# Patient Record
Sex: MALE | Race: WHITE | NOT HISPANIC OR LATINO | Employment: OTHER | URBAN - METROPOLITAN AREA
[De-identification: names, ages, dates, MRNs, and addresses within clinical notes are randomized per-mention and may not be internally consistent; named-entity substitution may affect disease eponyms.]

---

## 2017-01-04 ENCOUNTER — HOSPITAL ENCOUNTER (OUTPATIENT)
Dept: ULTRASOUND IMAGING | Facility: HOSPITAL | Age: 67
Discharge: HOME | End: 2017-01-04
Attending: OTOLARYNGOLOGY

## 2017-01-04 DIAGNOSIS — E04.1 THYROID NODULE: ICD-10-CM

## 2017-02-14 ENCOUNTER — COMMUNICATION - HEALTHEAST (OUTPATIENT)
Dept: CARDIOLOGY | Facility: CLINIC | Age: 67
End: 2017-02-14

## 2017-02-14 DIAGNOSIS — I10 HTN (HYPERTENSION): ICD-10-CM

## 2017-02-28 ENCOUNTER — OFFICE VISIT - HEALTHEAST (OUTPATIENT)
Dept: OTOLARYNGOLOGY | Facility: CLINIC | Age: 67
End: 2017-02-28

## 2017-02-28 DIAGNOSIS — R04.0 EPISTAXIS: ICD-10-CM

## 2017-02-28 ASSESSMENT — MIFFLIN-ST. JEOR: SCORE: 1409.43

## 2017-06-15 ENCOUNTER — OFFICE VISIT - HEALTHEAST (OUTPATIENT)
Dept: FAMILY MEDICINE | Facility: CLINIC | Age: 67
End: 2017-06-15

## 2017-06-15 DIAGNOSIS — G50.0 TRIGEMINAL NEURALGIA: ICD-10-CM

## 2017-06-27 ENCOUNTER — HOSPITAL ENCOUNTER (OUTPATIENT)
Dept: MRI IMAGING | Facility: HOSPITAL | Age: 67
Discharge: HOME | End: 2017-06-27
Attending: FAMILY MEDICINE

## 2017-06-27 DIAGNOSIS — G50.0 TRIGEMINAL NEURALGIA: ICD-10-CM

## 2017-07-03 ENCOUNTER — COMMUNICATION - HEALTHEAST (OUTPATIENT)
Dept: FAMILY MEDICINE | Facility: CLINIC | Age: 67
End: 2017-07-03

## 2017-07-12 ENCOUNTER — COMMUNICATION - HEALTHEAST (OUTPATIENT)
Dept: CARDIOLOGY | Facility: CLINIC | Age: 67
End: 2017-07-12

## 2017-07-12 DIAGNOSIS — I10 ESSENTIAL HYPERTENSION: ICD-10-CM

## 2017-07-14 ENCOUNTER — OFFICE VISIT - HEALTHEAST (OUTPATIENT)
Dept: FAMILY MEDICINE | Facility: CLINIC | Age: 67
End: 2017-07-14

## 2017-07-14 DIAGNOSIS — J32.9 SINUSITIS: ICD-10-CM

## 2017-07-14 ASSESSMENT — MIFFLIN-ST. JEOR: SCORE: 1423.04

## 2017-07-24 ENCOUNTER — OFFICE VISIT - HEALTHEAST (OUTPATIENT)
Dept: FAMILY MEDICINE | Facility: CLINIC | Age: 67
End: 2017-07-24

## 2017-07-24 ENCOUNTER — RECORDS - HEALTHEAST (OUTPATIENT)
Dept: GENERAL RADIOLOGY | Facility: CLINIC | Age: 67
End: 2017-07-24

## 2017-07-24 DIAGNOSIS — Z23 NEED FOR PNEUMOCOCCAL VACCINE: ICD-10-CM

## 2017-07-24 DIAGNOSIS — R05.9 COUGH: ICD-10-CM

## 2017-07-24 DIAGNOSIS — G50.0 TRIGEMINAL NEURALGIA: ICD-10-CM

## 2017-07-24 DIAGNOSIS — Z71.2 ENCOUNTER TO DISCUSS TEST RESULTS: ICD-10-CM

## 2017-07-24 DIAGNOSIS — J40 BRONCHITIS: ICD-10-CM

## 2017-07-24 ASSESSMENT — MIFFLIN-ST. JEOR: SCORE: 1418.51

## 2017-07-28 ENCOUNTER — AMBULATORY - HEALTHEAST (OUTPATIENT)
Dept: FAMILY MEDICINE | Facility: CLINIC | Age: 67
End: 2017-07-28

## 2017-07-28 ENCOUNTER — COMMUNICATION - HEALTHEAST (OUTPATIENT)
Dept: FAMILY MEDICINE | Facility: CLINIC | Age: 67
End: 2017-07-28

## 2017-08-11 ENCOUNTER — COMMUNICATION - HEALTHEAST (OUTPATIENT)
Dept: FAMILY MEDICINE | Facility: CLINIC | Age: 67
End: 2017-08-11

## 2017-08-11 DIAGNOSIS — G50.0 TRIGEMINAL NEURALGIA: ICD-10-CM

## 2017-08-14 ENCOUNTER — COMMUNICATION - HEALTHEAST (OUTPATIENT)
Dept: FAMILY MEDICINE | Facility: CLINIC | Age: 67
End: 2017-08-14

## 2017-08-14 DIAGNOSIS — G50.0 TRIGEMINAL NEURALGIA: ICD-10-CM

## 2017-08-19 ENCOUNTER — COMMUNICATION - HEALTHEAST (OUTPATIENT)
Dept: FAMILY MEDICINE | Facility: CLINIC | Age: 67
End: 2017-08-19

## 2017-08-19 DIAGNOSIS — I10 HYPERTENSION: ICD-10-CM

## 2017-08-21 ENCOUNTER — AMBULATORY - HEALTHEAST (OUTPATIENT)
Dept: CARDIOLOGY | Facility: CLINIC | Age: 67
End: 2017-08-21

## 2017-08-21 ENCOUNTER — COMMUNICATION - HEALTHEAST (OUTPATIENT)
Dept: FAMILY MEDICINE | Facility: CLINIC | Age: 67
End: 2017-08-21

## 2017-08-21 ENCOUNTER — AMBULATORY - HEALTHEAST (OUTPATIENT)
Dept: FAMILY MEDICINE | Facility: CLINIC | Age: 67
End: 2017-08-21

## 2017-08-21 DIAGNOSIS — I25.10 CAD (CORONARY ARTERY DISEASE): ICD-10-CM

## 2017-08-21 DIAGNOSIS — E78.5 HYPERLIPIDEMIA: ICD-10-CM

## 2017-08-27 ENCOUNTER — COMMUNICATION - HEALTHEAST (OUTPATIENT)
Dept: FAMILY MEDICINE | Facility: CLINIC | Age: 67
End: 2017-08-27

## 2017-08-27 DIAGNOSIS — G50.0 TRIGEMINAL NEURALGIA: ICD-10-CM

## 2017-09-08 ENCOUNTER — AMBULATORY - HEALTHEAST (OUTPATIENT)
Dept: CARDIOLOGY | Facility: CLINIC | Age: 67
End: 2017-09-08

## 2017-09-08 DIAGNOSIS — I25.10 CAD (CORONARY ARTERY DISEASE): ICD-10-CM

## 2017-09-08 DIAGNOSIS — E78.5 HYPERLIPIDEMIA: ICD-10-CM

## 2017-09-08 LAB
CHOLEST SERPL-MCNC: 282 MG/DL
FASTING STATUS PATIENT QL REPORTED: YES
HDLC SERPL-MCNC: 38 MG/DL
LDLC SERPL CALC-MCNC: 217 MG/DL
TRIGL SERPL-MCNC: 133 MG/DL

## 2017-09-11 ENCOUNTER — AMBULATORY - HEALTHEAST (OUTPATIENT)
Dept: CARDIOLOGY | Facility: CLINIC | Age: 67
End: 2017-09-11

## 2017-09-11 DIAGNOSIS — E78.5 HYPERLIPIDEMIA: ICD-10-CM

## 2017-09-25 ENCOUNTER — COMMUNICATION - HEALTHEAST (OUTPATIENT)
Dept: FAMILY MEDICINE | Facility: CLINIC | Age: 67
End: 2017-09-25

## 2017-09-28 ENCOUNTER — COMMUNICATION - HEALTHEAST (OUTPATIENT)
Dept: FAMILY MEDICINE | Facility: CLINIC | Age: 67
End: 2017-09-28

## 2017-09-28 DIAGNOSIS — G50.0 TRIGEMINAL NEURALGIA: ICD-10-CM

## 2017-10-09 ENCOUNTER — COMMUNICATION - HEALTHEAST (OUTPATIENT)
Dept: CARDIOLOGY | Facility: CLINIC | Age: 67
End: 2017-10-09

## 2017-10-09 DIAGNOSIS — I10 ESSENTIAL HYPERTENSION: ICD-10-CM

## 2017-10-26 ENCOUNTER — OFFICE VISIT - HEALTHEAST (OUTPATIENT)
Dept: CARDIOLOGY | Facility: CLINIC | Age: 67
End: 2017-10-26

## 2017-10-26 DIAGNOSIS — E04.1 THYROID NODULE: ICD-10-CM

## 2017-10-26 DIAGNOSIS — Z95.1 S/P CABG X 3: ICD-10-CM

## 2017-10-26 DIAGNOSIS — M79.10 MUSCLE SORENESS: ICD-10-CM

## 2017-10-26 DIAGNOSIS — I25.83 CORONARY ARTERY DISEASE DUE TO LIPID RICH PLAQUE: ICD-10-CM

## 2017-10-26 DIAGNOSIS — I10 ESSENTIAL HYPERTENSION: ICD-10-CM

## 2017-10-26 DIAGNOSIS — I25.10 CORONARY ARTERY DISEASE DUE TO LIPID RICH PLAQUE: ICD-10-CM

## 2017-10-26 ASSESSMENT — MIFFLIN-ST. JEOR: SCORE: 1436.65

## 2017-10-30 ENCOUNTER — HOSPITAL ENCOUNTER (OUTPATIENT)
Dept: CT IMAGING | Facility: HOSPITAL | Age: 67
Setting detail: RADIATION/ONCOLOGY SERIES
Discharge: STILL ADMITTED OR ADMINISTRATIVE | End: 2017-10-30
Attending: INTERNAL MEDICINE

## 2017-10-30 DIAGNOSIS — C7A.090 ATYPICAL CARCINOID LUNG TUMOR (H): ICD-10-CM

## 2017-10-31 ENCOUNTER — OFFICE VISIT - HEALTHEAST (OUTPATIENT)
Dept: ONCOLOGY | Facility: HOSPITAL | Age: 67
End: 2017-10-31

## 2017-10-31 ENCOUNTER — AMBULATORY - HEALTHEAST (OUTPATIENT)
Dept: INFUSION THERAPY | Facility: HOSPITAL | Age: 67
End: 2017-10-31

## 2017-10-31 DIAGNOSIS — I71.40 ABDOMINAL AORTIC ANEURYSM (AAA) WITHOUT RUPTURE (H): ICD-10-CM

## 2017-10-31 DIAGNOSIS — C7A.090 ATYPICAL CARCINOID LUNG TUMOR (H): ICD-10-CM

## 2017-10-31 ASSESSMENT — MIFFLIN-ST. JEOR: SCORE: 1435.74

## 2017-11-01 ENCOUNTER — COMMUNICATION - HEALTHEAST (OUTPATIENT)
Dept: CARDIOLOGY | Facility: CLINIC | Age: 67
End: 2017-11-01

## 2017-11-01 ENCOUNTER — COMMUNICATION - HEALTHEAST (OUTPATIENT)
Dept: ONCOLOGY | Facility: HOSPITAL | Age: 67
End: 2017-11-01

## 2017-11-03 ENCOUNTER — COMMUNICATION - HEALTHEAST (OUTPATIENT)
Dept: ONCOLOGY | Facility: CLINIC | Age: 67
End: 2017-11-03

## 2017-11-03 ENCOUNTER — AMBULATORY - HEALTHEAST (OUTPATIENT)
Dept: ONCOLOGY | Facility: CLINIC | Age: 67
End: 2017-11-03

## 2017-11-07 ENCOUNTER — COMMUNICATION - HEALTHEAST (OUTPATIENT)
Dept: CARDIOLOGY | Facility: CLINIC | Age: 67
End: 2017-11-07

## 2017-11-07 DIAGNOSIS — I10 HTN (HYPERTENSION): ICD-10-CM

## 2017-11-13 ENCOUNTER — RECORDS - HEALTHEAST (OUTPATIENT)
Dept: ADMINISTRATIVE | Facility: OTHER | Age: 67
End: 2017-11-13

## 2017-11-14 ENCOUNTER — RECORDS - HEALTHEAST (OUTPATIENT)
Dept: ADMINISTRATIVE | Facility: OTHER | Age: 67
End: 2017-11-14

## 2017-12-18 ENCOUNTER — COMMUNICATION - HEALTHEAST (OUTPATIENT)
Dept: FAMILY MEDICINE | Facility: CLINIC | Age: 67
End: 2017-12-18

## 2017-12-18 DIAGNOSIS — I10 HTN (HYPERTENSION): ICD-10-CM

## 2018-01-05 ENCOUNTER — COMMUNICATION - HEALTHEAST (OUTPATIENT)
Dept: CARDIOLOGY | Facility: CLINIC | Age: 68
End: 2018-01-05

## 2018-01-05 DIAGNOSIS — I10 ESSENTIAL HYPERTENSION: ICD-10-CM

## 2018-01-15 ENCOUNTER — COMMUNICATION - HEALTHEAST (OUTPATIENT)
Dept: FAMILY MEDICINE | Facility: CLINIC | Age: 68
End: 2018-01-15

## 2018-01-15 ENCOUNTER — AMBULATORY - HEALTHEAST (OUTPATIENT)
Dept: FAMILY MEDICINE | Facility: CLINIC | Age: 68
End: 2018-01-15

## 2018-01-29 ENCOUNTER — COMMUNICATION - HEALTHEAST (OUTPATIENT)
Dept: TELEHEALTH | Facility: CLINIC | Age: 68
End: 2018-01-29

## 2018-01-29 ENCOUNTER — AMBULATORY - HEALTHEAST (OUTPATIENT)
Dept: LAB | Facility: CLINIC | Age: 68
End: 2018-01-29

## 2018-01-29 DIAGNOSIS — I25.83 CORONARY ARTERY DISEASE DUE TO LIPID RICH PLAQUE: ICD-10-CM

## 2018-01-29 DIAGNOSIS — I25.10 CORONARY ARTERY DISEASE DUE TO LIPID RICH PLAQUE: ICD-10-CM

## 2018-01-29 LAB
CHOLEST SERPL-MCNC: 219 MG/DL
FASTING STATUS PATIENT QL REPORTED: YES
HDLC SERPL-MCNC: 39 MG/DL
LDLC SERPL CALC-MCNC: 154 MG/DL
TRIGL SERPL-MCNC: 128 MG/DL

## 2018-02-06 ENCOUNTER — COMMUNICATION - HEALTHEAST (OUTPATIENT)
Dept: CARDIOLOGY | Facility: CLINIC | Age: 68
End: 2018-02-06

## 2018-02-20 ENCOUNTER — COMMUNICATION - HEALTHEAST (OUTPATIENT)
Dept: CARDIOLOGY | Facility: CLINIC | Age: 68
End: 2018-02-20

## 2018-02-27 ENCOUNTER — RECORDS - HEALTHEAST (OUTPATIENT)
Dept: GENERAL RADIOLOGY | Facility: CLINIC | Age: 68
End: 2018-02-27

## 2018-02-27 ENCOUNTER — OFFICE VISIT - HEALTHEAST (OUTPATIENT)
Dept: FAMILY MEDICINE | Facility: CLINIC | Age: 68
End: 2018-02-27

## 2018-02-27 DIAGNOSIS — R10.9 ABDOMINAL PAIN: ICD-10-CM

## 2018-02-27 DIAGNOSIS — R10.9 UNSPECIFIED ABDOMINAL PAIN: ICD-10-CM

## 2018-03-05 ENCOUNTER — RECORDS - HEALTHEAST (OUTPATIENT)
Dept: ADMINISTRATIVE | Facility: OTHER | Age: 68
End: 2018-03-05

## 2018-03-28 ENCOUNTER — COMMUNICATION - HEALTHEAST (OUTPATIENT)
Dept: FAMILY MEDICINE | Facility: CLINIC | Age: 68
End: 2018-03-28

## 2018-03-28 DIAGNOSIS — I10 HTN (HYPERTENSION): ICD-10-CM

## 2018-04-03 ENCOUNTER — COMMUNICATION - HEALTHEAST (OUTPATIENT)
Dept: CARDIOLOGY | Facility: CLINIC | Age: 68
End: 2018-04-03

## 2018-04-03 DIAGNOSIS — I10 ESSENTIAL HYPERTENSION: ICD-10-CM

## 2018-05-14 ENCOUNTER — RECORDS - HEALTHEAST (OUTPATIENT)
Dept: ADMINISTRATIVE | Facility: OTHER | Age: 68
End: 2018-05-14

## 2018-06-26 ENCOUNTER — COMMUNICATION - HEALTHEAST (OUTPATIENT)
Dept: FAMILY MEDICINE | Facility: CLINIC | Age: 68
End: 2018-06-26

## 2018-06-26 DIAGNOSIS — I10 HTN (HYPERTENSION): ICD-10-CM

## 2018-07-03 ENCOUNTER — RECORDS - HEALTHEAST (OUTPATIENT)
Dept: ADMINISTRATIVE | Facility: OTHER | Age: 68
End: 2018-07-03

## 2018-07-13 ENCOUNTER — RECORDS - HEALTHEAST (OUTPATIENT)
Dept: ADMINISTRATIVE | Facility: OTHER | Age: 68
End: 2018-07-13

## 2018-09-26 ENCOUNTER — COMMUNICATION - HEALTHEAST (OUTPATIENT)
Dept: FAMILY MEDICINE | Facility: CLINIC | Age: 68
End: 2018-09-26

## 2018-09-26 DIAGNOSIS — I10 HTN (HYPERTENSION): ICD-10-CM

## 2018-10-25 ENCOUNTER — COMMUNICATION - HEALTHEAST (OUTPATIENT)
Dept: ONCOLOGY | Facility: HOSPITAL | Age: 68
End: 2018-10-25

## 2018-10-25 ENCOUNTER — OFFICE VISIT - HEALTHEAST (OUTPATIENT)
Dept: FAMILY MEDICINE | Facility: CLINIC | Age: 68
End: 2018-10-25

## 2018-10-25 DIAGNOSIS — C7A.090 ATYPICAL CARCINOID LUNG TUMOR (H): ICD-10-CM

## 2018-10-25 DIAGNOSIS — R68.2 DRY MOUTH: ICD-10-CM

## 2018-10-25 DIAGNOSIS — E55.9 VITAMIN D DEFICIENCY: ICD-10-CM

## 2018-10-25 DIAGNOSIS — E78.2 MIXED HYPERLIPIDEMIA: ICD-10-CM

## 2018-10-25 DIAGNOSIS — Z00.00 ROUTINE GENERAL MEDICAL EXAMINATION AT A HEALTH CARE FACILITY: ICD-10-CM

## 2018-10-25 DIAGNOSIS — M79.10 MYALGIA: ICD-10-CM

## 2018-10-25 LAB
ALBUMIN SERPL-MCNC: 3.8 G/DL (ref 3.5–5)
ALP SERPL-CCNC: 66 U/L (ref 45–120)
ALT SERPL W P-5'-P-CCNC: 12 U/L (ref 0–45)
ANION GAP SERPL CALCULATED.3IONS-SCNC: 12 MMOL/L (ref 5–18)
AST SERPL W P-5'-P-CCNC: 20 U/L (ref 0–40)
BASOPHILS # BLD AUTO: 0.1 THOU/UL (ref 0–0.2)
BASOPHILS NFR BLD AUTO: 1 % (ref 0–2)
BILIRUB SERPL-MCNC: 0.5 MG/DL (ref 0–1)
BUN SERPL-MCNC: 20 MG/DL (ref 8–22)
C REACTIVE PROTEIN LHE: 0.2 MG/DL (ref 0–0.8)
CALCIUM SERPL-MCNC: 9.9 MG/DL (ref 8.5–10.5)
CHLORIDE BLD-SCNC: 106 MMOL/L (ref 98–107)
CHOLEST SERPL-MCNC: 244 MG/DL
CO2 SERPL-SCNC: 21 MMOL/L (ref 22–31)
CREAT SERPL-MCNC: 0.83 MG/DL (ref 0.7–1.3)
EOSINOPHIL # BLD AUTO: 0.2 THOU/UL (ref 0–0.4)
EOSINOPHIL NFR BLD AUTO: 3 % (ref 0–6)
ERYTHROCYTE [DISTWIDTH] IN BLOOD BY AUTOMATED COUNT: 13.2 % (ref 11–14.5)
FASTING STATUS PATIENT QL REPORTED: YES
GFR SERPL CREATININE-BSD FRML MDRD: >60 ML/MIN/1.73M2
GLUCOSE BLD-MCNC: 80 MG/DL (ref 70–125)
HCT VFR BLD AUTO: 49.2 % (ref 40–54)
HDLC SERPL-MCNC: 42 MG/DL
HGB BLD-MCNC: 16.4 G/DL (ref 14–18)
LDLC SERPL CALC-MCNC: 182 MG/DL
LYMPHOCYTES # BLD AUTO: 1.7 THOU/UL (ref 0.8–4.4)
LYMPHOCYTES NFR BLD AUTO: 31 % (ref 20–40)
MCH RBC QN AUTO: 30.1 PG (ref 27–34)
MCHC RBC AUTO-ENTMCNC: 33.4 G/DL (ref 32–36)
MCV RBC AUTO: 90 FL (ref 80–100)
MONOCYTES # BLD AUTO: 0.4 THOU/UL (ref 0–0.9)
MONOCYTES NFR BLD AUTO: 7 % (ref 2–10)
NEUTROPHILS # BLD AUTO: 3.3 THOU/UL (ref 2–7.7)
NEUTROPHILS NFR BLD AUTO: 58 % (ref 50–70)
PLATELET # BLD AUTO: 261 THOU/UL (ref 140–440)
PMV BLD AUTO: 6.8 FL (ref 7–10)
POTASSIUM BLD-SCNC: 4.7 MMOL/L (ref 3.5–5)
PROT SERPL-MCNC: 7.2 G/DL (ref 6–8)
RBC # BLD AUTO: 5.45 MILL/UL (ref 4.4–6.2)
SODIUM SERPL-SCNC: 139 MMOL/L (ref 136–145)
T4 FREE SERPL-MCNC: 1 NG/DL (ref 0.7–1.8)
TRIGL SERPL-MCNC: 101 MG/DL
TSH SERPL DL<=0.005 MIU/L-ACNC: 1.25 UIU/ML (ref 0.3–5)
WBC: 5.6 THOU/UL (ref 4–11)

## 2018-10-25 ASSESSMENT — MIFFLIN-ST. JEOR: SCORE: 1428.71

## 2018-10-26 LAB
25(OH)D3 SERPL-MCNC: 19.1 NG/ML (ref 30–80)
25(OH)D3 SERPL-MCNC: 19.1 NG/ML (ref 30–80)

## 2018-10-29 LAB — ANA SER QL: 0.2 U

## 2018-10-30 ENCOUNTER — COMMUNICATION - HEALTHEAST (OUTPATIENT)
Dept: FAMILY MEDICINE | Facility: CLINIC | Age: 68
End: 2018-10-30

## 2018-10-30 DIAGNOSIS — I10 HTN (HYPERTENSION): ICD-10-CM

## 2018-10-30 DIAGNOSIS — E55.9 VITAMIN D DEFICIENCY: ICD-10-CM

## 2018-11-01 ENCOUNTER — RECORDS - HEALTHEAST (OUTPATIENT)
Dept: ADMINISTRATIVE | Facility: OTHER | Age: 68
End: 2018-11-01

## 2018-11-05 ENCOUNTER — COMMUNICATION - HEALTHEAST (OUTPATIENT)
Dept: ONCOLOGY | Facility: HOSPITAL | Age: 68
End: 2018-11-05

## 2018-11-05 ENCOUNTER — AMBULATORY - HEALTHEAST (OUTPATIENT)
Dept: ONCOLOGY | Facility: HOSPITAL | Age: 68
End: 2018-11-05

## 2018-11-05 DIAGNOSIS — C7A.090 ATYPICAL CARCINOID LUNG TUMOR (H): ICD-10-CM

## 2018-11-07 ENCOUNTER — COMMUNICATION - HEALTHEAST (OUTPATIENT)
Dept: CARDIOLOGY | Facility: CLINIC | Age: 68
End: 2018-11-07

## 2018-11-07 DIAGNOSIS — I10 HTN (HYPERTENSION): ICD-10-CM

## 2018-12-06 ENCOUNTER — COMMUNICATION - HEALTHEAST (OUTPATIENT)
Dept: CARDIOLOGY | Facility: CLINIC | Age: 68
End: 2018-12-06

## 2018-12-06 DIAGNOSIS — I10 HTN (HYPERTENSION): ICD-10-CM

## 2018-12-17 ENCOUNTER — HOSPITAL ENCOUNTER (OUTPATIENT)
Dept: CT IMAGING | Facility: HOSPITAL | Age: 68
Setting detail: RADIATION/ONCOLOGY SERIES
Discharge: STILL ADMITTED OR ADMINISTRATIVE | End: 2018-12-17
Attending: INTERNAL MEDICINE

## 2018-12-17 DIAGNOSIS — C7A.090 ATYPICAL CARCINOID LUNG TUMOR (H): ICD-10-CM

## 2018-12-17 LAB
CREAT BLD-MCNC: 1 MG/DL
POC GFR AMER AF HE - HISTORICAL: >60 ML/MIN/1.73M2
POC GFR NON AMER AF HE - HISTORICAL: >60 ML/MIN/1.73M2

## 2018-12-19 ENCOUNTER — AMBULATORY - HEALTHEAST (OUTPATIENT)
Dept: INFUSION THERAPY | Facility: HOSPITAL | Age: 68
End: 2018-12-19

## 2018-12-19 ENCOUNTER — OFFICE VISIT - HEALTHEAST (OUTPATIENT)
Dept: ONCOLOGY | Facility: HOSPITAL | Age: 68
End: 2018-12-19

## 2018-12-19 DIAGNOSIS — E04.1 THYROID NODULE: ICD-10-CM

## 2018-12-19 DIAGNOSIS — C7A.090 ATYPICAL CARCINOID LUNG TUMOR (H): ICD-10-CM

## 2018-12-19 DIAGNOSIS — K76.9 LESION OF LIVER: ICD-10-CM

## 2018-12-19 LAB
ALBUMIN SERPL-MCNC: 3.6 G/DL (ref 3.5–5)
ALP SERPL-CCNC: 57 U/L (ref 45–120)
ALT SERPL W P-5'-P-CCNC: <9 U/L (ref 0–45)
ANION GAP SERPL CALCULATED.3IONS-SCNC: 4 MMOL/L (ref 5–18)
AST SERPL W P-5'-P-CCNC: 17 U/L (ref 0–40)
BASOPHILS # BLD AUTO: 0.1 THOU/UL (ref 0–0.2)
BASOPHILS NFR BLD AUTO: 1 % (ref 0–2)
BILIRUB SERPL-MCNC: 0.6 MG/DL (ref 0–1)
BUN SERPL-MCNC: 18 MG/DL (ref 8–28)
CALCIUM SERPL-MCNC: 9.6 MG/DL (ref 8.5–10.5)
CHLORIDE BLD-SCNC: 108 MMOL/L (ref 98–107)
CO2 SERPL-SCNC: 29 MMOL/L (ref 22–31)
CREAT SERPL-MCNC: 0.91 MG/DL (ref 0.7–1.3)
EOSINOPHIL # BLD AUTO: 0.2 THOU/UL (ref 0–0.4)
EOSINOPHIL NFR BLD AUTO: 4 % (ref 0–6)
ERYTHROCYTE [DISTWIDTH] IN BLOOD BY AUTOMATED COUNT: 13.6 % (ref 11–14.5)
GFR SERPL CREATININE-BSD FRML MDRD: >60 ML/MIN/1.73M2
GLUCOSE BLD-MCNC: 63 MG/DL (ref 70–125)
HCT VFR BLD AUTO: 45 % (ref 40–54)
HGB BLD-MCNC: 15.6 G/DL (ref 14–18)
LYMPHOCYTES # BLD AUTO: 1.2 THOU/UL (ref 0.8–4.4)
LYMPHOCYTES NFR BLD AUTO: 24 % (ref 20–40)
MCH RBC QN AUTO: 30.8 PG (ref 27–34)
MCHC RBC AUTO-ENTMCNC: 34.7 G/DL (ref 32–36)
MCV RBC AUTO: 89 FL (ref 80–100)
MONOCYTES # BLD AUTO: 0.4 THOU/UL (ref 0–0.9)
MONOCYTES NFR BLD AUTO: 8 % (ref 2–10)
NEUTROPHILS # BLD AUTO: 3.3 THOU/UL (ref 2–7.7)
NEUTROPHILS NFR BLD AUTO: 63 % (ref 50–70)
PLATELET # BLD AUTO: 230 THOU/UL (ref 140–440)
PMV BLD AUTO: 8.8 FL (ref 8.5–12.5)
POTASSIUM BLD-SCNC: 3.8 MMOL/L (ref 3.5–5)
PROT SERPL-MCNC: 6.8 G/DL (ref 6–8)
RBC # BLD AUTO: 5.06 MILL/UL (ref 4.4–6.2)
SODIUM SERPL-SCNC: 141 MMOL/L (ref 136–145)
WBC: 5.2 THOU/UL (ref 4–11)

## 2018-12-27 ENCOUNTER — COMMUNICATION - HEALTHEAST (OUTPATIENT)
Dept: CARDIOLOGY | Facility: CLINIC | Age: 68
End: 2018-12-27

## 2018-12-27 DIAGNOSIS — I10 ESSENTIAL HYPERTENSION: ICD-10-CM

## 2019-02-07 ENCOUNTER — OFFICE VISIT - HEALTHEAST (OUTPATIENT)
Dept: FAMILY MEDICINE | Facility: CLINIC | Age: 69
End: 2019-02-07

## 2019-02-07 DIAGNOSIS — S93.491A SPRAIN OF ANTERIOR TALOFIBULAR LIGAMENT OF RIGHT ANKLE, INITIAL ENCOUNTER: ICD-10-CM

## 2019-02-15 ENCOUNTER — AMBULATORY - HEALTHEAST (OUTPATIENT)
Dept: LAB | Facility: CLINIC | Age: 69
End: 2019-02-15

## 2019-02-15 DIAGNOSIS — E55.9 VITAMIN D DEFICIENCY: ICD-10-CM

## 2019-02-18 LAB
25(OH)D3 SERPL-MCNC: 44.9 NG/ML (ref 30–80)
25(OH)D3 SERPL-MCNC: 44.9 NG/ML (ref 30–80)

## 2019-03-19 ENCOUNTER — HOSPITAL ENCOUNTER (OUTPATIENT)
Dept: MRI IMAGING | Facility: HOSPITAL | Age: 69
Setting detail: RADIATION/ONCOLOGY SERIES
Discharge: STILL ADMITTED OR ADMINISTRATIVE | End: 2019-03-19
Attending: INTERNAL MEDICINE

## 2019-03-19 ENCOUNTER — HOSPITAL ENCOUNTER (OUTPATIENT)
Dept: ULTRASOUND IMAGING | Facility: HOSPITAL | Age: 69
Setting detail: RADIATION/ONCOLOGY SERIES
Discharge: STILL ADMITTED OR ADMINISTRATIVE | End: 2019-03-19
Attending: INTERNAL MEDICINE

## 2019-03-19 DIAGNOSIS — C7A.090 ATYPICAL CARCINOID LUNG TUMOR (H): ICD-10-CM

## 2019-03-19 LAB
CREAT BLD-MCNC: 0.8 MG/DL (ref 0.7–1.3)
CREAT BLD-MCNC: 0.9 MG/DL
POC GFR AMER AF HE - HISTORICAL: >60 ML/MIN/1.73M2
POC GFR NON AMER AF HE - HISTORICAL: >60 ML/MIN/1.73M2

## 2019-03-22 ENCOUNTER — COMMUNICATION - HEALTHEAST (OUTPATIENT)
Dept: ONCOLOGY | Facility: HOSPITAL | Age: 69
End: 2019-03-22

## 2019-05-02 ENCOUNTER — RECORDS - HEALTHEAST (OUTPATIENT)
Dept: ADMINISTRATIVE | Facility: OTHER | Age: 69
End: 2019-05-02

## 2019-08-09 ENCOUNTER — RECORDS - HEALTHEAST (OUTPATIENT)
Dept: ADMINISTRATIVE | Facility: OTHER | Age: 69
End: 2019-08-09

## 2019-10-30 ENCOUNTER — COMMUNICATION - HEALTHEAST (OUTPATIENT)
Dept: FAMILY MEDICINE | Facility: CLINIC | Age: 69
End: 2019-10-30

## 2019-10-30 DIAGNOSIS — I10 HTN (HYPERTENSION): ICD-10-CM

## 2019-10-31 ENCOUNTER — RECORDS - HEALTHEAST (OUTPATIENT)
Dept: ADMINISTRATIVE | Facility: OTHER | Age: 69
End: 2019-10-31

## 2019-11-19 ENCOUNTER — COMMUNICATION - HEALTHEAST (OUTPATIENT)
Dept: FAMILY MEDICINE | Facility: CLINIC | Age: 69
End: 2019-11-19

## 2019-11-19 DIAGNOSIS — E55.9 VITAMIN D DEFICIENCY: ICD-10-CM

## 2019-11-29 ENCOUNTER — COMMUNICATION - HEALTHEAST (OUTPATIENT)
Dept: CARDIOLOGY | Facility: CLINIC | Age: 69
End: 2019-11-29

## 2019-11-29 DIAGNOSIS — I10 HTN (HYPERTENSION): ICD-10-CM

## 2019-12-15 ENCOUNTER — COMMUNICATION - HEALTHEAST (OUTPATIENT)
Dept: CARDIOLOGY | Facility: CLINIC | Age: 69
End: 2019-12-15

## 2019-12-15 DIAGNOSIS — I10 ESSENTIAL HYPERTENSION: ICD-10-CM

## 2019-12-20 ENCOUNTER — OFFICE VISIT - HEALTHEAST (OUTPATIENT)
Dept: FAMILY MEDICINE | Facility: CLINIC | Age: 69
End: 2019-12-20

## 2019-12-20 DIAGNOSIS — H92.02 LEFT EAR PAIN: ICD-10-CM

## 2019-12-20 ASSESSMENT — MIFFLIN-ST. JEOR: SCORE: 1405.39

## 2019-12-29 ENCOUNTER — COMMUNICATION - HEALTHEAST (OUTPATIENT)
Dept: CARDIOLOGY | Facility: CLINIC | Age: 69
End: 2019-12-29

## 2019-12-29 DIAGNOSIS — I10 HTN (HYPERTENSION): ICD-10-CM

## 2019-12-30 ENCOUNTER — COMMUNICATION - HEALTHEAST (OUTPATIENT)
Dept: CARDIOLOGY | Facility: CLINIC | Age: 69
End: 2019-12-30

## 2019-12-30 DIAGNOSIS — I10 HTN (HYPERTENSION): ICD-10-CM

## 2020-01-07 ENCOUNTER — OFFICE VISIT - HEALTHEAST (OUTPATIENT)
Dept: FAMILY MEDICINE | Facility: CLINIC | Age: 70
End: 2020-01-07

## 2020-01-07 DIAGNOSIS — M79.10 MYALGIA: ICD-10-CM

## 2020-01-07 DIAGNOSIS — I10 ESSENTIAL HYPERTENSION: ICD-10-CM

## 2020-01-07 DIAGNOSIS — E55.9 VITAMIN D DEFICIENCY, UNSPECIFIED: ICD-10-CM

## 2020-01-07 DIAGNOSIS — Z00.00 MEDICARE ANNUAL WELLNESS VISIT, SUBSEQUENT: ICD-10-CM

## 2020-01-07 DIAGNOSIS — Z11.59 NEED FOR HEPATITIS C SCREENING TEST: ICD-10-CM

## 2020-01-07 DIAGNOSIS — I10 HTN (HYPERTENSION): ICD-10-CM

## 2020-01-07 DIAGNOSIS — Z13.220 LIPID SCREENING: ICD-10-CM

## 2020-01-07 LAB
ANION GAP SERPL CALCULATED.3IONS-SCNC: 10 MMOL/L (ref 5–18)
BUN SERPL-MCNC: 21 MG/DL (ref 8–28)
CALCIUM SERPL-MCNC: 10.1 MG/DL (ref 8.5–10.5)
CHLORIDE BLD-SCNC: 107 MMOL/L (ref 98–107)
CHOLEST SERPL-MCNC: 260 MG/DL
CO2 SERPL-SCNC: 24 MMOL/L (ref 22–31)
CREAT SERPL-MCNC: 1.07 MG/DL (ref 0.7–1.3)
FASTING STATUS PATIENT QL REPORTED: YES
GFR SERPL CREATININE-BSD FRML MDRD: >60 ML/MIN/1.73M2
GLUCOSE BLD-MCNC: 89 MG/DL (ref 70–125)
HDLC SERPL-MCNC: 48 MG/DL
LDLC SERPL CALC-MCNC: 187 MG/DL
POTASSIUM BLD-SCNC: 4.8 MMOL/L (ref 3.5–5)
SODIUM SERPL-SCNC: 141 MMOL/L (ref 136–145)
TRIGL SERPL-MCNC: 123 MG/DL

## 2020-01-07 ASSESSMENT — MIFFLIN-ST. JEOR: SCORE: 1401.6

## 2020-01-08 LAB
25(OH)D3 SERPL-MCNC: 28.9 NG/ML (ref 30–80)
25(OH)D3 SERPL-MCNC: 28.9 NG/ML (ref 30–80)
HCV AB SERPL QL IA: NEGATIVE

## 2020-01-15 ENCOUNTER — AMBULATORY - HEALTHEAST (OUTPATIENT)
Dept: ONCOLOGY | Facility: HOSPITAL | Age: 70
End: 2020-01-15

## 2020-01-15 DIAGNOSIS — C7A.090 ATYPICAL CARCINOID LUNG TUMOR (H): ICD-10-CM

## 2020-01-20 ENCOUNTER — COMMUNICATION - HEALTHEAST (OUTPATIENT)
Dept: FAMILY MEDICINE | Facility: CLINIC | Age: 70
End: 2020-01-20

## 2020-01-20 DIAGNOSIS — Z95.1 S/P CABG X 3: ICD-10-CM

## 2020-01-20 DIAGNOSIS — E78.2 MIXED HYPERLIPIDEMIA: ICD-10-CM

## 2020-01-29 ENCOUNTER — HOSPITAL ENCOUNTER (OUTPATIENT)
Dept: CT IMAGING | Facility: HOSPITAL | Age: 70
Discharge: HOME | End: 2020-01-29
Attending: INTERNAL MEDICINE

## 2020-01-29 DIAGNOSIS — C7A.090 ATYPICAL CARCINOID LUNG TUMOR (H): ICD-10-CM

## 2020-01-31 ENCOUNTER — OFFICE VISIT - HEALTHEAST (OUTPATIENT)
Dept: ONCOLOGY | Facility: HOSPITAL | Age: 70
End: 2020-01-31

## 2020-01-31 ENCOUNTER — AMBULATORY - HEALTHEAST (OUTPATIENT)
Dept: INFUSION THERAPY | Facility: HOSPITAL | Age: 70
End: 2020-01-31

## 2020-01-31 DIAGNOSIS — C7A.090 ATYPICAL CARCINOID LUNG TUMOR (H): ICD-10-CM

## 2020-01-31 LAB
ALBUMIN SERPL-MCNC: 3.6 G/DL (ref 3.5–5)
ALP SERPL-CCNC: 53 U/L (ref 45–120)
ALT SERPL W P-5'-P-CCNC: <9 U/L (ref 0–45)
ANION GAP SERPL CALCULATED.3IONS-SCNC: 5 MMOL/L (ref 5–18)
AST SERPL W P-5'-P-CCNC: 16 U/L (ref 0–40)
BASOPHILS # BLD AUTO: 0.1 THOU/UL (ref 0–0.2)
BASOPHILS NFR BLD AUTO: 1 % (ref 0–2)
BILIRUB SERPL-MCNC: 0.4 MG/DL (ref 0–1)
BUN SERPL-MCNC: 14 MG/DL (ref 8–28)
CALCIUM SERPL-MCNC: 9.5 MG/DL (ref 8.5–10.5)
CHLORIDE BLD-SCNC: 106 MMOL/L (ref 98–107)
CO2 SERPL-SCNC: 29 MMOL/L (ref 22–31)
CREAT SERPL-MCNC: 0.96 MG/DL (ref 0.7–1.3)
EOSINOPHIL # BLD AUTO: 0.2 THOU/UL (ref 0–0.4)
EOSINOPHIL NFR BLD AUTO: 4 % (ref 0–6)
ERYTHROCYTE [DISTWIDTH] IN BLOOD BY AUTOMATED COUNT: 13.9 % (ref 11–14.5)
GFR SERPL CREATININE-BSD FRML MDRD: >60 ML/MIN/1.73M2
GLUCOSE BLD-MCNC: 60 MG/DL (ref 70–125)
HCT VFR BLD AUTO: 43 % (ref 40–54)
HGB BLD-MCNC: 14.1 G/DL (ref 14–18)
LYMPHOCYTES # BLD AUTO: 1.2 THOU/UL (ref 0.8–4.4)
LYMPHOCYTES NFR BLD AUTO: 23 % (ref 20–40)
MCH RBC QN AUTO: 28.4 PG (ref 27–34)
MCHC RBC AUTO-ENTMCNC: 32.8 G/DL (ref 32–36)
MCV RBC AUTO: 87 FL (ref 80–100)
MONOCYTES # BLD AUTO: 0.6 THOU/UL (ref 0–0.9)
MONOCYTES NFR BLD AUTO: 12 % (ref 2–10)
NEUTROPHILS # BLD AUTO: 3.3 THOU/UL (ref 2–7.7)
NEUTROPHILS NFR BLD AUTO: 61 % (ref 50–70)
PLATELET # BLD AUTO: 268 THOU/UL (ref 140–440)
PMV BLD AUTO: 8.7 FL (ref 8.5–12.5)
POTASSIUM BLD-SCNC: 3.7 MMOL/L (ref 3.5–5)
PROT SERPL-MCNC: 6.9 G/DL (ref 6–8)
RBC # BLD AUTO: 4.96 MILL/UL (ref 4.4–6.2)
SODIUM SERPL-SCNC: 140 MMOL/L (ref 136–145)
WBC: 5.4 THOU/UL (ref 4–11)

## 2020-01-31 ASSESSMENT — MIFFLIN-ST. JEOR: SCORE: 1410.1

## 2020-04-10 ENCOUNTER — COMMUNICATION - HEALTHEAST (OUTPATIENT)
Dept: FAMILY MEDICINE | Facility: CLINIC | Age: 70
End: 2020-04-10

## 2020-04-10 DIAGNOSIS — E55.9 VITAMIN D DEFICIENCY: ICD-10-CM

## 2020-04-30 ENCOUNTER — RECORDS - HEALTHEAST (OUTPATIENT)
Dept: ADMINISTRATIVE | Facility: OTHER | Age: 70
End: 2020-04-30

## 2020-07-07 ENCOUNTER — COMMUNICATION - HEALTHEAST (OUTPATIENT)
Dept: FAMILY MEDICINE | Facility: CLINIC | Age: 70
End: 2020-07-07

## 2020-07-07 ENCOUNTER — OFFICE VISIT - HEALTHEAST (OUTPATIENT)
Dept: FAMILY MEDICINE | Facility: CLINIC | Age: 70
End: 2020-07-07

## 2020-07-07 DIAGNOSIS — E78.2 MIXED HYPERLIPIDEMIA: ICD-10-CM

## 2020-07-07 DIAGNOSIS — I10 ESSENTIAL HYPERTENSION: ICD-10-CM

## 2020-07-07 DIAGNOSIS — E55.9 VITAMIN D DEFICIENCY: ICD-10-CM

## 2020-07-15 ENCOUNTER — AMBULATORY - HEALTHEAST (OUTPATIENT)
Dept: LAB | Facility: CLINIC | Age: 70
End: 2020-07-15

## 2020-07-15 DIAGNOSIS — E55.9 VITAMIN D DEFICIENCY: ICD-10-CM

## 2020-07-15 DIAGNOSIS — E78.2 MIXED HYPERLIPIDEMIA: ICD-10-CM

## 2020-07-15 DIAGNOSIS — Z95.1 S/P CABG X 3: ICD-10-CM

## 2020-07-15 DIAGNOSIS — C7A.090 ATYPICAL CARCINOID LUNG TUMOR (H): ICD-10-CM

## 2020-07-15 LAB
ALBUMIN SERPL-MCNC: 3.6 G/DL (ref 3.5–5)
ALP SERPL-CCNC: 61 U/L (ref 45–120)
ALT SERPL W P-5'-P-CCNC: 9 U/L (ref 0–45)
ANION GAP SERPL CALCULATED.3IONS-SCNC: 10 MMOL/L (ref 5–18)
AST SERPL W P-5'-P-CCNC: 17 U/L (ref 0–40)
BASOPHILS # BLD AUTO: 0.1 THOU/UL (ref 0–0.2)
BASOPHILS NFR BLD AUTO: 1 % (ref 0–2)
BILIRUB SERPL-MCNC: 0.3 MG/DL (ref 0–1)
BUN SERPL-MCNC: 19 MG/DL (ref 8–28)
CALCIUM SERPL-MCNC: 9.6 MG/DL (ref 8.5–10.5)
CHLORIDE BLD-SCNC: 106 MMOL/L (ref 98–107)
CHOLEST SERPL-MCNC: 248 MG/DL
CO2 SERPL-SCNC: 23 MMOL/L (ref 22–31)
CREAT SERPL-MCNC: 0.91 MG/DL (ref 0.7–1.3)
EOSINOPHIL # BLD AUTO: 0.2 THOU/UL (ref 0–0.4)
EOSINOPHIL NFR BLD AUTO: 3 % (ref 0–6)
ERYTHROCYTE [DISTWIDTH] IN BLOOD BY AUTOMATED COUNT: 14.6 % (ref 11–14.5)
FASTING STATUS PATIENT QL REPORTED: YES
GFR SERPL CREATININE-BSD FRML MDRD: >60 ML/MIN/1.73M2
GLUCOSE BLD-MCNC: 83 MG/DL (ref 70–125)
HCT VFR BLD AUTO: 47.2 % (ref 40–54)
HDLC SERPL-MCNC: 45 MG/DL
HGB BLD-MCNC: 14.9 G/DL (ref 14–18)
LDLC SERPL CALC-MCNC: 184 MG/DL
LYMPHOCYTES # BLD AUTO: 1.4 THOU/UL (ref 0.8–4.4)
LYMPHOCYTES NFR BLD AUTO: 24 % (ref 20–40)
MCH RBC QN AUTO: 27.5 PG (ref 27–34)
MCHC RBC AUTO-ENTMCNC: 31.6 G/DL (ref 32–36)
MCV RBC AUTO: 87 FL (ref 80–100)
MONOCYTES # BLD AUTO: 0.6 THOU/UL (ref 0–0.9)
MONOCYTES NFR BLD AUTO: 10 % (ref 2–10)
NEUTROPHILS # BLD AUTO: 3.8 THOU/UL (ref 2–7.7)
NEUTROPHILS NFR BLD AUTO: 63 % (ref 50–70)
PLATELET # BLD AUTO: 241 THOU/UL (ref 140–440)
PMV BLD AUTO: 9.9 FL (ref 8.5–12.5)
POTASSIUM BLD-SCNC: 4.6 MMOL/L (ref 3.5–5)
PROT SERPL-MCNC: 6.8 G/DL (ref 6–8)
RBC # BLD AUTO: 5.42 MILL/UL (ref 4.4–6.2)
SODIUM SERPL-SCNC: 139 MMOL/L (ref 136–145)
TRIGL SERPL-MCNC: 97 MG/DL
WBC: 6 THOU/UL (ref 4–11)

## 2020-07-16 LAB
25(OH)D3 SERPL-MCNC: 33.7 NG/ML (ref 30–80)
25(OH)D3 SERPL-MCNC: 33.7 NG/ML (ref 30–80)

## 2020-07-20 ENCOUNTER — COMMUNICATION - HEALTHEAST (OUTPATIENT)
Dept: ONCOLOGY | Facility: HOSPITAL | Age: 70
End: 2020-07-20

## 2020-10-29 ENCOUNTER — RECORDS - HEALTHEAST (OUTPATIENT)
Dept: ADMINISTRATIVE | Facility: OTHER | Age: 70
End: 2020-10-29

## 2021-01-10 ENCOUNTER — COMMUNICATION - HEALTHEAST (OUTPATIENT)
Dept: FAMILY MEDICINE | Facility: CLINIC | Age: 71
End: 2021-01-10

## 2021-01-10 DIAGNOSIS — I10 ESSENTIAL HYPERTENSION: ICD-10-CM

## 2021-01-11 RX ORDER — AMLODIPINE BESYLATE 10 MG/1
TABLET ORAL
Qty: 91 TABLET | Refills: 1 | Status: SHIPPED | OUTPATIENT
Start: 2021-01-11 | End: 2021-07-09

## 2021-01-18 ENCOUNTER — HOSPITAL ENCOUNTER (OUTPATIENT)
Dept: CT IMAGING | Facility: HOSPITAL | Age: 71
Discharge: HOME | End: 2021-01-18
Attending: INTERNAL MEDICINE

## 2021-01-18 DIAGNOSIS — C7A.090 ATYPICAL CARCINOID LUNG TUMOR (H): ICD-10-CM

## 2021-01-18 LAB
CREAT BLD-MCNC: 1.2 MG/DL (ref 0.7–1.3)
GFR SERPL CREATININE-BSD FRML MDRD: 60 ML/MIN/1.73M2

## 2021-01-19 ENCOUNTER — OFFICE VISIT - HEALTHEAST (OUTPATIENT)
Dept: ONCOLOGY | Facility: HOSPITAL | Age: 71
End: 2021-01-19

## 2021-01-19 DIAGNOSIS — C7A.090 ATYPICAL CARCINOID LUNG TUMOR (H): ICD-10-CM

## 2021-01-19 DIAGNOSIS — N40.0 BENIGN PROSTATIC HYPERPLASIA WITHOUT LOWER URINARY TRACT SYMPTOMS: ICD-10-CM

## 2021-01-19 DIAGNOSIS — E04.1 THYROID NODULE: ICD-10-CM

## 2021-02-01 ENCOUNTER — COMMUNICATION - HEALTHEAST (OUTPATIENT)
Dept: FAMILY MEDICINE | Facility: CLINIC | Age: 71
End: 2021-02-01

## 2021-02-01 ENCOUNTER — HOSPITAL ENCOUNTER (OUTPATIENT)
Dept: MRI IMAGING | Facility: HOSPITAL | Age: 71
Setting detail: RADIATION/ONCOLOGY SERIES
Discharge: STILL ADMITTED OR ADMINISTRATIVE | End: 2021-02-01
Attending: INTERNAL MEDICINE

## 2021-02-01 DIAGNOSIS — I10 HTN (HYPERTENSION): ICD-10-CM

## 2021-02-04 ENCOUNTER — HOSPITAL ENCOUNTER (OUTPATIENT)
Dept: MRI IMAGING | Facility: HOSPITAL | Age: 71
Setting detail: RADIATION/ONCOLOGY SERIES
Discharge: STILL ADMITTED OR ADMINISTRATIVE | End: 2021-02-04
Attending: INTERNAL MEDICINE

## 2021-02-15 ENCOUNTER — HOSPITAL ENCOUNTER (OUTPATIENT)
Dept: ULTRASOUND IMAGING | Facility: HOSPITAL | Age: 71
Setting detail: RADIATION/ONCOLOGY SERIES
Discharge: STILL ADMITTED OR ADMINISTRATIVE | End: 2021-02-15
Attending: INTERNAL MEDICINE

## 2021-02-15 ENCOUNTER — HOSPITAL ENCOUNTER (OUTPATIENT)
Dept: MRI IMAGING | Facility: HOSPITAL | Age: 71
Setting detail: RADIATION/ONCOLOGY SERIES
Discharge: STILL ADMITTED OR ADMINISTRATIVE | End: 2021-02-15
Attending: INTERNAL MEDICINE

## 2021-02-15 DIAGNOSIS — E04.1 THYROID NODULE: ICD-10-CM

## 2021-02-15 DIAGNOSIS — N40.0 BENIGN PROSTATIC HYPERPLASIA WITHOUT LOWER URINARY TRACT SYMPTOMS: ICD-10-CM

## 2021-02-17 ENCOUNTER — COMMUNICATION - HEALTHEAST (OUTPATIENT)
Dept: ONCOLOGY | Facility: HOSPITAL | Age: 71
End: 2021-02-17

## 2021-02-20 ENCOUNTER — OFFICE VISIT - HEALTHEAST (OUTPATIENT)
Dept: FAMILY MEDICINE | Facility: CLINIC | Age: 71
End: 2021-02-20

## 2021-02-20 DIAGNOSIS — R07.89 ATYPICAL CHEST PAIN: ICD-10-CM

## 2021-02-20 DIAGNOSIS — Z95.1 S/P CABG X 3: ICD-10-CM

## 2021-02-20 DIAGNOSIS — B02.9 HERPES ZOSTER WITHOUT COMPLICATION: ICD-10-CM

## 2021-02-25 ENCOUNTER — OFFICE VISIT - HEALTHEAST (OUTPATIENT)
Dept: FAMILY MEDICINE | Facility: CLINIC | Age: 71
End: 2021-02-25

## 2021-02-25 DIAGNOSIS — B02.9 HERPES ZOSTER WITHOUT COMPLICATION: ICD-10-CM

## 2021-02-27 LAB — BACTERIA SPEC CULT: NORMAL

## 2021-03-28 ENCOUNTER — COMMUNICATION - HEALTHEAST (OUTPATIENT)
Dept: FAMILY MEDICINE | Facility: CLINIC | Age: 71
End: 2021-03-28

## 2021-03-28 DIAGNOSIS — I10 HTN (HYPERTENSION): ICD-10-CM

## 2021-03-29 ENCOUNTER — RECORDS - HEALTHEAST (OUTPATIENT)
Dept: ADMINISTRATIVE | Facility: OTHER | Age: 71
End: 2021-03-29

## 2021-03-30 RX ORDER — TAMSULOSIN HYDROCHLORIDE 0.4 MG/1
CAPSULE ORAL
Qty: 90 CAPSULE | Refills: 1 | Status: SHIPPED | OUTPATIENT
Start: 2021-03-30 | End: 2021-09-24

## 2021-04-05 ENCOUNTER — AMBULATORY - HEALTHEAST (OUTPATIENT)
Dept: NURSING | Facility: CLINIC | Age: 71
End: 2021-04-05

## 2021-04-26 ENCOUNTER — AMBULATORY - HEALTHEAST (OUTPATIENT)
Dept: NURSING | Facility: CLINIC | Age: 71
End: 2021-04-26

## 2021-05-07 ENCOUNTER — COMMUNICATION - HEALTHEAST (OUTPATIENT)
Dept: FAMILY MEDICINE | Facility: CLINIC | Age: 71
End: 2021-05-07

## 2021-05-07 ENCOUNTER — COMMUNICATION - HEALTHEAST (OUTPATIENT)
Dept: ADMINISTRATIVE | Facility: CLINIC | Age: 71
End: 2021-05-07

## 2021-05-07 DIAGNOSIS — I10 HTN (HYPERTENSION): ICD-10-CM

## 2021-05-20 ENCOUNTER — RECORDS - HEALTHEAST (OUTPATIENT)
Dept: ADMINISTRATIVE | Facility: OTHER | Age: 71
End: 2021-05-20

## 2021-05-20 ENCOUNTER — OFFICE VISIT - HEALTHEAST (OUTPATIENT)
Dept: FAMILY MEDICINE | Facility: CLINIC | Age: 71
End: 2021-05-20

## 2021-05-20 DIAGNOSIS — I25.83 CORONARY ARTERY DISEASE DUE TO LIPID RICH PLAQUE: ICD-10-CM

## 2021-05-20 DIAGNOSIS — I25.10 CORONARY ARTERY DISEASE DUE TO LIPID RICH PLAQUE: ICD-10-CM

## 2021-05-20 DIAGNOSIS — Z13.220 LIPID SCREENING: ICD-10-CM

## 2021-05-20 DIAGNOSIS — Z00.00 MEDICARE ANNUAL WELLNESS VISIT, SUBSEQUENT: ICD-10-CM

## 2021-05-20 DIAGNOSIS — I10 ESSENTIAL HYPERTENSION: ICD-10-CM

## 2021-05-20 LAB
ANION GAP SERPL CALCULATED.3IONS-SCNC: 7 MMOL/L (ref 5–18)
BUN SERPL-MCNC: 16 MG/DL (ref 8–28)
CALCIUM SERPL-MCNC: 9.4 MG/DL (ref 8.5–10.5)
CHLORIDE BLD-SCNC: 105 MMOL/L (ref 98–107)
CHOLEST SERPL-MCNC: 236 MG/DL
CO2 SERPL-SCNC: 27 MMOL/L (ref 22–31)
CREAT SERPL-MCNC: 0.81 MG/DL (ref 0.7–1.3)
FASTING STATUS PATIENT QL REPORTED: YES
GFR SERPL CREATININE-BSD FRML MDRD: >60 ML/MIN/1.73M2
GLUCOSE BLD-MCNC: 91 MG/DL (ref 70–125)
HDLC SERPL-MCNC: 41 MG/DL
LDLC SERPL CALC-MCNC: 168 MG/DL
POTASSIUM BLD-SCNC: 4.3 MMOL/L (ref 3.5–5)
SODIUM SERPL-SCNC: 139 MMOL/L (ref 136–145)
TRIGL SERPL-MCNC: 134 MG/DL

## 2021-05-20 ASSESSMENT — MIFFLIN-ST. JEOR: SCORE: 1393.1

## 2021-05-26 NOTE — TELEPHONE ENCOUNTER
LM for patient at his home number with results of recent liver MRI and thyroid ultrasound. Per Dr Harmon, liver MRI was normal, thyroid ultrasound is stable, no change from previous.  Advised pt to follow up as directed and to call if he has any further concerns.

## 2021-05-27 ENCOUNTER — RECORDS - HEALTHEAST (OUTPATIENT)
Dept: ADMINISTRATIVE | Facility: CLINIC | Age: 71
End: 2021-05-27

## 2021-05-27 VITALS — HEIGHT: 65 IN

## 2021-05-27 VITALS — BODY MASS INDEX: 26.46 KG/M2 | WEIGHT: 159 LBS

## 2021-05-27 VITALS — OXYGEN SATURATION: 96 % | DIASTOLIC BLOOD PRESSURE: 70 MMHG | HEART RATE: 59 BPM | SYSTOLIC BLOOD PRESSURE: 144 MMHG

## 2021-05-30 ENCOUNTER — RECORDS - HEALTHEAST (OUTPATIENT)
Dept: ADMINISTRATIVE | Facility: CLINIC | Age: 71
End: 2021-05-30

## 2021-05-30 VITALS — WEIGHT: 158 LBS | BODY MASS INDEX: 25.39 KG/M2 | HEIGHT: 66 IN

## 2021-05-31 VITALS — HEIGHT: 66 IN | WEIGHT: 163.8 LBS | BODY MASS INDEX: 26.33 KG/M2

## 2021-05-31 VITALS — WEIGHT: 161 LBS | BODY MASS INDEX: 25.88 KG/M2 | HEIGHT: 66 IN

## 2021-05-31 VITALS — WEIGHT: 160 LBS | HEIGHT: 66 IN | BODY MASS INDEX: 25.71 KG/M2

## 2021-05-31 VITALS — BODY MASS INDEX: 26.31 KG/M2 | WEIGHT: 163 LBS

## 2021-05-31 VITALS — BODY MASS INDEX: 26.36 KG/M2 | WEIGHT: 164 LBS | HEIGHT: 66 IN

## 2021-06-01 VITALS — WEIGHT: 156 LBS | BODY MASS INDEX: 25.18 KG/M2

## 2021-06-02 VITALS — WEIGHT: 165 LBS | BODY MASS INDEX: 27.04 KG/M2

## 2021-06-02 VITALS — WEIGHT: 164.75 LBS | BODY MASS INDEX: 27 KG/M2

## 2021-06-02 VITALS — WEIGHT: 164 LBS | HEIGHT: 66 IN | BODY MASS INDEX: 26.36 KG/M2

## 2021-06-02 NOTE — TELEPHONE ENCOUNTER
Refill Approved    Rx renewed per Medication Renewal Policy. Medication was last renewed on 10/31/18.    Joelle Meza, [facility] [place] Connection Triage/Med Refill 10/30/2019     Requested Prescriptions   Pending Prescriptions Disp Refills     metoprolol tartrate (LOPRESSOR) 50 MG tablet 180 tablet 3     Sig: TAKE 1 TABLET(50 MG) BY MOUTH TWICE DAILY       Beta-Blockers Refill Protocol Passed - 10/30/2019  8:04 AM        Passed - PCP or prescribing provider visit in past 12 months or next 3 months     Last office visit with prescriber/PCP: 2/27/2018 Mercedes Adorno MD OR same dept: 2/7/2019 Lelo Aranda MD OR same specialty: 2/7/2019 Lelo Aranda MD  Last physical: 10/25/2018 Last MTM visit: Visit date not found   Next visit within 3 mo: Visit date not found  Next physical within 3 mo: Visit date not found  Prescriber OR PCP: Mercedes Adorno MD  Last diagnosis associated with med order: 1. HTN (hypertension)  - metoprolol tartrate (LOPRESSOR) 50 MG tablet; TAKE 1 TABLET(50 MG) BY MOUTH TWICE DAILY  Dispense: 180 tablet; Refill: 3    If protocol passes may refill for 12 months if within 3 months of last provider visit (or a total of 15 months).             Passed - Blood pressure filed in past 12 months     BP Readings from Last 1 Encounters:   02/07/19 122/74

## 2021-06-03 NOTE — TELEPHONE ENCOUNTER
This is available Over-the-counter if needed.  It has been over a year since I saw him and he is due for annual wellness visit.  Please call him to schedule.  I can do refill at that appt if needed - declined today

## 2021-06-03 NOTE — TELEPHONE ENCOUNTER
RN cannot approve Refill Request    RN can NOT refill this medication med is not covered by policy/route to provider. Last office visit: 2/27/2018 Mercedes Adorno MD Last Physical: 10/25/2018 Last MTM visit: Visit date not found Last visit same specialty: 2/7/2019 Lelo Aranda MD.  Next visit within 3 mo: Visit date not found  Next physical within 3 mo: Visit date not found      Milena Gonsalves, [facility] Triage/Med Refill 11/19/2019    Requested Prescriptions   Pending Prescriptions Disp Refills     cholecalciferol, vitamin D3, 1,000 unit (25 mcg) tablet 100 tablet 3     Sig: Take 1 tablet (1,000 Units total) by mouth daily.       There is no refill protocol information for this order

## 2021-06-03 NOTE — TELEPHONE ENCOUNTER
RN cannot approve Refill Request    RN can NOT refill this medication med is not covered by policy/route to provider. Last office visit: 2/27/2018 Mercedes Adorno MD Last Physical: 10/25/2018 Last MTM visit: Visit date not found Last visit same specialty: 10/26/2017 Yobany Estrada MD.  Next visit within 3 mo: Visit date not found  Next physical within 3 mo: Visit date not found      Jayde Koch, [facility] Triage/Med Refill 11/29/2019    Requested Prescriptions   Pending Prescriptions Disp Refills     tamsulosin (FLOMAX) 0.4 mg cap [Pharmacy Med Name: TAMSULOSIN 0.4MG CAPSULES] 90 capsule 0     Sig: TAKE 1 CAPSULE BY MOUTH DAILY AFTER BREAKFAST       There is no refill protocol information for this order

## 2021-06-03 NOTE — TELEPHONE ENCOUNTER
Spoke with Jack's wife. She stated he is not home. Jack was going to contact  in the next coming days and at that time he will schedule an appointment. No further action needed.

## 2021-06-04 VITALS
HEIGHT: 66 IN | DIASTOLIC BLOOD PRESSURE: 86 MMHG | BODY MASS INDEX: 25.28 KG/M2 | WEIGHT: 157.3 LBS | HEART RATE: 64 BPM | SYSTOLIC BLOOD PRESSURE: 122 MMHG | RESPIRATION RATE: 16 BRPM

## 2021-06-04 NOTE — TELEPHONE ENCOUNTER
RN cannot approve Refill Request    RN can NOT refill this medication med is not covered by policy/route to provider     . Last office visit: 2/27/2018 Mercedes Adorno MD Last Physical: 10/25/2018 Last MTM visit: Visit date not found Last visit same specialty: 10/26/2017 Yobany Estrada MD.  Next visit within 3 mo: Visit date not found  Next physical within 3 mo: Visit date not found      Joelle Meza, [facility] Triage/Med Refill 1/3/2020    Requested Prescriptions   Pending Prescriptions Disp Refills     tamsulosin (FLOMAX) 0.4 mg cap [Pharmacy Med Name: TAMSULOSIN 0.4MG CAPSULES] 30 capsule 0     Sig: TAKE ONE CAPSULE BY MOUTH DAILY AFTER BREAKFAST       There is no refill protocol information for this order

## 2021-06-04 NOTE — PROGRESS NOTES
Assessment/ Plan:         1. Left ear pain       Etiology of his left ear pain is unclear.  No excessive cerumen to explain discomfort.  No signs of bulging or retraction that is significant for diagnosis.  Oropharynx appears within normal limits no cracked teeth or swollen gums.  No significant lymph node enlargement present on exam.  Discussed that he may find benefit from utilizing an antihistamine to help reduce symptoms.  However I did discuss that he may find it worthwhile to follow-up with his dentist.  If no further improvement resolution of symptoms could consider referral to ENT for further evaluation.  Would likely recommend an audiology referral as well to rule out decreased hearing however patient seems to have adequate hearing in clinic.  Discussed this plan with the patient and he is in agreement with this plan.    Review of medications did not reveal any medications that would cause increased ear symptoms/side effects.      Subjective:      Jack Brown is a 69 y.o. male who presents for ear pain. He reports that he has had symptoms of left ear discomfort and pain over the last 2 months. Symptoms are worse in the morning and resolves later. No pain at night is present. He thinks he may have a cerumen impaction. He has had symptoms like this previously the last couple years when it gets cold. He trialed mucinex which has helped in the past. This did not help today. He does have a crown that is old on a tooth. He is wondering if this is a cause of the pain. He denies any URI symptoms.   No shortness of breath, chest pain, or difficulty breathing has been present.  He has not had any persistent headaches.  Has not noticed any swollen lymph nodes.    The following portions of the patient's history were reviewed and updated as appropriate: allergies, current medications and problem list.    Patient Active Problem List   Diagnosis     Hypertension     Abnormal Blood Chemistry     Hematospermia      "Hyperlipemia     Visual Impairment     Nasal drainage     Coronary artery disease due to lipid rich plaque     S/P CABG x 3     Thyroid nodule     Urinary retention     Atypical carcinoid lung tumor (H)     Trigeminal neuralgia     Muscle soreness       Current Outpatient Medications   Medication Sig     acetaminophen (TYLENOL) 500 MG tablet Take 500 mg by mouth every 6 (six) hours as needed for pain.     amLODIPine (NORVASC) 10 MG tablet TAKE 1 TABLET BY MOUTH ONCE DAILY     aspirin 81 mg chewable tablet Chew 81 mg daily.     cholecalciferol, vitamin D3, (VITAMIN D3) 25 mcg (1,000 unit) capsule Take 1,000 Units by mouth daily.     metoprolol tartrate (LOPRESSOR) 50 MG tablet TAKE 1 TABLET(50 MG) BY MOUTH TWICE DAILY     tamsulosin (FLOMAX) 0.4 mg cap TAKE 1 CAPSULE BY MOUTH DAILY AFTER BREAKFAST       Review of Systems   A 12 point comprehensive review of systems was negative except as noted.      Objective:      /86 (Patient Site: Left Arm, Patient Position: Sitting, Cuff Size: Adult Regular)   Pulse 64   Resp 16   Ht 5' 5.95\" (1.675 m)   Wt 157 lb 4.8 oz (71.4 kg)   BMI 25.43 kg/m      General Appearance: Alert, cooperative, appears well.  Head: Normocephalic, without obvious abnormality, atraumatic.  Eyes: PERRL, conjunctiva/corneas clear, EOM's intact.  Ears: Normal TM's and external ear canals, both ears. No excessive cerumen present.   Nose: Nares WNL  Throat: Slightly erythematous. No exudates.  Neck: Supple, symmetrical, trachea midline, no adenopathy.  Heart : normal rate, regular rhythm, normal S1, S2, no murmurs, rubs, clicks or gallops.  Lungs: Clear to auscultation bilaterally, respirations unlabored.  Extremities: Extremities normal, atraumatic, no cyanosis or edema.  Lymph nodes: Cervical, supraclavicular, and axillary nodes normal.      Brittany Moon, [place]  9:07 AM  "

## 2021-06-04 NOTE — TELEPHONE ENCOUNTER
RN cannot approve Refill Request    RN can NOT refill this medication med is not covered by policy/route to provider. Last office visit: 2/27/2018 Mercedes Adorno MD Last Physical: 10/25/2018 Last MTM visit: Visit date not found Last visit same specialty: 10/26/2017 Yobany Estrada MD.  Next visit within 3 mo: Visit date not found  Next physical within 3 mo: Visit date not found      Milena Gonsalves, [facility] Triage/Med Refill 12/16/2019    Requested Prescriptions   Pending Prescriptions Disp Refills     amLODIPine (NORVASC) 10 MG tablet [Pharmacy Med Name: AMLODIPINE BESYLATE 10MG TABLETS] 90 tablet 0     Sig: TAKE 1 TABLET BY MOUTH ONCE DAILY       There is no refill protocol information for this order

## 2021-06-04 NOTE — TELEPHONE ENCOUNTER
RN cannot approve Refill Request    RN can NOT refill this medication med is not covered by policy/route to provider     . Last office visit: 2/27/2018 Mercedes Adorno MD Last Physical: 10/25/2018 Last MTM visit: Visit date not found Last visit same specialty: 10/26/2017 Yobany Estrada MD.  Next visit within 3 mo: Visit date not found  Next physical within 3 mo: Visit date not found      Joelle Meza, [facility] Triage/Med Refill 1/2/2020    Requested Prescriptions   Pending Prescriptions Disp Refills     tamsulosin (FLOMAX) 0.4 mg cap [Pharmacy Med Name: TAMSULOSIN 0.4MG CAPSULES] 30 capsule 0     Sig: TAKE ONE CAPSULE BY MOUTH DAILY AFTER BREAKFAST       There is no refill protocol information for this order

## 2021-06-05 NOTE — PATIENT INSTRUCTIONS - HE
There are three choices for treating your cholesterol if it is high   - statin - we would try this first, but if you have muscle cramps, switch to   - Zetia  - but if you do not tolerate this, there is still the option of   - cholestyramine    If your face pain is not better with avoiding chewing chewy things, we could consider having you try taking nortriptyline at night    For aches, you could try taking a daily tumeric (= curcumin) supplement    Check your insurance for coverage of shingles vaccine - it may be better covered if you get it in a pharmacy than if you get it here    Please schedule and eye check up  Personalized Prevention Plan  You are due for the preventive services outlined below.  Your care team is available to assist you in scheduling these services.  If you have already completed any of these items, please share that information with your care team to update in your medical record.  Health Maintenance   Topic Date Due     HEPATITIS C SCREENING  1950     ZOSTER VACCINES (1 of 2) 12/02/1998     INFLUENZA VACCINE RULE BASED (1) 08/01/2019     MEDICARE ANNUAL WELLNESS VISIT  10/25/2019     TD 18+ HE  10/29/2019     PNEUMOCOCCAL IMMUNIZATION 65+ LOW/MEDIUM RISK (2 of 2 - PPSV23) 10/25/2019     FALL RISK ASSESSMENT  12/20/2020     LIPID  10/25/2023     ADVANCE CARE PLANNING  10/25/2023     COLONOSCOPY  03/05/2028

## 2021-06-05 NOTE — PROGRESS NOTES
Assessment and Plan:     1. Medicare annual wellness visit, subsequent    2. Essential hypertension  Well controlled on:   - amLODIPine (NORVASC) 10 MG tablet; Take 1 tablet (10 mg total) by mouth daily.  Dispense: 91 tablet; Refill: 3  - Basic Metabolic Panel  - metoprolol tartrate (LOPRESSOR) 50 MG tablet; TAKE 1 TABLET(50 MG) BY MOUTH TWICE DAILY  Dispense: 182 tablet; Refill: 3    3. Urinary hesitancy  Well controlled on:   - tamsulosin (FLOMAX) 0.4 mg cap; TAKE ONE CAPSULE BY MOUTH DAILY AFTER BREAKFAST  Dispense: 91 capsule; Refill: 3    4. Myalgia/ Vitamin D deficiency  An ongoing issue - vitamin D was low last year Note that in October 2018 did a lab work up for myalgia/mouth dryness - he had normal TSH, T4, comp panel, CBC, NANCY, CRP.    - recheck Vitamin D level    I also gave him a hand-out on antiinflammatory diet    5. Face pain  Pain in front of left ear Jack has a previous history of trigeminal neuralgia for which he had taken tegretol and  this resolved on its own. Current pain does not feel like TGN. Chewing triggers the pain, though not consistently, and he has no pain with brushing teeth. Given hand out on TMJ syndrome  - he watching what he chews and consider mouth guard at night, but if this doesn't get better he will contact me    6. Lipid screening  He has been intolerant to statins in the past due to Myalgia.  Also discussed low Vitamin D while taking a statin can make Myalgias worse.  He might be willing to try statin again, or Zetia, if LDL is high  - Lipid Morton FASTING   - recheck Vitamin D level    7. Need for hepatitis C screening test  - Hepatitis C Antibody (Anti-HCV)     Patient Instructions     There are three choices for treating your cholesterol if it is high   - statin - we would try this first, but if you have muscle cramps, switch to   - Zetia  - but if you do not tolerate this, there is still the option of   - cholestyramine    If your face pain is not better with avoiding  "chewing chewy things, we could consider having you try taking nortriptyline at night    For aches, you could try taking a daily tumeric (= curcumin) supplement    Check your insurance for coverage of shingles vaccine    Please schedule and eye check up    ---    The patient's current medical problems were reviewed.    The following health maintenance schedule was reviewed with the patient and provided in printed form in the after visit summary:   Health Maintenance   Topic Date Due     HEPATITIS C SCREENING  1950     ZOSTER VACCINES (1 of 2) 12/02/1998     INFLUENZA VACCINE RULE BASED (1) 08/01/2019     MEDICARE ANNUAL WELLNESS VISIT  10/25/2019     TD 18+ HE  10/29/2019     PNEUMOCOCCAL IMMUNIZATION 65+ LOW/MEDIUM RISK (2 of 2 - PPSV23) 10/25/2019     FALL RISK ASSESSMENT  12/20/2020     LIPID  10/25/2023     ADVANCE CARE PLANNING  10/25/2023     COLONOSCOPY  03/05/2028          .Mercedes Adorno MD    Subjective:   Chief Complaint: Jack Brown is an 69 y.o. male here for an Annual Wellness visit.   HPI:      NEW CONCERN  Pain in front of left ear  Jack has a previous history of trigeminal neuralgia (TGN) with negative brain MRI in 2017,  for which he had taken tegretol; this resolved on its own and the current pain does not feel like TGN. Chewing triggers the pain, though not consistently, and he has no pain with brushing teeth.  Sweet or salty food hurts.The pain  does not throb at night. His ear doesn't feel plugged (a couple of weeks ago had wax removed) and there has been no change in hearing.  Has shooting pain right now.  He does not chew gum.  No numbness in face    He has gone through bottle of mucinex - for addressing \"blockage\" of left maxillary sinus that he gets every fall. Does not feel blocked in that area any more.  No fever or blowing nose    ONGOING ISSUES  Lipid    Component      Latest Ref Rng & Units 10/25/2018   Cholesterol      <=199 mg/dL 244 (H)   Triglycerides      <=149 mg/dL 101 " "  HDL Cholesterol      >=40 mg/dL 42   LDL Calculated      <=129 mg/dL 182 (H)   Patient Fasting > 8hrs?       Yes     Jack had previously been intolerant to statins .  I message him last year about the possibility of trying ezetimibe but I did not hear back from him.  We discuss options today,.  I did say that sometimes muscle cramps with statins are due to low vitamin D level, so lets check that   He thought Zetia and statins had to be taken together - I said that was not the case, and we often try Zetia when statins cause side effects. At the end of the discussion he expressing willingness to try a statin again if his lipids were high, and if that didn't work we could try Zetia.      Myalgia  \"Arthritis' has set in\"  - by this Jack means generally soreness/myalgia  - \"when I get up and move around the soreness goes away - it takes 10 minutes for it to go away.\" He has cut down on bread and refined sugar. Cherry juice has not helped  If he sits for too long soreness comes back    Note that in October 2018 did a lab work up for myalgia/mouth dryness - he had normal TSH, T4, comp panel, CBC, NANCY, CRP.  His vitamin D level was low and he has been taking supplement since then    Given antiinflammatory diet **    Sun damaged skin - Jack sees dr. Jose Acevedo q 6 months  -recently had a spot on his face frozen    Hypertension   Well controlled -  no chest pains, palpitations or dyspnea     BP Readings from Last 3 Encounters:   01/07/20 100/78   12/20/19 122/86   02/07/19 122/74     Urinary hesitancy  Jack is very pleased that Tamsulosin IS WORKING!    Social History/ ADL/IADL - and  Independent.  Loves working in garden and walks the mall at least three times per week. No falls in the last year      Healthy Habits  Are you taking a daily aspirin? Yes  Do you typically exercising at least 40 min, 3-4 times per week?  Yes  Do you usually eat at least 4 servings of fruit and vegetables a day, include whole grains " and fiber and avoid regularly eating high fat foods? Yes  Have you had an eye exam in the past two years? NO  Do you see a dentist twice per year? NO  Do you have any concerns regarding sleep? No    Safety Screen  If you own firearms, are they secured in a locked gun cabinet or with trigger locks? Yes  Do you feel you are safe where you are living?: Yes (1/7/2020  7:58 AM)  Do you feel you are safe in your relationship(s)?: Yes (1/7/2020  7:58 AM)      Review of Systems:  Please see above.  The rest of the review of systems are negative for all systems.    Patient Care Team:  Mercedes Adorno MD as PCP - General (Family Medicine)  Orville Harmon MD as Physician (Hematology and Oncology)  Vinny Chase MD as Physician (Cardiovascular and Thoracic Surgery)  Cristy Osei RN as Oncology Nurse Navigator (Hematology and Oncology)  Mercedes Adorno MD as Assigned PCP  Lelo Aranda MD as Assigned PCP     Patient Active Problem List   Diagnosis     Hypertension     Abnormal Blood Chemistry     Hematospermia     Hyperlipemia     Visual Impairment     Nasal drainage     Coronary artery disease due to lipid rich plaque     S/P CABG x 3     Thyroid nodule     Urinary retention     Atypical carcinoid lung tumor (H)     Trigeminal neuralgia     Muscle soreness     Past Medical History:   Diagnosis Date     BPH (benign prostatic hypertrophy)      Cancer (H)     Skin on R.chest wall.     Chest discomfort      Coronary artery disease      Detached retina, left Oct 2014     Hyperlipidemia      Hypertension       Past Surgical History:   Procedure Laterality Date     CARDIAC SURGERY       CATARACT EXTRACTION  2015     CORONARY ARTERY BYPASS GRAFT N/A 12/28/2015    Procedure: CORONARY ARTERY BYPASS x 3, RIGHT ENDOSCOPIC VEIN HARVEST, LEFT INTERNAL MAMMARY ARTERY, ANESTHESIA TRANSESOPHAGEAL ECHOCARDIOGRAM;  Surgeon: Jayson Alvarado MD;  Location: [facility];  Service:      CYST REMOVAL      Ganglion cyst  removal of both wrist     PARS PLANA VITRECTOMY W/ REPAIR OF MACULAR HOLE  Nov 2014     ID EXCIS TENDON SHEATH LESION, HAND/FINGER      Description: Hand Excision Of A Tendon Cyst;  Recorded: 01/04/2011;     ID LAP, VENTRAL HERNIA REPAIR,REDUCIBLE      Description: Laparoscopy Repair Of Umbilical Hernia;  Recorded: 01/04/2011;     ID REMOVAL PREPATELLA BURSA      Description: Excision Of Prepatellar Bursa;  Recorded: 09/30/2014;     ID REPAIR OF NASAL SEPTUM      Description: Septoplasty;  Recorded: 09/30/2014;     RETINAL DETACHMENT SURGERY  oct 2014     SKIN CANCER EXCISION  Nov 2015    right chest     THORACOSCOPY Right 4/14/2016    Procedure: RIGHT VIDEO ASSISTED THORACOSCOPY, RIGHT LOBECTOMY;  Surgeon: Vinny Chase MD;  Location: [facility];  Service:      UMBILICAL HERNIA REPAIR       VASECTOMY        Family History   Problem Relation Age of Onset     Acute Myocardial Infarction Mother      Aneurysm Mother         brain     Acute Myocardial Infarction Father      Acute Myocardial Infarction Brother      Aortic aneurysm Brother         d @ 72     Lung cancer Cousin      Heart attack Cousin      Leukemia Brother         d @ 55     Colitis Brother      Colon cancer Paternal Grandfather       Social History     Socioeconomic History     Marital status: Single     Spouse name: Not on file     Number of children: Not on file     Years of education: Not on file     Highest education level: Not on file   Occupational History     Not on file   Social Needs     Financial resource strain: Not on file     Food insecurity:     Worry: Not on file     Inability: Not on file     Transportation needs:     Medical: Not on file     Non-medical: Not on file   Tobacco Use     Smoking status: Never Smoker     Smokeless tobacco: Never Used   Substance and Sexual Activity     Alcohol use: Yes     Alcohol/week: 8.0 standard drinks     Types: 1 Cans of beer, 7 Glasses of wine per week     Drug use: No     Sexual  activity: Never   Lifestyle     Physical activity:     Days per week: Not on file     Minutes per session: Not on file     Stress: Not on file   Relationships     Social connections:     Talks on phone: Not on file     Gets together: Not on file     Attends [employer] service: Not on file     Active member of club or organization: Not on file     Attends meetings of clubs or organizations: Not on file     Relationship status: Not on file     Intimate partner violence:     Fear of current or ex partner: Not on file     Emotionally abused: Not on file     Physically abused: Not on file     Forced sexual activity: Not on file   Other Topics Concern     Not on file   Social History Narrative     Not on file      Current Outpatient Medications   Medication Sig Dispense Refill     acetaminophen (TYLENOL) 500 MG tablet Take 500 mg by mouth every 6 (six) hours as needed for pain.       amLODIPine (NORVASC) 10 MG tablet TAKE 1 TABLET BY MOUTH ONCE DAILY 30 tablet 0     aspirin 81 mg chewable tablet Chew 81 mg daily.       cholecalciferol, vitamin D3, (VITAMIN D3) 25 mcg (1,000 unit) capsule Take 1,000 Units by mouth daily.       metoprolol tartrate (LOPRESSOR) 50 MG tablet TAKE 1 TABLET(50 MG) BY MOUTH TWICE DAILY 180 tablet 0     tamsulosin (FLOMAX) 0.4 mg cap TAKE ONE CAPSULE BY MOUTH DAILY AFTER BREAKFAST 30 capsule 0     No current facility-administered medications for this visit.       Objective:   Vital Signs:   Visit Vitals  /78 (Patient Site: Left Arm, Patient Position: Sitting, Cuff Size: Adult Regular)   Pulse 74        VisionScreening:  No exam data present     PHYSICAL EXAM  General appearance - alert, well appearing, and in no distress  Mental status - normal mood, behavior, speech, dress, motor activity, and thought processes  Eyes - pupils equal and reactive, extraocular eye movements intact, funduscopic exam normal, discs flat and sharp  Ears - bilateral TM's and external ear canals normal  Nose - no  sinus tenderness  Face - no pain over TMJs  Mouth - mucous membranes moist, pharynx normal without lesions  Neck - supple, no significant adenopathy, carotids upstroke normal bilaterally, no bruits, thyroid exam: thyroid is normal in size without nodules or tenderness  Chest - clear to auscultation, no wheezes, rales or rhonchi, symmetric air entry  Heart - normal rate, regular rhythm, normal S1, S2, no murmurs, rubs, clicks or gallops  Abdomen - soft, nontender, nondistended, no masses or organomegaly  Neurological - alert, oriented, normal speech, no focal findings or movement disorder noted, DTR's normal and symmetric  Extremities - peripheral pulses normal, no pedal edema, no clubbing or cyanosis.    Skin - no rashes or worrisome lesions; scattered erythematous macules on face, scalp      Assessment Results 1/7/2020   Activities of Daily Living No help needed   Instrumental Activities of Daily Living No help needed   Mini Cog Total Score 4   Some recent data might be hidden     A Mini-Cog score of 0-2 suggests the possibility of dementia, score of 3-5 suggests no dementia    Identified Health Risks:     Patient's advanced directive was discussed and I am comfortable with the patient's wishes.

## 2021-06-05 NOTE — PROGRESS NOTES
[facility] Hematology and Oncology Progress Note    Patient: Jack Brown  MRN: [id number]  Date of Service: January 31, 2020      Assessment and Plan:    Atypical carcinoid lung tumor    Staging form: Lung, AJCC 7th Edition      Clinical: T1, N0, M0 - Unsigned      Pathologic: T1, N0, cM0 - Signed by Orville Harmon MD on 5/3/2016    1.  Lung carcinoid tumor: CT scan images were reviewed.  These look fine.  No evidence of recurrent disease.  Clinically he is doing well.  He is about 3-1/2 years out from his treatment.  We will continue yearly CT scans for a few more years.     2.  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  Ultrasound in March 2019 showed stable isthmic nodule.     3.  Arterial aneurysms.  General stability.  Aneurysms are generally small.    ECOG Performance   ECOG Performance Status: 0    Distress Assessment  Distress Assessment Score: No distress    Pain  Currently in Pain: Yes  Pain Score (Initial OR Reassessment): 8  Location: left ear/jaw    Diagnosis:    1.  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    Treatment:    Right middle lobectomy in April 2016: Pathology shows atypical carcinoid tumor.  2.2 cm.  No lymphovascular invasion.  15 lymph nodes tested all negative.  Less than 2 mitoses per 10 high-power field.    Interim History:    Jack returns today for a 1 year followup. Doing well.  Having some problems with left-sided TMJ.  Otherwise no changes in his health over the past year.  No acute complaints today.    Review of Systems:    Constitutional  Constitutional (WDL): All constitutional elements are within defined limits  Neurosensory  Neurosensory (WDL): All neurosensory elements are within defined limits  Eye   Eye Disorder (WDL): All eye disorder elements are within defined limits(glasses)  Ear  Ear Disorder (WDL): Exceptions to WDL  Ear Pain: Concerns(left ear; possible TMJ)  Cardiovascular  Cardiovascular (WDL): All  "cardiovascular elements are within defined limits  Pulmonary  Respiratory (WDL): Within Defined Limits  Gastrointestinal  Gastrointestinal (WDL): All gastrointestinal elements are within defined limits  Genitourinary  Genitourinary (WDL): All genitourinary elements are within defined limits  Lymphatic  Lymph (WDL): All lymph disorder elements are within defined limits  Musculoskeletal and Connective Tissue  Musculoskeletal and Connetive Tissue Disorders (WDL): Exceptions to WDL  Arthralgia: Concerns  Myalgia: Concerns  Integumentary  Integumentary (WDL): All integumentary elements are within defined limits  Patient Coping  Patient Coping: Accepting;Open/discussion  Accompanied by  Accompanied by: Alone  Oral Chemo Adherence         Constitutional     Neurosensory     Cardiovascular     Pulmonary     Gastrointestinal     Genitourinary     Integumentary     Patient Coping  Patient Coping: Accepting;Open/discussion  Accompanied by  Accompanied by: Alone    Past History:    Past Medical History:   Diagnosis Date     BPH (benign prostatic hypertrophy)      Cancer (H)     Skin on R.chest wall.     Chest discomfort      Coronary artery disease      Detached retina, left Oct 2014     Hyperlipidemia      Hypertension      Physical Exam:    Recent Vitals 1/31/2020   Height 5' 5.5\"   Weight 161 lbs   BSA (m2) 1.84 m2   /82   Pulse 54   SpO2 97   Some recent data might be hidden     General: patient appears stated age of 69 y.o.. Nontoxic and in no distress.   HEENT: Head: atraumatic, normocephalic. Sclerae anicteric.  Chest:  Normal respiratory effort  Cardiac:  No edema.   Abdomen: abdomen is nondistended  Extremities: normal tone and muscle bulk.  Skin: no lesions or rash. Warm and dry.   CNS: alert and oriented. Grossly non-focal.   Psychiatric: normal mood and affect.     Lab Results:    Recent Results (from the past 168 hour(s))   Comprehensive Metabolic Panel   Result Value Ref Range    Sodium 140 136 - 145 mmol/L "    Potassium 3.7 3.5 - 5.0 mmol/L    Chloride 106 98 - 107 mmol/L    CO2 29 22 - 31 mmol/L    Anion Gap, Calculation 5 5 - 18 mmol/L    Glucose 60 (L) 70 - 125 mg/dL    BUN 14 8 - 28 mg/dL    Creatinine 0.96 0.70 - 1.30 mg/dL    GFR MDRD Af Amer >60 >60 mL/min/1.73m2    GFR MDRD Non Af Amer >60 >60 mL/min/1.73m2    Bilirubin, Total 0.4 0.0 - 1.0 mg/dL    Calcium 9.5 8.5 - 10.5 mg/dL    Protein, Total 6.9 6.0 - 8.0 g/dL    Albumin 3.6 3.5 - 5.0 g/dL    Alkaline Phosphatase 53 45 - 120 U/L    AST 16 0 - 40 U/L    ALT <9 0 - 45 U/L   HM1 (CBC with Diff)   Result Value Ref Range    WBC 5.4 4.0 - 11.0 thou/uL    RBC 4.96 4.40 - 6.20 mill/uL    Hemoglobin 14.1 14.0 - 18.0 g/dL    Hematocrit 43.0 40.0 - 54.0 %    MCV 87 80 - 100 fL    MCH 28.4 27.0 - 34.0 pg    MCHC 32.8 32.0 - 36.0 g/dL    RDW 13.9 11.0 - 14.5 %    Platelets 268 140 - 440 thou/uL    MPV 8.7 8.5 - 12.5 fL    Neutrophils % 61 50 - 70 %    Lymphocytes % 23 20 - 40 %    Monocytes % 12 (H) 2 - 10 %    Eosinophils % 4 0 - 6 %    Basophils % 1 0 - 2 %    Neutrophils Absolute 3.3 2.0 - 7.7 thou/uL    Lymphocytes Absolute 1.2 0.8 - 4.4 thou/uL    Monocytes Absolute 0.6 0.0 - 0.9 thou/uL    Eosinophils Absolute 0.2 0.0 - 0.4 thou/uL    Basophils Absolute 0.1 0.0 - 0.2 thou/uL     Imaging:    CT images were personally reviewed with the patient.  No pulmonary nodules.     Ct Chest Abdomen Pelvis Without Oral With Iv Contrast    Result Date: 1/29/2020  EXAM: CT CHEST ABDOMEN PELVIS WO ORAL W IV CONTRAST LOCATION: [facility] DATE/TIME: 1/29/2020 9:00 AM INDICATION: lung cancer and liver lesion follow-up. COMPARISON: MRI 3/19/2019 and CT 12/17/2018 TECHNIQUE: CT scan of the chest, abdomen, and pelvis was performed following injection of IV contrast. Multiplanar reformats were obtained. Dose reduction techniques were used. CONTRAST: Iohexol (Omni) 100 mL FINDINGS: LUNGS AND PLEURA: Status post right middle lobectomy. Emphysema. Trace dependent atelectasis on the  left. A stable 0.3 cm perifissural nodule in the left lower lobe, image 59:3. No pleural effusion. MEDIASTINUM/AXILLAE: Stable calcified nodule in the thyroid isthmus. Severe coronary artery calcification. Normal aortic caliber. Moderate aortic plaque. No lymphadenopathy. HEPATOBILIARY: Normal. PANCREAS: Normal. SPLEEN: Normal. ADRENAL GLANDS: A stable 1.7 cm adenoma in the left adrenal gland and a stable 1.5 cm adenoma in the right adrenal gland. KIDNEYS/BLADDER: Simple cyst in the right kidney, no follow-up required. A 0.2 cm nephrolith in the lower pole the right kidney. No hydronephrosis. Stable mild circumferential bladder wall thickening, likely due to chronic outlet obstruction. BOWEL: Normal. LYMPH NODES: No lymphadenopathy. VASCULATURE: A stable 2.1 cm saccular aneurysm of the infrarenal abdominal aorta, image 84:400. A stable saccular 2.9 cm aneurysm at the bifurcation of the left common iliac artery with eccentric mural thrombus. A stable saccular 2.2 cm aneurysm at the origin of the right internal iliac artery with eccentric mural thrombus. Moderate atheromatous plaque involving the aorta and major branches. PELVIC ORGANS: Stable prostatomegaly measuring 7.8 cm in transverse dimension. OTHER: No ascites. Small fat-containing inguinal hernias. MUSCULOSKELETAL: Degenerative changes of the spine and hips. Median sternotomy. A dextroconvex curvature of the thoracolumbar spine.     1.  No evidence of recurrent or metastatic disease in the chest, abdomen or pelvis. 2.  Stable bilateral adrenal adenomas. 3.  Stable aneurysms of the aorta, left common iliac artery and right internal iliac artery. 4.  Stable marked prostatomegaly. 5.  Small right nephrolith without hydronephrosis. 6.  A stable 0.3 cm pulmonary nodule which is consistent with an intrapulmonary lymph node.      Signed by: Orville Harmon MD

## 2021-06-07 NOTE — TELEPHONE ENCOUNTER
RN cannot approve Refill Request    RN can NOT refill this medication med is not covered by policy/route to provider       . Last office visit: 2/27/2018 Mercedes Adorno MD Last Physical: 1/7/2020 Last MTM visit: Visit date not found Last visit same specialty: 12/20/2019 Brittany Moon CNP.  Next visit within 3 mo: Visit date not found  Next physical within 3 mo: Visit date not found      Joelle Meza, [facility] Triage/Med Refill 4/10/2020    Requested Prescriptions   Pending Prescriptions Disp Refills     CHOLECALCIFEROL 25 mcg (1,000 unit) tablet [Pharmacy Med Name: VITAMIN D 1,000UNIT TABLETS] 100 tablet 3     Sig: TAKE 1 TABLET( 1,000 UNITS TOTAL) BY MOUTH DAILY       There is no refill protocol information for this order

## 2021-06-09 ENCOUNTER — COMMUNICATION - HEALTHEAST (OUTPATIENT)
Dept: INTERNAL MEDICINE | Facility: CLINIC | Age: 71
End: 2021-06-09

## 2021-06-09 ENCOUNTER — COMMUNICATION - HEALTHEAST (OUTPATIENT)
Dept: FAMILY MEDICINE | Facility: CLINIC | Age: 71
End: 2021-06-09

## 2021-06-09 DIAGNOSIS — I10 HTN (HYPERTENSION): ICD-10-CM

## 2021-06-09 NOTE — PROGRESS NOTES
HPI: This patient is a 67yo M who presents to clinic for evaluation of epistaxis. States that it has been going on for a few weeks and is out of the /left side. They start and stop spontaneously, and none of them have required an ER visit or packing. Not currently using any nasal saline or other nasal preparations.     Past medical history, surgical history, social history, family history, medications, and allergies have been reviewed with the patient and are documented above.    Review of Systems: a 10-system review was performed. Pertinent positives are noted in the HPI and on a separate scanned document in the chart.    PHYSICAL EXAMINATION:  GEN: no acute distress, normocephalic  EYES: extraocular movements are intact, pupils are equal and round. Sclera clear.   NOSE: anterior nares are patent. There are no masses or lesions. The septum is non-obstructing, but dry with exposed vessels on the left that were cauterized today.  OC/OP: clear, dentition is in good repair. The tongue and palate are fully mobile and symmetric.   NECK: soft and supple. No lymphadenopathy or masses. Airway is midline.  NEURO: CN II-XII are intact bilaterally. alert and oriented. No nystagmus. Gait is normal.  PULM: breathing comfortably on room air, normal chest expansion with respiration    MEDICAL DECISION-MAKING: This patient is a 67yo M with epistaxis from the anterior septum. The area in question was cauterized today in clinic without difficulty. Advised on nasal saline use. RTC PRN.

## 2021-06-09 NOTE — TELEPHONE ENCOUNTER
Called patient to notify him that Dr Harmon reviewed his labs and reported normal results. Reminded patient that he will be due for visit in January. Patient verbalized understanding and appreciation of call. Corry Hannah, [facility]

## 2021-06-09 NOTE — TELEPHONE ENCOUNTER
Left message to call back for: Patient   Information to relay to patient:  Please help schedule  For Fasting labs and a BP.    BR, CMA

## 2021-06-09 NOTE — PROGRESS NOTES
"Jack Brown is a 69 y.o. male who is being evaluated via a billable telephone visit.      The patient has been notified of following:     \"This telephone visit will be conducted via a call between you and your physician/provider. We have found that certain health care needs can be provided without the need for a physical exam.  This service lets us provide the care you need with a short phone conversation.  If a prescription is necessary we can send it directly to your pharmacy.  If lab work is needed we can place an order for that and you can then stop by our lab to have the test done at a later time.    Telephone visits are billed at different rates depending on your insurance coverage. During this emergency period, for some insurers they may be billed the same as an in-person visit.  Please reach out to your insurance provider with any questions.    If during the course of the call the physician/provider feels a telephone visit is not appropriate, you will not be charged for this service.\"    Patient has given verbal consent to a Telephone visit? Yes    What phone number would you like to be contacted at? [phone]     Patient would like to receive their AVS by AVS Preference: Britta.    Chief Complaint   Patient presents with     Follow-up     6 month med check     TMJ pain - Jack takes tylenol - doesn't grind teeth at night - does find himself clenching teeth and tries to correct    Elevated LDL  - Jack has made healthy changed in his diet   - Red meat only once a week   - Cuts out all the fat with park   - Cut down on pastries   - Trying to read saturated fat labels   - Aware of tropical oils (coconut, palms oil)  I did discuss that even with the best dietary efforts, sometimes cholesterol remains high.  He has had myalgias with pravastatin and atorvastatin.  He would be wiling to consider ezetimibe    Urinary retention - well controlled on tamsulosin    Muscle pains - they have improved  The kind of " soreness he used to have is better -the deep down soreness  taking 1000 ius daily Vitamin D    Hypertension - controlled on amlodipine and metoprolol    BP Readings from Last 3 Encounters:   01/31/20 156/82   01/07/20 100/78   12/20/19 122/86     He donated blood before lock down - his blood pressure then was at good level    How are you during the  CoVid19 pandemic ?   - they go out only to the grocery store and hardware store, drive through sandwich once in a while   - picnic lunch in the park - staying in vehicle    - Puts in 3 hours in the garde daily - he may have to go out as early as 5 am   - they avoid socializing   - Good connection with family and friends       ROS  No dizziness or dry eye   no chest pains, palpitations or dyspnea    He would consider statin if LDL is high    Assessment/Plan:    1. Hypertension  He has pending labs including renal function form Dr. Alford  I can refill medications in the next 6 months    2. Mixed hyperlipidemia  Intolerant of statins - I did discuss that even with the best dietary efforts, sometimes cholesterol remains high.  He has had myalgias with pravastatin and atorvastatin.  He would be wiling to consider ezetimibe      3. Vitamin D deficiency  Recheck levels  - Vitamin D, Total (25-Hydroxy); Future    Return in about 6 months (around 1/7/2021) for follow up.    8:07 - 8: 25  Phone call duration:  18 minutes    Mercedes Adorno MD

## 2021-06-10 RX ORDER — METOPROLOL TARTRATE 50 MG
50 TABLET ORAL 2 TIMES DAILY
Qty: 180 TABLET | Refills: 3 | Status: SHIPPED | OUTPATIENT
Start: 2021-06-10 | End: 2022-06-17

## 2021-06-11 NOTE — PROGRESS NOTES
"Assessment and Plan    1. Trigeminal neuralgia  - carBAMazepine (TEGRETOL  XR) 100 MG 12 hr tablet; Take 1 tablet (100 mg total) by mouth 2 (two) times a day.  Dispense: 30 tablet; Refill: 1  - MR Brain Without Contrast; Future      Mercedes Adorno MD     -------------------------------------------    Chief Complaint   Patient presents with     Ear Pain     left ear ache for 4 weeks.  Started on and off for 2 yrs ago especially in winter and spring.  Pain scale of  8/10.     Jack has been having ear pain starting a couple of years ago - went to the dentist - no problem with teeth. It happened a couple of times a year with cold/damp weather.  Now it has been every day for the last 4 weeks.  No pain when he is sleeping    Ear ache resonating down to jaw line, sometime top teeth, sometimes bottom teeth - excruciating pain.  Can be sensitive to salt and sugar and hot or cold.  Brushing teeth with warm water rather than cold water helps. Shaving can set it off.  Chewing can also set it off.  He notes history of septoplasty on the right    He says his Mom had \"tic\" syndrome and took tegretol.  When I asked \"tic douloureaux\"? He said yes.        Patient Active Problem List   Diagnosis     Hypertension     Abnormal Blood Chemistry     Hematospermia     Hyperlipidemia     Visual Impairment     Nasal drainage     Coronary artery disease due to lipid rich plaque     S/P CABG x 3     Acute respiratory insufficiency     Thyroid nodule     Carcinoid tumor determined by biopsy of lung     Carcinoid tumor of lung, right     Dysuria     Urinary retention     Atypical carcinoid lung tumor         Current Outpatient Prescriptions:      acetaminophen (TYLENOL) 500 MG tablet, Take 500 mg by mouth every 6 (six) hours as needed for pain., Disp: , Rfl:      amLODIPine (NORVASC) 10 MG tablet, TAKE 1 TABLET BY MOUTH ONCE DAILY, Disp: 90 tablet, Rfl: 2     aspirin 81 mg chewable tablet, Chew 81 mg daily., Disp: , Rfl:      atenolol (TENORMIN) " 50 MG tablet, Take 50 mg by mouth 2 times a day at 9:00am and 5:00pm., Disp: , Rfl:      omega-3 fatty acids-fish oil 360-1,200 mg cap, Take 1,200 mg by mouth 2 (two) times a day. , Disp: , Rfl:      tamsulosin (FLOMAX) 0.4 mg Cp24, Take 0.4 mg by mouth daily., Disp: , Rfl:      carBAMazepine (TEGRETOL  XR) 100 MG 12 hr tablet, Take 1 tablet (100 mg total) by mouth 2 (two) times a day., Disp: 30 tablet, Rfl: 1        History   Smoking Status     Never Smoker   Smokeless Tobacco     Never Used       History   Alcohol Use     0.6 oz/week     1 Cans of beer per week     Comment: Mild use         Vitals:    06/15/17 0915   BP: 102/78   Pulse: (!) 52   SpO2: 97%     Body mass index is 26.31 kg/(m^2).     EXAM:    General appearance - alert, well appearing, and in no distress  Ears - bilateral TM's and external ear canals normal  Mouth - mucous membranes moist, pharynx normal without lesions  Neck - supple, no significant adenopathy  Chest - clear to auscultation, no wheezes, rales or rhonchi, symmetric air entry  Heart - normal rate, regular rhythm, normal S1, S2, no murmurs, rubs, clicks or gallops  Neurological - alert, oriented, normal speech, no focal findings or movement disorder noted, cranial nerves II through XII intact

## 2021-06-12 NOTE — PROGRESS NOTES
Assessment/Plan:        Diagnoses and all orders for this visit:    Bronchitis  -     XR Chest PA and Lateral; Future; Expected date: 7/24/17    Encounter to discuss test results    Need for pneumococcal vaccine    Other orders  -     amoxicillin-clavulanate (AUGMENTIN) 500-125 mg per tablet; Take 1 tablet (500 mg total) by mouth 2 (two) times a day for 10 days.  Dispense: 20 tablet; Refill: 0    CXR - is read by me as normal.   Will retreat with Augmentin. Recommended to take probiotic along with this   We went over his MRI results of the brain - this was done due to presentation of tic dolisamarjer. This was normal.   He Is using Tegretol and reports that his pain is 99% improved.   We discussed staying on this for at least the next 3months and then tapering this down. He is in agreement with this.   FU willl be prn           Subjective:    Patient ID: Jack Brown is a 66 y.o. male.    Cough   This is a recurrent problem. The current episode started more than 1 month ago. The problem occurs intermittently. Associated symptoms include congestion and coughing. Pertinent negatives include no anorexia, chest pain, chills, fatigue, fever, headaches, myalgias, sore throat, swollen glands or weakness. Nothing aggravates the symptoms. He has tried acetaminophen and NSAIDs (antibiotics) for the symptoms. The treatment provided moderate relief.       The following portions of the patient's history were reviewed and updated as appropriate: allergies, current medications, past family history, past medical history, past social history, past surgical history and problem list.    Review of Systems   Constitutional: Negative for chills, fatigue and fever.   HENT: Positive for congestion and sinus pressure. Negative for ear pain, facial swelling, rhinorrhea and sore throat.    Respiratory: Positive for cough. Negative for shortness of breath, wheezing and stridor.    Cardiovascular: Negative for chest pain.   Gastrointestinal:  Negative for anorexia.   Musculoskeletal: Negative for myalgias.   Neurological: Negative for weakness and headaches.   All other systems reviewed and are negative.            Objective:    Physical Exam   Constitutional: He is oriented to person, place, and time. He appears well-developed and well-nourished. No distress.   HENT:   Head: Normocephalic and atraumatic.   Right Ear: External ear normal.   Left Ear: External ear normal.   Nose: Nose normal.   Mouth/Throat: Oropharynx is clear and moist. No oropharyngeal exudate.   Neck: Normal range of motion. Neck supple.   Cardiovascular: Normal rate, regular rhythm and normal heart sounds.  Exam reveals no gallop and no friction rub.    No murmur heard.  Pulmonary/Chest: Effort normal. No respiratory distress. He has no wheezes. He has no rales. He exhibits no tenderness.   Rhonchi scattered throughout. Air movement is good.    Lymphadenopathy:     He has no cervical adenopathy.   Neurological: He is alert and oriented to person, place, and time.   Skin: Skin is warm and dry.   Nursing note and vitals reviewed.

## 2021-06-12 NOTE — PROGRESS NOTES
Assessment/Plan:        Diagnoses and all orders for this visit:    Sinusitis    Other orders  -     amoxicillin-clavulanate (AUGMENTIN) 500-125 mg per tablet; Take 1 tablet (500 mg total) by mouth 2 (two) times a day for 10 days.  Dispense: 20 tablet; Refill: 0    Rrecommended Mucinex as well   Push fluids  FU prn         Subjective:    Patient ID: Jack Brown is a 66 y.o. male.    Cough   This is a new problem. The current episode started in the past 7 days. The problem occurs intermittently. The problem has been unchanged. Associated symptoms include congestion, coughing and fatigue. Pertinent negatives include no chest pain, chills, fever, headaches, nausea, rash, sore throat or swollen glands. Nothing aggravates the symptoms. He has tried drinking, rest and sleep for the symptoms. The treatment provided no relief.   Sinusitis   This is a new problem. The current episode started in the past 7 days. The problem is unchanged. His pain is at a severity of 2/10. The pain is mild. Associated symptoms include congestion, coughing and sinus pressure. Pertinent negatives include no chills, ear pain, headaches, hoarse voice, shortness of breath, sneezing, sore throat or swollen glands. Past treatments include acetaminophen. The treatment provided no relief.       The following portions of the patient's history were reviewed and updated as appropriate: allergies, current medications, past family history, past medical history, past social history, past surgical history and problem list.    Review of Systems   Constitutional: Positive for fatigue. Negative for chills and fever.   HENT: Positive for congestion and sinus pressure. Negative for ear pain, facial swelling, hoarse voice, sneezing and sore throat.    Respiratory: Positive for cough. Negative for shortness of breath, wheezing and stridor.    Cardiovascular: Negative for chest pain.   Gastrointestinal: Negative for nausea.   Skin: Negative for rash.    Neurological: Negative for headaches.   All other systems reviewed and are negative.            Objective:    Physical Exam   Constitutional: He appears well-developed and well-nourished. He appears distressed.   HENT:   Head: Normocephalic.   Right Ear: External ear normal.   Left Ear: External ear normal.   Nose: Mucosal edema and rhinorrhea present. No nasal deformity or septal deviation. Right sinus exhibits maxillary sinus tenderness. Left sinus exhibits maxillary sinus tenderness.   Mouth/Throat: Oropharynx is clear and moist. No oropharyngeal exudate.   Neck: Normal range of motion. Neck supple.   Cardiovascular: Regular rhythm.  Exam reveals friction rub. Exam reveals no gallop.    No murmur heard.  Pulmonary/Chest: Effort normal and breath sounds normal.   Lymphadenopathy:     He has no cervical adenopathy.   Nursing note and vitals reviewed.

## 2021-06-13 NOTE — PROGRESS NOTES
[facility] Hematology and Oncology Progress Note    Patient: Jack Brown  MRN: [id number]  Date of Service:  10/31/2017      Assessment and Plan:    Atypical carcinoid lung tumor    Staging form: Lung, AJCC 7th Edition      Clinical: T1, N0, M0 - Unsigned      Pathologic: T1, N0, cM0 - Signed by Orville Harmon MD on 5/3/2016    1.  Lung carcinoid tumor:  Clinically stable. No evidence of recurrent disease on imaging. Repeat scan in one year.    2.  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 Has seen ENT.     3.  Arterial aneurysms. Slight increase on imaging. Will send to surgery to establish follow-up.     ECOG Performance   ECOG Performance Status: 0    Distress Assessment  Distress Assessment Score: No distress    Pain  Currently in Pain: No/denies    Diagnosis:    1.  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    Treatment:    Right middle lobectomy in April 2016: Pathology shows atypical carcinoid tumor.  2.2 cm.  No lymphovascular invasion.  15 lymph nodes tested all negative.  Less than 2 mitoses per 10 high-power field.    Interim History:    Jack returns today for a 1 year followup. Doing well. No pain or cough. Breathing is stable.     Review of Systems:    Constitutional  Constitutional (WDL): Exceptions to WDL  Weight Gain: 5 - <10% from baseline (up 5lbs since 11/2/16)  Neurosensory  Neurosensory (WDL): All neurosensory elements are within defined limits  Cardiovascular  Cardiovascular (WDL): All cardiovascular elements are within defined limits  Pulmonary  Respiratory (WDL): Exceptions to WDL  Cough: Mild symptoms, nonprescription intervention indicated (sometimes after eating certain things)  Gastrointestinal  Gastrointestinal (WDL): All gastrointestinal elements are within defined limits  Genitourinary  Genitourinary (WDL): All genitourinary elements are within defined limits  Integumentary  Integumentary (WDL): All integumentary  "elements are within defined limits  Patient Coping  Patient Coping: Accepting  Accompanied by  Accompanied by: Family Member    Past History:    Past Medical History:   Diagnosis Date     BPH (benign prostatic hypertrophy)      Cancer     Skin on R.chest wall.     Chest discomfort      Coronary artery disease      Detached retina, left Oct 2014     Hyperlipidemia      Hypertension      Physical Exam:    Recent Vitals 10/31/2017   Height 5' 6\"   Weight 163 lbs 13 oz   BSA (m2) 1.86 m2   /79   Pulse 65   Temp 97.8   Temp src 1   SpO2 94   Some recent data might be hidden     General: patient appears stated age of 66 y.o.. Nontoxic and in no distress.   HEENT: Head: atraumatic, normocephalic. Sclerae anicteric.  Chest:  Normal respiratory effort  Cardiac:  No edema.   Abdomen: abdomen is soft, non-distended  Extremities: normal tone and muscle bulk.  Skin: no lesions or rash. Warm and dry.   CNS: alert and oriented x3. Grossly non-focal.   Psychiatric: normal mood and affect.     Lab Results:    Recent Results (from the past 168 hour(s))   Thyroid Stimulating Hormone (TSH)   Result Value Ref Range    TSH 1.62 0.30 - 5.00 uIU/mL   Erythrocyte Sedimentation Rate   Result Value Ref Range    Sed Rate 6 0 - 15 mm/hr   C-Reactive Protein   Result Value Ref Range    CRP 0.2 0.0 - 0.8 mg/dL   CK   Result Value Ref Range    CK, Total 105 30 - 190 U/L   POCT creatinine   Result Value Ref Range    POC Creatinine 0.9 mg/dL   POCT GFR   Result Value Ref Range    POC GFR AMER AF HE >60  >60 mL/min/1.73m2    POC GFR NON AMER AF >60  >60 mL/min/1.73m2   Comprehensive Metabolic Panel   Result Value Ref Range    Sodium 139 136 - 145 mmol/L    Potassium 3.9 3.5 - 5.0 mmol/L    Chloride 104 98 - 107 mmol/L    CO2 29 22 - 31 mmol/L    Anion Gap, Calculation 6 5 - 18 mmol/L    Glucose 78 70 - 125 mg/dL    BUN 14 8 - 22 mg/dL    Creatinine 0.83 0.70 - 1.30 mg/dL    GFR MDRD Af Amer >60 >60 mL/min/1.73m2    GFR MDRD Non Af Amer >60 >60 " mL/min/1.73m2    Bilirubin, Total 0.3 0.0 - 1.0 mg/dL    Calcium 9.6 8.5 - 10.5 mg/dL    Protein, Total 7.2 6.0 - 8.0 g/dL    Albumin 3.5 3.5 - 5.0 g/dL    Alkaline Phosphatase 59 45 - 120 U/L    AST 15 0 - 40 U/L    ALT 8 0 - 45 U/L   HM1 (CBC with Diff)   Result Value Ref Range    WBC 6.0 4.0 - 11.0 thou/uL    RBC 5.05 4.40 - 6.20 mill/uL    Hemoglobin 16.0 14.0 - 18.0 g/dL    Hematocrit 45.5 40.0 - 54.0 %    MCV 90 80 - 100 fL    MCH 31.7 27.0 - 34.0 pg    MCHC 35.2 32.0 - 36.0 g/dL    RDW 12.8 11.0 - 14.5 %    Platelets 251 140 - 440 thou/uL    MPV 8.9 8.5 - 12.5 fL    Neutrophils % 63 50 - 70 %    Lymphocytes % 26 20 - 40 %    Monocytes % 9 2 - 10 %    Eosinophils % 2 0 - 6 %    Basophils % 1 0 - 2 %    Neutrophils Absolute 3.7 2.0 - 7.7 thou/uL    Lymphocytes Absolute 1.5 0.8 - 4.4 thou/uL    Monocytes Absolute 0.5 0.0 - 0.9 thou/uL    Eosinophils Absolute 0.1 0.0 - 0.4 thou/uL    Basophils Absolute 0.1 0.0 - 0.2 thou/uL     Imaging:    CT images were personally reviewed with the patient.  No pulmonary nodules.     Ct Chest Abdomen Pelvis With Oral With Iv Cont    Result Date: 10/30/2017  CT CHEST, ABDOMEN, AND PELVIS 10/30/2017 9:20 AM      INDICATION: Follow-up pulmonary carcinoid. TECHNIQUE: CT chest, abdomen, and pelvis. Dose reduction techniques were used. IV CONTRAST: 100 mL Omnipaque 350. COMPARISON: 10/30/2017 and 1/1/2016 FINDINGS: CHEST: LUNGS: Right middle lobectomy an wedge resection of the inferior right upper lobe redemonstrated. Stable postsurgical scar in the right lung and left basilar subsegmental atelectasis versus fibrosis. No new mass or infiltrate. Central airways are patent. MEDIASTINUM: No adenopathy. Previous sternotomy for coronary artery bypass. No pleural nor pericardial effusion.  ABDOMEN: Diffuse hepatic steatosis. Normal-appearing gallbladder and biliary system, spleen, pancreas and left kidney with prominent extrarenal pelvis. Benign right upper pole cortical cyst and prominent  right extrarenal pelvis. 3 mm nonobstructing calculus right lower pole. Stable bilateral adrenal nodules, left greater the right. No retroperitoneal mass. Slight interval increase in penetrating ulcer/pseudoaneurysm of the infrarenal aorta which now measures 1 cm on image 277, previously measuring 0.8 cm. PELVIS: Bilateral iliac bifurcation our unchanged, measuring 2 cm on the right and 2.8 cm on the left. Markedly enlarged prostate protrudes into the base of the bladder. Normal appendix. No adenopathy nor abnormal fluid collection. MUSCULOSKELETAL: Mild rightward scoliosis centered at L3. Degenerative changes lower lumbar spine.     CONCLUSION: 1.  Stable postop changes right lung with no evidence of recurrence nor metastatic disease. 2.  3 mm nonobstructing calculus right lower pole end benign right renal cysts are redemonstrated. 3.  Interval increase in infrarenal aortic penetrating ulcer/pseudoaneurysm which now measures 1 cm, previously measuring 0.8 cm. Bilateral iliac bifurcation aneurysms measuring 2.8 cm on left and 2 cm on the right are stable. Consider vascular surgery consult to determine follow-up/management. 4.  Marked prostate enlargement redemonstrated.      Signed by: Orville Harmon MD

## 2021-06-13 NOTE — PROGRESS NOTES
Jack had a recent CT and f/u with Dr. Harmon.  His CT showed no evidence of recurrence.  I have sent out a letter today introducing myself and my role.  I provided my direct number and invited calls.  I will f/u with Jack when he returns to clinic or sooner if needs arise.

## 2021-06-14 NOTE — PROGRESS NOTES
[facility] Hematology and Oncology Progress Note    Patient: Jack Brown  MRN: [id number]  Date of Service: January 19, 2021.      Assessment and Plan:    Atypical carcinoid lung tumor    Staging form: Lung, AJCC 7th Edition      Clinical: T1, N0, M0 - Unsigned      Pathologic: T1, N0, cM0 - Signed by Orville Harmon MD on 5/3/2016    1.  Lung carcinoid tumor: Imaging all reviewed.  No evidence of recurrence.  He is now just over 5 years out.  I told him he is likely cured.  We will continue yearly surveillance for now as he has aneurysms and a thyroid nodule which also need to be followed.    2.  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  Ultrasound in March 2019 showed stable isthmic nodule.  Her going to repeat an ultrasound now as it has been 2 years.    3.  Arterial aneurysms.  General stability.  Aneurysms are generally small.  Continue to follow radiographically yearly.    4.  Enlarged prostate: I believe he had this evaluated with an MRI and biopsy about 5 years ago.  We will work to get those results.  We talked about the pros and cons of PSA testing and elected to forego that at this time.  We are going to repeat an MRI just to see if there is been any change compared to the previous.    ECOG Performance   ECOG Performance Status: 0    Distress Assessment  Distress Assessment Score: No distress    Pain  Currently in Pain: No/denies    Diagnosis:    1.  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    Treatment:    Right middle lobectomy in April 2016: Pathology shows atypical carcinoid tumor.  2.2 cm.  No lymphovascular invasion.  15 lymph nodes tested all negative.  Less than 2 mitoses per 10 high-power field.    Interim History:    Jack returns today for a 1 year followup.  He is doing well.  No change in his health over the past year.  No acute complaints today.    Review of Systems:    Constitutional  Constitutional (WDL): Exceptions to  WDL  Fatigue: Fatigue relieved by rest  Neurosensory  Neurosensory (WDL): All neurosensory elements are within defined limits  Cardiovascular  Cardiovascular (WDL): All cardiovascular elements are within defined limits  Pulmonary  Respiratory (WDL): Within Defined Limits  Gastrointestinal  Gastrointestinal (WDL): All gastrointestinal elements are within defined limits  Genitourinary  Genitourinary (WDL): All genitourinary elements are within defined limits  Integumentary  Integumentary (WDL): All integumentary elements are within defined limits  Patient Coping  Patient Coping: Accepting  Accompanied by  Accompanied by: Alone    Past History:    Past Medical History:   Diagnosis Date     BPH (benign prostatic hypertrophy)      Cancer (H)     Skin on R.chest wall.     Chest discomfort      Coronary artery disease      Detached retina, left Oct 2014     Hyperlipidemia      Hypertension      Physical Exam:    Recent Vitals 1/19/2021   Height -   Weight 163 lbs   BSA (m2) 1.85 m2   /81   Pulse 64   Temp 97.6   Temp src 1   SpO2 98   Some recent data might be hidden     General: patient appears stated age of 70 y.o.. Nontoxic and in no distress.   HEENT: Head: atraumatic, normocephalic. Sclerae anicteric.  Chest:  Normal respiratory effort  Cardiac:  No edema.   Abdomen: abdomen is nondistended  Extremities: normal tone and muscle bulk.  Skin: no lesions or rash. Warm and dry.   CNS: alert and oriented. Grossly non-focal.   Psychiatric: normal mood and affect.     Lab Results:    Recent Results (from the past 168 hour(s))   POCT CREATININE   Result Value Ref Range    iSTAT Creatinine 1.2 0.7 - 1.3 mg/dL    iSTAT GFR MDRD Af Amer >60 >60 mL/min/1.73m2    iSTAT GFR MDRD Non Af Amer 60 (L) >60 mL/min/1.73m2     Imaging:    CT images were personally reviewed with the patient.  No pulmonary nodules.     Ct Chest Abdomen Pelvis Without Oral With Iv Contrast    Result Date: 1/18/2021  EXAM: CT CHEST ABDOMEN PELVIS WO ORAL  W IV CONTRAST LOCATION: [facility] DATE/TIME: 1/18/2021 8:59 AM INDICATION: f/u lung carcinoid. COMPARISON: CT of the chest, abdomen, and pelvis 1/29/2020 and 12/17/2018 TECHNIQUE: CT scan of the chest, abdomen, and pelvis was performed following injection of IV contrast. Multiplanar reformats were obtained. Dose reduction techniques were used. CONTRAST: Iohexol (Omni) 75mL FINDINGS: LUNGS AND PLEURA: Status post right middle lobectomy. Minimal scarring along the resection staple lines without nodularity. No new nodules or airspace opacities. Trachea and remaining central airways are patent and normal caliber. No pleural fluid, thickening, or nodularity. MEDIASTINUM: Cardiac chambers are normal in size. There is subendocardial low-attenuation along the basal to mid anterior left ventricular myocardium consistent with old myocardial infarct. Status post median sternotomy and coronary revascularization. No  pericardial effusion. The main pulmonary artery is normal in size. There are no pulmonary artery filling defects. Normal caliber thoracic aorta. Conventional arch anatomy. Mild unchanged tortuosity of the proximal great vessels and mixed attenuation atheroma in the distal arch and descending aorta. There are no enlarged mediastinal or hilar lymph nodes. The esophagus is decompressed. HEPATOBILIARY: Normal. PANCREAS: Normal. SPLEEN: Normal. ADRENAL GLANDS: Unchanged left adrenal nodule typical of an adenoma. Unchanged thickening of the lateral limb of the right adrenal gland. KIDNEYS/BLADDER: No significant mass or hydronephrosis. 2 mm nonobstructing calculus in the right lower pole calyx, unchanged. No new or enlarging renal stones. Ureters are decompressed. There are simple or benign cysts. No follow up is needed. BOWEL: No obstruction or inflammatory change. LYMPH NODES: Normal. VASCULATURE: Fusiform ectasia of the infrarenal abdominal aorta and fusiform aneurysms of the distal common  iliac arteries. The distal right common iliac artery measures 19 x 20 mm. The distal left common iliac artery measures up to 22 mm and has a a larger amount of crescentic low-attenuation plaque/thrombus extending into the proximal left internal iliac artery. The common femoral arteries have mild mixed attenuation atheroma are patent and normal caliber. PELVIC ORGANS: Severe enlargement of the prostate gland which measures 8.1 cm transverse. The prostate gland indents the bladder base. MUSCULOSKELETAL: Solid osseous union of the median sternotomy. Degenerative disc space narrowing of the lower cervical and upper thoracic spine and lower lumbar spine. Lumbosacral facet arthropathy. No lytic or sclerotic bone lesions are present.     1.  Status post right middle lobectomy. No findings to suggest metastatic disease in the chest, abdomen, or pelvis. 2.  Unchanged adrenal nodules. 3.  Unchanged fusiform ectasia of the infrarenal abdominal aorta and aneurysms of the distal common iliac arteries. 4.  Severe prostate enlargement measuring 8.1 cm transverse. 5.  Nonobstructing right renal calculus.      Signed by: Orville Harmon MD

## 2021-06-14 NOTE — TELEPHONE ENCOUNTER
Refill Approved    Rx renewed per Medication Renewal Policy. Medication was last renewed on 1/7/20.    Joelle Meza, [facility] Triage/Med Refill 1/11/2021     Requested Prescriptions   Pending Prescriptions Disp Refills     amLODIPine (NORVASC) 10 MG tablet [Pharmacy Med Name: AMLODIPINE BESYLATE 10MG TABLETS] 91 tablet 3     Sig: TAKE 1 TABLET(10 MG) BY MOUTH DAILY       Calcium-Channel Blockers Protocol Passed - 1/10/2021 11:11 AM        Passed - PCP or prescribing provider visit in past 12 months or next 3 months     Last office visit with prescriber/PCP: 2/27/2018 Mercedes Adorno MD OR same dept: Visit date not found OR same specialty: 12/20/2019 Brittany Moon, CNP  Last physical: 1/7/2020 Last MTM visit: Visit date not found   Next visit within 3 mo: Visit date not found  Next physical within 3 mo: Visit date not found  Prescriber OR PCP: Mercedes Adorno MD  Last diagnosis associated with med order: 1. Essential hypertension  - amLODIPine (NORVASC) 10 MG tablet [Pharmacy Med Name: AMLODIPINE BESYLATE 10MG TABLETS]; TAKE 1 TABLET(10 MG) BY MOUTH DAILY  Dispense: 91 tablet; Refill: 3    If protocol passes may refill for 12 months if within 3 months of last provider visit (or a total of 15 months).             Passed - Blood pressure filed in past 12 months     BP Readings from Last 1 Encounters:   01/31/20 156/82

## 2021-06-14 NOTE — TELEPHONE ENCOUNTER
Refill Approved    Rx renewed per Medication Renewal Policy. Medication was last renewed on 1/7/20.    Karolyn Helton, [facility] Triage/Med Refill 2/1/2021     Requested Prescriptions   Pending Prescriptions Disp Refills     metoprolol tartrate (LOPRESSOR) 50 MG tablet [Pharmacy Med Name: METOPROLOL TARTRATE 50MG TABLETS] 182 tablet 3     Sig: TAKE 1 TABLET(50 MG) BY MOUTH TWICE DAILY       Beta-Blockers Refill Protocol Passed - 2/1/2021 10:05 AM        Passed - PCP or prescribing provider visit in past 12 months or next 3 months     Last office visit with prescriber/PCP: 2/27/2018 Mercedes Adorno MD OR same dept: Visit date not found OR same specialty: 12/20/2019 Brittany Moon, CNP  Last physical: 1/7/2020 Last MTM visit: Visit date not found   Next visit within 3 mo: Visit date not found  Next physical within 3 mo: Visit date not found  Prescriber OR PCP: Mercedes Adorno MD  Last diagnosis associated with med order: 1. HTN (hypertension)  - metoprolol tartrate (LOPRESSOR) 50 MG tablet [Pharmacy Med Name: METOPROLOL TARTRATE 50MG TABLETS]; TAKE 1 TABLET(50 MG) BY MOUTH TWICE DAILY  Dispense: 182 tablet; Refill: 3    If protocol passes may refill for 12 months if within 3 months of last provider visit (or a total of 15 months).             Passed - Blood pressure filed in past 12 months     BP Readings from Last 1 Encounters:   01/19/21 143/81

## 2021-06-15 NOTE — PROGRESS NOTES
Jack,    Culture came back unremarkable.  Please continue with plan discussed by you and your provider.      Best,    Dr. Ennis

## 2021-06-16 PROBLEM — M79.10 MUSCLE SORENESS: Status: ACTIVE | Noted: 2017-10-26

## 2021-06-16 NOTE — PROGRESS NOTES
Assessment and Plan    1. Abdominal pain  Leading to urge to defalcate  - XR Abdomen Upright Only; Future - I see a large amount of stool in distal colon - official reading:  XR ABDOMEN UPRIGHT ONLY  2/27/2018 1:48 PM     INDICATION: gas and pain before BM - small frequent stools - rule out constipation  COMPARISON: Abdomen CT 10/30/2017     FINDINGS: Lung bases are clear. Bowel gas pattern is unremarkable. No significant retention of stool. Mild lumbar rotoscoliosis convex to the right centered at L2 with mild degenerative changes.      Discussed with Jack by phone later that I think it would be OK to try two days of daily polyethylene glycol  this week.  Since he has a colonoscopy next week, that will also provide further (hopefully) reassurance    Return if symptoms worsen or fail to improve.    Mercedes Adorno MD     -------------------------------------------    Chief Complaint   Patient presents with     Abdominal Cramping     when having a BM.  BM makes the pain go away.  Going on for 3-4 weeks. No nausea or vomiting.  Pain is cramping.  Pain scale of 6, does not radiate anywhere.     January he had bronchitis - was on antibiotic Augmentin and using yogurt daily.  After bronchitis cleared up -  BMs were ok for a short time, then he became constipated. He took magnesium citrate and his  bowel movements became loose and frequent.  Now he has some gasey or sometimes sharp abdominal pain that lead to immediate need to have a bowel movement.  Over the weekend he did lots of shoveling and had no pain. He has no pain with eating.  His stool are soft but not liquid, and mostly small.  He had 8 BMs this am: small bowel movements that were soft and mushy  But had normal color and food was completely digested.  H   - no nausea, no vomiting, no black stools   - no pain in rectum   - no change in diet: he eats Yogurt daily, oatmeal qod, homemade vegetable soup frequently    NOTABLY he has a colonoscopy scheduled in 6 days  and will be doing the prep this weekend              Vascular surgery at [facility] [address]  Other concerns:  He notes three aortic aneurysms that his oncologist has evaluated with CT and also referred him to a vascular surgeon.  WE have a copy of consult note from [facility], including their plan on getting ultrasound of popliteal arteries - Lobito says he has had these ultrasounds but has not hear results    Current Outpatient Prescriptions on File Prior to Visit   Medication Sig Dispense Refill     acetaminophen (TYLENOL) 500 MG tablet Take 500 mg by mouth every 6 (six) hours as needed for pain.       amLODIPine (NORVASC) 10 MG tablet TAKE 1 TABLET BY MOUTH DAILY 90 tablet 0     aspirin 81 mg chewable tablet Chew 81 mg daily.       carBAMazepine (TEGRETOL  XR) 100 MG 12 hr tablet TAKE 1 TABLET(100 MG) BY MOUTH TWICE DAILY 60 tablet 5     metoprolol tartrate (LOPRESSOR) 50 MG tablet TAKE 1 TABLET(50 MG) BY MOUTH TWICE DAILY 60 tablet 2     tamsulosin (FLOMAX) 0.4 mg Cp24 Take 0.4 mg by mouth daily.       tamsulosin (FLOMAX) 0.4 mg Cp24 TAKE 1 CAPSULE(0.4 MG) BY MOUTH DAILY AFTER BREAKFAST 30 capsule 11     ezetimibe (ZETIA) 10 mg tablet Take 1 tablet (10 mg total) by mouth daily. 30 tablet 11     omega-3 fatty acids-fish oil 360-1,200 mg cap Take 1,200 mg by mouth 2 (two) times a day.        No current facility-administered medications on file prior to visit.              History   Smoking Status     Never Smoker   Smokeless Tobacco     Never Used       History   Alcohol Use     4.8 oz/week     1 Cans of beer, 7 Glasses of wine per week       Vitals:    02/27/18 1257   BP: 104/74   Pulse: 78   SpO2: 96%     Body mass index is 25.18 kg/(m^2).     EXAM:    General appearance - alert, well appearing, and in no distress  Mental status - alert, oriented to person, place, and time, normal mood, behavior, speech, dress, motor activity, and thought processes  Abdomen - soft, nontender, nondistended, no  masses or organomegaly  active bowel sounds all four quadrants

## 2021-06-16 NOTE — TELEPHONE ENCOUNTER
Refill Approved    Rx renewed per Medication Renewal Policy. Medication was last renewed on 1/7/20.    Joseph Magdaleno, [facility] Connection Triage/Med Refill 3/30/2021     Requested Prescriptions   Pending Prescriptions Disp Refills     tamsulosin (FLOMAX) 0.4 mg cap [Pharmacy Med Name: TAMSULOSIN 0.4MG CAPSULES] 91 capsule 3     Sig: TAKE 1 CAPSULE BY MOUTH DAILY AFTER BREAKFAST       Alfuzosin/Tamsulosin/Silodosin Refill Protocol  Passed - 3/28/2021  6:48 PM        Passed - PCP or prescribing provider visit in past 12 months       Last office visit with prescriber/PCP: 2/27/2018 Mercedes Adorno MD OR same dept: Visit date not found OR same specialty: 12/20/2019 Brittany Moon CNP  Last physical: 1/7/2020 Last MTM visit: Visit date not found   Next visit within 3 mo: Visit date not found  Next physical within 3 mo: Visit date not found  Prescriber OR PCP: Mercedes Adorno MD  Last diagnosis associated with med order: 1. HTN (hypertension)  - tamsulosin (FLOMAX) 0.4 mg cap [Pharmacy Med Name: TAMSULOSIN 0.4MG CAPSULES]; TAKE 1 CAPSULE BY MOUTH DAILY AFTER BREAKFAST  Dispense: 91 capsule; Refill: 3    If protocol passes may refill for 12 months if within 3 months of last provider visit (or a total of 15 months).

## 2021-06-17 NOTE — TELEPHONE ENCOUNTER
RN cannot approve Refill Request    RN can NOT refill this medication med is not covered by policy/route to provider. Last office visit: 2/27/2018 Mercedes Adorno MD Last Physical: 1/7/2020 Last MTM visit: Visit date not found Last visit same specialty: Visit date not found.  Next visit within 3 mo: Visit date not found  Next physical within 3 mo: Visit date not found      Joseph Magdaleno, [facility] Triage/Med Refill 5/7/2021    Requested Prescriptions   Pending Prescriptions Disp Refills     metoprolol tartrate (LOPRESSOR) 50 MG tablet 182 tablet 0     Sig: Take 1 tablet (50 mg total) by mouth 2 (two) times a day.       There is no refill protocol information for this order

## 2021-06-17 NOTE — TELEPHONE ENCOUNTER
Reason for Call:  Medication or medication refill:    Name of the pharmacy and phone number for the current request: [facility]- [phone]    Name of the medication requested: metoprolol tartrate (LOPRESSOR) 50 MG tablet      Can we leave a detailed message on this number? Yes    Phone number patient can be reached at: Home number on file [phone] (home)    Best Time: anytime    Call taken on 5/7/2021 at 9:48 AM by Becky Riggins

## 2021-06-17 NOTE — TELEPHONE ENCOUNTER
Refill Approved    Rx renewed per Medication Renewal Policy. Medication was last renewed on 5/7/21, last OV 7/7/20.    Mel Najera, [facility] Triage/Med Refill 5/9/2021     Requested Prescriptions   Pending Prescriptions Disp Refills     metoprolol tartrate (LOPRESSOR) 50 MG tablet [Pharmacy Med Name: METOPROLOL TARTRATE 50MG TABLETS] 180 tablet 0     Sig: TAKE 1 TABLET(50 MG) BY MOUTH TWICE DAILY       Beta-Blockers Refill Protocol Passed - 5/7/2021  5:05 PM        Passed - PCP or prescribing provider visit in past 12 months or next 3 months     Last office visit with prescriber/PCP: 2/27/2018 Mercedes Adorno MD OR same dept: Visit date not found OR same specialty: 12/20/2019 Brittany Moon CNP  Last physical: 1/7/2020 Last MTM visit: Visit date not found   Next visit within 3 mo: Visit date not found  Next physical within 3 mo: Visit date not found  Prescriber OR PCP: Mercedes Adorno MD  Last diagnosis associated with med order: 1. HTN (hypertension)  - metoprolol tartrate (LOPRESSOR) 50 MG tablet [Pharmacy Med Name: METOPROLOL TARTRATE 50MG TABLETS]; TAKE 1 TABLET(50 MG) BY MOUTH TWICE DAILY  Dispense: 180 tablet; Refill: 0    If protocol passes may refill for 12 months if within 3 months of last provider visit (or a total of 15 months).             Passed - Blood pressure filed in past 12 months     BP Readings from Last 1 Encounters:   02/25/21 149/82

## 2021-06-17 NOTE — PATIENT INSTRUCTIONS - HE
Advance Directive  Patient s advance directive was discussed and I am comfortable with the patient s wishes.  Patient Education   Personalized Prevention Plan  You are due for the preventive services outlined below.  Your care team is available to assist you in scheduling these services.  If you have already completed any of these items, please share that information with your care team to update in your medical record.  Health Maintenance Due   Topic Date Due     ZOSTER VACCINES (1 of 2) Never done

## 2021-06-17 NOTE — PROGRESS NOTES
Assessment and Plan:     Patient has been advised of split billing requirements and indicates understanding: Yes     1. Medicare annual wellness visit, subsequent      2. Coronary artery disease due to lipid rich plaque  Takes aspirin but other risk factors are not ideally controlled:    3. Hypertension  Lower at home that here - asked him to come back for BP check and bring monitor  - Basic Metabolic Panel    4. Lipid screening   He eats well and exercises, and does not want to add medication to lower cholesterol.  Discussed I will check this once more, BUT - given his habits are good and he does not want to take medication, I am hesitant to continue testing  - Lipid [place] FASTING      The patient's current medical problems were reviewed.      The following health maintenance schedule was reviewed with the patient and provided in printed form in the after visit summary:   Health Maintenance   Topic Date Due     ZOSTER VACCINES (1 of 2) Never done     INFLUENZA VACCINE RULE BASED (Season Ended) 08/01/2021     MEDICARE ANNUAL WELLNESS VISIT  05/20/2022     FALL RISK ASSESSMENT  05/20/2022     LIPID  07/15/2025     ADVANCE CARE PLANNING  05/20/2026     COLORECTAL CANCER SCREENING  03/05/2028     TD 18+ HE  01/07/2030     HEPATITIS C SCREENING  Completed     Pneumococcal Vaccine: 65+ Years  Completed     COVID-19 Vaccine  Completed     Pneumococcal Vaccine: Pediatrics (0 to 5 Years) and At-Risk Patients (6 to 64 Years)  Aged Out     HEPATITIS B VACCINES  Aged Out       Subjective:   Chief Complaint: Jack Brown is an 70 y.o. male here for an Annual Wellness visit.   HPI:     Hyperlipidemia/ASCVD s/p CABG  I had previously message Jack about his elevated LDL and guidelines recommending statin.  He eats well and exercises, and does not want to add medications to his list (takes vitamin D, aspirin, metoprolol and amlodipine).  Discussed I will check this once more, BUT - given his habits are good and he does not  want to take medication, I am hesitant to continue testing    Lab Results   Component Value Date    CHOL 236 (H) 05/20/2021    CHOL 248 (H) 07/15/2020    CHOL 260 (H) 01/07/2020     Lab Results   Component Value Date    HDL 41 05/20/2021    HDL 45 07/15/2020    HDL 48 01/07/2020     Lab Results   Component Value Date    LDLCALC 168 (H) 05/20/2021    LDLCALC 184 (H) 07/15/2020    LDLCALC 187 (H) 01/07/2020     Lab Results   Component Value Date    TRIG 134 05/20/2021    TRIG 97 07/15/2020    TRIG 123 01/07/2020     No components found for: CHOLHDL     Teeth grinding - comes and goes    Hypertension  Blood pressure at home is 135/68  Watches salt in diet   no chest pains, palpitations or dyspnea   BP Readings from Last 3 Encounters:   05/20/21 144/70   02/25/21 149/82   02/20/21 157/90       Preventive   - Had the shingles in March - wonders about getting the vaccine.  I said it was fine to do that now - discussed often better coverage at pharmacy - check with insurance   - history of low Vitamin D - taking daily    Social History:  [place]. Still appropriately careful about [employer] - limits shopping to groceries and hardware.  He will be visiting his brother in June.  HE participates in a senior mens discussion group which has been online in the past year - glad to be going back to in person.       Healthy Habits  Are you taking a daily aspirin? Yes  Do you typically exercising at least 40 min, 3-4 times per week?  Yes  Do you usually eat at least 4 servings of fruit and vegetables a day, include whole grains and fiber and avoid regularly eating high fat foods? Yes  Have you had an eye exam in the past two years? Yes  Do you see a dentist twice per year? NO  Do you have any concerns regarding sleep? No     Safety Screen  If you own firearms, are they secured in a locked gun cabinet or with trigger locks? The patient declines to answer  Do you feel you are safe where you are  living?: Yes (5/20/2021  2:08 PM)  Do you feel you are safe in your relationship(s)?: Yes (5/20/2021  2:08 PM)      Review of Systems:  Please see above.  The rest of the review of systems are negative for all systems.    Patient Care Team:  Mercedes Adorno MD as PCP - General (Family Medicine)  Orville Harmon MD as Physician (Hematology and Oncology)  Vinny Chase MD as Physician (Cardiovascular and Thoracic Surgery)  Cristy Osei RN as Oncology Nurse Navigator (Hematology and Oncology)  Orville Harmon MD as Assigned Cancer Care Provider  Mercedes Adorno MD as Assigned PCP     Patient Active Problem List   Diagnosis     Hypertension     Abnormal Blood Chemistry     Hyperlipemia     Visual Impairment     Nasal drainage     Coronary artery disease due to lipid rich plaque     S/P CABG x 3     Thyroid nodule     Urinary retention     Atypical carcinoid lung tumor (H)     Muscle soreness     Past Medical History:   Diagnosis Date     BPH (benign prostatic hypertrophy)      Cancer (H)     Skin on R.chest wall.     Chest discomfort      Coronary artery disease      Detached retina, left Oct 2014     Hyperlipidemia      Hypertension       Past Surgical History:   Procedure Laterality Date     CARDIAC SURGERY       CATARACT EXTRACTION  2015     CORONARY ARTERY BYPASS GRAFT N/A 12/28/2015    Procedure: CORONARY ARTERY BYPASS x 3, RIGHT ENDOSCOPIC VEIN HARVEST, LEFT INTERNAL MAMMARY ARTERY, ANESTHESIA TRANSESOPHAGEAL ECHOCARDIOGRAM;  Surgeon: Jayson Alvarado MD;  Location: [facility];  Service:      CYST REMOVAL      Ganglion cyst removal of both wrist     PARS PLANA VITRECTOMY W/ REPAIR OF MACULAR HOLE  Nov 2014     SC EXCIS TENDON SHEATH LESION, HAND/FINGER      Description: Hand Excision Of A Tendon Cyst;  Recorded: 01/04/2011;     SC LAP, VENTRAL HERNIA REPAIR,REDUCIBLE      Description: Laparoscopy Repair Of Umbilical Hernia;  Recorded: 01/04/2011;     SC REMOVAL PREPATELLA BURSA       Description: Excision Of Prepatellar Bursa;  Recorded: 09/30/2014;     [place] REPAIR OF NASAL SEPTUM      Description: Septoplasty;  Recorded: 09/30/2014;     RETINAL DETACHMENT SURGERY  oct 2014     SKIN CANCER EXCISION  Nov 2015    right chest     THORACOSCOPY Right 4/14/2016    Procedure: RIGHT VIDEO ASSISTED THORACOSCOPY, RIGHT LOBECTOMY;  Surgeon: Vinny Chase MD;  Location: [facility];  Service:      UMBILICAL HERNIA REPAIR       VASECTOMY        Family History   Problem Relation Age of Onset     Acute Myocardial Infarction Mother      Aneurysm Mother         brain     Acute Myocardial Infarction Father      Acute Myocardial Infarction Brother      Aortic aneurysm Brother         d @ 72     Lung cancer Cousin      Heart attack Cousin      Leukemia Brother         d @ 55     Colitis Brother      Colon cancer Paternal Grandfather       Social History     Socioeconomic History     Marital status:      Spouse name: Not on file     Number of children: Not on file     Years of education: Not on file     Highest education level: Not on file   Occupational History     Not on file   Social Needs     Financial resource strain: Not on file     Food insecurity     Worry: Not on file     Inability: Not on file     Transportation needs     Medical: Not on file     Non-medical: Not on file   Tobacco Use     Smoking status: Never Smoker     Smokeless tobacco: Never Used   Substance and Sexual Activity     Alcohol use: Yes     Alcohol/week: 8.0 standard drinks     Types: 1 Cans of beer, 7 Glasses of wine per week     Drug use: No     Sexual activity: Never   Lifestyle     Physical activity     Days per week: Not on file     Minutes per session: Not on file     Stress: Not on file   Relationships     Social connections     Talks on phone: Not on file     Gets together: Not on file     Attends Church service: Not on file     Active member of club or organization: Not on file     Attends meetings of  "clubs or organizations: Not on file     Relationship status: Not on file     Intimate partner violence     Fear of current or ex partner: Not on file     Emotionally abused: Not on file     Physically abused: Not on file     Forced sexual activity: Not on file   Other Topics Concern     Not on file   Social History Narrative     Not on file      Current Outpatient Medications   Medication Sig Dispense Refill     acetaminophen (TYLENOL) 500 MG tablet Take 500 mg by mouth every 6 (six) hours as needed for pain.       amLODIPine (NORVASC) 10 MG tablet TAKE 1 TABLET(10 MG) BY MOUTH DAILY 91 tablet 1     aspirin 81 mg chewable tablet Chew 81 mg daily.       CHOLECALCIFEROL 25 mcg (1,000 unit) tablet TAKE 1 TABLET( 1,000 UNITS TOTAL) BY MOUTH DAILY 100 tablet 3     cholecalciferol, vitamin D3, (VITAMIN D3) 25 mcg (1,000 unit) capsule Take 1,000 Units by mouth daily.       metoprolol tartrate (LOPRESSOR) 50 MG tablet TAKE 1 TABLET(50 MG) BY MOUTH TWICE DAILY 180 tablet 0     tamsulosin (FLOMAX) 0.4 mg cap TAKE 1 CAPSULE BY MOUTH DAILY AFTER BREAKFAST 90 capsule 1     No current facility-administered medications for this visit.       Objective:   Vital Signs:   Visit Vitals  /70   Pulse (!) 59   Ht 5' 5\" (1.651 m)   Wt 159 lb (72.1 kg)   SpO2 96%   BMI 26.46 kg/m           VisionScreening:  No exam data present     PHYSICAL EXAM  General appearance - alert, well appearing, and in no distress  Mental status - normal mood, behavior, speech, dress, motor activity, and thought processes  Eyes - pupils equal and reactive, extraocular eye movements intact, funduscopic exam normal, discs flat and sharp  Ears - bilateral TM's and external ear canals normal  Mouth - mucous membranes moist, pharynx normal without lesions  Neck - supple, no significant adenopathy, carotids upstroke normal bilaterally, no bruits, thyroid exam: thyroid is normal in size without nodules or tenderness  Chest - clear to auscultation, no wheezes, " rales or rhonchi, symmetric air entry  Heart - normal rate, regular rhythm, normal S1, S2, no murmurs, rubs, clicks or gallops  Abdomen - soft, nontender, nondistended, no masses or organomegaly  Neurological - alert, oriented, normal speech, no focal findings or movement disorder noted, DTR's normal and symmetric  Extremities - peripheral pulses normal, no pedal edema, no clubbing or cyanosis  Skin - sun damaged skin, actinic keratoses being treated by dermatology; otherwise no rashes or worrisome lesions      Assessment Results 5/20/2021   Activities of Daily Living No help needed   Instrumental Activities of Daily Living No help needed   Get Up and Go Score Less than 12 seconds   Mini Cog Total Score 5   Some recent data might be hidden     A Mini-Cog score of 0-2 suggests the possibility of dementia, score of 3-5 suggests no dementia    Identified Health Risks:     Patient's advanced directive was discussed and I am comfortable with the patient's wishes.

## 2021-06-17 NOTE — TELEPHONE ENCOUNTER
Overdue for labs; also last BPs done at visits elsewhere shows less than ideal control. 30 days given - please call him to schedule Annual Wellness Visit

## 2021-06-18 NOTE — PATIENT INSTRUCTIONS - HE
Patient Instructions by Jyothi Wilcox PA-C at 2/25/2021  2:50 PM     Author: Jyothi Wilcox PA-C Service: -- Author Type: Physician Assistant    Filed: 2/25/2021  3:39 PM Encounter Date: 2/25/2021 Status: Signed    : Jyothi Wilcox PA-C (Physician Assistant)       Patient Education     Folliculitis  Folliculitis is an inflammation of a hair follicle. A hair follicle is the little pocket where a hair grows out of the skin. Bacteria normally live on the skin. But sometimes bacteria can get trapped in a follicle and cause infection. This causes a bumpy rash. The area over the follicles is red and raised. It may itch or be painful. The bumps may have fluid (pus) inside. The pus may leak and then form crusts. Sores can spread to other areas of the body. Once it goes away, folliculitis can come back at any time. Severe cases may cause permanent hair loss and scarring.  Folliculitis can happen anywhere on the body where hair grows. It can be caused by rubbing from tight clothing. Ingrown hairs can cause it. Soaking in a hot tub or swimming pool that has bacteria in the water can cause it. It may also occur if a hair follicle is blocked by a bandage.  Sores often go away in a few days with no treatment. In some cases, medicine may be given. A small piece of skin or pus may be taken to find the type of bacteria causing the infection.  Home care  The healthcare provider may prescribe an antibiotic cream or ointment.  Oral antibiotics may also be prescribed. Or you may be told to use an over-the-counter antibiotic cream. Follow all instructions when using any of these medicines.  General care:    Apply warm, moist compresses to the sores for 20 minutes up to 3 times a day. You can make a compress by soaking a cloth in warm water. Squeeze out excess water.    Dont cut, poke, or squeeze the sores. This can be painful and spread infection.    Dont scratch the affected area. Scratching can delay  healing.    Dont shave the areas affected by folliculitis.    If the sores leak fluid, cover the area with a nonstick gauze bandage. Use as little tape as possible. Carefully discard all soiled bandages.    Dress in loose cotton clothing.    Change sheets and blankets if they are soiled by pus. Wash all clothes, towels, sheets, and cloth diapers in soap and hot water. Do not share clothes, towels, or sheets with other family members.    Do not soak the sores in bath water. This can spread infection. Instead, keep the area clean by gently washing sores with soap and warm water.    Wash your hands or use antibacterial gels often to prevent spreading the bacteria.  Follow-up care  Follow up with your healthcare provider, or as advised.  When to seek medical advice  Call your healthcare provider right away if any of these occur:    Fever of 100.4 F (38 C) or higher    Spreading of the rash    Rash does not get better with treatment    Redness or swelling that gets worse    Rash becomes more painful    Foul-smelling fluid leaking from the skin    Rash improves, but then comes back   Date Last Reviewed: 11/1/2016 2000-2017 [employer]. [address] [place] [address], [place] [address]. All rights reserved. This information is not intended as a substitute for professional medical care. Always follow your healthcare professional's instructions.

## 2021-06-18 NOTE — PATIENT INSTRUCTIONS - HE
Patient Instructions by Kim Montoya CNP at 2/20/2021 11:10 AM     Author: Kim Montoya CNP Service: -- Author Type: Nurse Practitioner    Filed: 2/20/2021 11:51 AM Encounter Date: 2/20/2021 Status: Signed    : Kim Montoya CNP (Nurse Practitioner)       Tylenol 1000 mg 3-4 times daily as needed for pain.  Can try cool compresses - see handout for details.      Please read over the handout for reminders on contagiousness.  In general, avoid infants we have not been vaccinated and tell any adults around that you have shingles and ensure they have had chickenpox.     Recheck in 2 weeks, sooner if pain is not tolerable. Can call or do a video visit if you'd prefer.      Patient Education     Shingles  Shingles is a viral infection caused by the same virus as chicken pox. Anyone who has had chicken pox may get shingles later in life. The virus stays in the body, but remains dormant (asleep). Shingles often occurs in older persons or persons with lowered immunity. But it can affect anyone at any age.  Shingles starts as a tingling patch of skin on one side of the body. Small, painful blisters may then appear. The rash does not spread to the rest of the body.  Exposure to shingles cannot cause shingles. However, it can cause chicken pox in anyone who has not had chicken pox or has not been vaccinated. The contagious period ends when all blisters have crusted over (generally about 2 weeks after the illness begins).  After the blisters heal, the affected skin may be sensitive or painful for months (neuralgia). This often gradually goes away.  A shingles vaccine is available. This can help prevent shingles or make it less painful. It is generally recommended for adults over the age of 60 who have had chicken pox in the past, but who have never had shingles. Adults over 60 who have had neither chicken pox nor shingles can prevent both diseases with the chicken pox vaccine. Ask your healthcare provider  about these vaccines.  Home care    Medicines may be prescribed to help relieve pain. Take these medicines as directed. Ask your healthcare provider or pharmacist before using over-the-counter medicines for helping treat pain and itching.    In certain cases, antiviral medicines may be prescribed to reduce pain, shorten the illness, and prevent neuralgia. Take these medicines as directed.    Compresses made from a solution of cool water mixed with cornstarch or baking soda may help relieve pain and itching.     Gently wash skin daily with soap and water to help prevent infection.  Be certain to rinse off all of the soap, which can be irritating.    Trim fingernails and try not to scratch. Scratching the sores may leave scars.    Stay home from work or school until all blisters have formed a crust and you are no longer contagious.  Follow-up care  Follow up with your healthcare provider or as directed by our staff.  When to seek medical advice    Fever of 100.4 F (38 C) or higher, or as directed by your healthcare provider    Affected skin is on the face or neck and any of the following occur:  ? Headache  ? Eye pain  ? Changes in vision  ? Sores near the eye  ? Weakness of facial muscles    Pain, redness, or swelling of a joint    Signs of skin infection: colored drainage from the sores, warmth, increasing redness, or increasing pain  Date Last Reviewed: 9/25/2015 2000-2017 [employer]. [address] [place] [address], [place] [address]. All rights reserved. This information is not intended as a substitute for professional medical care. Always follow your healthcare professional's instructions.

## 2021-06-21 NOTE — PROGRESS NOTES
"  Assessment and Plan:       1. Routine general medical examination at a health care facility  Mostly well man with managed chronic issues    2. Myalgia/3. Dry mouth  - C-Reactive Protein  - Thyroid Stimulating Hormone (TSH)  - T4, Free  - Vitamin D, Total (25-Hydroxy)  - Antinuclear Antibody (NANCY) Cascade    4. Vitamin D deficiency  - Vitamin D, Total (25-Hydroxy)    5. Mixed hyperlipidemia  - Lipid Cascade FASTING      -----  Following diagnosis associated with other orders released today    6. Atypical carcinoid lung tumor (H)    - HM1(CBC and Differential)  - Comprehensive Metabolic Panel  - HM1 (CBC with Diff)      The patient's current medical problems were reviewed.      The following health maintenance schedule was reviewed with the patient and provided in printed form in the after visit summary:   Health Maintenance   Topic Date Due     ZOSTER VACCINE  12/02/2008     PNEUMOCOCCAL POLYSACCHARIDE VACCINE AGE 65 AND OVER  12/02/2013     INFLUENZA VACCINE RULE BASED (1) 08/01/2018     TD 18+ HE  10/29/2019     FALL RISK ASSESSMENT  10/25/2019     ADVANCE DIRECTIVES DISCUSSED WITH PATIENT  10/25/2023     COLONOSCOPY  03/05/2028     PNEUMOCOCCAL CONJUGATE VACCINE FOR ADULTS (PCV13 OR PREVNAR)  Completed     He declines flu vaccine today - due for pneumovax in a year       Subjective:   Chief Complaint: Jack Brown is an 67 y.o. male here for a Welcome to Medicare visit.   HPI:      I last saw Jack in February of this year - at that time he was suffering some abdominal cramps. He says these got better with taking probiotic 'Align.'    He has a newer concern of myalgias (he first called this \"arthritis, but no in the joints\"). My whole body is overtaken with arthritis - not in the joint - but in the meat - it started 5 years ago in my arms - could hardly lift a box - .Now it's in my whole body.\" He says there are  good days and bad days - some maria he can do yard work all day, sometimes he can't walk the mall - " started to be this bad a year ago.  Feels equally bad in the am and at the end of the day.  Better after exercise  - No difficulty  sleeping - goes to bed at 9:30 and gets up 7  -No rashes, no joint aches - has dry mouth and eyes   - Does not have raynaud's phenomenon   - Walks the mall two miles every other day, lots of yard work    - TSH, crp, sed rate normal a yea ago - also work up for myalgia   - he does note dry mouth    Before heart surgery was found to have carcinoid - middle lobe removal.  Sees oncology at [facility]-  They also follows aneurysm  History of macular hole and detached retina - In January she goes back for eye exam -    No longer takes Carbamezapine for trigeminal neuralgia - this got gradually better    Healthy habits:  -He is trying to lower saturated fas  -Lots of fruits and veggies and oatmeal and flax meal and ban, whole grain breads; does have cheese and yogurt   - no falls, no loose rugs or other risks for falling    Review of Systems:  Please see above.  The rest of the review of systems are negative for all systems.    Patient Care Team:  Mercedes Adorno MD as PCP - General (Family Medicine)  Orville Harmon MD as Physician (Hematology and Oncology)  Vinny Chase MD as Physician (Cardiovascular and Thoracic Surgery)  Cristy Osei RN as Oncology Nurse Navigator (Hematology and Oncology)     Patient Active Problem List   Diagnosis     Hypertension     Abnormal Blood Chemistry     Hematospermia     Hyperlipemia     Visual Impairment     Nasal drainage     Coronary artery disease due to lipid rich plaque     S/P CABG x 3     Thyroid nodule     Urinary retention     Atypical carcinoid lung tumor (H)     Trigeminal neuralgia     Muscle soreness     Past Medical History:   Diagnosis Date     BPH (benign prostatic hypertrophy)      Cancer (H)     Skin on R.chest wall.     Chest discomfort      Coronary artery disease      Detached retina, left Oct 2014     Hyperlipidemia       Hypertension       Past Surgical History:   Procedure Laterality Date     CARDIAC SURGERY       CATARACT EXTRACTION  2015     CORONARY ARTERY BYPASS GRAFT N/A 12/28/2015    Procedure: CORONARY ARTERY BYPASS x 3, RIGHT ENDOSCOPIC VEIN HARVEST, LEFT INTERNAL MAMMARY ARTERY, ANESTHESIA TRANSESOPHAGEAL ECHOCARDIOGRAM;  Surgeon: Jayson Alvarado MD;  Location: [facility];  Service:      CYST REMOVAL      Ganglion cyst removal of both wrist     PARS PLANA VITRECTOMY W/ REPAIR OF MACULAR HOLE  Nov 2014     MI EXCIS TENDON SHEATH LESION, HAND/FINGER      Description: Hand Excision Of A Tendon Cyst;  Recorded: 01/04/2011;     MI LAP, VENTRAL HERNIA REPAIR,REDUCIBLE      Description: Laparoscopy Repair Of Umbilical Hernia;  Recorded: 01/04/2011;     MI REMOVAL PREPATELLA BURSA      Description: Excision Of Prepatellar Bursa;  Recorded: 09/30/2014;     MI REPAIR OF NASAL SEPTUM      Description: Septoplasty;  Recorded: 09/30/2014;     RETINAL DETACHMENT SURGERY  oct 2014     SKIN CANCER EXCISION  Nov 2015    right chest     THORACOSCOPY Right 4/14/2016    Procedure: RIGHT VIDEO ASSISTED THORACOSCOPY, RIGHT LOBECTOMY;  Surgeon: Vinny Chase MD;  Location: [facility];  Service:      UMBILICAL HERNIA REPAIR       VASECTOMY        Family History   Problem Relation Age of Onset     Acute Myocardial Infarction Mother      Aneurysm Mother      brain     Acute Myocardial Infarction Father      Acute Myocardial Infarction Brother      Aortic aneurysm Brother      d @ 72     Lung cancer Cousin      Heart attack Cousin      Leukemia Brother      d @ 55     Colitis Brother      Colon cancer Paternal Grandfather       Social History     Social History     Marital status: Single     Spouse name: N/A     Number of children: N/A     Years of education: N/A     Occupational History     Not on file.     Social History Main Topics     Smoking status: Never Smoker     Smokeless tobacco: Never Used     Alcohol  "use 4.8 oz/week     1 Cans of beer, 7 Glasses of wine per week     Drug use: No     Sexual activity: No     Other Topics Concern     Not on file     Social History Narrative       Current Outpatient Prescriptions   Medication Sig Dispense Refill     acetaminophen (TYLENOL) 500 MG tablet Take 500 mg by mouth every 6 (six) hours as needed for pain.       amLODIPine (NORVASC) 10 MG tablet TAKE 1 TABLET BY MOUTH DAILY 90 tablet 2     aspirin 81 mg chewable tablet Chew 81 mg daily.       metoprolol tartrate (LOPRESSOR) 50 MG tablet TAKE 1 TABLET(50 MG) BY MOUTH TWICE DAILY 60 tablet 0     tamsulosin (FLOMAX) 0.4 mg Cp24 TAKE 1 CAPSULE(0.4 MG) BY MOUTH DAILY AFTER BREAKFAST 30 capsule 11     carBAMazepine (TEGRETOL  XR) 100 MG 12 hr tablet TAKE 1 TABLET(100 MG) BY MOUTH TWICE DAILY 60 tablet 5     omega-3 fatty acids-fish oil 360-1,200 mg cap Take 1,200 mg by mouth 2 (two) times a day.        tamsulosin (FLOMAX) 0.4 mg Cp24 Take 0.4 mg by mouth daily.       No current facility-administered medications for this visit.       Objective:   Vital Signs:   Visit Vitals     /70     Pulse 62     Ht 5' 5.5\" (1.664 m)     Wt 164 lb (74.4 kg)     SpO2 98%     BMI 26.88 kg/m2      VisionScreening: sees ophthalmology  No exam data present     PHYSICAL EXAM  General appearance - alert, well appearing, and in no distress  Mental status - normal mood, behavior, speech, dress, motor activity, and thought processes  Eyes - pupils equal and reactive, extraocular eye movements intact, funduscopic exam normal, discs flat and sharp  Ears - bilateral TM's and external ear canals normal  Nose - normal and patent, no erythema, discharge or polyps  Mouth - small scratch on left palatotonsilar arch, otherwise normal  Neck - supple, no significant adenopathy, carotids upstroke normal bilaterally, no bruits, thyroid exam: thyroid is normal in size without nodules or tenderness  Chest - clear to auscultation, no wheezes, rales or rhonchi, " symmetric air entry  Heart - normal rate, regular rhythm, normal S1, S2, no murmurs, rubs, clicks or gallops  Abdomen - soft, nontender, nondistended, no masses or organomegaly, bowel sounds normal  Neurological - DTR's normal and symmetric  Extremities - peripheral pulses normal, no pedal edema, no clubbing or cyanosis  MS - no tenderness to palpation over muscle of back, chest, upper arms (fibromyalgia points)  Skin - solar keratosis forehead, otherwise no rashes or worrisome lesions      Assessment Results 10/25/2018   Activities of Daily Living No help needed   Instrumental Activities of Daily Living No help needed   Mini Cog Total Score 5   Some recent data might be hidden     A Mini Cog score of 0-2 suggests the possibility of dementia, score of 3-5 suggests no dementia    Identified Health Risks:       The 10-year ASCVD risk score ([place] JEISON Jr, et al., 2013) is: 21.6%    Values used to calculate the score:      Age: 67 years      Sex: Male      Is Non- : No      Diabetic: No      Tobacco smoker: No      Systolic Blood Pressure: 122 mmHg      Is BP treated: Yes      HDL Cholesterol: 42 mg/dL      Total Cholesterol: 244 mg/dL

## 2021-06-22 NOTE — PROGRESS NOTES
[facility] Hematology and Oncology Progress Note    Patient: Jack Brown  MRN: [id number]  Date of Service: December 19, 2018      Assessment and Plan:    Atypical carcinoid lung tumor    Staging form: Lung, AJCC 7th Edition      Clinical: T1, N0, M0 - Unsigned      Pathologic: T1, N0, cM0 - Signed by Orville Harmon MD on 5/3/2016    1.  Lung carcinoid tumor: No lesions suggestive of carcinoid recurrence.  We will repeat imaging in a year.  He has a low likelihood of carcinoid recurrence.      2.  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  We will repeat imaging is it has been about a year and a half since he has had thyroid ultrasound.    3.  Arterial aneurysms.  General stability.  Aneurysms are generally small.    4.  Liver lesion seen on CAT scan.  I think it was probably there a year ago.  We will get an MRI of the liver in 3 months to better characterize.    ECOG Performance   ECOG Performance Status: 0    Distress Assessment  Distress Assessment Score: 7    Pain  Currently in Pain: No/denies    Diagnosis:    1.  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    Treatment:    Right middle lobectomy in April 2016: Pathology shows atypical carcinoid tumor.  2.2 cm.  No lymphovascular invasion.  15 lymph nodes tested all negative.  Less than 2 mitoses per 10 high-power field.    Interim History:    Jack returns today for a 1 year followup. Doing well.  No acute complaints.  No significant changes in his health over the past year.  Denies abdominal pain, nausea vomiting, cough, or shortness of breath.    Review of Systems:    Constitutional  Constitutional (WDL): All constitutional elements are within defined limits  Neurosensory  Neurosensory (WDL): All neurosensory elements are within defined limits  Cardiovascular  Cardiovascular (WDL): All cardiovascular elements are within defined limits  Pulmonary  Respiratory (WDL): Within Defined  Limits  Gastrointestinal  Gastrointestinal (WDL): All gastrointestinal elements are within defined limits  Genitourinary  Genitourinary (WDL): All genitourinary elements are within defined limits  Integumentary  Integumentary (WDL): Exceptions to WDL(hx  of skin cancer. )  Patient Coping  Patient Coping: Accepting  Accompanied by  Accompanied by: Family Member    Past History:    Past Medical History:   Diagnosis Date     BPH (benign prostatic hypertrophy)      Cancer (H)     Skin on R.chest wall.     Chest discomfort      Coronary artery disease      Detached retina, left Oct 2014     Hyperlipidemia      Hypertension      Physical Exam:    Recent Vitals 12/19/2018   Height -   Weight 165 lbs   BSA (m2) 1.86 m2   /79   Pulse 56   Temp -   Temp src -   SpO2 96   Some recent data might be hidden     General: patient appears stated age of 68 y.o.. Nontoxic and in no distress.   HEENT: Head: atraumatic, normocephalic. Sclerae anicteric.  Chest:  Normal respiratory effort  Cardiac:  No edema.   Abdomen: abdomen is nondistended  Extremities: normal tone and muscle bulk.  Skin: no lesions or rash. Warm and dry.   CNS: alert and oriented. Grossly non-focal.   Psychiatric: normal mood and affect.     Lab Results:    Recent Results (from the past 168 hour(s))   POCT creatinine   Result Value Ref Range    POC Creatinine 1.0 mg/dL   POCT GFR   Result Value Ref Range    POC GFR AMER AF HE >60  >60 mL/min/1.73m2    POC GFR NON AMER AF >60  >60 mL/min/1.73m2   Comprehensive Metabolic Panel   Result Value Ref Range    Sodium 141 136 - 145 mmol/L    Potassium 3.8 3.5 - 5.0 mmol/L    Chloride 108 (H) 98 - 107 mmol/L    CO2 29 22 - 31 mmol/L    Anion Gap, Calculation 4 (L) 5 - 18 mmol/L    Glucose 63 (L) 70 - 125 mg/dL    BUN 18 8 - 28 mg/dL    Creatinine 0.91 0.70 - 1.30 mg/dL    GFR MDRD Af Amer >60 >60 mL/min/1.73m2    GFR MDRD Non Af Amer >60 >60 mL/min/1.73m2    Bilirubin, Total 0.6 0.0 - 1.0 mg/dL    Calcium 9.6 8.5 - 10.5  mg/dL    Protein, Total 6.8 6.0 - 8.0 g/dL    Albumin 3.6 3.5 - 5.0 g/dL    Alkaline Phosphatase 57 45 - 120 U/L    AST 17 0 - 40 U/L    ALT <9 0 - 45 U/L   HM1 (CBC with Diff)   Result Value Ref Range    WBC 5.2 4.0 - 11.0 thou/uL    RBC 5.06 4.40 - 6.20 mill/uL    Hemoglobin 15.6 14.0 - 18.0 g/dL    Hematocrit 45.0 40.0 - 54.0 %    MCV 89 80 - 100 fL    MCH 30.8 27.0 - 34.0 pg    MCHC 34.7 32.0 - 36.0 g/dL    RDW 13.6 11.0 - 14.5 %    Platelets 230 140 - 440 thou/uL    MPV 8.8 8.5 - 12.5 fL    Neutrophils % 63 50 - 70 %    Lymphocytes % 24 20 - 40 %    Monocytes % 8 2 - 10 %    Eosinophils % 4 0 - 6 %    Basophils % 1 0 - 2 %    Neutrophils Absolute 3.3 2.0 - 7.7 thou/uL    Lymphocytes Absolute 1.2 0.8 - 4.4 thou/uL    Monocytes Absolute 0.4 0.0 - 0.9 thou/uL    Eosinophils Absolute 0.2 0.0 - 0.4 thou/uL    Basophils Absolute 0.1 0.0 - 0.2 thou/uL     Imaging:    CT images were personally reviewed with the patient.  No pulmonary nodules.  1 nodule in the liver noted.    Ct Chest Abdomen Pelvis Without Oral With Iv Contrast    Result Date: 12/17/2018  CT CHEST, ABDOMEN, AND PELVIS 12/17/2018 7:30 AM      INDICATION: Pulmonary carcinoid; atypical carcinoid lung tumor TECHNIQUE: CT chest, abdomen, and pelvis. Dose reduction techniques were used. IV CONTRAST: Iohexol (Omni) 100 mL COMPARISON: CT 10/3/2017 and 7/3/2018 FINDINGS: CHEST: The central airways are clear. Stable post right lobectomy changes. Stable opacities in the lateral right lower lobe presumed to reflect chronic endobronchial mucoid impaction. Bibasilar atelectasis versus scarring. No focal pneumonic consolidation or pleural effusion. Stable left major fissural lymph node image 53. No pulmonary mass or suspicious nodule. No thoracic adenopathy. Normal heart size and no pericardial effusion. Severe coronary artery calcifications. Post open-heart surgery changes.  ABDOMEN: Hepatic steatosis. There is a 1.8 cm low-density structure in liver segment 4B  image 187 series 4. This is not definitely seen on previous exams. Unremarkable gallbladder, bile ducts, spleen and pancreas. There is a 1.4 x 1.8 cm left adrenal gland nodule, stable since 10/31/2016 and therefore likely an incidental adenoma. Stable right adrenal gland thickening. Multiple bilateral renal cysts and too small to characterize hypodense lesions likely representing additional cysts. A dominant exophytic right upper renal cyst measures 3.9 cm. No evidence of hydronephrosis. Normal caliber visualized ureters. Unremarkable stomach. Normal caliber small bowel. Normal appendix. Tortuous normal caliber colon. Small bilateral fat-containing inguinal hernias. Stable small infrarenal saccular aortic aneurysm measuring up to 1.5 cm. 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 Stable aneurysmal dilatation of the distal right common iliac artery measuring 2.1 cm and the proximal right internal iliac artery measuring up to 2.2 cm. No free fluid or adenopathy. PELVIS: Stable enlarged heterogeneous prostate measuring up to 7.5 cm in transverse dimension. Unremarkable urinary bladder. No adenopathy. MUSCULOSKELETAL: Negative.     CONCLUSION: 1.  Stable postoperative changes in the chest. No CT evidence of local tumor recurrence or metastatic disease within the chest. 2.  Newly visualized small low-density structure in liver segment 4B, perhaps nodular focal fatty change. As a precaution, recommend correlation with ultrasound and/or short interval CT follow-up in 3 months. 3.  Multiple stable chronic benign findings in the abdomen or pelvis as detailed above.      Signed by: Orville Harmon MD

## 2021-06-23 NOTE — PROGRESS NOTES
Patient is a 68-year-old male who presents today for right ankle injury.    He is here with his wife.  He notes that he slipped yesterday on the ice.  It inverted his right ankle.  Is able to get up right away, and in fact he finished snowblowing shoveling the driveway of his neighbor.  When he came inside, he noted that his right lateral ankle started to feel tight.  He therefore did apply an ice pack, took 2 Tylenol, and elevated it.  He did not think to wrap it.  Today, it looks a little bit bruised, but he is able to walk on it.  His wife is concerned that perhaps there is an undiagnosed fracture.  He notes the pain did not keep him up at night.  He notes slight swelling, but really quite minimal.    Patient Active Problem List    Diagnosis Date Noted     Muscle soreness 10/26/2017     Trigeminal neuralgia 07/25/2017     Atypical carcinoid lung tumor (H) 05/03/2016     Urinary retention      Thyroid nodule 12/29/2015     S/P CABG x 3 12/28/2015     Coronary artery disease due to lipid rich plaque 11/17/2015     Nasal drainage 03/17/2015     Visual Impairment      Hypertension      Abnormal Blood Chemistry      Hematospermia      Hyperlipemia        Current Outpatient Medications:      acetaminophen (TYLENOL) 500 MG tablet, Take 500 mg by mouth every 6 (six) hours as needed for pain., Disp: , Rfl:      amLODIPine (NORVASC) 10 MG tablet, TAKE 1 TABLET BY MOUTH DAILY, Disp: 90 tablet, Rfl: 3     aspirin 81 mg chewable tablet, Chew 81 mg daily., Disp: , Rfl:      cholecalciferol, vitamin D3, 1,000 unit tablet, Take 1 tablet (1,000 Units total) by mouth daily., Disp: 100 tablet, Rfl: 3     metoprolol tartrate (LOPRESSOR) 50 MG tablet, TAKE 1 TABLET(50 MG) BY MOUTH TWICE DAILY, Disp: 180 tablet, Rfl: 3     tamsulosin (FLOMAX) 0.4 mg cap, TAKE ONE CAPSULE BY MOUTH DAILY AFTER BREAKFAST, Disp: 90 capsule, Rfl: 3    Objective     /74 (Patient Site: Right Arm, Patient Position: Sitting, Cuff Size: Adult Regular)    Pulse 61   Wt 164 lb 12 oz (74.7 kg)   SpO2 98%   BMI 27.00 kg/m    General appearance: alert, appears stated age and cooperative  Extremities: Right ankle.   Inspection: There is a very minimal swelling just anterior to the anterior talofibular ligament.  Minimal bruising.  Palpation: There is some very minimal tenderness to the insertion of the anterior talofibular ligament.  There is minimal tenderness to palpation of the mid arch.  No other bony tenderness.  There is no fibular tenderness.  Range of motion: Patient has normal dorsi flexion, plantar flexion, ankle inversion and eversion.  Strength: Patient has 5 out of 5 strength.  Circulation: Patient has normal DPs and PT pulses.    Jack was seen today for fall.    Diagnoses and all orders for this visit:    Sprain of anterior talofibular ligament of right ankle, initial encounter  I discussed RICE cares at length.  We did discuss wearing an ankle brace when he walks outside.  Right now the sprain appears minimal; however, in navigating on the snow-covered uneven terrain, there is a potential that he could further injure his ankle, and therefore recommend Ace wrapping prior to walking or plowing outside.  Anticipate that this will improve in the next 2 weeks, patient to notify if symptoms worsen or do not improve.

## 2021-06-25 NOTE — PROGRESS NOTES
Progress Notes by Yobany Estrada MD at 10/26/2017  4:10 PM     Author: Yobany Estrada MD Service: -- Author Type: Physician    Filed: 10/26/2017  4:40 PM Encounter Date: 10/26/2017 Status: Signed    : Yobany Estrada MD (Physician)           Click to link to [facility] HEART [facility] NOTE    Thank you, Dr. Nazario, for asking us to see Jack Brown at the [facility].      Assessment/Recommendations   Patient with known coronary artery disease who is stable from a cardiac standpoint and exercising which is commendable.  He does have muscle soreness and I would like to send off some inflammatory labs as well as thyroid function.  I am not changing his medications today.  We will bring him back after his been on Zetia for 3 months as he has been intolerant of statins.    Thank you for allowing us to participate in his care.         History of Present Illness    Mr. Jack Brown is a 66 y.o. male with known coronary artery disease who has a history of bypass surgery.  He is actually been doing very well from a cardiac standpoint states that he feels the best when he is exercising.  He denies chest discomfort, orthopnea, paroxysmal nocturnal dyspnea, peripheral edema, syncope or near syncope or palpitations.  Does go for 2 mile walk every day, almost, and has no symptoms and actually feels good.  He does complain of a muscle aching and soreness which is been there since about 3 or 4 months after his bypass surgery.  It is constant.  The muscles are not tender but there is sore.  If he gets up and moves around and walks for a while it seems to help.  The discomfort is not in his joints but rather in his muscles.     Physical Examination Review of Systems   Vitals:    10/26/17 1555   BP: 122/88   Pulse: 72   Resp: 24     Body mass index is 26.47 kg/(m^2).  Wt Readings from Last 3 Encounters:   10/26/17 164 lb (74.4 kg)   07/24/17 160 lb (72.6 kg)   07/14/17  161 lb (73 kg)     General Appearance:   Alert, cooperative and in no acute distress.   ENT/Mouth: Oral mucuos membranes pink and moist .      EYES:  No scleral icterus. No Xanthelasma.    Neck: JVP normal. No Hepatojugular reflux. Thyroid not visualized.   Chest/Lungs:   Lungs are clear to auscultation, equal chest wall expansion    Cardiovascular:   S1, S2 without murmur ,clicks or rubs. Brachial, radial and posterior tibial pulses are intact and symetric. No carotid bruits noted   Abdomen:  Nontender. BS+. No bruits.      Extremities: No cyanosis, clubbing or edema   Skin: no xanthelasma, warm.    Psych: Appropriate affect.   Neurologic: normal gait, normal  bilateral, no tremors        General: WNL  Eyes: WNL  Ears/Nose/Throat: WNL  Lungs: WNL  Heart: WNL  Stomach: WNL  Bladder: WNL  Muscle/Joints: Muscle Pain  Skin: WNL  Nervous System: WNL  Mental Health: WNL     Blood: WNL     Medical History  Surgical History Family History Social History   Past Medical History:   Diagnosis Date   ? BPH (benign prostatic hypertrophy)    ? Cancer     Skin on R.chest wall.   ? Chest discomfort    ? Coronary artery disease    ? Detached retina, left Oct 2014   ? Hyperlipidemia    ? Hypertension     Past Surgical History:   Procedure Laterality Date   ? CARDIAC SURGERY     ? CATARACT EXTRACTION  2015   ? CORONARY ARTERY BYPASS GRAFT N/A 12/28/2015    Procedure: CORONARY ARTERY BYPASS x 3, RIGHT ENDOSCOPIC VEIN HARVEST, LEFT INTERNAL MAMMARY ARTERY, ANESTHESIA TRANSESOPHAGEAL ECHOCARDIOGRAM;  Surgeon: Jayson Alvarado MD;  Location: [facility];  Service:    ? CYST REMOVAL      Ganglion cyst removal of both wrist   ? PARS PLANA VITRECTOMY W/ REPAIR OF MACULAR HOLE  Nov 2014   ? TX EXCIS TENDON SHEATH LESION, HAND/FINGER      Description: Hand Excision Of A Tendon Cyst;  Recorded: 01/04/2011;   ? TX LAP, VENTRAL HERNIA REPAIR,REDUCIBLE      Description: Laparoscopy Repair Of Umbilical Hernia;  Recorded:  01/04/2011;   ? RI REMOVAL PREPATELLA BURSA      Description: Excision Of Prepatellar Bursa;  Recorded: 09/30/2014;   ? RI REPAIR OF NASAL SEPTUM      Description: Septoplasty;  Recorded: 09/30/2014;   ? RETINAL DETACHMENT SURGERY  oct 2014   ? SKIN CANCER EXCISION  Nov 2015    right chest   ? THORACOSCOPY Right 4/14/2016    Procedure: RIGHT VIDEO ASSISTED THORACOSCOPY, RIGHT LOBECTOMY;  Surgeon: Vinny Chase MD;  Location: [facility];  Service:    ? UMBILICAL HERNIA REPAIR     ? VASECTOMY      Family History   Problem Relation Age of Onset   ? Acute Myocardial Infarction Mother    ? Aneurysm Mother      brain   ? Acute Myocardial Infarction Father    ? Acute Myocardial Infarction Brother    ? Aortic aneurysm Brother      d @ 72   ? Lung cancer Cousin    ? Heart attack Cousin    ? Leukemia Brother      d @ 55   ? Colitis Brother    ? Colon cancer Paternal Grandfather     Social History     Social History   ? Marital status: Single     Spouse name: N/A   ? Number of children: N/A   ? Years of education: N/A     Occupational History   ? Not on file.     Social History Main Topics   ? Smoking status: Never Smoker   ? Smokeless tobacco: Never Used   ? Alcohol use 0.6 oz/week     1 Cans of beer per week      Comment: Mild use   ? Drug use: No   ? Sexual activity: No     Other Topics Concern   ? Not on file     Social History Narrative          Medications  Allergies   Current Outpatient Prescriptions   Medication Sig Dispense Refill   ? acetaminophen (TYLENOL) 500 MG tablet Take 500 mg by mouth every 6 (six) hours as needed for pain.     ? amLODIPine (NORVASC) 10 MG tablet Take 1 tablet (10 mg total) by mouth daily. 90 tablet 0   ? aspirin 81 mg chewable tablet Chew 81 mg daily.     ? atenolol (TENORMIN) 50 MG tablet Take 50 mg by mouth 2 times a day at 9:00am and 5:00pm.     ? carBAMazepine (TEGRETOL  XR) 100 MG 12 hr tablet TAKE 1 TABLET(100 MG) BY MOUTH TWICE DAILY 60 tablet 5   ? ezetimibe (ZETIA)  10 mg tablet Take 1 tablet (10 mg total) by mouth daily. 30 tablet 11   ? omega-3 fatty acids-fish oil 360-1,200 mg cap Take 1,200 mg by mouth 2 (two) times a day.      ? tamsulosin (FLOMAX) 0.4 mg Cp24 Take 0.4 mg by mouth daily.     ? atenolol (TENORMIN) 50 MG tablet TAKE 1 TABLET BY MOUTH TWICE DAILY 180 tablet 0   ? metoprolol tartrate (LOPRESSOR) 50 MG tablet Take 1 tablet (50 mg total) by mouth 2 (two) times a day. 60 tablet 3   ? nystatin (MYCOSTATIN) cream Use on affected area TID 30 g 0     No current facility-administered medications for this visit.       Allergies   Allergen Reactions   ? Niacin Hives and Rash     Burning of the skin   ? Atorvastatin Myalgia   ? Pravastatin Myalgia         Lab Results    Chemistry/lipid CBC Cardiac Enzymes/BNP/TSH/INR   Lab Results   Component Value Date    CHOL 282 (H) 09/08/2017    HDL 38 (L) 09/08/2017    LDLCALC 217 (H) 09/08/2017    TRIG 133 09/08/2017    CREATININE 0.94 11/02/2016    BUN 19 11/02/2016    K 3.9 11/02/2016     11/02/2016     11/02/2016    CO2 29 11/02/2016    Lab Results   Component Value Date    WBC 4.9 11/02/2016    HGB 14.7 11/02/2016    HCT 43.3 11/02/2016    MCV 86 11/02/2016     11/02/2016    Lab Results   Component Value Date    INR 1.00 04/25/2016

## 2021-06-25 NOTE — TELEPHONE ENCOUNTER
Reason for Call:  Other call back      Detailed comments: Pt calling about his metroprolol tartrate that has not yet been refilled, pt was seen 2 weeks ago with PCP and they were to have been updated at that time     Per patient -  Do not use my chart      Phone Number Patient can be reached at:   Cell number on file:    Telephone Information:   Mobile [phone]       Best Time: ASAP    Can we leave a detailed message on this number?: Yes    Call taken on 6/9/2021 at 1:22 PM by Farzana Gregg

## 2021-06-25 NOTE — TELEPHONE ENCOUNTER
"Agree with RN - RX requested too early.  However it may be the case that if [facility] is not his \"preferred\" pharmacy, he can only get one month at a time.  That being said, she should still honor 3 months worth, if only doled out once month at a time.  Please call [facility] to check    Also, if it is case that [facility] can only give 1 month at a time, Jack should find out about preferred pharmacy he can get a 90 day fill - thank you     Disp Refills Start End CHEMA   metoprolol tartrate (LOPRESSOR) 50 MG tablet 180 tablet 0 5/9/2021  No   Sig: TAKE 1 TABLET(50 MG) BY MOUTH TWICE DAILY   Sent to pharmacy as: metoprolol tartrate 50 mg tablet   Notes to Pharmacy: **Patient requests 90 days supply**   E-Prescribing Status: Receipt confirmed by pharmacy (5/9/2021  6:06 AM CDT)       "

## 2021-06-25 NOTE — TELEPHONE ENCOUNTER
Spoke to [place].  [facility] received a prescription for metoprolol on 5/7/21 for 30 day supply.  This is what they filled.  They cancelled the 5/9/21 for 90 day supply since it was filled two days prior.    Please send refill with a 90 day supply.

## 2021-06-25 NOTE — TELEPHONE ENCOUNTER
RN cannot approve Refill Request    RN can NOT refill this medication   Patient request early refill. Medication last filled 5/9/21 for qty 180 refill 0. Provider to advise on request. . Last office visit: 2/27/2018 Mercedes Adorno MD Last Physical: 5/20/2021 Last MTM visit: Visit date not found Last visit same specialty: 12/20/2019 Brittany Moon CNP.  Next visit within 3 mo: Visit date not found  Next physical within 3 mo: Visit date not found      Joseph Magdaleno, [facility] Connection Triage/Med Refill 6/9/2021    Requested Prescriptions   Pending Prescriptions Disp Refills     metoprolol tartrate (LOPRESSOR) 50 MG tablet [Pharmacy Med Name: METOPROLOL TARTRATE 50MG TABLETS] 60 tablet 0     Sig: TAKE 1 TABLET(50 MG) BY MOUTH TWICE DAILY       Beta-Blockers Refill Protocol Passed - 6/8/2021  8:55 AM        Passed - PCP or prescribing provider visit in past 12 months or next 3 months     Last office visit with prescriber/PCP: 2/27/2018 Mercedes Adorno MD OR same dept: Visit date not found OR same specialty: 12/20/2019 Brittany Moon CNP  Last physical: 5/20/2021 Last MTM visit: Visit date not found   Next visit within 3 mo: Visit date not found  Next physical within 3 mo: Visit date not found  Prescriber OR PCP: Mercedes Adorno MD  Last diagnosis associated with med order: 1. HTN (hypertension)  - metoprolol tartrate (LOPRESSOR) 50 MG tablet [Pharmacy Med Name: METOPROLOL TARTRATE 50MG TABLETS]; TAKE 1 TABLET(50 MG) BY MOUTH TWICE DAILY  Dispense: 60 tablet; Refill: 0    If protocol passes may refill for 12 months if within 3 months of last provider visit (or a total of 15 months).             Passed - Blood pressure filed in past 12 months     BP Readings from Last 1 Encounters:   05/20/21 144/70

## 2021-06-26 ENCOUNTER — HEALTH MAINTENANCE LETTER (OUTPATIENT)
Age: 71
End: 2021-06-26

## 2021-06-27 ENCOUNTER — COMMUNICATION - HEALTHEAST (OUTPATIENT)
Dept: FAMILY MEDICINE | Facility: CLINIC | Age: 71
End: 2021-06-27

## 2021-06-27 DIAGNOSIS — E55.9 VITAMIN D DEFICIENCY: ICD-10-CM

## 2021-06-30 NOTE — PROGRESS NOTES
Progress Notes by Kim Montoya CNP at 2/20/2021 11:10 AM     Author: Kim Montoya CNP Service: -- Author Type: Nurse Practitioner    Filed: 2/20/2021  4:30 PM Encounter Date: 2/20/2021 Status: Signed    : Kim Montoya CNP (Nurse Practitioner)       Chief Complaint   Patient presents with   ? Rash     CHEST. SAW IT THIS MORNING-- NOT ITCHY OR PAINFUL. POSS SHINGLES PER PT.    ? Neck Pain     NECK/SHOULDER PAIN-- FEW DAYS AFTER SHOVELING.        ASSESSMENT & PLAN:   Diagnoses and all orders for this visit:    Herpes zoster without complication  -     valACYclovir (VALTREX) 500 MG tablet; Take 2 tablets (1,000 mg total) by mouth 3 (three) times a day for 7 days.  Dispense: 42 tablet; Refill: 0    S/P CABG x 3    Atypical chest pain      Shingles rash for 1 day.  Valtrex.  OTC Tylenol.  Call PCP if pain not tolerable.  Long discussion re: contagiousness.  Pt will verify that his wife had chickenpox as a child.      Left sided chest pain with CABG hx - does not sound cardiac and there is significant rash overlying area where CP was present prior to its onset.  Patient to monitor for exertional CP or CP with other s/s.      Supportive care discussed.  See discharge instructions below for specific recommendations given.    At the end of the encounter, I discussed results, diagnosis, medications with the patient and/or caregivers. Discussed red flags for immediate return to clinic/ER, as well as indications for follow up if no improvement. Patient and/or caregiver understood and agreed to plan. Patient was stable for discharge.          SUBJECTIVE    HPI:  HPI  Jack Deans presents to the walk-in clinic with   Chief Complaint   Patient presents with   ? Rash     CHEST. SAW IT THIS MORNING-- NOT ITCHY OR PAINFUL. POSS SHINGLES PER PT.    ? Neck Pain     NECK/SHOULDER PAIN-- FEW DAYS AFTER SHOVELING.      Rash noted this morning on left neck and left chest area.    Has zinger intermittent pain in these  areas as well, comes and goes.  Has tried Tylenol for this which has been effective.  Noticing pain on the left upper chest prior to onset of the rash.  The left-sided chest pain is not associated with shortness of breath, weakness nor exertional.  He does have a history of CABG x3.    Denies any particular stressors other than normal life stressors.  Did get a work-up for prostate cancer recently which was normal.    Symptoms started: Rash yesterday, left-sided chest pain few days prior to that.      See ROS for additional symptoms and/or pertinent negatives.       History obtained from the patient.      Review of Systems   Constitutional: Negative for chills and fever.   HENT: Negative for congestion.    Respiratory: Negative for cough and shortness of breath.    Cardiovascular: Positive for chest pain (See HPI).   Musculoskeletal: Positive for neck pain.   Skin: Positive for rash.   Neurological: Negative for dizziness and light-headedness.           OBJECTIVE    Vitals:    02/20/21 1118   BP: 157/90   Patient Site: Right Arm   Patient Position: Sitting   Cuff Size: Adult Regular   Pulse: 62   Resp: 16   Temp: 97.5  F (36.4  C)   SpO2: 97%       Physical Exam  Constitutional:       General: He is not in acute distress.     Appearance: He is well-developed.   Eyes:      General:         Right eye: No discharge.         Left eye: No discharge.      Conjunctiva/sclera: Conjunctivae normal.   Pulmonary:      Effort: Pulmonary effort is normal.   Musculoskeletal: Normal range of motion.   Skin:     General: Skin is warm and dry.      Capillary Refill: Capillary refill takes less than 2 seconds.      Findings: Erythema (Patches of papular erythema located on only rt side of neck wrapping around to rt anterior chest.  Obvious termination of rash at mid neck.  No blisters/weeping/signs of secondary infection. ) present.   Neurological:      Mental Status: He is alert and oriented to person, place, and time.    Psychiatric:         Mood and Affect: Mood normal.         Behavior: Behavior normal.         Thought Content: Thought content normal.         Judgment: Judgment normal.         Labs/EKG:          Radiology:        PATIENT INSTRUCTIONS:   Patient Instructions   Tylenol 1000 mg 3-4 times daily as needed for pain.  Can try cool compresses - see handout for details.      Please read over the handout for reminders on contagiousness.  In general, avoid infants we have not been vaccinated and tell any adults around that you have shingles and ensure they have had chickenpox.     Recheck in 2 weeks, sooner if pain is not tolerable. Can call or do a video visit if you'd prefer.      Patient Education     Shingles  Shingles is a viral infection caused by the same virus as chicken pox. Anyone who has had chicken pox may get shingles later in life. The virus stays in the body, but remains dormant (asleep). Shingles often occurs in older persons or persons with lowered immunity. But it can affect anyone at any age.  Shingles starts as a tingling patch of skin on one side of the body. Small, painful blisters may then appear. The rash does not spread to the rest of the body.  Exposure to shingles cannot cause shingles. However, it can cause chicken pox in anyone who has not had chicken pox or has not been vaccinated. The contagious period ends when all blisters have crusted over (generally about 2 weeks after the illness begins).  After the blisters heal, the affected skin may be sensitive or painful for months (neuralgia). This often gradually goes away.  A shingles vaccine is available. This can help prevent shingles or make it less painful. It is generally recommended for adults over the age of 60 who have had chicken pox in the past, but who have never had shingles. Adults over 60 who have had neither chicken pox nor shingles can prevent both diseases with the chicken pox vaccine. Ask your healthcare provider about these  vaccines.  Home care    Medicines may be prescribed to help relieve pain. Take these medicines as directed. Ask your healthcare provider or pharmacist before using over-the-counter medicines for helping treat pain and itching.    In certain cases, antiviral medicines may be prescribed to reduce pain, shorten the illness, and prevent neuralgia. Take these medicines as directed.    Compresses made from a solution of cool water mixed with cornstarch or baking soda may help relieve pain and itching.     Gently wash skin daily with soap and water to help prevent infection.  Be certain to rinse off all of the soap, which can be irritating.    Trim fingernails and try not to scratch. Scratching the sores may leave scars.    Stay home from work or school until all blisters have formed a crust and you are no longer contagious.  Follow-up care  Follow up with your healthcare provider or as directed by our staff.  When to seek medical advice    Fever of 100.4 F (38 C) or higher, or as directed by your healthcare provider    Affected skin is on the face or neck and any of the following occur:  ? Headache  ? Eye pain  ? Changes in vision  ? Sores near the eye  ? Weakness of facial muscles    Pain, redness, or swelling of a joint    Signs of skin infection: colored drainage from the sores, warmth, increasing redness, or increasing pain  Date Last Reviewed: 9/25/2015 2000-2017 [employer]. [address] [place] [address], [place] [address]. All rights reserved. This information is not intended as a substitute for professional medical care. Always follow your healthcare professional's instructions.

## 2021-06-30 NOTE — PROGRESS NOTES
Progress Notes by Jyothi Wilcox PA-C at 2/25/2021  2:50 PM     Author: Jyothi Wilcox PA-C Service: -- Author Type: Physician Assistant    Filed: 2/25/2021  5:34 PM Encounter Date: 2/25/2021 Status: Signed    : Jyothi Wilcox PA-C (Physician Assistant)         Assessment & Plan:       1. Herpes zoster without complication  cephalexin (KEFLEX) 500 MG capsule    Culture, Wound      Medical Decision Making  Patient with history of diagnosis of shingles on 2/20 returns for ongoing rash and rash changes.  He is now noted to have new pustules associated with the rash concerning for secondary bacterial infection.  Otherwise, the rash on the back appears to be crusting and healing appropriately.  Patient notes that pain and discomfort of the rash has been improving, which is reassuring.  He will continue to take oral antivirals as previously instructed.  We will add prescription for oral antibiotics per orders.  Discussed signs of worsening symptoms and when to follow-up with PCP if no symptom improvement.      Patient Instructions   Patient Education     Folliculitis  Folliculitis is an inflammation of a hair follicle. A hair follicle is the little pocket where a hair grows out of the skin. Bacteria normally live on the skin. But sometimes bacteria can get trapped in a follicle and cause infection. This causes a bumpy rash. The area over the follicles is red and raised. It may itch or be painful. The bumps may have fluid (pus) inside. The pus may leak and then form crusts. Sores can spread to other areas of the body. Once it goes away, folliculitis can come back at any time. Severe cases may cause permanent hair loss and scarring.  Folliculitis can happen anywhere on the body where hair grows. It can be caused by rubbing from tight clothing. Ingrown hairs can cause it. Soaking in a hot tub or swimming pool that has bacteria in the water can cause it. It may also occur if a hair follicle is blocked by  a bandage.  Sores often go away in a few days with no treatment. In some cases, medicine may be given. A small piece of skin or pus may be taken to find the type of bacteria causing the infection.  Home care  The healthcare provider may prescribe an antibiotic cream or ointment.  Oral antibiotics may also be prescribed. Or you may be told to use an over-the-counter antibiotic cream. Follow all instructions when using any of these medicines.  General care:    Apply warm, moist compresses to the sores for 20 minutes up to 3 times a day. You can make a compress by soaking a cloth in warm water. Squeeze out excess water.    Dont cut, poke, or squeeze the sores. This can be painful and spread infection.    Dont scratch the affected area. Scratching can delay healing.    Dont shave the areas affected by folliculitis.    If the sores leak fluid, cover the area with a nonstick gauze bandage. Use as little tape as possible. Carefully discard all soiled bandages.    Dress in loose cotton clothing.    Change sheets and blankets if they are soiled by pus. Wash all clothes, towels, sheets, and cloth diapers in soap and hot water. Do not share clothes, towels, or sheets with other family members.    Do not soak the sores in bath water. This can spread infection. Instead, keep the area clean by gently washing sores with soap and warm water.    Wash your hands or use antibacterial gels often to prevent spreading the bacteria.  Follow-up care  Follow up with your healthcare provider, or as advised.  When to seek medical advice  Call your healthcare provider right away if any of these occur:    Fever of 100.4 F (38 C) or higher    Spreading of the rash    Rash does not get better with treatment    Redness or swelling that gets worse    Rash becomes more painful    Foul-smelling fluid leaking from the skin    Rash improves, but then comes back   Date Last Reviewed: 11/1/2016 2000-2017 [employer]. [address] [place] [address]  Road, [place] [address]. All rights reserved. This information is not intended as a substitute for professional medical care. Always follow your healthcare professional's instructions.                 Subjective:       Jack Brown is a 70 y.o. male here for evaluation of ongoing skin rash on the left back and left anterior chest.  Patient was seen 5 days ago on 2/20 and diagnosed with herpes zoster rash.  He was given oral antivirals.  Patient has noted some improvement in the pain, but now notes new white spots over the rash.  He continues to use acetaminophen with good relief of discomfort.  Patient denies fevers.  The rash has otherwise not spread.    The following portions of the patient's history were reviewed and updated as appropriate: allergies, current medications and problem list.    Review of Systems  Pertinent items are noted in HPI.     Allergies  Allergies   Allergen Reactions   ? Niacin Hives and Rash     Burning of the skin   ? Atorvastatin Myalgia   ? Pravastatin Myalgia       Family History   Problem Relation Age of Onset   ? Acute Myocardial Infarction Mother    ? Aneurysm Mother         brain   ? Acute Myocardial Infarction Father    ? Acute Myocardial Infarction Brother    ? Aortic aneurysm Brother         d @ 72   ? Lung cancer Cousin    ? Heart attack Cousin    ? Leukemia Brother         d @ 55   ? Colitis Brother    ? Colon cancer Paternal Grandfather        Social History     Socioeconomic History   ? Marital status:      Spouse name: None   ? Number of children: None   ? Years of education: None   ? Highest education level: None   Occupational History   ? None   Social Needs   ? Financial resource strain: None   ? Food insecurity     Worry: None     Inability: None   ? Transportation needs     Medical: None     Non-medical: None   Tobacco Use   ? Smoking status: Never Smoker   ? Smokeless tobacco: Never Used   Substance and Sexual Activity   ? Alcohol use: Yes     Alcohol/week:  8.0 standard drinks     Types: 1 Cans of beer, 7 Glasses of wine per week   ? Drug use: No   ? Sexual activity: Never   Lifestyle   ? Physical activity     Days per week: None     Minutes per session: None   ? Stress: None   Relationships   ? Social connections     Talks on phone: None     Gets together: None     Attends [employer] service: None     Active member of club or organization: None     Attends meetings of clubs or organizations: None     Relationship status: None   ? Intimate partner violence     Fear of current or ex partner: None     Emotionally abused: None     Physically abused: None     Forced sexual activity: None   Other Topics Concern   ? None   Social History Narrative   ? None         Objective:       /82 (Patient Site: Right Arm, Patient Position: Sitting, Cuff Size: Adult Regular)   Pulse 72   Resp 16   SpO2 97%   General appearance: alert, appears stated age, cooperative, no distress and non-toxic  Skin: Erythematous rash with clustered pustules affecting the left upper back and left anterior chest; rash on back appears to be scabbing and healing appropriately, where pustules appear more frequently on the anterior chest

## 2021-07-03 NOTE — ADDENDUM NOTE
Addendum Note by Holger Lemus at 1/7/2020  8:00 AM     Author: Holger Lemus Service: -- Author Type:     Filed: 1/8/2020 10:30 AM Encounter Date: 1/7/2020 Status: Signed    : Holger Lemus ()    Addended by: HOLGER LEMUS on: 1/8/2020 10:30 AM        Modules accepted: Orders

## 2021-07-07 NOTE — TELEPHONE ENCOUNTER
RN cannot approve Refill Request    RN can NOT refill this medication med is not covered by policy/route to provider. Last office visit: 2/27/2018 Mercedes Adorno MD Last Physical: 5/20/2021 Last MTM visit: Visit date not found Last visit same specialty: 12/20/2019 Brittany Moon CNP.  Next visit within 3 mo: Visit date not found  Next physical within 3 mo: Visit date not found      Jayde Koch, [facility] Triage/Med Refill 6/27/2021    Requested Prescriptions   Pending Prescriptions Disp Refills     cholecalciferol, vitamin D3, 1,000 unit (25 mcg) tablet [Pharmacy Med Name: VITAMIN D3 1,000 UNIT TABLETS] 100 tablet 3     Sig: TAKE 1 TABLET BY MOUTH DAILY       There is no refill protocol information for this order

## 2021-07-07 NOTE — TELEPHONE ENCOUNTER
Component      Latest Ref Rng & Units 7/15/2020   Vitamin D, Total (25-Hydroxy)      30.0 - 80.0 ng/mL 33.7     At that time advised to continue current supplementation.  Since normal, no need to recheck -will refill medication

## 2021-07-09 ENCOUNTER — COMMUNICATION - HEALTHEAST (OUTPATIENT)
Dept: FAMILY MEDICINE | Facility: CLINIC | Age: 71
End: 2021-07-09

## 2021-07-09 DIAGNOSIS — I10 ESSENTIAL HYPERTENSION: ICD-10-CM

## 2021-07-09 RX ORDER — AMLODIPINE BESYLATE 10 MG/1
10 TABLET ORAL DAILY
Qty: 90 TABLET | Refills: 2 | Status: SHIPPED | OUTPATIENT
Start: 2021-07-09 | End: 2022-04-13

## 2021-07-09 NOTE — TELEPHONE ENCOUNTER
Telephone Encounter by Milena Arita at 7/9/2021  3:09 PM     Author: Milena Arita Service: -- Author Type: Patient Access    Filed: 7/9/2021  3:59 PM Encounter Date: 7/9/2021 Status: Signed    : Milena Arita (Patient Access)       Reason for Call:  Medication or medication refill:    Amlodipine Besylate 10 mg    Do you use a [facility]?  Name of the pharmacy and phone number for the current request: [facility] [place], [place]    Name of the medication requested: Amlodipine Besylate 10 mg    Other request: Pt states he called [facility] and they sent over a request but unable to find the request. Also did not see this in his med list but he stated on the bottle it was filled by Dr. Adorno on 4/11/21.    Pt is almost out of the medication.    Can we leave a detailed message on this number? Yes    Phone number patient can be reached at: Home number on file [phone] (home)    Best Time:     Call taken on 7/9/2021 at 3:09 PM by Milena Arita

## 2021-07-09 NOTE — TELEPHONE ENCOUNTER
Telephone Encounter by Jayde Koch RN at 7/9/2021  4:20 PM     Author: Jayde Koch RN Service: -- Author Type: Registered Nurse    Filed: 7/9/2021  4:22 PM Encounter Date: 7/9/2021 Status: Signed    : Jayde Koch RN (Registered Nurse)       Refill Approved    Rx renewed per Medication Renewal Policy. Medication was last renewed on 1/11/2021.    Jayde Koch, [facility] Connection Triage/Med Refill 7/9/2021     Requested Prescriptions   Pending Prescriptions Disp Refills   ? amLODIPine (NORVASC) 10 MG tablet 91 tablet 1     Sig: Take 1 tablet (10 mg total) by mouth daily.       Calcium-Channel Blockers Protocol Passed - 7/9/2021  4:03 PM        Passed - PCP or prescribing provider visit in past 12 months or next 3 months     Last office visit with prescriber/PCP: 2/27/2018 Mercedes Adorno MD OR same dept: Visit date not found OR same specialty: 12/20/2019 Brittany Moon, CNP  Last physical: 5/20/2021 Last MTM visit: Visit date not found   Next visit within 3 mo: Visit date not found  Next physical within 3 mo: Visit date not found  Prescriber OR PCP: Mercedes Adorno MD  Last diagnosis associated with med order: 1. Essential hypertension  - amLODIPine (NORVASC) 10 MG tablet; Take 1 tablet (10 mg total) by mouth daily.  Dispense: 91 tablet; Refill: 1    If protocol passes may refill for 12 months if within 3 months of last provider visit (or a total of 15 months).             Passed - Blood pressure filed in past 12 months     BP Readings from Last 1 Encounters:   05/20/21 144/70

## 2021-08-16 ENCOUNTER — OFFICE VISIT (OUTPATIENT)
Dept: FAMILY MEDICINE | Facility: CLINIC | Age: 71
End: 2021-08-16
Payer: MEDICARE

## 2021-08-16 ENCOUNTER — TELEPHONE (OUTPATIENT)
Dept: ONCOLOGY | Facility: HOSPITAL | Age: 71
End: 2021-08-16

## 2021-08-16 VITALS
OXYGEN SATURATION: 95 % | BODY MASS INDEX: 26.19 KG/M2 | TEMPERATURE: 98.2 F | DIASTOLIC BLOOD PRESSURE: 77 MMHG | HEART RATE: 67 BPM | SYSTOLIC BLOOD PRESSURE: 133 MMHG | WEIGHT: 157.4 LBS

## 2021-08-16 DIAGNOSIS — R31.0 GROSS HEMATURIA: Primary | ICD-10-CM

## 2021-08-16 LAB
ALBUMIN UR-MCNC: 30 MG/DL
AMORPH CRY #/AREA URNS HPF: ABNORMAL /HPF
ANION GAP SERPL CALCULATED.3IONS-SCNC: 9 MMOL/L (ref 5–18)
APPEARANCE UR: ABNORMAL
BACTERIA #/AREA URNS HPF: ABNORMAL /HPF
BILIRUB UR QL STRIP: NEGATIVE
BUN SERPL-MCNC: 19 MG/DL (ref 8–28)
CALCIUM SERPL-MCNC: 10.1 MG/DL (ref 8.5–10.5)
CHLORIDE BLD-SCNC: 105 MMOL/L (ref 98–107)
CO2 SERPL-SCNC: 26 MMOL/L (ref 22–31)
COLOR UR AUTO: ABNORMAL
CREAT SERPL-MCNC: 1 MG/DL (ref 0.7–1.3)
GFR SERPL CREATININE-BSD FRML MDRD: 75 ML/MIN/1.73M2
GLUCOSE BLD-MCNC: 85 MG/DL (ref 70–125)
GLUCOSE UR STRIP-MCNC: NEGATIVE MG/DL
HGB BLD-MCNC: 15.7 G/DL (ref 13.3–17.7)
HGB UR QL STRIP: ABNORMAL
KETONES UR STRIP-MCNC: NEGATIVE MG/DL
LEUKOCYTE ESTERASE UR QL STRIP: NEGATIVE
NITRATE UR QL: NEGATIVE
PH UR STRIP: 5.5 [PH] (ref 5–8)
POTASSIUM BLD-SCNC: 4.5 MMOL/L (ref 3.5–5)
RBC #/AREA URNS AUTO: >100 /HPF
SODIUM SERPL-SCNC: 140 MMOL/L (ref 136–145)
SP GR UR STRIP: 1.01 (ref 1–1.03)
SQUAMOUS #/AREA URNS AUTO: ABNORMAL /LPF
UROBILINOGEN UR STRIP-ACNC: 0.2 E.U./DL
WBC #/AREA URNS AUTO: ABNORMAL /HPF

## 2021-08-16 PROCEDURE — 85018 HEMOGLOBIN: CPT | Performed by: NURSE PRACTITIONER

## 2021-08-16 PROCEDURE — 36415 COLL VENOUS BLD VENIPUNCTURE: CPT | Performed by: NURSE PRACTITIONER

## 2021-08-16 PROCEDURE — 81001 URINALYSIS AUTO W/SCOPE: CPT | Performed by: NURSE PRACTITIONER

## 2021-08-16 PROCEDURE — 87086 URINE CULTURE/COLONY COUNT: CPT | Performed by: NURSE PRACTITIONER

## 2021-08-16 PROCEDURE — 80048 BASIC METABOLIC PNL TOTAL CA: CPT | Performed by: NURSE PRACTITIONER

## 2021-08-16 PROCEDURE — 99214 OFFICE O/P EST MOD 30 MIN: CPT | Performed by: NURSE PRACTITIONER

## 2021-08-16 ASSESSMENT — ENCOUNTER SYMPTOMS
FLANK PAIN: 0
FREQUENCY: 0
VOMITING: 0
CHILLS: 0
DYSURIA: 0
HEMATURIA: 0
DIFFICULTY URINATING: 0
FEVER: 0
NAUSEA: 0

## 2021-08-16 NOTE — TELEPHONE ENCOUNTER
Jack calls to report that he has had a scant amount of blood in his urine since last week and had a one time pain in his abd last week.  Patient advised to follow up with his primary MD.  Patient verbalized understanding.

## 2021-08-16 NOTE — PATIENT INSTRUCTIONS
See urology in the next few days for hematuria       Patient Education     Blood in the Urine    Blood in the urine (hematuria) has many possible causes. If it occurs after an injury (such as a car accident or fall), it is most often a sign of bruising to the kidney or bladder. Common causes of blood in the urine include urinary tract infections, kidney stones, inflammation, tumors, or certain other diseases of the kidney or bladder. Menstruation can cause blood to appear in the urine sample, but it is not coming from the urinary tract.   If only a tiny (trace) amount of blood is present, it will show up on the urine test, even though the urine may be yellow and not pink or red. This may occur with any of the above conditions, as well as heavy exercise or high fever. In this case, your healthcare provider may want to repeat the urine test on another day. This will show if there is still blood in the urine. If there is, then other tests can be done to find out the cause.   Home care  Follow these home care guidelines:    If your urine does not look bloody (pink, brown, or red) then you don't need to restrict your activity in any way.    If you can see blood in your urine, rest and don't do any strenuous activity until your next exam. Don't use aspirin, blood thinners, or anti-platelet or anti-inflammatory medicines. These include ibuprofen and naproxen. These thin the blood and may increase bleeding. Call your healthcare provider to talk about using these medicines.  Follow-up care  Follow up with your healthcare provider, or as advised. If you were injured and had blood in your urine, you should have a repeat urine test in 1 to 2 days. Contact your provider for this test.   A radiologist will review any X-rays that were taken. You will be told of any new findings that may affect your care.   When to seek medical advice  Call your healthcare provider right away if any of these occur:    Bright red blood or blood  clots in the urine (if you did not have this before)    Weakness, dizziness or fainting    New groin, belly, or back pain    Fever of 100.4 F (38 C) or higher, or as directed by your provider    Repeated vomiting    Bleeding from the nose or gums or easy bruising  Dorian last reviewed this educational content on 9/1/2019 2000-2021 [employer]. All rights reserved. This information is not intended as a substitute for professional medical care. Always follow your healthcare professional's instructions.

## 2021-08-16 NOTE — PROGRESS NOTES
Assessment & Plan     Hematuria    - UA with Microscopic reflex to Culture - Clinic Collect  - Urine Microscopic    Gross hematuria    - Urine Culture Aerobic Bacterial  - Adult Urology Referral     Patient with otherwise asymptomatic gross hematuria.  Tempted to get an appointment at [facility].  The earliest appointment they had for gross hematuria was September 9.  Did call and speak to [facility] urology clinic and they were able to state that he could get in within 1 to 2 weeks and that the policy is to message the provider to ensure they are able to be seen in this time..    As patient has no other symptoms such as frequency, urgency, flank pain, ordered screening labs including BMP and CBC.  Patient should follow-up ASAP with large clots, difficulty urinating, frequent urination, dysuria.  Did add on a urine culture.    30 minutes spent on the date of the encounter doing chart review, review of test results, patient visit, documentation and 2 calls to urology clinics to discuss appointment availability         Return in about 1 week (around 8/23/2021).    Kim Montoya [facility]    Marcie Santiago is a 70 year old male who presents to clinic today for the following health issues:  Chief Complaint   Patient presents with     Urinary Problem     Blood in urine started 5 days ago.  Also noted one episode of sharp upper abdominal pain. Oncologist recommenede ua/uc.      HPI    Otherwise asymptomatic hematuria noted 5 days ago.  Bright red bleeding with urination - 1 drop at a time.  1 small clot yesterday.  No other symptoms.  Had 5 seconds of RUQ abd pain a few days ago.  Denies trauma.      Has a history of lung cancer with history of right middle lobectomy in 2015.  No recurrence for the last 5 years.  Sees Dr. Harmon with oncology.           Review of Systems   Constitutional: Negative for chills and fever.   Gastrointestinal: Negative for nausea and vomiting.    Genitourinary: Negative for decreased urine volume, difficulty urinating, dysuria, flank pain, frequency, hematuria, testicular pain and urgency.           Objective    /77 (BP Location: Right arm, Patient Position: Sitting, Cuff Size: Adult Regular)   Pulse 67   Temp 98.2  F (36.8  C) (Oral)   Wt 71.4 kg (157 lb 6.4 oz)   SpO2 95%   BMI 26.19 kg/m    Physical Exam  Constitutional:       Appearance: He is well-developed.   HENT:      Right Ear: External ear normal.      Left Ear: External ear normal.   Eyes:      General:         Right eye: No discharge.         Left eye: No discharge.      Conjunctiva/sclera: Conjunctivae normal.   Pulmonary:      Effort: Pulmonary effort is normal.   Abdominal:      Tenderness: There is no right CVA tenderness or left CVA tenderness.   Musculoskeletal:         General: Normal range of motion.   Skin:     General: Skin is warm.   Neurological:      Mental Status: He is alert and oriented to person, place, and time.   Psychiatric:         Mood and Affect: Mood normal.         Behavior: Behavior normal.         Thought Content: Thought content normal.         Judgment: Judgment normal.            Results for orders placed or performed in visit on 08/16/21 (from the past 24 hour(s))   UA with Microscopic reflex to Culture - Clinic Collect    Specimen: Urine, Clean Catch   Result Value Ref Range    Color Urine Light Red (A) Colorless, Straw, Light Yellow, Yellow    Appearance Urine Slightly Cloudy (A) Clear    Glucose Urine Negative Negative mg/dL    Bilirubin Urine Negative Negative    Ketones Urine Negative Negative mg/dL    Specific Gravity Urine 1.015 1.005 - 1.030    Blood Urine Large (A) Negative    pH Urine 5.5 5.0 - 8.0    Protein Albumin Urine 30  (A) Negative mg/dL    Urobilinogen Urine 0.2 0.2, 1.0 E.U./dL    Nitrite Urine Negative Negative    Leukocyte Esterase Urine Negative Negative   Urine Microscopic   Result Value Ref Range    Bacteria Urine None Seen None  Seen /HPF    RBC Urine >100 (A) 0-2 /HPF /HPF    WBC Urine 0-5 0-5 /HPF /HPF    Squamous Epithelials Urine None Seen None Seen /LPF    Amorphous Crystals Urine Few (A) None Seen /HPF    Narrative    Urine Culture not indicated   Hemoglobin   Result Value Ref Range    Hemoglobin 15.7 13.3 - 17.7 g/dL

## 2021-08-17 LAB — BACTERIA UR CULT: NO GROWTH

## 2021-08-18 ENCOUNTER — PRE VISIT (OUTPATIENT)
Dept: UROLOGY | Facility: CLINIC | Age: 71
End: 2021-08-18

## 2021-08-24 ENCOUNTER — PRE VISIT (OUTPATIENT)
Dept: UROLOGY | Facility: CLINIC | Age: 71
End: 2021-08-24

## 2021-09-03 ENCOUNTER — VIRTUAL VISIT (OUTPATIENT)
Dept: UROLOGY | Facility: CLINIC | Age: 71
End: 2021-09-03
Payer: MEDICARE

## 2021-09-03 DIAGNOSIS — R31.0 GROSS HEMATURIA: Primary | ICD-10-CM

## 2021-09-03 DIAGNOSIS — N40.0 BENIGN PROSTATIC HYPERPLASIA WITHOUT LOWER URINARY TRACT SYMPTOMS: ICD-10-CM

## 2021-09-03 PROCEDURE — 99203 OFFICE O/P NEW LOW 30 MIN: CPT | Mod: 95 | Performed by: NURSE PRACTITIONER

## 2021-09-03 NOTE — LETTER
9/3/2021       RE: Jack Brown  [address]  [place] [address]     Dear Colleague,    Thank you for referring your patient, Jack Brown, to the [facility] [place] [facility]. Please see a copy of my visit note below.    Jack is a 70 year old who is being evaluated via a billable video visit.      How would you like to obtain your AVS? MyChart  If the video visit is dropped, the invitation should be resent by: Send to e-mail at: [email or web address]  Will anyone else be joining your video visit? No    Video Start Time: 9:32 AM  Video-Visit Details    Type of service:  Video Visit    Video End Time: 10:00 AM    Originating Location (pt. Location): Home    Distant Location (provider location):  [facility] [place]     Platform used for Video Visit: [employer]       Jack Brown complains of   Chief Complaint   Patient presents with     Consult For     gross hematuria        Assessment/Plan:  70 year old male with a history of CAD s/p CABG, HTN, melanoma, lung nodule, and BPH on Flomax (144.5 cc, PIRADS 2 on MRI 6 months ago) who has experienced a few episodes of painless gross hematuria, now not for the past few weeks.The differential diagnosis at this point includes recurrent stone disease, infection, urothelial malignancy, renal disorder, friable hypervascularity of the prostate versus another yet unknown diagnosis.    At this time, recommend proceeding with comprehensive hematuria evaluation to include:  - Urinalysis and urine culture to rule out an acute urinary tract infection.   - Urine cytology to look for cells concerning for malignancy.  - CT urogram for upper tract imaging.  - Cystoscopy with the first available urologist to evaluate the interior of the bladder. Follow up for hematuria as recommended by urologist performing cystoscopic evaluation.    Veronica Costa, CNP  Department of Urology       Subjective:   70 year old male with a PMH of CAD s/p CABG, HTN, melanoma, lung nodule, and BPH on Flomax (144.5 cc, PIRADS 2 on MRI 6 months ago) who presents virtually for consult regarding gross hematuria. This began a few weeks ago. UA from 8/16 confirmed hematuria, showing >100 RBC/HPF; culture negative.     Today, he states that he last had blood in his urine 14 days ago; painless. This first began on the day of his intial evaluation, on 8/16. He has a history of kidney stones 40+ years ago, 2 stone passed. These were identified via hematuria workup that was conducted at that time. Recalls having a cystoscopy then, which was normal. He does not take any blood thinners aside from a daily baby aspirin. He denies any issues with urination and feels his stream is normal on Flomax.     Hematuria Risk Factors:  Age >40: Yes  Smoking history: No  Occupational exposure to chemicals or dyes (ie, benzenes, aromatic amines): No  History of urologic disorder or disease: BPH (as described)  History of irritative voiding symptoms: No  History of urinary tract infection: No  Analgesic abuse: No  History of pelvic irradiation: No      Objective:     Exam:   Patient is a 70 year old male   General Appearance: Well groomed, hygenic  Respiratory: No cough, no respiratory distress or labored breathing  Musculoskeletal: Grossly normal with no gross deficits  Skin: No discoloration or apparent rashes  Neurologic: No tremors  Psychiatric: Alert and oriented  Further examination is deferred due to the nature of our visit.

## 2021-09-03 NOTE — PROGRESS NOTES
Jack is a 70 year old who is being evaluated via a billable video visit.      How would you like to obtain your AVS? MyChart  If the video visit is dropped, the invitation should be resent by: Send to e-mail at: [email or web address]  Will anyone else be joining your video visit? No    Video Start Time: 9:32 AM  Video-Visit Details    Type of service:  Video Visit    Video End Time: 10:00 AM    Originating Location (pt. Location): Home    Distant Location (provider location):  [facility] [place]     Platform used for Video Visit: [employer]       Jack Brown complains of   Chief Complaint   Patient presents with     Consult For     gross hematuria        Assessment/Plan:  70 year old male with a history of CAD s/p CABG, HTN, melanoma, lung nodule, and BPH on Flomax (144.5 cc, PIRADS 2 on MRI 6 months ago) who has experienced a few episodes of painless gross hematuria, now not for the past few weeks.The differential diagnosis at this point includes recurrent stone disease, infection, urothelial malignancy, renal disorder, friable hypervascularity of the prostate versus another yet unknown diagnosis.    At this time, recommend proceeding with comprehensive hematuria evaluation to include:  - Urinalysis and urine culture to rule out an acute urinary tract infection.   - Urine cytology to look for cells concerning for malignancy.  - CT urogram for upper tract imaging.  - Cystoscopy with the first available urologist to evaluate the interior of the bladder. Follow up for hematuria as recommended by urologist performing cystoscopic evaluation.    Veronica Costa, CNP  Department of Urology      Subjective:   70 year old male with a PMH of CAD s/p CABG, HTN, melanoma, lung nodule, and BPH on Flomax (144.5 cc, PIRADS 2 on MRI 6 months ago) who presents virtually for consult regarding gross hematuria. This began a few weeks ago. UA from 8/16 confirmed hematuria, showing >100 RBC/HPF; culture  negative.     Today, he states that he last had blood in his urine 14 days ago; painless. This first began on the day of his intial evaluation, on 8/16. He has a history of kidney stones 40+ years ago, 2 stone passed. These were identified via hematuria workup that was conducted at that time. Recalls having a cystoscopy then, which was normal. He does not take any blood thinners aside from a daily baby aspirin. He denies any issues with urination and feels his stream is normal on Flomax.     Hematuria Risk Factors:  Age >40: Yes  Smoking history: No  Occupational exposure to chemicals or dyes (ie, benzenes, aromatic amines): No  History of urologic disorder or disease: BPH (as described)  History of irritative voiding symptoms: No  History of urinary tract infection: No  Analgesic abuse: No  History of pelvic irradiation: No      Objective:     Exam:   Patient is a 70 year old male   General Appearance: Well groomed, hygenic  Respiratory: No cough, no respiratory distress or labored breathing  Musculoskeletal: Grossly normal with no gross deficits  Skin: No discoloration or apparent rashes  Neurologic: No tremors  Psychiatric: Alert and oriented  Further examination is deferred due to the nature of our visit.

## 2021-09-03 NOTE — PATIENT INSTRUCTIONS
UROLOGY CLINIC VISIT PATIENT INSTRUCTIONS    -Labs: Will obtain a urinalysis, culture, and urine cytology  -Imaging: Will obtain a CT urogram scan to take a look at your kidneys, ureters, and bladder.  -Lastly, will schedule a cystoscopy with the next available urologist to evaluate the interior of your bladder.     If you have any issues, questions or concerns in the meantime, do not hesitate to contact us at [phone] or via [employer]     It was a pleasure meeting with you today.  Thank you for allowing me and my team the privilege of caring for you today.  YOU are the reason we are here, and I truly hope we provided you with the excellent service you deserve.  Please let us know if there is anything else we can do for you so that we can be sure you are leaving completely satisfied with your care experience.    Veronica Costa, CNP

## 2021-09-08 ENCOUNTER — TELEPHONE (OUTPATIENT)
Dept: UROLOGY | Facility: CLINIC | Age: 71
End: 2021-09-08

## 2021-09-08 NOTE — TELEPHONE ENCOUNTER
LVM for patient to: schedule Labs: Will obtain a urinalysis, culture, and urine cytology  -Imaging: Will obtain a CT urogram scan to take a look at your kidneys, ureters, and bladder.  -Lastly, will schedule a cystoscopy with the next available urologist to evaluate the interior of your bladder

## 2021-09-13 ENCOUNTER — PRE VISIT (OUTPATIENT)
Dept: UROLOGY | Facility: CLINIC | Age: 71
End: 2021-09-13

## 2021-09-13 NOTE — TELEPHONE ENCOUNTER
Reason for visit: cystoscopy     Relevant information: gross hematuria, BPH    Problem list   Patient Active Problem List   Diagnosis     Hypertension     Hyperlipemia     Visual Impairment     Nasal drainage     Coronary artery disease due to lipid rich plaque     S/P CABG x 3     Thyroid nodule     Urinary retention     Atypical carcinoid lung tumor (H)     Muscle soreness     History of melanoma     History of squamous cell carcinoma     History of basal cell carcinoma       Records/imaging/labs/orders: images and labs available    Pt called: not needed    At Rooming: have an extra luer-blair syringe to collect urine during the cysto. No urojet, cipro

## 2021-09-16 ENCOUNTER — LAB (OUTPATIENT)
Dept: LAB | Facility: HOSPITAL | Age: 71
End: 2021-09-16
Attending: NURSE PRACTITIONER
Payer: MEDICARE

## 2021-09-16 ENCOUNTER — HOSPITAL ENCOUNTER (OUTPATIENT)
Dept: CT IMAGING | Facility: HOSPITAL | Age: 71
End: 2021-09-16
Attending: NURSE PRACTITIONER
Payer: MEDICARE

## 2021-09-16 DIAGNOSIS — R31.0 GROSS HEMATURIA: ICD-10-CM

## 2021-09-16 LAB
ALBUMIN UR-MCNC: NEGATIVE MG/DL
APPEARANCE UR: CLEAR
BILIRUB UR QL STRIP: NEGATIVE
COLOR UR AUTO: COLORLESS
GLUCOSE UR STRIP-MCNC: NEGATIVE MG/DL
HGB UR QL STRIP: NEGATIVE
KETONES UR STRIP-MCNC: NEGATIVE MG/DL
LEUKOCYTE ESTERASE UR QL STRIP: NEGATIVE
NITRATE UR QL: NEGATIVE
PH UR STRIP: 7 [PH] (ref 5–7)
RBC URINE: 0 /HPF
SP GR UR STRIP: 1.01 (ref 1–1.03)
UROBILINOGEN UR STRIP-MCNC: <2 MG/DL
WBC URINE: <1 /HPF

## 2021-09-16 PROCEDURE — 74178 CT ABD&PLV WO CNTR FLWD CNTR: CPT | Mod: ME

## 2021-09-16 PROCEDURE — 81001 URINALYSIS AUTO W/SCOPE: CPT

## 2021-09-16 PROCEDURE — 87086 URINE CULTURE/COLONY COUNT: CPT

## 2021-09-16 PROCEDURE — 250N000011 HC RX IP 250 OP 636: Performed by: NURSE PRACTITIONER

## 2021-09-16 PROCEDURE — 88112 CYTOPATH CELL ENHANCE TECH: CPT | Mod: TC

## 2021-09-16 RX ORDER — IOPAMIDOL 755 MG/ML
100 INJECTION, SOLUTION INTRAVASCULAR ONCE
Status: COMPLETED | OUTPATIENT
Start: 2021-09-16 | End: 2021-09-16

## 2021-09-16 RX ADMIN — IOPAMIDOL 100 ML: 755 INJECTION, SOLUTION INTRAVENOUS at 07:44

## 2021-09-17 LAB
BACTERIA UR CULT: NO GROWTH
PATH REPORT.COMMENTS IMP SPEC: NORMAL
PATH REPORT.FINAL DX SPEC: NORMAL
PATH REPORT.GROSS SPEC: NORMAL
PATH REPORT.MICROSCOPIC SPEC OTHER STN: NORMAL
PATH REPORT.RELEVANT HX SPEC: NORMAL

## 2021-09-17 PROCEDURE — 88112 CYTOPATH CELL ENHANCE TECH: CPT | Mod: 26 | Performed by: PATHOLOGY

## 2021-09-22 ENCOUNTER — OFFICE VISIT (OUTPATIENT)
Dept: UROLOGY | Facility: CLINIC | Age: 71
End: 2021-09-22
Payer: MEDICARE

## 2021-09-22 VITALS
HEART RATE: 67 BPM | HEIGHT: 66 IN | SYSTOLIC BLOOD PRESSURE: 135 MMHG | BODY MASS INDEX: 25.71 KG/M2 | WEIGHT: 160 LBS | DIASTOLIC BLOOD PRESSURE: 84 MMHG

## 2021-09-22 DIAGNOSIS — N40.0 BENIGN PROSTATIC HYPERPLASIA WITHOUT LOWER URINARY TRACT SYMPTOMS: ICD-10-CM

## 2021-09-22 DIAGNOSIS — C7A.090 ATYPICAL CARCINOID LUNG TUMOR (H): ICD-10-CM

## 2021-09-22 DIAGNOSIS — R31.0 GROSS HEMATURIA: Primary | ICD-10-CM

## 2021-09-22 PROCEDURE — 52001 CYSTO W/IRRG&EVAC MLT CLOTS: CPT | Performed by: UROLOGY

## 2021-09-22 PROCEDURE — 99213 OFFICE O/P EST LOW 20 MIN: CPT | Mod: 25 | Performed by: UROLOGY

## 2021-09-22 RX ORDER — FINASTERIDE 5 MG/1
5 TABLET, FILM COATED ORAL DAILY
Qty: 30 TABLET | Refills: 2 | Status: SHIPPED | OUTPATIENT
Start: 2021-09-22 | End: 2021-10-12

## 2021-09-22 RX ORDER — CIPROFLOXACIN 500 MG/1
500 TABLET, FILM COATED ORAL ONCE
Status: DISCONTINUED | OUTPATIENT
Start: 2021-09-22 | End: 2021-10-23

## 2021-09-22 ASSESSMENT — MIFFLIN-ST. JEOR: SCORE: 1418.51

## 2021-09-22 ASSESSMENT — PAIN SCALES - GENERAL: PAINLEVEL: NO PAIN (0)

## 2021-09-22 NOTE — PROGRESS NOTES
Chief Complaint:   Gross hematuria            Consult or Referral:     Mr. Jack Brown is a 70 year old male seen in consultation from Dr. Costa.         History of Present Illness:    Jack Brown is a 70 year old male with a PMH of CAD s/p CABG, HTN, melanoma,hx of acinar lung cancer sp right middle lobectomy and BPH on Flomax (prostate volume 144.5 ml on MRI 6 months ago) who was seen recently by Veronica Costa CNP for gross hematuria through video consult.       His painless hematuria began almost 20 days ago. He denies any associated LUTS such as dysuria or straining. He has a history of kidney stones 40+ years ago, 2 stone passed. These were identified via hematuria workup that was conducted at that time. Recalls having a cystoscopy then, which was normal. He does not take any blood thinners aside from a daily baby aspirin.      CT urogram was negative for any filling defect and showed only non obstructive nephrolithiasis and enlarged prostate.  He is here today for cystoscopy to complete the work up for gross hematuria.         Past Medical History:     Past Medical History:   Diagnosis Date     BPH (benign prostatic hypertrophy)      Cancer (H)     Skin on R.chest wall.     Chest discomfort      Coronary artery disease      Detached retina, left Oct 2014     Hyperlipidemia      Hypertension             Past Surgical History:     Past Surgical History:   Procedure Laterality Date     BYPASS GRAFT ARTERY CORONARY N/A 12/28/2015    Procedure: CORONARY ARTERY BYPASS x 3, RIGHT ENDOSCOPIC VEIN HARVEST, LEFT INTERNAL MAMMARY ARTERY, ANESTHESIA TRANSESOPHAGEAL ECHOCARDIOGRAM;  Surgeon: Jayson Alvarado MD;  Location: [facility];  Service:      CARDIAC SURGERY       CATARACT EXTRACTION  2015     CYST REMOVAL      Ganglion cyst removal of both wrist     HC REMOVAL PREPATELLA BURSA      Description: Excision Of Prepatellar Bursa;  Recorded: 09/30/2014;     HC REPAIR OF NASAL SEPTUM       Description: Septoplasty;  Recorded: 09/30/2014;     HERNIA REPAIR, UMBILICAL       PARS PLANA VITRECTOMY W/ REPAIR OF MACULAR HOLE  Nov 2014     PA EXCIS TENDON SHEATH LESION, HAND/FINGER      Description: Hand Excision Of A Tendon Cyst;  Recorded: 01/04/2011;     PA LAP, VENTRAL HERNIA REPAIR,REDUCIBLE      Description: Laparoscopy Repair Of Umbilical Hernia;  Recorded: 01/04/2011;     RETINAL DETACHMENT SURGERY  oct 2014     SKIN CANCER EXCISION  Nov 2015    right chest     THORACOSCOPY Right 4/14/2016    Procedure: RIGHT VIDEO ASSISTED THORACOSCOPY, RIGHT LOBECTOMY;  Surgeon: Vinny Chase MD;  Location: [facility];  Service:      VASECTOMY              Medications     Current Outpatient Medications   Medication     acetaminophen (TYLENOL) 500 MG tablet     amLODIPine (NORVASC) 10 MG tablet     aspirin 81 mg chewable tablet     metoprolol tartrate (LOPRESSOR) 50 MG tablet     tamsulosin (FLOMAX) 0.4 mg cap     Current Facility-Administered Medications   Medication     ciprofloxacin (CIPRO) tablet 500 mg            Family History:     Family History   Problem Relation Age of Onset     Acute Myocardial Infarction Mother      Aneurysm Mother         brain     Acute Myocardial Infarction Father      Acute Myocardial Infarction Brother      Aortic aneurysm Brother         d @ 72     Lung Cancer Cousin      Coronary Artery Disease Cousin      Leukemia Brother         d @ 55     Colitis Brother      Colon Cancer Paternal Grandfather             Social History:     Social History     Socioeconomic History     Marital status:      Spouse name: Not on file     Number of children: Not on file     Years of education: Not on file     Highest education level: Not on file   Occupational History     Not on file   Tobacco Use     Smoking status: Never Smoker     Smokeless tobacco: Never Used   Substance and Sexual Activity     Alcohol use: Yes     Alcohol/week: 8.0 standard drinks     Drug use: No      Sexual activity: Never   Other Topics Concern     Not on file   Social History Narrative     Not on file     Social Determinants of Health     Financial Resource Strain:      Difficulty of Paying Living Expenses:    Food Insecurity:      Worried About Running Out of Food in the Last Year:      Ran Out of Food in the Last Year:    Transportation Needs:      Lack of Transportation (Medical):      Lack of Transportation (Non-Medical):    Physical Activity:      Days of Exercise per Week:      Minutes of Exercise per Session:    Stress:      Feeling of Stress :    Social Connections:      Frequency of Communication with Friends and Family:      Frequency of Social Gatherings with Friends and Family:      Attends Temple Services:      Active Member of Clubs or Organizations:      Attends Club or Organization Meetings:      Marital Status:    Intimate Partner Violence:      Fear of Current or Ex-Partner:      Emotionally Abused:      Physically Abused:      Sexually Abused:             Allergies:   Niacin, Atorvastatin, and Pravastatin         Review of Systems:  From intake questionnaire     Negative 14 system review except as noted on HPI, nurse's note.         Physical Exam:     Patient is a 70 year old  male   Vitals: There were no vitals taken for this visit.  General Appearance Adult: Alert, no acute distress, oriented  HENT: throat/mouth:normal, good dentition  Neck: No adenopathy,masses or thyromegaly  Lungs: no respiratory distress, or pursed lip breathing  Heart: No obvious jugular venous distension present  Abdomen: soft, nontender, no organomegaly or masses, There is no height or weight on file to calculate BMI., scars noted None  Lymphatics: No cervical or supraclavicular adenopathy  Musculoskeltal: extremities normal, no peripheral edema  Skin: no suspicious lesions or rashes  Neuro: Alert, oriented, speech and mentation normal  Psych: affect and mood normal  Gait: Normal  : OLE anodular, symmetric       Labs and Pathology:    I reviewed all applicable laboratory and pathology data and went over findings with patient  Significant for   Prostate Specific Antigen Screen   Date Value Ref Range Status   03/08/2014 10.2 (H) <4.6 ng/mL Final     Comment:     Method is Abbott Prostate-Specific Antigen (PSA)            Standard-WHO 1st International (90:10) as of 09/26/05     09/10/2013 9.0 (H) <4.6 ng/mL Final     Comment:     Method is Abbott Prostate-Specific Antigen (PSA)            Standard-WHO 1st International (90:10) as of 09/26/05 11/30/2010 5.9 (H) <4.6 ng/mL Final     Comment:     Method is Abbott Prostate-Specific Antigen (PSA)            Standard-WHO 1st International (90:10) as of 09/26/05     10/29/2009 5.7 (H) <4.6 ng/mL Final     Comment:     Method is Abbott Prostate-Specific Antigen (PSA)            Standard-WHO 1st International (90:10) as of 09/26/05             Imaging:    I reviewed all applicable imaging and went over findings with patient.  Significant for enlarged prostate, non obstructing renal stones, no hydronephrosis, filling defect or pelvic or retroperitoneal lymphadenopathy. Right adrenal adenoma.     EXAM: CT UROGRAM WO and W CONTRAST  LOCATION: [facility]  DATE/TIME: 9/16/2021 7:42 AM     INDICATION: Hematuria. History of right middle lobectomy for carcinoid tumor.  COMPARISON: CT 1/18/2021 and 1/29/2020  TECHNIQUE: CT scan of the abdomen and pelvis using urogram technique with pre contrast, post contrast, and delayed images. Multiplanar reformats were obtained. Dose reduction techniques were used.   CONTRAST: IsoVue 370 100mL     FINDINGS:   LOWER CHEST: Coronary artery calcifications.     HEPATOBILIARY: Stable focal fat along the niraj hepatis normal gallbladder and biliary system.     PANCREAS: Normal.     SPLEEN: Normal.     ADRENAL GLANDS: Stable 1.6 cm left adrenal adenoma. Normal right adrenal gland.     RIGHT KIDNEY/URETER: Stable 2 x 3 x 2 mm right  lower pole calyceal tip calcification. Stable 3 cm exophytic right upper pole cyst with some layering milk of calcium and a punctate rim calcification. Stable benign 2.2 cm hypodense cyst right lower pole.   Chronic right UPJ stenosis. The right ureter is otherwise negative.     LEFT KIDNEY/URETER: No urinary tract calculus. Benign parapelvic cyst left lower pole. Chronic left UPJ stenosis. The left ureter is otherwise negative.     BLADDER: Mild bladder wall trabeculation. No mucosal lesion.     BOWEL: No obstruction or inflammatory change. Normal appendix.     LYMPH NODES: No lymphadenopathy.     VASCULATURE: Saccular distal aortic ectasia. Unchanged bilateral distal common iliac artery aneurysms measuring 2.1 cm on the right and 2.9 cm on the left. Unchanged bilateral internal iliac artery aneurysms measuring 2.5 cm on the right and 2.1 cm on   the left.     PELVIC ORGANS: Prostatomegaly     MUSCULOSKELETAL: No suspicious osseous lesions. Rightward curvature mid L-spine and degenerative changes lower lumbar spine.                                                                      IMPRESSION:  1.  3 mm right lower pole nonobstructing calculus.  2.  Benign-appearing right cortical and left parapelvic renal cysts, including a Bosniak type II lesion right upper pole.  3.  Stable 1.6 cm benign left adrenal adenoma.  4.  Stable lateral common iliac bifurcation aneurysms extending into bilateral internal iliac arteries.  5.  Prostatomegaly.    Outside and Past Medical records:    I spent 10 minutes reviewing outside and past medical records.       Standardized Questionnaire:      AUASS: 3/35          Assessment and Plan:     Cystoscopy     After sterile preparation and draping of the patient,  a [place] citizen flexible cystoscope was introduced via the urethra.  It was passed without difficulty into the bladder.  The urethra was open without evidence of stricture. Verumontanum to bladder neck distance was 5 cm with  enlarged median lobe protruding into the bladder. The ureteral orifices were orthotopic.  The bladder mucosa was evaluated using both antegrade and retroflex views and this showed no evidence of any lesions or trabeculation. Scope was then removed and patient tolerated the procedure relatively well.     10 total minutes spent on they cystoscopy alone     Assessment:    Gross hematuria believed to be due to Benign Prostatic Hypertrophy     He has a very large, friable prostate. However, he does not have much urinary symptoms and has not had hematuria for 3 weeks now. We discussed of the various options including observation, medical management with addition of 5-alpha reductase inhibitors in order to shrink the prostate or surgical options such as a TURP, laser vaporization, Urolift, HoLEP and for very large prostates, robotic or open simple prostatectomy. I don't believe he needs surgery at this point and even the option of 5 alpha reductase inhibitor is optional for him just to decrease the risk of bleeding and decrease the size of prostate. The patient has decided he would like to proceed with Finasteride in addition to existing Flomax.        Would recommend follow up in 1-3 months for evaluation of medical therapy and degree of hematuria. If medical therapy fails, at that point we would consider other options including surgical intervention.    Plan   Add finasteride to flomax   Follow up in 3 months     30 total minutes spent on the date of the encounter including direct interaction with the patient, performing chart review, documentation and further activities as noted above.      Mina Stewart MD   Department of Urology   [facility]

## 2021-09-22 NOTE — LETTER
9/22/2021      RE: Jack Brown  [address] [place] [address]             Chief Complaint:   Gross hematuria            Consult or Referral:     Mr. Jack Brown is a 70 year old male seen in consultation from Dr. Costa.         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       His painless hematuria began almost 20 days ago. He denies any associated LUTS such as dysuria or straining. He has a history of kidney stones 40+ years ago, 2 stone passed. These were identified via hematuria workup that was conducted at that time. Recalls having a cystoscopy then, which was normal. He does not take any blood thinners aside from a daily baby aspirin.      CT urogram was negative for any filling defect and showed only non obstructive nephrolithiasis and enlarged prostate.  He is here today for cystoscopy to complete the work up for gross hematuria.         Past Medical History:     Past Medical History:   Diagnosis Date     BPH (benign prostatic hypertrophy)      Cancer (H)     Skin on R.chest wall.     Chest discomfort      Coronary artery disease      Detached retina, left Oct 2014     Hyperlipidemia      Hypertension             Past Surgical History:     Past Surgical History:   Procedure Laterality Date     BYPASS GRAFT ARTERY CORONARY N/A 12/28/2015    Procedure: CORONARY ARTERY BYPASS x 3, RIGHT ENDOSCOPIC VEIN HARVEST, LEFT INTERNAL MAMMARY ARTERY, ANESTHESIA TRANSESOPHAGEAL ECHOCARDIOGRAM;  Surgeon: Jayson Alvarado MD;  Location: [facility];  Service:      CARDIAC SURGERY       CATARACT EXTRACTION  2015     CYST REMOVAL      Ganglion cyst removal of both wrist     HC REMOVAL PREPATELLA BURSA      Description: Excision  Of Prepatellar Bursa;  Recorded: 09/30/2014;     HC REPAIR OF NASAL SEPTUM      Description: Septoplasty;  Recorded: 09/30/2014;     HERNIA REPAIR, UMBILICAL       PARS PLANA VITRECTOMY W/ REPAIR OF MACULAR HOLE  Nov 2014     OR EXCIS TENDON SHEATH LESION, HAND/FINGER      Description: Hand Excision Of A Tendon Cyst;  Recorded: 01/04/2011;     OR LAP, VENTRAL HERNIA REPAIR,REDUCIBLE      Description: Laparoscopy Repair Of Umbilical Hernia;  Recorded: 01/04/2011;     RETINAL DETACHMENT SURGERY  oct 2014     SKIN CANCER EXCISION  Nov 2015    right chest     THORACOSCOPY Right 4/14/2016    Procedure: RIGHT VIDEO ASSISTED THORACOSCOPY, RIGHT LOBECTOMY;  Surgeon: Vinny Chase MD;  Location: [facility];  Service:      VASECTOMY              Medications     Current Outpatient Medications   Medication     acetaminophen (TYLENOL) 500 MG tablet     amLODIPine (NORVASC) 10 MG tablet     aspirin 81 mg chewable tablet     metoprolol tartrate (LOPRESSOR) 50 MG tablet     tamsulosin (FLOMAX) 0.4 mg cap     Current Facility-Administered Medications   Medication     ciprofloxacin (CIPRO) tablet 500 mg            Family History:     Family History   Problem Relation Age of Onset     Acute Myocardial Infarction Mother      Aneurysm Mother         brain     Acute Myocardial Infarction Father      Acute Myocardial Infarction Brother      Aortic aneurysm Brother         d @ 72     Lung Cancer Cousin      Coronary Artery Disease Cousin      Leukemia Brother         d @ 55     Colitis Brother      Colon Cancer Paternal Grandfather             Social History:     Social History     Socioeconomic History     Marital status:      Spouse name: Not on file     Number of children: Not on file     Years of education: Not on file     Highest education level: Not on file   Occupational History     Not on file   Tobacco Use     Smoking status: Never Smoker     Smokeless tobacco: Never Used   Substance and Sexual Activity      Alcohol use: Yes     Alcohol/week: 8.0 standard drinks     Drug use: No     Sexual activity: Never   Other Topics Concern     Not on file   Social History Narrative     Not on file     Social Determinants of Health     Financial Resource Strain:      Difficulty of Paying Living Expenses:    Food Insecurity:      Worried About Running Out of Food in the Last Year:      Ran Out of Food in the Last Year:    Transportation Needs:      Lack of Transportation (Medical):      Lack of Transportation (Non-Medical):    Physical Activity:      Days of Exercise per Week:      Minutes of Exercise per Session:    Stress:      Feeling of Stress :    Social Connections:      Frequency of Communication with Friends and Family:      Frequency of Social Gatherings with Friends and Family:      Attends Orthodoxy Services:      Active Member of Clubs or Organizations:      Attends Club or Organization Meetings:      Marital Status:    Intimate Partner Violence:      Fear of Current or Ex-Partner:      Emotionally Abused:      Physically Abused:      Sexually Abused:             Allergies:   Niacin, Atorvastatin, and Pravastatin         Review of Systems:  From intake questionnaire     Negative 14 system review except as noted on HPI, nurse's note.         Physical Exam:     Patient is a 70 year old  male   Vitals: There were no vitals taken for this visit.  General Appearance Adult: Alert, no acute distress, oriented  HENT: throat/mouth:normal, good dentition  Neck: No adenopathy,masses or thyromegaly  Lungs: no respiratory distress, or pursed lip breathing  Heart: No obvious jugular venous distension present  Abdomen: soft, nontender, no organomegaly or masses, There is no height or weight on file to calculate BMI., scars noted None  Lymphatics: No cervical or supraclavicular adenopathy  Musculoskeltal: extremities normal, no peripheral edema  Skin: no suspicious lesions or rashes  Neuro: Alert, oriented, speech and mentation  normal  Psych: affect and mood normal  Gait: Normal  : OLE anodular, symmetric      Labs and Pathology:    I reviewed all applicable laboratory and pathology data and went over findings with patient  Significant for   Prostate Specific Antigen Screen   Date Value Ref Range Status   03/08/2014 10.2 (H) <4.6 ng/mL Final     Comment:     Method is Abbott Prostate-Specific Antigen (PSA)            Standard-WHO 1st International (90:10) as of 09/26/05     09/10/2013 9.0 (H) <4.6 ng/mL Final     Comment:     Method is Abbott Prostate-Specific Antigen (PSA)            Standard-WHO 1st International (90:10) as of 09/26/05 11/30/2010 5.9 (H) <4.6 ng/mL Final     Comment:     Method is Abbott Prostate-Specific Antigen (PSA)            Standard-WHO 1st International (90:10) as of 09/26/05     10/29/2009 5.7 (H) <4.6 ng/mL Final     Comment:     Method is Abbott Prostate-Specific Antigen (PSA)            Standard-WHO 1st International (90:10) as of 09/26/05             Imaging:    I reviewed all applicable imaging and went over findings with patient.  Significant for enlarged prostate, non obstructing renal stones, no hydronephrosis, filling defect or pelvic or retroperitoneal lymphadenopathy. Right adrenal adenoma.     EXAM: CT UROGRAM WO and W CONTRAST  LOCATION: [facility]  DATE/TIME: 9/16/2021 7:42 AM     INDICATION: Hematuria. History of right middle lobectomy for carcinoid tumor.  COMPARISON: CT 1/18/2021 and 1/29/2020  TECHNIQUE: CT scan of the abdomen and pelvis using urogram technique with pre contrast, post contrast, and delayed images. Multiplanar reformats were obtained. Dose reduction techniques were used.   CONTRAST: IsoVue 370 100mL     FINDINGS:   LOWER CHEST: Coronary artery calcifications.     HEPATOBILIARY: Stable focal fat along the niraj hepatis normal gallbladder and biliary system.     PANCREAS: Normal.     SPLEEN: Normal.     ADRENAL GLANDS: Stable 1.6 cm left adrenal  adenoma. Normal right adrenal gland.     RIGHT KIDNEY/URETER: Stable 2 x 3 x 2 mm right lower pole calyceal tip calcification. Stable 3 cm exophytic right upper pole cyst with some layering milk of calcium and a punctate rim calcification. Stable benign 2.2 cm hypodense cyst right lower pole.   Chronic right UPJ stenosis. The right ureter is otherwise negative.     LEFT KIDNEY/URETER: No urinary tract calculus. Benign parapelvic cyst left lower pole. Chronic left UPJ stenosis. The left ureter is otherwise negative.     BLADDER: Mild bladder wall trabeculation. No mucosal lesion.     BOWEL: No obstruction or inflammatory change. Normal appendix.     LYMPH NODES: No lymphadenopathy.     VASCULATURE: Saccular distal aortic ectasia. Unchanged bilateral distal common iliac artery aneurysms measuring 2.1 cm on the right and 2.9 cm on the left. Unchanged bilateral internal iliac artery aneurysms measuring 2.5 cm on the right and 2.1 cm on   the left.     PELVIC ORGANS: Prostatomegaly     MUSCULOSKELETAL: No suspicious osseous lesions. Rightward curvature mid L-spine and degenerative changes lower lumbar spine.                                                                      IMPRESSION:  1.  3 mm right lower pole nonobstructing calculus.  2.  Benign-appearing right cortical and left parapelvic renal cysts, including a Bosniak type II lesion right upper pole.  3.  Stable 1.6 cm benign left adrenal adenoma.  4.  Stable lateral common iliac bifurcation aneurysms extending into bilateral internal iliac arteries.  5.  Prostatomegaly.    Outside and Past Medical records:    I spent 10 minutes reviewing outside and past medical records.       Standardized Questionnaire:      AUASS: 3/35          Assessment and Plan:     Cystoscopy     After sterile preparation and draping of the patient,  a 16-[place] flexible cystoscope was introduced via the urethra.  It was passed without difficulty into the bladder.  The urethra was open  without evidence of stricture. Verumontanum to bladder neck distance was 5 cm with enlarged median lobe protruding into the bladder. The ureteral orifices were orthotopic.  The bladder mucosa was evaluated using both antegrade and retroflex views and this showed no evidence of any lesions or trabeculation. Scope was then removed and patient tolerated the procedure relatively well.     10 total minutes spent on they cystoscopy alone     Assessment:    Gross hematuria believed to be due to Benign Prostatic Hypertrophy     He has a very large, friable prostate. However, he does not have much urinary symptoms and has not had hematuria for 3 weeks now. We discussed of the various options including observation, medical management with addition of 5-alpha reductase inhibitors in order to shrink the prostate or surgical options such as a TURP, laser vaporization, Urolift, HoLEP and for very large prostates, robotic or open simple prostatectomy. I don't believe he needs surgery at this point and even the option of 5 alpha reductase inhibitor is optional for him just to decrease the risk of bleeding and decrease the size of prostate. The patient has decided he would like to proceed with Finasteride in addition to existing Flomax.        Would recommend follow up in 1-3 months for evaluation of medical therapy and degree of hematuria. If medical therapy fails, at that point we would consider other options including surgical intervention.    Plan   Add finasteride to flomax   Follow up in 3 months     30 total minutes spent on the date of the encounter including direct interaction with the patient, performing chart review, documentation and further activities as noted above.      Mina Stewart MD   Department of Urology   [facility]

## 2021-09-22 NOTE — PATIENT INSTRUCTIONS
Please follow up with Veronica Costa CNP on the next available.     It was a pleasure meeting with you today.  Thank you for allowing me and my team the privilege of caring for you today.  YOU are the reason we are here, and I truly hope we provided you with the excellent service you deserve.  Please let us know if there is anything else we can do for you so that we can be sure you are leaving completely satisfied with your care experience.

## 2021-09-22 NOTE — NURSING NOTE
"Chief Complaint   Patient presents with     Cystoscopy       Blood pressure 135/84, pulse 67, height 1.676 m (5' 6\"), weight 72.6 kg (160 lb). Body mass index is 25.82 kg/m .    Patient Active Problem List   Diagnosis     Hypertension     Hyperlipemia     Visual Impairment     Nasal drainage     Coronary artery disease due to lipid rich plaque     S/P CABG x 3     Thyroid nodule     Urinary retention     Atypical carcinoid lung tumor (H)     Muscle soreness     History of melanoma     History of squamous cell carcinoma     History of basal cell carcinoma       Allergies   Allergen Reactions     Niacin Hives and Rash     Burning of the skin     Atorvastatin Muscle Pain (Myalgia)     Pravastatin Muscle Pain (Myalgia)       Current Outpatient Medications   Medication Sig Dispense Refill     acetaminophen (TYLENOL) 500 MG tablet [ACETAMINOPHEN (TYLENOL) 500 MG TABLET] Take 500 mg by mouth every 6 (six) hours as needed for pain.       amLODIPine (NORVASC) 10 MG tablet [AMLODIPINE (NORVASC) 10 MG TABLET] Take 1 tablet (10 mg total) by mouth daily. 90 tablet 2     aspirin 81 mg chewable tablet [ASPIRIN 81 MG CHEWABLE TABLET] Chew 81 mg daily.       metoprolol tartrate (LOPRESSOR) 50 MG tablet [METOPROLOL TARTRATE (LOPRESSOR) 50 MG TABLET] Take 1 tablet (50 mg total) by mouth 2 (two) times a day. 180 tablet 3     tamsulosin (FLOMAX) 0.4 mg cap [TAMSULOSIN (FLOMAX) 0.4 MG CAP] TAKE 1 CAPSULE BY MOUTH DAILY AFTER BREAKFAST 90 capsule 1       Social History     Tobacco Use     Smoking status: Never Smoker     Smokeless tobacco: Never Used   Substance Use Topics     Alcohol use: Yes     Alcohol/week: 8.0 standard drinks     Drug use: No       Invasive Procedure Safety Checklist:    Procedure: Cystoscopy    Action: Complete sections and checkboxes as appropriate.    Pre-procedure:  1. Patient ID Verified with 2 identifiers (Jordyn and  or MRN) : YES    2. Procedure and site verified with patient/designee (when able) : YES    3. " Accurate consent documentation in medical record : YES    4. H&P (or appropriate assessment) documented in medical record : N/A  H&P must be up to 30 days prior to procedure an updated within 24 hours of                 Procedure as applicable.     5. Relevant diagnostic and radiology test results appropriately labeled and displayed as applicable : YES    6. Blood products, implants, devices, and/or special equipment available for the procedure as applicable : YES    7. Procedure site(s) marked with provider initials [Exclusions: none] : NO    8. Marking not required. Reason : Yes  Procedure does not require site marking    Time Out:     Time-Out performed immediately prior to starting procedure, including verbal and active participation of all team members addressing: YES    1. Correct patient identity.  2. Confirmed that the correct side and site are marked.  3. An accurate procedure to be done.  4. Agreement on the procedure to be done.  5. Correct patient position.  6. Relevant images and results are properly labeled and appropriately displayed.  7. The need to administer antibiotics or fluids for irrigation purposes during the procedure as applicable.  8. Safety precautions based on patient history or medication use.    During Procedure: Verification of correct person, site, and procedure occurs any time the responsibility for care of the patient is transferred to another member of the care team.    The following medication was given:     MEDICATION:  Ciprofloxacin  ROUTE: PO  SITE: PO   DOSE: 500 mg  LOT #: [id number]  : [employer]  EXPIRATION DATE: 11/2022  NDC#: 1448-3732-58   Was there drug waste? No    Prior to med admin, verified patient identity using patient's name and date of birth.  Due to med administration, patient instructed to remain in clinic for 15 minutes  afterwards, and to report any adverse reaction to me immediately.    Drug Amount Wasted:  None.  Vial/Syringe: Syringe      Eli  Julieth, EMT  9/22/2021  8:21 AM

## 2021-09-22 NOTE — LETTER
9/22/2021       RE: Jack Brown  [address]  [place] [address]     Dear Colleague,    Thank you for referring your patient, Jack Brown, to the [facility] [place] at [facility]. Please see a copy of my visit note below.          Chief Complaint:   Gross hematuria            Consult or Referral:     Mr. Jack Brown is a 70 year old male seen in consultation from Dr. Costa.         History of Present Illness:    Jack Brown is a 70 year old male with a PMH of CAD s/p CABG, HTN, melanoma,hx of acinar lung cancer sp right middle lobectomy and BPH on Flomax (prostate volume 144.5 ml on MRI 6 months ago) who was seen recently by Veronica Costa CNP for gross hematuria through video consult.       His painless hematuria began almost 20 days ago. He denies any associated LUTS such as dysuria or straining. He has a history of kidney stones 40+ years ago, 2 stone passed. These were identified via hematuria workup that was conducted at that time. Recalls having a cystoscopy then, which was normal. He does not take any blood thinners aside from a daily baby aspirin.      CT urogram was negative for any filling defect and showed only non obstructive nephrolithiasis and enlarged prostate.  He is here today for cystoscopy to complete the work up for gross hematuria.         Past Medical History:     Past Medical History:   Diagnosis Date     BPH (benign prostatic hypertrophy)      Cancer (H)     Skin on R.chest wall.     Chest discomfort      Coronary artery disease      Detached retina, left Oct 2014     Hyperlipidemia      Hypertension             Past Surgical History:     Past Surgical History:   Procedure Laterality Date     BYPASS GRAFT ARTERY CORONARY N/A 12/28/2015    Procedure: CORONARY ARTERY BYPASS x 3, RIGHT ENDOSCOPIC VEIN HARVEST, LEFT INTERNAL MAMMARY ARTERY, ANESTHESIA TRANSESOPHAGEAL ECHOCARDIOGRAM;  Surgeon: Jayson  Alistair Alvarado MD;  Location: [facility];  Service:      CARDIAC SURGERY       CATARACT EXTRACTION  2015     CYST REMOVAL      Ganglion cyst removal of both wrist     HC REMOVAL PREPATELLA BURSA      Description: Excision Of Prepatellar Bursa;  Recorded: 09/30/2014;     HC REPAIR OF NASAL SEPTUM      Description: Septoplasty;  Recorded: 09/30/2014;     HERNIA REPAIR, UMBILICAL       PARS PLANA VITRECTOMY W/ REPAIR OF MACULAR HOLE  Nov 2014     AL EXCIS TENDON SHEATH LESION, HAND/FINGER      Description: Hand Excision Of A Tendon Cyst;  Recorded: 01/04/2011;     AL LAP, VENTRAL HERNIA REPAIR,REDUCIBLE      Description: Laparoscopy Repair Of Umbilical Hernia;  Recorded: 01/04/2011;     RETINAL DETACHMENT SURGERY  oct 2014     SKIN CANCER EXCISION  Nov 2015    right chest     THORACOSCOPY Right 4/14/2016    Procedure: RIGHT VIDEO ASSISTED THORACOSCOPY, RIGHT LOBECTOMY;  Surgeon: Vinny Chase MD;  Location: [facility];  Service:      VASECTOMY              Medications     Current Outpatient Medications   Medication     acetaminophen (TYLENOL) 500 MG tablet     amLODIPine (NORVASC) 10 MG tablet     aspirin 81 mg chewable tablet     metoprolol tartrate (LOPRESSOR) 50 MG tablet     tamsulosin (FLOMAX) 0.4 mg cap     Current Facility-Administered Medications   Medication     ciprofloxacin (CIPRO) tablet 500 mg            Family History:     Family History   Problem Relation Age of Onset     Acute Myocardial Infarction Mother      Aneurysm Mother         brain     Acute Myocardial Infarction Father      Acute Myocardial Infarction Brother      Aortic aneurysm Brother         d @ 72     Lung Cancer Cousin      Coronary Artery Disease Cousin      Leukemia Brother         d @ 55     Colitis Brother      Colon Cancer Paternal Grandfather             Social History:     Social History     Socioeconomic History     Marital status:      Spouse name: Not on file     Number of children: Not on  file     Years of education: Not on file     Highest education level: Not on file   Occupational History     Not on file   Tobacco Use     Smoking status: Never Smoker     Smokeless tobacco: Never Used   Substance and Sexual Activity     Alcohol use: Yes     Alcohol/week: 8.0 standard drinks     Drug use: No     Sexual activity: Never   Other Topics Concern     Not on file   Social History Narrative     Not on file     Social Determinants of Health     Financial Resource Strain:      Difficulty of Paying Living Expenses:    Food Insecurity:      Worried About Running Out of Food in the Last Year:      Ran Out of Food in the Last Year:    Transportation Needs:      Lack of Transportation (Medical):      Lack of Transportation (Non-Medical):    Physical Activity:      Days of Exercise per Week:      Minutes of Exercise per Session:    Stress:      Feeling of Stress :    Social Connections:      Frequency of Communication with Friends and Family:      Frequency of Social Gatherings with Friends and Family:      Attends Judaism Services:      Active Member of Clubs or Organizations:      Attends Club or Organization Meetings:      Marital Status:    Intimate Partner Violence:      Fear of Current or Ex-Partner:      Emotionally Abused:      Physically Abused:      Sexually Abused:             Allergies:   Niacin, Atorvastatin, and Pravastatin         Review of Systems:  From intake questionnaire     Negative 14 system review except as noted on HPI, nurse's note.         Physical Exam:     Patient is a 70 year old  male   Vitals: There were no vitals taken for this visit.  General Appearance Adult: Alert, no acute distress, oriented  HENT: throat/mouth:normal, good dentition  Neck: No adenopathy,masses or thyromegaly  Lungs: no respiratory distress, or pursed lip breathing  Heart: No obvious jugular venous distension present  Abdomen: soft, nontender, no organomegaly or masses, There is no height or weight on file to  calculate BMI., scars noted None  Lymphatics: No cervical or supraclavicular adenopathy  Musculoskeltal: extremities normal, no peripheral edema  Skin: no suspicious lesions or rashes  Neuro: Alert, oriented, speech and mentation normal  Psych: affect and mood normal  Gait: Normal  : OLE anodular, symmetric      Labs and Pathology:    I reviewed all applicable laboratory and pathology data and went over findings with patient  Significant for   Prostate Specific Antigen Screen   Date Value Ref Range Status   03/08/2014 10.2 (H) <4.6 ng/mL Final     Comment:     Method is Abbott Prostate-Specific Antigen (PSA)            Standard-WHO 1st International (90:10) as of 09/26/05     09/10/2013 9.0 (H) <4.6 ng/mL Final     Comment:     Method is Abbott Prostate-Specific Antigen (PSA)            Standard-WHO 1st International (90:10) as of 09/26/05 11/30/2010 5.9 (H) <4.6 ng/mL Final     Comment:     Method is Abbott Prostate-Specific Antigen (PSA)            Standard-WHO 1st International (90:10) as of 09/26/05     10/29/2009 5.7 (H) <4.6 ng/mL Final     Comment:     Method is Abbott Prostate-Specific Antigen (PSA)            Standard-WHO 1st International (90:10) as of 09/26/05             Imaging:    I reviewed all applicable imaging and went over findings with patient.  Significant for enlarged prostate, non obstructing renal stones, no hydronephrosis, filling defect or pelvic or retroperitoneal lymphadenopathy. Right adrenal adenoma.     EXAM: CT UROGRAM WO and W CONTRAST  LOCATION: [facility]  DATE/TIME: 9/16/2021 7:42 AM     INDICATION: Hematuria. History of right middle lobectomy for carcinoid tumor.  COMPARISON: CT 1/18/2021 and 1/29/2020  TECHNIQUE: CT scan of the abdomen and pelvis using urogram technique with pre contrast, post contrast, and delayed images. Multiplanar reformats were obtained. Dose reduction techniques were used.   CONTRAST: IsoVue 370 100mL     FINDINGS:   LOWER  CHEST: Coronary artery calcifications.     HEPATOBILIARY: Stable focal fat along the niraj hepatis normal gallbladder and biliary system.     PANCREAS: Normal.     SPLEEN: Normal.     ADRENAL GLANDS: Stable 1.6 cm left adrenal adenoma. Normal right adrenal gland.     RIGHT KIDNEY/URETER: Stable 2 x 3 x 2 mm right lower pole calyceal tip calcification. Stable 3 cm exophytic right upper pole cyst with some layering milk of calcium and a punctate rim calcification. Stable benign 2.2 cm hypodense cyst right lower pole.   Chronic right UPJ stenosis. The right ureter is otherwise negative.     LEFT KIDNEY/URETER: No urinary tract calculus. Benign parapelvic cyst left lower pole. Chronic left UPJ stenosis. The left ureter is otherwise negative.     BLADDER: Mild bladder wall trabeculation. No mucosal lesion.     BOWEL: No obstruction or inflammatory change. Normal appendix.     LYMPH NODES: No lymphadenopathy.     VASCULATURE: Saccular distal aortic ectasia. Unchanged bilateral distal common iliac artery aneurysms measuring 2.1 cm on the right and 2.9 cm on the left. Unchanged bilateral internal iliac artery aneurysms measuring 2.5 cm on the right and 2.1 cm on   the left.     PELVIC ORGANS: Prostatomegaly     MUSCULOSKELETAL: No suspicious osseous lesions. Rightward curvature mid L-spine and degenerative changes lower lumbar spine.                                                                      IMPRESSION:  1.  3 mm right lower pole nonobstructing calculus.  2.  Benign-appearing right cortical and left parapelvic renal cysts, including a Bosniak type II lesion right upper pole.  3.  Stable 1.6 cm benign left adrenal adenoma.  4.  Stable lateral common iliac bifurcation aneurysms extending into bilateral internal iliac arteries.  5.  Prostatomegaly.    Outside and Past Medical records:    I spent 10 minutes reviewing outside and past medical records.       Standardized Questionnaire:      AUASS: 3/35           Assessment and Plan:     Cystoscopy     After sterile preparation and draping of the patient,  a 16-Hong Konger flexible cystoscope was introduced via the urethra.  It was passed without difficulty into the bladder.  The urethra was open without evidence of stricture. Verumontanum to bladder neck distance was 5 cm with enlarged median lobe protruding into the bladder. The ureteral orifices were orthotopic.  The bladder mucosa was evaluated using both antegrade and retroflex views and this showed no evidence of any lesions or trabeculation. Scope was then removed and patient tolerated the procedure relatively well.     10 total minutes spent on they cystoscopy alone     Assessment:    Gross hematuria believed to be due to Benign Prostatic Hypertrophy     He has a very large, friable prostate. However, he does not have much urinary symptoms and has not had hematuria for 3 weeks now. We discussed of the various options including observation, medical management with addition of 5-alpha reductase inhibitors in order to shrink the prostate or surgical options such as a TURP, laser vaporization, Urolift, HoLEP and for very large prostates, robotic or open simple prostatectomy. I don't believe he needs surgery at this point and even the option of 5 alpha reductase inhibitor is optional for him just to decrease the risk of bleeding and decrease the size of prostate. The patient has decided he would like to proceed with Finasteride in addition to existing Flomax.        Would recommend follow up in 1-3 months for evaluation of medical therapy and degree of hematuria. If medical therapy fails, at that point we would consider other options including surgical intervention.    Plan   Add finasteride to flomax   Follow up in 3 months     30 total minutes spent on the date of the encounter including direct interaction with the patient, performing chart review, documentation and further activities as noted above.      Mina Stewart MD    Department of Urology   [facility]

## 2021-09-24 DIAGNOSIS — I10 HTN (HYPERTENSION): ICD-10-CM

## 2021-09-24 RX ORDER — TAMSULOSIN HYDROCHLORIDE 0.4 MG/1
CAPSULE ORAL
Qty: 90 CAPSULE | Refills: 2 | Status: SHIPPED | OUTPATIENT
Start: 2021-09-24 | End: 2022-06-29

## 2021-09-24 NOTE — TELEPHONE ENCOUNTER
"Last Written Prescription Date:  3/30/2021  Last Fill Quantity: 90,  # refills: 1   Last office visit provider:  8/16/2021     Requested Prescriptions   Pending Prescriptions Disp Refills     tamsulosin (FLOMAX) 0.4 MG capsule [Pharmacy Med Name: TAMSULOSIN 0.4MG CAPSULES] 90 capsule 1     Sig: TAKE 1 CAPSULE BY MOUTH DAILY AFTER BREAKFAST       Alpha Blockers Passed - 9/24/2021  8:29 AM        Passed - Blood pressure under 140/90 in past 12 months     BP Readings from Last 3 Encounters:   09/22/21 135/84   08/16/21 133/77   05/20/21 (!) 144/70                 Passed - Recent (12 mo) or future (30 days) visit within the authorizing provider's specialty     Patient has had an office visit with the authorizing provider or a provider within the authorizing providers department within the previous 12 mos or has a future within next 30 days. See \"Patient Info\" tab in inbasket, or \"Choose Columns\" in Meds & Orders section of the refill encounter.              Passed - Patient does not have Tadalafil, Vardenafil, or Sildenafil on their medication list        Passed - Medication is active on med list        Passed - Patient is 18 years of age or older             Maggie Cruz RN 09/24/21 4:47 PM  "

## 2021-10-06 ENCOUNTER — PRE VISIT (OUTPATIENT)
Dept: UROLOGY | Facility: CLINIC | Age: 71
End: 2021-10-06

## 2021-10-06 NOTE — TELEPHONE ENCOUNTER
Reason for visit: follow up gross hematuria      Dx/Hx/Sx: gross hematuria, had cysto with Dr. Stewart 9/22/21, BPH     Records/imaging/labs/orders: in epic     At Rooming: telephone visit

## 2021-10-12 ENCOUNTER — VIRTUAL VISIT (OUTPATIENT)
Dept: UROLOGY | Facility: CLINIC | Age: 71
End: 2021-10-12
Payer: MEDICARE

## 2021-10-12 DIAGNOSIS — R31.0 GROSS HEMATURIA: ICD-10-CM

## 2021-10-12 DIAGNOSIS — N40.0 BENIGN PROSTATIC HYPERPLASIA WITHOUT LOWER URINARY TRACT SYMPTOMS: ICD-10-CM

## 2021-10-12 PROBLEM — H54.7 VISUAL IMPAIRMENT: Status: ACTIVE | Noted: 2021-10-12

## 2021-10-12 PROBLEM — I10 HYPERTENSION: Status: ACTIVE | Noted: 2021-10-12

## 2021-10-12 PROCEDURE — 99442 PR PHYSICIAN TELEPHONE EVALUATION 11-20 MIN: CPT | Mod: 95 | Performed by: NURSE PRACTITIONER

## 2021-10-12 RX ORDER — CHOLECALCIFEROL (VITAMIN D3) 25 MCG
1 TABLET ORAL DAILY
COMMUNITY
Start: 2021-10-01 | End: 2024-01-19

## 2021-10-12 RX ORDER — FINASTERIDE 5 MG/1
5 TABLET, FILM COATED ORAL DAILY
Qty: 90 TABLET | Refills: 3 | Status: SHIPPED | OUTPATIENT
Start: 2021-10-12 | End: 2022-11-29

## 2021-10-12 ASSESSMENT — PAIN SCALES - GENERAL: PAINLEVEL: NO PAIN (0)

## 2021-10-12 NOTE — LETTER
10/12/2021       RE: Jack Brown  [address]  [place] [address]     Dear Colleague,    Thank you for referring your patient, Jack Brown, to the [facility] [place] at [facility]. Please see a copy of my visit note below.    Jack is a 70 year old who is being evaluated via a billable telephone visit.      What phone number would you like to be contacted at? [phone]  How would you like to obtain your AVS? SureBookshart  Phone call duration: 13 minutes      Jack Brown complains of   Chief Complaint   Patient presents with     RECHECK     Hematuria       Assessment/Plan:  70 year old male with hematuria, likely 2/2 his large, friable prostate, now on finasteride with no recurrence of hematuria. Some concerns related to potential finasteride side effects and inability to donate blood while taking it, but overall he has no major concerns and is happy to continue it.   -Continue Flomax and finasteride (refills sent).  -Follow up with me in one year, or sooner if needed.     Veronica Costa, CNP  Department of Urology       Subjective:   70 year old male with a PMH of CAD s/p CABG, HTN, melanoma, hx of acinar lung cancer sp right middle lobectomy and BPH on Flomax (prostate volume 144.5 ml on MRI 6 months ago) who underwent workup for gross hematuria. CT showed non-obstructive stones and an enlarged prostate. Cystoscopy, completed by Dr. Stewart on 9/22, showed a very large, friable prostate. Finasteride added to his regimen.     Today, he states that he has had no further hematuria. He has been taking the finasteride. He has no major concern about LUTS. He used to get up 2-3 times per night, but has only been getting up once lately. Has some concern about potential sexual side effects over time. He is also a little disappointed because he is a blood donor and has been informed that he can no longer donate blood while on  finasteride.

## 2021-10-12 NOTE — PATIENT INSTRUCTIONS
UROLOGY CLINIC VISIT PATIENT INSTRUCTIONS    -Continue the Flomax and finasteride. Refills of the finasteride were sent to your pharmacy.  -Follow up with me in one year.     If you have any issues, questions or concerns in the meantime, do not hesitate to contact us at [phone] or via [employer].     It was a pleasure meeting with you today.  Thank you for allowing me and my team the privilege of caring for you today.  YOU are the reason we are here, and I truly hope we provided you with the excellent service you deserve.  Please let us know if there is anything else we can do for you so that we can be sure you are leaving completely satisfied with your care experience.    Veronica Costa, CNP

## 2021-10-12 NOTE — PROGRESS NOTES
Jack is a 70 year old who is being evaluated via a billable telephone visit.      What phone number would you like to be contacted at? [phone]  How would you like to obtain your AVS? Britta  Phone call duration: 13 minutes      Jack Brown complains of   Chief Complaint   Patient presents with     RECHECK     Hematuria       Assessment/Plan:  70 year old male with hematuria, likely 2/2 his large, friable prostate, now on finasteride with no recurrence of hematuria. Some concerns related to potential finasteride side effects and inability to donate blood while taking it, but overall he has no major concerns and is happy to continue it.   -Continue Flomax and finasteride (refills sent).  -Follow up with me in one year, or sooner if needed.     Veronica Costa CNP  Department of Urology       Subjective:   70 year old male with a PMH of CAD s/p CABG, HTN, melanoma, hx of acinar lung cancer sp right middle lobectomy and BPH on Flomax (prostate volume 144.5 ml on MRI 6 months ago) who underwent workup for gross hematuria. CT showed non-obstructive stones and an enlarged prostate. Cystoscopy, completed by Dr. Stewart on 9/22, showed a very large, friable prostate. Finasteride added to his regimen.     Today, he states that he has had no further hematuria. He has been taking the finasteride. He has no major concern about LUTS. He used to get up 2-3 times per night, but has only been getting up once lately. Has some concern about potential sexual side effects over time. He is also a little disappointed because he is a blood donor and has been informed that he can no longer donate blood while on finasteride.

## 2021-10-14 ENCOUNTER — TELEPHONE (OUTPATIENT)
Dept: UROLOGY | Facility: CLINIC | Age: 71
End: 2021-10-14

## 2021-10-16 ENCOUNTER — HEALTH MAINTENANCE LETTER (OUTPATIENT)
Age: 71
End: 2021-10-16

## 2021-10-21 ENCOUNTER — TELEPHONE (OUTPATIENT)
Dept: UROLOGY | Facility: CLINIC | Age: 71
End: 2021-10-21

## 2021-10-21 NOTE — TELEPHONE ENCOUNTER
[facility] Call Center    Phone Message    May a detailed message be left on voicemail: yes     Reason for Call: Other: Pt would like a medication for a UTI sent to [facility] and brad call back to discuss     Action Taken: Message routed to:  [facility] Uro    Travel Screening: Not Applicable

## 2021-10-22 DIAGNOSIS — N39.0 URINARY TRACT INFECTION: Primary | ICD-10-CM

## 2021-10-22 NOTE — TELEPHONE ENCOUNTER
Called patient and phone just rang and rang  Patient needs to go get a UAUC done and let us know what symptoms he is having.  Unable to leave message Janet Barton LPN Staff Nurse

## 2021-10-22 NOTE — TELEPHONE ENCOUNTER
[facility] Call Center    Phone Message    May a detailed message be left on voicemail: yes     Reason for Call: Other: Jack called regarding a medication for UTI.  Please call him to discuss.     Action Taken: Message routed to:  [facility] uro    Travel Screening: Not Applicable

## 2021-10-23 ENCOUNTER — OFFICE VISIT (OUTPATIENT)
Dept: FAMILY MEDICINE | Facility: CLINIC | Age: 71
End: 2021-10-23
Payer: MEDICARE

## 2021-10-23 VITALS
OXYGEN SATURATION: 95 % | SYSTOLIC BLOOD PRESSURE: 132 MMHG | HEART RATE: 65 BPM | TEMPERATURE: 97.4 F | DIASTOLIC BLOOD PRESSURE: 72 MMHG

## 2021-10-23 DIAGNOSIS — R35.0 URINARY FREQUENCY: ICD-10-CM

## 2021-10-23 DIAGNOSIS — N41.0 ACUTE PROSTATITIS: Primary | ICD-10-CM

## 2021-10-23 LAB
ALBUMIN UR-MCNC: NEGATIVE MG/DL
APPEARANCE UR: CLEAR
BACTERIA #/AREA URNS HPF: NORMAL /HPF
BILIRUB UR QL STRIP: NEGATIVE
COLOR UR AUTO: YELLOW
GLUCOSE UR STRIP-MCNC: NEGATIVE MG/DL
HGB UR QL STRIP: ABNORMAL
KETONES UR STRIP-MCNC: NEGATIVE MG/DL
LEUKOCYTE ESTERASE UR QL STRIP: NEGATIVE
NITRATE UR QL: NEGATIVE
PH UR STRIP: 6 [PH] (ref 5–8)
RBC #/AREA URNS AUTO: NORMAL /HPF
SP GR UR STRIP: 1.02 (ref 1–1.03)
SQUAMOUS #/AREA URNS AUTO: NORMAL /LPF
UROBILINOGEN UR STRIP-ACNC: 0.2 E.U./DL
WBC #/AREA URNS AUTO: NORMAL /HPF

## 2021-10-23 PROCEDURE — 99214 OFFICE O/P EST MOD 30 MIN: CPT | Performed by: NURSE PRACTITIONER

## 2021-10-23 PROCEDURE — 81001 URINALYSIS AUTO W/SCOPE: CPT | Performed by: NURSE PRACTITIONER

## 2021-10-23 RX ORDER — SULFAMETHOXAZOLE/TRIMETHOPRIM 800-160 MG
1 TABLET ORAL 2 TIMES DAILY
Qty: 14 TABLET | Refills: 0 | Status: SHIPPED | OUTPATIENT
Start: 2021-10-23 | End: 2021-10-26

## 2021-10-23 ASSESSMENT — ENCOUNTER SYMPTOMS
FEVER: 0
NAUSEA: 0
ABDOMINAL PAIN: 0
DYSURIA: 1
FLANK PAIN: 0
CHILLS: 0
APPETITE CHANGE: 0
VOMITING: 0
DIFFICULTY URINATING: 0
FATIGUE: 0
FREQUENCY: 1

## 2021-10-23 NOTE — PROGRESS NOTES
Patient presents with:  Urinary Problem: Painful urination, frequency. Ongoing x4 days.       Clinical Decision Making: UA indcates patient does not have UTI. Due to patient's history of Benign prostatic hyperplasia without lower urinary tract symptoms. Patient is having symptoms of acute prostatitis. No current findings concerning for pyelonephritis. Prescribed sulfamethoxazole-trimethoprim (BACTRIM DS) 800-160 MG. Recommended increasing fluids and following up with urologist.       ICD-10-CM    1. Acute prostatitis  N41.0 sulfamethoxazole-trimethoprim (BACTRIM DS) 800-160 MG tablet   2. Urinary frequency  R35.0 UA macro with reflex to Microscopic and Culture - Clinc Collect     Urine Microscopic       Patient Instructions     Patient Education     Prostatitis     The prostate gland is located deep inside the body at the base of the bladder. Prostatitis is an inflammation of the prostate gland. This can occur with or without infection. Most cases of prostatitis are long term (chronic). Most do not include a bacterial infection.    Chronic prostatitis is more common in older men. It is often an inflammatory condition and not an infection. But bacterial infection can also cause chronic prostatitis. It can cause pain in the rectum, urethra, bladder, or scrotum. It can also make you unable to fully empty the bladder. You may urinate often. Or you may have burning with urination. Prostatitis may also cause painful ejaculation and erectile dysfunction.    Acute prostatitis happens suddenly. This often occurs in men younger than 35. It is from a bacterial infection. You may have severe symptoms such as fever, chills, muscle aches, and pain in the area between the scrotum and anus (perineum). You may have a hard time urinating. Or you may have pain or burning when urinating. There may be blood or pus in the urine.  Your healthcare provider may do a culture test on prostate fluids or discharge from the penis. This will help  figure out if bacteria are the cause. Treatment can include antibiotics, anti-inflammatory medicine, prostate medicines, and stool softeners.  Home care  These guidelines will help you care for yourself at home:    Rest at home until the fever is gone and you are feeling better.    A hot sitz bath may offer some relief. Fill a tub with 6 inches of hot water. Allow the water to run so you can keep it hot for 10 to 15 minutes.    Drink plenty of fluids. Don't drink alcohol or caffeine until all symptoms are gone.    If your healthcare gives you an antibiotic, take it exactly as you are told. Take it until it is all gone.    Constipation causes straining and pain. Prevent constipation by eating natural laxatives such as prunes, fresh fruits, and whole-grain cereals. If needed, use a mild over-the-counter (OTC) laxative for constipation. An OTC stool softener may be used to keep the stools soft.    If sex is uncomfortable or painful, don't have sex until symptoms get better.    You may use OTC medicines for pain and fever, unless another medicine was given. If you have chronic liver or kidney disease, talk with your healthcare provider before using these medicines. Also talk with your provider if you've ever had a stomach ulcer or GI (gastrointestinal) bleeding.  Follow-up care  Follow up with your healthcare provider, a urologist, or as advised to be sure you are responding to treatment. Your healthcare provider may want to see you after you finish your antibiotics to be sure the infection has cleared. If a culture was taken, you may call for the results as directed. A culture test can help your provider know if you are on the correct antibiotic.  Call 911  Call 911if any of these occur:    Weakness, dizziness, or fainting  When to get medical advice  Call your healthcare provider right away if any of these occur:    Fever of 100.4 F (38 C) or higher, or as directed by your healthcare provider    Unable to pass urine  for 8 hours    Pressure or pain in your bladder gets worse    Painful swelling of the testicle or scrotum  [employer] last reviewed this educational content on 9/1/2019 2000-2021 [employer]. All rights reserved. This information is not intended as a substitute for professional medical care. Always follow your healthcare professional's instructions.               HPI: Jack Brown is a 70 year old male who presents today complaining of painful urination and frequency for the past four days. Patient reports urine decrease. Reports he is going to the bathroom every hour and urinating less. Patient denies UTI history. Patient denies taking OTC for pain. Patient did increase his fluids, lemon honey water and decreased his caffeine intake.  Patient denies flank pain, penile discharge and fever.     History obtained from the patient.    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     Chest discomfort      Coronary artery disease      Detached retina, left Oct 2014     Hyperlipidemia      Hypertension        Social History     Tobacco Use     Smoking status: Never Smoker     Smokeless tobacco: Never Used   Substance Use Topics     Alcohol use: Yes     Alcohol/week: 8.0 standard drinks       Review of Systems   Constitutional: Negative for appetite change, chills, fatigue and fever.   HENT: Negative.    Gastrointestinal: Negative for abdominal pain, nausea and vomiting.   Genitourinary:  Positive for decreased urine volume, dysuria, frequency and urgency. Negative for difficulty urinating, discharge, flank pain and penile pain.       Vitals:    10/23/21 1026   BP: 132/72   BP Location: Right arm   Patient Position: Sitting   Cuff Size: Adult Regular   Pulse: 65   Temp: 97.4  F (36.3  C)   TempSrc: Oral   SpO2: 95%       Physical Exam  Constitutional:       General: He is not in acute distress.     Appearance: Normal appearance.   HENT:      Head: Normocephalic.   Cardiovascular:      Rate and Rhythm: Normal rate and regular rhythm.      Pulses: Normal pulses.      Heart sounds: Normal heart sounds.   Pulmonary:      Effort: Pulmonary effort is normal.      Breath sounds: Normal breath sounds.   Abdominal:      General: Bowel sounds are normal.      Palpations: Abdomen is soft.   Skin:     Capillary Refill: Capillary refill takes less than 2 seconds.   Neurological:      Mental Status: He is alert and oriented to person, place, and time.   Psychiatric:         Behavior: Behavior normal.         Thought Content: Thought content normal.         Labs:  Results for orders placed or performed in visit on 10/23/21   UA macro with reflex to Microscopic and Culture - Clinc Collect     Status: Abnormal    Specimen: Urine, Clean Catch   Result Value Ref Range    Color Urine Yellow Colorless, Straw, Light Yellow, Yellow    Appearance Urine Clear Clear    Glucose Urine Negative Negative mg/dL    Bilirubin Urine Negative Negative    Ketones Urine Negative Negative mg/dL    Specific Gravity Urine 1.020 1.005 - 1.030    Blood Urine Trace (A) Negative    pH Urine 6.0 5.0 - 8.0    Protein Albumin Urine Negative Negative mg/dL    Urobilinogen Urine 0.2 0.2, 1.0 E.U./dL    Nitrite Urine Negative Negative    Leukocyte Esterase Urine Negative Negative       Radiology: None      At the end of the encounter, I discussed results, diagnosis, medications. Discussed red flags for immediate return to clinic/ER, as well as  indications for follow up if no improvement. Patient understood and agreed to plan.

## 2021-10-23 NOTE — PATIENT INSTRUCTIONS
Patient Education     Prostatitis     The prostate gland is located deep inside the body at the base of the bladder. Prostatitis is an inflammation of the prostate gland. This can occur with or without infection. Most cases of prostatitis are long term (chronic). Most do not include a bacterial infection.    Chronic prostatitis is more common in older men. It is often an inflammatory condition and not an infection. But bacterial infection can also cause chronic prostatitis. It can cause pain in the rectum, urethra, bladder, or scrotum. It can also make you unable to fully empty the bladder. You may urinate often. Or you may have burning with urination. Prostatitis may also cause painful ejaculation and erectile dysfunction.    Acute prostatitis happens suddenly. This often occurs in men younger than 35. It is from a bacterial infection. You may have severe symptoms such as fever, chills, muscle aches, and pain in the area between the scrotum and anus (perineum). You may have a hard time urinating. Or you may have pain or burning when urinating. There may be blood or pus in the urine.  Your healthcare provider may do a culture test on prostate fluids or discharge from the penis. This will help figure out if bacteria are the cause. Treatment can include antibiotics, anti-inflammatory medicine, prostate medicines, and stool softeners.  Home care  These guidelines will help you care for yourself at home:    Rest at home until the fever is gone and you are feeling better.    A hot sitz bath may offer some relief. Fill a tub with 6 inches of hot water. Allow the water to run so you can keep it hot for 10 to 15 minutes.    Drink plenty of fluids. Don't drink alcohol or caffeine until all symptoms are gone.    If your healthcare gives you an antibiotic, take it exactly as you are told. Take it until it is all gone.    Constipation causes straining and pain. Prevent constipation by eating natural laxatives such as prunes,  fresh fruits, and whole-grain cereals. If needed, use a mild over-the-counter (OTC) laxative for constipation. An OTC stool softener may be used to keep the stools soft.    If sex is uncomfortable or painful, don't have sex until symptoms get better.    You may use OTC medicines for pain and fever, unless another medicine was given. If you have chronic liver or kidney disease, talk with your healthcare provider before using these medicines. Also talk with your provider if you've ever had a stomach ulcer or GI (gastrointestinal) bleeding.  Follow-up care  Follow up with your healthcare provider, a urologist, or as advised to be sure you are responding to treatment. Your healthcare provider may want to see you after you finish your antibiotics to be sure the infection has cleared. If a culture was taken, you may call for the results as directed. A culture test can help your provider know if you are on the correct antibiotic.  Call 911  Call 911if any of these occur:    Weakness, dizziness, or fainting  When to get medical advice  Call your healthcare provider right away if any of these occur:    Fever of 100.4 F (38 C) or higher, or as directed by your healthcare provider    Unable to pass urine for 8 hours    Pressure or pain in your bladder gets worse    Painful swelling of the testicle or scrotum  [employer] last reviewed this educational content on 9/1/2019 2000-2021 [employer]. All rights reserved. This information is not intended as a substitute for professional medical care. Always follow your healthcare professional's instructions.

## 2021-10-25 ENCOUNTER — PRE VISIT (OUTPATIENT)
Dept: UROLOGY | Facility: CLINIC | Age: 71
End: 2021-10-25

## 2021-10-25 PROBLEM — R31.0 GROSS HEMATURIA: Status: ACTIVE | Noted: 2021-10-25

## 2021-10-25 NOTE — TELEPHONE ENCOUNTER
Reason for visit: follow up     Dx/Hx/Sx: acute prostatitis, frequency     Records/imaging/labs/orders: in epic     At Rooming: telephone visit

## 2021-10-26 ENCOUNTER — VIRTUAL VISIT (OUTPATIENT)
Dept: UROLOGY | Facility: CLINIC | Age: 71
End: 2021-10-26
Payer: MEDICARE

## 2021-10-26 DIAGNOSIS — N41.0 ACUTE PROSTATITIS: ICD-10-CM

## 2021-10-26 PROCEDURE — 99442 PR PHYSICIAN TELEPHONE EVALUATION 11-20 MIN: CPT | Mod: 95 | Performed by: NURSE PRACTITIONER

## 2021-10-26 RX ORDER — SULFAMETHOXAZOLE/TRIMETHOPRIM 800-160 MG
1 TABLET ORAL 2 TIMES DAILY
Qty: 14 TABLET | Refills: 0 | Status: SHIPPED | OUTPATIENT
Start: 2021-10-26 | End: 2022-05-23

## 2021-10-26 ASSESSMENT — PAIN SCALES - GENERAL: PAINLEVEL: NO PAIN (0)

## 2021-10-26 NOTE — NURSING NOTE
Chief Complaint   Patient presents with     RECHECK     Prostatitis/urinary frequency     Joelle Rouse, [facility]

## 2021-10-26 NOTE — PROGRESS NOTES
Jack is a 70 year old who is being evaluated via a billable telephone visit.      What phone number would you like to be contacted at? [phone]  How would you like to obtain your AVS? [employer]  Phone call duration: 16 minutes      Jack Brown complains of   Chief Complaint   Patient presents with     RECHECK     Prostatitis/urinary frequency       Assessment/Plan:  70 year old male with BPH on Flomax and recent negative hematuria workup, including cystoscopy, who developed UTI-like symptoms a few weeks later, suspicious for prostatitis. I think this is reasonable given the recent scope. We discussed this today. Recommend he continue his current course of Bactrim. Will extend this by an additional week to see how he responds to this. He will be in touch with me via [employer] next week to give me an update on symptoms. If he is starting to feel better, can consider extending this course further. If no improvement, could consider switching antibiotics.     Veronica Costa, CNP  Department of Urology      Subjective:   70 year old male with a PMH of CAD s/p CABG, HTN, melanoma,hx of acinar lung cancer sp right middle lobectomy and BPH on Flomax (prostate volume 144.5 ml on MRI 6 months ago) who recently completed a workup for gross hematuria, showing a large, friable prostate on cystoscopy. Was started on finasteride, in addition to his Flomax. I saw him virtually two weeks ago and he was doing well with no recurrence in hematuria. No major concerns at that time.     Since our last visit, he unfortunately developed UTI symptoms, including frequency and dysuria. Completed UA with FP a few days ago, which was negative (trace blood on initial UA, but no true hematuria present). Leukocyte and nitrite negative. Diagnosed with acute prostatitis and started on a 7-day course of Bactrim.     Today, he states that he has been on the Bactrim for 3-4 days. He misread the prescription initially and only took it once per day  for the first two days but has now started taking it twice. Continues to have urinary frequency and nocturia but feeling a little bit better.

## 2021-10-26 NOTE — LETTER
10/26/2021       RE: Jack Brown  [address]  [place] [address]     Dear Colleague,    Thank you for referring your patient, Jack Brown, to the [facility] [place] [facility]. Please see a copy of my visit note below.    Jack is a 70 year old who is being evaluated via a billable telephone visit.      What phone number would you like to be contacted at? [phone]  How would you like to obtain your AVS? [employer]  Phone call duration: 16 minutes      Jack Brown complains of   Chief Complaint   Patient presents with     RECHECK     Prostatitis/urinary frequency       Assessment/Plan:  70 year old male with BPH on Flomax and recent negative hematuria workup, including cystoscopy, who developed UTI-like symptoms a few weeks later, suspicious for prostatitis. I think this is reasonable given the recent scope. We discussed this today. Recommend he continue his current course of Bactrim. Will extend this by an additional week to see how he responds to this. He will be in touch with me via [employer] next week to give me an update on symptoms. If he is starting to feel better, can consider extending this course further. If no improvement, could consider switching antibiotics.     Veronica Costa, CNP  Department of Urology      Subjective:   70 year old male with a PMH of CAD s/p CABG, HTN, melanoma,hx of acinar lung cancer sp right middle lobectomy and BPH on Flomax (prostate volume 144.5 ml on MRI 6 months ago) who recently completed a workup for gross hematuria, showing a large, friable prostate on cystoscopy. Was started on finasteride, in addition to his Flomax. I saw him virtually two weeks ago and he was doing well with no recurrence in hematuria. No major concerns at that time.     Since our last visit, he unfortunately developed UTI symptoms, including frequency and dysuria. Completed UA with FP a few days ago, which was  negative (trace blood on initial UA, but no true hematuria present). Leukocyte and nitrite negative. Diagnosed with acute prostatitis and started on a 7-day course of Bactrim.     Today, he states that he has been on the Bactrim for 3-4 days. He misread the prescription initially and only took it once per day for the first two days but has now started taking it twice. Continues to have urinary frequency and nocturia but feeling a little bit better.

## 2021-10-26 NOTE — PATIENT INSTRUCTIONS
UROLOGY CLINIC VISIT PATIENT INSTRUCTIONS    -Continue the Bactrim antibiotic. I have sent an additional 7 days to your pharmacy.  -Touch base with me next week via [employer] to let me know how symptoms are coming along.     If you have any issues, questions or concerns in the meantime, do not hesitate to contact us at [phone] or via [employer].     It was a pleasure meeting with you today.  Thank you for allowing me and my team the privilege of caring for you today.  YOU are the reason we are here, and I truly hope we provided you with the excellent service you deserve.  Please let us know if there is anything else we can do for you so that we can be sure you are leaving completely satisfied with your care experience.    Veronica Costa, CNP

## 2021-10-29 ENCOUNTER — TRANSFERRED RECORDS (OUTPATIENT)
Dept: HEALTH INFORMATION MANAGEMENT | Facility: CLINIC | Age: 71
End: 2021-10-29
Payer: MEDICARE

## 2021-11-15 ENCOUNTER — TRANSFERRED RECORDS (OUTPATIENT)
Dept: HEALTH INFORMATION MANAGEMENT | Facility: CLINIC | Age: 71
End: 2021-11-15
Payer: MEDICARE

## 2022-01-18 VITALS
HEART RATE: 74 BPM | WEIGHT: 160 LBS | HEIGHT: 65 IN | SYSTOLIC BLOOD PRESSURE: 100 MMHG | DIASTOLIC BLOOD PRESSURE: 78 MMHG | BODY MASS INDEX: 26.66 KG/M2

## 2022-01-18 VITALS
WEIGHT: 161 LBS | OXYGEN SATURATION: 97 % | BODY MASS INDEX: 25.88 KG/M2 | HEART RATE: 54 BPM | HEIGHT: 66 IN | SYSTOLIC BLOOD PRESSURE: 156 MMHG | DIASTOLIC BLOOD PRESSURE: 82 MMHG

## 2022-01-18 VITALS
BODY MASS INDEX: 26.49 KG/M2 | OXYGEN SATURATION: 96 % | WEIGHT: 159 LBS | SYSTOLIC BLOOD PRESSURE: 144 MMHG | DIASTOLIC BLOOD PRESSURE: 70 MMHG | HEIGHT: 65 IN | HEART RATE: 59 BPM

## 2022-01-18 VITALS
RESPIRATION RATE: 16 BRPM | HEART RATE: 62 BPM | TEMPERATURE: 97.5 F | SYSTOLIC BLOOD PRESSURE: 157 MMHG | OXYGEN SATURATION: 97 % | DIASTOLIC BLOOD PRESSURE: 90 MMHG

## 2022-01-18 VITALS
SYSTOLIC BLOOD PRESSURE: 149 MMHG | RESPIRATION RATE: 16 BRPM | HEART RATE: 72 BPM | OXYGEN SATURATION: 97 % | DIASTOLIC BLOOD PRESSURE: 82 MMHG

## 2022-01-18 VITALS
DIASTOLIC BLOOD PRESSURE: 81 MMHG | WEIGHT: 163 LBS | SYSTOLIC BLOOD PRESSURE: 143 MMHG | OXYGEN SATURATION: 98 % | TEMPERATURE: 97.6 F | BODY MASS INDEX: 26.71 KG/M2 | HEART RATE: 64 BPM

## 2022-04-10 DIAGNOSIS — I10 ESSENTIAL HYPERTENSION: ICD-10-CM

## 2022-04-13 ENCOUNTER — TELEPHONE (OUTPATIENT)
Dept: FAMILY MEDICINE | Facility: CLINIC | Age: 72
End: 2022-04-13
Payer: MEDICARE

## 2022-04-13 RX ORDER — AMLODIPINE BESYLATE 10 MG/1
TABLET ORAL
Qty: 90 TABLET | Refills: 0 | Status: SHIPPED | OUTPATIENT
Start: 2022-04-13 | End: 2022-07-12

## 2022-04-13 NOTE — TELEPHONE ENCOUNTER
Jack requested renewal for amlodipine.  This brought to my attention he is almost due for annual exam.  Will route to scheduling to contact him to schedule

## 2022-04-13 NOTE — TELEPHONE ENCOUNTER
"Last Written Prescription Date:  7/9/21  Last Fill Quantity: 90,  # refills: 2   Last office visit provider:  5/20/21     Requested Prescriptions   Pending Prescriptions Disp Refills     amLODIPine (NORVASC) 10 MG tablet [Pharmacy Med Name: AMLODIPINE BESYLATE 10MG TABLETS] 90 tablet 2     Sig: TAKE 1 TABLET(10 MG) BY MOUTH DAILY       Calcium Channel Blockers Protocol  Passed - 4/13/2022  9:12 AM        Passed - Blood pressure under 140/90 in past 12 months     BP Readings from Last 3 Encounters:   10/23/21 132/72   09/22/21 135/84   08/16/21 133/77                 Passed - Recent (12 mo) or future (30 days) visit within the authorizing provider's specialty     Patient has had an office visit with the authorizing provider or a provider within the authorizing providers department within the previous 12 mos or has a future within next 30 days. See \"Patient Info\" tab in inbasket, or \"Choose Columns\" in Meds & Orders section of the refill encounter.              Passed - Medication is active on med list        Passed - Patient is age 18 or older        Passed - Normal serum creatinine on file in past 12 months     Recent Labs   Lab Test 08/16/21  1708   CR 1.00       Ok to refill medication if creatinine is low               Joseph Magdaleno RN 04/13/22 9:12 AM  "

## 2022-04-21 DIAGNOSIS — C7A.090 ATYPICAL CARCINOID LUNG TUMOR (H): Primary | ICD-10-CM

## 2022-04-27 ENCOUNTER — LAB (OUTPATIENT)
Dept: INFUSION THERAPY | Facility: HOSPITAL | Age: 72
End: 2022-04-27
Attending: INTERNAL MEDICINE
Payer: MEDICARE

## 2022-04-27 ENCOUNTER — HOSPITAL ENCOUNTER (OUTPATIENT)
Dept: CT IMAGING | Facility: HOSPITAL | Age: 72
Discharge: HOME | End: 2022-04-27
Attending: INTERNAL MEDICINE
Payer: MEDICARE

## 2022-04-27 DIAGNOSIS — C7A.090 ATYPICAL CARCINOID LUNG TUMOR (H): ICD-10-CM

## 2022-04-27 DIAGNOSIS — C7A.090 ATYPICAL CARCINOID LUNG TUMOR (H): Primary | ICD-10-CM

## 2022-04-27 LAB
ALBUMIN SERPL-MCNC: 4.1 G/DL (ref 3.5–5)
ALP SERPL-CCNC: 74 U/L (ref 45–120)
ALT SERPL W P-5'-P-CCNC: 16 U/L (ref 0–45)
ANION GAP SERPL CALCULATED.3IONS-SCNC: 10 MMOL/L (ref 5–18)
AST SERPL W P-5'-P-CCNC: 22 U/L (ref 0–40)
BASOPHILS # BLD AUTO: 0.1 10E3/UL (ref 0–0.2)
BASOPHILS NFR BLD AUTO: 1 %
BILIRUB SERPL-MCNC: 0.6 MG/DL (ref 0–1)
BUN SERPL-MCNC: 18 MG/DL (ref 8–28)
CALCIUM SERPL-MCNC: 10.2 MG/DL (ref 8.5–10.5)
CHLORIDE BLD-SCNC: 105 MMOL/L (ref 98–107)
CO2 SERPL-SCNC: 25 MMOL/L (ref 22–31)
CREAT SERPL-MCNC: 1.08 MG/DL (ref 0.7–1.3)
EOSINOPHIL # BLD AUTO: 0.1 10E3/UL (ref 0–0.7)
EOSINOPHIL NFR BLD AUTO: 2 %
ERYTHROCYTE [DISTWIDTH] IN BLOOD BY AUTOMATED COUNT: 13.4 % (ref 10–15)
GFR SERPL CREATININE-BSD FRML MDRD: 72 ML/MIN/1.73M2
GLUCOSE BLD-MCNC: 66 MG/DL (ref 70–125)
HCT VFR BLD AUTO: 50.2 % (ref 40–53)
HGB BLD-MCNC: 17.3 G/DL (ref 13.3–17.7)
IMM GRANULOCYTES # BLD: 0 10E3/UL
IMM GRANULOCYTES NFR BLD: 0 %
LYMPHOCYTES # BLD AUTO: 1.5 10E3/UL (ref 0.8–5.3)
LYMPHOCYTES NFR BLD AUTO: 23 %
MCH RBC QN AUTO: 30.9 PG (ref 26.5–33)
MCHC RBC AUTO-ENTMCNC: 34.5 G/DL (ref 31.5–36.5)
MCV RBC AUTO: 90 FL (ref 78–100)
MONOCYTES # BLD AUTO: 0.5 10E3/UL (ref 0–1.3)
MONOCYTES NFR BLD AUTO: 8 %
NEUTROPHILS # BLD AUTO: 4.4 10E3/UL (ref 1.6–8.3)
NEUTROPHILS NFR BLD AUTO: 66 %
NRBC # BLD AUTO: 0 10E3/UL
NRBC BLD AUTO-RTO: 0 /100
PLATELET # BLD AUTO: 292 10E3/UL (ref 150–450)
POTASSIUM BLD-SCNC: 3.9 MMOL/L (ref 3.5–5)
PROT SERPL-MCNC: 8.2 G/DL (ref 6–8)
RBC # BLD AUTO: 5.6 10E6/UL (ref 4.4–5.9)
SODIUM SERPL-SCNC: 140 MMOL/L (ref 136–145)
WBC # BLD AUTO: 6.7 10E3/UL (ref 4–11)

## 2022-04-27 PROCEDURE — 85025 COMPLETE CBC W/AUTO DIFF WBC: CPT

## 2022-04-27 PROCEDURE — 250N000011 HC RX IP 250 OP 636: Performed by: INTERNAL MEDICINE

## 2022-04-27 PROCEDURE — 74177 CT ABD & PELVIS W/CONTRAST: CPT

## 2022-04-27 PROCEDURE — 80053 COMPREHEN METABOLIC PANEL: CPT

## 2022-04-27 PROCEDURE — 36415 COLL VENOUS BLD VENIPUNCTURE: CPT

## 2022-04-27 RX ORDER — IOPAMIDOL 755 MG/ML
100 INJECTION, SOLUTION INTRAVASCULAR ONCE
Status: COMPLETED | OUTPATIENT
Start: 2022-04-27 | End: 2022-04-27

## 2022-04-27 RX ADMIN — IOPAMIDOL 100 ML: 755 INJECTION, SOLUTION INTRAVENOUS at 11:46

## 2022-04-29 ENCOUNTER — TRANSFERRED RECORDS (OUTPATIENT)
Dept: HEALTH INFORMATION MANAGEMENT | Facility: CLINIC | Age: 72
End: 2022-04-29
Payer: MEDICARE

## 2022-05-09 ENCOUNTER — ONCOLOGY VISIT (OUTPATIENT)
Dept: ONCOLOGY | Facility: HOSPITAL | Age: 72
End: 2022-05-09
Attending: INTERNAL MEDICINE
Payer: MEDICARE

## 2022-05-09 VITALS
OXYGEN SATURATION: 94 % | SYSTOLIC BLOOD PRESSURE: 131 MMHG | HEART RATE: 64 BPM | BODY MASS INDEX: 27.84 KG/M2 | RESPIRATION RATE: 24 BRPM | DIASTOLIC BLOOD PRESSURE: 77 MMHG | TEMPERATURE: 97.6 F | WEIGHT: 172.5 LBS

## 2022-05-09 DIAGNOSIS — C7A.090 ATYPICAL CARCINOID LUNG TUMOR (H): Primary | ICD-10-CM

## 2022-05-09 DIAGNOSIS — N40.0 BENIGN PROSTATIC HYPERPLASIA WITHOUT LOWER URINARY TRACT SYMPTOMS: ICD-10-CM

## 2022-05-09 DIAGNOSIS — E04.1 THYROID NODULE: ICD-10-CM

## 2022-05-09 PROCEDURE — G0463 HOSPITAL OUTPT CLINIC VISIT: HCPCS

## 2022-05-09 PROCEDURE — 99204 OFFICE O/P NEW MOD 45 MIN: CPT | Performed by: NURSE PRACTITIONER

## 2022-05-09 ASSESSMENT — PAIN SCALES - GENERAL: PAINLEVEL: NO PAIN (0)

## 2022-05-09 NOTE — PROGRESS NOTES
"Oncology Rooming Note    May 9, 2022 1:57 PM   Jack Brown is a 71 year old male who presents for:    Chief Complaint   Patient presents with     Oncology Clinic Visit     1 year return Atypical carcinoid lung tumor, review labs & CT     Initial Vitals: /77 (BP Location: Left arm, Patient Position: Sitting, Cuff Size: Adult Regular)   Pulse 64   Temp 97.6  F (36.4  C) (Oral)   Resp 24   Wt 78.2 kg (172 lb 8 oz)   SpO2 94%   BMI 27.84 kg/m   Estimated body mass index is 27.84 kg/m  as calculated from the following:    Height as of 9/22/21: 1.676 m (5' 6\").    Weight as of this encounter: 78.2 kg (172 lb 8 oz). Body surface area is 1.91 meters squared.  No Pain (0) Comment: Data Unavailable   No LMP for male patient.  Allergies reviewed: Yes  Medications reviewed: Yes    Medications: Medication refills not needed today.  Pharmacy name entered into [facility]: [id number] - [place], [place] - [address] AT [facility] [address]    Clinical concerns: 1 year return Atypical carcinoid lung tumor, review labs & CT      Kim Charlton CMA              "

## 2022-05-09 NOTE — PROGRESS NOTES
[facility] Hematology and Oncology Progress Note    Patient: Jack Brown  MRN: [id number]  Date of Service: 05/09/2022        Reason for Visit    Chief Complaint   Patient presents with     Oncology Clinic Visit     1 year return Atypical carcinoid lung tumor, review labs & CT       Assessment and Plan    Cancer Staging  No matching staging information was found for the patient.    1. Lung carcinoid tumor: currently on observation and doing well. CT scan shows no recurrent disease. Will continue annually imaging.     2. Enlarged prostate: overall stable. BPH symptoms gone after starting finasteride. Last MRI of prostate was stable in 2021.     3. 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    4. Thyroid nodule: US in 2021 was stable, 1.2cm nodule, likely benign.     ECOG Performance    0 - Independent    Distress Screening (within last 30 days)    No data recorded     Pain  Pain Score: No Pain (0)    Problem List    Patient Active Problem List   Diagnosis     Hypertension     Hyperlipemia     Visual Impairment     Nasal drainage     Coronary artery disease due to lipid rich plaque     S/P CABG x 3     Thyroid nodule     Urinary retention     Atypical carcinoid lung tumor (H)     Muscle soreness     History of melanoma     History of squamous cell carcinoma     History of basal cell carcinoma     Hypertension     Visual impairment     Gross hematuria        ______________________________________________________________________________    History of Present Illness    Diagnosis:     1.  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     Treatment:     Right middle lobectomy in April 2016: Pathology shows atypical carcinoid tumor.  2.2 cm.  No lymphovascular invasion.  15 lymph nodes tested all negative.  Less than 2 mitoses per 10 high-power field.     Interim History:    Patient is here today for a 1 year follow-up visit.  He states that over the last year he has been  feeling pretty good and healthy.  Nothing major is going on with his health.  He denies any new bone or back pain.  Denies any new breathing issues.  Denies any coughing.  Denies any infectious complaints.    Review of Systems    Pertinent items are noted in HPI.    Past History    Past Medical History:   Diagnosis Date     BPH (benign prostatic hypertrophy)      Cancer (H)     Skin on R.chest wall.     Chest discomfort      Coronary artery disease      Detached retina, left Oct 2014     Hyperlipidemia      Hypertension      PHYSICAL EXAM  /77 (BP Location: Left arm, Patient Position: Sitting, Cuff Size: Adult Regular)   Pulse 64   Temp 97.6  F (36.4  C) (Oral)   Resp 24   Wt 78.2 kg (172 lb 8 oz)   SpO2 94%   BMI 27.84 kg/m      GENERAL: no acute distress. Cooperative in conversation. Here alone. Mask on  RESP: Regular respiratory rate. Clear lungs bilaterally. No expiratory wheezes   CV: RRR, no murmur  MUSCULOSKELETAL: no bilateral leg swelling  NEURO: non focal. Alert and oriented x3.   PSYCH: within normal limits. No depression or anxiety.  SKIN: exposed skin is dry intact.     Lab Results    No results found for this or any previous visit (from the past 168 hour(s)).    Imaging    CT Chest/Abdomen/Pelvis w Contrast    Result Date: 4/27/2022  EXAM: CT CHEST/ABDOMEN/PELVIS W CONTRAST LOCATION: [facility] DATE/TIME: 4/27/2022 11:09 AM INDICATION:  Atypical carcinoid lung tumor (H) COMPARISON: CT of the chest, abdomen, and pelvis 1/18/2021, 1/29/2020 TECHNIQUE: CT scan of the chest, abdomen, and pelvis was performed following injection of IV contrast. Multiplanar reformats were obtained. Dose reduction techniques were used. CONTRAST: IsoVue 370 100mL FINDINGS: LUNGS AND PLEURA: Status post right middle lobectomy. Unchanged mild right lung volume loss as a consequence. No new soft tissue thickening or nodularity along the margin of bronchial resection. No new nodules or airspace  opacities. Trachea and remaining  central airways are patent and normal caliber. No pleural effusion, thickening, or nodularity. MEDIASTINUM: Status post median sternotomy and coronary revascularization. Cardiac chambers are not enlarged. No pericardial effusion. Mild mixed attenuation atheroma in the arch and descending thoracic aorta. There are no enlarged mediastinal or hilar lymph nodes. Normal thyroid. Esophagus is decompressed. CORONARY ARTERY CALCIFICATION: Previous intervention (stents or CABG). HEPATOBILIARY: Normal. PANCREAS: Normal. SPLEEN: Normal. ADRENAL GLANDS: Unchanged low-attenuation left adrenal nodule. Unchanged mild thickening of the lateral limb of the right adrenal gland. No new adrenal lesions. KIDNEYS/BLADDER: No renal mass or hydronephrosis. Nonobstructing calculi are present in the lower pole right kidney measuring less than 3 mm. No left renal calculi. There are simple/benign cortical and parapelvic cysts. No follow up is needed. BOWEL: Normal. LYMPH NODES: No enlarged lymph nodes in the abdomen or pelvis. VASCULATURE: Diffuse mixed calcified and noncalcified atheroma in the abdominal aorta which has diffusely lobulated contours and fusiform ectasia. Unchanged aneurysmal enlargement of both the distal common iliac arteries. The aneurysmal segments containing crescentic low-attenuation plaque/thrombus. PELVIC ORGANS: Severe enlargement of the prostate gland which indents the bladder base. Seminal vesicles are symmetric. MUSCULOSKELETAL: Solid osseous union of the median sternotomy. Degenerative disc space narrowing at L4-L5 with lower lumbar facet arthropathy. No aggressive or destructive bone lesions are present.     IMPRESSION: 1.  Status post right middle lobectomy. No findings to suggest recurrent neoplasm in the chest, abdomen, or pelvis. 2.  Unchanged prostatomegaly. 3.  Unchanged mixed attenuation atheroma and lobulated ectasia of the infrarenal abdominal aorta and aneurysms of the  distal common iliac arteries. 4.  Small nonobstructing right lower pole renal calculi.        Signed by: LILI Ch CNP

## 2022-05-09 NOTE — LETTER
"    5/9/2022         RE: Jack Brown  [address]  [place] [address]        Dear Colleague,    Thank you for referring your patient, Jack Brown, to the [facility]. Please see a copy of my visit note below.    Oncology Rooming Note    May 9, 2022 1:57 PM   Jack Brown is a 71 year old male who presents for:    Chief Complaint   Patient presents with     Oncology Clinic Visit     1 year return Atypical carcinoid lung tumor, review labs & CT     Initial Vitals: /77 (BP Location: Left arm, Patient Position: Sitting, Cuff Size: Adult Regular)   Pulse 64   Temp 97.6  F (36.4  C) (Oral)   Resp 24   Wt 78.2 kg (172 lb 8 oz)   SpO2 94%   BMI 27.84 kg/m   Estimated body mass index is 27.84 kg/m  as calculated from the following:    Height as of 9/22/21: 1.676 m (5' 6\").    Weight as of this encounter: 78.2 kg (172 lb 8 oz). Body surface area is 1.91 meters squared.  No Pain (0) Comment: Data Unavailable   No LMP for male patient.  Allergies reviewed: Yes  Medications reviewed: Yes    Medications: Medication refills not needed today.  Pharmacy name entered into [facility]: [id number] - [place], [place] - [address] [facility] [address]    Clinical concerns: 1 year return Atypical carcinoid lung tumor, review labs & CT      Kim Charlton, [facility] Hematology and Oncology Progress Note    Patient: Jack Brown  MRN: [id number]  Date of Service: 05/09/2022        Reason for Visit    Chief Complaint   Patient presents with     Oncology Clinic Visit     1 year return Atypical carcinoid lung tumor, review labs & CT       Assessment and Plan    Cancer Staging  No matching staging information was found for the patient.    1. Lung carcinoid tumor: currently on observation and doing well. CT scan shows no recurrent disease. Will continue annually imaging.     2. Enlarged prostate: overall stable. BPH symptoms " gone after starting finasteride. Last MRI of prostate was stable in 2021.     3. 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    4. Thyroid nodule: US in 2021 was stable, 1.2cm nodule, likely benign.     ECOG Performance    0 - Independent    Distress Screening (within last 30 days)    No data recorded     Pain  Pain Score: No Pain (0)    Problem List    Patient Active Problem List   Diagnosis     Hypertension     Hyperlipemia     Visual Impairment     Nasal drainage     Coronary artery disease due to lipid rich plaque     S/P CABG x 3     Thyroid nodule     Urinary retention     Atypical carcinoid lung tumor (H)     Muscle soreness     History of melanoma     History of squamous cell carcinoma     History of basal cell carcinoma     Hypertension     Visual impairment     Gross hematuria        ______________________________________________________________________________    History of Present Illness    Diagnosis:     1.  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     Treatment:     Right middle lobectomy in April 2016: Pathology shows atypical carcinoid tumor.  2.2 cm.  No lymphovascular invasion.  15 lymph nodes tested all negative.  Less than 2 mitoses per 10 high-power field.     Interim History:    Patient is here today for a 1 year follow-up visit.  He states that over the last year he has been feeling pretty good and healthy.  Nothing major is going on with his health.  He denies any new bone or back pain.  Denies any new breathing issues.  Denies any coughing.  Denies any infectious complaints.    Review of Systems    Pertinent items are noted in HPI.    Past History    Past Medical History:   Diagnosis Date     BPH (benign prostatic hypertrophy)      Cancer (H)     Skin on R.chest wall.     Chest discomfort      Coronary artery disease      Detached retina, left Oct 2014     Hyperlipidemia      Hypertension      PHYSICAL EXAM  /77 (BP Location: Left arm,  Patient Position: Sitting, Cuff Size: Adult Regular)   Pulse 64   Temp 97.6  F (36.4  C) (Oral)   Resp 24   Wt 78.2 kg (172 lb 8 oz)   SpO2 94%   BMI 27.84 kg/m      GENERAL: no acute distress. Cooperative in conversation. Here alone. Mask on  RESP: Regular respiratory rate. Clear lungs bilaterally. No expiratory wheezes   CV: RRR, no murmur  MUSCULOSKELETAL: no bilateral leg swelling  NEURO: non focal. Alert and oriented x3.   PSYCH: within normal limits. No depression or anxiety.  SKIN: exposed skin is dry intact.     Lab Results    No results found for this or any previous visit (from the past 168 hour(s)).    Imaging    CT Chest/Abdomen/Pelvis w Contrast    Result Date: 4/27/2022  EXAM: CT CHEST/ABDOMEN/PELVIS W CONTRAST LOCATION: [facility] DATE/TIME: 4/27/2022 11:09 AM INDICATION:  Atypical carcinoid lung tumor (H) COMPARISON: CT of the chest, abdomen, and pelvis 1/18/2021, 1/29/2020 TECHNIQUE: CT scan of the chest, abdomen, and pelvis was performed following injection of IV contrast. Multiplanar reformats were obtained. Dose reduction techniques were used. CONTRAST: IsoVue 370 100mL FINDINGS: LUNGS AND PLEURA: Status post right middle lobectomy. Unchanged mild right lung volume loss as a consequence. No new soft tissue thickening or nodularity along the margin of bronchial resection. No new nodules or airspace opacities. Trachea and remaining  central airways are patent and normal caliber. No pleural effusion, thickening, or nodularity. MEDIASTINUM: Status post median sternotomy and coronary revascularization. Cardiac chambers are not enlarged. No pericardial effusion. Mild mixed attenuation atheroma in the arch and descending thoracic aorta. There are no enlarged mediastinal or hilar lymph nodes. Normal thyroid. Esophagus is decompressed. CORONARY ARTERY CALCIFICATION: Previous intervention (stents or CABG). HEPATOBILIARY: Normal. PANCREAS: Normal. SPLEEN: Normal. ADRENAL  GLANDS: Unchanged low-attenuation left adrenal nodule. Unchanged mild thickening of the lateral limb of the right adrenal gland. No new adrenal lesions. KIDNEYS/BLADDER: No renal mass or hydronephrosis. Nonobstructing calculi are present in the lower pole right kidney measuring less than 3 mm. No left renal calculi. There are simple/benign cortical and parapelvic cysts. No follow up is needed. BOWEL: Normal. LYMPH NODES: No enlarged lymph nodes in the abdomen or pelvis. VASCULATURE: Diffuse mixed calcified and noncalcified atheroma in the abdominal aorta which has diffusely lobulated contours and fusiform ectasia. Unchanged aneurysmal enlargement of both the distal common iliac arteries. The aneurysmal segments containing crescentic low-attenuation plaque/thrombus. PELVIC ORGANS: Severe enlargement of the prostate gland which indents the bladder base. Seminal vesicles are symmetric. MUSCULOSKELETAL: Solid osseous union of the median sternotomy. Degenerative disc space narrowing at L4-L5 with lower lumbar facet arthropathy. No aggressive or destructive bone lesions are present.     IMPRESSION: 1.  Status post right middle lobectomy. No findings to suggest recurrent neoplasm in the chest, abdomen, or pelvis. 2.  Unchanged prostatomegaly. 3.  Unchanged mixed attenuation atheroma and lobulated ectasia of the infrarenal abdominal aorta and aneurysms of the distal common iliac arteries. 4.  Small nonobstructing right lower pole renal calculi.        Signed by: LILI Ch CNP      Again, thank you for allowing me to participate in the care of your patient.        Sincerely,        LILI Ch CNP

## 2022-05-22 ASSESSMENT — ACTIVITIES OF DAILY LIVING (ADL): CURRENT_FUNCTION: NO ASSISTANCE NEEDED

## 2022-05-23 ENCOUNTER — OFFICE VISIT (OUTPATIENT)
Dept: FAMILY MEDICINE | Facility: CLINIC | Age: 72
End: 2022-05-23
Payer: MEDICARE

## 2022-05-23 VITALS
BODY MASS INDEX: 27.35 KG/M2 | HEART RATE: 52 BPM | DIASTOLIC BLOOD PRESSURE: 78 MMHG | RESPIRATION RATE: 14 BRPM | SYSTOLIC BLOOD PRESSURE: 136 MMHG | OXYGEN SATURATION: 96 % | HEIGHT: 66 IN | WEIGHT: 170.2 LBS

## 2022-05-23 DIAGNOSIS — I25.10 CORONARY ARTERY DISEASE DUE TO LIPID RICH PLAQUE: ICD-10-CM

## 2022-05-23 DIAGNOSIS — Z13.1 SCREENING FOR DIABETES MELLITUS: ICD-10-CM

## 2022-05-23 DIAGNOSIS — R79.89 OTHER SPECIFIED ABNORMAL FINDINGS OF BLOOD CHEMISTRY: ICD-10-CM

## 2022-05-23 DIAGNOSIS — I25.83 CORONARY ARTERY DISEASE DUE TO LIPID RICH PLAQUE: ICD-10-CM

## 2022-05-23 DIAGNOSIS — E78.5 HYPERLIPIDEMIA, UNSPECIFIED HYPERLIPIDEMIA TYPE: ICD-10-CM

## 2022-05-23 DIAGNOSIS — Z00.00 ROUTINE GENERAL MEDICAL EXAMINATION AT A HEALTH CARE FACILITY: Primary | ICD-10-CM

## 2022-05-23 LAB
CHOLEST SERPL-MCNC: 234 MG/DL
FASTING STATUS PATIENT QL REPORTED: NO
HBA1C MFR BLD: 5.6 % (ref 0–5.6)
HDLC SERPL-MCNC: 42 MG/DL
LDLC SERPL CALC-MCNC: 173 MG/DL
TRIGL SERPL-MCNC: 96 MG/DL

## 2022-05-23 PROCEDURE — 80061 LIPID PANEL: CPT | Performed by: FAMILY MEDICINE

## 2022-05-23 PROCEDURE — 36415 COLL VENOUS BLD VENIPUNCTURE: CPT | Performed by: FAMILY MEDICINE

## 2022-05-23 PROCEDURE — 83036 HEMOGLOBIN GLYCOSYLATED A1C: CPT | Performed by: FAMILY MEDICINE

## 2022-05-23 PROCEDURE — G0439 PPPS, SUBSEQ VISIT: HCPCS | Performed by: FAMILY MEDICINE

## 2022-05-23 ASSESSMENT — ACTIVITIES OF DAILY LIVING (ADL): CURRENT_FUNCTION: NO ASSISTANCE NEEDED

## 2022-05-23 ASSESSMENT — PAIN SCALES - GENERAL: PAINLEVEL: MILD PAIN (3)

## 2022-05-23 NOTE — PROGRESS NOTES
"SUBJECTIVE:   Jack Brown is a 71 year old male who presents for Preventive Visit.      Patient has been advised of split billing requirements and indicates understanding: Yes  Are you in the first 12 months of your Medicare coverage?  No    Healthy Habits:     In general, how would you rate your overall health?  Excellent    Frequency of exercise:  4-5 days/week    Duration of exercise:  30-45 minutes    Do you usually eat at least 4 servings of fruit and vegetables a day, include whole grains    & fiber and avoid regularly eating high fat or \"junk\" foods?  Yes    Taking medications regularly:  Yes    Medication side effects:  None    Ability to successfully perform activities of daily living:  No assistance needed    Home Safety:  No safety concerns identified    Hearing Impairment:  No hearing concerns    In the past 6 months, have you been bothered by leaking of urine?  No    In general, how would you rate your overall mental or emotional health?  Excellent      PHQ-2 Total Score: 0    Additional concerns today:  No    Enjoys gardening, walking the mall, cooking - homemade soup.  Doing homemade pizza.      1) CAD - CABG x 3 in 2015.  Last seen by cardiology in 2017.    Tried statin medication - atorvastatin - myalgias.  After doing reading online, patient had more concerns regarding statin medication and has not tried another one since.  Did discuss recent data from the AHA\ACC study showing who benefits from statin.  I did discuss that coronary artery disease is diffuse.  The CABG x3 treated the 3 most dire blood vessels, but there are other outcomes to be considered from coronary artery disease.  We did discuss how statins work to decrease blood vessel inflammation.  I did recommend considering starting on a statin such as simvastatin.  He will look into it and let me know.  In the meantime, he has not had follow-up with cardiology, and he tells me that he had one other blood vessel that was 25% occluded.  " We will put in referral.      2) BPH: had a normal MRI ordered by Oncology.  Sees urology for BPH and hematuria.  Had a cystoscopy.  PSA through urology.    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   Sees Dr. Camacho.  Currently on observation.  CT shows no recurrent disease.    Will get shingles vaccine at pharmacy.  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  Discussed OTC lidocaine patch.    Colonoscopy in 2018 -no masses but inflammation that is characteristic of C. difficile colitis.  I cannot tell and patient does not recall if he was treated with vancomycin at that time.  Patient notes that he has not had any abdominal pain.      Do you feel safe in your environment? Yes     Yes, advance care planning is on file.       Fall risk  Fallen 2 or more times in the past year?: No  Any fall with injury in the past year?: No    Cognitive Screening   1) Repeat 3 items (Leader, Season, Table)    2) Clock draw: NORMAL  3) 3 item recall: Recalls 3 objects  Results: 3 items recalled: COGNITIVE IMPAIRMENT LESS LIKELY    Mini-CogTM Copyright S Scar. Licensed by the author for use in [facility] [place]; reprinted with permission ([email or web address]). All rights reserved.      Do you have sleep apnea, excessive snoring or daytime drowsiness?: no    Reviewed and updated as needed this visit by clinical staff   Tobacco  Allergies  Meds   Med Hx  Surg Hx  Fam Hx  Soc Hx          Reviewed and updated as needed this visit by Provider                   Social History     Tobacco Use     Smoking status: Never Smoker     Smokeless tobacco: Never Used   Substance Use Topics     Alcohol use: Yes     Alcohol/week: 8.0 standard drinks         Alcohol Use 5/22/2022   Prescreen: >3 drinks/day or >7 drinks/week? No               Current providers sharing in care for this patient include:   Patient Care Team:  Lelo Aranda MD as PCP - General (Family Medicine)  Orville Harmon MD as MD  "(Hematology & Oncology)  Mercedes Adorno MD as Assigned PCP  Orville Harmon MD as Assigned Cancer Care Provider  Veronica Costa CNP as Nurse Practitioner (Urology)  Kalee Hummel, RN as Specialty Care Coordinator (Urology)  Veroinca Costa CNP as Assigned Surgical Provider    The following health maintenance items are reviewed in Epic and correct as of today:  Health Maintenance Due   Topic Date Due     ZOSTER IMMUNIZATION (1 of 2) Never done     COVID-19 Vaccine (4 - Booster for Pfizer series) 03/23/2022     Labs reviewed in EPIC          Review of Systems  Per HPI    Patient Active Problem List    Diagnosis Date Noted     Gross hematuria 10/25/2021     Priority: Medium     cytoscopy 9/22/21 - large, friable prostate. Treated with \"  Finasteride in addition to existing Flomax.  Would recommend follow up in 1-3 months for evaluation of medical therapy and degree of hematuria. If medical therapy fails, at that point we would consider other options including surgical intervention.\" per Dr. Stewart       Hypertension 10/12/2021     Priority: Medium     Formatting of this note might be different from the original.  Created by Conversion    Replacement Utility updated for latest IMO load       Visual impairment 10/12/2021     Priority: Medium     Formatting of this note might be different from the original.  Created by Conversion       Hyperlipemia      Priority: Medium     Created by Conversion      Formatting of this note might be different from the original.  Created by Conversion       Muscle soreness 10/26/2017     Priority: Medium     Hypertension      Priority: Medium     Created by Conversion  Replacement Utility updated for latest IMO load         Atypical carcinoid lung tumor (H) 05/03/2016     Priority: Medium     Urinary retention      Priority: Medium     Thyroid nodule 12/29/2015     Priority: Medium     S/P CABG x 3 12/28/2015     Priority: Medium     Coronary artery disease due to " "lipid rich plaque 11/17/2015     Priority: Medium     History of melanoma 10/01/2015     Priority: Medium     Right chest         History of basal cell carcinoma 10/01/2015     Priority: Medium     Posterior ear         Nasal drainage 03/17/2015     Priority: Medium     History of squamous cell carcinoma 01/01/2015     Priority: Medium     Invasive, left arm         Visual Impairment      Priority: Medium     Created by Conversion         Current Outpatient Medications   Medication Instructions     acetaminophen (TYLENOL) 500 mg, EVERY 6 HOURS PRN     amLODIPine (NORVASC) 10 MG tablet TAKE 1 TABLET(10 MG) BY MOUTH DAILY     aspirin (ASA) 81 mg, DAILY     finasteride (PROSCAR) 5 mg, Oral, DAILY     metoprolol tartrate (LOPRESSOR) 50 mg, Oral, 2 TIMES DAILY     sulfamethoxazole-trimethoprim (BACTRIM DS) 800-160 MG tablet 1 tablet, Oral, 2 TIMES DAILY     tamsulosin (FLOMAX) 0.4 MG capsule TAKE 1 CAPSULE BY MOUTH DAILY AFTER BREAKFAST     VITAMIN D3 25 MCG (1000 UT) tablet 1 tablet, Oral, DAILY         OBJECTIVE:   /78 (BP Location: Left arm, Patient Position: Sitting, Cuff Size: Adult Large)   Pulse 52   Resp 14   Ht 1.676 m (5' 6\")   Wt 77.2 kg (170 lb 3.2 oz)   SpO2 96%   BMI 27.47 kg/m   Estimated body mass index is 27.47 kg/m  as calculated from the following:    Height as of this encounter: 1.676 m (5' 6\").    Weight as of this encounter: 77.2 kg (170 lb 3.2 oz).  Physical Exam  GENERAL: healthy, alert and no distress  EYES: Eyes grossly normal to inspection, PERRL and conjunctivae and sclerae normal  HENT: ear canals and TM's normal, nose and mouth without ulcers or lesions  NECK: no adenopathy, no asymmetry, masses, or scars and thyroid normal to palpation  RESP: lungs clear to auscultation - no rales, rhonchi or wheezes  CV: regular rate and rhythm, normal S1 S2, no S3 or S4, no murmur, click or rub, no peripheral edema and peripheral pulses strong  ABDOMEN: soft, nontender, no hepatosplenomegaly, " no masses and bowel sounds normal  MS: no gross musculoskeletal defects noted, no edema  SKIN: no suspicious lesions or rashes  NEURO: Normal strength and tone, mentation intact and speech normal  PSYCH: mentation appears normal, affect normal/bright        ASSESSMENT / PLAN:   Jack was seen today for recheck medication and annual visit.    Diagnoses and all orders for this visit:    Routine general medical examination at a health care facility  Discussed option for fourth covid vaccine today.  He would like to hold off.  He is open to shingles vaccine, and I did discuss that through Medicare that is a pharmacy benefit.  Recommend that he schedule that at pharmacy.  I did discuss risks and benefits of vaccine.    Hyperlipidemia, unspecified hyperlipidemia type  -     Lipid Profile (Chol, Trig, HDL, LDL calc); Future  -     Lipid Profile (Chol, Trig, HDL, LDL calc)  Patient has declined treatment of statin since his CABG.  We did discuss latest data and secondary prevention of heart attack.  I did recommend that we could try simvastatin, which would least be doing something for prevention.  Patient notes he will look into it and let me know.    Coronary artery disease due to lipid rich plaque  -     Hemoglobin A1c; Future  -     Adult Cardiology Eval Shyam Referral; Future  Patient has not followed up with cardiology since 2017.  We will put in referral.  He is not having any chest pain with activity.    Other specified abnormal findings of blood chemistry   -     Hemoglobin A1c; Future  -     Hemoglobin A1c  We will screen for diabetes.    Other orders  -     REVIEW OF HEALTH MAINTENANCE PROTOCOL ORDERS        Patient has been advised of split billing requirements and indicates understanding: Yes    COUNSELING:  Reviewed preventive health counseling, as reflected in patient instructions       Regular exercise       Healthy diet/nutrition       Aspirin prophylaxis     Estimated body mass index is 27.47 kg/m  as  "calculated from the following:    Height as of this encounter: 1.676 m (5' 6\").    Weight as of this encounter: 77.2 kg (170 lb 3.2 oz).    Weight management plan: Discussed healthy diet and exercise guidelines    He reports that he has never smoked. He has never used smokeless tobacco.      Appropriate preventive services were discussed with this patient, including applicable screening as appropriate for cardiovascular disease, diabetes, osteopenia/osteoporosis, and glaucoma.  As appropriate for age/gender, discussed screening for colorectal cancer, prostate cancer, breast cancer, and cervical cancer. Checklist reviewing preventive services available has been given to the patient.    Reviewed patients plan of care and provided an AVS. The Intermediate Care Plan ( asthma action plan, low back pain action plan, and migraine action plan) for Jack meets the Care Plan requirement. This Care Plan has been established and reviewed with the Patient.    Counseling Resources:  ATP IV Guidelines  Pooled Cohorts Equation Calculator  Breast Cancer Risk Calculator  Breast Cancer: Medication to Reduce Risk  FRAX Risk Assessment  ICSI Preventive Guidelines  Dietary Guidelines for Americans, 2010  USDA's MyPlate  ASA Prophylaxis  Lung CA Screening    Lelo Aranda MD  [facility]    Identified Health Risks:  "

## 2022-05-31 ENCOUNTER — OFFICE VISIT (OUTPATIENT)
Dept: CARDIOLOGY | Facility: CLINIC | Age: 72
End: 2022-05-31
Payer: MEDICARE

## 2022-05-31 VITALS
WEIGHT: 169 LBS | BODY MASS INDEX: 27.16 KG/M2 | SYSTOLIC BLOOD PRESSURE: 132 MMHG | HEIGHT: 66 IN | DIASTOLIC BLOOD PRESSURE: 80 MMHG | HEART RATE: 60 BPM | RESPIRATION RATE: 18 BRPM

## 2022-05-31 DIAGNOSIS — E78.5 HYPERLIPIDEMIA, UNSPECIFIED HYPERLIPIDEMIA TYPE: Primary | ICD-10-CM

## 2022-05-31 DIAGNOSIS — I10 HYPERTENSION, UNSPECIFIED TYPE: ICD-10-CM

## 2022-05-31 DIAGNOSIS — Z95.1 S/P CABG X 3: ICD-10-CM

## 2022-05-31 DIAGNOSIS — I25.83 CORONARY ARTERY DISEASE DUE TO LIPID RICH PLAQUE: ICD-10-CM

## 2022-05-31 DIAGNOSIS — I25.10 CORONARY ARTERY DISEASE DUE TO LIPID RICH PLAQUE: ICD-10-CM

## 2022-05-31 PROCEDURE — 99204 OFFICE O/P NEW MOD 45 MIN: CPT | Performed by: INTERNAL MEDICINE

## 2022-05-31 RX ORDER — ROSUVASTATIN CALCIUM 5 MG/1
2.5 TABLET, COATED ORAL DAILY
Qty: 45 TABLET | Refills: 3 | Status: SHIPPED | OUTPATIENT
Start: 2022-05-31 | End: 2023-06-05

## 2022-05-31 NOTE — PROGRESS NOTES
[facility]  [phone]          Assessment/Recommendations   Patient with known coronary artery disease, status post bypass surgery in approximately 2015.  He has done well since that time and in the past thought that statins caused his muscle aching to become more prominent but he has had continuous muscle aching which is better with physical activity.  This is been going on for years.  He is willing to retry a statin to see if it makes things worse and we will try rosuvastatin 2.5 mg each day.    He does not have any shortness of breath and it does not have any recurrence of the pinching sensation in his left upper chest that he had before bypass surgery.  He exercises in the garden and I have recommended that he start adding a 20-minute walk at least 5 times a week as well.  He will take that under advisement.       History of Present Illness/Subjective    Mr. Jack Brown is a 71 year old male with known coronary artery disease, status post bypass surgery approximately 7 years ago.  He has been doing well and is here for a routine checkup.  I have not seen him for 5 years.  Prior to his bypass surgery he had a pinching sensation in his left upper chest and this is never recurred.  He works in his garden and in the past is walked up to [employer] our mall in the winter although this past winter did not because it was too busy.  She does not get unusual shortness of breath with activity and denies any chest discomfort or pinching sensation like he had prior to bypass surgery.  He denies orthopnea, paroxysmal nocturnal dyspnea, he gets just a slight amount of peripheral edema in his left lower extremity) vein harvest site leg).  He denies palpitations.    He has not taken statins as he has had some chronic muscle aching and there was a sense that it made things worse although today he is not sure if the statins actually made it worse and certainly not dramatically so.  He would be willing to  "consider trying a statin again.    He is treated for hypertension, but his LDL cholesterol significantly elevated as noted below.       Physical Examination Review of Systems   /80 (BP Location: Left arm, Patient Position: Sitting, Cuff Size: Adult Regular)   Pulse 60   Resp 18   Ht 1.676 m (5' 6\")   Wt 76.7 kg (169 lb)   BMI 27.28 kg/m    Body mass index is 27.28 kg/m .  Wt Readings from Last 3 Encounters:   05/31/22 76.7 kg (169 lb)   05/23/22 77.2 kg (170 lb 3.2 oz)   05/09/22 78.2 kg (172 lb 8 oz)     General Appearance:   Alert, cooperative and in no acute distress.   ENT/Mouth: Patient wearing a mask.      EYES:  no scleral icterus, normal conjunctivae   Neck: JVP normal. No Hepatojugular reflux. Thyroid not visualized.   Chest/Lungs:   Lungs are clear to auscultation, equal chest wall expansion.   Cardiovascular:   S1, S2 without murmur ,clicks or rubs. Brachial, radial and posterior tibial pulses are intact and symetric. No carotid bruits noted   Abdomen:  Nontender. BS+.   Extremities: No cyanosis, clubbing and slight ankle edema on the left    Skin: no xanthelasma, warm.    Neurologic: normal arm movement bilateral, no tremors     Psychiatric: Appropriate affect.      Enc Vitals  BP: 132/80  Pulse: 60  Resp: 18  Weight: 76.7 kg (169 lb)  Height: 167.6 cm (5' 6\")                                           Medical History  Surgical History Family History Social History   Past Medical History:   Diagnosis Date     BPH (benign prostatic hypertrophy)      Cancer (H)     Skin on R.chest wall.     Chest discomfort      Coronary artery disease      Detached retina, left Oct 2014     Hyperlipidemia      Hypertension     Past Surgical History:   Procedure Laterality Date     BYPASS GRAFT ARTERY CORONARY N/A 12/28/2015    Procedure: CORONARY ARTERY BYPASS x 3, RIGHT ENDOSCOPIC VEIN HARVEST, LEFT INTERNAL MAMMARY ARTERY, ANESTHESIA TRANSESOPHAGEAL ECHOCARDIOGRAM;  Surgeon: Jayson Alvarado MD;  " Location: [facility];  Service:      CARDIAC SURGERY       CATARACT EXTRACTION  2015     CYST REMOVAL      Ganglion cyst removal of both wrist     HC REMOVAL PREPATELLA BURSA      Description: Excision Of Prepatellar Bursa;  Recorded: 09/30/2014;     HC REPAIR OF NASAL SEPTUM      Description: Septoplasty;  Recorded: 09/30/2014;     HERNIA REPAIR, UMBILICAL       PARS PLANA VITRECTOMY W/ REPAIR OF MACULAR HOLE  Nov 2014     ME EXCIS TENDON SHEATH LESION, HAND/FINGER      Description: Hand Excision Of A Tendon Cyst;  Recorded: 01/04/2011;     ME LAP, VENTRAL HERNIA REPAIR,REDUCIBLE      Description: Laparoscopy Repair Of Umbilical Hernia;  Recorded: 01/04/2011;     RETINAL DETACHMENT SURGERY  oct 2014     SKIN CANCER EXCISION  Nov 2015    right chest     THORACOSCOPY Right 4/14/2016    Procedure: RIGHT VIDEO ASSISTED THORACOSCOPY, RIGHT LOBECTOMY;  Surgeon: Vinny Chase MD;  Location: [facility];  Service:      VASECTOMY      Family History   Problem Relation Age of Onset     Acute Myocardial Infarction Mother      Aneurysm Mother         brain     Acute Myocardial Infarction Father      Acute Myocardial Infarction Brother      Aortic aneurysm Brother         d @ 72     Lung Cancer Cousin      Coronary Artery Disease Cousin      Leukemia Brother         d @ 55     Colitis Brother      Colon Cancer Paternal Grandfather     Social History     Socioeconomic History     Marital status:      Spouse name: Not on file     Number of children: Not on file     Years of education: Not on file     Highest education level: Not on file   Occupational History     Not on file   Tobacco Use     Smoking status: Never Smoker     Smokeless tobacco: Never Used   Substance and Sexual Activity     Alcohol use: Yes     Alcohol/week: 8.0 standard drinks     Drug use: No     Sexual activity: Never   Other Topics Concern     Not on file   Social History Narrative     Not on file     Social Determinants of  Health     Financial Resource Strain: Not on file   Food Insecurity: Not on file   Transportation Needs: Not on file   Physical Activity: Not on file   Stress: Not on file   Social Connections: Not on file   Intimate Partner Violence: Not on file   Housing Stability: Not on file          Medications  Allergies   Current Outpatient Medications   Medication Sig Dispense Refill     acetaminophen (TYLENOL) 500 MG tablet [ACETAMINOPHEN (TYLENOL) 500 MG TABLET] Take 500 mg by mouth every 6 (six) hours as needed for pain.       amLODIPine (NORVASC) 10 MG tablet TAKE 1 TABLET(10 MG) BY MOUTH DAILY 90 tablet 0     aspirin 81 mg chewable tablet [ASPIRIN 81 MG CHEWABLE TABLET] Chew 81 mg daily.       finasteride (PROSCAR) 5 MG tablet Take 1 tablet (5 mg) by mouth daily 90 tablet 3     metoprolol tartrate (LOPRESSOR) 50 MG tablet [METOPROLOL TARTRATE (LOPRESSOR) 50 MG TABLET] Take 1 tablet (50 mg total) by mouth 2 (two) times a day. 180 tablet 3     rosuvastatin (CRESTOR) 5 MG tablet Take 0.5 tablets (2.5 mg) by mouth daily 45 tablet 3     tamsulosin (FLOMAX) 0.4 MG capsule TAKE 1 CAPSULE BY MOUTH DAILY AFTER BREAKFAST 90 capsule 2     VITAMIN D3 25 MCG (1000 UT) tablet Take 1 tablet by mouth daily      Allergies   Allergen Reactions     Niacin Hives and Rash     Burning of the skin     Atorvastatin Muscle Pain (Myalgia)     Pravastatin Muscle Pain (Myalgia)         Lab Results    Chemistry/lipid CBC Cardiac Enzymes/BNP/TSH/INR   Lab Results   Component Value Date    CHOL 234 (H) 05/23/2022    HDL 42 05/23/2022    TRIG 96 05/23/2022    BUN 18 04/27/2022     04/27/2022    CO2 25 04/27/2022    Lab Results   Component Value Date    WBC 6.7 04/27/2022    HGB 17.3 04/27/2022    HCT 50.2 04/27/2022    MCV 90 04/27/2022     04/27/2022    Lab Results   Component Value Date    TSH 1.25 10/25/2018

## 2022-05-31 NOTE — LETTER
5/31/2022    Lelo Aranda MD  [address] [place] [address]    RE: Jack Brown       Dear Colleague,     I had the pleasure of seeing Jack Brown in the [facility] [place].      [facility] [place]  [phone]          Assessment/Recommendations   Patient with known coronary artery disease, status post bypass surgery in approximately 2015.  He has done well since that time and in the past thought that statins caused his muscle aching to become more prominent but he has had continuous muscle aching which is better with physical activity.  This is been going on for years.  He is willing to retry a statin to see if it makes things worse and we will try rosuvastatin 2.5 mg each day.    He does not have any shortness of breath and it does not have any recurrence of the pinching sensation in his left upper chest that he had before bypass surgery.  He exercises in the garden and I have recommended that he start adding a 20-minute walk at least 5 times a week as well.  He will take that under advisement.       History of Present Illness/Subjective    Mr. Jack Brown is a 71 year old male with known coronary artery disease, status post bypass surgery approximately 7 years ago.  He has been doing well and is here for a routine checkup.  I have not seen him for 5 years.  Prior to his bypass surgery he had a pinching sensation in his left upper chest and this is never recurred.  He works in his garden and in the past is walked [place] in the [place] although this past winter did not because it was too busy.  She does not get unusual shortness of breath with activity and denies any chest discomfort or pinching sensation like he had prior to bypass surgery.  He denies orthopnea, paroxysmal nocturnal dyspnea, he gets just a slight amount of peripheral edema in his left lower extremity) vein harvest site leg).  He denies palpitations.    He has not taken statins as he has had  "some chronic muscle aching and there was a sense that it made things worse although today he is not sure if the statins actually made it worse and certainly not dramatically so.  He would be willing to consider trying a statin again.    He is treated for hypertension, but his LDL cholesterol significantly elevated as noted below.       Physical Examination Review of Systems   /80 (BP Location: Left arm, Patient Position: Sitting, Cuff Size: Adult Regular)   Pulse 60   Resp 18   Ht 1.676 m (5' 6\")   Wt 76.7 kg (169 lb)   BMI 27.28 kg/m    Body mass index is 27.28 kg/m .  Wt Readings from Last 3 Encounters:   05/31/22 76.7 kg (169 lb)   05/23/22 77.2 kg (170 lb 3.2 oz)   05/09/22 78.2 kg (172 lb 8 oz)     General Appearance:   Alert, cooperative and in no acute distress.   ENT/Mouth: Patient wearing a mask.      EYES:  no scleral icterus, normal conjunctivae   Neck: JVP normal. No Hepatojugular reflux. Thyroid not visualized.   Chest/Lungs:   Lungs are clear to auscultation, equal chest wall expansion.   Cardiovascular:   S1, S2 without murmur ,clicks or rubs. Brachial, radial and posterior tibial pulses are intact and symetric. No carotid bruits noted   Abdomen:  Nontender. BS+.   Extremities: No cyanosis, clubbing and slight ankle edema on the left    Skin: no xanthelasma, warm.    Neurologic: normal arm movement bilateral, no tremors     Psychiatric: Appropriate affect.      Enc Vitals  BP: 132/80  Pulse: 60  Resp: 18  Weight: 76.7 kg (169 lb)  Height: 167.6 cm (5' 6\")                                           Medical History  Surgical History Family History Social History   Past Medical History:   Diagnosis Date     BPH (benign prostatic hypertrophy)      Cancer (H)     Skin on R.chest wall.     Chest discomfort      Coronary artery disease      Detached retina, left Oct 2014     Hyperlipidemia      Hypertension     Past Surgical History:   Procedure Laterality Date     BYPASS GRAFT ARTERY CORONARY N/A " 12/28/2015    Procedure: CORONARY ARTERY BYPASS x 3, RIGHT ENDOSCOPIC VEIN HARVEST, LEFT INTERNAL MAMMARY ARTERY, ANESTHESIA TRANSESOPHAGEAL ECHOCARDIOGRAM;  Surgeon: Jayson Alvarado MD;  Location: [facility];  Service:      CARDIAC SURGERY       CATARACT EXTRACTION  2015     CYST REMOVAL      Ganglion cyst removal of both wrist     HC REMOVAL PREPATELLA BURSA      Description: Excision Of Prepatellar Bursa;  Recorded: 09/30/2014;     HC REPAIR OF NASAL SEPTUM      Description: Septoplasty;  Recorded: 09/30/2014;     HERNIA REPAIR, UMBILICAL       PARS PLANA VITRECTOMY W/ REPAIR OF MACULAR HOLE  Nov 2014     MD EXCIS TENDON SHEATH LESION, HAND/FINGER      Description: Hand Excision Of A Tendon Cyst;  Recorded: 01/04/2011;     MD LAP, VENTRAL HERNIA REPAIR,REDUCIBLE      Description: Laparoscopy Repair Of Umbilical Hernia;  Recorded: 01/04/2011;     RETINAL DETACHMENT SURGERY  oct 2014     SKIN CANCER EXCISION  Nov 2015    right chest     THORACOSCOPY Right 4/14/2016    Procedure: RIGHT VIDEO ASSISTED THORACOSCOPY, RIGHT LOBECTOMY;  Surgeon: Vinny Chase MD;  Location: [facility];  Service:      VASECTOMY      Family History   Problem Relation Age of Onset     Acute Myocardial Infarction Mother      Aneurysm Mother         brain     Acute Myocardial Infarction Father      Acute Myocardial Infarction Brother      Aortic aneurysm Brother         d @ 72     Lung Cancer Cousin      Coronary Artery Disease Cousin      Leukemia Brother         d @ 55     Colitis Brother      Colon Cancer Paternal Grandfather     Social History     Socioeconomic History     Marital status:      Spouse name: Not on file     Number of children: Not on file     Years of education: Not on file     Highest education level: Not on file   Occupational History     Not on file   Tobacco Use     Smoking status: Never Smoker     Smokeless tobacco: Never Used   Substance and Sexual Activity     Alcohol use:  Yes     Alcohol/week: 8.0 standard drinks     Drug use: No     Sexual activity: Never   Other Topics Concern     Not on file   Social History Narrative     Not on file     Social Determinants of Health     Financial Resource Strain: Not on file   Food Insecurity: Not on file   Transportation Needs: Not on file   Physical Activity: Not on file   Stress: Not on file   Social Connections: Not on file   Intimate Partner Violence: Not on file   Housing Stability: Not on file          Medications  Allergies   Current Outpatient Medications   Medication Sig Dispense Refill     acetaminophen (TYLENOL) 500 MG tablet [ACETAMINOPHEN (TYLENOL) 500 MG TABLET] Take 500 mg by mouth every 6 (six) hours as needed for pain.       amLODIPine (NORVASC) 10 MG tablet TAKE 1 TABLET(10 MG) BY MOUTH DAILY 90 tablet 0     aspirin 81 mg chewable tablet [ASPIRIN 81 MG CHEWABLE TABLET] Chew 81 mg daily.       finasteride (PROSCAR) 5 MG tablet Take 1 tablet (5 mg) by mouth daily 90 tablet 3     metoprolol tartrate (LOPRESSOR) 50 MG tablet [METOPROLOL TARTRATE (LOPRESSOR) 50 MG TABLET] Take 1 tablet (50 mg total) by mouth 2 (two) times a day. 180 tablet 3     rosuvastatin (CRESTOR) 5 MG tablet Take 0.5 tablets (2.5 mg) by mouth daily 45 tablet 3     tamsulosin (FLOMAX) 0.4 MG capsule TAKE 1 CAPSULE BY MOUTH DAILY AFTER BREAKFAST 90 capsule 2     VITAMIN D3 25 MCG (1000 UT) tablet Take 1 tablet by mouth daily      Allergies   Allergen Reactions     Niacin Hives and Rash     Burning of the skin     Atorvastatin Muscle Pain (Myalgia)     Pravastatin Muscle Pain (Myalgia)         Lab Results    Chemistry/lipid CBC Cardiac Enzymes/BNP/TSH/INR   Lab Results   Component Value Date    CHOL 234 (H) 05/23/2022    HDL 42 05/23/2022    TRIG 96 05/23/2022    BUN 18 04/27/2022     04/27/2022    CO2 25 04/27/2022    Lab Results   Component Value Date    WBC 6.7 04/27/2022    HGB 17.3 04/27/2022    HCT 50.2 04/27/2022    MCV 90 04/27/2022      04/27/2022    Lab Results   Component Value Date    TSH 1.25 10/25/2018              Thank you for allowing me to participate in the care of your patient.      Sincerely,     Yobany Estrada MD     [facility]  cc:   Lelo Aranda MD  [address] [place] [address]

## 2022-06-03 ENCOUNTER — TRANSFERRED RECORDS (OUTPATIENT)
Dept: HEALTH INFORMATION MANAGEMENT | Facility: CLINIC | Age: 72
End: 2022-06-03
Payer: MEDICARE

## 2022-06-16 ENCOUNTER — TELEPHONE (OUTPATIENT)
Dept: FAMILY MEDICINE | Facility: CLINIC | Age: 72
End: 2022-06-16

## 2022-06-16 DIAGNOSIS — I10 BENIGN ESSENTIAL HYPERTENSION: Primary | ICD-10-CM

## 2022-06-16 DIAGNOSIS — I10 HTN (HYPERTENSION): ICD-10-CM

## 2022-06-16 NOTE — TELEPHONE ENCOUNTER
Reason for Call:  Medication or medication refill:    Do you use a [facility]?  Name of the pharmacy and phone number for the current request:  [email or web address]    Name of the medication requested:   metoprolol tartrate (LOPRESSOR) 50 MG tablet 180 tablet 3 6/10/2021  No   Sig - Route: [METOPROLOL TARTRATE (LOPRESSOR) 50 MG TABLET] Take 1 tablet (50 mg total) by mouth 2 (two) times a day. - Oral         Other request: n/a    Can we leave a detailed message on this number? YES    Phone number patient can be reached at: Cell number on file:    Telephone Information:   Mobile [phone]       Best Time: any    Call taken on 6/16/2022 at 11:02 AM by Pam J. Behr

## 2022-06-17 RX ORDER — METOPROLOL TARTRATE 50 MG
50 TABLET ORAL 2 TIMES DAILY
Qty: 180 TABLET | Refills: 3 | Status: SHIPPED | OUTPATIENT
Start: 2022-06-17 | End: 2023-07-15

## 2022-06-28 DIAGNOSIS — I10 HTN (HYPERTENSION): ICD-10-CM

## 2022-06-29 RX ORDER — TAMSULOSIN HYDROCHLORIDE 0.4 MG/1
CAPSULE ORAL
Qty: 90 CAPSULE | Refills: 3 | Status: SHIPPED | OUTPATIENT
Start: 2022-06-29 | End: 2023-06-28

## 2022-06-29 NOTE — TELEPHONE ENCOUNTER
"Last Written Prescription Date:  9/24/21  Last Fill Quantity: 90,  # refills: 2   Last office visit provider:  5/23/22     Requested Prescriptions   Pending Prescriptions Disp Refills     tamsulosin (FLOMAX) 0.4 MG capsule [Pharmacy Med Name: TAMSULOSIN 0.4MG CAPSULES] 90 capsule 2     Sig: TAKE 1 CAPSULE BY MOUTH DAILY AFTER BREAKFAST       Alpha Blockers Passed - 6/29/2022  7:10 AM        Passed - Blood pressure under 140/90 in past 12 months     BP Readings from Last 3 Encounters:   05/31/22 132/80   05/23/22 136/78   05/09/22 131/77                 Passed - Recent (12 mo) or future (30 days) visit within the authorizing provider's specialty     Patient has had an office visit with the authorizing provider or a provider within the authorizing providers department within the previous 12 mos or has a future within next 30 days. See \"Patient Info\" tab in inbasket, or \"Choose Columns\" in Meds & Orders section of the refill encounter.              Passed - Patient does not have Tadalafil, Vardenafil, or Sildenafil on their medication list        Passed - Medication is active on med list        Passed - Patient is 18 years of age or older             Joseph Magdaleno RN 06/29/22 7:10 AM  "

## 2022-07-12 DIAGNOSIS — I10 ESSENTIAL HYPERTENSION: ICD-10-CM

## 2022-07-12 RX ORDER — AMLODIPINE BESYLATE 10 MG/1
TABLET ORAL
Qty: 90 TABLET | Refills: 2 | Status: SHIPPED | OUTPATIENT
Start: 2022-07-12 | End: 2023-04-21

## 2022-07-12 NOTE — TELEPHONE ENCOUNTER
"Last Written Prescription Date:  4/13/22  Last Fill Quantity: 90,  # refills: 0   Last office visit provider:  5/23/22     Requested Prescriptions   Pending Prescriptions Disp Refills     amLODIPine (NORVASC) 10 MG tablet [Pharmacy Med Name: AMLODIPINE BESYLATE 10MG TABLETS] 90 tablet 0     Sig: TAKE 1 TABLET(10 MG) BY MOUTH DAILY       Calcium Channel Blockers Protocol  Passed - 7/12/2022  8:08 AM        Passed - Blood pressure under 140/90 in past 12 months     BP Readings from Last 3 Encounters:   05/31/22 132/80   05/23/22 136/78   05/09/22 131/77                 Passed - Recent (12 mo) or future (30 days) visit within the authorizing provider's specialty     Patient has had an office visit with the authorizing provider or a provider within the authorizing providers department within the previous 12 mos or has a future within next 30 days. See \"Patient Info\" tab in inbasket, or \"Choose Columns\" in Meds & Orders section of the refill encounter.              Passed - Medication is active on med list        Passed - Patient is age 18 or older        Passed - Normal serum creatinine on file in past 12 months     Recent Labs   Lab Test 04/27/22  1041   CR 1.08       Ok to refill medication if creatinine is low               Joelle Meza RN 07/12/22 6:59 PM  "

## 2022-08-12 ENCOUNTER — LAB (OUTPATIENT)
Dept: CARDIOLOGY | Facility: CLINIC | Age: 72
End: 2022-08-12
Payer: MEDICARE

## 2022-08-12 DIAGNOSIS — Z95.1 S/P CABG X 3: ICD-10-CM

## 2022-08-12 DIAGNOSIS — I10 HYPERTENSION, UNSPECIFIED TYPE: ICD-10-CM

## 2022-08-12 DIAGNOSIS — E78.5 HYPERLIPIDEMIA, UNSPECIFIED HYPERLIPIDEMIA TYPE: ICD-10-CM

## 2022-08-12 DIAGNOSIS — I25.10 CORONARY ARTERY DISEASE DUE TO LIPID RICH PLAQUE: ICD-10-CM

## 2022-08-12 DIAGNOSIS — I25.83 CORONARY ARTERY DISEASE DUE TO LIPID RICH PLAQUE: ICD-10-CM

## 2022-08-12 LAB
CHOLEST SERPL-MCNC: 177 MG/DL
FASTING STATUS PATIENT QL REPORTED: YES
HDLC SERPL-MCNC: 43 MG/DL
LDLC SERPL CALC-MCNC: 114 MG/DL
TRIGL SERPL-MCNC: 102 MG/DL

## 2022-08-12 PROCEDURE — 80061 LIPID PANEL: CPT

## 2022-08-12 PROCEDURE — 36415 COLL VENOUS BLD VENIPUNCTURE: CPT

## 2022-10-01 ENCOUNTER — HEALTH MAINTENANCE LETTER (OUTPATIENT)
Age: 72
End: 2022-10-01

## 2022-11-22 DIAGNOSIS — N40.0 BENIGN PROSTATIC HYPERPLASIA WITHOUT LOWER URINARY TRACT SYMPTOMS: ICD-10-CM

## 2022-11-22 DIAGNOSIS — R31.0 GROSS HEMATURIA: ICD-10-CM

## 2022-11-28 ENCOUNTER — HOSPITAL ENCOUNTER (OUTPATIENT)
Dept: GENERAL RADIOLOGY | Facility: HOSPITAL | Age: 72
Discharge: HOME | End: 2022-11-28
Attending: FAMILY MEDICINE | Admitting: FAMILY MEDICINE
Payer: MEDICARE

## 2022-11-28 ENCOUNTER — OFFICE VISIT (OUTPATIENT)
Dept: FAMILY MEDICINE | Facility: CLINIC | Age: 72
End: 2022-11-28
Payer: MEDICARE

## 2022-11-28 VITALS
HEART RATE: 66 BPM | RESPIRATION RATE: 20 BRPM | SYSTOLIC BLOOD PRESSURE: 153 MMHG | DIASTOLIC BLOOD PRESSURE: 89 MMHG | WEIGHT: 163.7 LBS | BODY MASS INDEX: 26.42 KG/M2 | OXYGEN SATURATION: 95 % | TEMPERATURE: 97.4 F

## 2022-11-28 DIAGNOSIS — S22.41XA CLOSED FRACTURE OF MULTIPLE RIBS OF RIGHT SIDE, INITIAL ENCOUNTER: Primary | ICD-10-CM

## 2022-11-28 PROCEDURE — 99213 OFFICE O/P EST LOW 20 MIN: CPT | Performed by: FAMILY MEDICINE

## 2022-11-28 PROCEDURE — 71101 X-RAY EXAM UNILAT RIBS/CHEST: CPT | Mod: RT

## 2022-11-28 NOTE — PATIENT INSTRUCTIONS
Ice to area  Tylenol 1000 mg three times a day as needed.  Brace chest with pillow if need to cough or move.

## 2022-11-28 NOTE — PROGRESS NOTES
OUTPATIENT VISIT NOTE                                                   Date of Visit: 11/28/2022     Chief Complaint   Patient presents with:  Rib Injury: X 4 day. Rt side rib. Stabbing upon movement. Some tightness. Rt metal lobectomy x 6-7 years. No SOB. Some pain when deep inhale.            History of Present Illness   Jack Brown is a 71 year old male hurt his chest when leaning down and over felt pop.  Worse with twisting and deep breathing.  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  No shortness of breath.         MEDICATIONS   Current Outpatient Medications   Medication     acetaminophen (TYLENOL) 500 MG tablet     amLODIPine (NORVASC) 10 MG tablet     aspirin 81 mg chewable tablet     finasteride (PROSCAR) 5 MG tablet     metoprolol tartrate (LOPRESSOR) 50 MG tablet     rosuvastatin (CRESTOR) 5 MG tablet     tamsulosin (FLOMAX) 0.4 MG capsule     VITAMIN D3 25 MCG (1000 UT) tablet     No current facility-administered medications for this visit.         SOCIAL HISTORY   Social History     Tobacco Use     Smoking status: Never     Smokeless tobacco: Never   Substance Use Topics     Alcohol use: Yes     Alcohol/week: 8.0 standard drinks           Physical Exam   Vitals:    11/28/22 1109   BP: (!) 153/89   BP Location: Left arm   Patient Position: Sitting   Cuff Size: Adult Regular   Pulse: 66   Resp: 20   Temp: 97.4  F (36.3  C)   TempSrc: Oral   SpO2: 95%   Weight: 74.3 kg (163 lb 11.2 oz)        GEN:  NAD  LUNGS:  Clear to auscultation without wheezing.  Normal effort.  HEART:  RRR without murmur, rub or gallop   Chest Wall: tender over mid chest line, mid chest     Diagnostic Studies   LABS:  Results for orders placed or performed in visit on 11/28/22   XR Ribs & Chest Rt 3vw     Status: None    Narrative    EXAM: XR RIBS and CHEST RT 3VW  LOCATION: [facility]  DATE/TIME: 11/28/2022 12:21 PM    INDICATION: bent and twisted and now pain in  chest wall.  previous h o right middle lobectomy due to atypical carcinoid.  COMPARISON: CT of the chest 4/27/2022      Impression    IMPRESSION:     Cardiac silhouette remains normal in size. Previous median sternotomy and coronary revascularization. Mildly tortuous thoracic aorta. Symmetric normal lung inflation. Clips in the right hilum and staple line lateral to the right hilum from previous right   lung resection. There are no new nodules or airspace opacities.    The oblique images show mildly displaced fractures of the left anterior sixth and seventh ribs near the costochondral junction. Intact and aligned sternal wires.            Assessment and Plan     Closed fracture of multiple ribs of right side, initial encounter  No respiratory distress.  Lungs clear and equal full breath sounds bilaterally.    Ice to area  Tylenol 1000 mg three times a day as needed.  Brace chest with pillow if need to cough or move.    - XR Ribs & Chest Rt 3vw                   Discussed signs / symptoms that warrant urgent / emergent medical attention.     Recheck if worsening or not improving.       Awais Harris MD          Pertinent History     The following portions of the patient's history were reviewed and updated as appropriate: allergies, current medications, past family history, past medical history, past social history, past surgical history and problem list.

## 2022-11-29 ENCOUNTER — TELEPHONE (OUTPATIENT)
Dept: UROLOGY | Facility: CLINIC | Age: 72
End: 2022-11-29

## 2022-11-29 RX ORDER — FINASTERIDE 5 MG/1
5 TABLET, FILM COATED ORAL DAILY
Qty: 30 TABLET | Refills: 0 | Status: SHIPPED | OUTPATIENT
Start: 2022-11-29 | End: 2023-01-04

## 2022-11-29 NOTE — TELEPHONE ENCOUNTER
Last Clinic Visit: 10/26/21  NV: NONE  Scheduling has been notified to contact the pt for appointment.

## 2022-12-30 ENCOUNTER — PRE VISIT (OUTPATIENT)
Dept: UROLOGY | Facility: CLINIC | Age: 72
End: 2022-12-30

## 2022-12-30 NOTE — TELEPHONE ENCOUNTER
Reason for visit: med refill     Dx/Hx/Sx: BPH    Records/imaging/labs/orders: in epic     At Rooming: telephone visit

## 2022-12-31 DIAGNOSIS — R31.0 GROSS HEMATURIA: ICD-10-CM

## 2022-12-31 DIAGNOSIS — N40.0 BENIGN PROSTATIC HYPERPLASIA WITHOUT LOWER URINARY TRACT SYMPTOMS: ICD-10-CM

## 2023-01-04 RX ORDER — FINASTERIDE 5 MG/1
5 TABLET, FILM COATED ORAL DAILY
Qty: 30 TABLET | Refills: 0 | Status: SHIPPED | OUTPATIENT
Start: 2023-01-04 | End: 2023-02-08

## 2023-01-05 ENCOUNTER — TELEPHONE (OUTPATIENT)
Dept: FAMILY MEDICINE | Facility: CLINIC | Age: 73
End: 2023-01-05

## 2023-01-05 NOTE — TELEPHONE ENCOUNTER
----- Message from Princess Bobby MD sent at 1/4/2023  5:10 PM CST -----  Hello,  This patient is on my schedule for establish care on Jan. 30.  I am not taking new patients, please help him set this appointment with an open provider, thanks.  Cynthia

## 2023-01-05 NOTE — TELEPHONE ENCOUNTER
First Attempt: I sent a my chart message to the patient to please contact our office to reschedule this appt with a provider that is taking new patients.

## 2023-02-06 DIAGNOSIS — R31.0 GROSS HEMATURIA: ICD-10-CM

## 2023-02-06 DIAGNOSIS — N40.0 BENIGN PROSTATIC HYPERPLASIA WITHOUT LOWER URINARY TRACT SYMPTOMS: ICD-10-CM

## 2023-02-08 RX ORDER — FINASTERIDE 5 MG/1
5 TABLET, FILM COATED ORAL DAILY
Qty: 30 TABLET | Refills: 0 | Status: SHIPPED | OUTPATIENT
Start: 2023-02-08 | End: 2023-02-14

## 2023-02-14 ENCOUNTER — VIRTUAL VISIT (OUTPATIENT)
Dept: UROLOGY | Facility: CLINIC | Age: 73
End: 2023-02-14
Payer: MEDICARE

## 2023-02-14 DIAGNOSIS — R31.0 GROSS HEMATURIA: ICD-10-CM

## 2023-02-14 DIAGNOSIS — N40.0 BENIGN PROSTATIC HYPERPLASIA WITHOUT LOWER URINARY TRACT SYMPTOMS: ICD-10-CM

## 2023-02-14 PROCEDURE — 99441 PR PHYSICIAN TELEPHONE EVALUATION 5-10 MIN: CPT | Mod: 95 | Performed by: NURSE PRACTITIONER

## 2023-02-14 RX ORDER — FINASTERIDE 5 MG/1
5 TABLET, FILM COATED ORAL DAILY
Qty: 90 TABLET | Refills: 3 | Status: SHIPPED | OUTPATIENT
Start: 2023-02-14 | End: 2024-04-01

## 2023-02-14 NOTE — LETTER
2/14/2023       RE: Jack Brown  [address]  [place] [address]     Dear Colleague,    Thank you for referring your patient, Jack Brown, to the [facility] [place] [facility]. Please see a copy of my visit note below.      Duration of telephone call: 10 minutes    Originating Location (pt. Location): Home    Distant Location (provider location):  Off-site    Jack Brown complains of   Chief Complaint   Patient presents with     Telephone     Acute prostatitis follow-up/med refill       Assessment/Plan:  70 year old male with BPH on Flomax and finasteride who has been doing quite well. In need of finasteride refills today. Given his age and negative MRI from a new years ago, we discussed options for prostate cancer surveillance going forward. Shared decision ultimately made to defer PSA in favor of follow up with me next year in-person to complete OLE and bladder scan. If any concerning findings on OLE, could consider repeating the MRI.     -Finasteride refills sent.   -Follow up with me in one year for an in-person clinic visit to complete exam and PVR.     Veronica Costa, CNP  Department of Urology      Subjective:   70 year old male with a PMH of CAD s/p CABG, HTN, melanoma,hx of acinar lung cancer sp right middle lobectomy and BPH on combination therapy of Flomax and finasteride (prostate volume 144.5 ml on MRI in 2021) who presents for virtual follow up. Our last visit was in 10/2021, at which time he was dealing with symptoms suggestive of prostatitis, which was possible given the cystoscopy he had one month prior.    Today, he states that he has been doing very well since our last visit. He denies any urination issues. He may experience an occasional twinge of pain, but nothing that is consistent or bothersome. In need of finasteride refills.             Again, thank you for allowing me to participate in the care of  your patient.      Sincerely,    Veronica Costa, CNP

## 2023-02-14 NOTE — LETTER
Date:February 15, 2023      Patient was self referred, no letter generated. Do not send.        [facility] Information

## 2023-02-14 NOTE — PATIENT INSTRUCTIONS
UROLOGY CLINIC VISIT PATIENT INSTRUCTIONS    -Continue the Flomax and finasteride.   -Follow up with me in one year.     If you have any issues, questions or concerns in the meantime, do not hesitate to contact us at [phone] or via [employer].     Veronica Costa, CNP  Department of Urology

## 2023-02-14 NOTE — PROGRESS NOTES
Duration of telephone call: 10 minutes    Originating Location (pt. Location): Home    Distant Location (provider location):  Off-site    Jack Brown complains of   Chief Complaint   Patient presents with     Telephone     Acute prostatitis follow-up/med refill       Assessment/Plan:  70 year old male with BPH on Flomax and finasteride who has been doing quite well. In need of finasteride refills today. Given his age and negative MRI from a new years ago, we discussed options for prostate cancer surveillance going forward. Shared decision ultimately made to defer PSA in favor of follow up with me next year in-person to complete OLE and bladder scan. If any concerning findings on OLE, could consider repeating the MRI.     -Finasteride refills sent.   -Follow up with me in one year for an in-person clinic visit to complete exam and PVR.     Veronica Costa, CNP  Department of Urology      Subjective:   70 year old male with a PMH of CAD s/p CABG, HTN, melanoma,hx of acinar lung cancer sp right middle lobectomy and BPH on combination therapy of Flomax and finasteride (prostate volume 144.5 ml on MRI in 2021) who presents for virtual follow up. Our last visit was in 10/2021, at which time he was dealing with symptoms suggestive of prostatitis, which was possible given the cystoscopy he had one month prior.    Today, he states that he has been doing very well since our last visit. He denies any urination issues. He may experience an occasional twinge of pain, but nothing that is consistent or bothersome. In need of finasteride refills.

## 2023-02-14 NOTE — NURSING NOTE
Is the patient currently in the state of [place]? YES    Visit mode:TELEPHONE    If the visit is dropped, the patient can be reconnected by: VIDEO VISIT: Text to cell phone: [phone]    Will anyone else be joining the visit? NO      How would you like to obtain your AVS? MyChart    Are changes needed to the allergy or medication list? NO    Comments or concerns regarding today's visit:

## 2023-04-20 DIAGNOSIS — I10 ESSENTIAL HYPERTENSION: ICD-10-CM

## 2023-04-20 NOTE — TELEPHONE ENCOUNTER
"Routing refill request to provider for review/approval because:  Blood Pressure  PCP not listed    Last Written Prescription Date:  7/12/2022  Last Fill Quantity: 90,  # refills: 2   Last office visit provider:  11/28/2022     Requested Prescriptions   Pending Prescriptions Disp Refills     amLODIPine (NORVASC) 10 MG tablet [Pharmacy Med Name: AMLODIPINE BESYLATE 10MG TABLETS] 90 tablet 2     Sig: TAKE 1 TABLET(10 MG) BY MOUTH DAILY       Calcium Channel Blockers Protocol  Failed - 4/20/2023  8:08 AM        Failed - Blood pressure under 140/90 in past 12 months     BP Readings from Last 3 Encounters:   11/28/22 (!) 153/89   05/31/22 132/80   05/23/22 136/78                 Failed - Recent (12 mo) or future (30 days) visit within the authorizing provider's specialty     Patient has had an office visit with the authorizing provider or a provider within the authorizing providers department within the previous 12 mos or has a future within next 30 days. See \"Patient Info\" tab in inbasket, or \"Choose Columns\" in Meds & Orders section of the refill encounter.              Passed - Medication is active on med list        Passed - Patient is age 18 or older        Passed - Normal serum creatinine on file in past 12 months     Recent Labs   Lab Test 04/27/22  1041   CR 1.08       Ok to refill medication if creatinine is low               Maggie Cruz RN 04/20/23 6:53 PM  "

## 2023-04-21 RX ORDER — AMLODIPINE BESYLATE 10 MG/1
TABLET ORAL
Qty: 90 TABLET | Refills: 0 | Status: SHIPPED | OUTPATIENT
Start: 2023-04-21 | End: 2023-07-24

## 2023-05-19 ENCOUNTER — PATIENT OUTREACH (OUTPATIENT)
Dept: CARE COORDINATION | Facility: CLINIC | Age: 73
End: 2023-05-19
Payer: MEDICARE

## 2023-05-26 ENCOUNTER — HOSPITAL ENCOUNTER (OUTPATIENT)
Dept: CT IMAGING | Facility: HOSPITAL | Age: 73
Discharge: HOME | End: 2023-05-26
Attending: NURSE PRACTITIONER | Admitting: NURSE PRACTITIONER
Payer: MEDICARE

## 2023-05-26 DIAGNOSIS — C7A.090 ATYPICAL CARCINOID LUNG TUMOR (H): ICD-10-CM

## 2023-05-26 DIAGNOSIS — N40.0 BENIGN PROSTATIC HYPERPLASIA WITHOUT LOWER URINARY TRACT SYMPTOMS: ICD-10-CM

## 2023-05-26 LAB
CREAT BLD-MCNC: 1.1 MG/DL (ref 0.7–1.3)
GFR SERPL CREATININE-BSD FRML MDRD: >60 ML/MIN/1.73M2

## 2023-05-26 PROCEDURE — 82565 ASSAY OF CREATININE: CPT

## 2023-05-26 PROCEDURE — 74177 CT ABD & PELVIS W/CONTRAST: CPT

## 2023-05-26 PROCEDURE — 250N000011 HC RX IP 250 OP 636: Performed by: NURSE PRACTITIONER

## 2023-05-26 RX ORDER — IOPAMIDOL 755 MG/ML
90 INJECTION, SOLUTION INTRAVASCULAR ONCE
Status: COMPLETED | OUTPATIENT
Start: 2023-05-26 | End: 2023-05-26

## 2023-05-26 RX ADMIN — IOPAMIDOL 90 ML: 755 INJECTION, SOLUTION INTRAVENOUS at 08:33

## 2023-05-31 ENCOUNTER — LAB (OUTPATIENT)
Dept: INFUSION THERAPY | Facility: HOSPITAL | Age: 73
End: 2023-05-31
Attending: NURSE PRACTITIONER
Payer: MEDICARE

## 2023-05-31 ENCOUNTER — ONCOLOGY VISIT (OUTPATIENT)
Dept: ONCOLOGY | Facility: HOSPITAL | Age: 73
End: 2023-05-31
Attending: NURSE PRACTITIONER
Payer: MEDICARE

## 2023-05-31 VITALS
OXYGEN SATURATION: 94 % | TEMPERATURE: 97.6 F | SYSTOLIC BLOOD PRESSURE: 137 MMHG | RESPIRATION RATE: 18 BRPM | WEIGHT: 165.1 LBS | HEIGHT: 66 IN | BODY MASS INDEX: 26.53 KG/M2 | DIASTOLIC BLOOD PRESSURE: 82 MMHG | HEART RATE: 66 BPM

## 2023-05-31 DIAGNOSIS — E04.1 THYROID NODULE: ICD-10-CM

## 2023-05-31 DIAGNOSIS — C7A.090 ATYPICAL CARCINOID LUNG TUMOR (H): ICD-10-CM

## 2023-05-31 DIAGNOSIS — I71.23 ANEURYSM OF DESCENDING THORACIC AORTA WITHOUT RUPTURE (H): ICD-10-CM

## 2023-05-31 DIAGNOSIS — C7A.090 ATYPICAL CARCINOID LUNG TUMOR (H): Primary | ICD-10-CM

## 2023-05-31 LAB
ALBUMIN SERPL BCG-MCNC: 4 G/DL (ref 3.5–5.2)
ALP SERPL-CCNC: 63 U/L (ref 40–129)
ALT SERPL W P-5'-P-CCNC: 14 U/L (ref 10–50)
ANION GAP SERPL CALCULATED.3IONS-SCNC: 10 MMOL/L (ref 7–15)
AST SERPL W P-5'-P-CCNC: 24 U/L (ref 10–50)
BASOPHILS # BLD AUTO: 0.1 10E3/UL (ref 0–0.2)
BASOPHILS NFR BLD AUTO: 1 %
BILIRUB SERPL-MCNC: 0.5 MG/DL
BUN SERPL-MCNC: 21.1 MG/DL (ref 8–23)
CALCIUM SERPL-MCNC: 9.8 MG/DL (ref 8.8–10.2)
CHLORIDE SERPL-SCNC: 107 MMOL/L (ref 98–107)
CREAT SERPL-MCNC: 1.07 MG/DL (ref 0.67–1.17)
DEPRECATED HCO3 PLAS-SCNC: 25 MMOL/L (ref 22–29)
EOSINOPHIL # BLD AUTO: 0.2 10E3/UL (ref 0–0.7)
EOSINOPHIL NFR BLD AUTO: 3 %
ERYTHROCYTE [DISTWIDTH] IN BLOOD BY AUTOMATED COUNT: 12.8 % (ref 10–15)
GFR SERPL CREATININE-BSD FRML MDRD: 73 ML/MIN/1.73M2
GLUCOSE SERPL-MCNC: 66 MG/DL (ref 70–99)
HCT VFR BLD AUTO: 46.3 % (ref 40–53)
HGB BLD-MCNC: 15.7 G/DL (ref 13.3–17.7)
IMM GRANULOCYTES # BLD: 0 10E3/UL
IMM GRANULOCYTES NFR BLD: 0 %
LYMPHOCYTES # BLD AUTO: 1.2 10E3/UL (ref 0.8–5.3)
LYMPHOCYTES NFR BLD AUTO: 19 %
MCH RBC QN AUTO: 30.8 PG (ref 26.5–33)
MCHC RBC AUTO-ENTMCNC: 33.9 G/DL (ref 31.5–36.5)
MCV RBC AUTO: 91 FL (ref 78–100)
MONOCYTES # BLD AUTO: 0.5 10E3/UL (ref 0–1.3)
MONOCYTES NFR BLD AUTO: 8 %
NEUTROPHILS # BLD AUTO: 4.5 10E3/UL (ref 1.6–8.3)
NEUTROPHILS NFR BLD AUTO: 69 %
NRBC # BLD AUTO: 0 10E3/UL
NRBC BLD AUTO-RTO: 0 /100
PLATELET # BLD AUTO: 239 10E3/UL (ref 150–450)
POTASSIUM SERPL-SCNC: 3.8 MMOL/L (ref 3.4–5.3)
PROT SERPL-MCNC: 7 G/DL (ref 6.4–8.3)
RBC # BLD AUTO: 5.1 10E6/UL (ref 4.4–5.9)
SODIUM SERPL-SCNC: 142 MMOL/L (ref 136–145)
WBC # BLD AUTO: 6.5 10E3/UL (ref 4–11)

## 2023-05-31 PROCEDURE — 99214 OFFICE O/P EST MOD 30 MIN: CPT | Performed by: NURSE PRACTITIONER

## 2023-05-31 PROCEDURE — 36415 COLL VENOUS BLD VENIPUNCTURE: CPT

## 2023-05-31 PROCEDURE — G0463 HOSPITAL OUTPT CLINIC VISIT: HCPCS | Performed by: NURSE PRACTITIONER

## 2023-05-31 PROCEDURE — 85025 COMPLETE CBC W/AUTO DIFF WBC: CPT

## 2023-05-31 PROCEDURE — 80053 COMPREHEN METABOLIC PANEL: CPT

## 2023-05-31 ASSESSMENT — PAIN SCALES - GENERAL: PAINLEVEL: NO PAIN (0)

## 2023-05-31 NOTE — LETTER
"    5/31/2023         RE: Jack Brown  [address]  [place] [address]        Dear Colleague,    Thank you for referring your patient, Jack Brown, to the [facility]. Please see a copy of my visit note below.    Oncology Rooming Note    May 31, 2023 8:39 AM   Jack Brown is a 72 year old male who presents for:    Chief Complaint   Patient presents with     Oncology Clinic Visit     Atypical carcinoid lung tumor (H)     Initial Vitals: /82   Pulse 66   Temp 97.6  F (36.4  C)   Resp 18   Ht 1.676 m (5' 6\")   Wt 74.9 kg (165 lb 1.6 oz)   SpO2 94%   BMI 26.65 kg/m   Estimated body mass index is 26.65 kg/m  as calculated from the following:    Height as of this encounter: 1.676 m (5' 6\").    Weight as of this encounter: 74.9 kg (165 lb 1.6 oz). Body surface area is 1.87 meters squared.  No Pain (0) Comment: Data Unavailable   No LMP for male patient.  Allergies reviewed: Yes  Medications reviewed: Yes    Medications: Medication refills not needed today.  Pharmacy name entered into [employer]: [facility] #[id number] - [place], [place] - [address] AT [facility] [address]    Clinical concerns: None       Corry Rasheed LPN              [facility] Hematology and Oncology Progress Note    Patient: Jack Brown  MRN: [id number]  Date of Service: 05/09/2022        Reason for Visit    Chief Complaint   Patient presents with     Oncology Clinic Visit     Atypical carcinoid lung tumor (H)       Assessment and Plan     Cancer Staging   No matching staging information was found for the patient.    1. Lung carcinoid tumor: currently on observation and doing well. CT scan shows no recurrent disease. Will continue annually imaging. Will see Dr. Harmon next year to discuss stopping surveillance.     2. Enlarged prostate: overall stable. BPH symptoms gone after starting finasteride. Last MRI of prostate was stable in 2021.     3. 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    4. Thyroid nodule: US in 2021 was stable, 1.2cm nodule, likely benign. Will repeat in one year.      ECOG Performance    0 - Independent    Distress Screening (within last 30 days)    No data recorded     Pain  Pain Score: No Pain (0)    Problem List    Patient Active Problem List   Diagnosis     Hypertension     Hyperlipemia     Visual Impairment     Nasal drainage     Coronary artery disease due to lipid rich plaque     S/P CABG x 3     Thyroid nodule     Urinary retention     Atypical carcinoid lung tumor (H)     Muscle soreness     History of melanoma     History of squamous cell carcinoma     History of basal cell carcinoma     Hypertension     Visual impairment     Gross hematuria        ______________________________________________________________________________    History of Present Illness    Diagnosis:     1.  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     Treatment:     Right middle lobectomy in April 2016: Pathology shows atypical carcinoid tumor.  2.2 cm.  No lymphovascular invasion.  15 lymph nodes tested all negative.  Less than 2 mitoses per 10 high-power field.     Interim History:    Patient is here today for a 1 year follow-up visit.  He states that over the last year he has been feeling pretty good and healthy.  Nothing major is going on with his health.  He denies any new bone or back pain.  Denies any new breathing issues.  Denies any coughing.  Denies any infectious complaints. Weight is stable. Happy with how he is doing. A little anxious to get CT results.     Review of Systems    Pertinent items are noted in HPI.    Past History    Past Medical History:   Diagnosis Date     BPH (benign prostatic hypertrophy)      Cancer (H)     Skin on R.chest wall.     Chest discomfort      Coronary artery disease      Detached retina, left Oct 2014     Hyperlipidemia      Hypertension      PHYSICAL EXAM  /82   Pulse  "66   Temp 97.6  F (36.4  C)   Resp 18   Ht 1.676 m (5' 6\")   Wt 74.9 kg (165 lb 1.6 oz)   SpO2 94%   BMI 26.65 kg/m      GENERAL: no acute distress. Cooperative in conversation. Here alone. Mask on  RESP: Regular respiratory rate. Clear lungs bilaterally. No expiratory wheezes   CV: RRR, no murmur  MUSCULOSKELETAL: no bilateral leg swelling  NEURO: non focal. Alert and oriented x3.   PSYCH: within normal limits. No depression or anxiety.  SKIN: exposed skin is dry intact.     Lab Results    Recent Results (from the past 168 hour(s))   Creatinine POCT   Result Value Ref Range    Creatinine POCT 1.1 0.7 - 1.3 mg/dL    GFR, ESTIMATED POCT >60 >60 mL/min/1.73m2   CBC with platelets and differential   Result Value Ref Range    WBC Count 6.5 4.0 - 11.0 10e3/uL    RBC Count 5.10 4.40 - 5.90 10e6/uL    Hemoglobin 15.7 13.3 - 17.7 g/dL    Hematocrit 46.3 40.0 - 53.0 %    MCV 91 78 - 100 fL    MCH 30.8 26.5 - 33.0 pg    MCHC 33.9 31.5 - 36.5 g/dL    RDW 12.8 10.0 - 15.0 %    Platelet Count 239 150 - 450 10e3/uL    % Neutrophils 69 %    % Lymphocytes 19 %    % Monocytes 8 %    % Eosinophils 3 %    % Basophils 1 %    % Immature Granulocytes 0 %    NRBCs per 100 WBC 0 <1 /100    Absolute Neutrophils 4.5 1.6 - 8.3 10e3/uL    Absolute Lymphocytes 1.2 0.8 - 5.3 10e3/uL    Absolute Monocytes 0.5 0.0 - 1.3 10e3/uL    Absolute Eosinophils 0.2 0.0 - 0.7 10e3/uL    Absolute Basophils 0.1 0.0 - 0.2 10e3/uL    Absolute Immature Granulocytes 0.0 <=0.4 10e3/uL    Absolute NRBCs 0.0 10e3/uL       Imaging    CT Chest/Abdomen/Pelvis w Contrast    Result Date: 5/26/2023  EXAM: CT CHEST/ABDOMEN/PELVIS W CONTRAST LOCATION: [facility] DATE/TIME: 5/26/2023 8:35 AM CDT INDICATION: Atypical carcinoid lung tumor (H). Benign prostatic hyperplasia without lower urinary tract symptoms. COMPARISON: CT chest, abdomen, pelvis, 04/27/2022. TECHNIQUE: CT scan of the chest, abdomen, and pelvis was performed following injection of " IV contrast. Multiplanar reformats were obtained. Dose reduction techniques were used. CONTRAST: 90 mL Isovue 370 FINDINGS: LUNGS AND PLEURA: Right middle lobectomy. Some branching nodularity involving the right lower lobe on image 48 of series 4 is stable and likely reflects some inspissated debris within a peripheral airway. There are no new findings within the lungs. MEDIASTINUM/AXILLAE: Heart size is normal. Thoracic aorta is normal in caliber. No lymphadenopathy. CORONARY ARTERY CALCIFICATION: Moderate. HEPATOBILIARY: Normal. PANCREAS: Normal. SPLEEN: Normal. ADRENAL GLANDS: Stable nodularity of the bilateral adrenal glands, left more than right. KIDNEYS/BLADDER: Bilateral benign-appearing renal cysts are again noted. There is a 2 mm stone involving the lower pole of the right kidney without hydronephrosis. This is unchanged. BOWEL: Normal. LYMPH NODES: Normal. VASCULATURE: Fusiform ectasia of the dominant aorta is again noted. No significant change in bilateral distal common iliac artery aneurysms measuring 3.2 cm on the left and 2.4 cm on the right. There is associated atheromatous plaque involving these aneurysms. PELVIC ORGANS: No significant change in marked prostatomegaly. MUSCULOSKELETAL: There are no suspicious osseous lesions.     IMPRESSION: 1.  Surgical changes of a right middle lobectomy. No evidence of recurrent or metastatic disease within the chest, abdomen or pelvis. 2.  Unchanged marked prostatomegaly. 3.  Stable 2 mm right renal stone without hydronephrosis. 4.  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        Signed by: LILI Ch CNP      Again, thank you for allowing me to participate in the care of your patient.        Sincerely,        LILI Ch CNP

## 2023-05-31 NOTE — PROGRESS NOTES
"Oncology Rooming Note    May 31, 2023 8:39 AM   Jack Brown is a 72 year old male who presents for:    Chief Complaint   Patient presents with     Oncology Clinic Visit     Atypical carcinoid lung tumor (H)     Initial Vitals: /82   Pulse 66   Temp 97.6  F (36.4  C)   Resp 18   Ht 1.676 m (5' 6\")   Wt 74.9 kg (165 lb 1.6 oz)   SpO2 94%   BMI 26.65 kg/m   Estimated body mass index is 26.65 kg/m  as calculated from the following:    Height as of this encounter: 1.676 m (5' 6\").    Weight as of this encounter: 74.9 kg (165 lb 1.6 oz). Body surface area is 1.87 meters squared.  No Pain (0) Comment: Data Unavailable   No LMP for male patient.  Allergies reviewed: Yes  Medications reviewed: Yes    Medications: Medication refills not needed today.  Pharmacy name entered into [employer]: [facility] #[id number] - [place], [place] -  [address] [facility] [address]    Clinical concerns: None       Corry Rasheed LPN            "

## 2023-05-31 NOTE — PROGRESS NOTES
[facility] Hematology and Oncology Progress Note    Patient: Jack Brown  MRN: [id number]  Date of Service: 05/09/2022        Reason for Visit    Chief Complaint   Patient presents with     Oncology Clinic Visit     Atypical carcinoid lung tumor (H)       Assessment and Plan     Cancer Staging   No matching staging information was found for the patient.    1. Lung carcinoid tumor: currently on observation and doing well. CT scan shows no recurrent disease. Will continue annually imaging. Will see Dr. Harmon next year to discuss stopping surveillance.     2. Enlarged prostate: overall stable. BPH symptoms gone after starting finasteride. Last MRI of prostate was stable in 2021.     3. 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    4. Thyroid nodule: US in 2021 was stable, 1.2cm nodule, likely benign. Will repeat in one year.      ECOG Performance    0 - Independent    Distress Screening (within last 30 days)    No data recorded     Pain  Pain Score: No Pain (0)    Problem List    Patient Active Problem List   Diagnosis     Hypertension     Hyperlipemia     Visual Impairment     Nasal drainage     Coronary artery disease due to lipid rich plaque     S/P CABG x 3     Thyroid nodule     Urinary retention     Atypical carcinoid lung tumor (H)     Muscle soreness     History of melanoma     History of squamous cell carcinoma     History of basal cell carcinoma     Hypertension     Visual impairment     Gross hematuria        ______________________________________________________________________________    History of Present Illness    Diagnosis:     1.  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     Treatment:     Right middle lobectomy in April 2016: Pathology shows atypical carcinoid tumor.  2.2 cm.  No lymphovascular invasion.  15 lymph nodes tested all negative.  Less than 2 mitoses per 10 high-power field.     Interim History:    Patient is here today for a 1 year  "follow-up visit.  He states that over the last year he has been feeling pretty good and healthy.  Nothing major is going on with his health.  He denies any new bone or back pain.  Denies any new breathing issues.  Denies any coughing.  Denies any infectious complaints. Weight is stable. Happy with how he is doing. A little anxious to get CT results.     Review of Systems    Pertinent items are noted in HPI.    Past History    Past Medical History:   Diagnosis Date     BPH (benign prostatic hypertrophy)      Cancer (H)     Skin on R.chest wall.     Chest discomfort      Coronary artery disease      Detached retina, left Oct 2014     Hyperlipidemia      Hypertension      PHYSICAL EXAM  /82   Pulse 66   Temp 97.6  F (36.4  C)   Resp 18   Ht 1.676 m (5' 6\")   Wt 74.9 kg (165 lb 1.6 oz)   SpO2 94%   BMI 26.65 kg/m      GENERAL: no acute distress. Cooperative in conversation. Here alone. Mask on  RESP: Regular respiratory rate. Clear lungs bilaterally. No expiratory wheezes   CV: RRR, no murmur  MUSCULOSKELETAL: no bilateral leg swelling  NEURO: non focal. Alert and oriented x3.   PSYCH: within normal limits. No depression or anxiety.  SKIN: exposed skin is dry intact.     Lab Results    Recent Results (from the past 168 hour(s))   Creatinine POCT   Result Value Ref Range    Creatinine POCT 1.1 0.7 - 1.3 mg/dL    GFR, ESTIMATED POCT >60 >60 mL/min/1.73m2   CBC with platelets and differential   Result Value Ref Range    WBC Count 6.5 4.0 - 11.0 10e3/uL    RBC Count 5.10 4.40 - 5.90 10e6/uL    Hemoglobin 15.7 13.3 - 17.7 g/dL    Hematocrit 46.3 40.0 - 53.0 %    MCV 91 78 - 100 fL    MCH 30.8 26.5 - 33.0 pg    MCHC 33.9 31.5 - 36.5 g/dL    RDW 12.8 10.0 - 15.0 %    Platelet Count 239 150 - 450 10e3/uL    % Neutrophils 69 %    % Lymphocytes 19 %    % Monocytes 8 %    % Eosinophils 3 %    % Basophils 1 %    % Immature Granulocytes 0 %    NRBCs per 100 WBC 0 <1 /100    Absolute Neutrophils 4.5 1.6 - 8.3 10e3/uL    " Absolute Lymphocytes 1.2 0.8 - 5.3 10e3/uL    Absolute Monocytes 0.5 0.0 - 1.3 10e3/uL    Absolute Eosinophils 0.2 0.0 - 0.7 10e3/uL    Absolute Basophils 0.1 0.0 - 0.2 10e3/uL    Absolute Immature Granulocytes 0.0 <=0.4 10e3/uL    Absolute NRBCs 0.0 10e3/uL       Imaging    CT Chest/Abdomen/Pelvis w Contrast    Result Date: 5/26/2023  EXAM: CT CHEST/ABDOMEN/PELVIS W CONTRAST LOCATION: [facility] DATE/TIME: 5/26/2023 8:35 AM CDT INDICATION: Atypical carcinoid lung tumor (H). Benign prostatic hyperplasia without lower urinary tract symptoms. COMPARISON: CT chest, abdomen, pelvis, 04/27/2022. TECHNIQUE: CT scan of the chest, abdomen, and pelvis was performed following injection of IV contrast. Multiplanar reformats were obtained. Dose reduction techniques were used. CONTRAST: 90 mL Isovue 370 FINDINGS: LUNGS AND PLEURA: Right middle lobectomy. Some branching nodularity involving the right lower lobe on image 48 of series 4 is stable and likely reflects some inspissated debris within a peripheral airway. There are no new findings within the lungs. MEDIASTINUM/AXILLAE: Heart size is normal. Thoracic aorta is normal in caliber. No lymphadenopathy. CORONARY ARTERY CALCIFICATION: Moderate. HEPATOBILIARY: Normal. PANCREAS: Normal. SPLEEN: Normal. ADRENAL GLANDS: Stable nodularity of the bilateral adrenal glands, left more than right. KIDNEYS/BLADDER: Bilateral benign-appearing renal cysts are again noted. There is a 2 mm stone involving the lower pole of the right kidney without hydronephrosis. This is unchanged. BOWEL: Normal. LYMPH NODES: Normal. VASCULATURE: Fusiform ectasia of the dominant aorta is again noted. No significant change in bilateral distal common iliac artery aneurysms measuring 3.2 cm on the left and 2.4 cm on the right. There is associated atheromatous plaque involving these aneurysms. PELVIC ORGANS: No significant change in marked prostatomegaly. MUSCULOSKELETAL: There are no  suspicious osseous lesions.     IMPRESSION: 1.  Surgical changes of a right middle lobectomy. No evidence of recurrent or metastatic disease within the chest, abdomen or pelvis. 2.  Unchanged marked prostatomegaly. 3.  Stable 2 mm right renal stone without hydronephrosis. 4.  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        Signed by: LILI Ch CNP

## 2023-06-03 ENCOUNTER — PATIENT OUTREACH (OUTPATIENT)
Dept: CARE COORDINATION | Facility: CLINIC | Age: 73
End: 2023-06-03
Payer: MEDICARE

## 2023-06-03 DIAGNOSIS — E78.5 HYPERLIPIDEMIA, UNSPECIFIED HYPERLIPIDEMIA TYPE: ICD-10-CM

## 2023-06-03 DIAGNOSIS — Z95.1 S/P CABG X 3: ICD-10-CM

## 2023-06-03 DIAGNOSIS — I10 HYPERTENSION, UNSPECIFIED TYPE: ICD-10-CM

## 2023-06-03 DIAGNOSIS — I25.10 CORONARY ARTERY DISEASE DUE TO LIPID RICH PLAQUE: ICD-10-CM

## 2023-06-03 DIAGNOSIS — I25.83 CORONARY ARTERY DISEASE DUE TO LIPID RICH PLAQUE: ICD-10-CM

## 2023-06-05 RX ORDER — ROSUVASTATIN CALCIUM 5 MG/1
2.5 TABLET, COATED ORAL DAILY
Qty: 45 TABLET | Refills: 3 | Status: SHIPPED | OUTPATIENT
Start: 2023-06-05 | End: 2023-10-10

## 2023-06-06 NOTE — TELEPHONE ENCOUNTER
06/06/2023 Pt called in to make follow up appt and the Dr has nothing avail in [facility] location for him to go to and he cannot go to the [place] or [place] Location added him to waitlist for opening suggestions. Needs refill of medication approved please follow up with Pt.

## 2023-06-09 ENCOUNTER — TRANSFERRED RECORDS (OUTPATIENT)
Dept: HEALTH INFORMATION MANAGEMENT | Facility: CLINIC | Age: 73
End: 2023-06-09
Payer: MEDICARE

## 2023-06-27 DIAGNOSIS — I10 HTN (HYPERTENSION): ICD-10-CM

## 2023-06-28 DIAGNOSIS — I10 HTN (HYPERTENSION): ICD-10-CM

## 2023-06-28 RX ORDER — TAMSULOSIN HYDROCHLORIDE 0.4 MG/1
CAPSULE ORAL
Qty: 30 CAPSULE | Refills: 0 | Status: SHIPPED | OUTPATIENT
Start: 2023-06-28 | End: 2023-08-04

## 2023-06-28 NOTE — TELEPHONE ENCOUNTER
I have not seen patient since 5/20/21.  Colleagues provided year's refill last year.  Has appointment with me 7/18/23 - will give shot refill only

## 2023-06-28 NOTE — TELEPHONE ENCOUNTER
"Routing refill request to provider for review/approval because:  Patient needs to be seen because it has been more than 1 year since last office visit.  Last Written Prescription Date:  6/29/22  Last Fill Quantity: 90,  # refills: 3   Last office visit provider:  5/23/22 w/ Dr Aranda    Requested Prescriptions   Pending Prescriptions Disp Refills     tamsulosin (FLOMAX) 0.4 MG capsule [Pharmacy Med Name: TAMSULOSIN 0.4MG CAPSULES] 90 capsule 3     Sig: TAKE 1 CAPSULE BY MOUTH DAILY AFTER BREAKFAST       Alpha Blockers Passed - 6/27/2023 10:41 AM        Passed - Blood pressure under 140/90 in past 12 months     BP Readings from Last 3 Encounters:   05/31/23 137/82   11/28/22 (!) 153/89   05/31/22 132/80                 Passed - Recent (12 mo) or future (30 days) visit within the authorizing provider's specialty     Patient has had an office visit with the authorizing provider or a provider within the authorizing providers department within the previous 12 mos or has a future within next 30 days. See \"Patient Info\" tab in inbasket, or \"Choose Columns\" in Meds & Orders section of the refill encounter.              Passed - Patient does not have Tadalafil, Vardenafil, or Sildenafil on their medication list        Passed - Medication is active on med list        Passed - Patient is 18 years of age or older             Aline Blake RN 06/28/23 9:04 AM  "

## 2023-06-29 RX ORDER — TAMSULOSIN HYDROCHLORIDE 0.4 MG/1
CAPSULE ORAL
Qty: 90 CAPSULE | OUTPATIENT
Start: 2023-06-29

## 2023-07-15 DIAGNOSIS — I10 BENIGN ESSENTIAL HYPERTENSION: ICD-10-CM

## 2023-07-15 RX ORDER — METOPROLOL TARTRATE 50 MG
TABLET ORAL
Qty: 180 TABLET | Refills: 1 | Status: SHIPPED | OUTPATIENT
Start: 2023-07-15 | End: 2024-02-23

## 2023-07-15 NOTE — TELEPHONE ENCOUNTER
"Last Written Prescription Date:  6/17/2022  Last Fill Quantity: 180,  # refills: 3   Last office visit provider:  11/28/2022     Requested Prescriptions   Pending Prescriptions Disp Refills    metoprolol tartrate (LOPRESSOR) 50 MG tablet [Pharmacy Med Name: METOPROLOL TARTRATE 50MG TABLETS] 180 tablet 3     Sig: TAKE 1 TABLET(50 MG) BY MOUTH TWICE DAILY       Beta-Blockers Protocol Passed - 7/15/2023  1:29 PM        Passed - Blood pressure under 140/90 in past 12 months     BP Readings from Last 3 Encounters:   05/31/23 137/82   11/28/22 (!) 153/89   05/31/22 132/80                 Passed - Patient is age 6 or older        Passed - Recent (12 mo) or future (30 days) visit within the authorizing provider's specialty     Patient has had an office visit with the authorizing provider or a provider within the authorizing providers department within the previous 12 mos or has a future within next 30 days. See \"Patient Info\" tab in inbasket, or \"Choose Columns\" in Meds & Orders section of the refill encounter.              Passed - Medication is active on med list             SEDA DIEGO RN 07/15/23 2:54 PM  "

## 2023-08-04 ENCOUNTER — OFFICE VISIT (OUTPATIENT)
Dept: FAMILY MEDICINE | Facility: CLINIC | Age: 73
End: 2023-08-04
Payer: MEDICARE

## 2023-08-04 VITALS
RESPIRATION RATE: 17 BRPM | HEIGHT: 65 IN | OXYGEN SATURATION: 95 % | WEIGHT: 164 LBS | DIASTOLIC BLOOD PRESSURE: 78 MMHG | TEMPERATURE: 97.4 F | BODY MASS INDEX: 27.32 KG/M2 | HEART RATE: 60 BPM | SYSTOLIC BLOOD PRESSURE: 135 MMHG

## 2023-08-04 DIAGNOSIS — I10 PRIMARY HYPERTENSION: ICD-10-CM

## 2023-08-04 DIAGNOSIS — Z00.00 ENCOUNTER FOR MEDICARE ANNUAL WELLNESS EXAM: Primary | ICD-10-CM

## 2023-08-04 DIAGNOSIS — Z23 NEED FOR SHINGLES VACCINE: ICD-10-CM

## 2023-08-04 PROCEDURE — 99213 OFFICE O/P EST LOW 20 MIN: CPT | Mod: 25 | Performed by: FAMILY MEDICINE

## 2023-08-04 PROCEDURE — G0439 PPPS, SUBSEQ VISIT: HCPCS | Performed by: FAMILY MEDICINE

## 2023-08-04 RX ORDER — TAMSULOSIN HYDROCHLORIDE 0.4 MG/1
CAPSULE ORAL
Qty: 90 CAPSULE | Refills: 3 | Status: SHIPPED | OUTPATIENT
Start: 2023-08-04 | End: 2024-01-19

## 2023-08-04 ASSESSMENT — ENCOUNTER SYMPTOMS
DIARRHEA: 0
ABDOMINAL PAIN: 0
HEARTBURN: 0
NERVOUS/ANXIOUS: 0
CHILLS: 0
DYSURIA: 0
JOINT SWELLING: 0
PARESTHESIAS: 0
NAUSEA: 0
HEMATURIA: 0
HEMATOCHEZIA: 0
COUGH: 0
WEAKNESS: 0
EYE PAIN: 0
SORE THROAT: 0
FREQUENCY: 0
SHORTNESS OF BREATH: 0
FEVER: 0
CONSTIPATION: 0
ARTHRALGIAS: 0
HEADACHES: 0
PALPITATIONS: 0
MYALGIAS: 0
DIZZINESS: 0

## 2023-08-04 ASSESSMENT — ACTIVITIES OF DAILY LIVING (ADL): CURRENT_FUNCTION: NO ASSISTANCE NEEDED

## 2023-08-04 NOTE — PROGRESS NOTES
"  ASSESSMENT / PLAN:   Jack was seen today for annual visit and recheck medication.    Diagnoses and all orders for this visit:    Encounter for Medicare annual wellness exam    Need for shingles vaccine    - discussed - he will inquire with his pharmacist    Primary hypertension  -     tamsulosin (FLOMAX) 0.4 MG capsule; TAKE 1 CAPSULE BY MOUTH DAILY AFTER BREAKFAST    Other orders  -     REVIEW OF HEALTH MAINTENANCE PROTOCOL ORDERS  -     PRIMARY CARE FOLLOW-UP SCHEDULING; Future        Return in about 53 weeks (around 8/9/2024) for medicare annual wellness.  \      COUNSELING:  Reviewed preventive health counseling, as reflected in patient instructions      BMI:   Estimated body mass index is 27 kg/m  as calculated from the following:    Height as of this encounter: 1.66 m (5' 5.35\").    Weight as of this encounter: 74.4 kg (164 lb).         He reports that he has never smoked. He has never used smokeless tobacco.      Appropriate preventive services were discussed with this patient, including applicable screening as appropriate for cardiovascular disease, diabetes, osteopenia/osteoporosis, and glaucoma.  As appropriate for age/gender, discussed screening for colorectal cancer, prostate cancer, breast cancer, and cervical cancer. Checklist reviewing preventive services available has been given to the patient.    Reviewed patients plan of care and provided an AVS. The Basic Care Plan (routine screening as documented in Health Maintenance) for Jack meets the Care Plan requirement. This Care Plan has been established and reviewed with the Patient.    Mercedes Adorno MD  [facility]    Identified Health Risks:  I have reviewed Opioid Use Disorder and Substance Use Disorder risk factors  - no referrals are necessary. Answers submitted by the patient for this visit:  SUBJECTIVE:   Jack is a 72 year old who presents for Preventive Visit.      8/4/2023     8:21 AM   Additional " "Questions   Roomed by roque         8/4/2023     8:21 AM   Patient Reported Additional Medications   Patient reports taking the following new medications no       Are you in the first 12 months of your Medicare coverage?  No    Healthy Habits:     In general, how would you rate your overall health?  Excellent    Frequency of exercise:  6-7 days/week    Duration of exercise:  15-30 minutes    Do you usually eat at least 4 servings of fruit and vegetables a day, include whole grains    & fiber and avoid regularly eating high fat or \"junk\" foods?  Yes    Taking medications regularly:  Yes    Medication side effects:  None    Ability to successfully perform activities of daily living:  No assistance needed    Home Safety:  No safety concerns identified    Hearing Impairment:  No hearing concerns    In the past 6 months, have you been bothered by leaking of urine?  No    In general, how would you rate your overall mental or emotional health?  Excellent    Additional concerns today:  No    Jack mostly gets care elsewhere for chronic issues.      It turns out I am the one that prescribes tamsulosin -the diagnosis that is linked to this is hypertension, but he also says this was for BPH for which she sees urology.  He also takes finasteride for this because tamsulosin alone was not adequate .He had no family history prostate cancer - declines screen    He sees Dr. Estrada for  follow-up of his ASCVD and abdominal aortic aneurysm.  He will wait till his next upcoming visit with Dr. Estrada to refill statin and do his lipid panel.  He is not fasting today.    He sees oncology for history of carcinoid yusra or-they do yearly competence of metabolic panel, so no need for me to check that     He takes Vitamin D over-the-counter    Vitamin D, Total (25-Hydroxy) 30.0 - 80.0 ng/mL 33.7 33.7 28.9 Low  28.9 Low  44.9 44.9 19.1 Low  19.1 Low        Have you ever done Advance Care Planning? (For example, a Health Directive, POLST, " or a discussion with a medical provider or your loved ones about your wishes): Yes, advance care planning is on file. - no it is not - new       Fall risk  Fallen 2 or more times in the past year?: No  Any fall with injury in the past year?: No    Cognitive Screening   1) Repeat 3 items (Leader, Season, Table)    2) Clock draw: NORMAL  3) 3 item recall: Recalls 3 objects  Results: 3 items recalled: COGNITIVE IMPAIRMENT LESS LIKELY    Mini-CogTM Copyright ELIAS Abbott. Licensed by the author for use in [facility] menuvox; reprinted with permission (radha@[facility]). All rights reserved.      Do you have sleep apnea, excessive snoring or daytime drowsiness? : no    Reviewed and updated as needed this visit by clinical staff   Tobacco  Allergies  Meds              Reviewed and updated as needed this visit by Provider                 Social History     Tobacco Use    Smoking status: Never    Smokeless tobacco: Never   Substance Use Topics    Alcohol use: Yes     Alcohol/week: 8.0 standard drinks of alcohol             8/4/2023     8:20 AM   Alcohol Use   Prescreen: >3 drinks/day or >7 drinks/week? No     Do you use any other controlled substances or medications that are not prescribed by a provider? None      Current providers sharing in care for this patient include: Patient Care Team:  Mercedes Adorno MD as PCP - General (Family Medicine)  Orville Harmon MD as MD (Hematology & Oncology)  Veronica Costa CNP as Nurse Practitioner (Urology)  Kalee Hummel, RN as Specialty Care Coordinator (Urology)  Veronica Costa CNP as Assigned Surgical Provider  Bobbi Camacho APRN CNP as Assigned Cancer Care Provider  Frantz Singh MD as MD (Cardiovascular Disease)  Yobany Estrada MD as Assigned Heart and Vascular Provider  Lelo Aranda MD as Assigned PCP    The following health maintenance items are reviewed in Epic and correct as of today:  Health Maintenance   Topic Date Due    ZOSTER IMMUNIZATION (1 of 2)  "Never done    COVID-19 Vaccine (4 - Pfizer series) 01/18/2022    MEDICARE ANNUAL WELLNESS VISIT  05/23/2023    ANNUAL REVIEW OF HM ORDERS  05/23/2023    INFLUENZA VACCINE (1) 09/01/2023    FALL RISK ASSESSMENT  08/04/2024    ADVANCE CARE PLANNING  05/23/2027    LIPID  08/12/2027    COLORECTAL CANCER SCREENING  03/05/2028    DTAP/TDAP/TD IMMUNIZATION (4 - Td or Tdap) 01/07/2030    HEPATITIS C SCREENING  Completed    PHQ-2 (once per calendar year)  Completed    Pneumococcal Vaccine: 65+ Years  Completed    AORTIC ANEURYSM SCREENING (SYSTEM ASSIGNED)  Completed    IPV IMMUNIZATION  Aged Out    MENINGITIS IMMUNIZATION  Aged Out         Review of Systems   Constitutional:  Negative for chills and fever.   HENT:  Negative for congestion, ear pain, hearing loss and sore throat.    Eyes:  Negative for pain and visual disturbance.   Respiratory:  Negative for cough and shortness of breath.    Cardiovascular:  Negative for chest pain, palpitations and peripheral edema.   Gastrointestinal:  Negative for abdominal pain, constipation, diarrhea, heartburn, hematochezia and nausea.   Genitourinary:  Negative for dysuria, frequency, genital sores, hematuria, impotence, penile discharge and urgency.   Musculoskeletal:  Negative for arthralgias, joint swelling and myalgias.   Skin:  Negative for rash.   Neurological:  Negative for dizziness, weakness, headaches and paresthesias.   Psychiatric/Behavioral:  Negative for mood changes. The patient is not nervous/anxious.          OBJECTIVE:   /78 (BP Location: Left arm, Patient Position: Sitting, Cuff Size: Adult Regular)   Pulse 60   Temp 97.4  F (36.3  C) (Oral)   Resp 17   Ht 1.66 m (5' 5.35\")   Wt 74.4 kg (164 lb)   SpO2 95%   BMI 27.00 kg/m   Estimated body mass index is 27 kg/m  as calculated from the following:    Height as of this encounter: 1.66 m (5' 5.35\").    Weight as of this encounter: 74.4 kg (164 lb).  Physical Exam  General appearance - alert, well " appearing, and in no distress  Mental status - normal mood, behavior, speech, dress, motor activity, and thought processes  Eyes -deferred  - recent eye exam  Ears - bilateral TM's and external ear canals normal  Mouth - mucous membranes moist, pharynx normal without lesions  Neck - supple, no significant adenopathy, carotids upstroke normal bilaterally, no bruits, thyroid exam: thyroid is normal in size without nodules or tenderness  Chest - clear to auscultation, no wheezes, rales or rhonchi, symmetric air entry  Heart - normal rate, regular rhythm, normal S1, S2, no murmurs, rubs, clicks or gallops  Abdomen - soft, nontender, nondistended, no masses or organomegaly  Neurological - alert, oriented, normal speech, no focal findings or movement disorder noted, DTR's normal and symmetric  Extremities - peripheral pulses normal, no pedal edema, no clubbing or cyanosis  Skin - no rashes or worrisome lesions

## 2023-08-04 NOTE — PATIENT INSTRUCTIONS
"Check with your pharmacist about getting shingles (\"shingrix\") vaccine    Please mail or drop by a copy of your advanced directive  Patient Education   Personalized Prevention Plan  You are due for the preventive services outlined below.  Your care team is available to assist you in scheduling these services.  If you have already completed any of these items, please share that information with your care team to update in your medical record.  Health Maintenance Due   Topic Date Due     Zoster (Shingles) Vaccine (1 of 2) Never done     COVID-19 Vaccine (4 - Pfizer series) 01/18/2022     Annual Wellness Visit  05/23/2023        "

## 2023-08-06 ENCOUNTER — HEALTH MAINTENANCE LETTER (OUTPATIENT)
Age: 73
End: 2023-08-06

## 2023-10-10 ENCOUNTER — OFFICE VISIT (OUTPATIENT)
Dept: CARDIOLOGY | Facility: CLINIC | Age: 73
End: 2023-10-10
Attending: INTERNAL MEDICINE
Payer: MEDICARE

## 2023-10-10 VITALS
BODY MASS INDEX: 26.83 KG/M2 | HEART RATE: 52 BPM | SYSTOLIC BLOOD PRESSURE: 120 MMHG | WEIGHT: 163 LBS | DIASTOLIC BLOOD PRESSURE: 74 MMHG | RESPIRATION RATE: 14 BRPM

## 2023-10-10 DIAGNOSIS — I25.10 CORONARY ARTERY DISEASE DUE TO LIPID RICH PLAQUE: ICD-10-CM

## 2023-10-10 DIAGNOSIS — Z95.1 S/P CABG X 3: ICD-10-CM

## 2023-10-10 DIAGNOSIS — M79.622 PAIN OF LEFT UPPER ARM: ICD-10-CM

## 2023-10-10 DIAGNOSIS — I25.83 CORONARY ARTERY DISEASE DUE TO LIPID RICH PLAQUE: ICD-10-CM

## 2023-10-10 DIAGNOSIS — E78.5 HYPERLIPIDEMIA, UNSPECIFIED HYPERLIPIDEMIA TYPE: ICD-10-CM

## 2023-10-10 DIAGNOSIS — R94.31 NONSPECIFIC ABNORMAL ELECTROCARDIOGRAM (ECG) (EKG): Primary | ICD-10-CM

## 2023-10-10 DIAGNOSIS — I10 HYPERTENSION, UNSPECIFIED TYPE: ICD-10-CM

## 2023-10-10 PROCEDURE — 99214 OFFICE O/P EST MOD 30 MIN: CPT | Performed by: INTERNAL MEDICINE

## 2023-10-10 RX ORDER — ROSUVASTATIN CALCIUM 5 MG/1
5 TABLET, COATED ORAL DAILY
Qty: 90 TABLET | Refills: 3 | Status: SHIPPED | OUTPATIENT
Start: 2023-10-10 | End: 2023-12-15

## 2023-10-10 NOTE — LETTER
10/10/2023    Mercedes Adorno MD  [address] [place] [address]    RE: Jack Brown       Dear Colleague,     I had the pleasure of seeing Jack Brown in the [facility].      [facility]  [phone]          Assessment/Recommendations   Patient with known coronary artery disease, status post bypass surgery in 2015.  He is reasonably stable but does get some left shoulder, armpit type discomfort after gardening.  I am not highly suspicious of an anginal equivalent but he has not had any evaluation of his left ventricular systolic function or for myocardial ischemia since bypass surgery 8 years ago.  We will get a stress test to evaluate him and will need imaging because he has had an abnormal EKG from an ST-T wave standpoint in the past.    We will also increase his rosuvastatin to 5 mg a day as he is tolerating this without difficulty.  His LDL cholesterol came down nicely but was not at goal.  We will repeat a lipid panel in 3 months.    We will get back to him with the results of the stress test and any further recommendations.    Thank you for allowing us to participate in his care.     History of Present Illness/Subjective    Mr. Jack Brown is a 72 year old male with known coronary artery disease, status post bypass surgery which was performed in 2015.  He has been feeling well.  He walks and does gardening.  After gardening he can sometimes get a discomfort in his left upper arm, armpit, shoulder area.  This typically does not happen at the time he is gardening but after he gardens.  He denies any unusual shortness of breath with physical activity, orthopnea, paroxysmal nocturnal dyspnea, peripheral edema, syncope or near syncopal episodes.  LDL cholesterol came down nicely with low-dose rosuvastatin and he did not notice any side effects from the rosuvastatin.           Physical Examination Review of Systems   /74 (BP Location: Left  arm, Patient Position: Sitting, Cuff Size: Adult Regular)   Pulse 52   Resp 14   Wt 73.9 kg (163 lb)   BMI 26.83 kg/m    Body mass index is 26.83 kg/m .  Wt Readings from Last 3 Encounters:   10/10/23 73.9 kg (163 lb)   08/04/23 74.4 kg (164 lb)   05/31/23 74.9 kg (165 lb 1.6 oz)     General Appearance:   Alert, cooperative and in no acute distress.   ENT/Mouth: Pink/moist oral mucosa   EYES:  no scleral icterus, normal conjunctivae   Neck: JVP normal. No Hepatojugular reflux. Thyroid not visualized.   Chest/Lungs:   Lungs are clear to auscultation, equal chest wall expansion.   Cardiovascular:   S1, S2 without murmur ,clicks or rubs. Brachial, radial pulses are intact and symetric. No carotid bruits noted   Abdomen:  Nontender. BS+.    Extremities: No cyanosis, clubbing or edema   Skin: no xanthelasma, warm.    Neurologic: normal arm movement bilateral, no tremors     Psychiatric: Appropriate affect.      Enc Vitals  BP: 120/74  Pulse: 52  Resp: 14  Weight: 73.9 kg (163 lb)                                           Medical History  Surgical History Family History Social History   Past Medical History:   Diagnosis Date    BPH (benign prostatic hypertrophy)     Cancer (H)     Skin on R.chest wall.    Chest discomfort     Coronary artery disease     Detached retina, left Oct 2014    Hyperlipidemia     Hypertension     Past Surgical History:   Procedure Laterality Date    BYPASS GRAFT ARTERY CORONARY N/A 12/28/2015    Procedure: CORONARY ARTERY BYPASS x 3, RIGHT ENDOSCOPIC VEIN HARVEST, LEFT INTERNAL MAMMARY ARTERY, ANESTHESIA TRANSESOPHAGEAL ECHOCARDIOGRAM;  Surgeon: Jayson Alvarado MD;  Location: [facility];  Service:     CARDIAC SURGERY      CATARACT EXTRACTION  2015    CYST REMOVAL      Ganglion cyst removal of both wrist    HC REMOVAL PREPATELLA BURSA      Description: Excision Of Prepatellar Bursa;  Recorded: 09/30/2014;    HC REPAIR OF NASAL SEPTUM      Description: Septoplasty;  Recorded:  09/30/2014;    HERNIA REPAIR, UMBILICAL      PARS PLANA VITRECTOMY W/ REPAIR OF MACULAR HOLE  Nov 2014    DE EXCIS TENDON SHEATH LESION, HAND/FINGER      Description: Hand Excision Of A Tendon Cyst;  Recorded: 01/04/2011;    DE LAP, VENTRAL HERNIA REPAIR,REDUCIBLE      Description: Laparoscopy Repair Of Umbilical Hernia;  Recorded: 01/04/2011;    RETINAL DETACHMENT SURGERY  oct 2014    SKIN CANCER EXCISION  Nov 2015    right chest    THORACOSCOPY Right 4/14/2016    Procedure: RIGHT VIDEO ASSISTED THORACOSCOPY, RIGHT LOBECTOMY;  Surgeon: Vinny Chase MD;  Location: [facility];  Service:     VASECTOMY      Family History   Problem Relation Age of Onset    Acute Myocardial Infarction Mother     Aneurysm Mother         brain    Acute Myocardial Infarction Father     Acute Myocardial Infarction Brother     Aortic aneurysm Brother         d @ 72    Lung Cancer Cousin     Coronary Artery Disease Cousin     Leukemia Brother         d @ 55    Colitis Brother     Colon Cancer Paternal Grandfather     Social History     Socioeconomic History    Marital status:      Spouse name: Not on file    Number of children: Not on file    Years of education: Not on file    Highest education level: Not on file   Occupational History    Not on file   Tobacco Use    Smoking status: Never    Smokeless tobacco: Never   Substance and Sexual Activity    Alcohol use: Yes     Alcohol/week: 8.0 standard drinks of alcohol    Drug use: No    Sexual activity: Never   Other Topics Concern    Not on file   Social History Narrative    Not on file     Social Determinants of Health     Financial Resource Strain: Not on file   Food Insecurity: Not on file   Transportation Needs: Not on file   Physical Activity: Not on file   Stress: Not on file   Social Connections: Not on file   Interpersonal Safety: Not on file   Housing Stability: Not on file          Medications  Allergies   Current Outpatient Medications   Medication Sig  Dispense Refill    acetaminophen (TYLENOL) 500 MG tablet [ACETAMINOPHEN (TYLENOL) 500 MG TABLET] Take 500 mg by mouth every 6 (six) hours as needed for pain.      amLODIPine (NORVASC) 10 MG tablet TAKE 1 TABLET(10 MG) BY MOUTH DAILY 90 tablet 0    aspirin 81 mg chewable tablet [ASPIRIN 81 MG CHEWABLE TABLET] Chew 81 mg daily.      finasteride (PROSCAR) 5 MG tablet Take 1 tablet (5 mg) by mouth daily 90 tablet 3    metoprolol tartrate (LOPRESSOR) 50 MG tablet TAKE 1 TABLET(50 MG) BY MOUTH TWICE DAILY 180 tablet 1    rosuvastatin (CRESTOR) 5 MG tablet Take 1 tablet (5 mg) by mouth daily 90 tablet 3    tamsulosin (FLOMAX) 0.4 MG capsule TAKE 1 CAPSULE BY MOUTH DAILY AFTER BREAKFAST 90 capsule 3    VITAMIN D3 25 MCG (1000 UT) tablet Take 1 tablet by mouth daily      Allergies   Allergen Reactions    Niacin Hives and Rash     Burning of the skin    Atorvastatin Muscle Pain (Myalgia)    Pravastatin Muscle Pain (Myalgia)         Lab Results    Chemistry/lipid CBC Cardiac Enzymes/BNP/TSH/INR   Lab Results   Component Value Date    CHOL 177 08/12/2022    HDL 43 08/12/2022    TRIG 102 08/12/2022    BUN 21.1 05/31/2023     05/31/2023    CO2 25 05/31/2023    Lab Results   Component Value Date    WBC 6.5 05/31/2023    HGB 15.7 05/31/2023    HCT 46.3 05/31/2023    MCV 91 05/31/2023     05/31/2023    Lab Results   Component Value Date    TSH 1.25 10/25/2018                                                Thank you for allowing me to participate in the care of your patient.      Sincerely,     Yobany Estrada MD     [facility]  cc:   Yobany Estrada MD  [address]  [place] [address]

## 2023-10-10 NOTE — PROGRESS NOTES
[facility]  [phone]          Assessment/Recommendations   Patient with known coronary artery disease, status post bypass surgery in 2015.  He is reasonably stable but does get some left shoulder, armpit type discomfort after gardening.  I am not highly suspicious of an anginal equivalent but he has not had any evaluation of his left ventricular systolic function or for myocardial ischemia since bypass surgery 8 years ago.  We will get a stress test to evaluate him and will need imaging because he has had an abnormal EKG from an ST-T wave standpoint in the past.    We will also increase his rosuvastatin to 5 mg a day as he is tolerating this without difficulty.  His LDL cholesterol came down nicely but was not at goal.  We will repeat a lipid panel in 3 months.    We will get back to him with the results of the stress test and any further recommendations.    Thank you for allowing us to participate in his care.     History of Present Illness/Subjective    Mr. Jack Brown is a 72 year old male with known coronary artery disease, status post bypass surgery which was performed in 2015.  He has been feeling well.  He walks and does gardening.  After gardening he can sometimes get a discomfort in his left upper arm, armpit, shoulder area.  This typically does not happen at the time he is gardening but after he gardens.  He denies any unusual shortness of breath with physical activity, orthopnea, paroxysmal nocturnal dyspnea, peripheral edema, syncope or near syncopal episodes.  LDL cholesterol came down nicely with low-dose rosuvastatin and he did not notice any side effects from the rosuvastatin.           Physical Examination Review of Systems   /74 (BP Location: Left arm, Patient Position: Sitting, Cuff Size: Adult Regular)   Pulse 52   Resp 14   Wt 73.9 kg (163 lb)   BMI 26.83 kg/m    Body mass index is 26.83 kg/m .  Wt Readings from Last 3 Encounters:   10/10/23 73.9 kg (163  lb)   08/04/23 74.4 kg (164 lb)   05/31/23 74.9 kg (165 lb 1.6 oz)     General Appearance:   Alert, cooperative and in no acute distress.   ENT/Mouth: Pink/moist oral mucosa   EYES:  no scleral icterus, normal conjunctivae   Neck: JVP normal. No Hepatojugular reflux. Thyroid not visualized.   Chest/Lungs:   Lungs are clear to auscultation, equal chest wall expansion.   Cardiovascular:   S1, S2 without murmur ,clicks or rubs. Brachial, radial pulses are intact and symetric. No carotid bruits noted   Abdomen:  Nontender. BS+.    Extremities: No cyanosis, clubbing or edema   Skin: no xanthelasma, warm.    Neurologic: normal arm movement bilateral, no tremors     Psychiatric: Appropriate affect.      Enc Vitals  BP: 120/74  Pulse: 52  Resp: 14  Weight: 73.9 kg (163 lb)                                           Medical History  Surgical History Family History Social History   Past Medical History:   Diagnosis Date    BPH (benign prostatic hypertrophy)     Cancer (H)     Skin on R.chest wall.    Chest discomfort     Coronary artery disease     Detached retina, left Oct 2014    Hyperlipidemia     Hypertension     Past Surgical History:   Procedure Laterality Date    BYPASS GRAFT ARTERY CORONARY N/A 12/28/2015    Procedure: CORONARY ARTERY BYPASS x 3, RIGHT ENDOSCOPIC VEIN HARVEST, LEFT INTERNAL MAMMARY ARTERY, ANESTHESIA TRANSESOPHAGEAL ECHOCARDIOGRAM;  Surgeon: Jayson Alvarado MD;  Location: [facility];  Service:     CARDIAC SURGERY      CATARACT EXTRACTION  2015    CYST REMOVAL      Ganglion cyst removal of both wrist    HC REMOVAL PREPATELLA BURSA      Description: Excision Of Prepatellar Bursa;  Recorded: 09/30/2014;    HC REPAIR OF NASAL SEPTUM      Description: Septoplasty;  Recorded: 09/30/2014;    HERNIA REPAIR, UMBILICAL      PARS PLANA VITRECTOMY W/ REPAIR OF MACULAR HOLE  Nov 2014    [place] EXCIS TENDON SHEATH LESION, HAND/FINGER      Description: Hand Excision Of A Tendon Cyst;  Recorded:  01/04/2011;    ND LAP, VENTRAL HERNIA REPAIR,REDUCIBLE      Description: Laparoscopy Repair Of Umbilical Hernia;  Recorded: 01/04/2011;    RETINAL DETACHMENT SURGERY  oct 2014    SKIN CANCER EXCISION  Nov 2015    right chest    THORACOSCOPY Right 4/14/2016    Procedure: RIGHT VIDEO ASSISTED THORACOSCOPY, RIGHT LOBECTOMY;  Surgeon: Vinny Chase MD;  Location: [facility];  Service:     VASECTOMY      Family History   Problem Relation Age of Onset    Acute Myocardial Infarction Mother     Aneurysm Mother         brain    Acute Myocardial Infarction Father     Acute Myocardial Infarction Brother     Aortic aneurysm Brother         d @ 72    Lung Cancer Cousin     Coronary Artery Disease Cousin     Leukemia Brother         d @ 55    Colitis Brother     Colon Cancer Paternal Grandfather     Social History     Socioeconomic History    Marital status:      Spouse name: Not on file    Number of children: Not on file    Years of education: Not on file    Highest education level: Not on file   Occupational History    Not on file   Tobacco Use    Smoking status: Never    Smokeless tobacco: Never   Substance and Sexual Activity    Alcohol use: Yes     Alcohol/week: 8.0 standard drinks of alcohol    Drug use: No    Sexual activity: Never   Other Topics Concern    Not on file   Social History Narrative    Not on file     Social Determinants of Health     Financial Resource Strain: Not on file   Food Insecurity: Not on file   Transportation Needs: Not on file   Physical Activity: Not on file   Stress: Not on file   Social Connections: Not on file   Interpersonal Safety: Not on file   Housing Stability: Not on file          Medications  Allergies   Current Outpatient Medications   Medication Sig Dispense Refill    acetaminophen (TYLENOL) 500 MG tablet [ACETAMINOPHEN (TYLENOL) 500 MG TABLET] Take 500 mg by mouth every 6 (six) hours as needed for pain.      amLODIPine (NORVASC) 10 MG tablet TAKE 1 TABLET(10  MG) BY MOUTH DAILY 90 tablet 0    aspirin 81 mg chewable tablet [ASPIRIN 81 MG CHEWABLE TABLET] Chew 81 mg daily.      finasteride (PROSCAR) 5 MG tablet Take 1 tablet (5 mg) by mouth daily 90 tablet 3    metoprolol tartrate (LOPRESSOR) 50 MG tablet TAKE 1 TABLET(50 MG) BY MOUTH TWICE DAILY 180 tablet 1    rosuvastatin (CRESTOR) 5 MG tablet Take 1 tablet (5 mg) by mouth daily 90 tablet 3    tamsulosin (FLOMAX) 0.4 MG capsule TAKE 1 CAPSULE BY MOUTH DAILY AFTER BREAKFAST 90 capsule 3    VITAMIN D3 25 MCG (1000 UT) tablet Take 1 tablet by mouth daily      Allergies   Allergen Reactions    Niacin Hives and Rash     Burning of the skin    Atorvastatin Muscle Pain (Myalgia)    Pravastatin Muscle Pain (Myalgia)         Lab Results    Chemistry/lipid CBC Cardiac Enzymes/BNP/TSH/INR   Lab Results   Component Value Date    CHOL 177 08/12/2022    HDL 43 08/12/2022    TRIG 102 08/12/2022    BUN 21.1 05/31/2023     05/31/2023    CO2 25 05/31/2023    Lab Results   Component Value Date    WBC 6.5 05/31/2023    HGB 15.7 05/31/2023    HCT 46.3 05/31/2023    MCV 91 05/31/2023     05/31/2023    Lab Results   Component Value Date    TSH 1.25 10/25/2018

## 2023-10-16 ENCOUNTER — HOSPITAL ENCOUNTER (OUTPATIENT)
Dept: NUCLEAR MEDICINE | Facility: HOSPITAL | Age: 73
Discharge: HOME | End: 2023-10-16
Attending: INTERNAL MEDICINE
Payer: MEDICARE

## 2023-10-16 ENCOUNTER — HOSPITAL ENCOUNTER (OUTPATIENT)
Dept: CARDIOLOGY | Facility: HOSPITAL | Age: 73
Discharge: HOME | End: 2023-10-16
Attending: INTERNAL MEDICINE
Payer: MEDICARE

## 2023-10-16 DIAGNOSIS — I25.83 CORONARY ARTERY DISEASE DUE TO LIPID RICH PLAQUE: ICD-10-CM

## 2023-10-16 DIAGNOSIS — M79.622 PAIN OF LEFT UPPER ARM: ICD-10-CM

## 2023-10-16 DIAGNOSIS — E78.5 HYPERLIPIDEMIA, UNSPECIFIED HYPERLIPIDEMIA TYPE: ICD-10-CM

## 2023-10-16 DIAGNOSIS — I10 HYPERTENSION, UNSPECIFIED TYPE: ICD-10-CM

## 2023-10-16 DIAGNOSIS — Z95.1 S/P CABG X 3: ICD-10-CM

## 2023-10-16 DIAGNOSIS — I25.10 CORONARY ARTERY DISEASE DUE TO LIPID RICH PLAQUE: ICD-10-CM

## 2023-10-16 DIAGNOSIS — R94.31 NONSPECIFIC ABNORMAL ELECTROCARDIOGRAM (ECG) (EKG): ICD-10-CM

## 2023-10-16 LAB
CV STRESS CURRENT BP HE: NORMAL
CV STRESS CURRENT HR HE: 102
CV STRESS CURRENT HR HE: 111
CV STRESS CURRENT HR HE: 112
CV STRESS CURRENT HR HE: 114
CV STRESS CURRENT HR HE: 125
CV STRESS CURRENT HR HE: 128
CV STRESS CURRENT HR HE: 128
CV STRESS CURRENT HR HE: 129
CV STRESS CURRENT HR HE: 130
CV STRESS CURRENT HR HE: 131
CV STRESS CURRENT HR HE: 132
CV STRESS CURRENT HR HE: 132
CV STRESS CURRENT HR HE: 145
CV STRESS CURRENT HR HE: 147
CV STRESS CURRENT HR HE: 147
CV STRESS CURRENT HR HE: 151
CV STRESS CURRENT HR HE: 153
CV STRESS CURRENT HR HE: 70
CV STRESS CURRENT HR HE: 72
CV STRESS CURRENT HR HE: 75
CV STRESS CURRENT HR HE: 75
CV STRESS CURRENT HR HE: 76
CV STRESS CURRENT HR HE: 77
CV STRESS CURRENT HR HE: 79
CV STRESS CURRENT HR HE: 82
CV STRESS CURRENT HR HE: 82
CV STRESS CURRENT HR HE: 87
CV STRESS CURRENT HR HE: 92
CV STRESS CURRENT HR HE: 96
CV STRESS CURRENT HR HE: 99
CV STRESS DEVIATION TIME HE: NORMAL
CV STRESS ECHO PERCENT HR HE: NORMAL
CV STRESS EXERCISE STAGE HE: NORMAL
CV STRESS EXERCISE STAGE REACHED HE: NORMAL
CV STRESS FINAL RESTING BP HE: NORMAL
CV STRESS FINAL RESTING HR HE: 70
CV STRESS MAX HR HE: 151
CV STRESS MAX TREADMILL GRADE HE: 14
CV STRESS MAX TREADMILL SPEED HE: 3.4
CV STRESS PEAK DIA BP HE: NORMAL
CV STRESS PEAK SYS BP HE: NORMAL
CV STRESS PHASE HE: NORMAL
CV STRESS PROTOCOL HE: NORMAL
CV STRESS RESTING PT POSITION HE: NORMAL
CV STRESS RESTING PT POSITION HE: NORMAL
CV STRESS ST DEVIATION AMOUNT HE: NORMAL
CV STRESS ST DEVIATION ELEVATION HE: NORMAL
CV STRESS ST EVELATION AMOUNT HE: NORMAL
CV STRESS TEST TYPE HE: NORMAL
CV STRESS TOTAL STAGE TIME MIN 1 HE: NORMAL
NUC STRESS EJECTION FRACTION: 52 %
RATE PRESSURE PRODUCT: NORMAL
STRESS ANGINA INDEX: 0
STRESS ECHO BASELINE HR: 104
STRESS ECHO CALCULATED PERCENT HR: 103 %
STRESS ECHO LAST STRESS DIASTOLIC BP: 92
STRESS ECHO LAST STRESS HR: 153
STRESS ECHO LAST STRESS SYSTOLIC BP: 164
STRESS ECHO POST ESTIMATED WORKLOAD: 7.3
STRESS ECHO POST EXERCISE DUR MIN: 6
STRESS ECHO POST EXERCISE DUR SEC: 1
STRESS ECHO TARGET HR: 146

## 2023-10-16 PROCEDURE — A9500 TC99M SESTAMIBI: HCPCS | Performed by: INTERNAL MEDICINE

## 2023-10-16 PROCEDURE — G1010 CDSM STANSON: HCPCS | Performed by: GENERAL ACUTE CARE HOSPITAL

## 2023-10-16 PROCEDURE — 78452 HT MUSCLE IMAGE SPECT MULT: CPT | Mod: ME

## 2023-10-16 PROCEDURE — 343N000001 HC RX 343: Performed by: INTERNAL MEDICINE

## 2023-10-16 PROCEDURE — 93018 CV STRESS TEST I&R ONLY: CPT | Performed by: GENERAL ACUTE CARE HOSPITAL

## 2023-10-16 PROCEDURE — 93016 CV STRESS TEST SUPVJ ONLY: CPT | Performed by: INTERNAL MEDICINE

## 2023-10-16 PROCEDURE — 93017 CV STRESS TEST TRACING ONLY: CPT

## 2023-10-16 PROCEDURE — 78452 HT MUSCLE IMAGE SPECT MULT: CPT | Mod: 26 | Performed by: GENERAL ACUTE CARE HOSPITAL

## 2023-10-16 RX ADMIN — Medication 31.1 MILLICURIE: at 09:37

## 2023-10-16 RX ADMIN — Medication 8.4 MILLICURIE: at 08:03

## 2023-10-26 DIAGNOSIS — I10 ESSENTIAL HYPERTENSION: ICD-10-CM

## 2023-10-26 RX ORDER — AMLODIPINE BESYLATE 10 MG/1
TABLET ORAL
Qty: 90 TABLET | Refills: 2 | Status: SHIPPED | OUTPATIENT
Start: 2023-10-26 | End: 2024-07-29

## 2023-12-15 ENCOUNTER — HOSPITAL ENCOUNTER (INPATIENT)
Facility: HOSPITAL | Age: 73
LOS: 5 days | Discharge: HOME | DRG: 300 | End: 2023-12-20
Attending: EMERGENCY MEDICINE | Admitting: INTERNAL MEDICINE
Payer: MEDICARE

## 2023-12-15 ENCOUNTER — OFFICE VISIT (OUTPATIENT)
Dept: FAMILY MEDICINE | Facility: CLINIC | Age: 73
End: 2023-12-15
Payer: MEDICARE

## 2023-12-15 ENCOUNTER — APPOINTMENT (OUTPATIENT)
Dept: CT IMAGING | Facility: HOSPITAL | Age: 73
DRG: 300 | End: 2023-12-15
Attending: EMERGENCY MEDICINE
Payer: MEDICARE

## 2023-12-15 ENCOUNTER — TELEPHONE (OUTPATIENT)
Dept: FAMILY MEDICINE | Facility: CLINIC | Age: 73
End: 2023-12-15

## 2023-12-15 ENCOUNTER — HOSPITAL ENCOUNTER (OUTPATIENT)
Dept: ULTRASOUND IMAGING | Facility: HOSPITAL | Age: 73
Discharge: HOME | DRG: 300 | End: 2023-12-15
Admitting: RADIOLOGY
Payer: MEDICARE

## 2023-12-15 VITALS
TEMPERATURE: 97.6 F | BODY MASS INDEX: 26.83 KG/M2 | SYSTOLIC BLOOD PRESSURE: 142 MMHG | WEIGHT: 163 LBS | DIASTOLIC BLOOD PRESSURE: 78 MMHG | HEART RATE: 57 BPM | RESPIRATION RATE: 18 BRPM | OXYGEN SATURATION: 95 %

## 2023-12-15 DIAGNOSIS — I48.0 PAROXYSMAL ATRIAL FIBRILLATION (H): ICD-10-CM

## 2023-12-15 DIAGNOSIS — M79.662 PAIN OF LEFT LOWER LEG: ICD-10-CM

## 2023-12-15 DIAGNOSIS — I72.4 POPLITEAL ARTERY ANEURYSM (H): Primary | ICD-10-CM

## 2023-12-15 DIAGNOSIS — I70.90 ARTERIAL OCCLUSION: ICD-10-CM

## 2023-12-15 DIAGNOSIS — M79.662 PAIN OF LEFT LOWER LEG: Primary | ICD-10-CM

## 2023-12-15 LAB
ALBUMIN SERPL BCG-MCNC: 4.1 G/DL (ref 3.5–5.2)
ALP SERPL-CCNC: 75 U/L (ref 40–150)
ALT SERPL W P-5'-P-CCNC: 11 U/L (ref 0–70)
ANION GAP SERPL CALCULATED.3IONS-SCNC: 10 MMOL/L (ref 7–15)
APTT PPP: 31 SECONDS (ref 22–38)
AST SERPL W P-5'-P-CCNC: 29 U/L (ref 0–45)
BASOPHILS # BLD AUTO: 0.1 10E3/UL (ref 0–0.2)
BASOPHILS NFR BLD AUTO: 1 %
BILIRUB DIRECT SERPL-MCNC: <0.2 MG/DL (ref 0–0.3)
BILIRUB SERPL-MCNC: 0.4 MG/DL
BUN SERPL-MCNC: 23.6 MG/DL (ref 8–23)
CALCIUM SERPL-MCNC: 9.8 MG/DL (ref 8.8–10.2)
CHLORIDE SERPL-SCNC: 103 MMOL/L (ref 98–107)
CREAT SERPL-MCNC: 0.91 MG/DL (ref 0.67–1.17)
D DIMER PPP FEU-MCNC: 2.75 UG/ML FEU (ref 0–0.5)
DEPRECATED HCO3 PLAS-SCNC: 25 MMOL/L (ref 22–29)
EGFRCR SERPLBLD CKD-EPI 2021: 88 ML/MIN/1.73M2
EOSINOPHIL # BLD AUTO: 0.2 10E3/UL (ref 0–0.7)
EOSINOPHIL NFR BLD AUTO: 3 %
ERYTHROCYTE [DISTWIDTH] IN BLOOD BY AUTOMATED COUNT: 12.9 % (ref 10–15)
GLUCOSE SERPL-MCNC: 117 MG/DL (ref 70–99)
HCT VFR BLD AUTO: 47 % (ref 40–53)
HGB BLD-MCNC: 16.1 G/DL (ref 13.3–17.7)
HOLD SPECIMEN: NORMAL
IMM GRANULOCYTES # BLD: 0 10E3/UL
IMM GRANULOCYTES NFR BLD: 0 %
INR PPP: 1.02 (ref 0.85–1.15)
LYMPHOCYTES # BLD AUTO: 1.7 10E3/UL (ref 0.8–5.3)
LYMPHOCYTES NFR BLD AUTO: 21 %
MCH RBC QN AUTO: 30.5 PG (ref 26.5–33)
MCHC RBC AUTO-ENTMCNC: 34.3 G/DL (ref 31.5–36.5)
MCV RBC AUTO: 89 FL (ref 78–100)
MONOCYTES # BLD AUTO: 0.7 10E3/UL (ref 0–1.3)
MONOCYTES NFR BLD AUTO: 9 %
NEUTROPHILS # BLD AUTO: 5.5 10E3/UL (ref 1.6–8.3)
NEUTROPHILS NFR BLD AUTO: 66 %
NRBC # BLD AUTO: 0 10E3/UL
NRBC BLD AUTO-RTO: 0 /100
PLATELET # BLD AUTO: 267 10E3/UL (ref 150–450)
POTASSIUM SERPL-SCNC: 4.4 MMOL/L (ref 3.4–5.3)
PROT SERPL-MCNC: 7.4 G/DL (ref 6.4–8.3)
RBC # BLD AUTO: 5.28 10E6/UL (ref 4.4–5.9)
SODIUM SERPL-SCNC: 138 MMOL/L (ref 135–145)
WBC # BLD AUTO: 8.3 10E3/UL (ref 4–11)

## 2023-12-15 PROCEDURE — 250N000011 HC RX IP 250 OP 636: Mod: JZ | Performed by: EMERGENCY MEDICINE

## 2023-12-15 PROCEDURE — 96365 THER/PROPH/DIAG IV INF INIT: CPT

## 2023-12-15 PROCEDURE — 80061 LIPID PANEL: CPT | Performed by: INTERNAL MEDICINE

## 2023-12-15 PROCEDURE — 200N000001 HC R&B ICU

## 2023-12-15 PROCEDURE — 85730 THROMBOPLASTIN TIME PARTIAL: CPT | Performed by: EMERGENCY MEDICINE

## 2023-12-15 PROCEDURE — 82040 ASSAY OF SERUM ALBUMIN: CPT | Performed by: EMERGENCY MEDICINE

## 2023-12-15 PROCEDURE — 96376 TX/PRO/DX INJ SAME DRUG ADON: CPT

## 2023-12-15 PROCEDURE — 85025 COMPLETE CBC W/AUTO DIFF WBC: CPT | Performed by: EMERGENCY MEDICINE

## 2023-12-15 PROCEDURE — 80048 BASIC METABOLIC PNL TOTAL CA: CPT | Performed by: EMERGENCY MEDICINE

## 2023-12-15 PROCEDURE — 99223 1ST HOSP IP/OBS HIGH 75: CPT | Mod: AI | Performed by: INTERNAL MEDICINE

## 2023-12-15 PROCEDURE — 85025 COMPLETE CBC W/AUTO DIFF WBC: CPT | Performed by: STUDENT IN AN ORGANIZED HEALTH CARE EDUCATION/TRAINING PROGRAM

## 2023-12-15 PROCEDURE — 85610 PROTHROMBIN TIME: CPT | Performed by: EMERGENCY MEDICINE

## 2023-12-15 PROCEDURE — 99285 EMERGENCY DEPT VISIT HI MDM: CPT | Mod: 25

## 2023-12-15 PROCEDURE — 250N000011 HC RX IP 250 OP 636: Performed by: EMERGENCY MEDICINE

## 2023-12-15 PROCEDURE — 80053 COMPREHEN METABOLIC PANEL: CPT | Performed by: STUDENT IN AN ORGANIZED HEALTH CARE EDUCATION/TRAINING PROGRAM

## 2023-12-15 PROCEDURE — 85379 FIBRIN DEGRADATION QUANT: CPT | Performed by: FAMILY MEDICINE

## 2023-12-15 PROCEDURE — 80048 BASIC METABOLIC PNL TOTAL CA: CPT | Performed by: STUDENT IN AN ORGANIZED HEALTH CARE EDUCATION/TRAINING PROGRAM

## 2023-12-15 PROCEDURE — 36415 COLL VENOUS BLD VENIPUNCTURE: CPT | Performed by: FAMILY MEDICINE

## 2023-12-15 PROCEDURE — 99207 PR INPT ADMISSION FROM CLINIC: CPT | Performed by: FAMILY MEDICINE

## 2023-12-15 PROCEDURE — 83036 HEMOGLOBIN GLYCOSYLATED A1C: CPT | Performed by: INTERNAL MEDICINE

## 2023-12-15 PROCEDURE — 36415 COLL VENOUS BLD VENIPUNCTURE: CPT | Performed by: STUDENT IN AN ORGANIZED HEALTH CARE EDUCATION/TRAINING PROGRAM

## 2023-12-15 PROCEDURE — G1010 CDSM STANSON: HCPCS

## 2023-12-15 PROCEDURE — 93971 EXTREMITY STUDY: CPT | Mod: LT

## 2023-12-15 RX ORDER — HEPARIN SODIUM 10000 [USP'U]/100ML
0-5000 INJECTION, SOLUTION INTRAVENOUS CONTINUOUS
Status: DISCONTINUED | OUTPATIENT
Start: 2023-12-15 | End: 2023-12-16

## 2023-12-15 RX ORDER — IOPAMIDOL 755 MG/ML
100 INJECTION, SOLUTION INTRAVASCULAR ONCE
Status: COMPLETED | OUTPATIENT
Start: 2023-12-15 | End: 2023-12-15

## 2023-12-15 RX ADMIN — HEPARIN SODIUM AND DEXTROSE 1250 UNITS/HR: 10000; 5 INJECTION INTRAVENOUS at 23:04

## 2023-12-15 RX ADMIN — IOPAMIDOL 100 ML: 755 INJECTION, SOLUTION INTRAVENOUS at 21:39

## 2023-12-15 ASSESSMENT — ACTIVITIES OF DAILY LIVING (ADL)
ADLS_ACUITY_SCORE: 33
ADLS_ACUITY_SCORE: 35

## 2023-12-15 NOTE — PROGRESS NOTES
Assessment & Plan     Pain of left lower leg  DDX include sciatica/ dvt but most likley would be PAD type pain given the decreased pulses and pain worse with exertion. Discussed options. No sign limb threatening ischemia. He is on ASA. Recommended vascular eval if d dimer negative. If positive will arrange urgent US today.   - D dimer, quantitative  - D dimer, quantitative       [id number]}    No follow-ups on file.    Sony Guido MD  [facility]    ------------------------------------------------------------------------  Subjective     Jack Brown presents to clinic today for the following health issues:  chief complaint  HPI  Pain left leg for about 4-5 days. Once felt like asleep and was cold but mostly just achy. Worse when active walking. Centerend behind knee. No swelling. No injury nor overuse. Was on a statin but stopped recently due to other muscle aches.     No hip/knee or back pain.         Review of Systems  No const, fever, cv nor resp symptoms.       Objective    BP (!) 142/78 (BP Location: Right arm, Patient Position: Sitting, Cuff Size: Adult Regular)   Pulse 57   Temp 97.6  F (36.4  C) (Oral)   Resp 18   Wt 73.9 kg (163 lb)   SpO2 95%   BMI 26.83 kg/m    Physical Exam   GENERAL: healthy, alert and no distress  MS: hip range of motion is normal without pain. Same with the knee. No efusion or masses. DP and physical therapy pulses decreased on the left compared to the left. There is not pallor but the left foot is mildly cooler than the right. No popliteal mass nor tenderness to palpation .   Straight leg raise did lead to some increase pain posteriorly.    SKIN: no suspicious lesions or rashes    D dimer done-pending

## 2023-12-15 NOTE — TELEPHONE ENCOUNTER
Patient Returning Call    Reason for call:  Returning call    Information relayed to patient:  Pt believes msg left said Dr. Sony Guido or one of his nurses would be calling back and has to do with scheduling an x-ray    Patient has additional questions:  Yes    What are your questions/concerns: What are next steps and what needs to be scheduled and is this time sensitive    Could we send this information to you in [facility] or would you prefer to receive a phone call?:   Patient would prefer a phone call   Okay to leave a detailed message?: Yes at Home number on file [phone] (home)

## 2023-12-16 ENCOUNTER — APPOINTMENT (OUTPATIENT)
Dept: INTERVENTIONAL RADIOLOGY/VASCULAR | Facility: HOSPITAL | Age: 73
DRG: 300 | End: 2023-12-16
Attending: SURGERY
Payer: MEDICARE

## 2023-12-16 ENCOUNTER — APPOINTMENT (OUTPATIENT)
Dept: ULTRASOUND IMAGING | Facility: HOSPITAL | Age: 73
DRG: 300 | End: 2023-12-16
Attending: SURGERY
Payer: MEDICARE

## 2023-12-16 LAB
CHOLEST SERPL-MCNC: 222 MG/DL
ERYTHROCYTE [DISTWIDTH] IN BLOOD BY AUTOMATED COUNT: 12.7 % (ref 10–15)
FIBRINOGEN PPP-MCNC: 342 MG/DL (ref 170–490)
FIBRINOGEN PPP-MCNC: 445 MG/DL (ref 170–490)
HBA1C MFR BLD: 5.5 %
HCT VFR BLD AUTO: 43.4 % (ref 40–53)
HDLC SERPL-MCNC: 41 MG/DL
HGB BLD-MCNC: 14.4 G/DL (ref 13.3–17.7)
HGB BLD-MCNC: 14.9 G/DL (ref 13.3–17.7)
HGB BLD-MCNC: 15.2 G/DL (ref 13.3–17.7)
HGB BLD-MCNC: 15.4 G/DL (ref 13.3–17.7)
HGB BLD-MCNC: 16.3 G/DL (ref 13.3–17.7)
HOLD SPECIMEN: NORMAL
INR PPP: 1.12 (ref 0.85–1.15)
LDLC SERPL CALC-MCNC: 151 MG/DL
MCH RBC QN AUTO: 30.8 PG (ref 26.5–33)
MCHC RBC AUTO-ENTMCNC: 35 G/DL (ref 31.5–36.5)
MCV RBC AUTO: 88 FL (ref 78–100)
NONHDLC SERPL-MCNC: 181 MG/DL
PLATELET # BLD AUTO: 241 10E3/UL (ref 150–450)
RBC # BLD AUTO: 4.93 10E6/UL (ref 4.4–5.9)
TRIGL SERPL-MCNC: 152 MG/DL
UFH PPP CHRO-ACNC: 0.56 IU/ML
UFH PPP CHRO-ACNC: 0.85 IU/ML
WBC # BLD AUTO: 6.6 10E3/UL (ref 4–11)

## 2023-12-16 PROCEDURE — 272N000500 HC NEEDLE CR2

## 2023-12-16 PROCEDURE — C1769 GUIDE WIRE: HCPCS

## 2023-12-16 PROCEDURE — 36415 COLL VENOUS BLD VENIPUNCTURE: CPT | Performed by: INTERNAL MEDICINE

## 2023-12-16 PROCEDURE — 85520 HEPARIN ASSAY: CPT | Performed by: EMERGENCY MEDICINE

## 2023-12-16 PROCEDURE — 272N000566 HC SHEATH CR3

## 2023-12-16 PROCEDURE — 255N000002 HC RX 255 OP 636: Performed by: INTERNAL MEDICINE

## 2023-12-16 PROCEDURE — C1887 CATHETER, GUIDING: HCPCS

## 2023-12-16 PROCEDURE — 85018 HEMOGLOBIN: CPT | Performed by: RADIOLOGY

## 2023-12-16 PROCEDURE — 272N000570 HC SHEATH CR7

## 2023-12-16 PROCEDURE — 258N000003 HC RX IP 258 OP 636: Performed by: RADIOLOGY

## 2023-12-16 PROCEDURE — 75625 CONTRAST EXAM ABDOMINL AORTA: CPT

## 2023-12-16 PROCEDURE — 85520 HEPARIN ASSAY: CPT | Performed by: INTERNAL MEDICINE

## 2023-12-16 PROCEDURE — 250N000011 HC RX IP 250 OP 636: Performed by: INTERNAL MEDICINE

## 2023-12-16 PROCEDURE — 75736 ARTERY X-RAYS PELVIS: CPT

## 2023-12-16 PROCEDURE — B41D1ZZ FLUOROSCOPY OF AORTA AND BILATERAL LOWER EXTREMITY ARTERIES USING LOW OSMOLAR CONTRAST: ICD-10-PCS | Performed by: RADIOLOGY

## 2023-12-16 PROCEDURE — 200N000001 HC R&B ICU

## 2023-12-16 PROCEDURE — 272N000124 HC CATH CR11

## 2023-12-16 PROCEDURE — 36415 COLL VENOUS BLD VENIPUNCTURE: CPT | Performed by: EMERGENCY MEDICINE

## 2023-12-16 PROCEDURE — 85610 PROTHROMBIN TIME: CPT | Performed by: RADIOLOGY

## 2023-12-16 PROCEDURE — 99233 SBSQ HOSP IP/OBS HIGH 50: CPT | Performed by: INTERNAL MEDICINE

## 2023-12-16 PROCEDURE — 250N000013 HC RX MED GY IP 250 OP 250 PS 637: Performed by: INTERNAL MEDICINE

## 2023-12-16 PROCEDURE — 85384 FIBRINOGEN ACTIVITY: CPT | Performed by: RADIOLOGY

## 2023-12-16 PROCEDURE — 250N000011 HC RX IP 250 OP 636: Mod: JZ | Performed by: EMERGENCY MEDICINE

## 2023-12-16 PROCEDURE — 93925 LOWER EXTREMITY STUDY: CPT

## 2023-12-16 PROCEDURE — 99152 MOD SED SAME PHYS/QHP 5/>YRS: CPT

## 2023-12-16 PROCEDURE — 250N000013 HC RX MED GY IP 250 OP 250 PS 637: Performed by: RADIOLOGY

## 2023-12-16 PROCEDURE — 272N000196 HC ACCESSORY CR5

## 2023-12-16 PROCEDURE — 36246 INS CATH ABD/L-EXT ART 2ND: CPT

## 2023-12-16 PROCEDURE — 85027 COMPLETE CBC AUTOMATED: CPT | Performed by: INTERNAL MEDICINE

## 2023-12-16 PROCEDURE — 3E05317 INTRODUCTION OF OTHER THROMBOLYTIC INTO PERIPHERAL ARTERY, PERCUTANEOUS APPROACH: ICD-10-PCS | Performed by: RADIOLOGY

## 2023-12-16 PROCEDURE — 37211 THROMBOLYTIC ART THERAPY: CPT

## 2023-12-16 PROCEDURE — 99222 1ST HOSP IP/OBS MODERATE 55: CPT | Mod: GC | Performed by: SURGERY

## 2023-12-16 PROCEDURE — 272N000117 HC CATH CR2

## 2023-12-16 PROCEDURE — 250N000011 HC RX IP 250 OP 636: Performed by: RADIOLOGY

## 2023-12-16 RX ORDER — HYDROMORPHONE HYDROCHLORIDE 1 MG/ML
0.3 INJECTION, SOLUTION INTRAMUSCULAR; INTRAVENOUS; SUBCUTANEOUS
Status: DISCONTINUED | OUTPATIENT
Start: 2023-12-16 | End: 2023-12-20 | Stop reason: HOSPADM

## 2023-12-16 RX ORDER — HYDROMORPHONE HYDROCHLORIDE 4 MG/1
4 TABLET ORAL EVERY 4 HOURS PRN
Status: DISCONTINUED | OUTPATIENT
Start: 2023-12-16 | End: 2023-12-20 | Stop reason: HOSPADM

## 2023-12-16 RX ORDER — LIDOCAINE 40 MG/G
CREAM TOPICAL
Status: DISCONTINUED | OUTPATIENT
Start: 2023-12-16 | End: 2023-12-20 | Stop reason: HOSPADM

## 2023-12-16 RX ORDER — METOPROLOL TARTRATE 25 MG/1
50 TABLET, FILM COATED ORAL 2 TIMES DAILY
Status: DISCONTINUED | OUTPATIENT
Start: 2023-12-16 | End: 2023-12-20 | Stop reason: HOSPADM

## 2023-12-16 RX ORDER — PROCHLORPERAZINE 25 MG
12.5 SUPPOSITORY, RECTAL RECTAL EVERY 12 HOURS PRN
Status: DISCONTINUED | OUTPATIENT
Start: 2023-12-16 | End: 2023-12-20 | Stop reason: HOSPADM

## 2023-12-16 RX ORDER — SODIUM CHLORIDE 9 MG/ML
INJECTION, SOLUTION INTRAVENOUS CONTINUOUS
Status: DISCONTINUED | OUTPATIENT
Start: 2023-12-17 | End: 2023-12-17

## 2023-12-16 RX ORDER — FINASTERIDE 5 MG/1
5 TABLET, FILM COATED ORAL DAILY
Status: DISCONTINUED | OUTPATIENT
Start: 2023-12-16 | End: 2023-12-20 | Stop reason: HOSPADM

## 2023-12-16 RX ORDER — NALOXONE HYDROCHLORIDE 0.4 MG/ML
0.2 INJECTION, SOLUTION INTRAMUSCULAR; INTRAVENOUS; SUBCUTANEOUS
Status: DISCONTINUED | OUTPATIENT
Start: 2023-12-16 | End: 2023-12-17 | Stop reason: HOSPADM

## 2023-12-16 RX ORDER — LABETALOL HYDROCHLORIDE 5 MG/ML
20 INJECTION, SOLUTION INTRAVENOUS EVERY 4 HOURS PRN
Status: DISCONTINUED | OUTPATIENT
Start: 2023-12-16 | End: 2023-12-20 | Stop reason: HOSPADM

## 2023-12-16 RX ORDER — FLUMAZENIL 0.1 MG/ML
0.2 INJECTION, SOLUTION INTRAVENOUS
Status: DISCONTINUED | OUTPATIENT
Start: 2023-12-16 | End: 2023-12-17 | Stop reason: HOSPADM

## 2023-12-16 RX ORDER — AMLODIPINE BESYLATE 5 MG/1
10 TABLET ORAL DAILY
Status: DISCONTINUED | OUTPATIENT
Start: 2023-12-16 | End: 2023-12-18

## 2023-12-16 RX ORDER — ONDANSETRON 2 MG/ML
4 INJECTION INTRAMUSCULAR; INTRAVENOUS EVERY 6 HOURS PRN
Status: DISCONTINUED | OUTPATIENT
Start: 2023-12-16 | End: 2023-12-16

## 2023-12-16 RX ORDER — TAMSULOSIN HYDROCHLORIDE 0.4 MG/1
0.4 CAPSULE ORAL ONCE
Status: DISCONTINUED | OUTPATIENT
Start: 2023-12-16 | End: 2023-12-16

## 2023-12-16 RX ORDER — FENTANYL CITRATE 50 UG/ML
25-50 INJECTION, SOLUTION INTRAMUSCULAR; INTRAVENOUS EVERY 5 MIN PRN
Status: DISCONTINUED | OUTPATIENT
Start: 2023-12-16 | End: 2023-12-17 | Stop reason: HOSPADM

## 2023-12-16 RX ORDER — HYDROMORPHONE HYDROCHLORIDE 2 MG/1
2 TABLET ORAL EVERY 4 HOURS PRN
Status: DISCONTINUED | OUTPATIENT
Start: 2023-12-16 | End: 2023-12-20 | Stop reason: HOSPADM

## 2023-12-16 RX ORDER — HEPARIN SODIUM 5000 [USP'U]/.5ML
5000 INJECTION, SOLUTION INTRAVENOUS; SUBCUTANEOUS ONCE
Status: COMPLETED | OUTPATIENT
Start: 2023-12-16 | End: 2023-12-16

## 2023-12-16 RX ORDER — AMOXICILLIN 250 MG
1 CAPSULE ORAL 2 TIMES DAILY
Status: DISCONTINUED | OUTPATIENT
Start: 2023-12-16 | End: 2023-12-20 | Stop reason: HOSPADM

## 2023-12-16 RX ORDER — ONDANSETRON 2 MG/ML
4 INJECTION INTRAMUSCULAR; INTRAVENOUS EVERY 6 HOURS PRN
Status: DISCONTINUED | OUTPATIENT
Start: 2023-12-16 | End: 2023-12-20 | Stop reason: HOSPADM

## 2023-12-16 RX ORDER — NALOXONE HYDROCHLORIDE 0.4 MG/ML
0.4 INJECTION, SOLUTION INTRAMUSCULAR; INTRAVENOUS; SUBCUTANEOUS
Status: DISCONTINUED | OUTPATIENT
Start: 2023-12-16 | End: 2023-12-17 | Stop reason: HOSPADM

## 2023-12-16 RX ORDER — ONDANSETRON 4 MG/1
4 TABLET, ORALLY DISINTEGRATING ORAL EVERY 6 HOURS PRN
Status: DISCONTINUED | OUTPATIENT
Start: 2023-12-16 | End: 2023-12-20 | Stop reason: HOSPADM

## 2023-12-16 RX ORDER — AMOXICILLIN 250 MG
1 CAPSULE ORAL 2 TIMES DAILY PRN
Status: DISCONTINUED | OUTPATIENT
Start: 2023-12-16 | End: 2023-12-20 | Stop reason: HOSPADM

## 2023-12-16 RX ORDER — IODIXANOL 320 MG/ML
200 INJECTION, SOLUTION INTRAVASCULAR ONCE
Status: COMPLETED | OUTPATIENT
Start: 2023-12-16 | End: 2023-12-16

## 2023-12-16 RX ORDER — ACETAMINOPHEN 500 MG
500 TABLET ORAL EVERY 6 HOURS PRN
Status: DISCONTINUED | OUTPATIENT
Start: 2023-12-16 | End: 2023-12-20 | Stop reason: HOSPADM

## 2023-12-16 RX ORDER — VITAMIN B COMPLEX
25 TABLET ORAL DAILY
Status: DISCONTINUED | OUTPATIENT
Start: 2023-12-16 | End: 2023-12-20 | Stop reason: HOSPADM

## 2023-12-16 RX ORDER — DOCUSATE SODIUM 100 MG/1
100 CAPSULE, LIQUID FILLED ORAL 2 TIMES DAILY
Status: DISCONTINUED | OUTPATIENT
Start: 2023-12-16 | End: 2023-12-20 | Stop reason: HOSPADM

## 2023-12-16 RX ORDER — HEPARIN SODIUM 10000 [USP'U]/100ML
0-5000 INJECTION, SOLUTION INTRAVENOUS CONTINUOUS
Status: DISCONTINUED | OUTPATIENT
Start: 2023-12-16 | End: 2023-12-16

## 2023-12-16 RX ORDER — HEPARIN SODIUM 10000 [USP'U]/100ML
500 INJECTION, SOLUTION INTRAVENOUS CONTINUOUS
Status: DISCONTINUED | OUTPATIENT
Start: 2023-12-16 | End: 2023-12-17

## 2023-12-16 RX ORDER — AMOXICILLIN 250 MG
2 CAPSULE ORAL 2 TIMES DAILY PRN
Status: DISCONTINUED | OUTPATIENT
Start: 2023-12-16 | End: 2023-12-20 | Stop reason: HOSPADM

## 2023-12-16 RX ORDER — AMOXICILLIN 250 MG
2 CAPSULE ORAL 2 TIMES DAILY
Status: DISCONTINUED | OUTPATIENT
Start: 2023-12-16 | End: 2023-12-20 | Stop reason: HOSPADM

## 2023-12-16 RX ORDER — TAMSULOSIN HYDROCHLORIDE 0.4 MG/1
0.4 CAPSULE ORAL DAILY
Status: DISCONTINUED | OUTPATIENT
Start: 2023-12-16 | End: 2023-12-20 | Stop reason: HOSPADM

## 2023-12-16 RX ORDER — PROCHLORPERAZINE MALEATE 5 MG
5 TABLET ORAL EVERY 6 HOURS PRN
Status: DISCONTINUED | OUTPATIENT
Start: 2023-12-16 | End: 2023-12-20 | Stop reason: HOSPADM

## 2023-12-16 RX ORDER — ASPIRIN 81 MG/1
81 TABLET, CHEWABLE ORAL DAILY
Status: DISCONTINUED | OUTPATIENT
Start: 2023-12-16 | End: 2023-12-20 | Stop reason: HOSPADM

## 2023-12-16 RX ORDER — CALCIUM CARBONATE 500 MG/1
1000 TABLET, CHEWABLE ORAL 4 TIMES DAILY PRN
Status: DISCONTINUED | OUTPATIENT
Start: 2023-12-16 | End: 2023-12-20 | Stop reason: HOSPADM

## 2023-12-16 RX ORDER — ONDANSETRON 4 MG/1
4 TABLET, ORALLY DISINTEGRATING ORAL EVERY 6 HOURS PRN
Status: DISCONTINUED | OUTPATIENT
Start: 2023-12-16 | End: 2023-12-16

## 2023-12-16 RX ADMIN — DOCUSATE SODIUM 100 MG: 100 CAPSULE, LIQUID FILLED ORAL at 20:20

## 2023-12-16 RX ADMIN — MIDAZOLAM HYDROCHLORIDE 0.5 MG: 1 INJECTION, SOLUTION INTRAMUSCULAR; INTRAVENOUS at 11:41

## 2023-12-16 RX ADMIN — METOPROLOL TARTRATE 50 MG: 25 TABLET, FILM COATED ORAL at 08:24

## 2023-12-16 RX ADMIN — SENNOSIDES AND DOCUSATE SODIUM 1 TABLET: 8.6; 5 TABLET ORAL at 20:22

## 2023-12-16 RX ADMIN — HEPARIN SODIUM AND DEXTROSE 500 UNITS/HR: 10000; 5 INJECTION INTRAVENOUS at 12:41

## 2023-12-16 RX ADMIN — ASPIRIN 81 MG CHEWABLE TABLET 81 MG: 81 TABLET CHEWABLE at 08:24

## 2023-12-16 RX ADMIN — ONDANSETRON 4 MG: 2 INJECTION INTRAMUSCULAR; INTRAVENOUS at 23:08

## 2023-12-16 RX ADMIN — MIDAZOLAM HYDROCHLORIDE 0.5 MG: 1 INJECTION, SOLUTION INTRAMUSCULAR; INTRAVENOUS at 11:51

## 2023-12-16 RX ADMIN — HEPARIN SODIUM AND DEXTROSE 1150 UNITS/HR: 10000; 5 INJECTION INTRAVENOUS at 08:04

## 2023-12-16 RX ADMIN — SENNOSIDES AND DOCUSATE SODIUM 1 TABLET: 8.6; 5 TABLET ORAL at 08:25

## 2023-12-16 RX ADMIN — TENECTEPLASE 0.4 MG/HR: KIT at 12:38

## 2023-12-16 RX ADMIN — HYDROMORPHONE HYDROCHLORIDE 4 MG: 4 TABLET ORAL at 13:46

## 2023-12-16 RX ADMIN — SODIUM CHLORIDE: 9 INJECTION, SOLUTION INTRAVENOUS at 23:45

## 2023-12-16 RX ADMIN — METOPROLOL TARTRATE 50 MG: 25 TABLET, FILM COATED ORAL at 20:20

## 2023-12-16 RX ADMIN — Medication 25 MCG: at 08:24

## 2023-12-16 RX ADMIN — MIDAZOLAM HYDROCHLORIDE 0.5 MG: 1 INJECTION, SOLUTION INTRAMUSCULAR; INTRAVENOUS at 12:15

## 2023-12-16 RX ADMIN — AMLODIPINE BESYLATE 10 MG: 5 TABLET ORAL at 08:24

## 2023-12-16 RX ADMIN — FINASTERIDE 5 MG: 5 TABLET, FILM COATED ORAL at 08:24

## 2023-12-16 RX ADMIN — FENTANYL CITRATE 25 MCG: 50 INJECTION, SOLUTION INTRAMUSCULAR; INTRAVENOUS at 11:45

## 2023-12-16 RX ADMIN — HYDROMORPHONE HYDROCHLORIDE 0.3 MG: 1 INJECTION, SOLUTION INTRAMUSCULAR; INTRAVENOUS; SUBCUTANEOUS at 20:26

## 2023-12-16 RX ADMIN — IODIXANOL 100 ML: 320 INJECTION, SOLUTION INTRAVASCULAR at 12:24

## 2023-12-16 RX ADMIN — HYDROMORPHONE HYDROCHLORIDE 0.3 MG: 1 INJECTION, SOLUTION INTRAMUSCULAR; INTRAVENOUS; SUBCUTANEOUS at 23:00

## 2023-12-16 RX ADMIN — FENTANYL CITRATE 50 MCG: 50 INJECTION, SOLUTION INTRAMUSCULAR; INTRAVENOUS at 11:22

## 2023-12-16 RX ADMIN — FENTANYL CITRATE 25 MCG: 50 INJECTION, SOLUTION INTRAMUSCULAR; INTRAVENOUS at 11:57

## 2023-12-16 RX ADMIN — TAMSULOSIN HYDROCHLORIDE 0.4 MG: 0.4 CAPSULE ORAL at 08:24

## 2023-12-16 RX ADMIN — MIDAZOLAM HYDROCHLORIDE 1 MG: 1 INJECTION, SOLUTION INTRAMUSCULAR; INTRAVENOUS at 11:22

## 2023-12-16 ASSESSMENT — ACTIVITIES OF DAILY LIVING (ADL)
ADLS_ACUITY_SCORE: 30
ADLS_ACUITY_SCORE: 30
ADLS_ACUITY_SCORE: 35
ADLS_ACUITY_SCORE: 30
ADLS_ACUITY_SCORE: 35
ADLS_ACUITY_SCORE: 30
ADLS_ACUITY_SCORE: 35

## 2023-12-16 NOTE — CONSULTS
"Vascular Surgery Consultation    Jack Brown MRN# [id number]   Age: 73 year old YOB: 1950     Date of Admission:  12/15/2023    Date of Consult:   12/16/23    Reason for consult: Occluded left popliteal aneurysm       Requesting service: Emergency medicine   requesting provider: Dr. Paz                   Assessment and Plan:   73 y.o. 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  CTA also shows 3 cm infrarenal AAA, 3 cm right common and 3.6 cm left common iliac artery aneurysms, and a patent 2 cm right popliteal artery aneurysms  Now motor and sensory intact since started heparin last evening. LLE angiogram today and started lytic therapy. Left popliteal artery is occluded throughout its course. PT and peroneal arteries reconstitute with 2 vessel runoff to the ankle, PT dominant.    Plan to continue catheter-directed lytic therapy  NPO  Bedrest  Q6H fibrinogen and hgb checks  Repeat angiogram with IR tomorrow      Seen and discussed with staff, Dr. Tapia.    Denzel Boyd MD         History of Present Illness:   Mr. Jack Brown is a 73 year old male who presented to the ED with 5 days of left leg and foot pain. Foot went \"dead\" 5 days ago, had difficulty flexing the ankle and associated parasthesias. Presented initially to PCP. Ultrasound demonstrated left popliteal artery occlusion and he was referred to [facility] ED and started on heparin drip. CTA demosntrates occluded 2.1 cm left popliteal aneurysm. Since started heparin drip his motor and sensory function in the left lower extremity has entirely returned to normal. He does not smoke. Not on statin due to myalgia. No personal history of clotting disorder but reports his two brothers develops clots after previous surgeries. He has a known AAA.             Past Medical History:     Past Medical History:   Diagnosis " Date    BPH (benign prostatic hypertrophy)     Cancer (H)     Skin on R.chest wall.    Chest discomfort     Coronary artery disease     Detached retina, left Oct 2014    Hyperlipidemia     Hypertension              Past Surgical History:     Past Surgical History:   Procedure Laterality Date    BYPASS GRAFT ARTERY CORONARY N/A 12/28/2015    Procedure: CORONARY ARTERY BYPASS x 3, RIGHT ENDOSCOPIC VEIN HARVEST, LEFT INTERNAL MAMMARY ARTERY, ANESTHESIA TRANSESOPHAGEAL ECHOCARDIOGRAM;  Surgeon: Jayson Alvarado MD;  Location: [facility];  Service:     CARDIAC SURGERY      CATARACT EXTRACTION  2015    CYST REMOVAL      Ganglion cyst removal of both wrist    HC REMOVAL PREPATELLA BURSA      Description: Excision Of Prepatellar Bursa;  Recorded: 09/30/2014;    HC REPAIR OF NASAL SEPTUM      Description: Septoplasty;  Recorded: 09/30/2014;    HERNIA REPAIR, UMBILICAL      PARS PLANA VITRECTOMY W/ REPAIR OF MACULAR HOLE  Nov 2014    NH EXCIS TENDON SHEATH LESION, HAND/FINGER      Description: Hand Excision Of A Tendon Cyst;  Recorded: 01/04/2011;    NH LAP, VENTRAL HERNIA REPAIR,REDUCIBLE      Description: Laparoscopy Repair Of Umbilical Hernia;  Recorded: 01/04/2011;    RETINAL DETACHMENT SURGERY  oct 2014    SKIN CANCER EXCISION  Nov 2015    right chest    THORACOSCOPY Right 4/14/2016    Procedure: RIGHT VIDEO ASSISTED THORACOSCOPY, RIGHT LOBECTOMY;  Surgeon: Vinny Chase MD;  Location: [facility];  Service:     VASECTOMY               Social History:     Social History     Tobacco Use    Smoking status: Never    Smokeless tobacco: Never   Substance Use Topics    Alcohol use: Yes     Alcohol/week: 8.0 standard drinks of alcohol             Family History:     Family History   Problem Relation Age of Onset    Acute Myocardial Infarction Mother     Aneurysm Mother         brain    Acute Myocardial Infarction Father     Acute Myocardial Infarction Brother     Aortic aneurysm Brother          d @ 72    Lung Cancer Cousin     Coronary Artery Disease Cousin     Leukemia Brother         d @ 55    Colitis Brother     Colon Cancer Paternal Grandfather                 Allergies:     Allergies   Allergen Reactions    Niacin Hives and Rash     Burning of the skin    Atorvastatin Muscle Pain (Myalgia)    Pravastatin Muscle Pain (Myalgia)             Medications:     Current Facility-Administered Medications   Medication    acetaminophen (TYLENOL) tablet 500 mg    amLODIPine (NORVASC) tablet 10 mg    aspirin (ASA) chewable tablet 81 mg    calcium carbonate (TUMS) chewable tablet 1,000 mg    docusate sodium (COLACE) capsule 100 mg    fentaNYL (PF) (SUBLIMAZE) injection 25-50 mcg    finasteride (PROSCAR) tablet 5 mg    flumazenil (ROMAZICON) injection 0.2 mg    heparin drip (IR Line 1) 25,000 units in D5W 250 mL    heparin infusion 25,000 units in D5W 250 mL ANTICOAGULANT    HYDROmorphone (DILAUDID) tablet 2 mg    Or    HYDROmorphone (DILAUDID) tablet 4 mg    lidocaine (LMX4) cream    lidocaine 1 % 0.1-1 mL    melatonin tablet 1 mg    metoprolol tartrate (LOPRESSOR) tablet 50 mg    midazolam (VERSED) injection 0.5-2 mg    naloxone (NARCAN) injection 0.2 mg    Or    naloxone (NARCAN) injection 0.4 mg    Or    naloxone (NARCAN) injection 0.2 mg    Or    naloxone (NARCAN) injection 0.4 mg    ondansetron (ZOFRAN ODT) ODT tab 4 mg    Or    ondansetron (ZOFRAN) injection 4 mg    prochlorperazine (COMPAZINE) injection 5 mg    Or    prochlorperazine (COMPAZINE) tablet 5 mg    Or    prochlorperazine (COMPAZINE) suppository 12.5 mg    senna-docusate (SENOKOT-S/PERICOLACE) 8.6-50 MG per tablet 1 tablet    Or    senna-docusate (SENOKOT-S/PERICOLACE) 8.6-50 MG per tablet 2 tablet    senna-docusate (SENOKOT-S/PERICOLACE) 8.6-50 MG per tablet 1 tablet    Or    senna-docusate (SENOKOT-S/PERICOLACE) 8.6-50 MG per tablet 2 tablet    sodium chloride (PF) 0.9% PF flush 10 mL    sodium chloride (PF) 0.9% PF flush 3 mL    sodium chloride  "(PF) 0.9% PF flush 3 mL    tamsulosin (FLOMAX) capsule 0.4 mg    tenecteplase (TNKASE) Line 1 (Arterial Infusion Catheter) 5 mg in 0.9 % NaCl 250 mL    Vitamin D3 (CHOLECALCIFEROL) tablet 25 mcg               Review of Systems:   A 12 point review of systems was completed and found to be negative unless otherwise stated in the above HPI          Physical Exam:   /81   Pulse 56   Temp 97.7  F (36.5  C) (Oral)   Resp 27   Ht 1.676 m (5' 6\")   Wt 68 kg (150 lb)   SpO2 93%   BMI 24.21 kg/m        Intake/Output Summary (Last 24 hours) at 12/16/2023 1347  Last data filed at 12/16/2023 0841  Gross per 24 hour   Intake 86.04 ml   Output 550 ml   Net -463.96 ml       GEN: A&Ox3, no acute distress  NEURO: CN II-XII grossly intact. No gross neurologic deficits  NECK: trachea midline, no JVD  HEENT: No scleral icterus.  RESP: Nonlabored breathing on room air, no cough  CV: RRR by radial pulse, noncyanotic   ABD: soft, non-tender, nondistended. No guarding or rebound tenderness  MSK: Moves all extremities independently. Normal active range of motion. Normal and symmetric strength with plantar/dorsiflexion. Left calf entirely soft, nontender  PSYCH: cooperative   PULSES: Palpable femoral pulse, palpable right DP pulse. Monophasic left DP and Pt doppler signals          Data:   All laboratory data reviewed    Results:  BMP  Recent Labs   Lab 12/15/23  2025      POTASSIUM 4.4   CHLORIDE 103   CO2 25   BUN 23.6*   CR 0.91   *     CBC  Recent Labs   Lab 12/16/23  1154 12/16/23  0833 12/15/23  2025   WBC  --  6.6 8.3   HGB 16.3 15.2 16.1   PLT  --  241 267     LFT  Recent Labs   Lab 12/16/23  1154 12/15/23  2025   AST  --  29   ALT  --  11   ALKPHOS  --  75   BILITOTAL  --  0.4   ALBUMIN  --  4.1   INR 1.12 1.02     Recent Labs   Lab 12/15/23  2025   *       Imaging:  CTA Chest Abdomen Pelvis Runoff w Contrast    Result Date: 12/15/2023  EXAM: CTA CHEST ABDOMEN PELVIS RUNOFF W CONTRAST LOCATION: [facility] " [facility] DATE: 12/15/2023 TECHNIQUE: Helical acquisition through the chest, abdomen, pelvis, and bilateral lower extremities was performed during the arterial phase of contrast enhancement. Second phase arterial imaging was performed through the bilateral lower extremities. 2D and 3D reconstructions performed by the CT technologist. Dose reduction techniques were used. CONTRAST: 100 mL Isovue 370. FINDINGS: AORTA: Moderate atheromatous disease of the thoracoabdominal aorta and branch vessels. No evidence of dissection. Multifocal ectasia and aneurysmal dilatation of the infrarenal abdominal aorta, measuring up to 3.0 cm. 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 Slight interval increase in size of a partially thrombosed aneurysm at the distal left common iliac artery bifurcation, measuring up to 3.5 cm, previously 3.3 cm. Classic hepatic arterial anatomy. Dual right renal arteries. Single left renal artery. RIGHT LEG: Moderate atheromatous disease. Widely patent oral and popliteal arteries. Tibioperoneal trunk, peroneal, and posterior tibial arteries are widely patent. Anterior tibial artery becomes occluded within its proximal to midportion, likely a chronic finding. Patent plantar artery within the foot. Dorsalis pedis artery is not opacified. LEFT LEG: Moderate atheromatous disease. Widely patent left femoral artery. Complete occlusion of the left popliteal artery, which demonstrates diffuse aneurysmal dilatation, measuring up to 2.1 cm in diameter. Conclusion also extends distally into the tibioperoneal trunk, as well as the proximal posterior tibial, peroneal, anterior tibial arteries. There is reconstitution of the proximal posterior tibial and peroneal arteries via collateral flow. Anterior tibial artery becomes occluded within its midportion, likely a chronic finding. Patent plantar artery within the  foot. Dorsalis pedis arteries only briefly opacified at the level of the ankle. NONVASCULAR FINDINGS: MEDIASTINUM: Mild cardiomegaly. Median sternotomy and coronary artery bypass grafting. Severe coronary artery atherosclerosis. LUNGS/PLEURA: Mild centrilobular emphysema. Previous right middle lobectomy. ABDOMEN: Diffuse hepatic steatosis. Unchanged bilateral adrenal adenomas. Simple right renal cysts; no further follow-up recommended. Tiny nonobstructing right renal stone. PELVIS: Moderate to severe prostatomegaly. Bilateral vasectomy clips. MUSCULOSKELETAL: Severe bilateral first metatarsophalangeal joint osteoarthritis. Right superficial infrapatellar bursitis.     IMPRESSION: 1.  Long segment thrombotic occlusion involving the left popliteal artery, tibioperoneal trunk, and the proximal aspect of the proximal anterior tibial, posterior tibial, peroneal arteries. The proximal left posterior tibial and peroneal arteries are reconstituted via collateral flow, remaining patent to the level of the ankle. The anterior tibial artery is also reconstituted via collateral flow, though becomes occluded at its mid aspect, possibly chronic. Findings discussed verbally via telephone with Dr. Zaldivar at 10:29 PM on 12/15/2023. 2.  Occlusion of the proximal to mid right anterior tibial artery, likely chronic. Associated nonopacification of the right dorsalis pedis artery within the foot. Remainder of the right lower extremity arterial vasculature is widely patent. 3.  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 Additional aneurysms of the right common iliac artery and the infrarenal abdominal aorta appear unchanged. 4.  Small nonobstructing right renal stone. 5.  Previous right middle lobectomy. 6.  Moderate to severe prostatomegaly. 7.  Hepatic steatosis. 8.  Unchanged small bilateral adrenal adenomas.    US Lower Extremity Venous Duplex Left    Result Date: 12/15/2023  EXAM: US  LOWER EXTREMITY VENOUS DUPLEX LEFT LOCATION: [facility] DATE: 12/15/2023 INDICATION: Left lower extremity pain. COMPARISON: None available. TECHNIQUE: Venous Duplex ultrasound of the left lower extremity with and without compression, augmentation and duplex. Color flow and spectral Doppler with waveform analysis performed. FINDINGS: Exam includes the common femoral, femoral, popliteal, and contralateral common femoral veins as well as segmentally visualized deep calf veins and greater saphenous vein. LEFT: No deep vein thrombosis. No superficial thrombophlebitis. No popliteal cyst. Incidentally noted complete occlusion of the popliteal artery. Distally, there is some detectable flow within the posterior tibial and peroneal arteries.     IMPRESSION: 1.  Complete segmental occlusion of the left popliteal artery, with detectable, though diminutive flow in the more distal left posterior tibial and peroneal arteries. Correlation with CT angiography is recommended. Findings and recommendations discussed verbally via telephone with Dr. Harris at 7:37 PM on 12/15/2023. 2.  No deep venous thrombosis in the visualized left lower extremity.

## 2023-12-16 NOTE — ED NOTES
Expected Patient Referral to ED  7:48 PM    Referring Clinic/Provider:  Walk in clinic    Reason for referral/Clinical facts:  Knee pain, No blood clot, Complete occlusion of popliteal artery, No distal pain.    Recommendations provided:  Send to ED for further evaluation    Caller was informed that this institution does possess the capabilities and/or resources to provide for patient and should be transferred to our facility.    Roselia Sierra MD  [facility] EMERGENCY DEPARTMENT  [address] [place] [id number]  [phone]       Roselia Sierra MD  12/15/23 4644

## 2023-12-16 NOTE — ED PROVIDER NOTES
"EMERGENCY DEPARTMENT ENCOUNTER      NAME: Jack Brown  YOB: 1950  MRN: [id number]    FINAL IMPRESSION  1. Arterial occlusion    2. Pain of left lower leg        MEDICAL DECISION MAKING   Pertinent Labs & Imaging studies reviewed. (See chart for details)    Jack Brown is a 73 year old male who presents for evaluation of left knee pain.  Patient reports onset of symptoms 5 days ago starting with discomfort in his left knee.  He reports that around the same time, he felt as though his leg went \"dead\" from the knee distally.  He states that when he touched his lower leg, it did not feel like it was his own extremity.  That sensation subsequently improved but he has had persistent discomfort in the back of his knee which prompted him to go into clinic today for evaluation.  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  He was sent here to the emergency department for further evaluation.  At time of my initial assessment, patient complains of mild discomfort in the back of his knee but denies associated numbness.  He reports that the discomfort is present primarily when he tries to walk around or move and not when at rest or sleeping.  He has no associated chest pain, dyspnea, or other new complaints.  He does have a history of AAA which is being monitored.  He also has a history of CAD and bypass surgery in 2015, follows with cardiology.  I reviewed most recent cardiology note from 10/10/2023.  He underwent stress test on 10/16/2023 that was reassuring.  Patient is currently on a statin and a baby aspirin but no other blood thinners.  He does not have a personal history of diagnosed vascular disease or clots.    On exam, patient does have decreased pulses to left distal extremity and I was unable to Doppler pulse " at bedside.  Fortunately, he has good range of motion of the ankle and knee and was able to ambulate into the department. I considered a broad differential including but not limited to claudication/peripheral arterial disease, acute occlusive disease, coagulopathy, critical limb ischemia, aneurysm, dissection, arrhythmia.  Discussed options for workup and management with the patient.  We have agreed on plan for CT with runoff, EKG, and labs.  Offered analgesics for discomfort but patient declined and states that he is comfortable at this time.    Independently reviewed labs.  No acute anemia, electrolyte derangement, acidosis, THOM, hepatobiliary disease.  No evidence of coagulopathy.  I discussed results of CT scan with radiologist.  This did reveal a long segment thrombotic occlusion of the left popliteal artery, tibioperoneal trunk, proximal anterior tibial, posterior tibial, and peroneal arteries with reconstitution of left posterior tibial and peroneal arteries at the ankle.  EKG showed no evidence of arrhythmia.  I rechecked the patient and updated him with these results.  He remained comfortable without any interventions.    I discussed the case with Dr. Tapia of vascular surgery who agreed with plan for heparin at admission.  I also discussed the case with Dr. Yang of interventional radiology team.  He reviewed imaging results and is not certain if findings are acute and did not believe that patient would necessarily benefit from emergent directed lytics but did agree with plan for heparin at admission.  Someone from their team will see the patient in the morning.  Will keep him n.p.o. after midnight.  I rechecked the patient and updated him with recommendations from specialist.  He was comfortable with this plan.  Heparin was initiated.  I discussed the case with hospital medicine team who agreed to facilitate admission.  No acute events under my care.      Medical Decision  Making    History:  Supplemental history from: N/A  External Record(s) reviewed: Outpatient Record: Family Medicine Visit 12/15/23 and Prior Imaging: Ultrasound 12/15/23    Work Up:  Chart documentation includes differential considered and any EKGs or imaging independently interpreted by provider, where specified.  In additional to work up documented, I considered the following work up: Documented in chart, if applicable.    External consultation:  Discussion of management with another provider: Hospitalist, Interventional Radiology, and Vascular Surgery    Complicating factors:  Care impacted by chronic illness: Heart Disease, Hyperlipidemia, and Hypertension  Care affected by social determinants of health: N/A    Disposition considerations: Admit.    Critical care: 60 minutes excluding separately billable procedures.  Includes bedside management, time reviewing test results, review of records, discussing the case with staff, documenting the medical record and time spent with family members (or surrogate decision makers) discussing specific treatment issues.         ED COURSE  9:05 PM I introduced myself to the patient, obtained patient history, performed a physical exam, and discussed plan for ED workup including potential diagnostic laboratory/imaging studies and interventions.  10:29 PM Discussed imaging results with radiologist, Dr. Nuñez.  10:34 PM I rechecked the patient and updated them on laboratory and imaging results.  10:46 PM Spoke with Dr. Tapia, vascular surgery. Discussed the patient's case and recommendations.   10:51 PM Updated the patient.  11:09 PM Spoke with Dr. Yang, interventional radiology. Discussed the patient's case and recommendations.   11:22 PM Rechecked the patient.  11:27 PM I spoke with the hospitalist, Dr. Paz. We discussed the patient's case and they agree to admit the patient.       MEDICATIONS GIVEN IN THE ED  Medications   heparin infusion 25,000 units in D5W 250 mL  "ANTICOAGULANT (1,250 Units/hr Intravenous $New Bag 12/15/23 2304)   iopamidol (ISOVUE-370) solution 100 mL (100 mLs Intravenous $Given 12/15/23 2139)   heparin ANTICOAGULANT Loading dose for HIGH INTENSITY TREATMENT * Give BEFORE starting heparin infusion (5,600 Units Intravenous $Given 12/15/23 2304)       NEW PRESCRIPTIONS STARTED AT TODAY'S VISIT  New Prescriptions    No medications on file          =================================================================    Chief Complaint   Patient presents with    Knee Pain         HPI:    Patient information was obtained from: Patient    Use of : N/A    Jack Brown is a 73 year old male who presents from his primary care clinic with a known occlusion of the left popliteal artery in the left lower extremity.    Per chart review, the patient was seen at his primary care clinic prior to ED presentation for evaluation of 4-5 days of left leg pain. On exam pulses were decreased in the left lower extremity compared to the right and the left foot was mildly cooler than the right. Ultrasound demonstrated complete segmental occlusion of the left popliteal artery, with detectable, though diminutive flow in the more distal left posterior tibial and peroneal arteries.     The patient reports he has been having 5 days of pain behind his left knee that intermittent extends down into his calf. The pain is worse when he is up and walking. He does not have any pain when he is lying down at night. A few days ago his left leg went \"dead\" for about 12 hours during which he was unable to move his left foot and he had abnormal sensation in his left lower extremity. Currently, he is not having any numbness or tingling in his left lower extremity. He denies any chest pain or shortness of breath. He is currently taking a baby aspirin daily, no other blood thinning medications. He denies any fever, nausea, vomiting, or back pain.    The patient denies any history of blood clots. " "He is a nonsmoker. He has not had any recent travel or surgeries. Two of his brothers developed blood clots after surgeries. He has a known history of AAA that is currently monitored by hematology/oncology.    RELEVANT HISTORY, MEDICATIONS, & ALLERGIES   Past medical history, surgical history, family history, medications, and allergies reviewed and pertinent noted in HPI.    REVIEW OF SYSTEMS:  A complete review of systems was performed with pertinent positives and negatives noted in the HPI. All other systems negative.     PHYSICAL EXAM:    Vitals: BP (!) 148/83   Pulse 67   Temp 98  F (36.7  C) (Temporal)   Resp 16   Ht 1.753 m (5' 9\")   Wt 70.3 kg (155 lb)   SpO2 94%   BMI 22.89 kg/m     General: Alert and interactive, comfortable appearing.  HENT: Atraumatic. Full AROM of neck. Conjunctiva clear.   Cardiovascular: Regular rate and rhythm.   Chest/Pulmonary: Normal work of breathing. Speaking in complete sentences. Lungs CTAB. No chest wall tenderness or deformities.  Abdomen: Soft, nondistended. Nontender without guarding or rebound.  Extremities: Normal AROM of all major joints.  Left lower extremity: Mild tenderness palpation popliteal fossa and proximal calf.  Negative Homans' sign.  Sensation intact all distributions.  Good range of motion of all major joints.  Unable to palpate distal pulse either manually or with Doppler.  Skin: Warm and dry. Normal skin color.   Neuro: Speech clear. CNs grossly intact. Moves all extremities spontaneously.   Psych: Normal affect/mood, cooperative, memory appropriate.      LAB  Labs Ordered and Resulted from Time of ED Arrival to Time of ED Departure   BASIC METABOLIC PANEL - Abnormal       Result Value    Sodium 138      Potassium 4.4      Chloride 103      Carbon Dioxide (CO2) 25      Anion Gap 10      Urea Nitrogen 23.6 (*)     Creatinine 0.91      GFR Estimate 88      Calcium 9.8      Glucose 117 (*)    HEPATIC FUNCTION PANEL - Normal    Protein Total 7.4      " Albumin 4.1      Bilirubin Total 0.4      Alkaline Phosphatase 75      AST 29      ALT 11      Bilirubin Direct <0.20     PARTIAL THROMBOPLASTIN TIME - Normal    aPTT 31     INR - Normal    INR 1.02     CBC WITH PLATELETS AND DIFFERENTIAL    WBC Count 8.3      RBC Count 5.28      Hemoglobin 16.1      Hematocrit 47.0      MCV 89      MCH 30.5      MCHC 34.3      RDW 12.9      Platelet Count 267      % Neutrophils 66      % Lymphocytes 21      % Monocytes 9      % Eosinophils 3      % Basophils 1      % Immature Granulocytes 0      NRBCs per 100 WBC 0      Absolute Neutrophils 5.5      Absolute Lymphocytes 1.7      Absolute Monocytes 0.7      Absolute Eosinophils 0.2      Absolute Basophils 0.1      Absolute Immature Granulocytes 0.0      Absolute NRBCs 0.0     HEPARIN UNFRACTIONATED ANTI XA LEVEL       RADIOLOGY  CTA Chest Abdomen Pelvis Runoff w Contrast   Final Result   IMPRESSION:   1.  Long segment thrombotic occlusion involving the left popliteal artery, tibioperoneal trunk, and the proximal aspect of the proximal anterior tibial, posterior tibial, peroneal arteries. The proximal left posterior tibial and peroneal arteries are    reconstituted via collateral flow, remaining patent to the level of the ankle. The anterior tibial artery is also reconstituted via collateral flow, though becomes occluded at its mid aspect, possibly chronic. Findings discussed verbally via telephone    with Dr. Zaldivar at 10:29 PM on 12/15/2023.   2.  Occlusion of the proximal to mid right anterior tibial artery, likely chronic. Associated nonopacification of the right dorsalis pedis artery within the foot. Remainder of the right lower extremity arterial vasculature is widely patent.   3.  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 Additional aneurysms of the right common iliac artery and the infrarenal abdominal aorta appear unchanged.   4.  Small nonobstructing right renal stone.    5.  Previous right middle lobectomy.   6.  Moderate to severe prostatomegaly.   7.  Hepatic steatosis.   8.  Unchanged small bilateral adrenal adenomas.          EKG  Performed at: 22:39  Impression: Normal sinus rhythm with first-degree AV block.  No acute ischemic changes.  Compared to previous, T waves upright in V2-V6.  Rate: 60 bpm  Rhythm: Sinus  QRS Interval: 96 ms  QTc Interval: 452 ms  Comparison: 12/29/2015    All laboratory and imaging results and EKG's were personally reviewed and interpreted by myself prior to disposition decision.       I, Chao Wolf, am serving as a scribe to document services personally performed by Dr. Aline Zaldivar based on my observation and the provider's statements to me. I, Aline Zaldivar MD attest that Chao Wolf is acting in a scribe capacity, has observed my performance of the services and has documented them in accordance with my direction.    Aline Zaldivar M.D.  Emergency Medicine  [facility] EMERGENCY DEPARTMENT  [facility] [facility] [address]  [phone]  Dept: [phone]     Aline Zaldivar MD  12/15/23 0782

## 2023-12-16 NOTE — MEDICATION SCRIBE - ADMISSION MEDICATION HISTORY
Medication Scribe Admission Medication History    Admission medication history is complete. The information provided in this note is only as accurate as the sources available at the time of the update.    Information Source(s): Patient, Hospital records, and CareEverywhere/SureScripts via in-person    Pertinent Information: Pt stated he doesn't take much Vit D3 unless winter which he hasn't taken this season, yet.    Changes made to PTA medication list:  Added: None  Deleted: Crestor, pt stopped  Changed: None    Medication Affordability:  Not including over the counter (OTC) medications, was there a time in the past 3 months when you did not take your medications as prescribed because of cost?: No    Allergies reviewed with patient and updates made in EHR: yes    Medication History Completed By: Julien Crockett 12/15/2023 11:17 PM    Prior to Admission medications    Medication Sig Last Dose Taking? Auth Provider Long Term End Date   acetaminophen (TYLENOL) 500 MG tablet [ACETAMINOPHEN (TYLENOL) 500 MG TABLET] Take 500 mg by mouth every 6 (six) hours as needed for pain. 12/15/2023 at prn Yes Provider, Historical     amLODIPine (NORVASC) 10 MG tablet TAKE 1 TABLET(10 MG) BY MOUTH DAILY 12/15/2023 at am Yes Mercedes Adorno MD Yes    aspirin 81 mg chewable tablet [ASPIRIN 81 MG CHEWABLE TABLET] Chew 81 mg daily. 12/15/2023 at am Yes Provider, Historical     finasteride (PROSCAR) 5 MG tablet Take 1 tablet (5 mg) by mouth daily 12/15/2023 at am Yes Veronica Costa, CNP     metoprolol tartrate (LOPRESSOR) 50 MG tablet TAKE 1 TABLET(50 MG) BY MOUTH TWICE DAILY 12/15/2023 at x2 Yes Lelo Aranda MD Yes    tamsulosin (FLOMAX) 0.4 MG capsule TAKE 1 CAPSULE BY MOUTH DAILY AFTER BREAKFAST 12/15/2023 at am Yes Mercedes Adorno MD     VITAMIN D3 25 MCG (1000 UT) tablet Take 1 tablet by mouth daily  Hold Hold Reported, Patient

## 2023-12-16 NOTE — PROGRESS NOTES
[facility]    Medicine Progress Note - Hospitalist Service    Date of Admission:  12/15/2023    Assessment & Plan   Lobito Brown is a 73-year-old male with a past medical history of CAD, HTN, HLD, PAD, BPH.  He presents with 5 days of left knee pain with ambulation found to have acute on chronic left lower extremity limb ischemia    #Acute on chronic left lower extremity ischemia  -Presented with 5 days of symptoms  - Was seen in the clinic, with lower extremity ultrasound showing complete segmental occlusion of left popliteal artery with decreased flow in the posterior tibial and peroneal arteries.  - Patient sent to [facility] ED for ongoing management.  - CT obtained here in the ED shows thrombotic occlusion involving left popliteal artery, tibial peroneal trunk and proximal aspect of proximal anterior tibial, posterior tibial and peroneal arteries.  - Case discussed with vascular surgery and interventional radiology.  - Patient initiated on heparin gtt in the ED  -S/p left lower extremity angiogram and currently on lytic therapy.  -Repeat angiogram with IR again tomorrow  -Continue catheter directed lytic therapy  -Remains on heparin gtt    # Coronary artery disease-status post CABG 2015  # Hypertension   - Graft remains patent.  - Also has a strong coronary artery disease history in the family.  - Is currently on aspirin, metoprolol 50 mg twice daily     # Hyperlipidemia  -Patient was on rosuvastatin 5 mg in the outpatient setting; patient discontinued this medication due to reported side effect of muscle aches    # Enlarged prostate  Continue Proscar and Flomax        Diet: Low Saturated Fat Na <2400 mg  NPO per Anesthesia Guidelines for Procedure/Surgery Except for: Meds    DVT Prophylaxis: Heparin gtt  Roper Catheter: Not present  Lines: PRESENT             Cardiac Monitoring: ACTIVE order. Indication: Tachyarrhythmias, acute (48 hours)  Code Status: Full Code      Clinically  Significant Risk Factors Present on Admission                # Drug Induced Platelet Defect: home medication list includes an antiplatelet medication   # Hypertension: Noted on problem list           # History of CABG: noted on surgical history       Disposition Plan  To  be determined.      Expected Discharge Date: 12/17/2023                    CLINTON BHAT MD  Hospitalist Service  [facility]  Securely message with CloudDock (more info)  Text page via mInfo Paging/Directory   ______________________________________________________________________    Interval History   -Admitted overnight given worsening left lower extremity pain with ambulation  -Per bedside RN, no dopplerable left DP pulse, but tube removal left PT pulse  -Went down for angiogram today and currently receiving lytic therapy; vascular surgery also following   -This morning, he had denied any pain at rest.  Sensation has been intact.    Physical Exam   Vital Signs: Temp: 97.7  F (36.5  C) Temp src: Oral BP: (!) 152/86 Pulse: 62   Resp: 28 SpO2: 94 % O2 Device: Nasal cannula Oxygen Delivery: 2 LPM  Weight: 150 lbs 0 oz    General Appearance: Lying comfortably in bed, on room air in no acute distress or discomfort  Respiratory: Clear to auscultation bilaterally  Cardiovascular: Normal S1-S2, no murmurs rubs or gallops  GI: Abdomen soft nontender nondistended  Skin: No visible rash  Other: Moves both extremities spontaneously.  Absent left foot DP and PT pulses to palpation.    Medical Decision Making       55 MINUTES SPENT BY ME on the date of service doing chart review, history, exam, documentation & further activities per the note.      Data   ------------------------- PAST 24 HR DATA REVIEWED -----------------------------------------------    I have personally reviewed the following data over the past 24 hrs:    6.6  \   16.3   / 241     138 103 23.6 (H) /  117 (H)   4.4 25 0.91 \     ALT: 11 AST: 29 AP: 75 TBILI: 0.4   ALB:  4.1 TOT PROTEIN: 7.4 LIPASE: N/A     TSH: N/A T4: N/A A1C: 5.5     INR:  1.12 PTT:  31   D-dimer:  N/A Fibrinogen:  445       Imaging results reviewed over the past 24 hrs:   Recent Results (from the past 24 hour(s))   US Lower Extremity Venous Duplex Left    Narrative    EXAM: US LOWER EXTREMITY VENOUS DUPLEX LEFT  LOCATION: [facility]  DATE: 12/15/2023    INDICATION: Left lower extremity pain.  COMPARISON: None available.  TECHNIQUE: Venous Duplex ultrasound of the left lower extremity with and without compression, augmentation and duplex. Color flow and spectral Doppler with waveform analysis performed.    FINDINGS: Exam includes the common femoral, femoral, popliteal, and contralateral common femoral veins as well as segmentally visualized deep calf veins and greater saphenous vein.     LEFT: No deep vein thrombosis. No superficial thrombophlebitis. No popliteal cyst.    Incidentally noted complete occlusion of the popliteal artery. Distally, there is some detectable flow within the posterior tibial and peroneal arteries.      Impression    IMPRESSION:  1.  Complete segmental occlusion of the left popliteal artery, with detectable, though diminutive flow in the more distal left posterior tibial and peroneal arteries. Correlation with CT angiography is recommended. Findings and recommendations discussed   verbally via telephone with Dr. Harris at 7:37 PM on 12/15/2023.  2.  No deep venous thrombosis in the visualized left lower extremity.   CTA Chest Abdomen Pelvis Runoff w Contrast    Narrative    EXAM: CTA CHEST ABDOMEN PELVIS RUNOFF W CONTRAST  LOCATION: [facility]  DATE: 12/15/2023      TECHNIQUE: Helical acquisition through the chest, abdomen, pelvis, and bilateral lower extremities was performed during the arterial phase of contrast enhancement. Second phase arterial imaging was performed through the bilateral lower extremities. 2D   and 3D reconstructions  performed by the CT technologist. Dose reduction techniques were used.  CONTRAST: 100 mL Isovue 370.    FINDINGS:   AORTA: Moderate atheromatous disease of the thoracoabdominal aorta and branch vessels. No evidence of dissection. Multifocal ectasia and aneurysmal dilatation of the infrarenal abdominal aorta, measuring up to 3.0 cm.    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    Slight interval increase in size of a partially thrombosed aneurysm at the distal left common iliac artery bifurcation, measuring up to 3.5 cm, previously 3.3 cm.    Classic hepatic arterial anatomy. Dual right renal arteries. Single left renal artery.    RIGHT LEG: Moderate atheromatous disease. Widely patent oral and popliteal arteries. Tibioperoneal trunk, peroneal, and posterior tibial arteries are widely patent.    Anterior tibial artery becomes occluded within its proximal to midportion, likely a chronic finding.    Patent plantar artery within the foot. Dorsalis pedis artery is not opacified.    LEFT LEG: Moderate atheromatous disease. Widely patent left femoral artery. Complete occlusion of the left popliteal artery, which demonstrates diffuse aneurysmal dilatation, measuring up to 2.1 cm in diameter. Conclusion also extends distally into the   tibioperoneal trunk, as well as the proximal posterior tibial, peroneal, anterior tibial arteries. There is reconstitution of the proximal posterior tibial and peroneal arteries via collateral flow.     Anterior tibial artery becomes occluded within its midportion, likely a chronic finding.    Patent plantar artery within the foot. Dorsalis pedis arteries only briefly opacified at the level of the ankle.      NONVASCULAR FINDINGS:  MEDIASTINUM: Mild cardiomegaly. Median sternotomy and coronary artery bypass grafting. Severe coronary artery atherosclerosis.     LUNGS/PLEURA: Mild centrilobular emphysema. Previous  right middle lobectomy.    ABDOMEN: Diffuse hepatic steatosis. Unchanged bilateral adrenal adenomas. Simple right renal cysts; no further follow-up recommended. Tiny nonobstructing right renal stone.    PELVIS: Moderate to severe prostatomegaly. Bilateral vasectomy clips.    MUSCULOSKELETAL: Severe bilateral first metatarsophalangeal joint osteoarthritis. Right superficial infrapatellar bursitis.      Impression    IMPRESSION:  1.  Long segment thrombotic occlusion involving the left popliteal artery, tibioperoneal trunk, and the proximal aspect of the proximal anterior tibial, posterior tibial, peroneal arteries. The proximal left posterior tibial and peroneal arteries are   reconstituted via collateral flow, remaining patent to the level of the ankle. The anterior tibial artery is also reconstituted via collateral flow, though becomes occluded at its mid aspect, possibly chronic. Findings discussed verbally via telephone   with Dr. Zaldivar at 10:29 PM on 12/15/2023.  2.  Occlusion of the proximal to mid right anterior tibial artery, likely chronic. Associated nonopacification of the right dorsalis pedis artery within the foot. Remainder of the right lower extremity arterial vasculature is widely patent.  3.  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 Additional aneurysms of the right common iliac artery and the infrarenal abdominal aorta appear unchanged.  4.  Small nonobstructing right renal stone.  5.  Previous right middle lobectomy.  6.  Moderate to severe prostatomegaly.  7.  Hepatic steatosis.  8.  Unchanged small bilateral adrenal adenomas.   IR Lower Extremity Angiogram Left    [facility] RADIOLOGY  LOCATION: [facility]  DATE: 12/16/2023    PROCEDURE: ABDOMINAL AORTIC, BILATERAL PELVIC AND LEFT LOWER EXTREMITY DIAGNOSTIC ARTERIOGRAPHY.  THIRD ORDER CATHETERIZATION  INITIATION OF THROMBOLYTIC THERAPY  ULTRASOUND-GUIDED  VASCULAR ACCESS  MODERATE SEDATION    INTERVENTIONAL RADIOLOGIST: Aris Larkin MD    INDICATION: PAD. Thrombosed left popliteal artery aneurysm.    CONSENT: The risks, benefits and alternatives of planned procedure were discussed with the patient  in detail. All questions were answered. Informed consent was given to proceed with the procedure.    MODERATE SEDATION: Versed 2.5 mg IV; Fentanyl 100 mcg IV. During the time out, immediately prior to the administration of medications, the patient was reassessed for adequacy to receive conscious sedation.  Under physician supervision, Versed and   fentanyl were administered for moderate sedation. Pulse oximetry, heart rate and blood pressure were continuously monitored by an independent trained observer. The physician spent 40 minutes of face-to-face sedation time with the patient.    CONTRAST: 160 mL intra-arterially.  ANTIBIOTICS: None.  ADDITIONAL MEDICATIONS: None.    FLUOROSCOPIC TIME: 14.5 minutes.  RADIATION DOSE: Air Kerma: 435 mGy.    COMPLICATIONS: No immediate complications.    STERILE BARRIER TECHNIQUE: Maximum sterile barrier technique was used. Cutaneous antisepsis was performed at the operative site with application of 2% chlorhexidine and large sterile drape. Prior to the procedure, the  and assistant performed   hand hygiene and wore hat, mask, sterile gown, and sterile gloves during the entire procedure.    PROCEDURE:    After obtaining informed written and oral consent local anesthesia was infiltrated into the soft tissues of the right groin. Prior to the procedure patency of the right common femoral artery was assessed with ultrasound and sonographic images recorded.   Using real-time ultrasound guidance, access  Access was achieved into the right common femoral artery, and conversion was made for a 5 Congolese vascular sheath, which was attached to a continuous heparinized saline infusion. A flush catheter was   manipulated over a wire into  the upper abdomen at the level of the renal arteries and digital subtraction abdominal arteriography was obtained. The catheter was repositioned above the aortic bifurcation, and bilateral oblique digital subtraction pelvic   arteriography was performed. Second-order catheterization of the left external iliac artery. From this location, digital subtraction left lower extremity arteriography was obtained. The findings were discussed with Dr. Tapia was elected to proceed with   left lower extremity thrombolytic therapy.    The existing 5 Macanese sheath was exchanged for a 6 Macanese Balkan sheath. A hematoma was noted immediately following placement of the 6 Macanese sheath. The 6 Macanese sheath was upsized to 7 Macanese and the bleeding seen to subside. Using a Glidewire   advantage wire and a 5 Macanese catheter, the thrombosed left popliteal artery aneurysm was catheterized. Catheter exchanged for a 1 35 cm x 10 cm infusion catheter which was positioned within the left popliteal artery aneurysm. The catheter and sheath   were secured in place.     ABDOMEN FINDINGS:  2 renal arteries supply the right kidney. Single renal artery supplies the left kidney. Moderate atherosclerotic disease infrarenal abdominal aorta with associated small aortic aneurysm. Atherosclerotic disease common iliac arteries bilaterally.   Bilateral distal common iliac artery aneurysms. The internal and external iliac arteries are patent bilaterally.      LEFT LOWER EXTREMITY FINDINGS:   Left common femoral, left profunda femoral and left superficial femoral arteries are patent. Diffuse atherosclerotic disease of the left superficial femoral artery. Occlusion of the left popliteal artery at the level of the adductor canal.   Reconstitution of the proximal peroneal and posterior tibial arteries. Occlusion of the left anterior tibial artery. Short segment occlusion of the proximal left posterior tibial artery. Two-vessel runoff into the left foot via the  peroneal and posterior   tibial arteries.      Impression    IMPRESSION:    1.  Small infrarenal abdominal aortic aneurysm.  2.  Bilateral common iliac artery aneurysms.  3.  Thrombosed left popliteal artery aneurysm with reconstitution of the left peroneal and posterior tibial arteries.  4.  Initiation of thrombolytic therapy.    PLAN: The patient will receive intra-arterial thrombolytics overnight and be reassessed with angiography in the am.    CPT:  88023  20803  [id number]  [id number]  [id number] x     The CPT codes are for physician reference only.

## 2023-12-16 NOTE — PROGRESS NOTES
Diagnostic Studies   LABS:  Results for orders placed or performed during the hospital encounter of 12/15/23   US Lower Extremity Venous Duplex Left     Status: None    Narrative    EXAM: US LOWER EXTREMITY VENOUS DUPLEX LEFT  LOCATION: [facility]  DATE: 12/15/2023    INDICATION: Left lower extremity pain.  COMPARISON: None available.  TECHNIQUE: Venous Duplex ultrasound of the left lower extremity with and without compression, augmentation and duplex. Color flow and spectral Doppler with waveform analysis performed.    FINDINGS: Exam includes the common femoral, femoral, popliteal, and contralateral common femoral veins as well as segmentally visualized deep calf veins and greater saphenous vein.     LEFT: No deep vein thrombosis. No superficial thrombophlebitis. No popliteal cyst.    Incidentally noted complete occlusion of the popliteal artery. Distally, there is some detectable flow within the posterior tibial and peroneal arteries.      Impression    IMPRESSION:  1.  Complete segmental occlusion of the left popliteal artery, with detectable, though diminutive flow in the more distal left posterior tibial and peroneal arteries. Correlation with CT angiography is recommended. Findings and recommendations discussed   verbally via telephone with Dr. Harris at 7:37 PM on 12/15/2023.  2.  No deep venous thrombosis in the visualized left lower extremity.   Results for orders placed or performed in visit on 12/15/23   D dimer, quantitative     Status: Abnormal   Result Value Ref Range    D-Dimer Quantitative 2.75 (H) 0.00 - 0.50 ug/mL FEU    Narrative    This D-dimer assay is intended for use in conjunction with a clinical pretest probability assessment model to exclude pulmonary embolism (PE) and deep venous thrombosis (DVT) in outpatients suspected of PE or DVT. The cut-off value is 0.50 ug/mL FEU.    For patients 50 years of age or older, the application of age-adjusted cut-off values for  D-Dimer may increase the specificity without significant effect on sensitivity. The literature suggested calculation age adjusted cut-off in ug/L = age in years x 10 ug/L. The results in this laboratory are reported as ug/mL rather than ug/L. The calculation for age adjusted cut off in ug/mL= age in years x 0.01 ug/mL. For example, the cut off for a 76 year old male is 74 x 0.01 ug/mL = 0.76 ug/mL (760 ug/L).    M Elsie et al. Age adjusted D-dimer cut-off levels to rule out pulmonary embolism: The ADJUST-PE Study. MUKUND 2014;311:2005-6140.; HJ Arianna et al. Diagnostic accuracy of conventional or age adjusted D-dimer cutoff values in older patients with suspected venous thromboembolism. Systemic review and meta-analysis. BMJ 2013:346:f2492.          Received call from radiology regarding venous dopplers.  Patient called and informed of arterial clot.  Informed to go to ER.  ER informed.    Awais Harris MD

## 2023-12-16 NOTE — PLAN OF CARE
Problem: Adult Inpatient Plan of Care  Goal: Optimal Comfort and Wellbeing  Outcome: Progressing     Problem: Pain Acute  Goal: Optimal Pain Control and Function  Outcome: Progressing   Goal Outcome Evaluation:  [id number]: A&Ox4, VSS. RA. Tele- NSR 1AVB/ Sinus nakita. LLE pedal pulse not dopplerable, post tib dopplerable. Intermittent numbness/ tingling, worse when standing on LLE per pt. Denies pain this shift. Not OOB , using urinal at bedside. Warm and no change in color. Remains NPO. Vascular to see patient. Hep gtt was running at 12.5 mL/hr but stopped at 0704 per protocol, to be restarted at 0800 and next hep draw will be at 1400.

## 2023-12-16 NOTE — PLAN OF CARE
Problem: Adult Inpatient Plan of Care  Goal: Optimal Comfort and Wellbeing  12/16/2023 1114 by Melani Hernandez, NAYELI  Outcome: Progressing  12/16/2023 1114 by Melani Hernandez RN  Outcome: Progressing     Problem: Pain Acute  Goal: Optimal Pain Control and Function  12/16/2023 1114 by Melani Hernandez, NAYELI  Outcome: Progressing  12/16/2023 1114 by Melani Hernandez RN  Outcome: Progressing  Intervention: Prevent or Manage Pain  Recent Flowsheet Documentation  Taken 12/16/2023 0835 by Melani Hernandez, NAYELI  Medication Review/Management: medications reviewed     Goal Outcome Evaluation: Pt denies any pain. Left tibial Pulse +2 with doppler. Ambulates independently. Anti XA 0.85. Heparin drip stopped for 1 hour. Restarted at 1150 units/hour. Pt left with all belongings to IR via zoom cart for procedure.

## 2023-12-16 NOTE — SEDATION DOCUMENTATION
Interventional Radiology Intra-procedural Nursing Note  Patient Name: Jack Brown  Medical Record Number: [id number]  Today's Date: December 16, 2023    Procedure: left lower extremity angio  Start time: 1120  End time: 1215  Sedation time: 55 min      Administered medication totals:  Versed 2.5 mg IVP  Fentanyl 100 mcg IVP    Last dose of sedation administered at 1215.   Procedure well tolerated by patient without complications. Procedure end with debrief by Dr. Larkin  Report given to Lima in ICU, called wife and updated that the patient was in the room and gave the telephone number of ICU.

## 2023-12-16 NOTE — PROCEDURES
[facility]    Procedure: IR Procedure Note    Date/Time: 12/16/2023 12:35 PM    Performed by: Aris Larkin MD  Authorized by: Aris Larkin MD      UNIVERSAL PROTOCOL   Site Marked: NA  Prior Images Obtained and Reviewed:  Yes  Required items: Required blood products, implants, devices and special equipment available    Patient identity confirmed:  Verbally with patient, arm band, provided demographic data and hospital-assigned identification number  Patient was reevaluated immediately before administering moderate or deep sedation or anesthesia  Confirmation Checklist:  Patient's identity using two indicators, relevant allergies, procedure was appropriate and matched the consent or emergent situation and correct equipment/implants were available  Time out: Immediately prior to the procedure a time out was called    Universal Protocol: the Joint Commission Universal Protocol was followed    Preparation: Patient was prepped and draped in usual sterile fashion       ANESTHESIA    Anesthesia:  Local infiltration  Local Anesthetic:  Lidocaine 1% without epinephrine      SEDATION  Patient Sedated: Yes    Sedation Type:  Moderate (conscious) sedation  Vital signs: Vital signs monitored during sedation    See dictated procedure note for full details.  Findings: Thrombosed popliteal artery aneurysm.  2 vessel run-off via PT and peroneal.  D/w Dr Tapia, will start thrombolytics and recheck tomorrow.    Specimens: none    Complications: None    Condition: Stable      PROCEDURE    Patient Tolerance:  Patient tolerated the procedure well with no immediate complications  Length of time physician/provider present for 1:1 monitoring during sedation: 45

## 2023-12-16 NOTE — ED TRIAGE NOTES
Patient arrives from home. C/o left side posterior knee pain for past five days. Went to walk in clinic today for assessment. Blood work showed elevated D-Dimer and clinic called patient to tell him to be seen in ED for further workup.     States when he is sitting or lying down at rest but any activity causes extreme pain. Also states he gets intermittent numbness from knee down to ankle.

## 2023-12-16 NOTE — H&P
"[facility]    History and Physical - Hospitalist Service       Date of Admission:  12/15/2023    Assessment & Plan      Jack Brown is a 73 year old male with a medical history significant for coronary artery disease, hypertension hyperlipidemia peripheral arterial disease BPH-medical comorbidities who is being admitted with acute on chronic left lower extremity ischemia.      Acute on chronic left lower extremity ischemia-Case discussed with vascular surgery and interventional radiology.  Continue heparin drip for now.  Likely due to acute decreased flow in the right lower extremity with chronic PAD.  Pulses can be auscultated using Doppler.  Patient's leg is wound without sensory loss when seen.  Continue heparin drip for now.  Appreciate vascular surgery input.  Plan of care discussed with patient.  Per nursing documentation-\"LLE pedal pulse not dopplerable, post tib dopplerable. Intermittent \"    Hypertension-continue metoprolol, amlodipine    Coronary artery disease-continue metoprolol, aspirin and heparin    Hyperlipidemia-.  Total cholesterol greater than 200.  Patient has a documented allergy to statins.  He will benefit from this started also planning to decrease the cholesterol level.  A.m. team to follow.  This was not discussed with patient    PAD-continue heparin, patient has documented allergies to statin, aspirin and metoprolol    ?Prostate cancer-resume Proscar and Flomax    Coronary artery disease-status post CABG many years ago.  Graft remains patent.  Patient.  Also has a strong coronary artery disease history in the family.    Rest of patient's medical problems management remains unchanged    Diet:  Full code  DVT Prophylaxis:   Roper Catheter: Not present  Lines: Peripheral     Cardiac Monitoring: None  Code Status:  Full code    Clinically Significant Risk Factors Present on Admission                # Drug Induced Platelet Defect: home medication list includes " an antiplatelet medication   # Hypertension: Noted on problem list           # History of CABG: noted on surgical history       Disposition Plan           Karely Paz MD  Hospitalist Service  [facility]  Securely message with [employer] (more info)  Text page via [employer] Paging/Directory     ______________________________________________________________________    Chief Complaint   Left knee pain        History of Present Illness   Jack Brown is a 73 year old male with a medical history significant for coronary artery disease, hypertension hyperlipidemia peripheral arterial disease BPH-medical comorbidities who is being admitted with acute on chronic left lower extremity ischemia.    Patient presented to the ER due to pain in the posterior knee for 5 days.  Patient admitted to claudication with walking.  He was seen in the clinic today for blood work which showed elevated dimer and he was called to go to the ER for further evaluation.  Patient reports ongoing pain with sitting or lying.  He also had intermittent numbness from his knee to ankle.    Preliminary workup done in the ER showed a BMP which was unremarkable.  BUN of 23 creatinine 0.91.  Total cholesterol 222 .  CBC was unremarkable.    Ultrasound of the right lower extremity showed results below  Narrative & Impression   EXAM: US LOWER EXTREMITY VENOUS DUPLEX LEFT  LOCATION: [facility]  DATE: 12/15/2023     INDICATION: Left lower extremity pain.  COMPARISON: None available.  TECHNIQUE: Venous Duplex ultrasound of the left lower extremity with and without compression, augmentation and duplex. Color flow and spectral Doppler with waveform analysis performed.     FINDINGS: Exam includes the common femoral, femoral, popliteal, and contralateral common femoral veins as well as segmentally visualized deep calf veins and greater saphenous vein.      LEFT: No deep vein thrombosis. No superficial  thrombophlebitis. No popliteal cyst.     Incidentally noted complete occlusion of the popliteal artery. Distally, there is some detectable flow within the posterior tibial and peroneal arteries.                                                                      IMPRESSION:  1.  Complete segmental occlusion of the left popliteal artery, with detectable, though diminutive flow in the more distal left posterior tibial and peroneal arteries. Correlation with CT angiography is recommended. Findings and recommendations discussed   verbally via telephone with Dr. Harris at 7:37 PM on 12/15/2023.  2.  No deep venous thrombosis in the visualized left lower extremity.     Case was discussed with vascular surgeon and interventional radiologist on-call and the recommendation was to start heparin drip.  Patient is being admitted for pain control and further management and anticoagulation.       Past Medical History    Past Medical History:   Diagnosis Date    BPH (benign prostatic hypertrophy)     Cancer (H)     Skin on R.chest wall.    Chest discomfort     Coronary artery disease     Detached retina, left Oct 2014    Hyperlipidemia     Hypertension        Past Surgical History   Past Surgical History:   Procedure Laterality Date    BYPASS GRAFT ARTERY CORONARY N/A 12/28/2015    Procedure: CORONARY ARTERY BYPASS x 3, RIGHT ENDOSCOPIC VEIN HARVEST, LEFT INTERNAL MAMMARY ARTERY, ANESTHESIA TRANSESOPHAGEAL ECHOCARDIOGRAM;  Surgeon: Jayson Alvarado MD;  Location: [facility];  Service:     CARDIAC SURGERY      CATARACT EXTRACTION  2015    CYST REMOVAL      Ganglion cyst removal of both wrist    HC REMOVAL PREPATELLA BURSA      Description: Excision Of Prepatellar Bursa;  Recorded: 09/30/2014;    HC REPAIR OF NASAL SEPTUM      Description: Septoplasty;  Recorded: 09/30/2014;    HERNIA REPAIR, UMBILICAL      PARS PLANA VITRECTOMY W/ REPAIR OF MACULAR HOLE  Nov 2014    NC EXCIS TENDON SHEATH LESION, HAND/FINGER       Description: Hand Excision Of A Tendon Cyst;  Recorded: 01/04/2011;    TN LAP, VENTRAL HERNIA REPAIR,REDUCIBLE      Description: Laparoscopy Repair Of Umbilical Hernia;  Recorded: 01/04/2011;    RETINAL DETACHMENT SURGERY  oct 2014    SKIN CANCER EXCISION  Nov 2015    right chest    THORACOSCOPY Right 4/14/2016    Procedure: RIGHT VIDEO ASSISTED THORACOSCOPY, RIGHT LOBECTOMY;  Surgeon: Vinny Chase MD;  Location: [facility];  Service:     VASECTOMY         Prior to Admission Medications   Prior to Admission Medications   Prescriptions Last Dose Informant Patient Reported? Taking?   VITAMIN D3 25 MCG (1000 UT) tablet  Self Yes No   Sig: Take 1 tablet by mouth daily   acetaminophen (TYLENOL) 500 MG tablet 12/15/2023 at prn Self Yes Yes   Sig: [ACETAMINOPHEN (TYLENOL) 500 MG TABLET] Take 500 mg by mouth every 6 (six) hours as needed for pain.   amLODIPine (NORVASC) 10 MG tablet 12/15/2023 at am Self No Yes   Sig: TAKE 1 TABLET(10 MG) BY MOUTH DAILY   aspirin 81 mg chewable tablet 12/15/2023 at am Self Yes Yes   Sig: [ASPIRIN 81 MG CHEWABLE TABLET] Chew 81 mg daily.   finasteride (PROSCAR) 5 MG tablet 12/15/2023 at am Self No Yes   Sig: Take 1 tablet (5 mg) by mouth daily   metoprolol tartrate (LOPRESSOR) 50 MG tablet 12/15/2023 at x2 Self No Yes   Sig: TAKE 1 TABLET(50 MG) BY MOUTH TWICE DAILY   tamsulosin (FLOMAX) 0.4 MG capsule 12/15/2023 at am Self No Yes   Sig: TAKE 1 CAPSULE BY MOUTH DAILY AFTER BREAKFAST      Facility-Administered Medications: None        Review of Systems    The 10 point Review of Systems is negative other than noted in the HPI or here.     Social History   I have reviewed this patient's social history and updated it with pertinent information if needed.  Social History     Tobacco Use    Smoking status: Never    Smokeless tobacco: Never   Substance Use Topics    Alcohol use: Yes     Alcohol/week: 8.0 standard drinks of alcohol    Drug use: No         Family History   I have  reviewed this patient's family history and updated it with pertinent information if needed.  Family History   Problem Relation Age of Onset    Acute Myocardial Infarction Mother     Aneurysm Mother         brain    Acute Myocardial Infarction Father     Acute Myocardial Infarction Brother     Aortic aneurysm Brother         d @ 72    Lung Cancer Cousin     Coronary Artery Disease Cousin     Leukemia Brother         d @ 55    Colitis Brother     Colon Cancer Paternal Grandfather          Allergies   Allergies   Allergen Reactions    Niacin Hives and Rash     Burning of the skin    Atorvastatin Muscle Pain (Myalgia)    Pravastatin Muscle Pain (Myalgia)        Physical Exam   Vital Signs: Temp: 98  F (36.7  C) Temp src: Temporal BP: (!) 148/83 Pulse: 67   Resp: 16 SpO2: 94 % O2 Device: None (Room air)    Weight: 155 lbs 0 oz      General Aox3, appropriate affect, NAD, on RA  HEENT  MMM, EOMI, PERRL  Chest Adeq E b/l, No wheezing  Heart RRR, No M/R/G  Abd- Soft, NT, BS+  - Deferred,   Extremity- Moving all extremities, No digital clubbing,   No edema  Right lower extremity-warm, no sensory loss, power 5 out of 5  Neuro- Aox3, CN II- XII intact, No peripheral vision loss  P5/5, T-N  gait not checked  Skin  Has no tattoo, No skin rash     Medical Decision Making       80 MINUTES SPENT BY ME on the date of service doing chart review, history, exam, documentation & further activities per the note.      ------------------ MEDICAL DECISION MAKING ------------------------------------------------------------------------------------------------------  MANAGEMENT DISCUSSED with the following over the past 24 hours:         Data     I have personally reviewed the following data over the past 24 hrs:    8.3  \   16.1   / 267     138 103 23.6 (H) /  117 (H)   4.4 25 0.91 \     ALT: 11 AST: 29 AP: 75 TBILI: 0.4   ALB: 4.1 TOT PROTEIN: 7.4 LIPASE: N/A     INR:  1.02 PTT:  31   D-dimer:  2.75 (H) Fibrinogen:  N/A       Imaging  results reviewed over the past 24 hrs:   Recent Results (from the past 24 hour(s))   US Lower Extremity Venous Duplex Left    Narrative    EXAM: US LOWER EXTREMITY VENOUS DUPLEX LEFT  LOCATION: [facility]  DATE: 12/15/2023    INDICATION: Left lower extremity pain.  COMPARISON: None available.  TECHNIQUE: Venous Duplex ultrasound of the left lower extremity with and without compression, augmentation and duplex. Color flow and spectral Doppler with waveform analysis performed.    FINDINGS: Exam includes the common femoral, femoral, popliteal, and contralateral common femoral veins as well as segmentally visualized deep calf veins and greater saphenous vein.     LEFT: No deep vein thrombosis. No superficial thrombophlebitis. No popliteal cyst.    Incidentally noted complete occlusion of the popliteal artery. Distally, there is some detectable flow within the posterior tibial and peroneal arteries.      Impression    IMPRESSION:  1.  Complete segmental occlusion of the left popliteal artery, with detectable, though diminutive flow in the more distal left posterior tibial and peroneal arteries. Correlation with CT angiography is recommended. Findings and recommendations discussed   verbally via telephone with Dr. Harris at 7:37 PM on 12/15/2023.  2.  No deep venous thrombosis in the visualized left lower extremity.   CTA Chest Abdomen Pelvis Runoff w Contrast    Narrative    EXAM: CTA CHEST ABDOMEN PELVIS RUNOFF W CONTRAST  LOCATION: [facility]  DATE: 12/15/2023      TECHNIQUE: Helical acquisition through the chest, abdomen, pelvis, and bilateral lower extremities was performed during the arterial phase of contrast enhancement. Second phase arterial imaging was performed through the bilateral lower extremities. 2D   and 3D reconstructions performed by the CT technologist. Dose reduction techniques were used.  CONTRAST: 100 mL Isovue 370.    FINDINGS:   AORTA: Moderate  atheromatous disease of the thoracoabdominal aorta and branch vessels. No evidence of dissection. Multifocal ectasia and aneurysmal dilatation of the infrarenal abdominal aorta, measuring up to 3.0 cm.    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    Slight interval increase in size of a partially thrombosed aneurysm at the distal left common iliac artery bifurcation, measuring up to 3.5 cm, previously 3.3 cm.    Classic hepatic arterial anatomy. Dual right renal arteries. Single left renal artery.    RIGHT LEG: Moderate atheromatous disease. Widely patent oral and popliteal arteries. Tibioperoneal trunk, peroneal, and posterior tibial arteries are widely patent.    Anterior tibial artery becomes occluded within its proximal to midportion, likely a chronic finding.    Patent plantar artery within the foot. Dorsalis pedis artery is not opacified.    LEFT LEG: Moderate atheromatous disease. Widely patent left femoral artery. Complete occlusion of the left popliteal artery, which demonstrates diffuse aneurysmal dilatation, measuring up to 2.1 cm in diameter. Conclusion also extends distally into the   tibioperoneal trunk, as well as the proximal posterior tibial, peroneal, anterior tibial arteries. There is reconstitution of the proximal posterior tibial and peroneal arteries via collateral flow.     Anterior tibial artery becomes occluded within its midportion, likely a chronic finding.    Patent plantar artery within the foot. Dorsalis pedis arteries only briefly opacified at the level of the ankle.      NONVASCULAR FINDINGS:  MEDIASTINUM: Mild cardiomegaly. Median sternotomy and coronary artery bypass grafting. Severe coronary artery atherosclerosis.     LUNGS/PLEURA: Mild centrilobular emphysema. Previous right middle lobectomy.    ABDOMEN: Diffuse hepatic steatosis. Unchanged bilateral adrenal adenomas. Simple right renal cysts; no  further follow-up recommended. Tiny nonobstructing right renal stone.    PELVIS: Moderate to severe prostatomegaly. Bilateral vasectomy clips.    MUSCULOSKELETAL: Severe bilateral first metatarsophalangeal joint osteoarthritis. Right superficial infrapatellar bursitis.      Impression    IMPRESSION:  1.  Long segment thrombotic occlusion involving the left popliteal artery, tibioperoneal trunk, and the proximal aspect of the proximal anterior tibial, posterior tibial, peroneal arteries. The proximal left posterior tibial and peroneal arteries are   reconstituted via collateral flow, remaining patent to the level of the ankle. The anterior tibial artery is also reconstituted via collateral flow, though becomes occluded at its mid aspect, possibly chronic. Findings discussed verbally via telephone   with Dr. Zaldivar at 10:29 PM on 12/15/2023.  2.  Occlusion of the proximal to mid right anterior tibial artery, likely chronic. Associated nonopacification of the right dorsalis pedis artery within the foot. Remainder of the right lower extremity arterial vasculature is widely patent.  3.  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 Additional aneurysms of the right common iliac artery and the infrarenal abdominal aorta appear unchanged.  4.  Small nonobstructing right renal stone.  5.  Previous right middle lobectomy.  6.  Moderate to severe prostatomegaly.  7.  Hepatic steatosis.  8.  Unchanged small bilateral adrenal adenomas.

## 2023-12-17 ENCOUNTER — APPOINTMENT (OUTPATIENT)
Dept: INTERVENTIONAL RADIOLOGY/VASCULAR | Facility: HOSPITAL | Age: 73
DRG: 300 | End: 2023-12-17
Attending: RADIOLOGY
Payer: MEDICARE

## 2023-12-17 ENCOUNTER — APPOINTMENT (OUTPATIENT)
Dept: ULTRASOUND IMAGING | Facility: HOSPITAL | Age: 73
DRG: 300 | End: 2023-12-17
Attending: RADIOLOGY
Payer: MEDICARE

## 2023-12-17 ENCOUNTER — APPOINTMENT (OUTPATIENT)
Dept: ULTRASOUND IMAGING | Facility: HOSPITAL | Age: 73
DRG: 300 | End: 2023-12-17
Attending: STUDENT IN AN ORGANIZED HEALTH CARE EDUCATION/TRAINING PROGRAM
Payer: MEDICARE

## 2023-12-17 LAB
ALBUMIN SERPL BCG-MCNC: 3.5 G/DL (ref 3.5–5.2)
ALP SERPL-CCNC: 63 U/L (ref 40–150)
ALT SERPL W P-5'-P-CCNC: 6 U/L (ref 0–70)
ANION GAP SERPL CALCULATED.3IONS-SCNC: 10 MMOL/L (ref 7–15)
APTT PPP: 202 SECONDS (ref 22–38)
AST SERPL W P-5'-P-CCNC: 17 U/L (ref 0–45)
BASOPHILS # BLD AUTO: 0.1 10E3/UL (ref 0–0.2)
BASOPHILS NFR BLD AUTO: 0 %
BILIRUB SERPL-MCNC: 0.4 MG/DL
BUN SERPL-MCNC: 32.7 MG/DL (ref 8–23)
CALCIUM SERPL-MCNC: 9.4 MG/DL (ref 8.8–10.2)
CHLORIDE SERPL-SCNC: 104 MMOL/L (ref 98–107)
CREAT SERPL-MCNC: 1.03 MG/DL (ref 0.67–1.17)
DEPRECATED HCO3 PLAS-SCNC: 24 MMOL/L (ref 22–29)
EGFRCR SERPLBLD CKD-EPI 2021: 76 ML/MIN/1.73M2
EOSINOPHIL # BLD AUTO: 0.3 10E3/UL (ref 0–0.7)
EOSINOPHIL NFR BLD AUTO: 3 %
ERYTHROCYTE [DISTWIDTH] IN BLOOD BY AUTOMATED COUNT: 13 % (ref 10–15)
FIBRINOGEN PPP-MCNC: 127 MG/DL (ref 170–490)
FIBRINOGEN PPP-MCNC: 152 MG/DL (ref 170–490)
FIBRINOGEN PPP-MCNC: 195 MG/DL (ref 170–490)
GLUCOSE SERPL-MCNC: 130 MG/DL (ref 70–99)
HCT VFR BLD AUTO: 38.6 % (ref 40–53)
HGB BLD-MCNC: 13.1 G/DL (ref 13.3–17.7)
HGB BLD-MCNC: 13.5 G/DL (ref 13.3–17.7)
HGB BLD-MCNC: 13.7 G/DL (ref 13.3–17.7)
HOLD SPECIMEN: NORMAL
IMM GRANULOCYTES # BLD: 0.1 10E3/UL
IMM GRANULOCYTES NFR BLD: 1 %
INR PPP: 1.27 (ref 0.85–1.15)
LACTATE SERPL-SCNC: 1.6 MMOL/L (ref 0.7–2)
LYMPHOCYTES # BLD AUTO: 1.9 10E3/UL (ref 0.8–5.3)
LYMPHOCYTES NFR BLD AUTO: 16 %
MCH RBC QN AUTO: 30.3 PG (ref 26.5–33)
MCHC RBC AUTO-ENTMCNC: 33.9 G/DL (ref 31.5–36.5)
MCV RBC AUTO: 89 FL (ref 78–100)
MONOCYTES # BLD AUTO: 1.2 10E3/UL (ref 0–1.3)
MONOCYTES NFR BLD AUTO: 10 %
NEUTROPHILS # BLD AUTO: 8.4 10E3/UL (ref 1.6–8.3)
NEUTROPHILS NFR BLD AUTO: 70 %
NRBC # BLD AUTO: 0 10E3/UL
NRBC BLD AUTO-RTO: 0 /100
PLATELET # BLD AUTO: 193 10E3/UL (ref 150–450)
POTASSIUM SERPL-SCNC: 4.2 MMOL/L (ref 3.4–5.3)
PROT SERPL-MCNC: 6.1 G/DL (ref 6.4–8.3)
RBC # BLD AUTO: 4.33 10E6/UL (ref 4.4–5.9)
SODIUM SERPL-SCNC: 138 MMOL/L (ref 135–145)
UFH PPP CHRO-ACNC: 0.63 IU/ML
WBC # BLD AUTO: 12 10E3/UL (ref 4–11)

## 2023-12-17 PROCEDURE — 37211 THROMBOLYTIC ART THERAPY: CPT

## 2023-12-17 PROCEDURE — 250N000011 HC RX IP 250 OP 636: Performed by: INTERNAL MEDICINE

## 2023-12-17 PROCEDURE — 04PY33Z REMOVAL OF INFUSION DEVICE FROM LOWER ARTERY, PERCUTANEOUS APPROACH: ICD-10-PCS | Performed by: RADIOLOGY

## 2023-12-17 PROCEDURE — 36415 COLL VENOUS BLD VENIPUNCTURE: CPT | Performed by: RADIOLOGY

## 2023-12-17 PROCEDURE — 85384 FIBRINOGEN ACTIVITY: CPT | Performed by: INTERNAL MEDICINE

## 2023-12-17 PROCEDURE — 93970 EXTREMITY STUDY: CPT

## 2023-12-17 PROCEDURE — 272N000691 HC DILATOR CR1

## 2023-12-17 PROCEDURE — 250N000013 HC RX MED GY IP 250 OP 250 PS 637: Performed by: INTERNAL MEDICINE

## 2023-12-17 PROCEDURE — 85384 FIBRINOGEN ACTIVITY: CPT | Performed by: RADIOLOGY

## 2023-12-17 PROCEDURE — 83605 ASSAY OF LACTIC ACID: CPT | Performed by: INTERNAL MEDICINE

## 2023-12-17 PROCEDURE — 93005 ELECTROCARDIOGRAM TRACING: CPT

## 2023-12-17 PROCEDURE — 250N000011 HC RX IP 250 OP 636: Mod: JZ | Performed by: RADIOLOGY

## 2023-12-17 PROCEDURE — 85025 COMPLETE CBC W/AUTO DIFF WBC: CPT | Performed by: RADIOLOGY

## 2023-12-17 PROCEDURE — 85018 HEMOGLOBIN: CPT | Performed by: RADIOLOGY

## 2023-12-17 PROCEDURE — 250N000013 HC RX MED GY IP 250 OP 250 PS 637: Performed by: RADIOLOGY

## 2023-12-17 PROCEDURE — 99233 SBSQ HOSP IP/OBS HIGH 50: CPT | Performed by: INTERNAL MEDICINE

## 2023-12-17 PROCEDURE — 93010 ELECTROCARDIOGRAM REPORT: CPT | Mod: HIP | Performed by: INTERNAL MEDICINE

## 2023-12-17 PROCEDURE — 255N000002 HC RX 255 OP 636: Performed by: INTERNAL MEDICINE

## 2023-12-17 PROCEDURE — 93926 LOWER EXTREMITY STUDY: CPT

## 2023-12-17 PROCEDURE — 85730 THROMBOPLASTIN TIME PARTIAL: CPT | Performed by: INTERNAL MEDICINE

## 2023-12-17 PROCEDURE — 272N000196 HC ACCESSORY CR5

## 2023-12-17 PROCEDURE — 80053 COMPREHEN METABOLIC PANEL: CPT | Performed by: INTERNAL MEDICINE

## 2023-12-17 PROCEDURE — 85018 HEMOGLOBIN: CPT | Performed by: INTERNAL MEDICINE

## 2023-12-17 PROCEDURE — 85520 HEPARIN ASSAY: CPT | Performed by: RADIOLOGY

## 2023-12-17 PROCEDURE — 93005 ELECTROCARDIOGRAM TRACING: CPT | Performed by: INTERNAL MEDICINE

## 2023-12-17 PROCEDURE — 99152 MOD SED SAME PHYS/QHP 5/>YRS: CPT

## 2023-12-17 PROCEDURE — 36415 COLL VENOUS BLD VENIPUNCTURE: CPT | Performed by: INTERNAL MEDICINE

## 2023-12-17 PROCEDURE — C1760 CLOSURE DEV, VASC: HCPCS

## 2023-12-17 PROCEDURE — 85610 PROTHROMBIN TIME: CPT | Performed by: INTERNAL MEDICINE

## 2023-12-17 PROCEDURE — 200N000001 HC R&B ICU

## 2023-12-17 PROCEDURE — C1769 GUIDE WIRE: HCPCS

## 2023-12-17 RX ORDER — NALOXONE HYDROCHLORIDE 0.4 MG/ML
0.2 INJECTION, SOLUTION INTRAMUSCULAR; INTRAVENOUS; SUBCUTANEOUS
Status: DISCONTINUED | OUTPATIENT
Start: 2023-12-17 | End: 2023-12-20 | Stop reason: HOSPADM

## 2023-12-17 RX ORDER — HEPARIN SODIUM 10000 [USP'U]/100ML
0-5000 INJECTION, SOLUTION INTRAVENOUS CONTINUOUS
Status: DISCONTINUED | OUTPATIENT
Start: 2023-12-17 | End: 2023-12-20 | Stop reason: HOSPADM

## 2023-12-17 RX ORDER — ROSUVASTATIN CALCIUM 5 MG/1
5 TABLET, COATED ORAL AT BEDTIME
Status: DISCONTINUED | OUTPATIENT
Start: 2023-12-18 | End: 2023-12-20 | Stop reason: HOSPADM

## 2023-12-17 RX ORDER — NALOXONE HYDROCHLORIDE 0.4 MG/ML
0.4 INJECTION, SOLUTION INTRAMUSCULAR; INTRAVENOUS; SUBCUTANEOUS
Status: DISCONTINUED | OUTPATIENT
Start: 2023-12-17 | End: 2023-12-20 | Stop reason: HOSPADM

## 2023-12-17 RX ORDER — IODIXANOL 320 MG/ML
100 INJECTION, SOLUTION INTRAVASCULAR ONCE
Status: COMPLETED | OUTPATIENT
Start: 2023-12-17 | End: 2023-12-17

## 2023-12-17 RX ADMIN — HEPARIN SODIUM 1250 UNITS/HR: 10000 INJECTION, SOLUTION INTRAVENOUS at 09:09

## 2023-12-17 RX ADMIN — ACETAMINOPHEN 500 MG: 500 TABLET ORAL at 09:04

## 2023-12-17 RX ADMIN — FENTANYL CITRATE 25 MCG: 50 INJECTION, SOLUTION INTRAMUSCULAR; INTRAVENOUS at 00:30

## 2023-12-17 RX ADMIN — METOPROLOL TARTRATE 50 MG: 25 TABLET, FILM COATED ORAL at 09:04

## 2023-12-17 RX ADMIN — MIDAZOLAM HYDROCHLORIDE 0.5 MG: 1 INJECTION, SOLUTION INTRAMUSCULAR; INTRAVENOUS at 00:28

## 2023-12-17 RX ADMIN — Medication 25 MCG: at 09:04

## 2023-12-17 RX ADMIN — Medication 1 LOZENGE: at 09:05

## 2023-12-17 RX ADMIN — FENTANYL CITRATE 25 MCG: 50 INJECTION, SOLUTION INTRAMUSCULAR; INTRAVENOUS at 00:46

## 2023-12-17 RX ADMIN — ONDANSETRON 4 MG: 2 INJECTION INTRAMUSCULAR; INTRAVENOUS at 16:31

## 2023-12-17 RX ADMIN — FENTANYL CITRATE 50 MCG: 50 INJECTION, SOLUTION INTRAMUSCULAR; INTRAVENOUS at 00:41

## 2023-12-17 RX ADMIN — MIDAZOLAM HYDROCHLORIDE 1 MG: 1 INJECTION, SOLUTION INTRAMUSCULAR; INTRAVENOUS at 00:44

## 2023-12-17 RX ADMIN — IODIXANOL 20 ML: 320 INJECTION, SOLUTION INTRAVASCULAR at 00:59

## 2023-12-17 RX ADMIN — ASPIRIN 81 MG CHEWABLE TABLET 81 MG: 81 TABLET CHEWABLE at 09:04

## 2023-12-17 RX ADMIN — FINASTERIDE 5 MG: 5 TABLET, FILM COATED ORAL at 09:04

## 2023-12-17 RX ADMIN — AMLODIPINE BESYLATE 10 MG: 5 TABLET ORAL at 09:04

## 2023-12-17 RX ADMIN — TAMSULOSIN HYDROCHLORIDE 0.4 MG: 0.4 CAPSULE ORAL at 09:04

## 2023-12-17 RX ADMIN — HYDROMORPHONE HYDROCHLORIDE 0.3 MG: 1 INJECTION, SOLUTION INTRAMUSCULAR; INTRAVENOUS; SUBCUTANEOUS at 16:29

## 2023-12-17 RX ADMIN — METOPROLOL TARTRATE 50 MG: 25 TABLET, FILM COATED ORAL at 20:06

## 2023-12-17 ASSESSMENT — ACTIVITIES OF DAILY LIVING (ADL)
ADLS_ACUITY_SCORE: 30
ADLS_ACUITY_SCORE: 32
ADLS_ACUITY_SCORE: 32
ADLS_ACUITY_SCORE: 30
DEPENDENT_IADLS:: INDEPENDENT
ADLS_ACUITY_SCORE: 30
ADLS_ACUITY_SCORE: 32
ADLS_ACUITY_SCORE: 30
ADLS_ACUITY_SCORE: 32

## 2023-12-17 NOTE — PROGRESS NOTES
[facility] Progress Note - Hospitalist Service    Date of Admission:  12/15/2023    Assessment & Plan   Lobito Brown is a 73-year-old male with a past medical history of CAD, HTN, HLD, PAD, BPH.  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     #Acute on chronic left lower extremity ischemia  #S/p LLE angiogram with lytic therapy on 12/16/23  # Right groin hematoma s/p discontinuation of lytic therapy due to hematoma 12/17/23  -Presented with 5 days of symptoms  - Was seen in the clinic, with lower extremity ultrasound showing complete segmental occlusion of left popliteal artery with decreased flow in the posterior tibial and peroneal arteries.  - Patient sent to [facility] ED for ongoing management.  - CT obtained here in the ED shows thrombotic occlusion involving left popliteal artery, tibial peroneal trunk and proximal aspect of proximal anterior tibial, posterior tibial and peroneal arteries.  - Case discussed with vascular surgery and interventional radiology.  -- S/p LLE angiogram with lytic therapy on 12/16/23, then with Right groin hematoma s/p discontinuation of lytic therapy due to hematoma 12/17/23.   -Continue on heparin gtt -> transition to DOAC in the morning   -Outpatient follow-up with Vascular surgery     # Infrarenal abdominal aortic aneurysm   # Bilatereal common ileac artery aneurysm   - routine outpatient follow-up with vascular surgery monitoring        # Coronary artery disease-status post CABG 2015  # Hypertension   - Graft remains patent.  - Also has a strong coronary artery disease history in the family.  - Is currently on aspirin, metoprolol 50 mg twice daily     # Hyperlipidemia  -Patient was on rosuvastatin 5 mg in the outpatient setting; patient discontinued this medication due to reported side effect of muscle aches  - we discussed  "the importance of resuming rosuvastatin again and that taking the medication at night might minimize side effect.   Start rosuvastatin at bedtime     # Enlarged prostate  Continue Proscar and Flomax        Diet: Low Saturated Fat Na <2400 mg    DVT Prophylaxis: Heparin gtt  Roper Catheter: Not present  Lines: None       Cardiac Monitoring: ACTIVE order. Indication: Tachyarrhythmias, acute (48 hours)  Code Status: Full Code      Clinically Significant Risk Factors                  # Hypertension: Noted on problem list        # Overweight: Estimated body mass index is 25.01 kg/m  as calculated from the following:    Height as of this encounter: 1.676 m (5' 6\").    Weight as of this encounter: 70.3 kg (154 lb 15.7 oz)., PRESENT ON ADMISSION      # History of CABG: noted on surgical history       Disposition Plan  To  be determined.     Expected Discharge Date: 12/18/2023                    CLINTON BHAT MD  Hospitalist Service  [facility]  Securely message with [employer] (more info)  Text page via [employer] Paging/Directory   ______________________________________________________________________    Interval History   -Overnight, noted to have right groin bleed or hematoma, pressure applied and went down again with IR for angio; lytic therapy discontinued. R-groin catheter and sheath removed overnight with IR, hemostasis achieved with placement of Angio-seal    -This morning, denies any pain in both lower extremities.  -Per discussion with RN, dopplerable pulses in bilateral DP & PT pulses   -patient denies any pain in both legs  -willing to start rosuvastatin again, but this time, at night    Physical Exam   Vital Signs: Temp: 98  F (36.7  C) Temp src: Oral BP: 123/72 Pulse: 65   Resp: 15 SpO2: 98 % O2 Device: Nasal cannula Oxygen Delivery: 4 LPM  Weight: 154 lbs 15.73 oz    General Appearance: Lying comfortably in bed, on room air in no acute distress or discomfort  Respiratory: bibasilar " crackles  Cardiovascular: Normal S1-S2, no murmurs rubs or gallops  GI: Abdomen soft nontender nondistended  Skin: No visible rash  Other: Moves both extremities spontaneously.  Faint bilateral PT pulses to touch, faint L-DP pulse to touch and no R-DP pulse to touch. Warm bilateral LE extremity. Touch is intact.     Medical Decision Making       55 MINUTES SPENT BY ME on the date of service doing chart review, history, exam, documentation & further activities per the note.      Data   ------------------------- PAST 24 HR DATA REVIEWED -----------------------------------------------    I have personally reviewed the following data over the past 24 hrs:    N/A  \   13.7   / N/A     N/A N/A N/A /  N/A   N/A N/A N/A \     INR:  1.12 PTT:  N/A   D-dimer:  N/A Fibrinogen:  152 (L)       Imaging results reviewed over the past 24 hrs:   Recent Results (from the past 24 hour(s))   IR Lower Extremity Angiogram Left    Narrative    [facility]  LOCATION: [facility]  DATE: 12/16/2023    PROCEDURE: ABDOMINAL AORTIC, BILATERAL PELVIC AND LEFT LOWER EXTREMITY DIAGNOSTIC ARTERIOGRAPHY.  THIRD ORDER CATHETERIZATION  INITIATION OF THROMBOLYTIC THERAPY  ULTRASOUND-GUIDED VASCULAR ACCESS  MODERATE SEDATION    INTERVENTIONAL RADIOLOGIST: Aris Larkin MD    INDICATION: PAD. Thrombosed left popliteal artery aneurysm.    CONSENT: The risks, benefits and alternatives of planned procedure were discussed with the patient  in detail. All questions were answered. Informed consent was given to proceed with the procedure.    MODERATE SEDATION: Versed 2.5 mg IV; Fentanyl 100 mcg IV. During the time out, immediately prior to the administration of medications, the patient was reassessed for adequacy to receive conscious sedation.  Under physician supervision, Versed and   fentanyl were administered for moderate sedation. Pulse oximetry, heart rate and blood pressure were continuously monitored by an independent  trained observer. The physician spent 40 minutes of face-to-face sedation time with the patient.    CONTRAST: 160 mL intra-arterially.  ANTIBIOTICS: None.  ADDITIONAL MEDICATIONS: None.    FLUOROSCOPIC TIME: 14.5 minutes.  RADIATION DOSE: Air Kerma: 435 mGy.    COMPLICATIONS: No immediate complications.    STERILE BARRIER TECHNIQUE: Maximum sterile barrier technique was used. Cutaneous antisepsis was performed at the operative site with application of 2% chlorhexidine and large sterile drape. Prior to the procedure, the  and assistant performed   hand hygiene and wore hat, mask, sterile gown, and sterile gloves during the entire procedure.    PROCEDURE:    After obtaining informed written and oral consent local anesthesia was infiltrated into the soft tissues of the right groin. Prior to the procedure patency of the right common femoral artery was assessed with ultrasound and sonographic images recorded.   Using real-time ultrasound guidance, access  Access was achieved into the right common femoral artery, and conversion was made for a 5 Angolan vascular sheath, which was attached to a continuous heparinized saline infusion. A flush catheter was   manipulated over a wire into the upper abdomen at the level of the renal arteries and digital subtraction abdominal arteriography was obtained. The catheter was repositioned above the aortic bifurcation, and bilateral oblique digital subtraction pelvic   arteriography was performed. Second-order catheterization of the left external iliac artery. From this location, digital subtraction left lower extremity arteriography was obtained. The findings were discussed with Dr. Tapia was elected to proceed with   left lower extremity thrombolytic therapy.    The existing 5 Angolan sheath was exchanged for a 6 Angolan Balkan sheath. A hematoma was noted immediately following placement of the 6 Angolan sheath. The 6 Angolan sheath was upsized to 7 Angolan and the bleeding seen  to subside. Using a Glidewire   advantage wire and a 5 Uruguayan catheter, the thrombosed left popliteal artery aneurysm was catheterized. Catheter exchanged for a 1 35 cm x 10 cm infusion catheter which was positioned within the left popliteal artery aneurysm. The catheter and sheath   were secured in place.     ABDOMEN FINDINGS:  2 renal arteries supply the right kidney. Single renal artery supplies the left kidney. Moderate atherosclerotic disease infrarenal abdominal aorta with associated small aortic aneurysm. Atherosclerotic disease common iliac arteries bilaterally.   Bilateral distal common iliac artery aneurysms. The internal and external iliac arteries are patent bilaterally.      LEFT LOWER EXTREMITY FINDINGS:   Left common femoral, left profunda femoral and left superficial femoral arteries are patent. Diffuse atherosclerotic disease of the left superficial femoral artery. Occlusion of the left popliteal artery at the level of the adductor canal.   Reconstitution of the proximal peroneal and posterior tibial arteries. Occlusion of the left anterior tibial artery. Short segment occlusion of the proximal left posterior tibial artery. Two-vessel runoff into the left foot via the peroneal and posterior   tibial arteries.      Impression    IMPRESSION:    1.  Small infrarenal abdominal aortic aneurysm.  2.  Bilateral common iliac artery aneurysms.  3.  Thrombosed left popliteal artery aneurysm with reconstitution of the left peroneal and posterior tibial arteries.  4.  Initiation of thrombolytic therapy.    PLAN: The patient will receive intra-arterial thrombolytics overnight and be reassessed with angiography in the am.    CPT:  21263  10225  [id number]  [id number]  [id number] x     The CPT codes are for physician reference only.   US Lower Extremity Arterial Duplex Bilateral    Narrative    EXAM: US LOWER EXTREMITY ARTERIAL DUPLEX BILATERAL  LOCATION: [facility]  DATE: 12/16/2023    INDICATION:  Peripheral vascular disease. Thrombosed left popliteal artery, currently on catheter directed thrombolytic therapy.  COMPARISON: CTA 12/15/2023. Angiogram earlier today.  TECHNIQUE: Duplex utilizing 2D gray-scale imaging, Doppler interrogation with color-flow and spectral waveform analysis.    FINDINGS:    RIGHT LOWER EXTREMITY ARTERIAL ASSESSMENT:    The external iliac, common femoral, and profunda femoral artery are not visualized on the right due to bandaging in the groin.    The SFA is patent throughout the thigh. Waveforms are biphasic. The popliteal artery is patent, with similar biphasic waveforms. Distal posterior tibial artery, anterior tibial artery and dorsalis pedis are also patent with brisk biphasic flow.    LOWER EXTREMITY ARTERIAL DUPLEX EXAM WITH WAVEFORMS  RIGHT (cm/s)  EIA: NV  CFA: NV  PFA: NV  SFA-Proximal: 54 B  SFA-Mid: 93 B  SFA-Distal: 26 B  Popliteal Proximal: 22 B  Popliteal Distal: 46 B  PTA: 80 B  DMITRI: 89 B  DPA: 43 B/T  (M=monophasic, B=biphasic, T=triphasic)    LEFT LOWER EXTREMITY ARTERIAL ASSESSMENT:    The external iliac and common femoral artery are patent, with brisk biphasic waveforms. Similar brisk biphasic flow is seen throughout the SFA continuing into the proximal popliteal. There is fusiform dilation of the distal SFA and popliteal, with a more   focal distal SFA aneurysm measuring up to 16 mm. There is thrombus within the popliteal artery, with minimal peripheral flow, velocity quite slow distally at 22 cm/s. Below the knee, there is slow monophasic flow in the distal posterior tibial artery   with no detectable flow in the distal anterior tibial or dorsalis pedis.    Indwelling thrombotic catheter is noted.    LEFT (cm/s)  EIA: 57 T  CFA: 64 T  PFA: 41 T  SFA-Proximal: 46 T  SFA-Mid: 53 T  SFA-Distal: 144 T  Popliteal: 153 T  Popliteal Proximal: 79 T  Popliteal Distal: 22  PTA: 13 M  DMITRI: 0  DPA: 0  (M=monophasic, B=biphasic, T=triphasic)    ABIs were not performed.       Impression    IMPRESSION:  1.  Right lower extremity: The external iliac, common femoral, and profunda femoral artery were not visualized due to overlying bandaging, however, waveforms and velocities inferiorly throughout the SFA, popliteal, and tibial vessels suggest no   significant stenosis. No evidence of acute vessel occlusion.    2.  Left lower extremity: Indwelling thrombolytic catheter. Brisk biphasic flow from the external iliac through the proximal popliteal. Some peripheral flow is now noted within the thrombosed popliteal artery, though velocity is quite slow distally at 22   cm/s. Relative to CTA yesterday showing complete occlusion, this represents some early response to thrombolytic therapy, though significant clot burden persists. Below the knee, there is slow monophasic flow in the posterior tibial with no detectable   anterior tibial or dorsal pedal flow.    3.  Fusiform distal SFA and popliteal dilation noted on the left with a small focal distal SFA aneurysm measuring up to 16 mm.     IR Thrombolysis Arterial Infusion Initial Day    Narrative    [facility]  LOCATION: [facility]  DATE: 12/17/2023    PROCEDURE: Left leg thrombolytic check with catheter removal  CLOSURE DEVICE.  MODERATE SEDATION    INTERVENTIONAL RADIOLOGIST: Aris Larkin MD    INDICATION: Expanding painful right groin hematoma. 13 hour thrombolytic therapy. Left leg warm with dopplerable posterior tibial pulse..    CONSENT: The risks, benefits and alternatives of planned procedure were discussed with the patient  in detail. All questions were answered. Informed consent was given to proceed with the procedure.    MODERATE SEDATION: Versed 2.5 mg IV; Fentanyl 100 mcg IV. During the time out, immediately prior to the administration of medications, the patient was reassessed for adequacy to receive conscious sedation.  Under physician supervision, Versed and   fentanyl were administered for  moderate sedation. Pulse oximetry, heart rate and blood pressure were continuously monitored by an independent trained observer. The physician spent 55 minutes of face-to-face sedation time with the patient.    CONTRAST: 40 mL intra-arterially.  ANTIBIOTICS: None.  ADDITIONAL MEDICATIONS: None.    FLUOROSCOPIC TIME: 1.2 minutes.  RADIATION DOSE: Air Kerma: 37 mGy.    COMPLICATIONS: No immediate complications.    STERILE BARRIER TECHNIQUE: Maximum sterile barrier technique was used. Cutaneous antisepsis was performed at the operative site with application of 2% chlorhexidine and large sterile drape. Prior to the procedure, the  and assistant performed   hand hygiene and wore hat, mask, sterile gown, and sterile gloves during the entire procedure.    PROCEDURE:    Contrast was injected through the existing left thrombolytic catheter. Images were obtained of the left lower extremity. Contrast was injected through the right groin sheath and additional images were obtained of the left lower extremity. The catheter   and sheath was removed and hemostasis was obtained with placement of 8 Guamanian Angio-Seal.      LEFT LOWER EXTREMITY FINDINGS:   Left common femoral, left profunda femoral and left superficial femoral arteries are patent. Flow through the thrombosed left popliteal artery has been reestablished. There is some residual nonocclusive thrombus however flow is significantly improved in   comparison to previous study. The tibial peroneal trunk is patent with residual nonocclusive thrombus. There is two-vessel runoff via the posterior tibial and peroneal artery. Proximal occlusion of the left anterior tibial artery. Reconstitution of   small caliber left dorsalis pedis artery at the level the ankle.      Impression    IMPRESSION:    1.  Patient is not tolerating further thrombolytic therapy due to expanding painful right groin hematoma.  2.  Reestablishment of straight line flow through the left popliteal  artery aneurysm. Residual nonocclusive thrombus is noted.  3.  Patent left tibial peroneal trunk with residual nonocclusive thrombus.  4.  Two-vessel runoff via the posterior tibial and peroneal arteries. Reconstitution of small caliber left dorsalis pedis artery at the ankle.

## 2023-12-17 NOTE — DISCHARGE INSTRUCTIONS
Thrombolytic Procedure Discharge Instructions:  Thrombolytic therapy is used when people have blood clot(s) blocking one or more of the large blood vessels within the body. It involves inserting a catheter (thin flexible tube) into the blood stream where the clot is and uses the thrombolytic medication to rapidly dissolvethe clot burden and improve blood flow. This treatment can reduce the long-term effects of clot burden by improving blood flow and reduces harm to organ systems that previously contained clot. Please follow the below instructions as you recover:    Care instructions after angiogram procedure:  -  If you received sedation for your procedure, do not drive or operate heavy machinery for the rest of the day.  -  Do not lift objects greater than 10 pounds for 2 days following the procedure.  -  Avoid excessive exercise and straining for 2 days.   -  Avoid tub baths, pools, hot tubs and Jacuzzis for 3 days or until procedure site is well healed.   -  You may shower beginning tomorrow. Do not scrub procedure site until well healed; pat dry.  -  Return to your normal activities as you tolerate after the 2 day restriction.  -  You can expect to return to work 1-2 days after your procedure - depending on the nature of your profession.  -  It is normal to have some tenderness and minimal swelling at procedure puncture site. A small area of discoloration may be present. Tenderness typically subsides in 1-2 days. A small knot may also be present at puncture site for 6-8 weeks. This can be a normal part of the healing process.     Please seek medical evaluation:  - If you develop chest pain, trouble breathing, shortness of breath, fast heart beat, sweating, fainting, and/or increasing limb swelling/pain please seek urgent medical evaluation.  - If you experience any bleeding from procedure site, apply pressure and seek medical assistance.  - If you develop fevers (greater than 101 F (38.3C)).  - If you develop  increasing pain, redness, purulent drainage, tenderness, or swelling at procedure site.   - If you experience any bleeding from procedure/puncture site: lie down, firmly apply pressure to puncture site and call 911.  - Seek emergent evaluation if you experience any new leg/arm pain, discoloration or numbness.      [facility] number is [phone]

## 2023-12-17 NOTE — IR NOTE
Interventional Radiology Intra-procedural Nursing Note  Patient Name: Jack Brown  Medical Record Number: [id number]  Today's Date: December 17, 2023    Procedure: lytics check on left leg    Administered medication totals:  Versed 1.5 mg IVP  Fentanyl 100 mcg IVP  .   Procedure well tolerated by patient without complications. Procedure end with debrief by Dr. Larkin.  Report given to Manda ICU RN. Manda transported him back to ICU, no questions at the end of report.

## 2023-12-17 NOTE — SIGNIFICANT EVENT
Significant Event Note    Time of event: 10:58 PM December 16, 2023    Description of event:  RN Paged regarding increasing hematoma  and bleeding noted on right groin access site.       Briefly, patient is admitted with 5 days of left knee pain and found to have an acute on chronic left lower extremity ischemia. CTA showed thrombotic occlusion involving left popliteal artery, tibioperoneal and proximal aspect of the anterior tibial and peroneal arteries. Patient seen by IR and currently on heparin and catheter directed lytic therapy with plan to reassess in the morning.      RN notes IR paged earlier regarding hematoma and ordered 15 min pressure and Hgb check. Hgb remained stable, but hematoma continued to increase in size and significant bleeding noted from the site, patient is uncomfortable and c/o pain.      Paged IR. Dr. Valenzuela intensivist spoke with IR.  IR Doctor planning to come in and assess the patient.     -Hold lytics and heparin.        Discussed with: bedside nurse    Cecilia Marie MD

## 2023-12-17 NOTE — PRE-PROCEDURE
GENERAL PRE-PROCEDURE:   Procedure:  Leg angio possible lytics    Written consent obtained?: Yes    Risks and benefits: Risks, benefits and alternatives were discussed    Consent given by:  Patient  Patient states understanding of procedure being performed: Yes    Patient's understanding of procedure matches consent: Yes    Procedure consent matches procedure scheduled: Yes    Expected level of sedation:  Moderate  Appropriately NPO:  Yes  ASA Class:  2  Mallampati  :  Grade 2- soft palate, base of uvula, tonsillar pillars, and portion of posterior pharyngeal wall visible  Lungs:  Lungs clear with good breath sounds bilaterally  Heart:  Normal heart sounds and rate  History & Physical reviewed:  History and physical reviewed and no updates needed  Statement of review:  I have reviewed the lab findings, diagnostic data, medications, and the plan for sedation

## 2023-12-17 NOTE — PROGRESS NOTES
Vascular Surgery Progress Note     Date of Admission:  12/15/2023  Date: December 17, 2023     Subjective  Mr. Brown reports feeling fantastic this morning. He says he has no pain and no numbness of the left foot.       Physical Exam   Temp: 98.1  F (36.7  C) Temp src: Oral BP: 125/82 Pulse: 73   Resp: 22 SpO2: 94 % O2 Device: Nasal cannula Oxygen Delivery: 1 LPM  Vital Signs with Ranges  Temp:  [97.9  F (36.6  C)-98.7  F (37.1  C)] 98.1  F (36.7  C)  Pulse:  [52-99] 73  Resp:  [9-41] 22  BP: ()/(60-96) 125/82  SpO2:  [91 %-98 %] 94 %  154 lbs 15.73 oz    Constitutional: cooperative, no apparent distress  Vascular: right DP and PT pulses palpable; left PT pulse palpable today, with warm left foot  Musculoskeletal: grossly normal and symmetric ROM and strength in BL extremities   Neurologic: Awake, alert, oriented to name, place, time, and situation        Data   Most Recent 3 CBCs:  Recent Labs   Lab Test 12/17/23  0539 12/17/23  0156 12/16/23  2246 12/16/23  0833 12/15/23  2025 05/31/23  0826   WBC  --   --   --  6.6 8.3 6.5   HGB 13.7 13.5 14.4 15.2 16.1 15.7   MCV  --   --   --  88 89 91   PLT  --   --   --  241 267 239    < > = values in this interval not displayed.       Most Recent 3 BMPs:  Recent Labs   Lab Test 12/15/23  2025 05/31/23  0826 04/27/22  1041    142 140   POTASSIUM 4.4 3.8 3.9   CHLORIDE 103 107 105   CO2 25 25 25   BUN 23.6* 21.1 18   CR 0.91 1.07 1.08   ANIONGAP 10 10 10   BIGG 9.8 9.8 10.2   * 66* 66*       Most Recent 3 INRs:  Recent Labs   Lab Test 12/16/23  1154 12/15/23  2025   INR 1.12 1.02          Assessment & Plan   Jack Brown is a 73 year old male who presents with left leg ischemia secondary to thrombosis of a left popliteal artery aneurysm.    Had excellent initial symptomatic improvement with heparinization, and has now had restoration of a palpable posterior tibial pulse at the foot with recanalization of in-line flow. We are exceedingly grateful for the  lytic therapy from IR.   Will plan distal femoral to posterior tibial or tibioperoneal trunk bypass with interval ligation and exclusion of the popliteal artery in the near future. He can likely go home prior to the bypass, but will need to be on anticoagulation medication.   Discussed with him that the pathophysiology of popliteal artery aneurysms tends to be thromboembolic complications (as he has just had on the left); these do not tend to rupture. Explained that the iliac and aortic aneurysms are repaired if/when they continue to enlarge, as these are associated with rupture. Explained that we would focus right now on repairing the popliteal aneurysm, and would continue to follow the iliac aneurysms (these have previously been followed by Dr. Harmon, but we would likely take over aneurysm surveillance in vascular surgery clinic).  Will obtain vein mapping while he is here. Recent cardiac work-up reveals no active coronary disease. He is otherwise reasonably well-optimized from a pre-operative standpoint.   Will ask pharmacy to run cost analysis on DOACs to take as an outpatient, while awaiting surgery.   Will recommend he stay here overnight into tomorrow, to ensure stability of access site hematoma in right groin prior to discharge on anticoagulation.   He will discuss screening recommendations with his nieces/nephews, as his brother also had aneurysmal disease. He has no children of his own.       Nilda Tapia MD

## 2023-12-17 NOTE — CONSULTS
Care Management Initial Consult    General Information  Assessment completed with: Patient,    Type of CM/SW Visit: Initial Assessment    Primary Care Provider verified and updated as needed: Yes   Readmission within the last 30 days: no previous admission in last 30 days         Advance Care Planning: Advance Care Planning Reviewed:  (states [facility] should have one on file)          Communication Assessment  Patient's communication style: spoken language (English or Bilingual)    Hearing Difficulty or Deaf: no   Wear Glasses or Blind: no    Cognitive  Cognitive/Neuro/Behavioral: WDL  Level of Consciousness: alert  Arousal Level: opens eyes spontaneously  Orientation: oriented x 4  Mood/Behavior: cooperative, anxious  Best Language: 0 - No aphasia  Speech: clear, spontaneous, logical    Living Environment:   People in home: spouse     Current living Arrangements: house      Able to return to prior arrangements: yes       Family/Social Support:  Care provided by: self  Provides care for: no one  Marital Status:   Wife, Sibling(s)          Description of Support System: Supportive, Involved         Current Resources:   Patient receiving home care services: No     Community Resources: None  Equipment currently used at home: none  Supplies currently used at home: None    Employment/Financial:  Employment Status: retired          Does the patient's insurance plan have a 3 day qualifying hospital stay waiver?  Yes     Which insurance plan 3 day waiver is available? [employer]    Will the waiver be used for post-acute placement? Undetermined at this time    Lifestyle & Psychosocial Needs:  Social Determinants of Health     Food Insecurity: Not on file   Depression: Not at risk (8/4/2023)    PHQ-2     PHQ-2 Score: 0   Housing Stability: Not on file   Tobacco Use: Low Risk  (12/17/2023)    Patient History     Smoking Tobacco Use: Never     Smokeless Tobacco Use: Never     Passive Exposure: Not on file   Financial  Resource Strain: Not on file   Alcohol Use: Not on file   Transportation Needs: Not on file   Physical Activity: Not on file   Interpersonal Safety: Not on file   Stress: Not on file   Social Connections: Not on file       Functional Status:  Prior to admission patient needed assistance:   Dependent ADLs:: Independent  Dependent IADLs:: Independent         Additional Information:    Assessment completed with patient. Patient reports he lives with his spouse in their house. He is independent with ADLs/IADLs, ambulates without devices and drives. Spouse is primary family contact. He states his car is here and he will drive home at discharge.       Jennifer Mills RN

## 2023-12-17 NOTE — PLAN OF CARE
Goal Outcome Evaluation:  [facility] - ICU    RN Progress Note:            Pertinent Assessments:      Please refer to flowsheet rows for full assessment     Alert & oriented x3. Stable VS, Afebrile. Large right groin hematoma with tenderness.             Key Events - This Shift:       Hematoma noted at shift change during report, please see previous notes for MD notification. Patient was taken to IR, right groin sheath removed and closure place. Hematoma still noted with soft tissue palpable around site. Pulse present to bilateral lower extremities with doppler. Will restart heparin 6hrs after sheath removal as ordered.             Barriers to Discharge / Downgrade:     Heparin gtt

## 2023-12-17 NOTE — PROVIDER NOTIFICATION
Dr. Larkin notified and this writer did talk to him regarding large hematoma on right groin   Site. Pressure was applied for 30 minutes and it came back and appears to be getting larger. Dr. Larkin did suggest using a Fem stop but this writer did not feel comfortable with that so House officer and Wendy Villalpando did see patient and was felt that Dr. Larkin needed to come in and see patient Dr. Auguste did talk to him and he agreed. Patient taken back to IR at 0003.

## 2023-12-17 NOTE — PROVIDER NOTIFICATION
Dr Larkin notified of hematoma noted to patient's right groin access site, ordered for 15 mins of pressure to site and ordered hemoglobin.

## 2023-12-17 NOTE — PROCEDURES
[facility]    Procedure: IR Procedure Note    Date/Time: 12/17/2023 1:05 AM    Performed by: Aris Larkin MD  Authorized by: Aris Larkin MD      UNIVERSAL PROTOCOL   Site Marked: NA  Prior Images Obtained and Reviewed:  Yes  Required items: Required blood products, implants, devices and special equipment available    Patient identity confirmed:  Verbally with patient, arm band, provided demographic data and hospital-assigned identification number  Patient was reevaluated immediately before administering moderate or deep sedation or anesthesia  Confirmation Checklist:  Patient's identity using two indicators, relevant allergies, procedure was appropriate and matched the consent or emergent situation and correct equipment/implants were available  Time out: Immediately prior to the procedure a time out was called    Universal Protocol: the Joint Commission Universal Protocol was followed    Preparation: Patient was prepped and draped in usual sterile fashion       ANESTHESIA    Anesthesia:  Local infiltration  Local Anesthetic:  Lidocaine 1% without epinephrine    See dictated procedure note for full details.  Findings: Expanding right groin hematoma.  Straightline flow via PT and peroneal.  Non-occlusive thrombus in popliteal a  8 F Angioseal    Specimens: none    Complications: None    Condition: Stable      PROCEDURE    Patient Tolerance:  Patient tolerated the procedure well with no immediate complications  Length of time physician/provider present for 1:1 monitoring during sedation: 60

## 2023-12-18 ENCOUNTER — APPOINTMENT (OUTPATIENT)
Dept: ULTRASOUND IMAGING | Facility: HOSPITAL | Age: 73
DRG: 300 | End: 2023-12-18
Attending: NURSE PRACTITIONER
Payer: MEDICARE

## 2023-12-18 LAB
ALBUMIN SERPL BCG-MCNC: 3.3 G/DL (ref 3.5–5.2)
ALP SERPL-CCNC: 57 U/L (ref 40–150)
ALT SERPL W P-5'-P-CCNC: 5 U/L (ref 0–70)
ANION GAP SERPL CALCULATED.3IONS-SCNC: 7 MMOL/L (ref 7–15)
AST SERPL W P-5'-P-CCNC: 12 U/L (ref 0–45)
ATRIAL RATE - MUSE: 97 BPM
BASOPHILS # BLD AUTO: 0.1 10E3/UL (ref 0–0.2)
BASOPHILS NFR BLD AUTO: 1 %
BILIRUB SERPL-MCNC: 0.4 MG/DL
BUN SERPL-MCNC: 31.5 MG/DL (ref 8–23)
CALCIUM SERPL-MCNC: 9.1 MG/DL (ref 8.8–10.2)
CHLORIDE SERPL-SCNC: 105 MMOL/L (ref 98–107)
CREAT SERPL-MCNC: 1.1 MG/DL (ref 0.67–1.17)
DEPRECATED HCO3 PLAS-SCNC: 27 MMOL/L (ref 22–29)
DIASTOLIC BLOOD PRESSURE - MUSE: NORMAL MMHG
EGFRCR SERPLBLD CKD-EPI 2021: 70 ML/MIN/1.73M2
EOSINOPHIL # BLD AUTO: 0.4 10E3/UL (ref 0–0.7)
EOSINOPHIL NFR BLD AUTO: 4 %
ERYTHROCYTE [DISTWIDTH] IN BLOOD BY AUTOMATED COUNT: 13.1 % (ref 10–15)
GLUCOSE SERPL-MCNC: 103 MG/DL (ref 70–99)
HCT VFR BLD AUTO: 35 % (ref 40–53)
HGB BLD-MCNC: 11.8 G/DL (ref 13.3–17.7)
IMM GRANULOCYTES # BLD: 0.1 10E3/UL
IMM GRANULOCYTES NFR BLD: 1 %
INTERPRETATION ECG - MUSE: NORMAL
LYMPHOCYTES # BLD AUTO: 1.9 10E3/UL (ref 0.8–5.3)
LYMPHOCYTES NFR BLD AUTO: 18 %
MCH RBC QN AUTO: 30.3 PG (ref 26.5–33)
MCHC RBC AUTO-ENTMCNC: 33.7 G/DL (ref 31.5–36.5)
MCV RBC AUTO: 90 FL (ref 78–100)
MONOCYTES # BLD AUTO: 1 10E3/UL (ref 0–1.3)
MONOCYTES NFR BLD AUTO: 9 %
NEUTROPHILS # BLD AUTO: 7.2 10E3/UL (ref 1.6–8.3)
NEUTROPHILS NFR BLD AUTO: 67 %
NRBC # BLD AUTO: 0 10E3/UL
NRBC BLD AUTO-RTO: 0 /100
P AXIS - MUSE: 15 DEGREES
PLATELET # BLD AUTO: 166 10E3/UL (ref 150–450)
POTASSIUM SERPL-SCNC: 4.1 MMOL/L (ref 3.4–5.3)
PR INTERVAL - MUSE: 184 MS
PROT SERPL-MCNC: 5.9 G/DL (ref 6.4–8.3)
QRS DURATION - MUSE: 94 MS
QT - MUSE: 362 MS
QTC - MUSE: 459 MS
R AXIS - MUSE: -13 DEGREES
RBC # BLD AUTO: 3.89 10E6/UL (ref 4.4–5.9)
SODIUM SERPL-SCNC: 139 MMOL/L (ref 135–145)
SYSTOLIC BLOOD PRESSURE - MUSE: NORMAL MMHG
T AXIS - MUSE: 39 DEGREES
UFH PPP CHRO-ACNC: 0.51 IU/ML
UFH PPP CHRO-ACNC: 0.72 IU/ML
UFH PPP CHRO-ACNC: 0.77 IU/ML
VENTRICULAR RATE- MUSE: 97 BPM
WBC # BLD AUTO: 10.6 10E3/UL (ref 4–11)

## 2023-12-18 PROCEDURE — 93010 ELECTROCARDIOGRAM REPORT: CPT | Mod: HIP | Performed by: INTERNAL MEDICINE

## 2023-12-18 PROCEDURE — 200N000001 HC R&B ICU

## 2023-12-18 PROCEDURE — 36415 COLL VENOUS BLD VENIPUNCTURE: CPT | Performed by: STUDENT IN AN ORGANIZED HEALTH CARE EDUCATION/TRAINING PROGRAM

## 2023-12-18 PROCEDURE — 250N000013 HC RX MED GY IP 250 OP 250 PS 637: Performed by: RADIOLOGY

## 2023-12-18 PROCEDURE — 36415 COLL VENOUS BLD VENIPUNCTURE: CPT | Performed by: RADIOLOGY

## 2023-12-18 PROCEDURE — 250N000011 HC RX IP 250 OP 636: Mod: JZ | Performed by: RADIOLOGY

## 2023-12-18 PROCEDURE — 80053 COMPREHEN METABOLIC PANEL: CPT | Performed by: INTERNAL MEDICINE

## 2023-12-18 PROCEDURE — 93926 LOWER EXTREMITY STUDY: CPT

## 2023-12-18 PROCEDURE — 99233 SBSQ HOSP IP/OBS HIGH 50: CPT | Performed by: HOSPITALIST

## 2023-12-18 PROCEDURE — 250N000013 HC RX MED GY IP 250 OP 250 PS 637: Performed by: INTERNAL MEDICINE

## 2023-12-18 PROCEDURE — 85520 HEPARIN ASSAY: CPT | Performed by: STUDENT IN AN ORGANIZED HEALTH CARE EDUCATION/TRAINING PROGRAM

## 2023-12-18 PROCEDURE — 93005 ELECTROCARDIOGRAM TRACING: CPT

## 2023-12-18 PROCEDURE — 85025 COMPLETE CBC W/AUTO DIFF WBC: CPT | Performed by: INTERNAL MEDICINE

## 2023-12-18 PROCEDURE — 36415 COLL VENOUS BLD VENIPUNCTURE: CPT | Performed by: INTERNAL MEDICINE

## 2023-12-18 PROCEDURE — 250N000009 HC RX 250: Performed by: INTERNAL MEDICINE

## 2023-12-18 PROCEDURE — 85520 HEPARIN ASSAY: CPT | Performed by: RADIOLOGY

## 2023-12-18 PROCEDURE — 99222 1ST HOSP IP/OBS MODERATE 55: CPT | Mod: 25 | Performed by: INTERNAL MEDICINE

## 2023-12-18 RX ORDER — AMIODARONE HYDROCHLORIDE 200 MG/1
400 TABLET ORAL 2 TIMES DAILY
Status: DISCONTINUED | OUTPATIENT
Start: 2023-12-18 | End: 2023-12-18

## 2023-12-18 RX ORDER — METOPROLOL TARTRATE 1 MG/ML
5 INJECTION, SOLUTION INTRAVENOUS EVERY 6 HOURS
Status: DISCONTINUED | OUTPATIENT
Start: 2023-12-18 | End: 2023-12-20 | Stop reason: HOSPADM

## 2023-12-18 RX ORDER — AMIODARONE HYDROCHLORIDE 200 MG/1
400 TABLET ORAL 2 TIMES DAILY
Status: DISCONTINUED | OUTPATIENT
Start: 2023-12-18 | End: 2023-12-19

## 2023-12-18 RX ADMIN — TAMSULOSIN HYDROCHLORIDE 0.4 MG: 0.4 CAPSULE ORAL at 08:08

## 2023-12-18 RX ADMIN — HEPARIN SODIUM 1050 UNITS/HR: 10000 INJECTION, SOLUTION INTRAVENOUS at 11:34

## 2023-12-18 RX ADMIN — METOPROLOL TARTRATE 5 MG: 5 INJECTION INTRAVENOUS at 17:24

## 2023-12-18 RX ADMIN — SENNOSIDES AND DOCUSATE SODIUM 1 TABLET: 8.6; 5 TABLET ORAL at 20:03

## 2023-12-18 RX ADMIN — ACETAMINOPHEN 500 MG: 500 TABLET ORAL at 08:07

## 2023-12-18 RX ADMIN — DOCUSATE SODIUM 100 MG: 100 CAPSULE, LIQUID FILLED ORAL at 08:08

## 2023-12-18 RX ADMIN — AMLODIPINE BESYLATE 10 MG: 5 TABLET ORAL at 08:05

## 2023-12-18 RX ADMIN — ROSUVASTATIN CALCIUM 5 MG: 5 TABLET, FILM COATED ORAL at 20:03

## 2023-12-18 RX ADMIN — SENNOSIDES AND DOCUSATE SODIUM 1 TABLET: 8.6; 5 TABLET ORAL at 08:08

## 2023-12-18 RX ADMIN — AMIODARONE HYDROCHLORIDE 400 MG: 200 TABLET ORAL at 18:13

## 2023-12-18 RX ADMIN — Medication 25 MCG: at 08:07

## 2023-12-18 RX ADMIN — METOPROLOL TARTRATE 50 MG: 25 TABLET, FILM COATED ORAL at 20:03

## 2023-12-18 RX ADMIN — METOPROLOL TARTRATE 50 MG: 25 TABLET, FILM COATED ORAL at 08:08

## 2023-12-18 RX ADMIN — ASPIRIN 81 MG CHEWABLE TABLET 81 MG: 81 TABLET CHEWABLE at 08:07

## 2023-12-18 RX ADMIN — FINASTERIDE 5 MG: 5 TABLET, FILM COATED ORAL at 08:09

## 2023-12-18 RX ADMIN — DOCUSATE SODIUM 100 MG: 100 CAPSULE, LIQUID FILLED ORAL at 20:03

## 2023-12-18 ASSESSMENT — ACTIVITIES OF DAILY LIVING (ADL)
ADLS_ACUITY_SCORE: 30
ADLS_ACUITY_SCORE: 32
ADLS_ACUITY_SCORE: 30
ADLS_ACUITY_SCORE: 32

## 2023-12-18 NOTE — PROGRESS NOTES
Care Management Follow Up    Length of Stay (days): 3    Expected Discharge Date: 12/20/2023     Concerns to be Addressed:       Patient plan of care discussed at interdisciplinary rounds: Yes    Anticipated Discharge Disposition:       Anticipated Discharge Services:    Anticipated Discharge DME:      Patient/family educated on Medicare website which has current facility and service quality ratings:    Education Provided on the Discharge Plan:    Patient/Family in Agreement with the Plan:      Referrals Placed by CM/SW:    Private pay costs discussed: Not applicable    Additional Information:  Chart reviewed. OR tomorrow. CM will follow     Social history:  Assessment completed with patient. Patient reports he lives with his spouse in their house. He is independent with ADLs/IADLs, ambulates without devices and drives. Spouse is primary family contact. He states his car is here and he will drive home at discharge.      Aline Kuhn RN

## 2023-12-18 NOTE — PROGRESS NOTES
[facility] Progress Note - Hospitalist Service    Date of Admission:  12/15/2023    Assessment & Plan   73-year-old male with a past medical history of CAD, HTN, HLD, PAD, BPH.  He presents to the PCP with 5 days of left knee pain with ambulation found to have acute on chronic left lower extremity limb ischemia secondary to left popliteal artery aneurysm, subsequent sent to ER.  Admitted on 12/15 and was taken for immediate LLE angiogram with lytic therapy by IR and was placed on lytic therapy but developed a right groin hematoma and therefore this was held.  RLE ultrasound done on 12/18 did not reveal any evidence of pseudoaneurysm or hematoma in the right groin.    -Pending OR on 12/19 for LLE femoral to posterior tibial to tibioperoneal trunk bypass.    #Acute on chronic left lower extremity ischemia  #S/p LLE angiogram with lytic therapy on 12/16/23  # Right groin hematoma s/p discontinuation of lytic therapy due to hematoma 12/17/23  Was seen in the clinic, with lower extremity ultrasound showing complete segmental occlusion of left popliteal artery with decreased flow in the posterior tibial and peroneal arteries. CT obtained here in the ED shows thrombotic occlusion involving left popliteal artery, tibial peroneal trunk and proximal aspect of proximal anterior tibial, posterior tibial and peroneal arteries.  -Vascular surgery and IR following, appreciate recs  -S/p LLE angiogram with lytic therapy on 12/16/23, then with Right groin hematoma s/p discontinuation of lytic therapy due to hematoma 12/17/23  -Heparin drip  -Pending or tomorrow for LLE bypass, n.p.o. after midnight    #Infrarenal abdominal aortic aneurysm   #Bilateral common ileac artery aneurysm   -Plan for outpatient surveillance with vascular surgery    #Coronary artery disease-status post CABG 2015  #Hypertension   Graft remains patent. Also has a strong coronary artery disease history in the family.  -Is  currently on aspirin, metoprolol 50 mg twice daily     #Hyperlipidemia  -Patient was on rosuvastatin 5 mg in the outpatient setting; patient discontinued this medication due to reported side effect of muscle aches  - we discussed the importance of resuming rosuvastatin again and that taking the medication at night might minimize side effect.   -Rosuvastatin at bedtime, monitor for side effects as outpatient    #Enlarged prostate  -Continue Proscar and Flomax          Diet: Low Saturated Fat Na <2400 mg  NPO for Medical/Clinical Reasons Except for: Meds    DVT Prophylaxis: Heparin gtt  Roper Catheter: Not present  Lines: None     Cardiac Monitoring: ACTIVE order. Indication: Tachyarrhythmias, acute (48 hours)  Code Status: Full Code      Clinically Significant Risk Factors              # Hypoalbuminemia: Lowest albumin = 3.3 g/dL at 12/18/2023  6:00 AM, will monitor as appropriate    # Coagulation Defect: INR = 1.27 (Ref range: 0.85 - 1.15) and/or PTT = 202 Seconds (Ref range: 22 - 38 Seconds), will monitor for bleeding    # Hypertension: Noted on problem list             # History of CABG: noted on surgical history       Disposition Plan      Expected Discharge Date: 12/20/2023                    Chauncey Mayo MD  Hospitalist Service  [facility]  Securely message with [employer] (more info)  Text page via [employer] Paging/Directory   ______________________________________________________________________    Interval History   No acute events overnight.     Patient seen and evaluated at bedside.  Doing relatively well aside from getting poor sleep overnight.  Denies chest pain or shortness of breath currently.  No abdominal pain either.  Plan for or tomorrow for LLE bypass.    Physical Exam   Vital Signs: Temp: 98.3  F (36.8  C) Temp src: Oral BP: 115/78 Pulse: 114   Resp: 16 SpO2: 90 % O2 Device: Nasal cannula Oxygen Delivery: 1 LPM  Weight: 153 lbs 9.6 oz    General: Laying in bed,  nontoxic-appearing  CV: +S1/S2, no pitting edema  Respiratory: clear to auscultation bilaterally  GI: soft, NT, non-distended  Skin: Significant bruising and scrotal/inguinal area, right thigh  Neuro: alert    Medical Decision Making       50 MINUTES SPENT BY ME on the date of service doing chart review, history, exam, documentation & further activities per the note.      Data     I have personally reviewed the following data over the past 24 hrs:    10.6  \   11.8 (L)   / 166     139 105 31.5 (H) /  103 (H)   4.1 27 1.10 \     ALT: 5 AST: 12 AP: 57 TBILI: 0.4   ALB: 3.3 (L) TOT PROTEIN: 5.9 (L) LIPASE: N/A     Procal: N/A CRP: N/A Lactic Acid: 1.6       INR:  N/A PTT:  N/A   D-dimer:  N/A Fibrinogen:  N/A       Imaging results reviewed over the past 24 hrs:   Recent Results (from the past 24 hour(s))   Us Post Vascular Access Low Ext Duplex    Addendum: 12/17/2023    ADDENDUM: Upon further review, the hypoechoic structure described in the original report could represent the Angio-Seal device. Apparent flow within this hypoechoic structure is favored to represent artifact from the adjacent vessel. This does not   require intervention at this point. Follow-up imaging could be performed if there is continued clinical concern.      Narrative    EXAM: LOWER EXTREMITY ARTERIAL DUPLEX  LOCATION: [facility]  DATE: 12/17/2023    INDICATION: right groin hematoma with current oozing post tkn and heparin 12 16 2023; sutured and angiosealed last night 12 17 2023  COMPARISON: 12/16/2023 at 0052 hours     TECHNIQUE: Gray-scale imaging, spectral wave analysis, and color-flow imaging was performed of the right groin.    FINDINGS:   DUPLEX:   There is an approximately 1.5 x 1.1 cm hypoechoic structure arising from the right common femoral artery with an opening to the artery measuring approximately 2 mm. There is pulsatile color flow along the periphery of this structure. Spectral wave   analysis reveals  pulsatile flow with a maximum velocity of approximately 20 cm/s.    The right common femoral artery exhibits maximum flow velocity of 80 cm/s. More distally flow in the right superficial femoral artery is up to 40 cm/s. The right common femoral vein is patent.        Impression    IMPRESSION:  1.  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    Us Post Vascular Access Low Ext Duplex    Narrative    [facility]  LOCATION: [facility]  DATE: 12/18/2023  LOWER EXTREMITY ARTERIAL DUPLEX     INDICATION: Pain. Reassessment of potential previously noted pseudoaneurysm of right common femoral artery  TECHNIQUE: Gray-scale imaging, spectral wave analysis, and color-flow imaging was performed of the right groin.  COMPARISON: 12/17/2023    FINDINGS:   DUPLEX:   Normal arterial and venous waveforms. No evidence of pseudoaneurysm or AV fistula. No evidence of significant hematoma.      Impression    IMPRESSION:  1.  NO EVIDENCE OF PSEUDOANEURYSM OR HEMATOMA.

## 2023-12-18 NOTE — PROVIDER NOTIFICATION
Spoke with Dr. Boyd from vascular surgery about patient continuing to ooze from right groin and now having spasm like pain with femostop and pressure release. Orders to remove femostop, place pressure dressing and get right groin ultrasound. Then page once ultrasound is completed.

## 2023-12-18 NOTE — CONSULTS
"  Thank you,  No ref. provider found, for asking the [facility] Care team to see Jack Brown to evaluate atrial fibrillation.      Assessment/Recommendations   Assessment:    Paroxysmal atrial fibrillation, new onset, mildly elevated ventricular response rate, hemodynamically stable  Coronary artery disease with history of coronary artery bypass graft surgery in 2015, no angina/negative stress test in October 2023  Peripheral arterial disease with left lower extremity ischemia awaiting peripheral bypass surgery  Hypercholesterolemia on statin  Hypertension, controlled    Plan:  IV  metoprolol in addition to p.o.  P.o. amiodarone load  Echo     History of Present Illness/Subjective    Mr. Jack Brown is a 73 year old male who was admitted with left lower extremity ischemia.  He is known to have peripheral arterial disease and aneurysms.  He was placed on lytics and developed right groin hematoma without significant hemoglobin drop.  He is now being considered for peripheral bypass surgery.  He developed sensation of heart racing at the time of visit with vascular surgeon.  EKG showed atrial fibrillation.  The patient denies any chest pain or shortness of breath at present time.  He has no history of atrial fibrillation.  In 2015 he was diagnosed with multivessel coronary artery disease and underwent coronary artery bypass graft surgery.  He has not had any additional revascularization since that time.  He had negative nuclear stress test in October of this year.    Telemetry: A-fib with mildly elevated ventricular response rate         Physical Examination Review of Systems   /78   Pulse 114   Temp 98.3  F (36.8  C) (Oral)   Resp 16   Ht 1.676 m (5' 6\")   Wt 69.7 kg (153 lb 9.6 oz)   SpO2 90%   BMI 24.79 kg/m    Body mass index is 24.79 kg/m .  Wt Readings from Last 3 Encounters:   12/18/23 69.7 kg (153 lb 9.6 oz)   12/15/23 73.9 kg (163 lb)   10/10/23 73.9 kg (163 lb)       Intake/Output " "Summary (Last 24 hours) at 12/18/2023 1708  Last data filed at 12/18/2023 1400  Gross per 24 hour   Intake 504.5 ml   Output 650 ml   Net -145.5 ml     General Appearance:   Alert, cooperative, no distress, appears stated age   Head/ENT: Normocephalic, without obvious abnormality. Membranes moist.      EYES:  No scleral icterus   Neck: Supple, symmetrical, trachea midline, no adenopathy, thyroid: not enlarged, symmetric, no carotid bruit or JVD   Chest/Lungs:   lungs are clear to auscultation, respirations unlabored. No tenderness or deformity   Cardiovascular:   irregular rhythm, S1, S2 normal, no murmur, rub or gallop.   Abdomen:  Soft, non-tender, bowel sounds active all four quadrants,  no masses, no organomegaly   Extremities: Hematoma right groin           Psychiatric: Normal affect.      10 system review of systems completed see history of present illness and inpatient H&P (reviewed) for further details.          Lab Results    Chemistry/lipid CBC Cardiac Enzymes/BNP/TSH/INR   Recent Labs   Lab Test 12/15/23  2025   CHOL 222*   HDL 41   *   TRIG 152*     Recent Labs   Lab Test 12/15/23  2025 08/12/22  0843 05/23/22  0942   * 114 173*     Recent Labs   Lab Test 12/18/23  0600      POTASSIUM 4.1   CHLORIDE 105   CO2 27   *   BUN 31.5*   CR 1.10   GFRESTIMATED 70   BIGG 9.1     Recent Labs   Lab Test 12/18/23  0600 12/17/23  1626 12/15/23  2025   CR 1.10 1.03 0.91     Recent Labs   Lab Test 12/15/23  2025 05/23/22  0942 12/23/15  0850   A1C 5.5 5.6 5.6          Recent Labs   Lab Test 12/18/23  0600   WBC 10.6   HGB 11.8*   HCT 35.0*   MCV 90        Recent Labs   Lab Test 12/18/23  0600 12/17/23  1626 12/17/23  0539   HGB 11.8* 13.1* 13.7    No results for input(s): \"TROPONINI\" in the last 95008 hours.  No results for input(s): \"BNP\", \"NTBNPI\", \"NTBNP\" in the last 97777 hours.  Recent Labs   Lab Test 10/25/18  1124   TSH 1.25     Recent Labs   Lab Test 12/17/23  1428 " 12/16/23  1154 12/15/23  2025   INR 1.27* 1.12 1.02        Medical History  Surgical History Family History Social History   Past Medical History:   Diagnosis Date    BPH (benign prostatic hypertrophy)     Cancer (H)     Skin on R.chest wall.    Chest discomfort     Coronary artery disease     Detached retina, left Oct 2014    Hyperlipidemia     Hypertension      Past Surgical History:   Procedure Laterality Date    BYPASS GRAFT ARTERY CORONARY N/A 12/28/2015    Procedure: CORONARY ARTERY BYPASS x 3, RIGHT ENDOSCOPIC VEIN HARVEST, LEFT INTERNAL MAMMARY ARTERY, ANESTHESIA TRANSESOPHAGEAL ECHOCARDIOGRAM;  Surgeon: Jayson Alvarado MD;  Location: [facility];  Service:     CARDIAC SURGERY      CATARACT EXTRACTION  2015    CYST REMOVAL      Ganglion cyst removal of both wrist    HC REMOVAL PREPATELLA BURSA      Description: Excision Of Prepatellar Bursa;  Recorded: 09/30/2014;    HC REPAIR OF NASAL SEPTUM      Description: Septoplasty;  Recorded: 09/30/2014;    HERNIA REPAIR, UMBILICAL      IR LOWER EXTREMITY ANGIOGRAM LEFT  12/16/2023    IR THROMBOLYSIS ARTERIAL INFUSION INITIAL DAY  12/17/2023    PARS PLANA VITRECTOMY W/ REPAIR OF MACULAR HOLE  Nov 2014    MT EXCIS TENDON SHEATH LESION, HAND/FINGER      Description: Hand Excision Of A Tendon Cyst;  Recorded: 01/04/2011;    MT LAP, VENTRAL HERNIA REPAIR,REDUCIBLE      Description: Laparoscopy Repair Of Umbilical Hernia;  Recorded: 01/04/2011;    RETINAL DETACHMENT SURGERY  oct 2014    SKIN CANCER EXCISION  Nov 2015    right chest    THORACOSCOPY Right 4/14/2016    Procedure: RIGHT VIDEO ASSISTED THORACOSCOPY, RIGHT LOBECTOMY;  Surgeon: Vinny Chase MD;  Location: [facility];  Service:     VASECTOMY       No premature CAD, SCD,cardiomyopathy   Social History     Socioeconomic History    Marital status:      Spouse name: Not on file    Number of children: Not on file    Years of education: Not on file    Highest education level:  Not on file   Occupational History    Not on file   Tobacco Use    Smoking status: Never    Smokeless tobacco: Never   Substance and Sexual Activity    Alcohol use: Yes     Alcohol/week: 8.0 standard drinks of alcohol    Drug use: No    Sexual activity: Never   Other Topics Concern    Not on file   Social History Narrative    Not on file     Social Determinants of Health     Financial Resource Strain: Not on file   Food Insecurity: Not on file   Transportation Needs: Not on file   Physical Activity: Not on file   Stress: Not on file   Social Connections: Not on file   Interpersonal Safety: Not on file   Housing Stability: Not on file         Medications  Allergies   Current Facility-Administered Medications Ordered in Epic   Medication Dose Route Frequency Provider Last Rate Last Admin    acetaminophen (TYLENOL) tablet 500 mg  500 mg Oral Q6H PRN Karely Paz MD   500 mg at 12/18/23 0807    amLODIPine (NORVASC) tablet 10 mg  10 mg Oral Daily Karely Paz MD   10 mg at 12/18/23 0805    aspirin (ASA) chewable tablet 81 mg  81 mg Oral Daily Karely Paz MD   81 mg at 12/18/23 0807    benzocaine-menthol (CHLORASEPTIC) 6-10 MG lozenge 1 lozenge  1 lozenge Buccal Q1H PRN Aris Larkin MD   1 lozenge at 12/17/23 0905    calcium carbonate (TUMS) chewable tablet 1,000 mg  1,000 mg Oral 4x Daily PRN Karely Paz MD        docusate sodium (COLACE) capsule 100 mg  100 mg Oral BID Aris Larkin MD   100 mg at 12/18/23 0808    finasteride (PROSCAR) tablet 5 mg  5 mg Oral Daily Karely Paz MD   5 mg at 12/18/23 0809    heparin infusion 25,000 units in D5W 250 mL ANTICOAGULANT  0-5,000 Units/hr Intravenous Continuous Aris Larkin MD 10.5 mL/hr at 12/18/23 1500 1,050 Units/hr at 12/18/23 1500    HYDROmorphone (DILAUDID) tablet 2 mg  2 mg Oral Q4H PRN Aris Larkin MD        Or    HYDROmorphone (DILAUDID) tablet 4 mg  4 mg Oral Q4H PRN Aris Larkin MD    4 mg at 12/16/23 1346    HYDROmorphone (PF) (DILAUDID) injection 0.3 mg  0.3 mg Intravenous Q3H PRN Bob Pichardo MD   0.3 mg at 12/17/23 1629    labetalol (NORMODYNE/TRANDATE) injection 20 mg  20 mg Intravenous Q4H PRN Denzel Boyd MD        lidocaine (LMX4) cream   Topical Q1H PRN Karely Paz MD        lidocaine 1 % 0.1-1 mL  0.1-1 mL Other Q1H PRN Karely Paz MD        melatonin tablet 1 mg  1 mg Oral At Bedtime PRN Karely Paz MD        metoprolol tartrate (LOPRESSOR) tablet 50 mg  50 mg Oral BID Bob Pichardo MD   50 mg at 12/18/23 0808    naloxone (NARCAN) injection 0.2 mg  0.2 mg Intravenous Q2 Min PRN Karely Paz MD        Or    naloxone (NARCAN) injection 0.4 mg  0.4 mg Intravenous Q2 Min PRN Karely Paz MD        Or    naloxone (NARCAN) injection 0.2 mg  0.2 mg Intramuscular Q2 Min PRN Karely Paz MD        Or    naloxone (NARCAN) injection 0.4 mg  0.4 mg Intramuscular Q2 Min PRN Karely Paz MD        ondansetron (ZOFRAN ODT) ODT tab 4 mg  4 mg Oral Q6H PRN Karely Paz MD        Or    ondansetron (ZOFRAN) injection 4 mg  4 mg Intravenous Q6H PRN Karely Paz MD   4 mg at 12/17/23 1631    prochlorperazine (COMPAZINE) injection 5 mg  5 mg Intravenous Q6H PRN Aris Larkin MD        Or    prochlorperazine (COMPAZINE) tablet 5 mg  5 mg Oral Q6H PRN Aris Larkin MD        Or    prochlorperazine (COMPAZINE) suppository 12.5 mg  12.5 mg Rectal Q12H PRN Aris Larkin MD        rosuvastatin (CRESTOR) tablet 5 mg  5 mg Oral At Bedtime Bob Pichardo MD        senna-docusate (SENOKOT-S/PERICOLACE) 8.6-50 MG per tablet 1 tablet  1 tablet Oral BID PRN Karely Paz MD        Or    senna-docusate (SENOKOT-S/PERICOLACE) 8.6-50 MG per tablet 2 tablet  2 tablet Oral BID PRN Karely Paz MD        senna-docusate (SENOKOT-S/PERICOLACE) 8.6-50 MG per tablet 1 tablet  1 tablet Oral BID Karely Paz MD   1 tablet at 12/18/23  0808    Or    senna-docusate (SENOKOT-S/PERICOLACE) 8.6-50 MG per tablet 2 tablet  2 tablet Oral BID Karely Paz MD        sodium chloride (PF) 0.9% PF flush 10 mL  10 mL Intracatheter q1 min prn Aris Larkin MD        sodium chloride (PF) 0.9% PF flush 3 mL  3 mL Intracatheter Q8H Karely Paz MD   3 mL at 12/17/23 0610    sodium chloride (PF) 0.9% PF flush 3 mL  3 mL Intracatheter q1 min prn Karely Paz MD        tamsulosin (FLOMAX) capsule 0.4 mg  0.4 mg Oral Daily Karely Paz MD   0.4 mg at 12/18/23 0808    Vitamin D3 (CHOLECALCIFEROL) tablet 25 mcg  25 mcg Oral Daily Karely Paz MD   25 mcg at 12/18/23 0807     No current [facility]-ordered outpatient medications on file.       Allergies   Allergen Reactions    Niacin Hives and Rash     Burning of the skin    Atorvastatin Muscle Pain (Myalgia)    Pravastatin Muscle Pain (Myalgia)

## 2023-12-18 NOTE — PLAN OF CARE
[facility] - ICU    RN Progress Note:            Pertinent Assessments:      Please refer to flowsheet rows for full assessment     A&O x4. Neuro intact.    SR with frequent PACs. BP WNL. Pulses palpable throughout.    Clear lung sounds, 1L via NC.    Active bowel sounds.    Voiding adequately.    Hematoma unchanged, no oozing, area is soft.           Key Events - This Shift:       Heparin restarted at 2000, infusing at 1250, per vascular.     Strict bedrest due to oozing from right groin procedural site. No oozing on my shift.    Limited intake due to risk for surgery tonight if patient re-bleeds.    No complaints of pain, patient stated that he feels better.             Barriers to Discharge / Downgrade:     None.         Point of Contact Update YES-OR-NO: Yes    Name:Shayy  Phone Number:see chart  Summary of Conversation: Patient is doing well, the hematoma is stable, there has not been any oozing, and he is feeling much better. Updated her on the plan and if anything changes we will give her a call. She would like an update in the morning as well.         Problem: VTE (Venous Thromboembolism)  Goal: Tissue Perfusion  Outcome: Progressing  Intervention: Optimize Tissue Perfusion  Recent Flowsheet Documentation  Taken 12/17/2023 2000 by Johanna Soto, RN  Bleeding Precautions:   blood pressure closely monitored   coagulation study results reviewed   monitored for signs of bleeding     Problem: Pain Acute  Goal: Optimal Pain Control and Function  Outcome: Progressing  Intervention: Prevent or Manage Pain  Recent Flowsheet Documentation  Taken 12/17/2023 2000 by Johanna Soto, RN  Sleep/Rest Enhancement:   awakenings minimized   comfort measures   noise level reduced   regular sleep/rest pattern promoted   relaxation techniques promoted   room darkened  Medication Review/Management:   medications reviewed   high-risk medications identified   infusion titrated     Problem: Adult  Inpatient Plan of Care  Goal: Absence of Hospital-Acquired Illness or Injury  Intervention: Prevent Skin Injury  Recent Flowsheet Documentation  Taken 12/17/2023 2000 by Johanna Soto, RN  Body Position:   weight shifting   reverse trendelenburg  Skin Protection:   adhesive use limited   skin to device areas padded   skin to skin areas padded   transparent dressing maintained  Device Skin Pressure Protection:   absorbent pad utilized/changed   pressure points protected   skin-to-device areas padded   skin-to-skin areas padded   tubing/devices free from skin contact

## 2023-12-18 NOTE — PROVIDER NOTIFICATION
Spoke with Dr. Larkin (IR) about new oozing from right groin site despite applying manual pressure for 15 minutes x1 and 30 minutes x2. Orders obtained to stop heparin for 2 hours and place femostop on patient until oozing stops. Right groin ultrasound also ordered.

## 2023-12-18 NOTE — PROGRESS NOTES
"VASCULAR SURGERY PROGRESS NOTE    Subjective:  Patient was seen and evaluated at the bedside for follow up. Denies any lower extremity pain. Continues on heparin gtt. Awaiting bypass surgery tomorrow, in agreement to proceed. No other concerns.    Objective:  Intake/Output Summary (Last 24 hours) at 12/18/2023 0735  Last data filed at 12/18/2023 0629  Gross per 24 hour   Intake 773.9 ml   Output 950 ml   Net -176.1 ml     PHYSICAL EXAM:  BP (!) 142/71   Pulse 70   Temp 98.1  F (36.7  C) (Oral)   Resp 16   Ht 1.676 m (5' 6\")   Wt 69.7 kg (153 lb 9.6 oz)   SpO2 96%   BMI 24.79 kg/m    General: patient is alert and oriented, appropriate, no acute distress  Psych: pleasant affect, answers questions appropriately  Skin: color appropriate for race, warm, dry.  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 Underwent lytic therapy with restoration of a palpable posterior tibial pulse at the foot with recanalization of in-line flow. Lytics discontinued due to developed a right groin hematoma. Ultrasound also demonstrating an incompletely thrombosed small pseudoaneurysm.       PLAN:  Continue heparin gtt  Activity as tolerated  IR consult for possible R femoral artery pseudoaneurysm injection  OR tomorrow for LLE femoral to posterior tibial or tibioperoneal trunk bypass with interval ligation and exclusion of the popliteal artery   NPO at midnight    Nanci Harry PA-C  VASCULAR SURGERY                   "

## 2023-12-18 NOTE — PLAN OF CARE
[facility] - ICU    RN Progress Note:            Pertinent Assessments:      Please refer to flowsheet rows for full assessment     - A/Ox4. Vital signs stable. NSR. 02 sats >90%. No Shortness of breathing noted.  - Had some minimal back pain in bed due to bedrest. Weight shifting in bed helps.         Key Events - This Shift:     - Monitoring closely of hematoma in right groin and hematoma has been unchanged. No signs of active bleeding. Dressing clean, dry and intact.  - Heparin now running now at 1150 U/hr. Followed heparin protocol.  - Interrupted sleep due to frequent monitoring of hematoma/any signs of bleeding.         Barriers to Discharge / Downgrade:     - monitoring closely of signs of bleeding.

## 2023-12-18 NOTE — CONSULTS
"  Interventional Radiology  12/18/2023    Procedure requested: R femoral pseudoaneurysm, candidate for thrombin injection?\"  Requested by: Nanci Harry Pa-C    Brief HPI: 74yo male who presented with pain x5 days of LLE with motor and sensory deficits 2/2 acute limb ischemia and occluded left popliteal artery aneurysm. IR initiated lytics 12/16/23, patient not tolerating further thrombolytic therapy 12/17 d/t expanding painful right groin hematoma.    US 12/17 which noted incompletely thrombosed small pseudoaneurysm from right common femoral artery. Addendum noted that after further review hypoechoic structure could represent angio-seal device utilized by IR.     Plan:  -Imaging studies reviewed by Dr. Peraza (IR), at this pseudoaneurysm presence is small where it will likely heal and close on it's own, would not recommend thrombin injection at this point; however, continue to monitor.     - Follow up US imaging ordered for 12/19 AM to assess for any expanding or improving pseudoaneurysm.    ADDENDUM 1515:   US completed today 12/18 notes no pseudoaneurysm presence or hematoma. No IR intervention recommended    Total time: 20 minutes    LILI Porter CNP   Interventional Radiology  [phone]  "

## 2023-12-19 ENCOUNTER — APPOINTMENT (OUTPATIENT)
Dept: CARDIOLOGY | Facility: HOSPITAL | Age: 73
DRG: 300 | End: 2023-12-19
Attending: INTERNAL MEDICINE
Payer: MEDICARE

## 2023-12-19 ENCOUNTER — ANESTHESIA EVENT (OUTPATIENT)
Dept: SURGERY | Facility: HOSPITAL | Age: 73
End: 2023-12-19

## 2023-12-19 ENCOUNTER — ANESTHESIA (OUTPATIENT)
Dept: SURGERY | Facility: HOSPITAL | Age: 73
End: 2023-12-19

## 2023-12-19 DIAGNOSIS — I48.0 PAROXYSMAL ATRIAL FIBRILLATION (H): Primary | ICD-10-CM

## 2023-12-19 LAB
ABO/RH(D): NORMAL
ANION GAP SERPL CALCULATED.3IONS-SCNC: 7 MMOL/L (ref 7–15)
ANTIBODY SCREEN: NEGATIVE
ATRIAL RATE - MUSE: 87 BPM
BUN SERPL-MCNC: 25.9 MG/DL (ref 8–23)
CALCIUM SERPL-MCNC: 9.1 MG/DL (ref 8.8–10.2)
CHLORIDE SERPL-SCNC: 104 MMOL/L (ref 98–107)
CREAT SERPL-MCNC: 0.99 MG/DL (ref 0.67–1.17)
DEPRECATED HCO3 PLAS-SCNC: 25 MMOL/L (ref 22–29)
DIASTOLIC BLOOD PRESSURE - MUSE: NORMAL MMHG
EGFRCR SERPLBLD CKD-EPI 2021: 79 ML/MIN/1.73M2
ERYTHROCYTE [DISTWIDTH] IN BLOOD BY AUTOMATED COUNT: 12.9 % (ref 10–15)
GLUCOSE SERPL-MCNC: 106 MG/DL (ref 70–99)
HCT VFR BLD AUTO: 30.9 % (ref 40–53)
HGB BLD-MCNC: 10.2 G/DL (ref 13.3–17.7)
INTERPRETATION ECG - MUSE: NORMAL
LVEF ECHO: NORMAL
MAGNESIUM SERPL-MCNC: 1.9 MG/DL (ref 1.7–2.3)
MCH RBC QN AUTO: 30 PG (ref 26.5–33)
MCHC RBC AUTO-ENTMCNC: 33 G/DL (ref 31.5–36.5)
MCV RBC AUTO: 91 FL (ref 78–100)
P AXIS - MUSE: 12 DEGREES
PHOSPHATE SERPL-MCNC: 2.8 MG/DL (ref 2.5–4.5)
PLATELET # BLD AUTO: 182 10E3/UL (ref 150–450)
POTASSIUM SERPL-SCNC: 4 MMOL/L (ref 3.4–5.3)
PR INTERVAL - MUSE: 188 MS
QRS DURATION - MUSE: 98 MS
QT - MUSE: 336 MS
QTC - MUSE: 404 MS
R AXIS - MUSE: -19 DEGREES
RBC # BLD AUTO: 3.4 10E6/UL (ref 4.4–5.9)
SODIUM SERPL-SCNC: 136 MMOL/L (ref 135–145)
SPECIMEN EXPIRATION DATE: NORMAL
SYSTOLIC BLOOD PRESSURE - MUSE: NORMAL MMHG
T AXIS - MUSE: 46 DEGREES
UFH PPP CHRO-ACNC: 0.45 IU/ML
UFH PPP CHRO-ACNC: 0.55 IU/ML
VENTRICULAR RATE- MUSE: 87 BPM
WBC # BLD AUTO: 11.1 10E3/UL (ref 4–11)

## 2023-12-19 PROCEDURE — 83735 ASSAY OF MAGNESIUM: CPT | Performed by: HOSPITALIST

## 2023-12-19 PROCEDURE — 84100 ASSAY OF PHOSPHORUS: CPT | Performed by: HOSPITALIST

## 2023-12-19 PROCEDURE — 99233 SBSQ HOSP IP/OBS HIGH 50: CPT | Mod: 25 | Performed by: GENERAL ACUTE CARE HOSPITAL

## 2023-12-19 PROCEDURE — 99232 SBSQ HOSP IP/OBS MODERATE 35: CPT | Performed by: HOSPITALIST

## 2023-12-19 PROCEDURE — 36415 COLL VENOUS BLD VENIPUNCTURE: CPT | Performed by: HOSPITALIST

## 2023-12-19 PROCEDURE — 250N000009 HC RX 250: Performed by: INTERNAL MEDICINE

## 2023-12-19 PROCEDURE — 200N000001 HC R&B ICU

## 2023-12-19 PROCEDURE — 85520 HEPARIN ASSAY: CPT | Performed by: HOSPITALIST

## 2023-12-19 PROCEDURE — 255N000002 HC RX 255 OP 636: Performed by: HOSPITALIST

## 2023-12-19 PROCEDURE — 250N000013 HC RX MED GY IP 250 OP 250 PS 637: Performed by: INTERNAL MEDICINE

## 2023-12-19 PROCEDURE — 93306 TTE W/DOPPLER COMPLETE: CPT | Mod: 26 | Performed by: INTERNAL MEDICINE

## 2023-12-19 PROCEDURE — 250N000011 HC RX IP 250 OP 636: Mod: JZ | Performed by: RADIOLOGY

## 2023-12-19 PROCEDURE — 86900 BLOOD TYPING SEROLOGIC ABO: CPT | Performed by: HOSPITALIST

## 2023-12-19 PROCEDURE — 80048 BASIC METABOLIC PNL TOTAL CA: CPT | Performed by: HOSPITALIST

## 2023-12-19 PROCEDURE — 85014 HEMATOCRIT: CPT | Performed by: HOSPITALIST

## 2023-12-19 PROCEDURE — 250N000013 HC RX MED GY IP 250 OP 250 PS 637: Performed by: GENERAL ACUTE CARE HOSPITAL

## 2023-12-19 PROCEDURE — 250N000013 HC RX MED GY IP 250 OP 250 PS 637: Performed by: RADIOLOGY

## 2023-12-19 PROCEDURE — 250N000013 HC RX MED GY IP 250 OP 250 PS 637: Performed by: ANESTHESIOLOGY

## 2023-12-19 PROCEDURE — 250N000011 HC RX IP 250 OP 636: Mod: JZ | Performed by: ANESTHESIOLOGY

## 2023-12-19 PROCEDURE — 999N000141 HC STATISTIC PRE-PROCEDURE NURSING ASSESSMENT: Performed by: SURGERY

## 2023-12-19 RX ORDER — SODIUM CHLORIDE, SODIUM LACTATE, POTASSIUM CHLORIDE, CALCIUM CHLORIDE 600; 310; 30; 20 MG/100ML; MG/100ML; MG/100ML; MG/100ML
INJECTION, SOLUTION INTRAVENOUS CONTINUOUS
Status: DISCONTINUED | OUTPATIENT
Start: 2023-12-19 | End: 2023-12-19 | Stop reason: HOSPADM

## 2023-12-19 RX ORDER — AMIODARONE HYDROCHLORIDE 200 MG/1
200 TABLET ORAL DAILY
Status: DISCONTINUED | OUTPATIENT
Start: 2023-12-27 | End: 2023-12-20 | Stop reason: HOSPADM

## 2023-12-19 RX ORDER — CEFAZOLIN SODIUM/WATER 2 G/20 ML
2 SYRINGE (ML) INTRAVENOUS SEE ADMIN INSTRUCTIONS
Status: DISCONTINUED | OUTPATIENT
Start: 2023-12-19 | End: 2023-12-19 | Stop reason: HOSPADM

## 2023-12-19 RX ORDER — ACETAMINOPHEN 325 MG/1
975 TABLET ORAL ONCE
Status: COMPLETED | OUTPATIENT
Start: 2023-12-19 | End: 2023-12-19

## 2023-12-19 RX ORDER — PAPAVERINE HYDROCHLORIDE 30 MG/ML
INJECTION INTRAMUSCULAR; INTRAVENOUS
Status: DISCONTINUED
Start: 2023-12-19 | End: 2023-12-19 | Stop reason: HOSPADM

## 2023-12-19 RX ORDER — MAGNESIUM SULFATE 4 G/50ML
4 INJECTION INTRAVENOUS ONCE
Status: COMPLETED | OUTPATIENT
Start: 2023-12-19 | End: 2023-12-19

## 2023-12-19 RX ORDER — AMIODARONE HYDROCHLORIDE 200 MG/1
400 TABLET ORAL 2 TIMES DAILY
Status: DISCONTINUED | OUTPATIENT
Start: 2023-12-19 | End: 2023-12-20 | Stop reason: HOSPADM

## 2023-12-19 RX ORDER — LIDOCAINE 40 MG/G
CREAM TOPICAL
Status: DISCONTINUED | OUTPATIENT
Start: 2023-12-19 | End: 2023-12-19 | Stop reason: HOSPADM

## 2023-12-19 RX ORDER — CEFAZOLIN SODIUM/WATER 2 G/20 ML
2 SYRINGE (ML) INTRAVENOUS
Status: DISCONTINUED | OUTPATIENT
Start: 2023-12-19 | End: 2023-12-19 | Stop reason: HOSPADM

## 2023-12-19 RX ADMIN — ROSUVASTATIN CALCIUM 5 MG: 5 TABLET, FILM COATED ORAL at 20:15

## 2023-12-19 RX ADMIN — HEPARIN SODIUM 1050 UNITS/HR: 10000 INJECTION, SOLUTION INTRAVENOUS at 11:28

## 2023-12-19 RX ADMIN — SENNOSIDES AND DOCUSATE SODIUM 1 TABLET: 8.6; 5 TABLET ORAL at 08:35

## 2023-12-19 RX ADMIN — Medication 25 MCG: at 08:35

## 2023-12-19 RX ADMIN — PERFLUTREN 2 ML: 6.52 INJECTION, SUSPENSION INTRAVENOUS at 07:59

## 2023-12-19 RX ADMIN — AMIODARONE HYDROCHLORIDE 400 MG: 200 TABLET ORAL at 20:15

## 2023-12-19 RX ADMIN — METOPROLOL TARTRATE 50 MG: 25 TABLET, FILM COATED ORAL at 08:35

## 2023-12-19 RX ADMIN — DOCUSATE SODIUM 100 MG: 100 CAPSULE, LIQUID FILLED ORAL at 20:15

## 2023-12-19 RX ADMIN — AMIODARONE HYDROCHLORIDE 400 MG: 200 TABLET ORAL at 08:35

## 2023-12-19 RX ADMIN — MAGNESIUM SULFATE HEPTAHYDRATE 4 G: 80 INJECTION, SOLUTION INTRAVENOUS at 14:32

## 2023-12-19 RX ADMIN — METOPROLOL TARTRATE 50 MG: 25 TABLET, FILM COATED ORAL at 20:15

## 2023-12-19 RX ADMIN — ASPIRIN 81 MG CHEWABLE TABLET 81 MG: 81 TABLET CHEWABLE at 08:35

## 2023-12-19 RX ADMIN — DOCUSATE SODIUM 100 MG: 100 CAPSULE, LIQUID FILLED ORAL at 08:35

## 2023-12-19 RX ADMIN — SENNOSIDES AND DOCUSATE SODIUM 1 TABLET: 8.6; 5 TABLET ORAL at 20:15

## 2023-12-19 RX ADMIN — ACETAMINOPHEN 975 MG: 325 TABLET ORAL at 14:22

## 2023-12-19 RX ADMIN — ACETAMINOPHEN 500 MG: 500 TABLET ORAL at 00:50

## 2023-12-19 RX ADMIN — TAMSULOSIN HYDROCHLORIDE 0.4 MG: 0.4 CAPSULE ORAL at 08:35

## 2023-12-19 RX ADMIN — FINASTERIDE 5 MG: 5 TABLET, FILM COATED ORAL at 08:35

## 2023-12-19 ASSESSMENT — ACTIVITIES OF DAILY LIVING (ADL)
ADLS_ACUITY_SCORE: 32

## 2023-12-19 NOTE — PROGRESS NOTES
[facility] Progress Note - Hospitalist Service    Date of Admission:  12/15/2023    Assessment & Plan   73-year-old male with a past medical history of CAD, HTN, HLD, PAD, BPH.  He presents to the PCP with 5 days of left knee pain with ambulation found to have acute on chronic left lower extremity limb ischemia secondary to left popliteal artery aneurysm, subsequent sent to ER.  Admitted on 12/15 and was taken for immediate LLE angiogram with lytic therapy by IR and was placed on lytic therapy but developed a right groin hematoma and therefore this was held.  RLE ultrasound done on 12/18 did not reveal any evidence of pseudoaneurysm or hematoma in the right groin.    Telemetry showed possible atrial fibrillation vs sinus tachycardia with PACs; seen by cardiology and started on Amiodarone.    -Pending OR today on 12/19 for LLE femoral to posterior tibial to tibioperoneal trunk bypass.    #Acute on chronic left lower extremity ischemia  #S/p LLE angiogram with lytic therapy on 12/16/23  # Right groin hematoma s/p discontinuation of lytic therapy due to hematoma 12/17/23  Was seen in the clinic, with lower extremity ultrasound showing complete segmental occlusion of left popliteal artery with decreased flow in the posterior tibial and peroneal arteries. CT obtained here in the ED shows thrombotic occlusion involving left popliteal artery, tibial peroneal trunk and proximal aspect of proximal anterior tibial, posterior tibial and peroneal arteries.  -Vascular surgery and IR following, appreciate recs  -S/p LLE angiogram with lytic therapy on 12/16/23, then with Right groin hematoma s/p discontinuation of lytic therapy due to hematoma 12/17/23  -Heparin drip  -Pending or tomorrow for LLE bypass, n.p.o. after midnight    #Possible Paroxysmal Afib vs Sinus Tachycardia with PACs  [id number] 6.  -Amiodarone 400 mg BID x7 days then 200 mg Daily and follow up in cardiology clinic to  determine taper  -AC indefinitely; currently on Heparin gtt, consider DOAC vs Warfarin on discharge  -Aspirin indefinitely    #Infrarenal abdominal aortic aneurysm   #Bilateral common ileac artery aneurysm   -Plan for outpatient surveillance with vascular surgery    #Coronary artery disease-status post CABG 2015  #Hypertension   Graft remains patent. Also has a strong coronary artery disease history in the family.  -Is currently on aspirin, metoprolol 50 mg twice daily  -Metoprolol 5 mg IV q6H     #Hyperlipidemia  -Patient was on rosuvastatin 5 mg in the outpatient setting; patient discontinued this medication due to reported side effect of muscle aches  - we discussed the importance of resuming rosuvastatin again and that taking the medication at night might minimize side effect.   -Rosuvastatin at bedtime, monitor for side effects as outpatient    #Enlarged prostate  -Continue Proscar and Flomax          Diet: NPO for Medical/Clinical Reasons Except for: Meds    DVT Prophylaxis: Heparin gtt  Roper Catheter: Not present  Lines: None     Cardiac Monitoring: ACTIVE order. Indication: Tachyarrhythmias, acute (48 hours)  Code Status: Full Code      Clinically Significant Risk Factors              # Hypoalbuminemia: Lowest albumin = 3.3 g/dL at 12/18/2023  6:00 AM, will monitor as appropriate    # Coagulation Defect: INR = 1.27 (Ref range: 0.85 - 1.15) and/or PTT = 202 Seconds (Ref range: 22 - 38 Seconds), will monitor for bleeding    # Hypertension: Noted on problem list             # History of CABG: noted on surgical history       Disposition Plan     Expected Discharge Date: 12/20/2023                    Chauncey Mayo MD  Hospitalist Service  [facility]  Securely message with [employer] (more info)  Text page via [employer] Paging/Directory   ______________________________________________________________________    Interval History   No acute events overnight.     Patient seen and evaluated at  bedside. Doing fairly well this AM, denies any chest pain, shortness of breath or abdominal pain.     Plan for OR today, feels okay.    Physical Exam   Vital Signs: Temp: 98.1  F (36.7  C) Temp src: Oral BP: (!) 138/91 Pulse: 89   Resp: 24 SpO2: 93 % O2 Device: None (Room air)    Weight: 153 lbs 9.6 oz    General: NAD, laying in bed  CV: +S1/S2, no pitting edema  Respiratory: CTA anteriorly  GI: soft, NT, ND  Skin: Significant bruising and scrotal/inguinal area, right thigh/groin  Neuro: alert    Medical Decision Making               Data     I have personally reviewed the following data over the past 24 hrs:    11.1 (H)  \   10.2 (L)   / 182     136 104 25.9 (H) /  106 (H)   4.0 25 0.99 \       Imaging results reviewed over the past 24 hrs:   Recent Results (from the past 24 hour(s))   Echocardiogram Complete   Result Value    LVEF  55-60%    Narrative    [id number]  [id number]  [id number]  [id number] [address]     Name: JESSA LEW  MRN: [id number]  : 1950  Study Date: 2023 07:01 AM  Age: 73 yrs  Gender: Male  Patient Location: [facility]  Reason For Study: Atrial Fibrillation  Ordering Physician: MARY PRAKASH  Performed By: ACE     BSA: 1.8 m2  Height: 66 in  Weight: 153 lb  HR: 77  BP: 119/70 mmHg  ______________________________________________________________________________  Procedure  Complete Portable Echo Adult. Definity (NDC #01732-005) given intravenously.  ______________________________________________________________________________  Interpretation Summary     Left ventricular size, wall motion and function are normal. The ejection  fraction is 55-60%.  The left atrium is mildly dilated.  There is mild (1+) aortic regurgitation.  ______________________________________________________________________________  Left Ventricle  Left ventricular size, wall motion and function are normal. The ejection  fraction is 55-60%.  There is mild concentric left ventricular hypertrophy.  Left ventricular diastolic function is indeterminate. No regional wall motion  abnormalities noted.     Right Ventricle  The right ventricle is not well visualized. The right ventricle is borderline  dilated. The right ventricular systolic function is normal.     Atria  The left atrium is mildly dilated. Right atrial size is normal. There is no  color Doppler evidence of an atrial shunt.     Mitral Valve  Mitral valve leaflets appear normal. There is no evidence of mitral stenosis  or clinically significant mitral regurgitation. There is mild (1+) mitral  regurgitation.     Tricuspid Valve  Tricuspid valve leaflets appear normal. There is no evidence of tricuspid  stenosis or clinically significant tricuspid regurgitation. There is mild (1+)  tricuspid regurgitation. Right ventricular systolic pressure could not be  approximated due to inadequate tricuspid regurgitation.     Aortic Valve  Aortic valve leaflets appear normal. There is no evidence of aortic stenosis  or clinically significant aortic regurgitation. There is mild (1+) aortic  regurgitation.     Pulmonic Valve  The pulmonic valve is not well seen, but is grossly normal. This degree of  valvular regurgitation is within normal limits.     Vessels  Ascending Aorta dilatation is present. IVC diameter <2.1 cm collapsing >50%  with sniff suggests a normal RA pressure of 3 mmHg.     Pericardium  There is no pericardial effusion.     ______________________________________________________________________________  MMode/2D Measurements & Calculations  IVSd: 1.3 cm  LVIDd: 4.5 cm  LVIDs: 3.3 cm  LVPWd: 1.1 cm  FS: 26.2 %     LV mass(C)d: 187.9 grams  LV mass(C)dI: 105.3 grams/m2  Ao root diam: 3.6 cm  LA dimension: 4.5 cm  asc Aorta Diam: 3.9 cm  LA/Ao: 1.3  LVOT diam: 2.4 cm  LVOT area: 4.5 cm2  Ao root diam index Ht(cm/m): 2.1  Ao root diam index BSA (cm/m2): 2.0  Asc Ao diam index BSA (cm/m2): 2.2  Asc Ao  diam index Ht(cm/m): 2.3  LA Volume (BP): 42.6 ml  LA Volume Index (BP): 23.9 ml/m2     LA Volume Indexed (AL/bp): 26.1 ml/m2  RV Base: 3.9 cm  RWT: 0.48     Time Measurements  MM HR: 73.0 BPM     Doppler Measurements & Calculations  MV E max kavon: 45.6 cm/sec  MV A max kavon: 59.7 cm/sec  MV E/A: 0.76     MV dec slope: 284.0 cm/sec2  MV dec time: 0.16 sec  Ao V2 max: 132.0 cm/sec  Ao max P.0 mmHg  Ao V2 mean: 91.1 cm/sec  Ao mean P.0 mmHg  Ao V2 VTI: 25.2 cm  TOMÁS(I,D): 3.5 cm2  TOMÁS(V,D): 3.8 cm2  LV V1 max P.0 mmHg  LV V1 max: 112.0 cm/sec  LV V1 VTI: 19.5 cm  SV(LVOT): 88.2 ml  SI(LVOT): 49.4 ml/m2  PA acc time: 0.11 sec  AV Kavon Ratio (DI): 0.85  TOMÁS Index (cm2/m2): 2.0  E/E': 5.1  E/E' av.5  Lateral E/e': 5.1  Medial E/e': 12.0  Peak E' Kavon: 8.9 cm/sec  RV S Kavon: 5.7 cm/sec     ______________________________________________________________________________  Report approved by: Arnulfo Vo 2023 08:36 AM

## 2023-12-19 NOTE — PROGRESS NOTES
Impression and Plan     Impression:   Preoperative cardiovascular examination prior to urgent surgical revascularization of the left lower extremity scheduled for today. He is well-compensated from a cardiovascular standpoint and at a reasonably low risk of perioperative major adverse cardiac events.  New-onset atrial fibrillation versus sinus tachycardia with premature atrial contractions seen on telemetry 12/17/2023 since converted to sinus rhythm on amiodarone. [id number] score is at least 6 (hypertension, age 75 or greater, acute lower extremity thrombosis, coronary artery disease).  Acute left lower extremity limb ischemia due to an occluded left popliteal artery aneurysm status post catheter-directed lytic therapy 12/16/2023 with development of a right groin hematoma.  Bilateral lower extremity peripheral arterial disease seen CT angiography of the lower extremities 12/15/2023.  Coronary artery disease status post three-vessel coronary artery bypass grafting (left internal mammary artery to the left anterior descending artery, reversed saphenous venous grafts to an obtuse marginal artery branch and the distal right coronary artery) 12/28/2015. Exercise nuclear stress testing 10/16/2023 showed no ischemia. He denies angina.  Essential hypertension. Controlled.  Hyperlipidemia with prior intolerance of high-intensity statin therapy. Last  mg/dL.  Infrarenal abdominal aortic aneurysm measuring 3.0 cm seen on CT angiography 12/15/2023.  Right common iliac artery aneurysm measuring 3.0 cm and left common iliac artery aneurysm measuring 3.5 cm.    Plan:  From a cardiac perspective he may proceed with his planned surgery today.  Continue oral amiodarone 400 mg twice daily for 7 days then reduce the dose to 200 mg once daily until he is seen in outpatient follow-up  Although from the available data it is not clear if he truly atrial fibrillation, given his risk factors for atrial fibrillation and acute  lower extremity thrombotic event he should probably be anticoagulated indefinitely preferably with a direct oral anticoagulant, or warfarin if cost is an issue  I would also continue low dose aspirin 81 mg daily given his extensive atherosclerotic vascular disease  Continue rosuvastatin 5 mg daily. He reports intolerance to higher intensity statin regimens so addition of ezetimibe and/or a PCSK9 inhibitor could be considered  We will arrange for him to follow-up in our Atrial Fibrillation clinic in 1 months  We will sign off at this time. Please do not hesitate to contact us with additional questions or concerns.    Primary Cardiologist: Dr. Yobany Estrada    Subjective     - left leg does not hurt now  - he was feeling palpitations yesterday which have since subsided    Cardiac Diagnostics   Telemetry (personally reviewed): sinus tachycardia with PACs     ECG 12/18/2023 (personally reviewed and interpreted): SR, LVH with repolarization abnormality    Echocardiogram 12/1/2023 (results reviewed):   Left ventricular size, wall motion and function are normal. The ejection fraction is 55-60%.  The left atrium is mildly dilated.  There is mild (1+) aortic regurgitation.    Cardiac Cath 11/17/2015 (results reviewed):   Severe multivessel CAD     Left lower extremity angiogram 12/16/2023 (report reviewed):  1.  Small infrarenal abdominal aortic aneurysm.  2.  Bilateral common iliac artery aneurysms.  3.  Thrombosed left popliteal artery aneurysm with reconstitution of the left peroneal and posterior tibial arteries.  4.  Initiation of thrombolytic therapy.    Cardiac Stress Testing 10/16/2023 (results reviewed):     The exercise nuclear stress test is negative for inducible myocardial ischemia or infarction.    The exercise stress electrocardiogram is negative for inducible ischemic EKG changes.    The patient's exercise capacity is average for age and gender. The patient exercised for 6:01 minutes on the Florencio protocol,  "achieving 7.3 METs and 103% the age-predicted maximum heart rate. The patient did not experience any symptoms.    The left ventricular ejection fraction at stress is 52%.    The patient is at a low risk of future cardiac ischemic events.    A prior study was conducted on 5/17/2007.  Prior images were unavailable for comparison review.    Physical Examination       /72   Pulse 70   Temp 98.1  F (36.7  C) (Oral)   Resp 27   Ht 1.676 m (5' 6\")   Wt 69.7 kg (153 lb 9.6 oz)   SpO2 95%   BMI 24.79 kg/m          Wt Readings from Last 3 Encounters:   12/18/23 69.7 kg (153 lb 9.6 oz)   12/15/23 73.9 kg (163 lb)   10/10/23 73.9 kg (163 lb)           Intake/Output Summary (Last 24 hours) at 12/19/2023 0847  Last data filed at 12/19/2023 0600  Gross per 24 hour   Intake 1101.66 ml   Output 1075 ml   Net 26.66 ml       General: pleasant male. No acute distress.   HENT: external ears normal. Nares patent. Mucous membranes moist.  Eyes: perrla, extraocular muscles intact. No scleral icterus.   Neck: No JVD  Lungs: clear to auscultation  COR:  regular rate and rhythm, No murmurs, rubs, or gallops  Abd: nondistended, BS present  Extrem: No edema         Imaging      CTA chest/abdomen/pelvis 12/15/2023 (report reviewed):  1.  Long segment thrombotic occlusion involving the left popliteal artery, tibioperoneal trunk, and the proximal aspect of the proximal anterior tibial, posterior tibial, peroneal arteries. The proximal left posterior tibial and peroneal arteries are reconstituted via collateral flow, remaining patent to the level of the ankle. The anterior tibial artery is also reconstituted via collateral flow, though becomes occluded at its mid aspect, possibly chronic. Findings discussed verbally via telephone  with Dr. Zaldivar at 10:29 PM on 12/15/2023.  2.  Occlusion of the proximal to mid right anterior tibial artery, likely chronic. Associated nonopacification of the right dorsalis pedis artery within the foot. " Remainder of the right lower extremity arterial vasculature is widely patent.  3.  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 Additional aneurysms of the right common iliac artery and the infrarenal abdominal aorta appear unchanged.  4.  Small nonobstructing right renal stone.  5.  Previous right middle lobectomy.  6.  Moderate to severe prostatomegaly.  7.  Hepatic steatosis.  8.  Unchanged small bilateral adrenal adenomas.    Lab Results   Lab Results   Component Value Date     12/19/2023    CO2 25 12/19/2023    CO2 25 04/27/2022    BUN 25.9 12/19/2023    BUN 18 04/27/2022     Lab Results   Component Value Date    WBC 11.1 12/19/2023    HGB 10.2 12/19/2023    HCT 30.9 12/19/2023    MCV 91 12/19/2023     12/19/2023     Lab Results   Component Value Date    CHOL 222 12/15/2023    TRIG 152 12/15/2023    HDL 41 12/15/2023     Lab Results   Component Value Date    INR 1.27 12/17/2023     Lab Results   Component Value Date    TSH 1.25 10/25/2018           Current Inpatient Scheduled Medications   Scheduled Meds:   amiodarone  400 mg Oral BID    aspirin  81 mg Oral Daily    docusate sodium  100 mg Oral BID    finasteride  5 mg Oral Daily    metoprolol  5 mg Intravenous Q6H    metoprolol tartrate  50 mg Oral BID    rosuvastatin  5 mg Oral At Bedtime    senna-docusate  1 tablet Oral BID    Or    senna-docusate  2 tablet Oral BID    sodium chloride (PF)  3 mL Intracatheter Q8H    tamsulosin  0.4 mg Oral Daily    Vitamin D3  25 mcg Oral Daily     Continuous Infusions:   heparin 1,050 Units/hr (12/19/23 0800)            Medications Prior to Admission   Prior to Admission medications    Medication Sig Start Date End Date Taking? Authorizing Provider   acetaminophen (TYLENOL) 500 MG tablet [ACETAMINOPHEN (TYLENOL) 500 MG TABLET] Take 500 mg by mouth every 6 (six) hours as needed for pain. 2/9/16  Yes Provider, Historical   amLODIPine (NORVASC) 10 MG tablet TAKE 1  TABLET(10 MG) BY MOUTH DAILY 10/26/23  Yes Mercedes Adorno MD   aspirin 81 mg chewable tablet [ASPIRIN 81 MG CHEWABLE TABLET] Chew 81 mg daily. 2/9/16  Yes Provider, Historical   finasteride (PROSCAR) 5 MG tablet Take 1 tablet (5 mg) by mouth daily 2/14/23  Yes Veronica Costa CNP   metoprolol tartrate (LOPRESSOR) 50 MG tablet TAKE 1 TABLET(50 MG) BY MOUTH TWICE DAILY 7/15/23  Yes Lelo Aranda MD   tamsulosin (FLOMAX) 0.4 MG capsule TAKE 1 CAPSULE BY MOUTH DAILY AFTER BREAKFAST 8/4/23  Yes Mercedes Adorno MD   VITAMIN D3 25 MCG (1000 UT) tablet Take 1 tablet by mouth daily 10/1/21   Reported, Patient          Charles Ordonez MD [facility]  Non-Invasive Cardiologist  [facility]  Pager [phone]

## 2023-12-19 NOTE — PLAN OF CARE
[facility] - ICU    RN Progress Note:            Pertinent Assessments:      Please refer to flowsheet rows for full assessment     VSS, no changes noted in R groin site, Dressing clean, dry, and intact. Pt reports no shortness of breath or numbness/tingling in lower extremities. Pedal pulses palpable, L +1 weak. Pt voiding without difficulty using urinal, NPO after midnight for procedure today. PRN tylenol given d/t generalized pain (4/10), interventions effective per pt.           Key Events - This Shift:       PRN tylenol given x1               Barriers to Discharge / Downgrade:     Surgery today, close monitoring for bleeding

## 2023-12-20 ENCOUNTER — PREP FOR PROCEDURE (OUTPATIENT)
Dept: SURGERY | Facility: CLINIC | Age: 73
End: 2023-12-20
Payer: MEDICARE

## 2023-12-20 VITALS
TEMPERATURE: 98.2 F | OXYGEN SATURATION: 93 % | WEIGHT: 153.6 LBS | RESPIRATION RATE: 33 BRPM | DIASTOLIC BLOOD PRESSURE: 79 MMHG | HEART RATE: 114 BPM | BODY MASS INDEX: 24.68 KG/M2 | HEIGHT: 66 IN | SYSTOLIC BLOOD PRESSURE: 159 MMHG

## 2023-12-20 DIAGNOSIS — I73.9 PAD (PERIPHERAL ARTERY DISEASE) (H): Primary | ICD-10-CM

## 2023-12-20 LAB
ANION GAP SERPL CALCULATED.3IONS-SCNC: 8 MMOL/L (ref 7–15)
BUN SERPL-MCNC: 20.6 MG/DL (ref 8–23)
CALCIUM SERPL-MCNC: 8.8 MG/DL (ref 8.8–10.2)
CHLORIDE SERPL-SCNC: 105 MMOL/L (ref 98–107)
CREAT SERPL-MCNC: 0.9 MG/DL (ref 0.67–1.17)
DEPRECATED HCO3 PLAS-SCNC: 24 MMOL/L (ref 22–29)
EGFRCR SERPLBLD CKD-EPI 2021: 89 ML/MIN/1.73M2
ERYTHROCYTE [DISTWIDTH] IN BLOOD BY AUTOMATED COUNT: 13.1 % (ref 10–15)
GLUCOSE SERPL-MCNC: 103 MG/DL (ref 70–99)
HCT VFR BLD AUTO: 29.9 % (ref 40–53)
HGB BLD-MCNC: 10.1 G/DL (ref 13.3–17.7)
INR PPP: 1.09 (ref 0.85–1.15)
MAGNESIUM SERPL-MCNC: 2.2 MG/DL (ref 1.7–2.3)
MCH RBC QN AUTO: 31.1 PG (ref 26.5–33)
MCHC RBC AUTO-ENTMCNC: 33.8 G/DL (ref 31.5–36.5)
MCV RBC AUTO: 92 FL (ref 78–100)
PLATELET # BLD AUTO: 199 10E3/UL (ref 150–450)
POTASSIUM SERPL-SCNC: 4.1 MMOL/L (ref 3.4–5.3)
RBC # BLD AUTO: 3.25 10E6/UL (ref 4.4–5.9)
SODIUM SERPL-SCNC: 137 MMOL/L (ref 135–145)
UFH PPP CHRO-ACNC: 0.46 IU/ML
WBC # BLD AUTO: 10.9 10E3/UL (ref 4–11)

## 2023-12-20 PROCEDURE — 250N000013 HC RX MED GY IP 250 OP 250 PS 637: Performed by: HOSPITALIST

## 2023-12-20 PROCEDURE — 250N000013 HC RX MED GY IP 250 OP 250 PS 637: Performed by: GENERAL ACUTE CARE HOSPITAL

## 2023-12-20 PROCEDURE — 36415 COLL VENOUS BLD VENIPUNCTURE: CPT | Performed by: HOSPITALIST

## 2023-12-20 PROCEDURE — 99239 HOSP IP/OBS DSCHRG MGMT >30: CPT | Performed by: HOSPITALIST

## 2023-12-20 PROCEDURE — 250N000013 HC RX MED GY IP 250 OP 250 PS 637: Performed by: INTERNAL MEDICINE

## 2023-12-20 PROCEDURE — 85520 HEPARIN ASSAY: CPT | Performed by: HOSPITALIST

## 2023-12-20 PROCEDURE — 85610 PROTHROMBIN TIME: CPT | Performed by: HOSPITALIST

## 2023-12-20 PROCEDURE — 85014 HEMATOCRIT: CPT | Performed by: HOSPITALIST

## 2023-12-20 PROCEDURE — 250N000011 HC RX IP 250 OP 636: Mod: JZ | Performed by: RADIOLOGY

## 2023-12-20 PROCEDURE — 250N000013 HC RX MED GY IP 250 OP 250 PS 637: Performed by: RADIOLOGY

## 2023-12-20 PROCEDURE — 83735 ASSAY OF MAGNESIUM: CPT | Performed by: HOSPITALIST

## 2023-12-20 PROCEDURE — 250N000011 HC RX IP 250 OP 636: Mod: JZ | Performed by: HOSPITALIST

## 2023-12-20 PROCEDURE — 80048 BASIC METABOLIC PNL TOTAL CA: CPT | Performed by: HOSPITALIST

## 2023-12-20 RX ORDER — ENOXAPARIN SODIUM 100 MG/ML
1 INJECTION SUBCUTANEOUS EVERY 12 HOURS
Status: DISCONTINUED | OUTPATIENT
Start: 2023-12-20 | End: 2023-12-20 | Stop reason: HOSPADM

## 2023-12-20 RX ORDER — WARFARIN SODIUM 3 MG/1
3 TABLET ORAL ONCE
Status: COMPLETED | OUTPATIENT
Start: 2023-12-20 | End: 2023-12-20

## 2023-12-20 RX ORDER — AMIODARONE HYDROCHLORIDE 200 MG/1
TABLET ORAL
Qty: 58 TABLET | Refills: 1 | Status: SHIPPED | OUTPATIENT
Start: 2023-12-20 | End: 2024-01-26

## 2023-12-20 RX ORDER — ROSUVASTATIN CALCIUM 5 MG/1
5 TABLET, COATED ORAL AT BEDTIME
Qty: 30 TABLET | Refills: 1 | Status: SHIPPED | OUTPATIENT
Start: 2023-12-20

## 2023-12-20 RX ORDER — ENOXAPARIN SODIUM 100 MG/ML
1 INJECTION SUBCUTANEOUS EVERY 12 HOURS
Qty: 11.2 ML | Refills: 0 | Status: SHIPPED | OUTPATIENT
Start: 2023-12-20 | End: 2023-12-27

## 2023-12-20 RX ORDER — WARFARIN SODIUM 1 MG/1
3 TABLET ORAL DAILY
Qty: 60 TABLET | Refills: 0 | Status: SHIPPED | OUTPATIENT
Start: 2023-12-20 | End: 2024-01-09

## 2023-12-20 RX ADMIN — ENOXAPARIN SODIUM 70 MG: 80 INJECTION SUBCUTANEOUS at 15:12

## 2023-12-20 RX ADMIN — ACETAMINOPHEN 500 MG: 500 TABLET ORAL at 00:55

## 2023-12-20 RX ADMIN — METOPROLOL TARTRATE 50 MG: 25 TABLET, FILM COATED ORAL at 08:32

## 2023-12-20 RX ADMIN — WARFARIN SODIUM 3 MG: 3 TABLET ORAL at 15:23

## 2023-12-20 RX ADMIN — HEPARIN SODIUM 1050 UNITS/HR: 10000 INJECTION, SOLUTION INTRAVENOUS at 11:20

## 2023-12-20 RX ADMIN — FINASTERIDE 5 MG: 5 TABLET, FILM COATED ORAL at 08:33

## 2023-12-20 RX ADMIN — ASPIRIN 81 MG CHEWABLE TABLET 81 MG: 81 TABLET CHEWABLE at 08:32

## 2023-12-20 RX ADMIN — Medication 25 MCG: at 08:32

## 2023-12-20 RX ADMIN — AMIODARONE HYDROCHLORIDE 400 MG: 200 TABLET ORAL at 08:34

## 2023-12-20 RX ADMIN — SENNOSIDES AND DOCUSATE SODIUM 1 TABLET: 8.6; 5 TABLET ORAL at 08:32

## 2023-12-20 RX ADMIN — DOCUSATE SODIUM 100 MG: 100 CAPSULE, LIQUID FILLED ORAL at 08:32

## 2023-12-20 RX ADMIN — TAMSULOSIN HYDROCHLORIDE 0.4 MG: 0.4 CAPSULE ORAL at 08:32

## 2023-12-20 ASSESSMENT — ACTIVITIES OF DAILY LIVING (ADL)
ADLS_ACUITY_SCORE: 32

## 2023-12-20 NOTE — PHARMACY-ANTICOAGULATION SERVICE
Clinical Pharmacy - Warfarin Dosing Consult     Pharmacy has been consulted to manage this patient s warfarin therapy.  Indication: Other - specify in comments (Arterial thrombosis)  Therapy Goal: INR 2-3  Warfarin Prior to Admission: No  Significant drug interactions: AMIODARONE, aspirin, (enoxaparin for bridging)  Dose Comments: Would start this patient at 5 mg, but since he is also taking 800 mg of amiodarone daily right now, will reduce dose to 3 mg.    INR   Date Value Ref Range Status   12/20/2023 1.09 0.85 - 1.15 Final   12/17/2023 1.27 (H) 0.85 - 1.15 Final       Recommend warfarin 3 mg today.  Pharmacy will monitor Jack Brown daily and order warfarin doses to achieve specified goal.      Please contact pharmacy as soon as possible if the warfarin needs to be held for a procedure or if the warfarin goals change.       Thank you,  Nohelia

## 2023-12-20 NOTE — DISCHARGE SUMMARY
[facility]  Hospitalist Discharge Summary      Date of Admission:  12/15/2023  Date of Discharge:  12/20/2023  Discharging Provider: Chauncey Mayo MD  Discharge Service: Hospitalist Service    Discharge Diagnoses   #Acute on chronic left lower extremity ischemia  #S/p LLE angiogram with lytic therapy on 12/16/23  # Right groin hematoma s/p discontinuation of lytic therapy due to hematoma 12/17/23    Follow-ups Needed After Discharge   Follow-up Appointments     Follow-up and recommended labs and tests       Follow up with primary care provider, Mercedes Adorno, within 7   days for hospital follow- up.  The following labs/tests are recommended:   CBC, BMP.      Follow-up with warfarin/Coumadin clinic who will guide you on which dose   of warfarin to take.  You will need an INR check on 12/21.    Follow-up with cardiology within 2-4 weeks of discharge for your atrial   fibrillation.    Follow-up with vascular surgery as an outpatient with regards to getting   your bypass done.        Dose warfarin based on INR.    Unresulted Labs Ordered in the Past 30 Days of this Admission       No orders found from 11/15/2023 to 12/16/2023.        These results will be followed up by NA    Discharge Disposition   Discharged to home  Condition at discharge: Stable    Hospital Course   73-year-old male with a past medical history of CAD, HTN, HLD, PAD, BPH.  He presents to the PCP with 5 days of left knee pain with ambulation found to have acute on chronic left lower extremity limb ischemia secondary to left popliteal artery aneurysm, subsequent sent to ER.  Admitted on 12/15 and was taken for immediate LLE angiogram with lytic therapy by IR and was placed on lytic therapy but developed a right groin hematoma and therefore this was held.  RLE ultrasound done on 12/18 did not reveal any evidence of pseudoaneurysm or hematoma in the right groin.    Telemetry showed possible atrial fibrillation vs sinus  tachycardia with PACs; seen by cardiology and started on Amiodarone.    Initial plans for LLE femoral to posterior tibial to tibioperoneal trunk bypass inpatient but then vascular surgery did not perform the surgery and recommended discharge home with outpatient follow up for bypass. Patient was discharged on Lovenox to warfarin bridge.  Also placed on amiodarone for possible atrial fibrillation with cardiology follow-up.      #Acute on chronic left lower extremity ischemia  #S/p LLE angiogram with lytic therapy on 12/16/23  # Right groin hematoma s/p discontinuation of lytic therapy due to hematoma 12/17/23  Was seen in the clinic, with lower extremity ultrasound showing complete segmental occlusion of left popliteal artery with decreased flow in the posterior tibial and peroneal arteries. CT obtained here in the ED shows thrombotic occlusion involving left popliteal artery, tibial peroneal trunk and proximal aspect of proximal anterior tibial, posterior tibial and peroneal arteries.  -S/p LLE angiogram with lytic therapy on 12/16/23, then with Right groin hematoma s/p discontinuation of lytic therapy due to hematoma 12/17/23  -Plan for vascular surgery follow-up as outpatient for bypass  -Lovenox to warfarin bridge    #Possible Paroxysmal Afib vs Sinus Tachycardia with PACs  [id number].  -Amiodarone 400 mg BID x7 days then 200 mg Daily and follow up in cardiology clinic to determine taper  -AC indefinitely; Lovenox to Warfarin bridge as outpatient  -Aspirin indefinitely    #Infrarenal abdominal aortic aneurysm   #Bilateral common ileac artery aneurysm   -Plan for outpatient surveillance with vascular surgery    #Coronary artery disease-status post CABG 2015  #Hypertension   Graft remains patent. Also has a strong coronary artery disease history in the family.  -Currently on aspirin, metoprolol 50 mg twice daily    #Hyperlipidemia  -Patient was on rosuvastatin 5 mg in the outpatient setting; patient  discontinued this medication due to reported side effect of muscle aches  -Rosuvastatin at bedtime resumed, monitor for side effects as outpatient    #Enlarged prostate  -Continue Proscar and Flomax    Consultations This Hospital Stay   PHARMACY IP CONSULT  VASCULAR SURGERY IP CONSULT  PHARMACY IP CONSULT  CARDIOLOGY IP CONSULT  PHARMACY IP CONSULT  PHARMACY TEST CLAIM IP CONSULT  CARE MANAGEMENT / SOCIAL WORK IP CONSULT  INTERVENTIONAL RADIOLOGY ADULT/PEDS IP CONSULT  CARDIOLOGY IP CONSULT  PHARMACY TEST CLAIM IP CONSULT  PHARMACY TO DOSE WARFARIN    Code Status   Full Code    Time Spent on this Encounter   I, Chauncey Mayo MD, personally saw the patient today and spent greater than 30 minutes discharging this patient.       Chauncey Mayo MD  [address] [address]  Phone: [phone]  Fax: [phone]  ______________________________________________________________________    Physical Exam   Vital Signs: Temp: 98.2  F (36.8  C) Temp src: Oral BP: (!) 159/79 Pulse: 114   Resp: (!) 33 SpO2: 93 % O2 Device: None (Room air)    Weight: 153 lbs 9.6 oz    General: Lying in bed, no significant distress  CV: +S1/S2, no pitting edema  Respiratory: clear to auscultation bilaterally  GI: soft, nontender, nondistended  Skin: Significant ecchymosis of right groin including scrotum  Neuro: alert       Primary Care Physician   Mercedes Adorno    Discharge Orders      Anticoagulation Clinic Referral      Reason for your hospital stay    You presented with left leg ischemia (decreased blood flow) due to a blockage) thrombosis) of a left popliteal artery aneurysm.  He received medications to break up the clot with improved flow thereafter but he developed a right groin blood collection (hematoma).  The vascular surgery team recommended bypass in your left leg but ultimately this will need to be done as an outpatient.  They recommend follow-up with them as  an outpatient.  In the interim, you will need to be on a blood thinner, a shot called Lovenox/enoxaparin which you will need to use twice a day while you take a pill called warfarin.  The warfarin clinic/provider should let she know how much to take.    You may also have developed something called atrial fibrillation although it is difficult to say on telemetry.  You were put on a medication called amiodarone and also the warfarin to try to prevent blood clots from forming due to the atrial fibrillation.  You will need to follow-up with cardiology as an outpatient.     Follow-up and recommended labs and tests     Follow up with primary care provider, Mercedes Adorno, within 7 days for hospital follow- up.  The following labs/tests are recommended: CBC, BMP.      Follow-up with warfarin/Coumadin clinic who will guide you on which dose of warfarin to take.  You will need an INR check on 12/21.    Follow-up with cardiology within 2-4 weeks of discharge for your atrial fibrillation.    Follow-up with vascular surgery as an outpatient with regards to getting your bypass done.     Activity    Your activity upon discharge: activity as tolerated     Diet    Follow this diet upon discharge: Low Saturated Fat Na <2400mg Diet, Low Saturated Fat Diet       Significant Results and Procedures   Most Recent 3 CBC's:  Recent Labs   Lab Test 12/20/23  0450 12/19/23  0413 12/18/23  0600   WBC 10.9 11.1* 10.6   HGB 10.1* 10.2* 11.8*   MCV 92 91 90    182 166     Most Recent 3 BMP's:  Recent Labs   Lab Test 12/20/23  0450 12/19/23  0413 12/18/23  0600    136 139   POTASSIUM 4.1 4.0 4.1   CHLORIDE 105 104 105   CO2 24 25 27   BUN 20.6 25.9* 31.5*   CR 0.90 0.99 1.10   ANIONGAP 8 7 7   BIGG 8.8 9.1 9.1   * 106* 103*     Most Recent 2 LFT's:  Recent Labs   Lab Test 12/18/23  0600 12/17/23  1626   AST 12 17   ALT 5 6   ALKPHOS 57 63   BILITOTAL 0.4 0.4     Most Recent 3 INR's:  Recent Labs   Lab Test 12/20/23  1319  12/17/23  1428 12/16/23  1154   INR 1.09 1.27* 1.12     Most Recent INR's and Anticoagulation Dosing History:  Anticoagulation Dose History          Latest Ref Rng & Units 12/15/2023 12/16/2023 12/17/2023 12/20/2023   Recent Dosing and Labs   INR 0.85 - 1.15 1.02  1.12  1.27  1.09    ,   Results for orders placed or performed during the hospital encounter of 12/15/23   CTA Chest Abdomen Pelvis Runoff w Contrast    Narrative    EXAM: CTA CHEST ABDOMEN PELVIS RUNOFF W CONTRAST  LOCATION: [facility]  DATE: 12/15/2023      TECHNIQUE: Helical acquisition through the chest, abdomen, pelvis, and bilateral lower extremities was performed during the arterial phase of contrast enhancement. Second phase arterial imaging was performed through the bilateral lower extremities. 2D   and 3D reconstructions performed by the CT technologist. Dose reduction techniques were used.  CONTRAST: 100 mL Isovue 370.    FINDINGS:   AORTA: Moderate atheromatous disease of the thoracoabdominal aorta and branch vessels. No evidence of dissection. Multifocal ectasia and aneurysmal dilatation of the infrarenal abdominal aorta, measuring up to 3.0 cm.    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    Slight interval increase in size of a partially thrombosed aneurysm at the distal left common iliac artery bifurcation, measuring up to 3.5 cm, previously 3.3 cm.    Classic hepatic arterial anatomy. Dual right renal arteries. Single left renal artery.    RIGHT LEG: Moderate atheromatous disease. Widely patent oral and popliteal arteries. Tibioperoneal trunk, peroneal, and posterior tibial arteries are widely patent.    Anterior tibial artery becomes occluded within its proximal to midportion, likely a chronic finding.    Patent plantar artery within the foot. Dorsalis pedis artery is not opacified.    LEFT LEG: Moderate atheromatous disease. Widely  patent left femoral artery. Complete occlusion of the left popliteal artery, which demonstrates diffuse aneurysmal dilatation, measuring up to 2.1 cm in diameter. Conclusion also extends distally into the   tibioperoneal trunk, as well as the proximal posterior tibial, peroneal, anterior tibial arteries. There is reconstitution of the proximal posterior tibial and peroneal arteries via collateral flow.     Anterior tibial artery becomes occluded within its midportion, likely a chronic finding.    Patent plantar artery within the foot. Dorsalis pedis arteries only briefly opacified at the level of the ankle.      NONVASCULAR FINDINGS:  MEDIASTINUM: Mild cardiomegaly. Median sternotomy and coronary artery bypass grafting. Severe coronary artery atherosclerosis.     LUNGS/PLEURA: Mild centrilobular emphysema. Previous right middle lobectomy.    ABDOMEN: Diffuse hepatic steatosis. Unchanged bilateral adrenal adenomas. Simple right renal cysts; no further follow-up recommended. Tiny nonobstructing right renal stone.    PELVIS: Moderate to severe prostatomegaly. Bilateral vasectomy clips.    MUSCULOSKELETAL: Severe bilateral first metatarsophalangeal joint osteoarthritis. Right superficial infrapatellar bursitis.      Impression    IMPRESSION:  1.  Long segment thrombotic occlusion involving the left popliteal artery, tibioperoneal trunk, and the proximal aspect of the proximal anterior tibial, posterior tibial, peroneal arteries. The proximal left posterior tibial and peroneal arteries are   reconstituted via collateral flow, remaining patent to the level of the ankle. The anterior tibial artery is also reconstituted via collateral flow, though becomes occluded at its mid aspect, possibly chronic. Findings discussed verbally via telephone   with Dr. Zaldivar at 10:29 PM on 12/15/2023.  2.  Occlusion of the proximal to mid right anterior tibial artery, likely chronic. Associated nonopacification of the right dorsalis pedis  artery within the foot. Remainder of the right lower extremity arterial vasculature is widely patent.  3.  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 Additional aneurysms of the right common iliac artery and the infrarenal abdominal aorta appear unchanged.  4.  Small nonobstructing right renal stone.  5.  Previous right middle lobectomy.  6.  Moderate to severe prostatomegaly.  7.  Hepatic steatosis.  8.  Unchanged small bilateral adrenal adenomas.   US Lower Extremity Arterial Duplex Bilateral    Narrative    EXAM: US LOWER EXTREMITY ARTERIAL DUPLEX BILATERAL  LOCATION: [facility]  DATE: 12/16/2023    INDICATION: Peripheral vascular disease. Thrombosed left popliteal artery, currently on catheter directed thrombolytic therapy.  COMPARISON: CTA 12/15/2023. Angiogram earlier today.  TECHNIQUE: Duplex utilizing 2D gray-scale imaging, Doppler interrogation with color-flow and spectral waveform analysis.    FINDINGS:    RIGHT LOWER EXTREMITY ARTERIAL ASSESSMENT:    The external iliac, common femoral, and profunda femoral artery are not visualized on the right due to bandaging in the groin.    The SFA is patent throughout the thigh. Waveforms are biphasic. The popliteal artery is patent, with similar biphasic waveforms. Distal posterior tibial artery, anterior tibial artery and dorsalis pedis are also patent with brisk biphasic flow.    LOWER EXTREMITY ARTERIAL DUPLEX EXAM WITH WAVEFORMS  RIGHT (cm/s)  EIA: NV  CFA: NV  PFA: NV  SFA-Proximal: 54 B  SFA-Mid: 93 B  SFA-Distal: 26 B  Popliteal Proximal: 22 B  Popliteal Distal: 46 B  PTA: 80 B  DMITRI: 89 B  DPA: 43 B/T  (M=monophasic, B=biphasic, T=triphasic)    LEFT LOWER EXTREMITY ARTERIAL ASSESSMENT:    The external iliac and common femoral artery are patent, with brisk biphasic waveforms. Similar brisk biphasic flow is seen throughout the SFA continuing into the proximal popliteal. There is  fusiform dilation of the distal SFA and popliteal, with a more   focal distal SFA aneurysm measuring up to 16 mm. There is thrombus within the popliteal artery, with minimal peripheral flow, velocity quite slow distally at 22 cm/s. Below the knee, there is slow monophasic flow in the distal posterior tibial artery   with no detectable flow in the distal anterior tibial or dorsalis pedis.    Indwelling thrombotic catheter is noted.    LEFT (cm/s)  EIA: 57 T  CFA: 64 T  PFA: 41 T  SFA-Proximal: 46 T  SFA-Mid: 53 T  SFA-Distal: 144 T  Popliteal: 153 T  Popliteal Proximal: 79 T  Popliteal Distal: 22  PTA: 13 M  DMITRI: 0  DPA: 0  (M=monophasic, B=biphasic, T=triphasic)    ABIs were not performed.      Impression    IMPRESSION:  1.  Right lower extremity: The external iliac, common femoral, and profunda femoral artery were not visualized due to overlying bandaging, however, waveforms and velocities inferiorly throughout the SFA, popliteal, and tibial vessels suggest no   significant stenosis. No evidence of acute vessel occlusion.    2.  Left lower extremity: Indwelling thrombolytic catheter. Brisk biphasic flow from the external iliac through the proximal popliteal. Some peripheral flow is now noted within the thrombosed popliteal artery, though velocity is quite slow distally at 22   cm/s. Relative to CTA yesterday showing complete occlusion, this represents some early response to thrombolytic therapy, though significant clot burden persists. Below the knee, there is slow monophasic flow in the posterior tibial with no detectable   anterior tibial or dorsal pedal flow.    3.  Fusiform distal SFA and popliteal dilation noted on the left with a small focal distal SFA aneurysm measuring up to 16 mm.     IR Lower Extremity Angiogram Left    [facility] RADIOLOGY  LOCATION: [facility]  DATE: 12/16/2023    PROCEDURE: ABDOMINAL AORTIC, BILATERAL PELVIC AND LEFT LOWER EXTREMITY DIAGNOSTIC  ARTERIOGRAPHY.  THIRD ORDER CATHETERIZATION  INITIATION OF THROMBOLYTIC THERAPY  ULTRASOUND-GUIDED VASCULAR ACCESS  MODERATE SEDATION    INTERVENTIONAL RADIOLOGIST: Aris Larkin MD    INDICATION: PAD. Thrombosed left popliteal artery aneurysm.    CONSENT: The risks, benefits and alternatives of planned procedure were discussed with the patient  in detail. All questions were answered. Informed consent was given to proceed with the procedure.    MODERATE SEDATION: Versed 2.5 mg IV; Fentanyl 100 mcg IV. During the time out, immediately prior to the administration of medications, the patient was reassessed for adequacy to receive conscious sedation.  Under physician supervision, Versed and   fentanyl were administered for moderate sedation. Pulse oximetry, heart rate and blood pressure were continuously monitored by an independent trained observer. The physician spent 40 minutes of face-to-face sedation time with the patient.    CONTRAST: 160 mL intra-arterially.  ANTIBIOTICS: None.  ADDITIONAL MEDICATIONS: None.    FLUOROSCOPIC TIME: 14.5 minutes.  RADIATION DOSE: Air Kerma: 435 mGy.    COMPLICATIONS: No immediate complications.    STERILE BARRIER TECHNIQUE: Maximum sterile barrier technique was used. Cutaneous antisepsis was performed at the operative site with application of 2% chlorhexidine and large sterile drape. Prior to the procedure, the  and assistant performed   hand hygiene and wore hat, mask, sterile gown, and sterile gloves during the entire procedure.    PROCEDURE:    After obtaining informed written and oral consent local anesthesia was infiltrated into the soft tissues of the right groin. Prior to the procedure patency of the right common femoral artery was assessed with ultrasound and sonographic images recorded.   Using real-time ultrasound guidance, access  Access was achieved into the right common femoral artery, and conversion was made for a 5 Azerbaijani vascular sheath, which was attached  to a continuous heparinized saline infusion. A flush catheter was   manipulated over a wire into the upper abdomen at the level of the renal arteries and digital subtraction abdominal arteriography was obtained. The catheter was repositioned above the aortic bifurcation, and bilateral oblique digital subtraction pelvic   arteriography was performed. Second-order catheterization of the left external iliac artery. From this location, digital subtraction left lower extremity arteriography was obtained. The findings were discussed with Dr. Tapia was elected to proceed with   left lower extremity thrombolytic therapy.    The existing 5 Italian sheath was exchanged for a 6 Italian Balkan sheath. A hematoma was noted immediately following placement of the 6 Italian sheath. The 6 Italian sheath was upsized to 7 Italian and the bleeding seen to subside. Using a Glidewire   advantage wire and a 5 Italian catheter, the thrombosed left popliteal artery aneurysm was catheterized. Catheter exchanged for a 1 35 cm x 10 cm infusion catheter which was positioned within the left popliteal artery aneurysm. The catheter and sheath   were secured in place.     ABDOMEN FINDINGS:  2 renal arteries supply the right kidney. Single renal artery supplies the left kidney. Moderate atherosclerotic disease infrarenal abdominal aorta with associated small aortic aneurysm. Atherosclerotic disease common iliac arteries bilaterally.   Bilateral distal common iliac artery aneurysms. The internal and external iliac arteries are patent bilaterally.      LEFT LOWER EXTREMITY FINDINGS:   Left common femoral, left profunda femoral and left superficial femoral arteries are patent. Diffuse atherosclerotic disease of the left superficial femoral artery. Occlusion of the left popliteal artery at the level of the adductor canal.   Reconstitution of the proximal peroneal and posterior tibial arteries. Occlusion of the left anterior tibial artery. Short segment  occlusion of the proximal left posterior tibial artery. Two-vessel runoff into the left foot via the peroneal and posterior   tibial arteries.      Impression    IMPRESSION:    1.  Small infrarenal abdominal aortic aneurysm.  2.  Bilateral common iliac artery aneurysms.  3.  Thrombosed left popliteal artery aneurysm with reconstitution of the left peroneal and posterior tibial arteries.  4.  Initiation of thrombolytic therapy.    PLAN: The patient will receive intra-arterial thrombolytics overnight and be reassessed with angiography in the am.    CPT:  96210  [id number]  [id number]  [id number]  [id number] x     The CPT codes are for physician reference only.   IR Thrombolysis Arterial Infusion Initial Day    Narrative    [facility] RADIOLOGY  LOCATION: [facility]  DATE: 12/17/2023    PROCEDURE: Left leg thrombolytic check with catheter removal  CLOSURE DEVICE.  MODERATE SEDATION    INTERVENTIONAL RADIOLOGIST: Aris Larkin MD    INDICATION: Expanding painful right groin hematoma. 13 hour thrombolytic therapy. Left leg warm with dopplerable posterior tibial pulse..    CONSENT: The risks, benefits and alternatives of planned procedure were discussed with the patient  in detail. All questions were answered. Informed consent was given to proceed with the procedure.    MODERATE SEDATION: Versed 2.5 mg IV; Fentanyl 100 mcg IV. During the time out, immediately prior to the administration of medications, the patient was reassessed for adequacy to receive conscious sedation.  Under physician supervision, Versed and   fentanyl were administered for moderate sedation. Pulse oximetry, heart rate and blood pressure were continuously monitored by an independent trained observer. The physician spent 55 minutes of face-to-face sedation time with the patient.    CONTRAST: 40 mL intra-arterially.  ANTIBIOTICS: None.  ADDITIONAL MEDICATIONS: None.    FLUOROSCOPIC TIME: 1.2 minutes.  RADIATION DOSE: Air Kerma: 37  mGy.    COMPLICATIONS: No immediate complications.    STERILE BARRIER TECHNIQUE: Maximum sterile barrier technique was used. Cutaneous antisepsis was performed at the operative site with application of 2% chlorhexidine and large sterile drape. Prior to the procedure, the  and assistant performed   hand hygiene and wore hat, mask, sterile gown, and sterile gloves during the entire procedure.    PROCEDURE:    Contrast was injected through the existing left thrombolytic catheter. Images were obtained of the left lower extremity. Contrast was injected through the right groin sheath and additional images were obtained of the left lower extremity. The catheter   and sheath was removed and hemostasis was obtained with placement of 8 Luxembourgish Angio-Seal.      LEFT LOWER EXTREMITY FINDINGS:   Left common femoral, left profunda femoral and left superficial femoral arteries are patent. Flow through the thrombosed left popliteal artery has been reestablished. There is some residual nonocclusive thrombus however flow is significantly improved in   comparison to previous study. The tibial peroneal trunk is patent with residual nonocclusive thrombus. There is two-vessel runoff via the posterior tibial and peroneal artery. Proximal occlusion of the left anterior tibial artery. Reconstitution of   small caliber left dorsalis pedis artery at the level the ankle.      Impression    IMPRESSION:    1.  Patient is not tolerating further thrombolytic therapy due to expanding painful right groin hematoma.  2.  Reestablishment of straight line flow through the left popliteal artery aneurysm. Residual nonocclusive thrombus is noted.  3.  Patent left tibial peroneal trunk with residual nonocclusive thrombus.  4.  Two-vessel runoff via the posterior tibial and peroneal arteries. Reconstitution of small caliber left dorsalis pedis artery at the ankle.       US Lower Extremity Venous Mapping Bilateral    Narrative    EXAM: US LOWER  EXTREMITY VENOUS MAPPING BILATERAL  LOCATION: [facility]  DATE: 12/17/2023    INDICATION: PAD. Evaluate for bypass conduit  COMPARISON: None.  TECHNIQUE: Ultrasound examination of the lower extremity veins was performed, including gray-scale and compression imaging.     LOWER  EXTREMITY FINDINGS:       VENOUS DIAMETERS  RIGHT GREAT SAPHENOUS VEIN  Upper Thigh: NV   Mid Thigh: NV mm  Lower Thigh: 3 mm  Knee: 6 mm  Upper Calf: 5 mm  Mid Calf: 9 mm  Lower Calf: 6 mm  Ankle: 8 mm    LEFT GREAT SAPHENOUS VEIN  Upper Thigh: 3.8 mm  Mid Thigh: 1.6 mm  Lower Thigh: 1.1 mm  Knee: 1.1 mm  Upper Calf: 1.1 mm  Mid Calf: 1.1 mm  Lower Calf: 2.0 mm  Ankle: 1.4 mm    RIGHT SMALL SAPHENOUS VEIN  Upper Calf: 6.6 mm  Mid Calf: 1.9 mm  Lower Calf: 1.6 mm  Ankle: 1.0 mm    LEFT SMALL SAPHENOUS VEIN  Upper Calf: 11.4 mm  Mid Calf: 2.0 mm  Lower Calf: 2.0 mm  Ankle: NV       Impression    IMPRESSION:  1. Bilateral lower extremity venous mapping.   Right great saphenous vein in the upper thigh appears to have been harvested.   Us Post Vascular Access Low Ext Duplex    Addendum: 12/17/2023    ADDENDUM: Upon further review, the hypoechoic structure described in the original report could represent the Angio-Seal device. Apparent flow within this hypoechoic structure is favored to represent artifact from the adjacent vessel. This does not   require intervention at this point. Follow-up imaging could be performed if there is continued clinical concern.      Narrative    EXAM: LOWER EXTREMITY ARTERIAL DUPLEX  LOCATION: [facility]  DATE: 12/17/2023    INDICATION: right groin hematoma with current oozing post tkn and heparin 12 16 2023; sutured and angiosealed last night 12 17 2023  COMPARISON: 12/16/2023 at 0052 hours     TECHNIQUE: Gray-scale imaging, spectral wave analysis, and color-flow imaging was performed of the right groin.    FINDINGS:   DUPLEX:   There is an approximately 1.5 x 1.1 cm  hypoechoic structure arising from the right common femoral artery with an opening to the artery measuring approximately 2 mm. There is pulsatile color flow along the periphery of this structure. Spectral wave   analysis reveals pulsatile flow with a maximum velocity of approximately 20 cm/s.    The right common femoral artery exhibits maximum flow velocity of 80 cm/s. More distally flow in the right superficial femoral artery is up to 40 cm/s. The right common femoral vein is patent.        Impression    IMPRESSION:  1.  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    Us Post Vascular Access Low Ext Duplex    Narrative    [facility]  LOCATION: [facility]  DATE: 2023  LOWER EXTREMITY ARTERIAL DUPLEX     INDICATION: Pain. Reassessment of potential previously noted pseudoaneurysm of right common femoral artery  TECHNIQUE: Gray-scale imaging, spectral wave analysis, and color-flow imaging was performed of the right groin.  COMPARISON: 2023    FINDINGS:   DUPLEX:   Normal arterial and venous waveforms. No evidence of pseudoaneurysm or AV fistula. No evidence of significant hematoma.      Impression    IMPRESSION:  1.  NO EVIDENCE OF PSEUDOANEURYSM OR HEMATOMA.   Echocardiogram Complete     Value    LVEF  55-60%    Narrative    [id number]  [id number]  [id number]  [id number] [address]     Name: JESSA LEW  MRN: [id number]  : 1950  Study Date: 2023 07:01 AM  Age: 73 yrs  Gender: Male  Patient Location: [facility]  Reason For Study: Atrial Fibrillation  Ordering Physician: MARY PRAKASH  Performed By: ACE     BSA: 1.8 m2  Height: 66 in  Weight: 153 lb  HR: 77  BP: 119/70 mmHg  ______________________________________________________________________________  Procedure  Complete Portable Echo Adult. Definity (NDC #60534-693) given  intravenously.  ______________________________________________________________________________  Interpretation Summary     Left ventricular size, wall motion and function are normal. The ejection  fraction is 55-60%.  The left atrium is mildly dilated.  There is mild (1+) aortic regurgitation.  ______________________________________________________________________________  Left Ventricle  Left ventricular size, wall motion and function are normal. The ejection  fraction is 55-60%. There is mild concentric left ventricular hypertrophy.  Left ventricular diastolic function is indeterminate. No regional wall motion  abnormalities noted.     Right Ventricle  The right ventricle is not well visualized. The right ventricle is borderline  dilated. The right ventricular systolic function is normal.     Atria  The left atrium is mildly dilated. Right atrial size is normal. There is no  color Doppler evidence of an atrial shunt.     Mitral Valve  Mitral valve leaflets appear normal. There is no evidence of mitral stenosis  or clinically significant mitral regurgitation. There is mild (1+) mitral  regurgitation.     Tricuspid Valve  Tricuspid valve leaflets appear normal. There is no evidence of tricuspid  stenosis or clinically significant tricuspid regurgitation. There is mild (1+)  tricuspid regurgitation. Right ventricular systolic pressure could not be  approximated due to inadequate tricuspid regurgitation.     Aortic Valve  Aortic valve leaflets appear normal. There is no evidence of aortic stenosis  or clinically significant aortic regurgitation. There is mild (1+) aortic  regurgitation.     Pulmonic Valve  The pulmonic valve is not well seen, but is grossly normal. This degree of  valvular regurgitation is within normal limits.     Vessels  Ascending Aorta dilatation is present. IVC diameter <2.1 cm collapsing >50%  with sniff suggests a normal RA pressure of 3 mmHg.     Pericardium  There is no pericardial  effusion.     ______________________________________________________________________________  MMode/2D Measurements & Calculations  IVSd: 1.3 cm  LVIDd: 4.5 cm  LVIDs: 3.3 cm  LVPWd: 1.1 cm  FS: 26.2 %     LV mass(C)d: 187.9 grams  LV mass(C)dI: 105.3 grams/m2  Ao root diam: 3.6 cm  LA dimension: 4.5 cm  asc Aorta Diam: 3.9 cm  LA/Ao: 1.3  LVOT diam: 2.4 cm  LVOT area: 4.5 cm2  Ao root diam index Ht(cm/m): 2.1  Ao root diam index BSA (cm/m2): 2.0  Asc Ao diam index BSA (cm/m2): 2.2  Asc Ao diam index Ht(cm/m): 2.3  LA Volume (BP): 42.6 ml  LA Volume Index (BP): 23.9 ml/m2     LA Volume Indexed (AL/bp): 26.1 ml/m2  RV Base: 3.9 cm  RWT: 0.48     Time Measurements  MM HR: 73.0 BPM     Doppler Measurements & Calculations  MV E max kavon: 45.6 cm/sec  MV A max kavon: 59.7 cm/sec  MV E/A: 0.76     MV dec slope: 284.0 cm/sec2  MV dec time: 0.16 sec  Ao V2 max: 132.0 cm/sec  Ao max P.0 mmHg  Ao V2 mean: 91.1 cm/sec  Ao mean P.0 mmHg  Ao V2 VTI: 25.2 cm  TOMÁS(I,D): 3.5 cm2  TOMÁS(V,D): 3.8 cm2  LV V1 max P.0 mmHg  LV V1 max: 112.0 cm/sec  LV V1 VTI: 19.5 cm  SV(LVOT): 88.2 ml  SI(LVOT): 49.4 ml/m2  PA acc time: 0.11 sec  AV Kavon Ratio (DI): 0.85  TOMÁS Index (cm2/m2): 2.0  E/E': 5.1  E/E' av.5  Lateral E/e': 5.1  Medial E/e': 12.0  Peak E' Kavon: 8.9 cm/sec  RV S Kavon: 5.7 cm/sec     ______________________________________________________________________________  Report approved by: Arnulfo Vo 2023 08:36 AM             Discharge Medications   Current Discharge Medication List        START taking these medications    Details   amiodarone (PACERONE) 200 MG tablet Take 2 tablets (400 mg) by mouth 2 times daily for 7 days, THEN 1 tablet (200 mg) daily for 30 days.  Qty: 58 tablet, Refills: 1    Associated Diagnoses: Paroxysmal atrial fibrillation (H)      enoxaparin ANTICOAGULANT (LOVENOX) 80 MG/0.8ML syringe Inject 0.7 mLs (70 mg) Subcutaneous every 12 hours for 7 days  Qty: 11.2 mL, Refills: 0    Associated  Diagnoses: Arterial occlusion      rosuvastatin (CRESTOR) 5 MG tablet Take 1 tablet (5 mg) by mouth at bedtime  Qty: 30 tablet, Refills: 1    Associated Diagnoses: Arterial occlusion      warfarin ANTICOAGULANT (COUMADIN) 1 MG tablet Take 3 tablets (3 mg) by mouth daily Take at 6 PM. Adjust dose as directed by Warfarin clinic  Qty: 60 tablet, Refills: 0    Associated Diagnoses: Arterial occlusion           CONTINUE these medications which have NOT CHANGED    Details   acetaminophen (TYLENOL) 500 MG tablet [ACETAMINOPHEN (TYLENOL) 500 MG TABLET] Take 500 mg by mouth every 6 (six) hours as needed for pain.      amLODIPine (NORVASC) 10 MG tablet TAKE 1 TABLET(10 MG) BY MOUTH DAILY  Qty: 90 tablet, Refills: 2    Associated Diagnoses: Essential hypertension      aspirin 81 mg chewable tablet [ASPIRIN 81 MG CHEWABLE TABLET] Chew 81 mg daily.      finasteride (PROSCAR) 5 MG tablet Take 1 tablet (5 mg) by mouth daily  Qty: 90 tablet, Refills: 3    Associated Diagnoses: Gross hematuria; Benign prostatic hyperplasia without lower urinary tract symptoms      metoprolol tartrate (LOPRESSOR) 50 MG tablet TAKE 1 TABLET(50 MG) BY MOUTH TWICE DAILY  Qty: 180 tablet, Refills: 1    Associated Diagnoses: Benign essential hypertension      tamsulosin (FLOMAX) 0.4 MG capsule TAKE 1 CAPSULE BY MOUTH DAILY AFTER BREAKFAST  Qty: 90 capsule, Refills: 3    Associated Diagnoses: Primary hypertension      VITAMIN D3 25 MCG (1000 UT) tablet Take 1 tablet by mouth daily           Allergies   Allergies   Allergen Reactions    Niacin Hives and Rash     Burning of the skin    Atorvastatin Muscle Pain (Myalgia)    Pravastatin Muscle Pain (Myalgia)

## 2023-12-20 NOTE — PROGRESS NOTES
Vascular surgery note    Bypass surgery cancelled yesterday due to emergency at another hospital. Patient graciously understands and we will schedule for elective bypass in January.    This morning he is resting comfortably. Motor and sensory function intact with palpable PT pulse.    Recommend transitioning to warfarin with lovenox bridge and continue aspirin.  We discussed the need to present emergently to ED or call 911 if he develops signs or symptoms of acute limb ischemia.  Okay to discharge from vascular surgery perspective.    Discussed with staff, Dr. Crum.    Denzel Boyd MD

## 2023-12-20 NOTE — PLAN OF CARE
[facility] - ICU    RN Progress Note:            Pertinent Assessments:      Please refer to flowsheet rows for full assessment     NA           Key Events - This Shift:       VSS. Pt slept throughout most of night. Pain managed with tylenol.                    Barriers to Discharge / Downgrade:     Need to schedule procedure.

## 2023-12-20 NOTE — PROGRESS NOTES
Care Management Discharge Note    Discharge Date: 12/20/2023       Discharge Disposition: Home    Discharge Services:      Discharge DME:      Discharge Transportation:      Private pay costs discussed: Not applicable    Does the patient's insurance plan have a 3 day qualifying hospital stay waiver?  Yes     Which insurance plan 3 day waiver is available? [employer]    Will the waiver be used for post-acute placement? No    PAS Confirmation Code:    Patient/family educated on Medicare website which has current facility and service quality ratings:      Education Provided on the Discharge Plan:    Persons Notified of Discharge Plans: patient   Patient/Family in Agreement with the Plan:      Handoff Referral Completed: no- did not trigger     Additional Information:  Home today, no care management needs identified     Aline Kuhn RN

## 2023-12-21 ENCOUNTER — HOSPITAL ENCOUNTER (INPATIENT)
Facility: HOSPITAL | Age: 73
Setting detail: SURGERY ADMIT
End: 2023-12-21
Attending: SURGERY | Admitting: SURGERY
Payer: MEDICARE

## 2023-12-21 ENCOUNTER — TELEPHONE (OUTPATIENT)
Dept: FAMILY MEDICINE | Facility: CLINIC | Age: 73
End: 2023-12-21

## 2023-12-21 ENCOUNTER — DOCUMENTATION ONLY (OUTPATIENT)
Dept: VASCULAR SURGERY | Facility: CLINIC | Age: 73
End: 2023-12-21

## 2023-12-21 ENCOUNTER — LAB (OUTPATIENT)
Dept: LAB | Facility: CLINIC | Age: 73
End: 2023-12-21
Payer: MEDICARE

## 2023-12-21 ENCOUNTER — ANTICOAGULATION THERAPY VISIT (OUTPATIENT)
Dept: ANTICOAGULATION | Facility: CLINIC | Age: 73
End: 2023-12-21

## 2023-12-21 DIAGNOSIS — I70.90 ARTERIAL OCCLUSION: Primary | ICD-10-CM

## 2023-12-21 DIAGNOSIS — Z79.01 WARFARIN ANTICOAGULATION: ICD-10-CM

## 2023-12-21 DIAGNOSIS — R09.89 OTHER SPECIFIED SYMPTOMS AND SIGNS INVOLVING THE CIRCULATORY AND RESPIRATORY SYSTEMS: ICD-10-CM

## 2023-12-21 DIAGNOSIS — I70.90 ARTERIAL OCCLUSION: ICD-10-CM

## 2023-12-21 DIAGNOSIS — Z79.01 WARFARIN ANTICOAGULATION: Primary | ICD-10-CM

## 2023-12-21 DIAGNOSIS — I25.10 CORONARY ARTERY DISEASE DUE TO LIPID RICH PLAQUE: Primary | ICD-10-CM

## 2023-12-21 DIAGNOSIS — Z95.1 S/P CABG X 3: ICD-10-CM

## 2023-12-21 DIAGNOSIS — I25.83 CORONARY ARTERY DISEASE DUE TO LIPID RICH PLAQUE: Primary | ICD-10-CM

## 2023-12-21 DIAGNOSIS — I72.3 COMMON ILIAC ANEURYSM (H): ICD-10-CM

## 2023-12-21 DIAGNOSIS — M79.662 PAIN OF LEFT LOWER LEG: Primary | ICD-10-CM

## 2023-12-21 LAB — INR PPP: 1.17 (ref 0.85–1.15)

## 2023-12-21 PROCEDURE — 85610 PROTHROMBIN TIME: CPT

## 2023-12-21 PROCEDURE — 36415 COLL VENOUS BLD VENIPUNCTURE: CPT

## 2023-12-21 NOTE — TELEPHONE ENCOUNTER
Left message with Jack that I will call him again aroung 5pm pls transfer if calling back  Felisa

## 2023-12-21 NOTE — PROGRESS NOTES
"ANTICOAGULATION  MANAGEMENT    ASSESSMENT     Jack Brown 73 year old male is on warfarin with subtherapeutic INR result. (Goal INR {INR Goal:849401})    Recent labs: (last 7 days)     23  1415   INR 1.17*       Indication for Anticoagulation: {Anticoagulation Indication:816439}       RECOMMENDATION     ***    Start  Enoxaparin (Lovenox) *** mg subq Q *** hrs ({Lovenox Dosin}) today and continue until {bridge dc parameter:638392}    Cristy Jaquez, [facility]    SUBJECTIVE/OBJECTIVE     Jack Brown, a 73 year old male    Wt Readings from Last 3 Encounters:   23 69.7 kg (153 lb 9.6 oz)   12/15/23 73.9 kg (163 lb)   10/10/23 73.9 kg (163 lb)      Ideal body weight: 63.8 kg (140 lb 10.5 oz)  Adjusted ideal body weight: 66.1 kg (145 lb 13.3 oz)     Estimated body mass index is 24.79 kg/m  as calculated from the following:    Height as of 23: 1.676 m (5' 6\").    Weight as of 23: 69.7 kg (153 lb 9.6 oz).    Lab Results   Component Value Date    INR 1.17 (H) 2023    INR 1.09 2023    INR 1.27 (H) 2023     Lab Results   Component Value Date    HGB 10.1 2023    HCT 29.9 2023     2023     Lab Results   Component Value Date    CR 0.90 2023    CR 0.99 2023    CR 1.10 2023     Estimated Creatinine Clearance: 69.9 mL/min (based on SCr of 0.9 mg/dL).    "

## 2023-12-21 NOTE — PROGRESS NOTES
Surgery Scheduled    Confirmed surgery date and instructions with pt's spouse, Shayy.  Scheduled a visit with Dr. Crum prior to surgery to discuss procedure as they only saw Dr. Crum during hospitalization.  Writer will send letter with instructions to pt after follow up orders are entered.    Surgery/Procedure: CREATION, BYPASS, ARTERIAL, FEMORAL TO POPLITEAL, USING GRAFT     Special Equipment: C-arm  Length of Surgery: 4n hours   Preoperative physical needed: No  Product Rep needed? Yes  Company?  cryoartery: 1 segment approximately 40 cm    Location: [facility]:  [address] [place] [address] (phone: [phone], Fax: [phone])    Date: 1/30/24    Time: 1:00 PM    Admission Type: Inpatient    Surgeon: Dr. Crum    OR Confirmed & :  Yes with Torri on 12/21/2023    Entered on provider calendar:  Yes    Post Op: U/s orders needed    Wound Vac Needed: No    Home Care Needed: No    Reps Contacted: [employer] Rep emailed 1/3/2024    Blood Thinners Addressed:  pt taking coumadin - routing message to nursing to address

## 2023-12-21 NOTE — PROGRESS NOTES
"ANTICOAGULATION  MANAGEMENT: NEW REFERRAL      SUBJECTIVE/OBJECTIVE     Jack Brown, a 73 year old male  is newly referred to [facility].    Anticoagulation:    Previously on warfarin: No, new to anticoagulation  Warfarin initiation date (approximate): 12/20/23, 3 mg   Indication(s):  lower extremity ischemia  with DVT and Afib   Goal Range: 2.0-3.0   Anticoagulation Bridge/Overlap: Yes, bridging with Enoxaparin until INR >= 2.0   Referring provider: from inpatient provider; ambulatory responsible provider from primary or specialty care needed.  Request sent to Mercedes Adorno MD to oversee management.    General Dietary/Social Hx:    Typical vitamin K intake: high; consistent     Other dietary considerations: None     Social History:  no etoh, tobacco, cannabis    In the past 2 weeks, patient estimates taking medications as instructed % of time: 100    Results:        Recent labs: (last 7 days)     12/20/23  1319   INR 1.09       Wt Readings from Last 2 Encounters:   12/18/23 69.7 kg (153 lb 9.6 oz)   12/15/23 73.9 kg (163 lb)      Estimated body mass index is 24.79 kg/m  as calculated from the following:    Height as of 12/16/23: 1.676 m (5' 6\").    Weight as of 12/18/23: 69.7 kg (153 lb 9.6 oz).  Lab Results   Component Value Date    AST 12 12/18/2023    ALT 5 12/18/2023    ALBUMIN 3.3 (L) 12/18/2023     Lab Results   Component Value Date    CR 0.90 12/20/2023     Estimated Creatinine Clearance: 69.9 mL/min (based on SCr of 0.9 mg/dL).    ASSESSMENT     Goal INR 2-3, standard for indication(s) above  Establishing initial warfarin maintenance dose (on warfarin < 30 days)   Factors that may increase sensitivity to warfarin: none  Factors that may reduce sensitivity to warfarin: Male Gender and High Greens/Vitamin K intake  Potential significant drug interactions with home medications: None noted and Amiodarone new start 200 mg bid started 12/18/23  Starting warfarin dose is appropriate " for patient's anticipated sensitivity to warfarin    PLAN     Dosing Instructions: Continue your current warfarin dose with INR in 5 days continue lovenox twice daily      Summary  As of 12/21/2023      Full warfarin instructions:  3 mg every day   Next INR check:  12/26/2023               Education provided:   Taking warfarin: purpose of warfarin and how it works, take warfarin at same time each day; preferably in the evening, prescribed tablet strength and color, importance of following [facility] instructions vs instructions on the prescription bottle, and Importance of taking warfarin as instructed  Goal range and lab monitoring: goal range and significance of current result, Importance of therapeutic range, Importance of following up at instructed interval, and frequency of lab work when starting warfarin and importance of following up when instructed (extends after stability established)  Dietary considerations: importance of consistent vitamin K intake, impact of vitamin K foods on INR, and vitamin K content of foods  Healthy lifestyle considerations: doesn't use etoh or cannabis  Symptom monitoring: monitoring for bleeding signs and symptoms, when to seek medical attention/emergency care, and if you hit your head or have a bad fall seek emergency care  Importance of notifying anticoagulation clinic for: changes in medications; a sooner lab recheck maybe needed    Education still needed:   Contact [phone] with any changes, questions or concerns.       Telephone call with wife Shayy who verbalizes understanding and agrees to plan and who agrees to plan and repeated back plan correctly    Lab visit scheduled    Standing orders placed in Epic: Point of Care INR (Lab 5000)    Plan made per [facility] anticoagulation protocol    Felisa Carnes RN  Anticoagulation Clinic  12/21/2023

## 2023-12-21 NOTE — TELEPHONE ENCOUNTER
No tele encounter or notes regarding needing xray done.    Closing encounter.    Anjali Arredondo on 12/21/2023 at 11:28 AM

## 2023-12-21 NOTE — TELEPHONE ENCOUNTER
Patient and spouse contacting clinic to request lab orders for INR. Orders and chart review indicate that patient should have INR completed 12/21.      Follow-ups Needed After Discharge  Follow-up Appointments     Follow-up and recommended labs and tests       Follow up with primary care provider, Mercedes Adorno, within 7   days for hospital follow- up.  The following labs/tests are recommended:   CBC, BMP.       Follow-up with warfarin/Coumadin clinic who will guide you on which dose   of warfarin to take.  You will need an INR check on 12/21.     Follow-up with cardiology within 2-4 weeks of discharge for your atrial   fibrillation.     Follow-up with vascular surgery as an outpatient with regards to getting   your bypass done.              Contacted [facility] to confirm order was placed. Spoke with lab staff who confirmed receipt of order for INR arm draw entered by Dr. Adorno.       Per chart review, patient should follow up with anticoagulation for Warfarin dose management.     Anticoagulation Order Details    INR goal: 2.0-3.0   Send INR reminders to: Kami Bowling

## 2023-12-22 ENCOUNTER — TELEPHONE (OUTPATIENT)
Dept: ANTICOAGULATION | Facility: CLINIC | Age: 73
End: 2023-12-22
Payer: MEDICARE

## 2023-12-22 NOTE — CONFIDENTIAL NOTE
Jack's wife Shayy called to say they were short 2 days of the lovenox rx filled at [facility]. I called [facility] and they do have the remainder in stock now. I let Shayy know she could pick it up in about 2 hrs.

## 2023-12-26 ENCOUNTER — ANTICOAGULATION THERAPY VISIT (OUTPATIENT)
Dept: ANTICOAGULATION | Facility: CLINIC | Age: 73
End: 2023-12-26

## 2023-12-26 ENCOUNTER — LAB (OUTPATIENT)
Dept: LAB | Facility: CLINIC | Age: 73
End: 2023-12-26
Payer: MEDICARE

## 2023-12-26 DIAGNOSIS — I70.90 ARTERIAL OCCLUSION: Primary | ICD-10-CM

## 2023-12-26 DIAGNOSIS — I70.90 ARTERIAL OCCLUSION: ICD-10-CM

## 2023-12-26 DIAGNOSIS — C7A.090 ATYPICAL CARCINOID LUNG TUMOR (H): ICD-10-CM

## 2023-12-26 LAB
ALBUMIN SERPL BCG-MCNC: 3.9 G/DL (ref 3.5–5.2)
ALP SERPL-CCNC: 62 U/L (ref 40–150)
ALT SERPL W P-5'-P-CCNC: 33 U/L (ref 0–70)
ANION GAP SERPL CALCULATED.3IONS-SCNC: 9 MMOL/L (ref 7–15)
AST SERPL W P-5'-P-CCNC: 48 U/L (ref 0–45)
BASOPHILS # BLD AUTO: 0.1 10E3/UL (ref 0–0.2)
BASOPHILS NFR BLD AUTO: 1 %
BILIRUB SERPL-MCNC: 0.9 MG/DL
BUN SERPL-MCNC: 22.1 MG/DL (ref 8–23)
CALCIUM SERPL-MCNC: 9.6 MG/DL (ref 8.8–10.2)
CHLORIDE SERPL-SCNC: 106 MMOL/L (ref 98–107)
CREAT SERPL-MCNC: 1.12 MG/DL (ref 0.67–1.17)
DEPRECATED HCO3 PLAS-SCNC: 24 MMOL/L (ref 22–29)
EGFRCR SERPLBLD CKD-EPI 2021: 69 ML/MIN/1.73M2
EOSINOPHIL # BLD AUTO: 0.3 10E3/UL (ref 0–0.7)
EOSINOPHIL NFR BLD AUTO: 3 %
ERYTHROCYTE [DISTWIDTH] IN BLOOD BY AUTOMATED COUNT: 15.7 % (ref 10–15)
GLUCOSE SERPL-MCNC: 110 MG/DL (ref 70–99)
HCT VFR BLD AUTO: 31.9 % (ref 40–53)
HGB BLD-MCNC: 10.9 G/DL (ref 13.3–17.7)
IMM GRANULOCYTES # BLD: 0.1 10E3/UL
IMM GRANULOCYTES NFR BLD: 1 %
INR BLD: 2 (ref 0.9–1.1)
LYMPHOCYTES # BLD AUTO: 1.3 10E3/UL (ref 0.8–5.3)
LYMPHOCYTES NFR BLD AUTO: 14 %
MCH RBC QN AUTO: 32.2 PG (ref 26.5–33)
MCHC RBC AUTO-ENTMCNC: 34.2 G/DL (ref 31.5–36.5)
MCV RBC AUTO: 94 FL (ref 78–100)
MONOCYTES # BLD AUTO: 0.8 10E3/UL (ref 0–1.3)
MONOCYTES NFR BLD AUTO: 9 %
NEUTROPHILS # BLD AUTO: 6.7 10E3/UL (ref 1.6–8.3)
NEUTROPHILS NFR BLD AUTO: 73 %
PLATELET # BLD AUTO: 385 10E3/UL (ref 150–450)
POTASSIUM SERPL-SCNC: 3.9 MMOL/L (ref 3.4–5.3)
PROT SERPL-MCNC: 7 G/DL (ref 6.4–8.3)
RBC # BLD AUTO: 3.39 10E6/UL (ref 4.4–5.9)
SODIUM SERPL-SCNC: 139 MMOL/L (ref 135–145)
WBC # BLD AUTO: 9.2 10E3/UL (ref 4–11)

## 2023-12-26 PROCEDURE — 85025 COMPLETE CBC W/AUTO DIFF WBC: CPT

## 2023-12-26 PROCEDURE — 36415 COLL VENOUS BLD VENIPUNCTURE: CPT

## 2023-12-26 PROCEDURE — 80053 COMPREHEN METABOLIC PANEL: CPT

## 2023-12-26 PROCEDURE — 85610 PROTHROMBIN TIME: CPT

## 2023-12-26 NOTE — PROGRESS NOTES
ANTICOAGULATION MANAGEMENT     Jack Brown 73 year old male is on warfarin with therapeutic INR result. (Goal INR 2.0-3.0)    Recent labs: (last 7 days)     12/26/23  1411   INR 2.0*       ASSESSMENT     Source(s): Chart Review and Patient/Caregiver Call     Warfarin doses taken: Warfarin taken as instructed  Diet: No new diet changes identified  Medication/supplement changes: None noted  New illness, injury, or hospitalization: No  Signs or symptoms of bleeding or clotting: No  Previous result: Subtherapeutic  Additional findings: New start on day 7 of warfarin and Bridging with Enoxaparin until INR >= 2.0       PLAN     Recommended plan for no diet, medication or health factor changes affecting INR     Dosing Instructions: Continue your current warfarin dose with next INR in 2 days with follow up ov. Stop lovenox after tonight's dose    Summary  As of 12/26/2023      Full warfarin instructions:  3 mg every day   Next INR check:  12/28/2023               Telephone call with Shayy who verbalizes understanding and agrees to plan    Check at provider office visit 12/28    Education provided:   Please call back if any changes to your diet, medications or how you've been taking warfarin    Plan made per [facility] anticoagulation protocol    Felisa Carnes RN  Anticoagulation Clinic  12/26/2023    _______________________________________________________________________     Anticoagulation Episode Summary       Current INR goal:  2.0-3.0   TTR:  --   Target end date:  Indefinite   Send INR reminders to:  [facility]    Indications    Arterial occlusion [I70.90]             Comments:               Anticoagulation Care Providers       Provider Role Specialty Phone number    Chauncey Mayo MD Referring HOSPITALIST [phone]

## 2023-12-28 ENCOUNTER — ANTICOAGULATION THERAPY VISIT (OUTPATIENT)
Dept: ANTICOAGULATION | Facility: CLINIC | Age: 73
End: 2023-12-28

## 2023-12-28 ENCOUNTER — OFFICE VISIT (OUTPATIENT)
Dept: PEDIATRICS | Facility: CLINIC | Age: 73
End: 2023-12-28
Payer: MEDICARE

## 2023-12-28 VITALS
HEIGHT: 66 IN | WEIGHT: 153.7 LBS | BODY MASS INDEX: 24.7 KG/M2 | HEART RATE: 63 BPM | TEMPERATURE: 97.7 F | DIASTOLIC BLOOD PRESSURE: 66 MMHG | RESPIRATION RATE: 20 BRPM | SYSTOLIC BLOOD PRESSURE: 128 MMHG | OXYGEN SATURATION: 95 %

## 2023-12-28 DIAGNOSIS — I70.90 ARTERIAL OCCLUSION: Primary | ICD-10-CM

## 2023-12-28 DIAGNOSIS — I72.3 COMMON ILIAC ANEURYSM (H): ICD-10-CM

## 2023-12-28 DIAGNOSIS — K59.00 CONSTIPATION, UNSPECIFIED CONSTIPATION TYPE: ICD-10-CM

## 2023-12-28 DIAGNOSIS — I71.43 INFRARENAL ABDOMINAL AORTIC ANEURYSM (AAA) WITHOUT RUPTURE (H): ICD-10-CM

## 2023-12-28 DIAGNOSIS — I48.0 PAROXYSMAL A-FIB (H): ICD-10-CM

## 2023-12-28 DIAGNOSIS — R74.8 ELEVATED LIVER ENZYMES: ICD-10-CM

## 2023-12-28 DIAGNOSIS — D64.9 ANEMIA, UNSPECIFIED TYPE: ICD-10-CM

## 2023-12-28 LAB — INR BLD: 2.7 (ref 0.9–1.1)

## 2023-12-28 PROCEDURE — 85610 PROTHROMBIN TIME: CPT | Performed by: NURSE PRACTITIONER

## 2023-12-28 PROCEDURE — 99214 OFFICE O/P EST MOD 30 MIN: CPT | Performed by: NURSE PRACTITIONER

## 2023-12-28 PROCEDURE — 36416 COLLJ CAPILLARY BLOOD SPEC: CPT | Performed by: NURSE PRACTITIONER

## 2023-12-28 RX ORDER — RESPIRATORY SYNCYTIAL VIRUS VACCINE 120MCG/0.5
0.5 KIT INTRAMUSCULAR ONCE
Qty: 1 EACH | Refills: 0 | Status: CANCELLED | OUTPATIENT
Start: 2023-12-28 | End: 2023-12-28

## 2023-12-28 RX ORDER — POLYETHYLENE GLYCOL 3350 17 G/17G
1 POWDER, FOR SOLUTION ORAL DAILY
COMMUNITY
Start: 2023-12-28 | End: 2024-01-19

## 2023-12-28 ASSESSMENT — PAIN SCALES - GENERAL: PAINLEVEL: MODERATE PAIN (4)

## 2023-12-28 NOTE — PROGRESS NOTES
"  Assessment & Plan     (I70.90) Arterial occlusion  (primary encounter diagnosis)  Comment: Acute on chronic LLE ischemia. Had lytics which caused a right groin hematoma so they were stopped. Started on warfarin with lovenox bridge-now therapeutic. Plan for bypass 1/30. Limb is warm and pink. No concerns today  Plan:   -Due for INR today.   -Continue warfarin/aspirin  -Preop scheduled    (D64.9) Anemia, unspecified type  Comment: Likely due to hematoma/bleeding from L groin hematoma. Hemoglobin improving. No signs of ongoing bleeding.   Plan: CBC with platelets and differential  -Discussed that hemoglobin is improving so we should recheck in 1-2 weeks. If continuing to improve will monitor. If not improving could consider checking iron studies. Pt wishes to avoid iron as he is already struggling with constipation    (R74.8) Elevated liver enzymes  Comment: Very slight bump since starting amiodarone, warfarin and statin. Likely unrelated to any of those meds but could be statin related. Tolerating the statin.   Plan: Hepatic panel (Albumin, ALT, AST, Bili, Alk         Phos, TP)        -Recheck LFTs 1-2 weeks. Consider stopping statin and rechecking if continues to increase    (I48.0) Paroxysmal A-fib (H)  Comment: New problem. [id number] 6. Amio started in hospital. Appears to be in NSR today.  Plan:   -AC indefinitely  -Tolerating amiodarone  -Follow-up with cardiology  -INR today    (K59.00) Constipation, unspecified constipation type  Comment: Mild constipation. No BM in 2 days.  Plan:   -Increase fluids  -Start 1 cap miralax at HS. If no improvement increase to BID. If no BM in 3 days call us               MED REC REQUIRED  Post Medication Reconciliation Status:  Discharge medications reconciled, continue medications without change  BMI:   Estimated body mass index is 25.19 kg/m  as calculated from the following:    Height as of this encounter: 1.664 m (5' 5.5\").    Weight as of this encounter: 69.7 kg (153 lb " "11.2 oz).   Weight management plan: Discussed healthy diet and exercise guidelines        LILI VILLEDA CNP  [facility] MITUL Santiago is a 73 year old, presenting for the following health issues:  Hospital F/U        12/28/2023     2:23 PM   Additional Questions   Roomed by Adore Estrada CMA   Accompanied by wife       HPI       Hospital Follow-up Visit:    Hospital/Nursing Home/IP Rehab Facility: [facility]  Date of Admission: 12/15/23  Date of Discharge: 12/20/23  Reason(s) for Admission: #Acute on chronic left lower extremity ischemia  #S/p LLE angiogram with lytic therapy on 12/16/23  # Right groin hematoma s/p discontinuation of lytic therapy due to hematoma 12/17/23    Was your hospitalization related to COVID-19? No   Problems taking medications regularly:  None  Medication changes since discharge: None  Problems adhering to non-medication therapy:  None    Needs CBC, BMP, INR per discharge note    Summary of hospitalization:  [facility] discharge summary reviewed  Diagnostic Tests/Treatments reviewed.  Follow up needed: cardiology, vascular  Other Healthcare Providers Involved in Patient s Care:         None  Update since discharge: stable.         Plan of care communicated with patient and family                   Review of Systems   Constitutional, HEENT, cardiovascular, pulmonary, gi and gu systems are negative, except as otherwise noted.      Objective    /66 (BP Location: Right arm, Cuff Size: Adult Regular)   Pulse 63   Temp 97.7  F (36.5  C) (Tympanic)   Resp 20   Ht 1.664 m (5' 5.5\")   Wt 69.7 kg (153 lb 11.2 oz)   SpO2 95%   BMI 25.19 kg/m    Body mass index is 25.19 kg/m .  Physical Exam   CONSTITUTIONAL: Alert, well-nourished, well-groomed, NAD  HRRR S1 S2 No MRG. No peripheral edema. Left leg with good color, cap refill, warmth  Lungs CTA. No wheeze, rhonchi, rales.            "

## 2023-12-28 NOTE — PATIENT INSTRUCTIONS
1 capful miralax per day. If no BM in the next 24 hours, start doing 1 cap twice a day. If no BM in 2-3 days let us know  Preop within 30 days of surgery. Recheck liver and hemoglobin at that visit

## 2023-12-28 NOTE — PROGRESS NOTES
ANTICOAGULATION MANAGEMENT     Jack Brown 73 year old male is on warfarin with therapeutic INR result. (Goal INR 2.0-3.0)    Recent labs: (last 7 days)     12/28/23  1537   INR 2.7*       ASSESSMENT     Source(s): Chart Review and Patient/Caregiver Call     Warfarin doses taken: Warfarin taken as instructed  Diet: No new diet changes identified  Medication/supplement changes: None noted  New illness, injury, or hospitalization: No  Signs or symptoms of bleeding or clotting: No  Previous result: Therapeutic last 2(+) visits  Additional findings: New start on day 9 of warfarin       PLAN     Recommended plan for no diet, medication or health factor changes affecting INR     Dosing Instructions: Continue your current warfarin dose with next INR in 5 days       Summary  As of 12/28/2023      Full warfarin instructions:  3 mg every day   Next INR check:  1/2/2024               Telephone call with Jack who verbalizes understanding and agrees to plan    Lab visit scheduled    Education provided:   Contact [phone] with any changes, questions or concerns.     Plan made per [facility] anticoagulation protocol    Felisa Carnes RN  Anticoagulation Clinic  12/28/2023    _______________________________________________________________________     Anticoagulation Episode Summary       Current INR goal:  2.0-3.0   TTR:  --   Target end date:  Indefinite   Send INR reminders to:  [facility]    Indications    Arterial occlusion [I70.90]             Comments:               Anticoagulation Care Providers       Provider Role Specialty Phone number    Chauncey Mayo MD Referring HOSPITALIST [phone]

## 2024-01-02 ENCOUNTER — ANTICOAGULATION THERAPY VISIT (OUTPATIENT)
Dept: ANTICOAGULATION | Facility: CLINIC | Age: 74
End: 2024-01-02

## 2024-01-02 ENCOUNTER — LAB (OUTPATIENT)
Dept: LAB | Facility: CLINIC | Age: 74
End: 2024-01-02
Payer: MEDICARE

## 2024-01-02 DIAGNOSIS — I70.90 ARTERIAL OCCLUSION: ICD-10-CM

## 2024-01-02 DIAGNOSIS — I70.90 ARTERIAL OCCLUSION: Primary | ICD-10-CM

## 2024-01-02 LAB — INR BLD: 3.8 (ref 0.9–1.1)

## 2024-01-02 PROCEDURE — 36416 COLLJ CAPILLARY BLOOD SPEC: CPT

## 2024-01-02 PROCEDURE — 85610 PROTHROMBIN TIME: CPT

## 2024-01-02 NOTE — PROGRESS NOTES
ANTICOAGULATION MANAGEMENT     Jack Brown 73 year old male is on warfarin with supratherapeutic INR result. (Goal INR 2.0-3.0)    Recent labs: (last 7 days)     01/02/24  1234   INR 3.8*       ASSESSMENT     Source(s): Chart Review and Patient/Caregiver Call     Warfarin doses taken: Warfarin taken as instructed  Diet: No new diet changes identified  Medication/supplement changes: None noted  New illness, injury, or hospitalization: No  Signs or symptoms of bleeding or clotting: No  Previous result: Therapeutic last 2(+) visits  Additional findings: None       PLAN     Recommended plan for no diet, medication or health factor changes affecting INR     Dosing Instructions: hold dose then decrease your warfarin dose (9.5% change) with next INR in 1 week       Summary  As of 1/2/2024      Full warfarin instructions:  1/2: Hold; Otherwise 2 mg every Tue, Fri; 3 mg all other days   Next INR check:  1/16/2024               Telephone call with Shayy who verbalizes understanding and agrees to plan    Lab visit scheduled    Education provided:   Please call back if any changes to your diet, medications or how you've been taking warfarin    Plan made per [facility] anticoagulation protocol    Felisa Carnes RN  Anticoagulation Clinic  1/2/2024    _______________________________________________________________________     Anticoagulation Episode Summary       Current INR goal:  2.0-3.0   TTR:  0.0% (3 d)   Target end date:  Indefinite   Send INR reminders to:  [facility]    Indications    Arterial occlusion [I70.90]             Comments:               Anticoagulation Care Providers       Provider Role Specialty Phone number    Chauncey Mayo MD Referring HOSPITALIST [phone]

## 2024-01-04 NOTE — TELEPHONE ENCOUNTER
US follow-up orders placed. Coumadin is managed by anticoagulation clinic, will pend message and send to them next week due to surgery being on 1/30/24.

## 2024-01-09 ENCOUNTER — TELEPHONE (OUTPATIENT)
Dept: FAMILY MEDICINE | Facility: CLINIC | Age: 74
End: 2024-01-09
Payer: MEDICARE

## 2024-01-09 DIAGNOSIS — I70.90 ARTERIAL OCCLUSION: Primary | ICD-10-CM

## 2024-01-09 RX ORDER — WARFARIN SODIUM 1 MG/1
TABLET ORAL
Qty: 270 TABLET | Refills: 1 | Status: ON HOLD | OUTPATIENT
Start: 2024-01-09 | End: 2024-05-01

## 2024-01-09 NOTE — TELEPHONE ENCOUNTER
Patient Returning Call    Reason for call:  Patient's spouse calling in rigo [place] only has 2 days left of warfarin and is needing a refill. Patient has an upcoming surgery as well. Please call.     Information relayed to patient:  message placed, await call back    Patient has additional questions:  No      Could we send this information to you in [employer] or would you prefer to receive a phone call?:   Patient would prefer a phone call   Okay to leave a detailed message?: Yes at Home number on file [phone] (home)

## 2024-01-10 ENCOUNTER — ANTICOAGULATION THERAPY VISIT (OUTPATIENT)
Dept: ANTICOAGULATION | Facility: CLINIC | Age: 74
End: 2024-01-10

## 2024-01-10 ENCOUNTER — LAB (OUTPATIENT)
Dept: CARDIOLOGY | Facility: CLINIC | Age: 74
End: 2024-01-10
Payer: MEDICARE

## 2024-01-10 DIAGNOSIS — I25.10 CORONARY ARTERY DISEASE DUE TO LIPID RICH PLAQUE: ICD-10-CM

## 2024-01-10 DIAGNOSIS — M79.622 PAIN OF LEFT UPPER ARM: ICD-10-CM

## 2024-01-10 DIAGNOSIS — I70.90 ARTERIAL OCCLUSION: Primary | ICD-10-CM

## 2024-01-10 DIAGNOSIS — E78.5 HYPERLIPIDEMIA, UNSPECIFIED HYPERLIPIDEMIA TYPE: ICD-10-CM

## 2024-01-10 DIAGNOSIS — Z79.01 LONG TERM (CURRENT) USE OF ANTICOAGULANTS: ICD-10-CM

## 2024-01-10 DIAGNOSIS — I10 HYPERTENSION, UNSPECIFIED TYPE: ICD-10-CM

## 2024-01-10 DIAGNOSIS — R94.31 NONSPECIFIC ABNORMAL ELECTROCARDIOGRAM (ECG) (EKG): ICD-10-CM

## 2024-01-10 DIAGNOSIS — Z95.1 S/P CABG X 3: ICD-10-CM

## 2024-01-10 DIAGNOSIS — I25.83 CORONARY ARTERY DISEASE DUE TO LIPID RICH PLAQUE: ICD-10-CM

## 2024-01-10 LAB
CHOLEST SERPL-MCNC: 141 MG/DL
FASTING STATUS PATIENT QL REPORTED: YES
HDLC SERPL-MCNC: 49 MG/DL
INR POINT OF CARE: 4.3 (ref 0.9–1.1)
LDLC SERPL CALC-MCNC: 77 MG/DL
NONHDLC SERPL-MCNC: 92 MG/DL
TRIGL SERPL-MCNC: 77 MG/DL

## 2024-01-10 PROCEDURE — 80061 LIPID PANEL: CPT

## 2024-01-10 PROCEDURE — 85610 PROTHROMBIN TIME: CPT | Mod: QW

## 2024-01-10 PROCEDURE — 36415 COLL VENOUS BLD VENIPUNCTURE: CPT

## 2024-01-10 NOTE — PROGRESS NOTES
ANTICOAGULATION MANAGEMENT     Jack Brown 73 year old male is on warfarin with supratherapeutic INR result. (Goal INR 2.0-3.0)    Recent labs: (last 7 days)     01/10/24  0742   INR 4.3*       ASSESSMENT     Source(s): Chart Review and Patient/Caregiver Call     Warfarin doses taken: Warfarin taken as instructed  Diet: No new diet changes identified  Medication/supplement changes: None noted  New illness, injury, or hospitalization: No  Signs or symptoms of bleeding or clotting: No  Previous result: Supratherapeutic  Additional findings: None       PLAN     Recommended plan for no diet, medication or health factor changes affecting INR     Dosing Instructions: hold 1.7 doses then decrease your warfarin dose (10.5% change) with next INR in 5 days       Summary  As of 1/10/2024      Full warfarin instructions:  1/10: Hold; 1/11: 1 mg; Otherwise 3 mg every Mon, Wed, Sat; 2 mg all other days   Next INR check:  1/16/2024               Telephone call with Shayy who verbalizes understanding and agrees to plan    Lab visit scheduled    Education provided:   Please call back if any changes to your diet, medications or how you've been taking warfarin    Plan made per [facility]agulation protocol    Felisa Carnes RN  Anticoagulation Clinic  1/10/2024    _______________________________________________________________________     Anticoagulation Episode Summary       Current INR goal:  2.0-3.0   TTR:  0.0% (1.6 wk)   Target end date:  Indefinite   Send INR reminders to:  [facility]    Indications    Arterial occlusion [I70.90]             Comments:               Anticoagulation Care Providers       Provider Role Specialty Phone number    Chauncey Mayo MD Referring HOSPITALIST [phone]

## 2024-01-16 ENCOUNTER — LAB (OUTPATIENT)
Dept: LAB | Facility: CLINIC | Age: 74
End: 2024-01-16
Payer: MEDICARE

## 2024-01-16 ENCOUNTER — ANTICOAGULATION THERAPY VISIT (OUTPATIENT)
Dept: ANTICOAGULATION | Facility: CLINIC | Age: 74
End: 2024-01-16

## 2024-01-16 DIAGNOSIS — I70.90 ARTERIAL OCCLUSION: ICD-10-CM

## 2024-01-16 DIAGNOSIS — I70.90 ARTERIAL OCCLUSION: Primary | ICD-10-CM

## 2024-01-16 LAB — INR BLD: 2.3 (ref 0.9–1.1)

## 2024-01-16 PROCEDURE — 85610 PROTHROMBIN TIME: CPT

## 2024-01-16 PROCEDURE — 36416 COLLJ CAPILLARY BLOOD SPEC: CPT

## 2024-01-16 NOTE — PROGRESS NOTES
ANTICOAGULATION MANAGEMENT     Jack Brown 73 year old male is on warfarin with therapeutic INR result. (Goal INR 2.0-3.0)    Recent labs: (last 7 days)     01/16/24  1349   INR 2.3*       ASSESSMENT     Source(s): Chart Review and Patient/Caregiver Call     Warfarin doses taken: Warfarin taken as instructed  Diet: No new diet changes identified  Medication/supplement changes: None noted  New illness, injury, or hospitalization: No  Signs or symptoms of bleeding or clotting: No  Previous result: Supratherapeutic  Additional findings:  pre op 1/19 for femoral bypass 1/30. Plan with Cristy for assessment       PLAN     Recommended plan for no diet, medication or health factor changes affecting INR     Dosing Instructions: Continue your current warfarin dose with next INR in 3 days       Summary  As of 1/16/2024      Full warfarin instructions:  3 mg every Mon, Wed, Sat; 2 mg all other days   Next INR check:  1/19/2024               Telephone call with Shayy who verbalizes understanding and agrees to plan    Lab visit scheduled    Education provided:   Contact [phone] with any changes, questions or concerns.     Plan made per [facility] anticoagulation protocol    Felisa Carnes RN  Anticoagulation Clinic  1/16/2024    _______________________________________________________________________     Anticoagulation Episode Summary       Current INR goal:  2.0-3.0   TTR:  12.5% (2.4 wk)   Target end date:  Indefinite   Send INR reminders to:  [facility]    Indications    Arterial occlusion [I70.90]             Comments:               Anticoagulation Care Providers       Provider Role Specialty Phone number    Chauncey Mayo MD Referring HOSPITALIST [phone]

## 2024-01-19 ENCOUNTER — OFFICE VISIT (OUTPATIENT)
Dept: PEDIATRICS | Facility: CLINIC | Age: 74
End: 2024-01-19
Payer: MEDICARE

## 2024-01-19 ENCOUNTER — ANTICOAGULATION THERAPY VISIT (OUTPATIENT)
Dept: ANTICOAGULATION | Facility: CLINIC | Age: 74
End: 2024-01-19

## 2024-01-19 VITALS
SYSTOLIC BLOOD PRESSURE: 122 MMHG | WEIGHT: 154 LBS | TEMPERATURE: 97 F | BODY MASS INDEX: 25.66 KG/M2 | HEART RATE: 50 BPM | HEIGHT: 65 IN | DIASTOLIC BLOOD PRESSURE: 70 MMHG | RESPIRATION RATE: 16 BRPM | OXYGEN SATURATION: 98 %

## 2024-01-19 DIAGNOSIS — C7A.090 ATYPICAL CARCINOID LUNG TUMOR (H): ICD-10-CM

## 2024-01-19 DIAGNOSIS — I48.0 PAROXYSMAL A-FIB (H): ICD-10-CM

## 2024-01-19 DIAGNOSIS — I72.3 COMMON ILIAC ANEURYSM (H): ICD-10-CM

## 2024-01-19 DIAGNOSIS — I70.90 ARTERIAL OCCLUSION: Primary | ICD-10-CM

## 2024-01-19 DIAGNOSIS — Z01.818 PREOP GENERAL PHYSICAL EXAM: ICD-10-CM

## 2024-01-19 DIAGNOSIS — Z95.1 S/P CABG X 3: ICD-10-CM

## 2024-01-19 DIAGNOSIS — I71.43 INFRARENAL ABDOMINAL AORTIC ANEURYSM (AAA) WITHOUT RUPTURE (H): ICD-10-CM

## 2024-01-19 DIAGNOSIS — I10 PRIMARY HYPERTENSION: ICD-10-CM

## 2024-01-19 PROBLEM — R94.31 NONSPECIFIC ABNORMAL ELECTROCARDIOGRAM (ECG) (EKG): Status: RESOLVED | Noted: 2023-10-10 | Resolved: 2024-01-19

## 2024-01-19 LAB
BASOPHILS # BLD AUTO: 0 10E3/UL (ref 0–0.2)
BASOPHILS NFR BLD AUTO: 0 %
EOSINOPHIL # BLD AUTO: 0.3 10E3/UL (ref 0–0.7)
EOSINOPHIL NFR BLD AUTO: 4 %
ERYTHROCYTE [DISTWIDTH] IN BLOOD BY AUTOMATED COUNT: 14.8 % (ref 10–15)
HCT VFR BLD AUTO: 43.8 % (ref 40–53)
HGB BLD-MCNC: 14.1 G/DL (ref 13.3–17.7)
IMM GRANULOCYTES # BLD: 0 10E3/UL
IMM GRANULOCYTES NFR BLD: 0 %
INR BLD: 2.3 (ref 0.9–1.1)
LYMPHOCYTES # BLD AUTO: 1 10E3/UL (ref 0.8–5.3)
LYMPHOCYTES NFR BLD AUTO: 15 %
MCH RBC QN AUTO: 31.3 PG (ref 26.5–33)
MCHC RBC AUTO-ENTMCNC: 32.2 G/DL (ref 31.5–36.5)
MCV RBC AUTO: 97 FL (ref 78–100)
MONOCYTES # BLD AUTO: 0.6 10E3/UL (ref 0–1.3)
MONOCYTES NFR BLD AUTO: 9 %
NEUTROPHILS # BLD AUTO: 4.5 10E3/UL (ref 1.6–8.3)
NEUTROPHILS NFR BLD AUTO: 71 %
PLATELET # BLD AUTO: 212 10E3/UL (ref 150–450)
RBC # BLD AUTO: 4.5 10E6/UL (ref 4.4–5.9)
WBC # BLD AUTO: 6.3 10E3/UL (ref 4–11)

## 2024-01-19 PROCEDURE — 36415 COLL VENOUS BLD VENIPUNCTURE: CPT | Performed by: NURSE PRACTITIONER

## 2024-01-19 PROCEDURE — 80053 COMPREHEN METABOLIC PANEL: CPT | Performed by: NURSE PRACTITIONER

## 2024-01-19 PROCEDURE — 85610 PROTHROMBIN TIME: CPT | Performed by: NURSE PRACTITIONER

## 2024-01-19 PROCEDURE — 99215 OFFICE O/P EST HI 40 MIN: CPT | Performed by: NURSE PRACTITIONER

## 2024-01-19 PROCEDURE — 85025 COMPLETE CBC W/AUTO DIFF WBC: CPT | Performed by: NURSE PRACTITIONER

## 2024-01-19 RX ORDER — TAMSULOSIN HYDROCHLORIDE 0.4 MG/1
CAPSULE ORAL
COMMUNITY
Start: 2024-01-19 | End: 2024-03-15

## 2024-01-19 ASSESSMENT — PAIN SCALES - GENERAL: PAINLEVEL: NO PAIN (0)

## 2024-01-19 NOTE — PROGRESS NOTES
Preoperative Evaluation  [facility] [place]  [address]  [address]  [place] [address]  Phone: [phone]  Fax: [phone]  Primary Provider: Mercedes Adorno  Pre-op Performing Provider: LIZETT FRANCISCO 19, 2024       Jack is a 73 year old, presenting for the following:  Pre-Op Exam (Would also like to get his INR blood work done today.)        1/19/2024     9:29 AM   Additional Questions   Roomed by Tabitha   Accompanied by self         1/19/2024     9:29 AM   Patient Reported Additional Medications   Patient reports taking the following new medications no     Surgical Information  Surgery/Procedure: Creation, bypass, arterial, femoral to popliteal, using graft  Surgery Location: [facility]  Surgeon: Dr Laverne Crum  Surgery Date: 1/30/24  Time of Surgery: 2:30 pm  Where patient plans to recover: At home with family  Fax number for surgical facility: Note does not need to be faxed, will be available electronically in Epic.    Assessment & Plan     The proposed surgical procedure is considered INTERMEDIATE risk.    Arterial occlusion  Preop general physical exam  Medically optimized for surgery  - CBC with platelets and differential; Future  - Comprehensive metabolic panel (BMP + Alb, Alk Phos, ALT, AST, Total. Bili, TP); Future  - CBC with platelets and differential  - Comprehensive metabolic panel (BMP + Alb, Alk Phos, ALT, AST, Total. Bili, TP)  - INR point of care    Paroxysmal A-fib (H)  Stable on amio. Is on warfarin. See pharmacy note for anticoagulation plan perioperatively.    Infrarenal abdominal aortic aneurysm (AAA) without rupture (H24)  Common iliac aneurysm (H24)  Follows with vascular.     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 Asa plan perioperatively per pharmicist.     Primary hypertension  Stable    Risks and Recommendations  The patient has the following  additional risks and recommendations for perioperative complications:  Cardiovascular:   - Cardiology consulted final cardiac clearance per cards  Anemia/Bleeding/Clotting:     Antiplatelet or Anticoagulation Medication Instructions   - aspirin: Per pharmacist anticoagulation clinic protocol, pt should continue aspirin.   - warfarin: Hold warfarin 5 days prior to procedure with therapeutic enoxaparin bridge per anticoagulation clinic. Bridging decision made in collaboration with Dr. Crum    Additional Medication Instructions  Patient is to take all scheduled medications on the day of surgery EXCEPT for modifications listed below:    Recommendation  Patient referred to cardiology for evaluation before surgery. Surgery approval pending completion of consultation.    Subjective       HPI related to upcoming procedure:         1/19/2024     9:21 AM   Preop Questions   1. Have you ever had a heart attack or stroke? No   2. Have you ever had surgery on your heart or blood vessels, such as a stent placement, a coronary artery bypass, or surgery on an artery in your head, neck, heart, or legs? YES - Hx bypass   3. Do you have chest pain with activity? No   4. Do you have a history of  heart failure? No   5. Do you currently have a cold, bronchitis or symptoms of other infection? No   6. Do you have a cough, shortness of breath, or wheezing? No   7. Do you or anyone in your family have previous history of blood clots? No   8. Do you or does anyone in your family have a serious bleeding problem such as prolonged bleeding following surgeries or cuts? No   9. Have you ever had problems with anemia or been told to take iron pills? No   10. Have you had any abnormal blood loss such as black, tarry or bloody stools? No   11. Have you ever had a blood transfusion? No   12. Are you willing to have a blood transfusion if it is medically needed before, during, or after your surgery? Yes   13. Have you or any of your relatives ever  "had problems with anesthesia? No   14. Do you have sleep apnea, excessive snoring or daytime drowsiness? No   15. Do you have any artifical heart valves or other implanted medical devices like a pacemaker, defibrillator, or continuous glucose monitor? No   16. Do you have artificial joints? No   17. Are you allergic to latex? No     Preoperative Review of    reviewed - no record of controlled substances prescribed.          Patient Active Problem List    Diagnosis Date Noted    Paroxysmal A-fib (H) 01/19/2024     Priority: Medium    Infrarenal abdominal aortic aneurysm (AAA) without rupture (H24) 12/28/2023     Priority: Medium    Common iliac aneurysm (H24) 12/28/2023     Priority: Medium    Arterial occlusion 12/15/2023     Priority: Medium    Pain of left lower leg 12/15/2023     Priority: Medium    Pain of left upper arm 10/10/2023     Priority: Medium    Gross hematuria 10/25/2021     Priority: Medium     cytoscopy 9/22/21 - large, friable prostate. Treated with \"  Finasteride in addition to existing Flomax.  Would recommend follow up in 1-3 months for evaluation of medical therapy and degree of hematuria. If medical therapy fails, at that point we would consider other options including surgical intervention.\" per Dr. Stewart      Hypertension 10/12/2021     Priority: Medium     Formatting of this note might be different from the original.  Created by Conversion    Replacement Utility updated for latest IMO load      Visual impairment 10/12/2021     Priority: Medium     Formatting of this note might be different from the original.  Created by Conversion      Hyperlipemia      Priority: Medium     Created by Conversion      Formatting of this note might be different from the original.  Created by Conversion      Muscle soreness 10/26/2017     Priority: Medium    Hypertension      Priority: Medium     Created by Conversion  Replacement Utility updated for latest IMO load        Atypical carcinoid lung tumor " (H) 05/03/2016     Priority: Medium    Urinary retention      Priority: Medium    Thyroid nodule 12/29/2015     Priority: Medium    S/P CABG x 3 12/28/2015     Priority: Medium    Coronary artery disease due to lipid rich plaque 11/17/2015     Priority: Medium    History of melanoma 10/01/2015     Priority: Medium     Right chest        History of basal cell carcinoma 10/01/2015     Priority: Medium     Posterior ear        Nasal drainage 03/17/2015     Priority: Medium    History of squamous cell carcinoma 01/01/2015     Priority: Medium     Invasive, left arm        Visual Impairment      Priority: Medium     Created by Conversion          Past Medical History:   Diagnosis Date    BPH (benign prostatic hypertrophy)     Cancer (H)     Skin on R.chest wall.    Chest discomfort     Coronary artery disease     Detached retina, left Oct 2014    Hyperlipidemia     Hypertension      Past Surgical History:   Procedure Laterality Date    BYPASS GRAFT ARTERY CORONARY N/A 12/28/2015    Procedure: CORONARY ARTERY BYPASS x 3, RIGHT ENDOSCOPIC VEIN HARVEST, LEFT INTERNAL MAMMARY ARTERY, ANESTHESIA TRANSESOPHAGEAL ECHOCARDIOGRAM;  Surgeon: Jayson Alvarado MD;  Location: [facility];  Service:     CARDIAC SURGERY      CATARACT EXTRACTION  2015    CYST REMOVAL      Ganglion cyst removal of both wrist    HC REMOVAL PREPATELLA BURSA      Description: Excision Of Prepatellar Bursa;  Recorded: 09/30/2014;    HC REPAIR OF NASAL SEPTUM      Description: Septoplasty;  Recorded: 09/30/2014;    HERNIA REPAIR, UMBILICAL      IR LOWER EXTREMITY ANGIOGRAM LEFT  12/16/2023    IR THROMBOLYSIS ARTERIAL INFUSION INITIAL DAY  12/17/2023    PARS PLANA VITRECTOMY W/ REPAIR OF MACULAR HOLE  Nov 2014    AZ EXCIS TENDON SHEATH LESION, HAND/FINGER      Description: Hand Excision Of A Tendon Cyst;  Recorded: 01/04/2011;    AZ LAP, VENTRAL HERNIA REPAIR,REDUCIBLE      Description: Laparoscopy Repair Of Umbilical Hernia;  Recorded:  01/04/2011;    RETINAL DETACHMENT SURGERY  oct 2014    SKIN CANCER EXCISION  Nov 2015    right chest    THORACOSCOPY Right 4/14/2016    Procedure: RIGHT VIDEO ASSISTED THORACOSCOPY, RIGHT LOBECTOMY;  Surgeon: Vinny Chase MD;  Location: [facility];  Service:     VASECTOMY       Current Outpatient Medications   Medication Sig Dispense Refill    acetaminophen (TYLENOL) 500 MG tablet [ACETAMINOPHEN (TYLENOL) 500 MG TABLET] Take 500 mg by mouth every 6 (six) hours as needed for pain.      amiodarone (PACERONE) 200 MG tablet Take 2 tablets (400 mg) by mouth 2 times daily for 7 days, THEN 1 tablet (200 mg) daily for 30 days. 58 tablet 1    amLODIPine (NORVASC) 10 MG tablet TAKE 1 TABLET(10 MG) BY MOUTH DAILY 90 tablet 2    aspirin 81 mg chewable tablet [ASPIRIN 81 MG CHEWABLE TABLET] Chew 81 mg daily.      finasteride (PROSCAR) 5 MG tablet Take 1 tablet (5 mg) by mouth daily 90 tablet 3    metoprolol tartrate (LOPRESSOR) 50 MG tablet TAKE 1 TABLET(50 MG) BY MOUTH TWICE DAILY 180 tablet 1    rosuvastatin (CRESTOR) 5 MG tablet Take 1 tablet (5 mg) by mouth at bedtime 30 tablet 1    warfarin ANTICOAGULANT (COUMADIN) 1 MG tablet Take 2-3 tablets daily as directed based on inr results. 270 tablet 1    polyethylene glycol (MIRALAX) 17 GM/Dose powder Take 17 g (1 Capful) by mouth daily (Patient not taking: Reported on 1/19/2024)      tamsulosin (FLOMAX) 0.4 MG capsule TAKE 1 CAPSULE BY MOUTH DAILY AFTER BREAKFAST (Patient not taking: Reported on 1/19/2024) 90 capsule 3    VITAMIN D3 25 MCG (1000 UT) tablet Take 1 tablet by mouth daily (Patient not taking: Reported on 1/19/2024)         Allergies   Allergen Reactions    Niacin Hives and Rash     Burning of the skin    Atorvastatin Muscle Pain (Myalgia)    Pravastatin Muscle Pain (Myalgia)        Social History     Tobacco Use    Smoking status: Never    Smokeless tobacco: Never   Substance Use Topics    Alcohol use: Yes     Alcohol/week: 8.0 standard drinks of  "alcohol       History   Drug Use No         Review of Systems    Review of Systems  Constitutional, neuro, ENT, endocrine, pulmonary, cardiac, gastrointestinal, genitourinary, musculoskeletal, integument and psychiatric systems are negative, except as otherwise noted.  Objective    /70 (BP Location: Right arm, Patient Position: Sitting, Cuff Size: Adult Regular)   Pulse 50   Temp 97  F (36.1  C) (Temporal)   Resp 16   Ht 1.651 m (5' 5\")   Wt 69.9 kg (154 lb)   SpO2 98%   BMI 25.63 kg/m     Estimated body mass index is 25.63 kg/m  as calculated from the following:    Height as of this encounter: 1.651 m (5' 5\").    Weight as of this encounter: 69.9 kg (154 lb).  Physical Exam  GENERAL: alert and no distress  EYES: Eyes grossly normal to inspection, PERRL and conjunctivae and sclerae normal  HENT: ear canals and TM's normal, nose and mouth without ulcers or lesions  NECK: no adenopathy, no asymmetry, masses, or scars  RESP: lungs clear to auscultation - no rales, rhonchi or wheezes  CV: regular rate and rhythm, normal S1 S2, no S3 or S4, no murmur, click or rub, no peripheral edema  ABDOMEN: soft, nontender, no hepatosplenomegaly, no masses and bowel sounds normal  MS: no gross musculoskeletal defects noted, no edema  SKIN: no suspicious lesions or rashes  NEURO: Normal strength and tone, mentation intact and speech normal  PSYCH: mentation appears normal, affect normal/bright  LYMPH: no cervical, supraclavicular, axillary, or inguinal adenopathy    Recent Labs   Lab Test 01/16/24  1349 01/10/24  0742 12/28/23  1537 12/26/23  1411 12/20/23  1319 12/20/23  0450 12/16/23  0833 12/15/23  2025 05/26/23  0828 05/23/22  0942   HGB  --   --   --  10.9*  --  10.1*   < > 16.1   < >  --    PLT  --   --   --  385  --  199   < > 267   < >  --    INR 2.3* 4.3*   < > 2.0*   < >  --    < > 1.02  --   --    NA  --   --   --  139  --  137   < > 138   < >  --    POTASSIUM  --   --   --  3.9  --  4.1   < > 4.4   < >  --  "   CR  --   --   --  1.12  --  0.90   < > 0.91   < >  --    A1C  --   --   --   --   --   --   --  5.5  --  5.6    < > = values in this interval not displayed.        Diagnostics  Labs pending at this time.  Results will be reviewed when available.   No EKG this visit, completed in the last 90 days.    Revised Cardiac Risk Index (RCRI)  The patient has the following serious cardiovascular risks for perioperative complications:   - Coronary Artery Disease (MI, positive stress test, angina, Qs on EKG) = 1 point     RCRI Interpretation: 1 point: Class II (low risk - 0.9% complication rate)    50 minutes spent with patient, over 1/2 in counseling and coordination of care regarding the above diagnoses         Signed Electronically by: LILI VILLEDA CNP  Copy of this evaluation report is provided to requesting physician.

## 2024-01-19 NOTE — PROGRESS NOTES
"[facility]-PROCEDURAL ANTICOAGULATION  MANAGEMENT    ASSESSMENT     Warfarin interruption plan for Fem-pop arterial bypass on 1/30/24.    Indication for Anticoagulation: Atrial Fibrillation, Hx of Arterial occlusion    [id number] = 4-6 (Hypertension, Age >= 75, and Vascular- CAD-s/pCABG (2015); PAD)  Chadsvasc of 6 noted in recent hospitalization- 2 points for arterial occlusion would be dependent if it was felt to be systemic embolism from Afib    Hx of arterial occlusion 12/15/2023  Acute on chronic left lower extremity ischemia; s/p LLE angiogram with lytic therapy  Groin Hematoma   Discharged warfarin (new start) with Lovenox to bridge    RECOMMENDATION     Imput on bridging decision sought from DR. Crum, vascular specialist  ?   Cristy Jaquez, [facility]    SUBJECTIVE/OBJECTIVE     Jack Brown, a 73 year old male    Wt Readings from Last 3 Encounters:   01/19/24 69.9 kg (154 lb)   12/28/23 69.7 kg (153 lb 11.2 oz)   12/18/23 69.7 kg (153 lb 9.6 oz)      Ideal body weight: 61.5 kg (135 lb 9.3 oz)  Adjusted ideal body weight: 64.8 kg (142 lb 15.2 oz)     Estimated body mass index is 25.63 kg/m  as calculated from the following:    Height as of 1/19/24: 1.651 m (5' 5\").    Weight as of 1/19/24: 69.9 kg (154 lb).    Lab Results   Component Value Date    INR 2.3 (H) 01/19/2024    INR 2.3 (H) 01/16/2024    INR 4.3 (A) 01/10/2024     Lab Results   Component Value Date    HGB 14.1 01/19/2024    HCT 43.8 01/19/2024     01/19/2024     Lab Results   Component Value Date    CR 1.12 12/26/2023    CR 0.90 12/20/2023    CR 0.99 12/19/2023     Estimated Creatinine Clearance: 56.3 mL/min (based on SCr of 1.12 mg/dL).    "

## 2024-01-19 NOTE — PROGRESS NOTES
ANTICOAGULATION MANAGEMENT     Jack Brown 73 year old male is on warfarin with therapeutic INR result. (Goal INR 2.0-3.0)    Recent labs: (last 7 days)     01/19/24  1026   INR 2.3*       ASSESSMENT     Source(s): Chart Review and Patient/Caregiver Call     Warfarin doses taken: Warfarin taken as instructed  Diet: No new diet changes identified  Medication/supplement changes: None noted  New illness, injury, or hospitalization: No  Signs or symptoms of bleeding or clotting: No  Previous result: Therapeutic last visit; previously outside of goal range  Additional findings:  procedure 1/30 bridge plan in progress       PLAN     Recommended plan for no diet, medication or health factor changes affecting INR     Dosing Instructions: Continue your current warfarin dose with next INR in 1 week       Summary  As of 1/19/2024      Full warfarin instructions:  3 mg every Mon, Wed, Sat; 2 mg all other days   Next INR check:  1/26/2024               Telephone call with Shayy who verbalizes understanding and agrees to plan    Lab visit scheduled    Education provided:   Please call back if any changes to your diet, medications or how you've been taking warfarin    Plan made per [facility] anticoagulation protocol    Felisa Carnes RN  Anticoagulation Clinic  1/19/2024    _______________________________________________________________________     Anticoagulation Episode Summary       Current INR goal:  2.0-3.0   TTR:  24.7% (2.9 wk)   Target end date:  Indefinite   Send INR reminders to:  [facility]    Indications    Arterial occlusion [I70.90]             Comments:               Anticoagulation Care Providers       Provider Role Specialty Phone number    Chauncey Mayo MD Referring HOSPITALIST [phone]

## 2024-01-19 NOTE — PATIENT INSTRUCTIONS
Other than aspirin and warfarin, take all meds as usual day of surgery. We'll get back to you about aspirin/warfarin    Stay on aspirin until you hear otherwise from us

## 2024-01-20 LAB
ALBUMIN SERPL BCG-MCNC: 3.9 G/DL (ref 3.5–5.2)
ALP SERPL-CCNC: 71 U/L (ref 40–150)
ALT SERPL W P-5'-P-CCNC: 21 U/L (ref 0–70)
ANION GAP SERPL CALCULATED.3IONS-SCNC: 8 MMOL/L (ref 7–15)
AST SERPL W P-5'-P-CCNC: 28 U/L (ref 0–45)
BILIRUB SERPL-MCNC: 0.4 MG/DL
BUN SERPL-MCNC: 26.5 MG/DL (ref 8–23)
CALCIUM SERPL-MCNC: 9.4 MG/DL (ref 8.8–10.2)
CHLORIDE SERPL-SCNC: 103 MMOL/L (ref 98–107)
CREAT SERPL-MCNC: 1.27 MG/DL (ref 0.67–1.17)
DEPRECATED HCO3 PLAS-SCNC: 25 MMOL/L (ref 22–29)
EGFRCR SERPLBLD CKD-EPI 2021: 59 ML/MIN/1.73M2
GLUCOSE SERPL-MCNC: 84 MG/DL (ref 70–99)
POTASSIUM SERPL-SCNC: 4.6 MMOL/L (ref 3.4–5.3)
PROT SERPL-MCNC: 6.8 G/DL (ref 6.4–8.3)
SODIUM SERPL-SCNC: 136 MMOL/L (ref 135–145)

## 2024-01-22 ENCOUNTER — TELEPHONE (OUTPATIENT)
Dept: PEDIATRICS | Facility: CLINIC | Age: 74
End: 2024-01-22
Payer: MEDICARE

## 2024-01-22 NOTE — TELEPHONE ENCOUNTER
Pls call pt  Kidneys worse.  Is he taking any nsaids?  Please repeat sample in 1-2 days. Come well hydrated.     Shivani

## 2024-01-24 ENCOUNTER — LAB (OUTPATIENT)
Dept: LAB | Facility: CLINIC | Age: 74
End: 2024-01-24
Payer: MEDICARE

## 2024-01-24 DIAGNOSIS — I10 PRIMARY HYPERTENSION: ICD-10-CM

## 2024-01-24 DIAGNOSIS — D64.9 ANEMIA, UNSPECIFIED TYPE: ICD-10-CM

## 2024-01-24 DIAGNOSIS — I70.90 ARTERIAL OCCLUSION: ICD-10-CM

## 2024-01-24 DIAGNOSIS — R94.4 DECREASED GFR: Primary | ICD-10-CM

## 2024-01-24 LAB
BASOPHILS # BLD AUTO: 0 10E3/UL (ref 0–0.2)
BASOPHILS NFR BLD AUTO: 0 %
EOSINOPHIL # BLD AUTO: 0.2 10E3/UL (ref 0–0.7)
EOSINOPHIL NFR BLD AUTO: 3 %
ERYTHROCYTE [DISTWIDTH] IN BLOOD BY AUTOMATED COUNT: 14.7 % (ref 10–15)
HCT VFR BLD AUTO: 45.2 % (ref 40–53)
HGB BLD-MCNC: 14.2 G/DL (ref 13.3–17.7)
IMM GRANULOCYTES # BLD: 0 10E3/UL
IMM GRANULOCYTES NFR BLD: 0 %
LYMPHOCYTES # BLD AUTO: 0.8 10E3/UL (ref 0.8–5.3)
LYMPHOCYTES NFR BLD AUTO: 14 %
MCH RBC QN AUTO: 30.8 PG (ref 26.5–33)
MCHC RBC AUTO-ENTMCNC: 31.4 G/DL (ref 31.5–36.5)
MCV RBC AUTO: 98 FL (ref 78–100)
MONOCYTES # BLD AUTO: 0.5 10E3/UL (ref 0–1.3)
MONOCYTES NFR BLD AUTO: 8 %
NEUTROPHILS # BLD AUTO: 4.4 10E3/UL (ref 1.6–8.3)
NEUTROPHILS NFR BLD AUTO: 75 %
PLATELET # BLD AUTO: 236 10E3/UL (ref 150–450)
RBC # BLD AUTO: 4.61 10E6/UL (ref 4.4–5.9)
WBC # BLD AUTO: 5.9 10E3/UL (ref 4–11)

## 2024-01-24 PROCEDURE — 85025 COMPLETE CBC W/AUTO DIFF WBC: CPT

## 2024-01-24 PROCEDURE — 36415 COLL VENOUS BLD VENIPUNCTURE: CPT

## 2024-01-24 PROCEDURE — 80053 COMPREHEN METABOLIC PANEL: CPT

## 2024-01-24 RX ORDER — ENOXAPARIN SODIUM 100 MG/ML
0.75 INJECTION SUBCUTANEOUS EVERY 12 HOURS
Qty: 16.8 ML | Refills: 0 | Status: CANCELLED | OUTPATIENT
Start: 2024-01-24

## 2024-01-24 NOTE — PROGRESS NOTES
Baltes, Sandi A, RN  You; Shivani Meier APRN CNP2 hours ago (3:21 PM)      I just spoke to him [] on the phone and he would like the patient to be bridged with lovenox.     [facility] will finalize plan with bridge 1/24/24    Cristy Jaquez, [facility]

## 2024-01-24 NOTE — PROGRESS NOTES
NAGA-PROCEDURAL ANTICOAGULATION  MANAGEMENT    Warfarin interruption plan for Fem-pop arterial bypass on 1/30/24.    Indication for Anticoagulation: Atrial Fibrillation, Hx of Arterial occlusion    Bridging advised by Dr. Crum    PLAN     Pre-Procedure:  Hold warfarin for 5 days, until after procedure starting: Thursday 1/25   Enoxaparin (Lovenox) 50 mg subq Q 12 hrs (0.75 mg/kg Q 12 hrs for CrCl 30-60 ml/min per [facility] P&T approved dose adjustment protocol)   Start enoxaparin: Sat 1/27 AM  Last dose of enoxaparin prior to procedure: Mon 1/29 AM  (~24 hours prior to procedure)    Post-Procedure:  Resume warfarin dose if okay with provider doing procedure on night of procedure, 1/30 PM: 4 mg daily x 2 then resume current dose  Resume enoxaparin (Lovenox) ~ 24-48 hrs post procedure when okay with provider doing procedure. Continue until INR >= 2.0  Recheck INR 5-6 days after resuming warfarin     ? Cristy Jaquez, [facility]

## 2024-01-25 ENCOUNTER — VIRTUAL VISIT (OUTPATIENT)
Dept: VASCULAR SURGERY | Facility: CLINIC | Age: 74
End: 2024-01-25
Attending: SURGERY
Payer: MEDICARE

## 2024-01-25 ENCOUNTER — TELEPHONE (OUTPATIENT)
Dept: FAMILY MEDICINE | Facility: CLINIC | Age: 74
End: 2024-01-25

## 2024-01-25 VITALS — WEIGHT: 150 LBS | BODY MASS INDEX: 24.99 KG/M2 | HEIGHT: 65 IN

## 2024-01-25 DIAGNOSIS — R09.89 OTHER SPECIFIED SYMPTOMS AND SIGNS INVOLVING THE CIRCULATORY AND RESPIRATORY SYSTEMS: ICD-10-CM

## 2024-01-25 DIAGNOSIS — I70.90 ARTERIAL OCCLUSION: Primary | ICD-10-CM

## 2024-01-25 DIAGNOSIS — I72.3 COMMON ILIAC ANEURYSM (H): ICD-10-CM

## 2024-01-25 LAB
ALBUMIN SERPL BCG-MCNC: 4 G/DL (ref 3.5–5.2)
ALP SERPL-CCNC: 71 U/L (ref 40–150)
ALT SERPL W P-5'-P-CCNC: 21 U/L (ref 0–70)
ANION GAP SERPL CALCULATED.3IONS-SCNC: 11 MMOL/L (ref 7–15)
AST SERPL W P-5'-P-CCNC: 27 U/L (ref 0–45)
BILIRUB SERPL-MCNC: 0.4 MG/DL
BUN SERPL-MCNC: 18.4 MG/DL (ref 8–23)
CALCIUM SERPL-MCNC: 9.4 MG/DL (ref 8.8–10.2)
CHLORIDE SERPL-SCNC: 104 MMOL/L (ref 98–107)
CREAT SERPL-MCNC: 1.24 MG/DL (ref 0.67–1.17)
DEPRECATED HCO3 PLAS-SCNC: 24 MMOL/L (ref 22–29)
EGFRCR SERPLBLD CKD-EPI 2021: 61 ML/MIN/1.73M2
GLUCOSE SERPL-MCNC: 91 MG/DL (ref 70–99)
POTASSIUM SERPL-SCNC: 4.5 MMOL/L (ref 3.4–5.3)
PROT SERPL-MCNC: 7 G/DL (ref 6.4–8.3)
SODIUM SERPL-SCNC: 139 MMOL/L (ref 135–145)

## 2024-01-25 PROCEDURE — G2012 BRIEF CHECK IN BY MD/QHP: HCPCS | Mod: 93 | Performed by: SURGERY

## 2024-01-25 ASSESSMENT — PAIN SCALES - GENERAL: PAINLEVEL: NO PAIN (0)

## 2024-01-25 NOTE — NURSING NOTE
No other vitals to report today      Is the patient currently in the state of [place]? YES    Visit mode:TELEPHONE    If the visit is dropped, the patient can be reconnected by: TELEPHONE VISIT: Phone number:   Telephone Information:   Mobile [phone]       Will anyone else be joining the visit? Pts wife  (If patient encounters technical issues they should call [phone] :[phone])    How would you like to obtain your AVS? MyChart    Are changes needed to the allergy or medication list? No    Patient declined individual allergy and medication review by support staff because they deny any changes and state that all information remains accurate since last reviewed/verified.     No changes since reviewed yesterday per pt     Reason for visit: JOSSIE Gold MA VVF

## 2024-01-25 NOTE — Clinical Note
Needs 3 month follow-up with Dr. Crum or [facility] and us [facility] and LLE arterial duplex, thanks!

## 2024-01-25 NOTE — TELEPHONE ENCOUNTER
FYI - Status Update    Who is Calling: family member, Wife Herb    Update: Surgery has been canceled and need to know what dosage he should take now     Does caller want a call/response back: Yes     Could we send this information to you in [place] or would you prefer to receive a phone call?:   Patient would prefer a phone call   Okay to leave a detailed message?: Yes at   Home number on file [phone] (home)

## 2024-01-25 NOTE — TELEPHONE ENCOUNTER
Talked with Shayy who will have Jack resume usual warfarin, inr tomorrow at [facility].  Surgery was cancelled today as he is improving without surgical intervention.

## 2024-01-25 NOTE — PATIENT INSTRUCTIONS
This is the plan that was discussed at your appointment.    We have cancelled surgery for 1/30/24  We will contact you to follow up in the vascular clinic in 3 months with ultrasounds        I am including additional information on these things and our contact information if you have any questions or concerns.   Please do not hesitate to reach out to us if you felt we did not answer your questions or you are unsure of the treatment plan after your visit today. Our number is [phone].  Thank you for trusting us with your care.         Again thank you for your time.

## 2024-01-25 NOTE — PROGRESS NOTES
"Virtual Visit Details    Type of service:  Telephone Visit   Phone call duration: 10 minutes   Patient Location of Call: Home Address  Provider Location of Call- [facility]        Patient is here for a virtual visit to discuss Surgery    Pt is currently taking Aspirin, Statin, and Warfarin.    Ht 1.651 m (5' 5\")   Wt 68 kg (150 lb)   BMI 24.96 kg/m      The provider has been notified that the patient has no concerns.     Questions patient would like addressed today are:     Pt would like to discuss restrictions such as how long after surgery until can drive, stairs (laundry is in basement), Will pt need to use portable for restroom. 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  Patient failed lytic therapy when he was in the hospital.  Initially he was scheduled for bypass surgery, however, the surgery was delayed and patient was discharged from the hospital.  Currently patient denies any symptoms.  Given the fact that he does not have symptoms of claudication, pain at rest, or any tissue loss, I would recommend postpone the operation until the patient becomes symptomatic.  Follow-up in 3 months with repeat studies and abdominal CT scan.  "

## 2024-01-26 ENCOUNTER — LAB (OUTPATIENT)
Dept: CARDIOLOGY | Facility: CLINIC | Age: 74
End: 2024-01-26
Payer: MEDICARE

## 2024-01-26 ENCOUNTER — ANTICOAGULATION THERAPY VISIT (OUTPATIENT)
Dept: ANTICOAGULATION | Facility: CLINIC | Age: 74
End: 2024-01-26

## 2024-01-26 ENCOUNTER — OFFICE VISIT (OUTPATIENT)
Dept: CARDIOLOGY | Facility: CLINIC | Age: 74
End: 2024-01-26
Attending: GENERAL ACUTE CARE HOSPITAL
Payer: MEDICARE

## 2024-01-26 VITALS
WEIGHT: 154 LBS | HEIGHT: 65 IN | DIASTOLIC BLOOD PRESSURE: 72 MMHG | BODY MASS INDEX: 25.66 KG/M2 | SYSTOLIC BLOOD PRESSURE: 126 MMHG | RESPIRATION RATE: 16 BRPM | HEART RATE: 61 BPM

## 2024-01-26 DIAGNOSIS — I25.10 CORONARY ARTERY DISEASE DUE TO LIPID RICH PLAQUE: ICD-10-CM

## 2024-01-26 DIAGNOSIS — Z79.01 LONG TERM (CURRENT) USE OF ANTICOAGULANTS: ICD-10-CM

## 2024-01-26 DIAGNOSIS — I70.90 ARTERIAL OCCLUSION: Primary | ICD-10-CM

## 2024-01-26 DIAGNOSIS — I48.0 PAROXYSMAL ATRIAL FIBRILLATION (H): Primary | ICD-10-CM

## 2024-01-26 DIAGNOSIS — I10 HYPERTENSION, UNSPECIFIED TYPE: ICD-10-CM

## 2024-01-26 DIAGNOSIS — I25.83 CORONARY ARTERY DISEASE DUE TO LIPID RICH PLAQUE: ICD-10-CM

## 2024-01-26 LAB — INR POINT OF CARE: 2.1 (ref 0.9–1.1)

## 2024-01-26 PROCEDURE — 99215 OFFICE O/P EST HI 40 MIN: CPT | Performed by: NURSE PRACTITIONER

## 2024-01-26 PROCEDURE — 85610 PROTHROMBIN TIME: CPT

## 2024-01-26 PROCEDURE — G2211 COMPLEX E/M VISIT ADD ON: HCPCS | Performed by: NURSE PRACTITIONER

## 2024-01-26 PROCEDURE — 36416 COLLJ CAPILLARY BLOOD SPEC: CPT

## 2024-01-26 NOTE — LETTER
1/26/2024    Mercedes Adorno MD  [address] [place] [address]    RE: Jack Brown       Dear Colleague,     I had the pleasure of seeing Jack Brown in the [facility].    HEART CARE ELECTROPHYSIOLOGY CONSULTATON NOTE      [facility]  [phone]    Thank you, Dr. Ordonez, for asking the [facility] Care Electrophysiology team to see . Jack Brown to evaluate atrial fibrillation.    Assessment/Recommendations   Assessment/Plan:  1.  Paroxysmal Atrial Fibrillation: Episode of PAF-RVR noted on telemetry 12/17/2023.  Symptoms status indeterminate.  Maintaining sinus rhythm on amiodarone.    We had a lengthy discussion of the physiology and natural progression of atrial fibrillation and treatment options including rate control versus rhythm control depending upon the presence of symptoms.  We further discussed rhythm control with medications or pulmonary vein isolation ablation.   He was given information to review at home.  Recommend doing a trial off amiodarone and using sotalol versus ablation for recurrent AF.  -- Discontinue amiodarone  --Daily pulse checks, to call if frequent or prolonged A fib    He was reassured that atrial fibrillation is not life-threatening, but carries an increased risk for stroke.  He has a [id number] score of 6 for age 73, vascular disease, HTN, thrombotic event.  We discussed OAC options including Eliquis, Xarelto, and warfarin.  If he has adequate insurance coverage, recommend switching to Eliquis.  Continue warfarin for stroke prophylaxis, goal INR 2.0-3.0.    2.  Coronary artery disease: Three-vessel CABG in 2015.  Exercise nuclear stress testing 10/16/2023 negative for ischemia.  Denies anginal symptoms.  Continue statin and aspirin.    3.  Hypertension: Controlled with amlodipine and metoprolol.    Follow up in 3 months     History of Present Illness/Subjective    HPI: Jack Brown is a 73 year old  male who comes in today for EP consultation of atrial fibrillation.  He has a history of paroxysmal atrial fibrillation, coronary artery disease (CABG x 3V in 2015: LIMA-LAD, SVG-OM, SVG-RCA), hypertension, hyperlipidemia, peripheral arterial disease with acute lower extremity thrombosis, common iliac aneurysm, infrarenal abdominal aortic aneurysm.  He was initially planned for peripheral bypass surgery, but as symptoms have resolved, surgery was canceled.  He has a follow-up in 3 months.    Arrhythmia history  Dx/date: PAF-RVR diagnosed 12/17/2023 (seen on telemetry).  Converted to sinus rhythm on amiodarone.  You are on 200 mg daily.  Sx: Palpitations  [id number] score: 6 for age 73, vascular disease, HTN, thrombotic event  Oral anticoagulation: Warfarin  Antiarrhythmic medications, AV michel blocking agents: Amiodarone 200 mg daily  Procedures  DCCV: N/A  Ablation: N/A    Jack states that he is feeling well.  He cannot recall having awareness of A-fib during the episode that occurred during his hospitalization in December.  He denies chest discomfort, palpitations, abdominal fullness/bloating or peripheral edema, shortness of breath, paroxysmal nocturnal dyspnea, orthopnea, lightheadedness, dizziness, pre-syncope, or syncope.    Cardiographics (EKG personally reviewed):  EKG done 12/18/2023 shows sinus rhythm at 87 bpm, PACs, QRS 98 ms, QT/QTc 336/404 ms    ECHO done 12/19/2023:  Left ventricular size, wall motion and function are normal. The ejection  fraction is 55-60%.  The left atrium is mildly dilated.  There is mild (1+) aortic regurgitation.    NM stress test done 10/16/2023:    The exercise nuclear stress test is negative for inducible myocardial ischemia or infarction.    The exercise stress electrocardiogram is negative for inducible ischemic EKG changes.    The patient's exercise capacity is average for age and gender. The patient exercised for 6:01 minutes on the Florencio protocol, achieving 7.3 METs  "and 103% the age-predicted maximum heart rate. The patient did not experience any symptoms.    The left ventricular ejection fraction at stress is 52%.    The patient is at a low risk of future cardiac ischemic events.    A prior study was conducted on 5/17/2007.  Prior images were unavailable for comparison review.    I have reviewed and updated the patient's Past Medical History, Social History, Family History and Medication List.  Outside records reviewed.     Physical Examination  Review of Systems   Vitals: /72 (BP Location: Left arm, Patient Position: Sitting, Cuff Size: Adult Regular)   Pulse 61   Resp 16   Ht 1.651 m (5' 5\")   Wt 69.9 kg (154 lb)   BMI 25.63 kg/m    BMI= Body mass index is 25.63 kg/m .  Wt Readings from Last 3 Encounters:   01/26/24 69.9 kg (154 lb)   01/25/24 68 kg (150 lb)   01/19/24 69.9 kg (154 lb)       General Appearance:   Alert, well-appearing and in no acute distress.   HEENT: Atraumatic, normocephalic.  No scleral icterus, normal conjunctivae, EOMs intact, PERRL.  Mucous membranes pink and moist.     Chest/Lungs:   Chest symmetric, spine straight.  Respirations unlabored.  Lungs are clear to auscultation.   Cardiovascular:   Regular rate and rhythm.  Normal first and second heart sounds with no murmurs, rubs, or gallops; radial pulses are intact,  no edema.   Abdomen:  Soft, nondistended.   Extremities: No cyanosis or clubbing.   Musculoskeletal: Moves all extremities.    Skin: Warm, dry, intact.    Neurologic: Mood and affect are appropriate.  Alert and oriented to person, place, time, and situation.     ROS: 10 point ROS neg other than the symptoms noted above in the HPI.        Medical History  Surgical History Family History Social History   Past Medical History:   Diagnosis Date    BPH (benign prostatic hypertrophy)     Cancer (H)     Skin on R.chest wall.    Chest discomfort     Coronary artery disease     Detached retina, left Oct 2014    Hyperlipidemia     " Hypertension      Past Surgical History:   Procedure Laterality Date    BYPASS GRAFT ARTERY CORONARY N/A 12/28/2015    Procedure: CORONARY ARTERY BYPASS x 3, RIGHT ENDOSCOPIC VEIN HARVEST, LEFT INTERNAL MAMMARY ARTERY, ANESTHESIA TRANSESOPHAGEAL ECHOCARDIOGRAM;  Surgeon: Jayson Alvarado MD;  Location: [facility];  Service:     CARDIAC SURGERY      CATARACT EXTRACTION  2015    CYST REMOVAL      Ganglion cyst removal of both wrist    HC REMOVAL PREPATELLA BURSA      Description: Excision Of Prepatellar Bursa;  Recorded: 09/30/2014;    HC REPAIR OF NASAL SEPTUM      Description: Septoplasty;  Recorded: 09/30/2014;    HERNIA REPAIR, UMBILICAL      IR LOWER EXTREMITY ANGIOGRAM LEFT  12/16/2023    IR THROMBOLYSIS ARTERIAL INFUSION INITIAL DAY  12/17/2023    PARS PLANA VITRECTOMY W/ REPAIR OF MACULAR HOLE  Nov 2014    AZ EXCIS TENDON SHEATH LESION, HAND/FINGER      Description: Hand Excision Of A Tendon Cyst;  Recorded: 01/04/2011;    AZ LAP, VENTRAL HERNIA REPAIR,REDUCIBLE      Description: Laparoscopy Repair Of Umbilical Hernia;  Recorded: 01/04/2011;    RETINAL DETACHMENT SURGERY  oct 2014    SKIN CANCER EXCISION  Nov 2015    right chest    THORACOSCOPY Right 4/14/2016    Procedure: RIGHT VIDEO ASSISTED THORACOSCOPY, RIGHT LOBECTOMY;  Surgeon: Vinny Chase MD;  Location: [facility];  Service:     VASECTOMY       Family History   Problem Relation Age of Onset    Acute Myocardial Infarction Mother     Aneurysm Mother         brain    Acute Myocardial Infarction Father     Acute Myocardial Infarction Brother     Aortic aneurysm Brother         d @ 72    Lung Cancer Cousin     Coronary Artery Disease Cousin     Leukemia Brother         d @ 55    Colitis Brother     Colon Cancer Paternal Grandfather         Social History     Socioeconomic History    Marital status:      Spouse name: Not on file    Number of children: Not on file    Years of education: Not on file    Highest  education level: Not on file   Occupational History    Not on file   Tobacco Use    Smoking status: Never    Smokeless tobacco: Never   Vaping Use    Vaping Use: Never used   Substance and Sexual Activity    Alcohol use: Yes     Alcohol/week: 8.0 standard drinks of alcohol    Drug use: No    Sexual activity: Never   Other Topics Concern    Not on file   Social History Narrative    Not on file     Social Determinants of Health     Financial Resource Strain: Low Risk  (1/19/2024)    Financial Resource Strain     Within the past 12 months, have you or your family members you live with been unable to get utilities (heat, electricity) when it was really needed?: No   Food Insecurity: Low Risk  (1/19/2024)    Food Insecurity     Within the past 12 months, did you worry that your food would run out before you got money to buy more?: No     Within the past 12 months, did the food you bought just not last and you didn t have money to get more?: No   Transportation Needs: Low Risk  (1/19/2024)    Transportation Needs     Within the past 12 months, has lack of transportation kept you from medical appointments, getting your medicines, non-medical meetings or appointments, work, or from getting things that you need?: No   Physical Activity: Not on file   Stress: Not on file   Social Connections: Not on file   Interpersonal Safety: Low Risk  (1/19/2024)    Interpersonal Safety     Do you feel physically and emotionally safe where you currently live?: Yes     Within the past 12 months, have you been hit, slapped, kicked or otherwise physically hurt by someone?: No     Within the past 12 months, have you been humiliated or emotionally abused in other ways by your partner or ex-partner?: No   Housing Stability: Low Risk  (1/19/2024)    Housing Stability     Do you have housing? : Yes     Are you worried about losing your housing?: No           Medications  Allergies   Current Outpatient Medications   Medication Sig Dispense Refill  "   acetaminophen (TYLENOL) 500 MG tablet [ACETAMINOPHEN (TYLENOL) 500 MG TABLET] Take 500 mg by mouth every 6 (six) hours as needed for pain.      amLODIPine (NORVASC) 10 MG tablet TAKE 1 TABLET(10 MG) BY MOUTH DAILY 90 tablet 2    aspirin 81 mg chewable tablet [ASPIRIN 81 MG CHEWABLE TABLET] Chew 81 mg daily.      finasteride (PROSCAR) 5 MG tablet Take 1 tablet (5 mg) by mouth daily 90 tablet 3    metoprolol tartrate (LOPRESSOR) 50 MG tablet TAKE 1 TABLET(50 MG) BY MOUTH TWICE DAILY 180 tablet 1    rosuvastatin (CRESTOR) 5 MG tablet Take 1 tablet (5 mg) by mouth at bedtime 30 tablet 1    tamsulosin (FLOMAX) 0.4 MG capsule TAKE 1 CAPSULE BY MOUTH DAILY AFTER BREAKFAST      warfarin ANTICOAGULANT (COUMADIN) 1 MG tablet Take 2-3 tablets daily as directed based on inr results. 270 tablet 1       Allergies   Allergen Reactions    Niacin Hives and Rash     Burning of the skin    Atorvastatin Muscle Pain (Myalgia)    Pravastatin Muscle Pain (Myalgia)          Lab Results    Chemistry/lipid CBC Cardiac Enzymes/BNP/TSH/INR   Recent Labs   Lab Test 01/10/24  0750   CHOL 141   HDL 49   LDL 77   TRIG 77     Recent Labs   Lab Test 01/10/24  0750 12/15/23  2025 08/12/22  0843   LDL 77 151* 114     Recent Labs   Lab Test 01/24/24  1121      POTASSIUM 4.5   CHLORIDE 104   CO2 24   GLC 91   BUN 18.4   CR 1.24*   GFRESTIMATED 61   BIGG 9.4     Recent Labs   Lab Test 01/24/24  1121 01/19/24  1027 12/26/23  1411   CR 1.24* 1.27* 1.12     Recent Labs   Lab Test 12/15/23  2025 05/23/22  0942 12/23/15  0850   A1C 5.5 5.6 5.6          Recent Labs   Lab Test 01/24/24  1121   WBC 5.9   HGB 14.2   HCT 45.2   MCV 98        Recent Labs   Lab Test 01/24/24  1121 01/19/24  1027 12/26/23  1411   HGB 14.2 14.1 10.9*    No results for input(s): \"TROPONINI\" in the last 30659 hours.  No results for input(s): \"BNP\", \"NTBNPI\", \"NTBNP\" in the last 48040 hours.  Recent Labs   Lab Test 10/25/18  1124   TSH 1.25     Recent Labs   Lab Test " 01/19/24  1026 01/16/24  1349 01/10/24  0742   INR 2.3* 2.3* 4.3*      53 minutes were spent on the date of encounter performing chart review, history and exam, documentation, and further activities as noted above.    The longitudinal plan of care for atrial fibrillation was addressed during this visit. Due to the added complexity in care, I will continue to support Jack in the subsequent management of this condition(s) and with the ongoing continuity of care of this condition(s).            Thank you for allowing me to participate in the care of your patient.      Sincerely,     LILI Concepcion [facility]  cc:   Charles Ordonez MD  [address]  [place] [address]

## 2024-01-26 NOTE — PROGRESS NOTES
ANTICOAGULATION MANAGEMENT     Jack Brown 73 year old male is on warfarin with therapeutic INR result. (Goal INR 2.0-3.0)    Recent labs: (last 7 days)     01/26/24  1448   INR 2.1*       ASSESSMENT     Source(s): Chart Review and Patient/Caregiver Call     Warfarin doses taken: Warfarin taken as instructed  Diet: No new diet changes identified  Medication/supplement changes:  discontinued amiodarone  New illness, injury, or hospitalization: No  Signs or symptoms of bleeding or clotting: No  Previous result: Therapeutic last 2(+) visits  Additional findings: None       PLAN     Recommended plan for no diet, medication or health factor changes affecting INR     Dosing Instructions: Continue your current warfarin dose with next INR in 1 week       Summary  As of 1/26/2024      Full warfarin instructions:  3 mg every Mon, Wed, Sat; 2 mg all other days   Next INR check:  2/2/2024               Telephone call with Shayy who verbalizes understanding and agrees to plan    Lab visit scheduled    Education provided:   Contact [phone] with any changes, questions or concerns.     Plan made per [facility] anticoagulation protocol    Felisa Carnes RN  Anticoagulation Clinic  1/26/2024    _______________________________________________________________________     Anticoagulation Episode Summary       Current INR goal:  2.0-3.0   TTR:  44.3% (3.9 wk)   Target end date:  Indefinite   Send INR reminders to:  TIFFANIE MIDWAY    Indications    Arterial occlusion [I70.90]             Comments:               Anticoagulation Care Providers       Provider Role Specialty Phone number    Mercedes Adorno MD Referring Family Medicine [phone]

## 2024-01-26 NOTE — PATIENT INSTRUCTIONS
[facility]  Cardiac Electrophysiology  [address]  [place] [address]   Office: [phone]  Fax: [phone]       Jack Brown,    It was a pleasure to see you today at the [facility].     My recommendations after this visit include:    Continue warfarin decrease your stroke risk.  Goal INR 2.0-3.0.  Consider alternative medications: Eliquis or Xarelto which do not require INR's    Stop taking amiodarone.  Check pulse daily and if you have symptoms.  Note if irregular (A fib).  How do you feel?    Call if you have frequent or prolonged A fib    Follow-up in 3 months    Funmilayo Jacob, CNP  [facility], Electrophysiology  [phone]  EP nurses [phone]             ATRIAL FIBRILLATION: Patient Information     What is atrial fibrillation?  Atrial fibrillation (AF, A-fib) is a common heart rhythm problem (arrhythmia) occurring within the upper chambers of the heart (the atria).  In normal rhythm, the upper and lower chambers of the heart are electrically driven to contract in a coordinated sequence.  In atrial fibrillation, the atria lose their ability to contract because of rapid and chaotic electrical activity.  The lower chambers of the heart (the ventricles) continue to pump blood throughout the body, though with irregular and often faster rate due to the chaotic activity within the atria.          How do I know if I have atrial fibrillation?   Some people may feel their heart beating faster, harder, or irregularly while in atrial fibrillation.  Others may be lightheaded, fatigued, feel weak or tired or become more short of breath especially with activities.  Some patients have no symptoms at all.  Atrial fibrillation may be found due to an irregular pulse or on an electrocardiogram (ECG). Atrial fibrillation can start and stop on its own, and episodes can last from seconds to several months.       How common is atrial  fibrillation?   An estimated 3-6 million people in the [place] have atrial fibrillation.  Atrial fibrillation is a common heart rhythm problem for older persons, affecting as estimated 12-15% of people over the age of 65 years of age.     What causes atrial fibrillation?   Age is the most important risk factor for atrial fibrillation.  Atrial fibrillation is more common in people with other heart disease, high blood pressure, diabetes, obesity, sleep apnea and in people who regularly consume alcohol.  Surgery, lung disease, or thyroid problems can lead to atrial fibrillation.  Atrial fibrillation has multiple possible causes, and in most cases a single cause cannot be found.  Atrial fibrillation is a progressive condition, usually starting with at an early stage with short and infrequent episodes.  In later stages of disease, more frequent and longer lasting episodes of atrial fibrillation occur, ultimately culminating in episodes which do not spontaneously terminate.  Generally, more enlargement and scarring within the upper chambers of the heart is observed as atrial fibrillation progresses from early to late-stage disease.     How is atrial fibrillation diagnosed and evaluated?    Because of its start-stop nature, atrial fibrillation can be challenging to diagnose.  Atrial fibrillation is most commonly diagnosed via cardiac rhythm recordings - either an ECG or wearable cardiac rhythm monitor.  For patients with pacemakers, defibrillators or implantable loop recorders, atrial fibrillation may be recorded via these devices.  Recently, commercially available devices (eg. Apple Watch, LinQpay device, certain FitBit devices, others) can allow patients to take 30 second cardiac rhythm recordings which may document atrial fibrillation.  Once atrial fibrillation is diagnosed, additional tests include blood tests and an echocardiogram.  The echocardiogram uses ultrasound to look at your heart to assess your cardiac  function and evaluate for other heart disease.  Additional evaluation may include CT or MRI studies.     Is atrial fibrillation dangerous?   Atrial fibrillation is not usually a life-threatening arrhythmia.  The most serious consequences of atrial fibrillation including stroke and worsening of overall cardiac function.  While in atrial fibrillation, the upper cardiac chambers do not contract normally, resulting in slower blood flow and increased risk of clot formation.  If this blood clot becomes detached from the heart a stroke can occur.  Unfortunately, stroke can be the first sign of atrial fibrillation for some people.  With a stroke, you may notice abnormal sensation, weakness on one side of the body or face, changes in your vision or speech.  If you have any of these signs, you should contact EMS and be evaluated in an emergency room as soon as possible.       How is atrial fibrillation treated?     Several treatment options exist for suppressing atrial fibrillation - however, it is not an easily curable arrhythmia.  The first goal in managing atrial fibrillation is to minimize stroke risk.  The second goal is to improve symptoms associated with atrial fibrillation.  Finally, in patients with reduced cardiac function, maintaining normal rhythm can help improve cardiac function.       Blood thinners are used to reduce the risk of stroke in patients with high estimated stroke risk related to atrial fibrillation.  For patients at higher risk of bleeding related to blood thinner use, implantable devices may be an option to reduce stroke risk without the need for long term blood thinner use.       Atrial fibrillation can be managed via two strategies: rate control and rhythm control.  In a rate control strategy, continued atrial fibrillation is accepted and medications (eg. beta-blockers or calcium channel blockers) are used to control the lower chamber rate.  In a rhythm control strategy, anti-arrhythmic  medications or catheter ablation are used to maintain normal cardiac rhythm and slow disease progression by suppressing atrial fibrillation.  A procedure called a cardioversion, in which an electric shock is delivered through patches placed on the chest wall while under deep sedation, can be performed to temporarily restore normal cardiac rhythm, though does not address the chance of atrial fibrillation recurrence.  Treatments are more effective for earlier rather than later stage atrial fibrillation.  Lifestyle modifications (maintaining a healthy weight, aerobic exercise, diagnosing and treating sleep apnea, and minimizing alcohol intake) are important elements of atrial fibrillation rhythm control.      What is catheter ablation for atrial fibrillation?  Cardiac catheter ablation is a commonly performed, minimally invasive procedure performed by a cardiac electrophysiologist to treat many different cardiac rhythm abnormalities.  During catheter ablation, long, thin, flexible tubes are advanced into the heart via small sheaths inserted into the femoral veins and thermal energy (either heating or cooling) is applied within the heart to disrupt abnormal electrical activity.  Atrial fibrillation ablation is performed under general anesthesia, with procedures generally taking approximately 2-3 hours.  Patients are typically observed for 3-5 hours after the ablation, and in most cases can be discharged home the same day.  Atrial fibrillation ablation is associated with better outcomes (mortality, cardiovascular hospitalizations, atrial arrhythmia recurrences) compared to antiarrhythmic drug therapy.  However, atrial fibrillation recurrences are not uncommon, and repeat catheter ablation may be offered.  Your electrophysiology team can review atrial fibrillation ablation, anticipated success rates, risks, and recovery expectations with you.     What are anti-arrhythmic medications?  Anti-arrhythmic medications are  specialized drugs which alter cardiac electrical functioning to suppress arrhythmias.  There are several anti-arrhythmic medications available, each with its own success rate and side effects.  Some anti-arrhythmic medications are less effective though safer to use, others are more effective though have serious potential toxicities.  Atrial fibrillation recurrences are common and may require dose adjustment or change in antiarrhythmic therapy.  Your electrophysiology team will carefully consider which medication would be the best and safest for your particular case.       Can I live a normal life?    The goal of atrial fibrillation management is for patients to live normal lives without being limited by symptoms related to atrial fibrillation.     Are any additional educational resources available?  There are a number of excellent atrial fibrillation education resources available to you online.  A few options you may wish to review include:  hrsonline.org/guide-atrial-fibrillation  afibmatters.org  getsmartaboutafib.com  stopaf.com     What comes next?    Consider your management options and let us know how we can help in your decision process.  Please take medications as they have been prescribed.  You should also get any tests that may have been ordered for you.       When to Call Your Doctor or seek emergency care:  Call your doctor or seek emergency care if you have any significant changes with the following:  Weakness  Dizziness  Fainting  Fatigue  Shortness of breath  Chest pain with increased activity  If you are concerned that your heart rate is too fast or too slow  Bleeding that does not stop in 10 minutes  Coughing or throwing up blood  Bloody diarrhea or bleeding hemorrhoids  Dark-colored urine or black stool  Allergic reactions:  Rash  Itching  Swelling  Trouble breathing or swallowing        Please call the [facility] at [phone] if you have concerns about your symptoms, your medicines, or  your follow-up appointments.

## 2024-01-26 NOTE — PROGRESS NOTES
HEART CARE ELECTROPHYSIOLOGY CONSULTATON NOTE      [facility]  [phone]    Thank you, Dr. Ordonez, for asking the [facility] Electrophysiology team to see Mr. Jack Brown to evaluate atrial fibrillation.    Assessment/Recommendations   Assessment/Plan:  1.  Paroxysmal Atrial Fibrillation: Episode of PAF-RVR noted on telemetry 12/17/2023.  Symptoms status indeterminate.  Maintaining sinus rhythm on amiodarone.    We had a lengthy discussion of the physiology and natural progression of atrial fibrillation and treatment options including rate control versus rhythm control depending upon the presence of symptoms.  We further discussed rhythm control with medications or pulmonary vein isolation ablation.   He was given information to review at home.  Recommend doing a trial off amiodarone and using sotalol versus ablation for recurrent AF.  -- Discontinue amiodarone  --Daily pulse checks, to call if frequent or prolonged A fib    He was reassured that atrial fibrillation is not life-threatening, but carries an increased risk for stroke.  He has a [id number] score of 6 for age 73, vascular disease, HTN, thrombotic event.  We discussed OAC options including Eliquis, Xarelto, and warfarin.  If he has adequate insurance coverage, recommend switching to Eliquis.  Continue warfarin for stroke prophylaxis, goal INR 2.0-3.0.    2.  Coronary artery disease: Three-vessel CABG in 2015.  Exercise nuclear stress testing 10/16/2023 negative for ischemia.  Denies anginal symptoms.  Continue statin and aspirin.    3.  Hypertension: Controlled with amlodipine and metoprolol.    Follow up in 3 months     History of Present Illness/Subjective    HPI: Jack Brown is a 73 year old male who comes in today for EP consultation of atrial fibrillation.  He has a history of paroxysmal atrial fibrillation, coronary artery disease (CABG x 3V in 2015: LIMA-LAD, SVG-OM, SVG-RCA), hypertension,  hyperlipidemia, peripheral arterial disease with acute lower extremity thrombosis, common iliac aneurysm, infrarenal abdominal aortic aneurysm.  He was initially planned for peripheral bypass surgery, but as symptoms have resolved, surgery was canceled.  He has a follow-up in 3 months.    Arrhythmia history  Dx/date: PAF-RVR diagnosed 12/17/2023 (seen on telemetry).  Converted to sinus rhythm on amiodarone.  You are on 200 mg daily.  Sx: Palpitations  [id number] score: 6 for age 73, vascular disease, HTN, thrombotic event  Oral anticoagulation: Warfarin  Antiarrhythmic medications, AV michel blocking agents: Amiodarone 200 mg daily  Procedures  DCCV: N/A  Ablation: N/A    Jack states that he is feeling well.  He cannot recall having awareness of A-fib during the episode that occurred during his hospitalization in December.  He denies chest discomfort, palpitations, abdominal fullness/bloating or peripheral edema, shortness of breath, paroxysmal nocturnal dyspnea, orthopnea, lightheadedness, dizziness, pre-syncope, or syncope.    Cardiographics (EKG personally reviewed):  EKG done 12/18/2023 shows sinus rhythm at 87 bpm, PACs, QRS 98 ms, QT/QTc 336/404 ms    ECHO done 12/19/2023:  Left ventricular size, wall motion and function are normal. The ejection  fraction is 55-60%.  The left atrium is mildly dilated.  There is mild (1+) aortic regurgitation.    NM stress test done 10/16/2023:    The exercise nuclear stress test is negative for inducible myocardial ischemia or infarction.    The exercise stress electrocardiogram is negative for inducible ischemic EKG changes.    The patient's exercise capacity is average for age and gender. The patient exercised for 6:01 minutes on the Florencio protocol, achieving 7.3 METs and 103% the age-predicted maximum heart rate. The patient did not experience any symptoms.    The left ventricular ejection fraction at stress is 52%.    The patient is at a low risk of future cardiac  "ischemic events.    A prior study was conducted on 5/17/2007.  Prior images were unavailable for comparison review.    I have reviewed and updated the patient's Past Medical History, Social History, Family History and Medication List.  Outside records reviewed.     Physical Examination  Review of Systems   Vitals: /72 (BP Location: Left arm, Patient Position: Sitting, Cuff Size: Adult Regular)   Pulse 61   Resp 16   Ht 1.651 m (5' 5\")   Wt 69.9 kg (154 lb)   BMI 25.63 kg/m    BMI= Body mass index is 25.63 kg/m .  Wt Readings from Last 3 Encounters:   01/26/24 69.9 kg (154 lb)   01/25/24 68 kg (150 lb)   01/19/24 69.9 kg (154 lb)       General Appearance:   Alert, well-appearing and in no acute distress.   HEENT: Atraumatic, normocephalic.  No scleral icterus, normal conjunctivae, EOMs intact, PERRL.  Mucous membranes pink and moist.     Chest/Lungs:   Chest symmetric, spine straight.  Respirations unlabored.  Lungs are clear to auscultation.   Cardiovascular:   Regular rate and rhythm.  Normal first and second heart sounds with no murmurs, rubs, or gallops; radial pulses are intact,  no edema.   Abdomen:  Soft, nondistended.   Extremities: No cyanosis or clubbing.   Musculoskeletal: Moves all extremities.    Skin: Warm, dry, intact.    Neurologic: Mood and affect are appropriate.  Alert and oriented to person, place, time, and situation.     ROS: 10 point ROS neg other than the symptoms noted above in the HPI.        Medical History  Surgical History Family History Social History   Past Medical History:   Diagnosis Date    BPH (benign prostatic hypertrophy)     Cancer (H)     Skin on R.chest wall.    Chest discomfort     Coronary artery disease     Detached retina, left Oct 2014    Hyperlipidemia     Hypertension      Past Surgical History:   Procedure Laterality Date    BYPASS GRAFT ARTERY CORONARY N/A 12/28/2015    Procedure: CORONARY ARTERY BYPASS x 3, RIGHT ENDOSCOPIC VEIN HARVEST, LEFT INTERNAL " MAMMARY ARTERY, ANESTHESIA TRANSESOPHAGEAL ECHOCARDIOGRAM;  Surgeon: Jayson Alvarado MD;  Location: [facility];  Service:     CARDIAC SURGERY      CATARACT EXTRACTION  2015    CYST REMOVAL      Ganglion cyst removal of both wrist    HC REMOVAL PREPATELLA BURSA      Description: Excision Of Prepatellar Bursa;  Recorded: 09/30/2014;    HC REPAIR OF NASAL SEPTUM      Description: Septoplasty;  Recorded: 09/30/2014;    HERNIA REPAIR, UMBILICAL      IR LOWER EXTREMITY ANGIOGRAM LEFT  12/16/2023    IR THROMBOLYSIS ARTERIAL INFUSION INITIAL DAY  12/17/2023    PARS PLANA VITRECTOMY W/ REPAIR OF MACULAR HOLE  Nov 2014    MO EXCIS TENDON SHEATH LESION, HAND/FINGER      Description: Hand Excision Of A Tendon Cyst;  Recorded: 01/04/2011;    MO LAP, VENTRAL HERNIA REPAIR,REDUCIBLE      Description: Laparoscopy Repair Of Umbilical Hernia;  Recorded: 01/04/2011;    RETINAL DETACHMENT SURGERY  oct 2014    SKIN CANCER EXCISION  Nov 2015    right chest    THORACOSCOPY Right 4/14/2016    Procedure: RIGHT VIDEO ASSISTED THORACOSCOPY, RIGHT LOBECTOMY;  Surgeon: Vinny Chase MD;  Location: [facility];  Service:     VASECTOMY       Family History   Problem Relation Age of Onset    Acute Myocardial Infarction Mother     Aneurysm Mother         brain    Acute Myocardial Infarction Father     Acute Myocardial Infarction Brother     Aortic aneurysm Brother         d @ 72    Lung Cancer Cousin     Coronary Artery Disease Cousin     Leukemia Brother         d @ 55    Colitis Brother     Colon Cancer Paternal Grandfather         Social History     Socioeconomic History    Marital status:      Spouse name: Not on file    Number of children: Not on file    Years of education: Not on file    Highest education level: Not on file   Occupational History    Not on file   Tobacco Use    Smoking status: Never    Smokeless tobacco: Never   Vaping Use    Vaping Use: Never used   Substance and Sexual Activity     Alcohol use: Yes     Alcohol/week: 8.0 standard drinks of alcohol    Drug use: No    Sexual activity: Never   Other Topics Concern    Not on file   Social History Narrative    Not on file     Social Determinants of Health     Financial Resource Strain: Low Risk  (1/19/2024)    Financial Resource Strain     Within the past 12 months, have you or your family members you live with been unable to get utilities (heat, electricity) when it was really needed?: No   Food Insecurity: Low Risk  (1/19/2024)    Food Insecurity     Within the past 12 months, did you worry that your food would run out before you got money to buy more?: No     Within the past 12 months, did the food you bought just not last and you didn t have money to get more?: No   Transportation Needs: Low Risk  (1/19/2024)    Transportation Needs     Within the past 12 months, has lack of transportation kept you from medical appointments, getting your medicines, non-medical meetings or appointments, work, or from getting things that you need?: No   Physical Activity: Not on file   Stress: Not on file   Social Connections: Not on file   Interpersonal Safety: Low Risk  (1/19/2024)    Interpersonal Safety     Do you feel physically and emotionally safe where you currently live?: Yes     Within the past 12 months, have you been hit, slapped, kicked or otherwise physically hurt by someone?: No     Within the past 12 months, have you been humiliated or emotionally abused in other ways by your partner or ex-partner?: No   Housing Stability: Low Risk  (1/19/2024)    Housing Stability     Do you have housing? : Yes     Are you worried about losing your housing?: No           Medications  Allergies   Current Outpatient Medications   Medication Sig Dispense Refill    acetaminophen (TYLENOL) 500 MG tablet [ACETAMINOPHEN (TYLENOL) 500 MG TABLET] Take 500 mg by mouth every 6 (six) hours as needed for pain.      amLODIPine (NORVASC) 10 MG tablet TAKE 1 TABLET(10 MG) BY  "MOUTH DAILY 90 tablet 2    aspirin 81 mg chewable tablet [ASPIRIN 81 MG CHEWABLE TABLET] Chew 81 mg daily.      finasteride (PROSCAR) 5 MG tablet Take 1 tablet (5 mg) by mouth daily 90 tablet 3    metoprolol tartrate (LOPRESSOR) 50 MG tablet TAKE 1 TABLET(50 MG) BY MOUTH TWICE DAILY 180 tablet 1    rosuvastatin (CRESTOR) 5 MG tablet Take 1 tablet (5 mg) by mouth at bedtime 30 tablet 1    tamsulosin (FLOMAX) 0.4 MG capsule TAKE 1 CAPSULE BY MOUTH DAILY AFTER BREAKFAST      warfarin ANTICOAGULANT (COUMADIN) 1 MG tablet Take 2-3 tablets daily as directed based on inr results. 270 tablet 1       Allergies   Allergen Reactions    Niacin Hives and Rash     Burning of the skin    Atorvastatin Muscle Pain (Myalgia)    Pravastatin Muscle Pain (Myalgia)          Lab Results    Chemistry/lipid CBC Cardiac Enzymes/BNP/TSH/INR   Recent Labs   Lab Test 01/10/24  0750   CHOL 141   HDL 49   LDL 77   TRIG 77     Recent Labs   Lab Test 01/10/24  0750 12/15/23  2025 08/12/22  0843   LDL 77 151* 114     Recent Labs   Lab Test 01/24/24  1121      POTASSIUM 4.5   CHLORIDE 104   CO2 24   GLC 91   BUN 18.4   CR 1.24*   GFRESTIMATED 61   BIGG 9.4     Recent Labs   Lab Test 01/24/24  1121 01/19/24  1027 12/26/23  1411   CR 1.24* 1.27* 1.12     Recent Labs   Lab Test 12/15/23  2025 05/23/22  0942 12/23/15  0850   A1C 5.5 5.6 5.6          Recent Labs   Lab Test 01/24/24  1121   WBC 5.9   HGB 14.2   HCT 45.2   MCV 98        Recent Labs   Lab Test 01/24/24  1121 01/19/24  1027 12/26/23  1411   HGB 14.2 14.1 10.9*    No results for input(s): \"TROPONINI\" in the last 37773 hours.  No results for input(s): \"BNP\", \"NTBNPI\", \"NTBNP\" in the last 09474 hours.  Recent Labs   Lab Test 10/25/18  1124   TSH 1.25     Recent Labs   Lab Test 01/19/24  1026 01/16/24  1349 01/10/24  0742   INR 2.3* 2.3* 4.3*      53 minutes were spent on the date of encounter performing chart review, history and exam, documentation, and further activities as noted " above.    The longitudinal plan of care for atrial fibrillation was addressed during this visit. Due to the added complexity in care, I will continue to support Jack in the subsequent management of this condition(s) and with the ongoing continuity of care of this condition(s).

## 2024-01-29 ENCOUNTER — TELEPHONE (OUTPATIENT)
Dept: PEDIATRICS | Facility: CLINIC | Age: 74
End: 2024-01-29
Payer: MEDICARE

## 2024-01-29 NOTE — TELEPHONE ENCOUNTER
Reason for call:  Other   Patient called regarding (reason for call): call back  Additional comments:     MATTHEW--patient cardiologist discontinued his AMIODARONE      Phone number to reach patient:  Home number on file [phone] (home)    Best Time:  any    Can we leave a detailed message on this number?  YES    Travel screening: Not Applicable

## 2024-01-30 ENCOUNTER — LAB (OUTPATIENT)
Dept: LAB | Facility: CLINIC | Age: 74
End: 2024-01-30
Payer: MEDICARE

## 2024-01-30 DIAGNOSIS — R94.4 DECREASED GFR: ICD-10-CM

## 2024-01-30 LAB
ALBUMIN UR-MCNC: NEGATIVE MG/DL
ANION GAP SERPL CALCULATED.3IONS-SCNC: 8 MMOL/L (ref 7–15)
APPEARANCE UR: CLEAR
BILIRUB UR QL STRIP: NEGATIVE
BUN SERPL-MCNC: 23.1 MG/DL (ref 8–23)
CALCIUM SERPL-MCNC: 9.3 MG/DL (ref 8.8–10.2)
CHLORIDE SERPL-SCNC: 104 MMOL/L (ref 98–107)
COLOR UR AUTO: YELLOW
CREAT SERPL-MCNC: 1.19 MG/DL (ref 0.67–1.17)
DEPRECATED HCO3 PLAS-SCNC: 26 MMOL/L (ref 22–29)
EGFRCR SERPLBLD CKD-EPI 2021: 64 ML/MIN/1.73M2
GLUCOSE SERPL-MCNC: 76 MG/DL (ref 70–99)
GLUCOSE UR STRIP-MCNC: NEGATIVE MG/DL
HGB UR QL STRIP: NEGATIVE
KETONES UR STRIP-MCNC: ABNORMAL MG/DL
LEUKOCYTE ESTERASE UR QL STRIP: NEGATIVE
NITRATE UR QL: NEGATIVE
OSMOLALITY UR: 582 MMOL/KG (ref 100–1200)
PH UR STRIP: 5.5 [PH] (ref 5–7)
POTASSIUM SERPL-SCNC: 4.4 MMOL/L (ref 3.4–5.3)
SODIUM SERPL-SCNC: 138 MMOL/L (ref 135–145)
SP GR UR STRIP: 1.02 (ref 1–1.03)
UROBILINOGEN UR STRIP-ACNC: 0.2 E.U./DL

## 2024-01-30 PROCEDURE — 36415 COLL VENOUS BLD VENIPUNCTURE: CPT

## 2024-01-30 PROCEDURE — 81003 URINALYSIS AUTO W/O SCOPE: CPT

## 2024-01-30 PROCEDURE — 83935 ASSAY OF URINE OSMOLALITY: CPT

## 2024-01-30 PROCEDURE — 80048 BASIC METABOLIC PNL TOTAL CA: CPT

## 2024-02-01 ENCOUNTER — ANTICOAGULATION THERAPY VISIT (OUTPATIENT)
Dept: ANTICOAGULATION | Facility: CLINIC | Age: 74
End: 2024-02-01
Payer: MEDICARE

## 2024-02-01 DIAGNOSIS — I70.90 ARTERIAL OCCLUSION: Primary | ICD-10-CM

## 2024-02-02 ENCOUNTER — LAB (OUTPATIENT)
Dept: LAB | Facility: CLINIC | Age: 74
End: 2024-02-02
Payer: MEDICARE

## 2024-02-02 ENCOUNTER — ANTICOAGULATION THERAPY VISIT (OUTPATIENT)
Dept: ANTICOAGULATION | Facility: CLINIC | Age: 74
End: 2024-02-02

## 2024-02-02 DIAGNOSIS — I70.90 ARTERIAL OCCLUSION: Primary | ICD-10-CM

## 2024-02-02 DIAGNOSIS — I70.90 ARTERIAL OCCLUSION: ICD-10-CM

## 2024-02-02 LAB — INR BLD: 1.6 (ref 0.9–1.1)

## 2024-02-02 PROCEDURE — 85610 PROTHROMBIN TIME: CPT

## 2024-02-02 PROCEDURE — 36416 COLLJ CAPILLARY BLOOD SPEC: CPT

## 2024-02-02 NOTE — PROGRESS NOTES
ANTICOAGULATION MANAGEMENT     Jack Brown 73 year old male is on warfarin with subtherapeutic INR result. (Goal INR 2.0-3.0)    Recent labs: (last 7 days)     02/02/24  1434   INR 1.6*       ASSESSMENT     Source(s): Chart Review and Patient/Caregiver Call     Warfarin doses taken: Warfarin taken as instructed  Diet: No new diet changes identified  Medication/supplement changes:  amiodarone dc  1/26  New illness, injury, or hospitalization: No  Signs or symptoms of bleeding or clotting: No  Previous result: Therapeutic last 2(+) visits  Additional findings: None       PLAN     Recommended plan for ongoing change(s) affecting INR     Dosing Instructions: Increase your warfarin dose (11.8% change) with next INR in 1 week       Summary  As of 2/2/2024      Full warfarin instructions:  2 mg every Sun, Thu; 3 mg all other days   Next INR check:  2/9/2024               Telephone call with Shayy who verbalizes understanding and agrees to plan    Contact [phone] to schedule and with any changes, questions or concerns.     Education provided:   Please call back if any changes to your diet, medications or how you've been taking warfarin    Plan made per [facility] anticoagulation protocol    Felisa Carnes RN  Anticoagulation Clinic  2/2/2024    _______________________________________________________________________     Anticoagulation Episode Summary       Current INR goal:  2.0-3.0   TTR:  39.4% (1.1 mo)   Target end date:  Indefinite   Send INR reminders to:  [facility]    Indications    Arterial occlusion [I70.90]             Comments:               Anticoagulation Care Providers       Provider Role Specialty Phone number    Mercedes Adorno MD Referring Family Medicine [phone]

## 2024-02-09 ENCOUNTER — ANTICOAGULATION THERAPY VISIT (OUTPATIENT)
Dept: ANTICOAGULATION | Facility: CLINIC | Age: 74
End: 2024-02-09

## 2024-02-09 ENCOUNTER — LAB (OUTPATIENT)
Dept: LAB | Facility: CLINIC | Age: 74
End: 2024-02-09
Payer: MEDICARE

## 2024-02-09 DIAGNOSIS — I70.90 ARTERIAL OCCLUSION: Primary | ICD-10-CM

## 2024-02-09 DIAGNOSIS — I70.90 ARTERIAL OCCLUSION: ICD-10-CM

## 2024-02-09 LAB — INR BLD: 1.6 (ref 0.9–1.1)

## 2024-02-09 PROCEDURE — 85610 PROTHROMBIN TIME: CPT

## 2024-02-09 PROCEDURE — 36416 COLLJ CAPILLARY BLOOD SPEC: CPT

## 2024-02-09 NOTE — PROGRESS NOTES
ANTICOAGULATION MANAGEMENT     Jack Brown 73 year old male is on warfarin with subtherapeutic INR result. (Goal INR 2.0-3.0)    Recent labs: (last 7 days)     02/09/24  1327   INR 1.6*       ASSESSMENT     Source(s): Chart Review and Patient/Caregiver Call     Warfarin doses taken: Warfarin taken as instructed  Diet: No new diet changes identified  Medication/supplement changes: None noted  New illness, injury, or hospitalization: No  Signs or symptoms of bleeding or clotting: No  Previous result: Subtherapeutic  Additional findings: None       PLAN     Recommended plan for no diet, medication or health factor changes affecting INR     Dosing Instructions: booster dose then Increase your warfarin dose (10.5% change) with next INR in 2 weeks       Summary  As of 2/9/2024      Full warfarin instructions:  2/9: 5 mg; Otherwise 3 mg every day   Next INR check:  2/21/2024               Telephone call with Shayy who verbalizes understanding and agrees to plan    Lab visit scheduled    Education provided:   Please call back if any changes to your diet, medications or how you've been taking warfarin    Plan made per [facility] anticoagulation protocol    Felisa Carnes RN  Anticoagulation Clinic  2/9/2024    _______________________________________________________________________     Anticoagulation Episode Summary       Current INR goal:  2.0-3.0   TTR:  32.8% (1.4 mo)   Target end date:  Indefinite   Send INR reminders to:  [facility]    Indications    Arterial occlusion [I70.90]             Comments:               Anticoagulation Care Providers       Provider Role Specialty Phone number    Mercedes Adorno MD Referring Family Medicine [phone]

## 2024-02-12 ENCOUNTER — OFFICE VISIT (OUTPATIENT)
Dept: FAMILY MEDICINE | Facility: CLINIC | Age: 74
End: 2024-02-12
Payer: MEDICARE

## 2024-02-12 VITALS
WEIGHT: 157.7 LBS | DIASTOLIC BLOOD PRESSURE: 80 MMHG | OXYGEN SATURATION: 97 % | SYSTOLIC BLOOD PRESSURE: 150 MMHG | HEART RATE: 50 BPM | TEMPERATURE: 97.5 F | BODY MASS INDEX: 26.24 KG/M2 | RESPIRATION RATE: 18 BRPM

## 2024-02-12 DIAGNOSIS — L72.9 BENIGN CYST OF SKIN: Primary | ICD-10-CM

## 2024-02-12 PROCEDURE — 99213 OFFICE O/P EST LOW 20 MIN: CPT | Performed by: FAMILY MEDICINE

## 2024-02-12 NOTE — PROGRESS NOTES
Assessment:       Benign cyst of skin     Plan:     Patient with benign appearing cyst on the foreskin.  Reassurance given.  Follow-up with urology if getting larger or causing more pain or not going away over the next 2 weeks.  Patient agreeable with this plan.          Subjective:       73 year old male presents for evaluation of a 10-day history of a nontender lump on his foreskin.  It has not been red.  He has used a topical antibiotic on it which has not made a difference.    Patient Active Problem List   Diagnosis    Hyperlipemia    Visual Impairment    Nasal drainage    Coronary artery disease due to lipid rich plaque    S/P CABG x 3    Thyroid nodule    Urinary retention    Atypical carcinoid lung tumor (H)    Muscle soreness    History of melanoma    History of squamous cell carcinoma    History of basal cell carcinoma    Hypertension    Visual impairment    Gross hematuria    Pain of left upper arm    Arterial occlusion    Pain of left lower leg    Infrarenal abdominal aortic aneurysm (AAA) without rupture (H24)    Common iliac aneurysm (H24)    Paroxysmal atrial fibrillation (H)       Past Medical History:   Diagnosis Date    BPH (benign prostatic hypertrophy)     Cancer (H)     Skin on R.chest wall.    Chest discomfort     Coronary artery disease     Detached retina, left Oct 2014    Hyperlipidemia     Hypertension        Past Surgical History:   Procedure Laterality Date    BYPASS GRAFT ARTERY CORONARY N/A 12/28/2015    Procedure: CORONARY ARTERY BYPASS x 3, RIGHT ENDOSCOPIC VEIN HARVEST, LEFT INTERNAL MAMMARY ARTERY, ANESTHESIA TRANSESOPHAGEAL ECHOCARDIOGRAM;  Surgeon: Jayson Alvarado MD;  Location: [facility];  Service:     CARDIAC SURGERY      CATARACT EXTRACTION  2015    CYST REMOVAL      Ganglion cyst removal of both wrist    HC REMOVAL PREPATELLA BURSA      Description: Excision Of Prepatellar Bursa;  Recorded: 09/30/2014;    HC REPAIR OF NASAL SEPTUM      Description:  Septoplasty;  Recorded: 09/30/2014;    HERNIA REPAIR, UMBILICAL      IR LOWER EXTREMITY ANGIOGRAM LEFT  12/16/2023    IR THROMBOLYSIS ARTERIAL INFUSION INITIAL DAY  12/17/2023    PARS PLANA VITRECTOMY W/ REPAIR OF MACULAR HOLE  Nov 2014    ND EXCIS TENDON SHEATH LESION, HAND/FINGER      Description: Hand Excision Of A Tendon Cyst;  Recorded: 01/04/2011;    ND LAP, VENTRAL HERNIA REPAIR,REDUCIBLE      Description: Laparoscopy Repair Of Umbilical Hernia;  Recorded: 01/04/2011;    RETINAL DETACHMENT SURGERY  oct 2014    SKIN CANCER EXCISION  Nov 2015    right chest    THORACOSCOPY Right 4/14/2016    Procedure: RIGHT VIDEO ASSISTED THORACOSCOPY, RIGHT LOBECTOMY;  Surgeon: Vinny Chase MD;  Location: [facility];  Service:     VASECTOMY         Current Outpatient Medications   Medication    acetaminophen (TYLENOL) 500 MG tablet    amLODIPine (NORVASC) 10 MG tablet    aspirin 81 mg chewable tablet    finasteride (PROSCAR) 5 MG tablet    metoprolol tartrate (LOPRESSOR) 50 MG tablet    rosuvastatin (CRESTOR) 5 MG tablet    tamsulosin (FLOMAX) 0.4 MG capsule    warfarin ANTICOAGULANT (COUMADIN) 1 MG tablet     No current facility-administered medications for this visit.       Allergies   Allergen Reactions    Niacin Hives and Rash     Burning of the skin    Atorvastatin Muscle Pain (Myalgia)    Pravastatin Muscle Pain (Myalgia)       Family History   Problem Relation Age of Onset    Acute Myocardial Infarction Mother     Aneurysm Mother         brain    Acute Myocardial Infarction Father     Acute Myocardial Infarction Brother     Aortic aneurysm Brother         d @ 72    Lung Cancer Cousin     Coronary Artery Disease Cousin     Leukemia Brother         d @ 55    Colitis Brother     Colon Cancer Paternal Grandfather        Social History     Socioeconomic History    Marital status:      Spouse name: None    Number of children: None    Years of education: None    Highest education level: None    Tobacco Use    Smoking status: Never    Smokeless tobacco: Never   Vaping Use    Vaping Use: Never used   Substance and Sexual Activity    Alcohol use: Yes     Alcohol/week: 8.0 standard drinks of alcohol    Drug use: No    Sexual activity: Never     Social Determinants of Health     Financial Resource Strain: Low Risk  (1/19/2024)    Financial Resource Strain     Within the past 12 months, have you or your family members you live with been unable to get utilities (heat, electricity) when it was really needed?: No   Food Insecurity: Low Risk  (1/19/2024)    Food Insecurity     Within the past 12 months, did you worry that your food would run out before you got money to buy more?: No     Within the past 12 months, did the food you bought just not last and you didn t have money to get more?: No   Transportation Needs: Low Risk  (1/19/2024)    Transportation Needs     Within the past 12 months, has lack of transportation kept you from medical appointments, getting your medicines, non-medical meetings or appointments, work, or from getting things that you need?: No   Interpersonal Safety: Low Risk  (1/19/2024)    Interpersonal Safety     Do you feel physically and emotionally safe where you currently live?: Yes     Within the past 12 months, have you been hit, slapped, kicked or otherwise physically hurt by someone?: No     Within the past 12 months, have you been humiliated or emotionally abused in other ways by your partner or ex-partner?: No   Housing Stability: Low Risk  (1/19/2024)    Housing Stability     Do you have housing? : Yes     Are you worried about losing your housing?: No         Review of Systems  Pertinent items are noted in HPI.      Objective:     BP (!) 150/80 (BP Location: Right arm, Patient Position: Sitting, Cuff Size: Adult Regular)   Pulse 50   Temp 97.5  F (36.4  C) (Oral)   Resp 18   Wt 71.5 kg (157 lb 11.2 oz)   SpO2 97%   BMI 26.24 kg/m       General appearance: alert, appears  stated age, and cooperative  Male genitalia: a small 2 mm nontender subcutaneous cyst like noted on the foreskin.  No overlying erythema.  No other abnormalities noted.        This note has been dictated using voice recognition software. Any grammatical or context distortions are unintentional and inherent to the software

## 2024-02-21 ENCOUNTER — LAB (OUTPATIENT)
Dept: LAB | Facility: CLINIC | Age: 74
End: 2024-02-21
Payer: MEDICARE

## 2024-02-21 ENCOUNTER — ANTICOAGULATION THERAPY VISIT (OUTPATIENT)
Dept: ANTICOAGULATION | Facility: CLINIC | Age: 74
End: 2024-02-21

## 2024-02-21 DIAGNOSIS — I70.90 ARTERIAL OCCLUSION: ICD-10-CM

## 2024-02-21 DIAGNOSIS — I70.90 ARTERIAL OCCLUSION: Primary | ICD-10-CM

## 2024-02-21 LAB — INR BLD: 2 (ref 0.9–1.1)

## 2024-02-21 PROCEDURE — 85610 PROTHROMBIN TIME: CPT

## 2024-02-21 PROCEDURE — 36416 COLLJ CAPILLARY BLOOD SPEC: CPT

## 2024-02-21 NOTE — PROGRESS NOTES
ANTICOAGULATION MANAGEMENT     Jack Brown 73 year old male is on warfarin with therapeutic INR result. (Goal INR 2.0-3.0)    Recent labs: (last 7 days)     02/21/24  1314   INR 2.0*       ASSESSMENT     Warfarin Lab Questionnaire    Warfarin Doses Last 7 Days      2/21/2024     8:56 AM   Dose in Tablet or Mg   TAB or MG? milligram (mg)     Pt Rptd Dose SUNDAY MONDAY TUESDAY WED THURS FRIDAY SATURDAY 2/21/2024   8:56 AM 3 3 3 3 3 3 3         2/21/2024   Warfarin Lab Questionnaire   Missed doses within past 14 days? No   Changes in diet or alcohol within past 14 days? No   Medication changes since last result? No   Injuries or illness since last result? No   New shortness of breath, severe headaches or sudden changes in vision since last result? No   Abnormal bleeding since last result? No   Upcoming surgery, procedure? No   Best number to call with results? [phone]     Previous result: Subtherapeutic  Additional findings: None       PLAN     Recommended plan for no diet, medication or health factor changes affecting INR     Dosing Instructions: Continue your current warfarin dose with next INR in 2 weeks       Summary  As of 2/21/2024      Full warfarin instructions:  3 mg every day   Next INR check:  3/6/2024               Detailed voice message left for Jack with dosing instructions and follow up date.     Contact [phone] to schedule and with any changes, questions or concerns.     Education provided:   Please call back if any changes to your diet, medications or how you've been taking warfarin    Plan made per [facility] anticoagulation protocol    Felisa Carnes RN  Anticoagulation Clinic  2/21/2024    _______________________________________________________________________     Anticoagulation Episode Summary       Current INR goal:  2.0-3.0   TTR:  25.4% (1.8 mo)   Target end date:  Indefinite   Send INR reminders to:  [facility]    Indications    Arterial occlusion [I70.90]             Comments:                Anticoagulation Care Providers       Provider Role Specialty Phone number    Mercedes Adorno MD Referring Family Medicine [phone]

## 2024-02-22 DIAGNOSIS — I10 BENIGN ESSENTIAL HYPERTENSION: ICD-10-CM

## 2024-02-23 RX ORDER — METOPROLOL TARTRATE 50 MG
TABLET ORAL
Qty: 180 TABLET | Refills: 1 | Status: SHIPPED | OUTPATIENT
Start: 2024-02-23 | End: 2024-09-06

## 2024-03-06 ENCOUNTER — ANTICOAGULATION THERAPY VISIT (OUTPATIENT)
Dept: ANTICOAGULATION | Facility: CLINIC | Age: 74
End: 2024-03-06

## 2024-03-06 ENCOUNTER — LAB (OUTPATIENT)
Dept: LAB | Facility: CLINIC | Age: 74
End: 2024-03-06
Payer: MEDICARE

## 2024-03-06 DIAGNOSIS — I70.90 ARTERIAL OCCLUSION: Primary | ICD-10-CM

## 2024-03-06 DIAGNOSIS — I70.90 ARTERIAL OCCLUSION: ICD-10-CM

## 2024-03-06 LAB — INR BLD: 1.7 (ref 0.9–1.1)

## 2024-03-06 PROCEDURE — 85610 PROTHROMBIN TIME: CPT

## 2024-03-06 PROCEDURE — 36416 COLLJ CAPILLARY BLOOD SPEC: CPT

## 2024-03-06 NOTE — PROGRESS NOTES
ANTICOAGULATION MANAGEMENT     Jack Brown 73 year old male is on warfarin with subtherapeutic INR result. (Goal INR 2.0-3.0)    Recent labs: (last 7 days)     03/06/24  1416   INR 1.7*       ASSESSMENT     Source(s): Chart Review and Patient/Caregiver Call     Warfarin doses taken: Warfarin taken as instructed  Diet: No new diet changes identified  Medication/supplement changes: None noted  New illness, injury, or hospitalization: No  Signs or symptoms of bleeding or clotting: No  Previous result: Therapeutic last visit; previously outside of goal range  Additional findings: None       PLAN     Recommended plan for no diet, medication or health factor changes affecting INR     Dosing Instructions: Increase your warfarin dose (4.8% change) with next INR in 2 weeks       Summary  As of 3/6/2024      Full warfarin instructions:  4 mg every Wed; 3 mg all other days   Next INR check:  3/20/2024               Telephone call with Jack who verbalizes understanding and agrees to plan    Lab visit scheduled    Education provided:   Please call back if any changes to your diet, medications or how you've been taking warfarin  Goal range and lab monitoring: goal range and significance of current result, Importance of therapeutic range, and Importance of following up at instructed interval  Symptom monitoring: monitoring for clotting signs and symptoms and monitoring for stroke signs and symptoms    Plan made per [facility] anticoagulation protocol    Sandi Banks RN  Anticoagulation Clinic  3/6/2024    _______________________________________________________________________     Anticoagulation Episode Summary       Current INR goal:  2.0-3.0   TTR:  20.2% (2.2 mo)   Target end date:  Indefinite   Send INR reminders to:  [facility]    Indications    Arterial occlusion [I70.90]             Comments:               Anticoagulation Care Providers       Provider Role Specialty Phone number    Mercedes Adorno MD Referring Family  Medicine [phone]

## 2024-03-13 DIAGNOSIS — I10 PRIMARY HYPERTENSION: ICD-10-CM

## 2024-03-13 RX ORDER — TAMSULOSIN HYDROCHLORIDE 0.4 MG/1
CAPSULE ORAL
OUTPATIENT
Start: 2024-03-13

## 2024-03-13 NOTE — TELEPHONE ENCOUNTER
tamsulosin (FLOMAX) 0.4 MG capsule (Discontinued) 90 capsule 3 8/4/2023 1/19/2024 No   Sig: TAKE 1 CAPSULE BY MOUTH DAILY AFTER BREAKFAST   Patient not taking: Reported on 1/19/2024   Sent to pharmacy as: Tamsulosin HCl 0.4 MG Oral Capsule (FLOMAX)   Class: E-Prescribe   Order: [id number]   E-Prescribing Status: Receipt confirmed by pharmacy (8/4/2023  9:21 AM CDT)   E-Cancel Status: Request approved by pharmacy (1/19/2024 12:24 PM CST)       E-Cancel Status Note: Some or all of Rx dispensed; Last fill:12/11/23     This was accidentally taken off chart- re=added as historical. But he should have refils

## 2024-03-15 RX ORDER — TAMSULOSIN HYDROCHLORIDE 0.4 MG/1
CAPSULE ORAL
Qty: 90 CAPSULE | Refills: 1 | Status: SHIPPED | OUTPATIENT
Start: 2024-03-15 | End: 2024-04-05

## 2024-03-20 ENCOUNTER — LAB (OUTPATIENT)
Dept: LAB | Facility: CLINIC | Age: 74
End: 2024-03-20
Payer: MEDICARE

## 2024-03-20 ENCOUNTER — ANTICOAGULATION THERAPY VISIT (OUTPATIENT)
Dept: ANTICOAGULATION | Facility: CLINIC | Age: 74
End: 2024-03-20

## 2024-03-20 DIAGNOSIS — I70.90 ARTERIAL OCCLUSION: Primary | ICD-10-CM

## 2024-03-20 DIAGNOSIS — I70.90 ARTERIAL OCCLUSION: ICD-10-CM

## 2024-03-20 LAB — INR BLD: 1.7 (ref 0.9–1.1)

## 2024-03-20 PROCEDURE — 85610 PROTHROMBIN TIME: CPT

## 2024-03-20 PROCEDURE — 36416 COLLJ CAPILLARY BLOOD SPEC: CPT

## 2024-03-20 NOTE — PROGRESS NOTES
ANTICOAGULATION MANAGEMENT     Jack Brown 73 year old male is on warfarin with subtherapeutic INR result. (Goal INR 2.0-3.0)    Recent labs: (last 7 days)     03/20/24  0842   INR 1.7*       ASSESSMENT     Source(s): Chart Review and Patient/Caregiver Call     Warfarin doses taken: Warfarin taken as instructed  Diet: No new diet changes identified  Medication/supplement changes: None noted  New illness, injury, or hospitalization: No  Signs or symptoms of bleeding or clotting: No  Previous result: Subtherapeutic  Additional findings: None       PLAN     Recommended plan for no diet, medication or health factor changes affecting INR     Dosing Instructions: Increase your warfarin dose (9.1% change) with next INR in 2 weeks       Summary  As of 3/20/2024      Full warfarin instructions:  4 mg every Mon, Wed, Fri; 3 mg all other days   Next INR check:  4/3/2024               Telephone call with Shayy who verbalizes understanding and agrees to plan    Lab visit scheduled    Education provided:   Please call back if any changes to your diet, medications or how you've been taking warfarin    Plan made per [facility] anticoagulation protocol    Felisa Carnes RN  Anticoagulation Clinic  3/20/2024    _______________________________________________________________________     Anticoagulation Episode Summary       Current INR goal:  2.0-3.0   TTR:  16.8% (2.7 mo)   Target end date:  Indefinite   Send INR reminders to:  [facility]    Indications    Arterial occlusion [I70.90]             Comments:               Anticoagulation Care Providers       Provider Role Specialty Phone number    Mercedes Adorno MD Referring Family Medicine [phone]

## 2024-03-25 ENCOUNTER — TELEPHONE (OUTPATIENT)
Dept: VASCULAR SURGERY | Facility: CLINIC | Age: 74
End: 2024-03-25
Payer: MEDICARE

## 2024-03-25 NOTE — TELEPHONE ENCOUNTER
Caller: Jack    Provider: MD Laverne Crum    Detailed reason for call: Lump in right groin/ occasional swelling RLE    Best phone number to contact: [phone]    Best time to contact: Anytime    Ok to leave a detailed message: Yes    Ok to speak to authorized person if needed: No      (Noted to patient if reason is related to wound or incision, to please send a photo via email or [employer].)

## 2024-03-28 DIAGNOSIS — R31.0 GROSS HEMATURIA: ICD-10-CM

## 2024-03-28 DIAGNOSIS — N40.0 BENIGN PROSTATIC HYPERPLASIA WITHOUT LOWER URINARY TRACT SYMPTOMS: ICD-10-CM

## 2024-04-02 ENCOUNTER — LAB (OUTPATIENT)
Dept: LAB | Facility: CLINIC | Age: 74
End: 2024-04-02
Payer: MEDICARE

## 2024-04-02 ENCOUNTER — ANCILLARY PROCEDURE (OUTPATIENT)
Dept: VASCULAR ULTRASOUND | Facility: CLINIC | Age: 74
End: 2024-04-02
Attending: SURGERY
Payer: MEDICARE

## 2024-04-02 ENCOUNTER — HOSPITAL ENCOUNTER (OUTPATIENT)
Dept: CT IMAGING | Facility: HOSPITAL | Age: 74
Discharge: HOME | End: 2024-04-02
Attending: SURGERY
Payer: MEDICARE

## 2024-04-02 ENCOUNTER — ANTICOAGULATION THERAPY VISIT (OUTPATIENT)
Dept: ANTICOAGULATION | Facility: CLINIC | Age: 74
End: 2024-04-02

## 2024-04-02 ENCOUNTER — PREP FOR PROCEDURE (OUTPATIENT)
Dept: SURGERY | Facility: CLINIC | Age: 74
End: 2024-04-02

## 2024-04-02 DIAGNOSIS — I70.90 ARTERIAL OCCLUSION: ICD-10-CM

## 2024-04-02 DIAGNOSIS — R09.89 OTHER SPECIFIED SYMPTOMS AND SIGNS INVOLVING THE CIRCULATORY AND RESPIRATORY SYSTEMS: ICD-10-CM

## 2024-04-02 DIAGNOSIS — I72.4 PSEUDOANEURYSM OF FEMORAL ARTERY (H): Primary | ICD-10-CM

## 2024-04-02 DIAGNOSIS — Z01.810 ENCOUNTER FOR PREPROCEDURAL CARDIOVASCULAR EXAMINATION: Primary | ICD-10-CM

## 2024-04-02 DIAGNOSIS — I72.3 COMMON ILIAC ANEURYSM (H): ICD-10-CM

## 2024-04-02 DIAGNOSIS — I70.90 ARTERIAL OCCLUSION: Primary | ICD-10-CM

## 2024-04-02 LAB
CREAT BLD-MCNC: 1.3 MG/DL (ref 0.7–1.3)
EGFRCR SERPLBLD CKD-EPI 2021: 57 ML/MIN/1.73M2
INR BLD: 1.8 (ref 0.9–1.1)

## 2024-04-02 PROCEDURE — 250N000009 HC RX 250: Performed by: SURGERY

## 2024-04-02 PROCEDURE — 93926 LOWER EXTREMITY STUDY: CPT | Mod: LT

## 2024-04-02 PROCEDURE — 74174 CTA ABD&PLVS W/CONTRAST: CPT | Mod: MG

## 2024-04-02 PROCEDURE — 85610 PROTHROMBIN TIME: CPT

## 2024-04-02 PROCEDURE — 82565 ASSAY OF CREATININE: CPT

## 2024-04-02 PROCEDURE — 250N000011 HC RX IP 250 OP 636: Performed by: SURGERY

## 2024-04-02 PROCEDURE — 36416 COLLJ CAPILLARY BLOOD SPEC: CPT

## 2024-04-02 PROCEDURE — 93923 UPR/LXTR ART STDY 3+ LVLS: CPT

## 2024-04-02 RX ORDER — CEFAZOLIN SODIUM/WATER 2 G/20 ML
2 SYRINGE (ML) INTRAVENOUS
Status: CANCELLED | OUTPATIENT
Start: 2024-05-01

## 2024-04-02 RX ORDER — CEFAZOLIN SODIUM/WATER 2 G/20 ML
2 SYRINGE (ML) INTRAVENOUS SEE ADMIN INSTRUCTIONS
Status: CANCELLED | OUTPATIENT
Start: 2024-05-01

## 2024-04-02 RX ORDER — IOPAMIDOL 755 MG/ML
67 INJECTION, SOLUTION INTRAVASCULAR ONCE
Status: COMPLETED | OUTPATIENT
Start: 2024-04-02 | End: 2024-04-02

## 2024-04-02 RX ADMIN — IOPAMIDOL 67 ML: 755 INJECTION, SOLUTION INTRAVENOUS at 13:57

## 2024-04-02 RX ADMIN — SODIUM CHLORIDE 90 ML: 9 INJECTION, SOLUTION INTRAVENOUS at 14:05

## 2024-04-02 NOTE — PROGRESS NOTES
ANTICOAGULATION MANAGEMENT     Jack Brown 73 year old male is on warfarin with subtherapeutic INR result. (Goal INR 2.0-3.0)    Recent labs: (last 7 days)     04/02/24  1324   INR 1.8*       ASSESSMENT     Source(s): Chart Review and Patient/Caregiver Call     Warfarin doses taken: Warfarin taken as instructed  Diet: No new diet changes identified  Medication/supplement changes: None noted  New illness, injury, or hospitalization: No  Signs or symptoms of bleeding or clotting: No  Previous result: Therapeutic last 2(+) visits  Additional findings: None       PLAN     Recommended plan for no diet, medication or health factor changes affecting INR     Dosing Instructions: Increase your warfarin dose (8.3% change) with next INR in 2 weeks       Summary  As of 4/2/2024      Full warfarin instructions:  3 mg every Sun, Tue; 4 mg all other days   Next INR check:  4/16/2024               Telephone call with Shayy who verbalizes understanding and agrees to plan    Lab visit scheduled    Education provided:   Please call back if any changes to your diet, medications or how you've been taking warfarin    Plan made per [facility] anticoagulation protocol    Felisa Carnes RN  Anticoagulation Clinic  4/2/2024    _______________________________________________________________________     Anticoagulation Episode Summary       Current INR goal:  2.0-3.0   TTR:  14.4% (3.1 mo)   Target end date:  Indefinite   Send INR reminders to:  [facility]    Indications    Arterial occlusion [I70.90]             Comments:               Anticoagulation Care Providers       Provider Role Specialty Phone number    Mercedes Adorno MD Referring Family Medicine [phone]

## 2024-04-02 NOTE — TELEPHONE ENCOUNTER
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 Crum. Updated patient that Dr. Crum will discuss surgical repair with him on Monday. Per Dr. Crum, patient should refrain from strenuous activity but walking and daily activities ok.

## 2024-04-03 NOTE — PROGRESS NOTES
[facility]        Patient is here for a follow up  to discuss DISCUSS SURGERY FOR PSEUDOANEURYSM RIGHT GROIN.     3 month f/u, imaging done 4/2. 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 bypass cancelled     Pt is currently taking  Statin, and Warfarin.    /82   Pulse 71   Resp 16   SpO2 97%     The provider has been notified that the patient see above.     Questions patient would like addressed today are: if there is surgery- when to stop warfarin, usual recovery time after surgery, should patient be wearing compression stockings now on Rt leg, swelling in Rt leg - swelling for the last 4 weeks    Refills are needed: N/A    Has homecare services and agency name:  No

## 2024-04-04 ENCOUNTER — DOCUMENTATION ONLY (OUTPATIENT)
Dept: VASCULAR SURGERY | Facility: CLINIC | Age: 74
End: 2024-04-04
Payer: MEDICARE

## 2024-04-04 RX ORDER — FINASTERIDE 5 MG/1
5 TABLET, FILM COATED ORAL DAILY
Qty: 30 TABLET | Refills: 0 | Status: SHIPPED | OUTPATIENT
Start: 2024-04-04 | End: 2024-04-05

## 2024-04-04 NOTE — PROGRESS NOTES
Surgery Scheduled    Pt has appt to see Dr. Crum on 4/8/24 to review surgery plan.    Surgery/Procedure: REPAIR, PSEUDOANEURYSM, ARTERY, FEMORAL     Special Equipment: Cell Saver, cryo artery    Location: [facility]:  [address] [place] [id number] (phone: [phone], Fax: [phone])    Date: 5/1/2024    Time: 1:45 PM    Admission Type: Inpatient    Surgeon: Dr. Crum    OR Confirmed & :  Yes with Viridiana on 4/4/2024    Entered on provider calendar:  Yes    Post Op: TBD    Wound Vac Needed:  TBD    Home Care Needed:  TBD    Reps Contacted: Cryo rep Reinaldo Banda emailed 4/4    Blood Thinners Addressed:  pt on coumadin - routing message to Sandi SUN To address when pt is in clinic on 4/8.    Stress Test Clearance: NO

## 2024-04-04 NOTE — TELEPHONE ENCOUNTER
finasteride (PROSCAR) 5 MG tablet     90 tablet 3 2/14/2023       Last Office Visit : 2---  Return in about 1 year (around 2/14/2024) for in person, Follow up.      Future Office visit:  none    BPH Agents [id number]/2024 01:31 PM   Protocol Details Recent (12 mo) or future (30 days) visit within the authorizing provider's department     Karely refill  Scheduling has been notified to contact the pt for appointment.

## 2024-04-05 ENCOUNTER — VIRTUAL VISIT (OUTPATIENT)
Dept: UROLOGY | Facility: CLINIC | Age: 74
End: 2024-04-05
Payer: MEDICARE

## 2024-04-05 DIAGNOSIS — R31.0 GROSS HEMATURIA: ICD-10-CM

## 2024-04-05 DIAGNOSIS — N40.0 BENIGN PROSTATIC HYPERPLASIA WITHOUT LOWER URINARY TRACT SYMPTOMS: ICD-10-CM

## 2024-04-05 DIAGNOSIS — I10 PRIMARY HYPERTENSION: ICD-10-CM

## 2024-04-05 PROCEDURE — 99213 OFFICE O/P EST LOW 20 MIN: CPT | Mod: 95 | Performed by: NURSE PRACTITIONER

## 2024-04-05 RX ORDER — TAMSULOSIN HYDROCHLORIDE 0.4 MG/1
CAPSULE ORAL
Qty: 90 CAPSULE | Refills: 3 | Status: SHIPPED | OUTPATIENT
Start: 2024-04-05

## 2024-04-05 RX ORDER — FINASTERIDE 5 MG/1
5 TABLET, FILM COATED ORAL DAILY
Qty: 90 TABLET | Refills: 3 | Status: SHIPPED | OUTPATIENT
Start: 2024-04-05

## 2024-04-05 ASSESSMENT — PAIN SCALES - GENERAL: PAINLEVEL: NO PAIN (0)

## 2024-04-05 NOTE — LETTER
4/5/2024       RE: Jack Brown  [address]  [place] [address]     Dear Colleague,    Thank you for referring your patient, Jack Brown, to the [facility] [place] at [facility]. Please see a copy of my visit note below.    Virtual Visit Details    Type of service:  Video Visit   Video Start Time: 8:55 AM  Video End Time: 9:04 AM    Originating Location (pt. Location): Home  Distant Location (provider location):  Off-site  Platform used for Video Visit: Lisa    CC: BPH follow up    Assessment/Plan:  73 year old male with PMH of CAD s/p CABG, HTN, melanoma,hx of acinar lung cancer sp right middle lobectomy and BPH on Flomax and finasteride, with symptoms stable. Has a few other medical concerns being addressed at this time. We are due for an in-person visit to complete OLE and PVR, so will arrange for this in 6 months.   -Continue Flomax and finasteride- refills sent.  -Follow up with me in 6 months for an in-person visit.    Veronica Costa, CNP  Department of Urology      Subjective:   73 year old male with a PMH of CAD s/p CABG, HTN, melanoma,hx of acinar lung cancer sp right middle lobectomy and BPH on Flomax and finasteride (prostate volume 144.5 ml on MRI in 2021) who presents for virtual follow up. History of prostatitis. At the time of our last visit in 02/2023, he was doing quite well. Denied any urination issues. Reported an occasional twinge of pain, but nothing consistent or bothersome. Given his age and negative MRI from a few years ago, we discussed options for prostate cancer surveillance going forward. Shared decision ultimately made to defer PSA in favor of annual, in-person follow up to complete OLE and bladder scan. Continued on Flomax and finasteride daily.     Today, he states that he has been doing well from a urologic standpoint. Was found to have a blood clot in his leg back in December and is now on  blood-thinners. 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     Objective:     Exam:   Patient is a 73 year old male   No vital signs obtained due to virtual visit.  General Appearance: Well groomed, hygenic  Respiratory: No cough, no respiratory distress or labored breathing  Musculoskeletal: Grossly normal with no gross deficits  Skin: No discoloration or apparent rashes  Neurologic: No tremors  Psychiatric: Alert and oriented  Further examination is deferred due to the nature of our visit.

## 2024-04-05 NOTE — PATIENT INSTRUCTIONS
UROLOGY CLINIC VISIT PATIENT INSTRUCTIONS    -Continue Flomax and finasteride- refills sent.  -Follow up with me in 6 months for an in-person visit.    If you have any issues, questions or concerns in the meantime, do not hesitate to contact us at [phone] or via [employer].     Veronica Costa, CNP  Department of Urology

## 2024-04-05 NOTE — PROGRESS NOTES
Virtual Visit Details    Type of service:  Video Visit   Video Start Time: 8:55 AM  Video End Time: 9:04 AM    Originating Location (pt. Location): Home  Distant Location (provider location):  Off-site  Platform used for Video Visit: Lisa    CC: BPH follow up    Assessment/Plan:  73 year old male with PMH of CAD s/p CABG, HTN, melanoma,hx of acinar lung cancer sp right middle lobectomy and BPH on Flomax and finasteride, with symptoms stable. Has a few other medical concerns being addressed at this time. We are due for an in-person visit to complete OLE and PVR, so will arrange for this in 6 months.   -Continue Flomax and finasteride- refills sent.  -Follow up with me in 6 months for an in-person visit.    Veronica Costa CNP  Department of Urology      Subjective:   73 year old male with a PMH of CAD s/p CABG, HTN, melanoma,hx of acinar lung cancer sp right middle lobectomy and BPH on Flomax and finasteride (prostate volume 144.5 ml on MRI in 2021) who presents for virtual follow up. History of prostatitis. At the time of our last visit in 02/2023, he was doing quite well. Denied any urination issues. Reported an occasional twinge of pain, but nothing consistent or bothersome. Given his age and negative MRI from a few years ago, we discussed options for prostate cancer surveillance going forward. Shared decision ultimately made to defer PSA in favor of annual, in-person follow up to complete OLE and bladder scan. Continued on Flomax and finasteride daily.     Today, he states that he has been doing well from a urologic standpoint. Was found to have a blood clot in his leg back in December and is now on blood-thinners. 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     Objective:     Exam:   Patient is a 73 year old male   No vital signs obtained due to virtual visit.  General Appearance: Well groomed, hygenic  Respiratory: No cough,  no respiratory distress or labored breathing  Musculoskeletal: Grossly normal with no gross deficits  Skin: No discoloration or apparent rashes  Neurologic: No tremors  Psychiatric: Alert and oriented  Further examination is deferred due to the nature of our visit.

## 2024-04-05 NOTE — NURSING NOTE
Is the patient currently in the state of [place]? YES    Visit mode:VIDEO    If the visit is dropped, the patient can be reconnected by: VIDEO VISIT: Send to e-mail at: [email or web address]    Will anyone else be joining the visit? NO  (If patient encounters technical issues they should call [phone]    How would you like to obtain your AVS? MyChart    Are changes needed to the allergy or medication list? No    Are refills needed on medications prescribed by this physician?     Reason for visit: RECHECK    Georgina NICOLE

## 2024-04-08 ENCOUNTER — OFFICE VISIT (OUTPATIENT)
Dept: VASCULAR SURGERY | Facility: CLINIC | Age: 74
End: 2024-04-08
Attending: SURGERY
Payer: MEDICARE

## 2024-04-08 VITALS
RESPIRATION RATE: 16 BRPM | DIASTOLIC BLOOD PRESSURE: 82 MMHG | SYSTOLIC BLOOD PRESSURE: 137 MMHG | HEART RATE: 71 BPM | OXYGEN SATURATION: 97 %

## 2024-04-08 DIAGNOSIS — I72.4 FEMORAL ARTERY PSEUDO-ANEURYSM, RIGHT (H): Primary | ICD-10-CM

## 2024-04-08 PROCEDURE — 99214 OFFICE O/P EST MOD 30 MIN: CPT | Performed by: SURGERY

## 2024-04-08 PROCEDURE — G0463 HOSPITAL OUTPT CLINIC VISIT: HCPCS | Performed by: SURGERY

## 2024-04-08 ASSESSMENT — PAIN SCALES - GENERAL: PAINLEVEL: NO PAIN (0)

## 2024-04-08 NOTE — PATIENT INSTRUCTIONS
Jack    Your visit to [facility] for your surgery is coming soon and we look forward to seeing you! This friendly reminder and pre-procedure checklist will help to ensure your surgery goes smoothly and meets your expectations. At [facility], our goal is to provide you with a great patient experience and to deliver genuine, professional care to every patient.     Please complete all the steps in advance of your visit. If you have any questions about the items listed below, please give our office a call. We can be reached at [phone] or visit our website at [email or web address]  for more information.     Procedure:        Procedure Laterality    REPAIR, PSEUDOANEURYSM, ARTERY, FEMORAL Right             Procedure Date :  5/1/24    Arrival Time:  Tentative time and subject to change. The pre-admission nurse will call you 1-2 days before surgery to tell you time of arrival.     Procedure Location: [facility]:  [address] [place] [address] (phone: [phone], Fax: [phone])    Surgeon: Dr. Laverne Crum    Admission Type: Inpatient    COVID-19 Test: is no longer required. If you are experiencing COVID symptoms or have tested positive for COVID-19 within 14 days of procedure date, reach out to the care team to reschedule please.     Post Operative Appointment: TBD      If you take blood thinners:   PLEASE DO NOT STOP YOUR ASPIRIN OR PLAVIX UNLESS SPECIFICALLY DIRECTED BY THE VASCULAR SURGEON TO STOP!  In most cases Vascular surgeons want you to continue these. This is different from most NON vascular surgeries and may not be well known by your Primary Care Provider    Coumadin: THIS MUST BE HELD 5 DAYS PRIOR TO THE PROCEDURE/ SURGERY. We may need to have you do blood thinner injections during this time. This is called Bridging. Please contact your primary doctor or INR clinic to see if bridging is needed.      If you take these diabetic medications, please discuss with your primary doctor and follow the hold instructions:   Hold seven (7) days prior for once weekly injectable doses [semaglutide (Ozempic, Wegovy), dulaglutide (Trulicity), exenatide ER (Bydureon), tirzepatide (Mounjaro)]  Hold the day before and day of for once daily injectable GLP-1 agonists [exenatide (Byetta), liraglutide (Saxenda, Victoza)]  Hold seven (7) days for oral semaglutide (Rybelsus)       Notify our office right away if you have any changes in your health status or if you develop a cold, flu, diarrhea, infection, fever or sore throat before your scheduled surgery date.   We can be reached at [phone] Monday-Friday 8 am-4:30 pm if you have any questions.       Complete the following checklist before your surgery    []  You will need a Pre-op Physical within 30 days before surgery with your primary care provider. This exam is required by the anesthesiologist to ensure a safe surgical experience.    Failure  to obtain your pre-op physical will lead to cancellation of your surgery  Call them right away to schedule this. Please ensure your Preoperative Physical is faxed to the Hospital if done outside of [facility] system.     [] Preoperative Medication Instructions  Contact your prescribing provider and/or vascular surgeon for instructions before discontinuing any medications especially anticoagulants. (Examples: Coumadin, Plavix, Xarelto, Eliquis, Pradaxa, Effient, Brilinta)   Hold Ibuprofen, Herbal Supplements and Vitamin E 7 days before  Stop Cialis, Levitra and Viagra 2-3 days before surgery    [] Fasting Requirements  Nothing to eat for 8 hours before surgery unless instructed differently by the surgery nurse.   You may have clear liquids up to two hours before your arrival time (coffee, tea, water, or Gatorade. No cream or milk)  If your primary care provider has instructed you to continue oral medications, you may take them  on the morning of surgery with a small sip of water.    No alcohol or smoking 24 hours before surgery.     [] Contact your insurance regarding coverage  If you would like a [facility] Estimate for your upcoming procedure at [facility] Location, contact Cost of Care Estimates   Advocates are available Monday through Friday 8am - 5pm   [phone]  You may also submit a request online through your [employer] account.   If your procedure is at [facility] please contact the numbers below for Cost of Care Estimates.   - Facility Charge: [phone]    Anesthesiology charge:  [phone]      [] DO NOT BRING FMLA WITH TO SURGERY.  These should be sent to the provider's office by fax to [phone].     [] Day of Surgery  Medications - Take as indicated with sips of water.   Wear comfortable loose fitting clothes. Wear your glasses-Not contacts. Do not wear jewelry including rings and body piercings.   You may have 1 family member wait in the lobby during your surgery. Visitor restrictions are subject to change. Please verify with the surgery nurse when they call.   If same day surgery-Have an adult  come with you to surgery that can help you understand the surgeon's instructions, drive you home and stay with you overnight the first night.    [] You will receive a call from a surgery nurse 1-3 days prior to surgery. They will go over more details with you.     [] If the hospital is at max capacity and does not have available inpatient beds, your procedure may need to be postponed. We will contact you if this happens.      Use Chlorhexidine Gluconate 4% soap to help prevent surgical site infections    You play an important part in helping to prevent a surgical site infection. Let s review what you will need to do.    Chlorhexidine Gluconate 4% is a powerful antiseptic that will help make sure your skin is free of germs. It looks and feels just like liquid soap and should be used liked a shower  gel.    **[facility] patients can receive free Chlorhexidine Gluconate 4% soap for surgery at any outpatient [facility] pharmacy. You can also buy this over the counter at any pharmacy.**    Do not shave within 12 inches of your incision (surgical cut) area for at least 3 days before surgery. Shaving can make small cuts in the skin. This puts you at a higher risk of infection.    Items you will need for each shower:   1 newly washed towel   4 ounces of one of the above soaps   Clean pajamas or clothes to change into    Follow these steps the evening before surgery and the morning of surgery.  Remove all body piercings and jewelry, and leave them off until after your surgery.  Wash your hair and body with your regular shampoo and soap. Make sure you rinse the shampoo and soap from your hair and body.  Using clean hands, apply about 2 ounces of soap gently on your skin from your ear lobes to your toes. You don t need to scrub your skin, but make sure you liberally wash the area where your surgery will be done. Use on your groin area last. Do not use this soap on your face or head. If you get any soap in your eyes, ears or mouth, rinse right away. It is very important to let the soap stay on your skin for at least 1 minute.  Repeat step 2.   Rinse well and dry off using a clean towel. If you feel any tingling, itching or other irritation, rinse right away. It is normal to feel some coolness on the skin after using the antiseptic soap. Your skin may feel a bit dry after the shower, but do not use any lotions, creams or moisturizers. Do not use hair spray or other products in your hair. Also, do not wash with your regular soap after using this.  Dress in freshly washed clothes or pajamas. Use fresh pillowcases and sheets on your bed.  Repeat these steps the morning of surgery.    If you are allergic or sensitive to Chlorhexidine Gluconate, use an antibacterial soap instead, following the same steps.    If you cannot use the  shower, wash yourself with this soap at the sink. Make sure the sink is clean before you begin, and do the best you can to clean your entire body.

## 2024-04-11 NOTE — PROGRESS NOTES
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  The thrombolysis was unsuccessful.  At that time since the popliteal artery aneurysm remained occluded and patient was asymptomatic, decision was not to proceed with an operation via the bypass.  Patient was discharged home.  Interestingly, there is recanalization of the left popliteal artery aneurysm.  Since the child is open, most likely patient will benefit from a bypass operation to exclude the aneurysm from circulation.  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  Patient is symptomatic with right groin pain as well as significant edema involving right lower extremity.  The pseudoaneurysm is patent.  RECOMMENDATION/RISKS: Will proceed with right groin exploration with right external iliac artery exploration for proximal control and most likely an interposition graft from external iliac artery to common femoral artery.    HPI:  Jack Brown is a 73 year old male who was seen today for follow-up.  Patient is doing well overall.    REVIEW OF SYSTEMS: Will proceed with right  A 12 point ROS was reviewed and is negative except for right groin pain.     PHH:    Past Medical History:   Diagnosis Date    BPH (benign prostatic hypertrophy)     Cancer (H)     Skin on R.chest wall.    Chest discomfort     Coronary artery disease     Detached retina, left Oct 2014    Hyperlipidemia      Hypertension           Past Surgical History:   Procedure Laterality Date    BYPASS GRAFT ARTERY CORONARY N/A 12/28/2015    Procedure: CORONARY ARTERY BYPASS x 3, RIGHT ENDOSCOPIC VEIN HARVEST, LEFT INTERNAL MAMMARY ARTERY, ANESTHESIA TRANSESOPHAGEAL ECHOCARDIOGRAM;  Surgeon: Jayson Alvarado MD;  Location: [facility];  Service:     CARDIAC SURGERY      CATARACT EXTRACTION  2015    CYST REMOVAL      Ganglion cyst removal of both wrist    HC REMOVAL PREPATELLA BURSA      Description: Excision Of Prepatellar Bursa;  Recorded: 09/30/2014;    HC REPAIR OF NASAL SEPTUM      Description: Septoplasty;  Recorded: 09/30/2014;    HERNIA REPAIR, UMBILICAL      IR LOWER EXTREMITY ANGIOGRAM LEFT  12/16/2023    IR THROMBOLYSIS ARTERIAL INFUSION INITIAL DAY  12/17/2023    PARS PLANA VITRECTOMY W/ REPAIR OF MACULAR HOLE  Nov 2014    OH EXCIS TENDON SHEATH LESION, HAND/FINGER      Description: Hand Excision Of A Tendon Cyst;  Recorded: 01/04/2011;    OH LAP, VENTRAL HERNIA REPAIR,REDUCIBLE      Description: Laparoscopy Repair Of Umbilical Hernia;  Recorded: 01/04/2011;    RETINAL DETACHMENT SURGERY  oct 2014    SKIN CANCER EXCISION  Nov 2015    right chest    THORACOSCOPY Right 4/14/2016    Procedure: RIGHT VIDEO ASSISTED THORACOSCOPY, RIGHT LOBECTOMY;  Surgeon: Vinny Chase MD;  Location: [facility];  Service:     VASECTOMY         ALLERGIES:  Niacin, Atorvastatin, and Pravastatin    MEDS:    Current Outpatient Medications:     acetaminophen (TYLENOL) 500 MG tablet, [ACETAMINOPHEN (TYLENOL) 500 MG TABLET] Take 500 mg by mouth every 6 (six) hours as needed for pain., Disp: , Rfl:     amLODIPine (NORVASC) 10 MG tablet, TAKE 1 TABLET(10 MG) BY MOUTH DAILY, Disp: 90 tablet, Rfl: 2    finasteride (PROSCAR) 5 MG tablet, Take 1 tablet (5 mg) by mouth daily, Disp: 90 tablet, Rfl: 3    metoprolol tartrate (LOPRESSOR) 50 MG tablet, TAKE 1 TABLET(50 MG) BY MOUTH TWICE DAILY, Disp: 180 tablet, Rfl: 1     rosuvastatin (CRESTOR) 5 MG tablet, Take 1 tablet (5 mg) by mouth at bedtime, Disp: 30 tablet, Rfl: 1    tamsulosin (FLOMAX) 0.4 MG capsule, TAKE 1 CAPSULE BY MOUTH DAILY AFTER BREAKFAST, Disp: 90 capsule, Rfl: 3    warfarin ANTICOAGULANT (COUMADIN) 1 MG tablet, Take 2-3 tablets daily as directed based on inr results., Disp: 270 tablet, Rfl: 1    aspirin 81 mg chewable tablet, [ASPIRIN 81 MG CHEWABLE TABLET] Chew 81 mg daily. (Patient not taking: Reported on 4/5/2024), Disp: , Rfl:     SOCIAL HABITS:    History   Smoking Status    Never   Smokeless Tobacco    Never     Social History    Substance and Sexual Activity      Alcohol use: Yes        Alcohol/week: 8.0 standard drinks of alcohol      History   Drug Use No       FAMILY HISTORY:    Family History   Problem Relation Age of Onset    Acute Myocardial Infarction Mother     Aneurysm Mother         brain    Acute Myocardial Infarction Father     Acute Myocardial Infarction Brother     Aortic aneurysm Brother         d @ 72    Lung Cancer Cousin     Coronary Artery Disease Cousin     Leukemia Brother         d @ 55    Colitis Brother     Colon Cancer Paternal Grandfather        PE:  /82   Pulse 71   Resp 16   SpO2 97%   Wt Readings from Last 1 Encounters:   02/12/24 71.5 kg (157 lb 11.2 oz)     There is no height or weight on file to calculate BMI.    EXAM:  GENERAL: This is a well-developed 73 year old male who appears his stated age  EYES: Grossly normal.  MOUTH: Buccal mucosa normal   CARDIAC: Normal   CHEST/LUNG: Clear to auscultation bilaterally  GASTROINTESINAL soft nontender nondistended  MUSCULOSKELETAL: Grossly normal and both lower extremities are intact.  HEME/LYMPH: No lymphedema  NEUROLOGIC: Focally intact, Alert and oriented x 3.   PSYCH: appropriate affect  INTEGUMENT: No open lesions or ulcers  Pulse Exam: Large pulsatile mass in the right groin present.  No overlying skin changes        DIAGNOSTIC STUDIES:     Images:  CTA Abdomen Pelvis  with Contrast    Result Date: 4/2/2024  EXAM: CTA ABDOMEN PELVIS WITH CONTRAST LOCATION: [facility] DATE: 4/2/2024 INDICATION:  Arterial occlusion, Other specified symptoms and signs involving the circulatory and respiratory systems, Common iliac aneurysm (H24). Known pseudoaneurysm in the right groin. COMPARISON: 12/15/2023. TECHNIQUE: CT angiogram abdomen pelvis during arterial phase of injection of IV contrast. 2D and 3D MIP reconstructions were performed by the CT technologist. Dose reduction techniques were used. CONTRAST: 67ml IV ISOVUE 370 FINDINGS: ANGIOGRAM ABDOMEN/PELVIS: The abdominal aorta is tortuous. The suprarenal aorta is ectatic measuring 2.5 x 2.5 cm. There is moderate stenosis of the SMA at the origin. The celiac trunk and its branches are widely patent. The right renal artery is very tortuous. Both main renal arteries are patent. There is an accessory right renal artery supplying the lower pole. The JAIMEE is patent. The infrarenal abdominal aorta is aneurysmal measuring 3.2 x 2.2 cm. No signs of rupture. The distal abdominal aorta is ectatic measuring up to 2.5 x 2.2 cm. There is mild calcific plaque in the abdominal aorta. The bilateral common iliac arteries are tortuous. Aneurysmal dilatation of the distal right common iliac artery again noted extending to the bifurcation measuring  up to 3.1 x 2.5 cm. Aneurysmal dilatation of the distal left common iliac artery measures up to 3.8 x 3.5 cm. There is some mural thrombus in the left common iliac artery aneurysm similar to previous exam. Overall it is probably not significantly changed. The aneurysm on the right extends into the hypogastric artery measuring 2.9 cm, not significantly changed. The aneurysm on the left extends into the hypogastric artery measuring up to 2.3 cm. Both external iliac arteries are widely patent. There  is a new right common femoral pseudoaneurysm with a large amount internal blood or clot material.  Subtle extravasation is seen on image 552. This is a known finding. This causes some mass effect of the superficial femoral artery on the right. It measures approximately 7.4 x 7.9 x 6.3 cm today. LOWER CHEST: There is some scarring, bronchiectasis and atelectasis in both lung bases. The heart is enlarged. Partially seen coronary arterial calcification. HEPATOBILIARY: No hypervascular liver lesion. The gallbladder is present. PANCREAS: Normal. SPLEEN: Normal. ADRENAL GLANDS: Stable bilateral adrenal gland adenomas measuring up to 1.8 cm on the left and 0.9 cm on the right. KIDNEYS/BLADDER: No renal mass or hydronephrosis. There are benign cortical and peripelvic renal cysts. No follow-up is required. Urinary bladder appears trabeculated and mildly distended. The prostate gland is enlarged and protrudes into the bladder base. BOWEL: There is no free air. No signs of bowel obstruction or significant bowel inflammation. LYMPH NODES: No lymphadenopathy. PELVIC ORGANS: Enlarged prostate gland. MUSCULOSKELETAL: Scoliosis with degenerative change in the spine and pelvic joints.     IMPRESSION: 1.  Similar abdominal aortic and bilateral iliac aneurysms as described in detail above involving the distal common iliac arteries extending into both hypogastric arteries. No signs of rupture. 2.  Newly seen pseudoaneurysm involving the right common femoral artery. This is a known finding. The aneurysm sac measures 7.4 x 6.3 x 7.9 cm.    US Lower Extremity Arterial Duplex Left    Result Date: 4/2/2024  Table formatting from the original result was not included. Arterial Duplex Ultrasound (Date: 04/02/24) Lower Extremity Artery Evaluation Indication: Surveillance  Left Leg Arterial: Follow up Left Leg IR done 12/16/23. Right Leg Swelling/decreased lower extremity pulses      Hx: IR Left Thrombosed Popliteal Artery Aneurysm (12/16/23) with subsequent Thrombolytics done 12/17/23 & 12/18/23.            Right Groin Pseudoaneurysm., CABG  x3 (12/18/15) , AAA , Bilateral NAJMA Aneurysms. Previous: None History: CAD, Angioplasty, Vascular Surgery, and Rest Pain Technique: Duplex imaging is performed utilizing gray-scale, two-dimensional images, and color-flow imaging. Doppler waveform analysis and spectral Doppler imaging is also performed. LOWER EXTREMITY ARTERIAL DUPLEX EXAM WITH WAVEFORMS Right Leg:(cm/s) Location: Velocities Waveforms EIA:   128  T CFA:   90  B Pseudo Aneurysm Stalk   292   M SFA Prox:  145 B    PTA:   128   T DMITRI:   50  B DPA:   63  T Waveforms: T=Triphasic, M=Monophasic, B=Biphasic Left Leg:(cm/s) Location: Velocities Waveforms EIA:   66  B CFA:   50  B PFA:   41  B SFA Proximal:   58  B SFA Mid:   72  B SFA Distal:   21  B Popliteal Artery:   31 / 18 / 46  B PTA:   100   T DMITRI:   20  B DPA:   45  B Waveforms: T=Triphasic, M=Monophasic, B=Biphasic Comments: Right Groin/ CFA Pseudoaneurysm (8.06 x 6.76 x 7.21 cm) from IR Angiogram done 12/16/2023. Stalk measures 8.0 mm with internal blood flow swirl measuring (3.31 x 2.59 x 2.87 cm). Impression: Right Lower Extremity: Large, partially thrombosed, right common femoral artery pseudoaneurysm measuring 8.1 cm in maximal dimension.  There is a moderate to large volume intramural thrombus within the pseudoaneurysm sac.  The pseudoaneurysm neck measures approximately 8 mm in diameter.  Multiphasic waveforms distal to the pseudoaneurysm. Left Lower Extremity: Patent vasculature with biphasic waveforms throughout.  No significant stenosis identified. Reference: Category Normal 1-19% 20-49% 50-99% Occluded PSV <160 cm/sec without spectral broadening <160 cm/sec with spectral broadening Increased Increased Absent Flow Ratio N/A N/A < 2.0 >2.0 N/A Post-Stenotic Turbulence No No No Yes N/A      US Low Ext Arterial Dop Seg Pres w/o Exercise    Result Date: 4/2/2024  Table formatting from the original result was not included. BILATERAL RESTING ANKLE-BRACHIAL INDICES (AMADOU'S) (Date: 04/02/24)  Indication: Surveillance AMADOU's: Right Leg Swelling/ Pain.  Decreased lower extremity pulses.      Hx: IR Left Thrombosed Popliteal Artery Aneurysm (12/16/23) with subsequent Thrombolytics done 12/17/23 & 12/18/23.            Right Groin Pseudoaneurysm., CABG x3 (12/18/15) , AAA , Bilateral NAJMA Aneurysms. Previous: None History: CAD, Angioplasty, Vascular Surgery, and Rest Pain  Resting AMADOU's          Right: mmHg Index     Brachial: 145  Ankle-(PT): 185 1.28 Ankle-(DP): 178 1.23          Digit: 105 0.72               Left: mmHg Index     Brachial: 130  Ankle-(PT): 173 1.19 Ankle-(DP): 176 1.21          Digit: 111 0.77 Resting ankle-brachial index of 1.28 on the right. Toe Pressures of 105 mmHg and TBI of 0.72 Resting ankle-brachial index of 1.21 on the left. Toe Pressures of 111 mmHg and TBI of 0.77  VPR WAVEFORMS: The right volume plethysmography waveforms are mildly abnormal at the lower thigh level, mildly abnormal at the upper calf level and mildly abnormal at the ankle. The left volume plethysmography waveforms are mildly abnormal at the lower thigh level, mildly abnormal at the upper calf level and mildly abnormal at the ankle.  Impression:  1. RIGHT LOWER EXTREMITY: AMADOU is Normal with multiphasic waveforms with an AMADOU of 1.28. Toe Pressures are Normal and adequate for wound healing with toe pressures of 105 mmHg. 2. LEFT LOWER EXTREMITY: AMADOU is Normal with multiphasic waveforms with an AMADOU of 1.21. Toe Pressures are Normal and adequate for wound healing with toe pressures of 111 mmHg. Reference: Wound classification Grade AMADOU Ankle Systolic Pressure Toe Pressures 0 > 0.80 > 100 mmHg > 60 mmHg 1 0.6 - 0.79 70 - 100 mmHg 40 - 59 mmHg 2 0.4 - 0.59 50-70 mmHg 30 - 39 mmHg 3 < 0.39 < 50 mmHg < 30 mmHg Digit Pressures DBI Disease Category > 0.70 Normal < 0.70 Abnormal > 30 mmHg Potential wound healing < 30 mmHg Impaired wound healing Ankle Brachial Pressures AMADOU Disease Category > 1.3  Likely vessel calcification  with monophasic waveforms, non-diagnostic 0.95-1.30 Normal with multiphasic waveforms 0.50-0.95 Single level disease 0.30-0.50 Multilevel disease < 0.30 Critical limb ischema Volume Plethysmography Recording (VPR) at all levels Normal Sharp systolic peak, fast upstroke, prominent dicrotic notch in wave Mild Sharp systolic peak, fast upstroke, absent dicrotic notch in wave Moderate Flattened systolic peak, slowed upstroke, absent dicrotic notch inwave Severe amplitude wave with = upslope and down slope Occluded Flat Line            LABS:      Sodium   Date Value Ref Range Status   01/30/2024 138 135 - 145 mmol/L Final     Comment:     Reference intervals for this test were updated on 09/26/2023 to more accurately reflect our healthy population. There may be differences in the flagging of prior results with similar values performed with this method. Interpretation of those prior results can be made in the context of the updated reference intervals.    01/24/2024 139 135 - 145 mmol/L Final     Comment:     Reference intervals for this test were updated on 09/26/2023 to more accurately reflect our healthy population. There may be differences in the flagging of prior results with similar values performed with this method. Interpretation of those prior results can be made in the context of the updated reference intervals.    01/19/2024 136 135 - 145 mmol/L Final     Comment:     Reference intervals for this test were updated on 09/26/2023 to more accurately reflect our healthy population. There may be differences in the flagging of prior results with similar values performed with this method. Interpretation of those prior results can be made in the context of the updated reference intervals.      Urea Nitrogen   Date Value Ref Range Status   01/30/2024 23.1 (H) 8.0 - 23.0 mg/dL Final   01/24/2024 18.4 8.0 - 23.0 mg/dL Final   01/19/2024 26.5 (H) 8.0 - 23.0 mg/dL Final   04/27/2022 18 8 - 28 mg/dL Final   08/16/2021 19 8 -  28 mg/dL Final   05/20/2021 16 8 - 28 mg/dL Final     Hemoglobin   Date Value Ref Range Status   01/24/2024 14.2 13.3 - 17.7 g/dL Final   01/19/2024 14.1 13.3 - 17.7 g/dL Final   12/26/2023 10.9 (L) 13.3 - 17.7 g/dL Final     Platelet Count   Date Value Ref Range Status   01/24/2024 236 150 - 450 10e3/uL Final   01/19/2024 212 150 - 450 10e3/uL Final   12/26/2023 385 150 - 450 10e3/uL Final     INR   Date Value Ref Range Status   04/02/2024 1.8 (H) 0.9 - 1.1 Final   03/20/2024 1.7 (H) 0.9 - 1.1 Final   03/06/2024 1.7 (H) 0.9 - 1.1 Final   12/21/2023 1.17 (H) 0.85 - 1.15 Final   12/20/2023 1.09 0.85 - 1.15 Final   12/17/2023 1.27 (H) 0.85 - 1.15 Final     INR Point of Care   Date Value Ref Range Status   01/26/2024 2.1 (A) 0.9 - 1.1 Final   01/10/2024 4.3 (A) 0.9 - 1.1 Final       40 minutes spent on the day of encounter doing chart review, history and exam, documentation, and further activities as noted.         Laverne Crum MD, [facility]  VASCULAR SURGERY

## 2024-04-12 ENCOUNTER — TELEPHONE (OUTPATIENT)
Dept: ONCOLOGY | Facility: HOSPITAL | Age: 74
End: 2024-04-12
Payer: MEDICARE

## 2024-04-15 NOTE — PROGRESS NOTES
NAGA-PROCEDURAL ANTICOAGULATION  MANAGEMENT    Jack requesting pre-procedure hold orders for warfarin and review for bridging      Procedure date: 5/1       Procedure:  pseudoaneurysm repair  femoral artery      Procedure location and phone number (if external): [place]     Number of warfarin hold days requested and/or target INR: 5 days    Pre-op date:  4/24/24      Routing to Anticoagulation Pharmacist for review.      Felisa Carnes RN

## 2024-04-16 ENCOUNTER — LAB (OUTPATIENT)
Dept: LAB | Facility: CLINIC | Age: 74
End: 2024-04-16
Payer: MEDICARE

## 2024-04-16 ENCOUNTER — ANTICOAGULATION THERAPY VISIT (OUTPATIENT)
Dept: ANTICOAGULATION | Facility: CLINIC | Age: 74
End: 2024-04-16

## 2024-04-16 DIAGNOSIS — I70.90 ARTERIAL OCCLUSION: ICD-10-CM

## 2024-04-16 DIAGNOSIS — I70.90 ARTERIAL OCCLUSION: Primary | ICD-10-CM

## 2024-04-16 LAB — INR BLD: 1.6 (ref 0.9–1.1)

## 2024-04-16 PROCEDURE — 85610 PROTHROMBIN TIME: CPT

## 2024-04-16 PROCEDURE — 36416 COLLJ CAPILLARY BLOOD SPEC: CPT

## 2024-04-16 NOTE — PROGRESS NOTES
ANTICOAGULATION MANAGEMENT     Jack Brown 73 year old male is on warfarin with subtherapeutic INR result. (Goal INR 2.0-3.0)    Recent labs: (last 7 days)     04/16/24  0918   INR 1.6*       ASSESSMENT     Source(s): Chart Review and Patient/Caregiver Call     Warfarin doses taken: Less warfarin taken than planned which may be affecting INR reversed plan took 4 mg x2 days instead of 3 mg sun/tue/4 mg aod  Diet: No new diet changes identified  Medication/supplement changes: None noted  New illness, injury, or hospitalization: No  Signs or symptoms of bleeding or clotting: No  Previous result: Subtherapeutic  Additional findings: Upcoming surgery/procedure May 1 pseudoaneurysm artery repair  pre op 4/24 plan request has been sent to [facility]       PLAN     Recommended plan for temporary change(s) affecting INR     Dosing Instructions: Increase your warfarin dose (14.9% change) with next INR in 1 week       Summary  As of 4/16/2024      Full warfarin instructions:  3 mg every Mon, Wed, Fri; 4 mg all other days   Next INR check:  4/24/2024               Telephone call with Jack who verbalizes understanding and agrees to plan    Lab visit scheduled    Education provided:   Contact [phone] with any changes, questions or concerns.     Plan made per [facility] anticoagulation protocol    Felisa Carnes RN  Anticoagulation Clinic  4/16/2024    _______________________________________________________________________     Anticoagulation Episode Summary       Current INR goal:  2.0-3.0   TTR:  12.6% (3.6 mo)   Target end date:  Indefinite   Send INR reminders to:  [facility]    Indications    Arterial occlusion [I70.90]             Comments:               Anticoagulation Care Providers       Provider Role Specialty Phone number    Mercedes Adorno MD Referring Family Medicine [phone]

## 2024-04-22 NOTE — PROGRESS NOTES
"YANCY-PROCEDURAL ANTICOAGULATION  MANAGEMENT    ASSESSMENT     Warfarin interruption plan for pseudoaneurysm repair femoral artery on .    Indication for Anticoagulation: Atrial Fibrillation and hx of arterial occlusion (2023)    [id number] = 4 (Hypertension, Age >= 75, and Vascular- CABG )  2024 Fem-pop bypass, Historically advised to bridge for potential bypass but was postponed by vascular specialist, Dr. Crum  Patient taking aspirin 81 mg, instructed to continue aspirin per  office visit  24 - received input from Dr. Crum, no bridging required per guidelines    Yancy-Procedure Risk stratification for thromboembolism: low ( Chest guidelines)    AFIB:  CHEST Perioperative Management guidelines recommends against bridging for patients with atrial fibrillation except in high risk stratification patients.    RECOMMENDATION     Received input from vascular specialist, Dr. Crum, no bridging required per guidelines.    Pre-Procedure:  Hold warfarin for 5 days, until after procedure startin/26   No Bridge    Post-Procedure:  Resume warfarin dose if okay with provider doing procedure on night of procedure,  PM: 8 mg daily x 2 days, then resume current dosing  Recheck INR ~ 7 days after resuming warfarin   ?   Krissy Alcaraz, PharmD Student    SUBJECTIVE/OBJECTIVE     Jack MYLES Kevin, a 73 year old male    Goal INR Range: 2.0-3.0       Wt Readings from Last 3 Encounters:   24 71.5 kg (157 lb 11.2 oz)   24 69.9 kg (154 lb)   24 68 kg (150 lb)      Ideal body weight: 61.5 kg (135 lb 9.3 oz)  Adjusted ideal body weight: 65.5 kg (144 lb 6.9 oz)     Estimated body mass index is 26.24 kg/m  as calculated from the following:    Height as of 24: 1.651 m (5' 5\").    Weight as of 24: 71.5 kg (157 lb 11.2 oz).    Lab Results   Component Value Date    INR 1.6 (H) 2024    INR 1.8 (H) 2024    INR 1.7 (H) 2024     Lab Results   Component Value Date "    HGB 14.2 01/24/2024    HCT 45.2 01/24/2024     01/24/2024     Lab Results   Component Value Date    CR 1.3 04/02/2024    CR 1.19 (H) 01/30/2024    CR 1.24 (H) 01/24/2024     Estimated Creatinine Clearance: 49.7 mL/min (based on SCr of 1.3 mg/dL).

## 2024-04-24 ENCOUNTER — TELEPHONE (OUTPATIENT)
Dept: VASCULAR SURGERY | Facility: CLINIC | Age: 74
End: 2024-04-24

## 2024-04-24 ENCOUNTER — LAB (OUTPATIENT)
Dept: LAB | Facility: CLINIC | Age: 74
End: 2024-04-24
Payer: MEDICARE

## 2024-04-24 ENCOUNTER — OFFICE VISIT (OUTPATIENT)
Dept: PEDIATRICS | Facility: CLINIC | Age: 74
End: 2024-04-24
Payer: MEDICARE

## 2024-04-24 ENCOUNTER — ANTICOAGULATION THERAPY VISIT (OUTPATIENT)
Dept: ANTICOAGULATION | Facility: CLINIC | Age: 74
End: 2024-04-24

## 2024-04-24 VITALS
TEMPERATURE: 97.7 F | DIASTOLIC BLOOD PRESSURE: 76 MMHG | WEIGHT: 165.4 LBS | HEIGHT: 65 IN | RESPIRATION RATE: 16 BRPM | SYSTOLIC BLOOD PRESSURE: 118 MMHG | HEART RATE: 78 BPM | BODY MASS INDEX: 27.56 KG/M2 | OXYGEN SATURATION: 96 %

## 2024-04-24 DIAGNOSIS — I25.83 CORONARY ARTERY DISEASE DUE TO LIPID RICH PLAQUE: ICD-10-CM

## 2024-04-24 DIAGNOSIS — I48.0 PAROXYSMAL ATRIAL FIBRILLATION (H): ICD-10-CM

## 2024-04-24 DIAGNOSIS — Z01.818 PREOP GENERAL PHYSICAL EXAM: Primary | ICD-10-CM

## 2024-04-24 DIAGNOSIS — I10 HYPERTENSION, UNSPECIFIED TYPE: ICD-10-CM

## 2024-04-24 DIAGNOSIS — I72.4 PSEUDOANEURYSM OF FEMORAL ARTERY (H): ICD-10-CM

## 2024-04-24 DIAGNOSIS — I70.90 ARTERIAL OCCLUSION: ICD-10-CM

## 2024-04-24 DIAGNOSIS — I25.10 CORONARY ARTERY DISEASE DUE TO LIPID RICH PLAQUE: ICD-10-CM

## 2024-04-24 DIAGNOSIS — I70.90 ARTERIAL OCCLUSION: Primary | ICD-10-CM

## 2024-04-24 PROBLEM — H54.7 VISUAL IMPAIRMENT: Status: RESOLVED | Noted: 2021-10-12 | Resolved: 2024-04-24

## 2024-04-24 PROBLEM — R31.0 GROSS HEMATURIA: Status: RESOLVED | Noted: 2021-10-25 | Resolved: 2024-04-24

## 2024-04-24 PROBLEM — M79.10 MUSCLE SORENESS: Status: RESOLVED | Noted: 2017-10-26 | Resolved: 2024-04-24

## 2024-04-24 LAB — INR BLD: 1.8 (ref 0.9–1.1)

## 2024-04-24 PROCEDURE — 36416 COLLJ CAPILLARY BLOOD SPEC: CPT

## 2024-04-24 PROCEDURE — 85610 PROTHROMBIN TIME: CPT

## 2024-04-24 PROCEDURE — 99214 OFFICE O/P EST MOD 30 MIN: CPT | Mod: GC | Performed by: STUDENT IN AN ORGANIZED HEALTH CARE EDUCATION/TRAINING PROGRAM

## 2024-04-24 NOTE — PROGRESS NOTES
ANTICOAGULATION MANAGEMENT     Jack Brown 73 year old male is on warfarin with subtherapeutic INR result. (Goal INR 2.0-3.0)    Recent labs: (last 7 days)     04/24/24  1206   INR 1.8*       ASSESSMENT     Source(s): Chart Review and Patient/Caregiver Call     Warfarin doses taken: Warfarin taken as instructed  Diet: No new diet changes identified  Medication/supplement changes: None noted  New illness, injury, or hospitalization: No  Signs or symptoms of bleeding or clotting: No  Previous result: Subtherapeutic  Additional findings: Upcoming surgery/procedure may 1 reviewed procedure plan  from 4/22 RAMIN Lucas says Jack will likely have a hospital stay in which case meds will taken care of while inpt and we will reconnect at discharge       PLAN     Recommended plan for no diet, medication or health factor changes affecting INR     Dosing Instructions: Increase your warfarin dose (3.7% change) and begin warfarin hold for surgery on 4/26 with next INR in 2 weeks       Summary  As of 4/24/2024      Full warfarin instructions:  4/26: Hold; 4/27: Hold; 4/28: Hold; 4/29: Hold; 4/30: Hold; 5/1: 8 mg; 5/2: 8 mg; Otherwise 4 mg every day   Next INR check:  5/7/2024               Telephone call with Jack who verbalizes understanding and agrees to plan    Contact [phone] to schedule and with any changes, questions or concerns.     Education provided:   Please call back if any changes to your diet, medications or how you've been taking warfarin    Plan made per [facility] anticoagulation protocol    Felisa Carnes RN  Anticoagulation Clinic  4/24/2024    _______________________________________________________________________     Anticoagulation Episode Summary       Current INR goal:  2.0-3.0   TTR:  11.7% (3.9 mo)   Target end date:  Indefinite   Send INR reminders to:  [facility]    Indications    Arterial occlusion [I70.90]             Comments:               Anticoagulation Care Providers       Provider Role  Specialty Phone number    Mercedes Adorno MD Referring Family Medicine [phone]

## 2024-04-24 NOTE — PROGRESS NOTES
Preoperative Evaluation  [facility] [place]  [address]  [address]  [place] [address]  Phone: [phone]  Fax: [phone]  Primary Provider: Mercedes Adorno  Pre-op Performing Provider: [address]  Apr 24, 2024     Jack is a 73 year old, presenting for the following:  Pre-Op Exam        4/24/2024    10:39 AM   Additional Questions   Roomed by Kristina Goodwin   Accompanied by Wife, Shayy     Surgical Information  Surgery/Procedure: REPAIR, PSEUDOANEURYSM, ARTERY, FEMORAL   Surgery Location: [facility]  Surgeon: Laverne Crum MD   Surgery Date: 5/1/24  Time of Surgery: 2:10PM  Where patient plans to recover: At home with family  Fax number for surgical facility: Note does not need to be faxed, will be available electronically in Epic.    Assessment & Plan     The proposed surgical procedure is considered INTERMEDIATE risk.    Preop general physical exam  Pseudoaneurysm of femoral artery (H24)  Patient with a history of pseudoaneurysm of the right femoral artery for which he is initially supposed to undergo surgery earlier this spring, unfortunately this was rescheduled but will be done on 5/1.  His vascular surgeon has labs planned for the day prior to surgery, will hold on additional labs today after discussion with the patient and family.    Coronary artery disease due to lipid rich plaque  History of CAD, underwent three-vessel CABG in 2015 and has done well since.  Denies any anginal symptoms, no signs or symptoms of heart failure at this time.  Most recent EKG and echocardiogram at baseline with an EF of 50 to 55% in December 2023.  Continue statin.    Hypertension, unspecified type  Well-controlled on current medications, continue.    Paroxysmal atrial fibrillation (H)  Diagnosed with atrial fibrillation early spring, he has been on anticoagulation without any sequela of bleeding.  Plan to hold warfarin beginning 5 days prior  to surgery as recommended by his vascular surgeon, no need for bridging.  He is not in atrial fibrillation today.  Continue beta-blocker.       - No identified additional risk factors other than previously addressed    Antiplatelet or Anticoagulation Medication Instructions   - warfarin: Thromboembolic risk is low (<4%/year). HOLD warfarin for 5 days without bridging before procedure.     Additional Medication Instructions   - Beta Blockers: Continue taking on the day of surgery.   - Calcium Channel Blockers: May be continued on the day of surgery.   - Statins: Continue taking on the day of surgery.    - Continue tansulosin     Recommendation  APPROVAL GIVEN to proceed with proposed procedure, without further diagnostic evaluation.      Subjective   HPI related to upcoming procedure: Surgery on 5/1, has family to help at home. Has right femoral pseudoaneurysm that will need repair. No change in size. Has been well and without illness.         4/24/2024    10:18 AM   Preop Questions   1. Have you ever had a heart attack or stroke? No   2. Have you ever had surgery on your heart or blood vessels, such as a stent placement, a coronary artery bypass, or surgery on an artery in your head, neck, heart, or legs? YES - CABG x3 in 2015   3. Do you have chest pain with activity? No   4. Do you have a history of  heart failure? No   5. Do you currently have a cold, bronchitis or symptoms of other infection? No   6. Do you have a cough, shortness of breath, or wheezing? No   7. Do you or anyone in your family have previous history of blood clots? YES - one brother with a blood clot, patient with clot (needs stenting)   8. Do you or does anyone in your family have a serious bleeding problem such as prolonged bleeding following surgeries or cuts? No   9. Have you ever had problems with anemia or been told to take iron pills? No   10. Have you had any abnormal blood loss such as black, tarry or bloody stools? No   11. Have you  ever had a blood transfusion? No   12. Are you willing to have a blood transfusion if it is medically needed before, during, or after your surgery? Yes   13. Have you or any of your relatives ever had problems with anesthesia? No   14. Do you have sleep apnea, excessive snoring or daytime drowsiness? No   15. Do you have any artifical heart valves or other implanted medical devices like a pacemaker, defibrillator, or continuous glucose monitor? No   16. Do you have artificial joints? No   17. Are you allergic to latex? No     Health Care Directive  Patient does not have a Health Care Directive or Living Will: n/a    Patient Active Problem List    Diagnosis Date Noted    Paroxysmal atrial fibrillation (H) 01/19/2024     Priority: Medium    Infrarenal abdominal aortic aneurysm (AAA) without rupture (H24) 12/28/2023     Priority: Medium    Common iliac aneurysm (H24) 12/28/2023     Priority: Medium    Arterial occlusion 12/15/2023     Priority: Medium    Pain of left lower leg 12/15/2023     Priority: Medium    Pain of left upper arm 10/10/2023     Priority: Medium    Hypertension 10/12/2021     Priority: Medium     Formatting of this note might be different from the original.  Created by Conversion    Replacement Utility updated for latest IMO load      Hyperlipemia      Priority: Medium     Created by Conversion      Formatting of this note might be different from the original.  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    Chest discomfort     Coronary artery disease     Detached retina, left Oct 2014    Hyperlipidemia     Hypertension      Past Surgical History:   Procedure Laterality Date    BYPASS GRAFT ARTERY CORONARY N/A 12/28/2015    Procedure: CORONARY ARTERY BYPASS x 3, RIGHT ENDOSCOPIC VEIN HARVEST, LEFT INTERNAL MAMMARY ARTERY, ANESTHESIA TRANSESOPHAGEAL ECHOCARDIOGRAM;  Surgeon: Jayson Alvarado MD;  Location: [facility];  Service:     CARDIAC SURGERY      CATARACT EXTRACTION  2015    CYST REMOVAL      Ganglion cyst removal of both wrist    HC REMOVAL PREPATELLA BURSA      Description: Excision Of Prepatellar Bursa;  Recorded: 09/30/2014;    HC REPAIR OF NASAL SEPTUM      Description: Septoplasty;  Recorded: 09/30/2014;    HERNIA REPAIR, UMBILICAL      IR LOWER EXTREMITY ANGIOGRAM LEFT  12/16/2023    IR THROMBOLYSIS ARTERIAL INFUSION INITIAL DAY  12/17/2023    PARS PLANA VITRECTOMY W/ REPAIR OF MACULAR HOLE  Nov 2014    IA EXCIS TENDON SHEATH LESION, HAND/FINGER      Description: Hand Excision Of A Tendon Cyst;  Recorded: 01/04/2011;    IA LAP, VENTRAL HERNIA REPAIR,REDUCIBLE      Description: Laparoscopy Repair Of Umbilical Hernia;  Recorded: 01/04/2011;    RETINAL DETACHMENT SURGERY  oct 2014    SKIN CANCER EXCISION  Nov 2015    right chest    THORACOSCOPY Right 4/14/2016    Procedure: RIGHT VIDEO ASSISTED THORACOSCOPY, RIGHT LOBECTOMY;  Surgeon: Vinny Chase MD;  Location: [facility];  Service:     VASECTOMY       Current Outpatient Medications   Medication Sig Dispense Refill    acetaminophen (TYLENOL) 500 MG tablet [ACETAMINOPHEN (TYLENOL) 500 MG TABLET] Take 500 mg by mouth every 6 (six) hours as needed for pain.      amLODIPine (NORVASC) 10 MG tablet TAKE 1 TABLET(10 MG) BY MOUTH DAILY 90 tablet 2    finasteride (PROSCAR) 5 MG tablet Take 1 tablet (5 mg) by mouth  "daily 90 tablet 3    metoprolol tartrate (LOPRESSOR) 50 MG tablet TAKE 1 TABLET(50 MG) BY MOUTH TWICE DAILY 180 tablet 1    rosuvastatin (CRESTOR) 5 MG tablet Take 1 tablet (5 mg) by mouth at bedtime 30 tablet 1    tamsulosin (FLOMAX) 0.4 MG capsule TAKE 1 CAPSULE BY MOUTH DAILY AFTER BREAKFAST 90 capsule 3    warfarin ANTICOAGULANT (COUMADIN) 1 MG tablet Take 2-3 tablets daily as directed based on inr results. 270 tablet 1       Allergies   Allergen Reactions    Niacin Hives and Rash     Burning of the skin    Atorvastatin Muscle Pain (Myalgia)    Pravastatin Muscle Pain (Myalgia)        Social History     Tobacco Use    Smoking status: Never    Smokeless tobacco: Never   Substance Use Topics    Alcohol use: Yes     Alcohol/week: 8.0 standard drinks of alcohol       History   Drug Use No         Review of Systems    Review of Systems  Constitutional, HEENT, cardiovascular, pulmonary, gi and gu systems are negative, except as otherwise noted.    Objective    /76 (BP Location: Right arm, Patient Position: Sitting, Cuff Size: Adult Large)   Pulse 78   Temp 97.7  F (36.5  C) (Tympanic)   Resp 16   Ht 1.651 m (5' 5\")   Wt 75 kg (165 lb 6.4 oz)   SpO2 96%   BMI 27.52 kg/m     Estimated body mass index is 27.52 kg/m  as calculated from the following:    Height as of this encounter: 1.651 m (5' 5\").    Weight as of this encounter: 75 kg (165 lb 6.4 oz).    Physical Exam  GENERAL: alert and no distress  EYES: Eyes grossly normal to inspection, PERRL and conjunctivae and sclerae normal  NECK: no adenopathy  RESP: lungs clear to auscultation - no rales, rhonchi or wheezes  CV: regular rate and rhythm, normal S1 S2, no S3 or S4, no murmur, click or rub, trace RLE edema  ABDOMEN: soft, nontender, no hepatosplenomegaly, no masses and bowel sounds normal  MSK: no gross musculoskeletal defects noted, large right femoral pseudoaneurysm which is firm to palpation in the right grain and without palpable thrill. No " overlying skin changes noted.   SKIN: no suspicious lesions or rashes  NEURO: Normal strength and tone, mentation intact and speech normal    Recent Labs   Lab Test 04/16/24  0918 04/02/24  1355 04/02/24  1324 02/02/24  1434 01/30/24  0851 01/26/24  1448 01/24/24  1121 01/19/24  1027 12/16/23  0833 12/15/23  2025 05/26/23  0828 05/23/22  0942   HGB  --   --   --   --   --   --  14.2 14.1   < > 16.1   < >  --    PLT  --   --   --   --   --   --  236 212   < > 267   < >  --    INR 1.6*  --  1.8*   < >  --    < >  --   --    < > 1.02  --   --    NA  --   --   --   --  138  --  139 136   < > 138   < >  --    POTASSIUM  --   --   --   --  4.4  --  4.5 4.6   < > 4.4   < >  --    CR  --  1.3  --   --  1.19*  --  1.24* 1.27*   < > 0.91   < >  --    A1C  --   --   --   --   --   --   --   --   --  5.5  --  5.6    < > = values in this interval not displayed.      Diagnostics  No labs were ordered during this visit.   No EKG this visit, completed in the last 180 days.    Revised Cardiac Risk Index (RCRI)  The patient has the following serious cardiovascular risks for perioperative complications:   - High risk surgery (>5% cardiac complication risk) = 1 point   - Coronary Artery Disease (MI, positive stress test, angina, Qs on EKG) = 1 point     RCRI Interpretation: 2 points: Class III (moderate risk - 6.6% complication rate)     Estimated Functional Capacity: Performs 4 METS exercise without symptoms (e.g., light housework, stairs, 4 mph walk, 7 mph bike, slow step dance)    Signed Electronically by: Veronica Conde MD  Copy of this evaluation report is provided to requesting physician.    I have seen the patient, discussed with the resident and agree with the history, physical and plan as documented above.    Isi Elena MD  Internal Medicine - Pediatrics

## 2024-04-24 NOTE — TELEPHONE ENCOUNTER
Caller: Jack    Provider: MD Laverne Crum    Detailed reason for call: Jack is wondering if he needs to have a full panel blood draw done prior to his surgery.  He states he had his pre-op appointment but they didn't do a big blood draw and he's worried that will hold up his surgery if not done.     Best phone number to contact: [phone]    Best time to contact: any    Ok to leave a detailed message: Yes    Ok to speak to authorized person if needed: Yes: spouse Shayy      (Noted to patient if reason is related to wound or incision, to please send a photo via email or [employer].)

## 2024-04-24 NOTE — PROGRESS NOTES
Reinaldo Banda <[email or web address]>    u 2024 4:33 PM    Got it.  Thanks for the update Alex.   I have this case on my schedule.      Reinaldo/ Rajat      On 2024, at 3:38?PM, Jose Marie <[email or web address]> wrote:  Sender: [email or web address]    Sorry - DATE CHANGED.    Date: 24    Time: 1:45PM    [facility]    Thanks!    Alex Marie  Complex        From: Jose Marie  Sent:  1:09 PM  To: [email or web address]  Cc: Reanna Robb <[email or web address]>; Sandi Hein <[email or web address]>  Subject: Dr. Crum - 24 - 720 AM [facility]     Good afternoon Reinaldo,    I have this pt tentatively scheduled for a pseudoaneurysm repair of their femoral artery.  They have a visit with Dr. Crum this next Monday so I ll be sure to update you if there are any changes.  I m not sure of sizes needed for cryo-artery that will be needed.    KORI PICKERING 46    Surgery: REPAIR, PSEUDOANEURYSM, ARTERY, FEMORAL  Date: 2024  Time: 720 AM  Location: [facility]    Feel free to reach out in case of questions.     Alex Khalil

## 2024-04-24 NOTE — PROGRESS NOTES
Spoke with Shayy and reviewed plan in anticoag encounter of today.. She will call with any questions.

## 2024-04-24 NOTE — PATIENT INSTRUCTIONS
For your Warfarin  Pre-Procedure:  - Hold warfarin for 5 days, until after procedure startin/26 (last dose )     Post-Procedure:  - Resume warfarin dose if okay with provider doing procedure on night of procedure,  PM: 8 mg daily x 2 days, then resume current dosing  - Recheck INR ~ 7 days after resuming warfarin    - Take all of your medications as normally    - Make sure to stay on top of stool softeners after surgery. Let your team know about your prior urinary retention!

## 2024-04-25 NOTE — TELEPHONE ENCOUNTER
Updated patient's wife that the PCP draws labs at the pre op appt and if any other labs needed the day of surgery they will draw then.

## 2024-04-25 NOTE — PROGRESS NOTES
PREOPERATIVE CALL QUESTIONS    Have you had any recent infections, illnesses, unusual symptoms that you know of? (Please review different areas of the body) No    Confirm surgery date/time/location and pt transportation plans: Yes    Confirm preop exam is done/scheduled: Yes Completed 04/23/24.    Are you taking any blood thinners still? If so what are they? When did you stop your blood thinners? Yes: Begin warfarin hold on 04/26/24    Confirm NPO instructions.  Our policy is no eating 8 hours before your surgery! Please note if this is not followed, your surgery will be cancelled. Also you may have clear liquids up to 2 hours before surgery. This is ONLY BLACK TEA OR COFFEE, GATORADE TYPE DRINKS, AND WATER. YOU CANNOT ADD SUGAR OR CREAM TO ANYTHING OR YOUR SURGERY WILL BE CANCELLED.

## 2024-04-29 ENCOUNTER — OFFICE VISIT (OUTPATIENT)
Dept: CARDIOLOGY | Facility: CLINIC | Age: 74
End: 2024-04-29
Attending: NURSE PRACTITIONER
Payer: MEDICARE

## 2024-04-29 VITALS
RESPIRATION RATE: 16 BRPM | SYSTOLIC BLOOD PRESSURE: 130 MMHG | WEIGHT: 165 LBS | HEIGHT: 65 IN | BODY MASS INDEX: 27.49 KG/M2 | HEART RATE: 71 BPM | DIASTOLIC BLOOD PRESSURE: 76 MMHG

## 2024-04-29 DIAGNOSIS — I48.0 PAROXYSMAL ATRIAL FIBRILLATION (H): ICD-10-CM

## 2024-04-29 PROCEDURE — 99214 OFFICE O/P EST MOD 30 MIN: CPT | Performed by: NURSE PRACTITIONER

## 2024-04-29 NOTE — PATIENT INSTRUCTIONS
Jack Brown,    It was a pleasure to see you today at the [facility].     My recommendations after this visit include:    Look into insurance coverage for alternatives to warfarin:  Eliquis, Xarelto, Pradaxa    Continue to do daily pulse checks to monitor for A fib.  Call if you have frequent or prolonged episodes.  How do you feel when in A fib?    Funmilayo Jacob, CNP  [facility], Electrophysiology  [phone]  EP nurses [phone]

## 2024-04-29 NOTE — LETTER
4/29/2024    Mercedes Adorno MD  [address] [place] [address]    RE: Jack Brown       Dear Colleague,     I had the pleasure of seeing Jack Brown in the [facility]alth [facility].    HEART CARE ELECTROPHYSIOLOGY NOTE      [facility]  [phone]      Assessment/Recommendations   Assessment/Plan:  1.  Paroxysmal Atrial Fibrillation: Episode of PAF-RVR noted on telemetry 12/17/2023.  Symptoms status indeterminate.  Amiodarone discontinued for a trial off membrane active antiarrhythmic medications.  No symptomatology or evidence of AF recurrence.  We reviewed the natural progression of atrial fibrillation and treatment options with medications or pulmonary vein isolation ablation.  Recommend sotalol versus ablation for recurrent AF.  -- Continue Daily pulse checks, to call if frequent or prolonged A fib    He was reassured that atrial fibrillation is not life-threatening, but carries an increased risk for stroke.  He has a [id number] score of 6 for age 73, vascular disease, HTN, thrombotic event.  We discussed OAC options including Eliquis, Xarelto, Pradaxa, and warfarin.  If he has adequate insurance coverage, recommend switching to Eliquis.  Continue warfarin for stroke prophylaxis, goal INR 2.0-3.0.  Okay to hold warfarin for 5 days prior to surgery.    2.  Coronary artery disease: Three-vessel CABG in 2015.  Exercise nuclear stress testing 10/16/2023 negative for ischemia.  Denies anginal symptoms.  Continue statin and aspirin.    3.  Hypertension: Controlled with amlodipine and metoprolol.    Follow up in 3 months     History of Present Illness/Subjective    HPI: Jack Brown is a 73 year old male who comes in today for EP follow-up of atrial fibrillation.  He has a history of paroxysmal atrial fibrillation, coronary artery disease (CABG x 3V in 2015: LIMA-LAD, SVG-OM, SVG-RCA), hypertension, hyperlipidemia, peripheral arterial disease with acute lower extremity  thrombosis, common iliac aneurysm, infrarenal abdominal aortic aneurysm.  Plan for surgical repair of right pseudoaneurysm on 5/1/2024.    Arrhythmia history  Dx/date: PAF-RVR diagnosed 12/17/2023 (seen on telemetry).  Converted to sinus rhythm on amiodarone.   Sx: Palpitations  [id number] score: 6 for age 73, vascular disease, HTN, thrombotic event  Oral anticoagulation: Warfarin  Antiarrhythmic medications, AV michel blocking agents: Amiodarone discontinued 1/26/2024  Procedures  DCCV: N/A  Ablation: N/A    Jcak states that he is feeling well.  He has been checking his pulse every day and has not noted any irregularity.  He denies chest discomfort, palpitations, abdominal fullness/bloating or peripheral edema, shortness of breath, paroxysmal nocturnal dyspnea, orthopnea, lightheadedness, dizziness, pre-syncope, or syncope.    Cardiographics (EKG personally reviewed):  EKG done 12/18/2023 shows sinus rhythm at 87 bpm, PACs, QRS 98 ms, QT/QTc 336/404 ms    ECHO done 12/19/2023:  Left ventricular size, wall motion and function are normal. The ejection  fraction is 55-60%.  The left atrium is mildly dilated.  There is mild (1+) aortic regurgitation.    NM stress test done 10/16/2023:    The exercise nuclear stress test is negative for inducible myocardial ischemia or infarction.    The exercise stress electrocardiogram is negative for inducible ischemic EKG changes.    The patient's exercise capacity is average for age and gender. The patient exercised for 6:01 minutes on the Florencio protocol, achieving 7.3 METs and 103% the age-predicted maximum heart rate. The patient did not experience any symptoms.    The left ventricular ejection fraction at stress is 52%.    The patient is at a low risk of future cardiac ischemic events.    A prior study was conducted on 5/17/2007.  Prior images were unavailable for comparison review.    I have reviewed and updated the patient's Past Medical History, Social History, Family  "History and Medication List.  Outside records reviewed.     Physical Examination  Review of Systems   Vitals: /76 (BP Location: Right arm, Patient Position: Sitting, Cuff Size: Adult Regular)   Pulse 71   Resp 16   Ht 1.651 m (5' 5\")   Wt 74.8 kg (165 lb)   BMI 27.46 kg/m    BMI= Body mass index is 27.46 kg/m .  Wt Readings from Last 3 Encounters:   04/29/24 74.8 kg (165 lb)   04/24/24 75 kg (165 lb 6.4 oz)   02/12/24 71.5 kg (157 lb 11.2 oz)       General Appearance:   Alert, well-appearing and in no acute distress.   HEENT: Atraumatic, normocephalic.  No scleral icterus, normal conjunctivae, EOMs intact, PERRL.  Mucous membranes pink and moist.     Chest/Lungs:   Chest symmetric, spine straight.  Respirations unlabored.  Lungs are clear to auscultation.   Cardiovascular:   Regular rate and rhythm.  Normal first and second heart sounds with no murmurs, rubs, or gallops; radial pulses are intact,  no edema.   Abdomen:  Soft, nondistended.   Extremities: No cyanosis or clubbing.   Musculoskeletal: Moves all extremities.    Skin: Warm, dry, intact.    Neurologic: Mood and affect are appropriate.  Alert and oriented to person, place, time, and situation.     ROS: 10 point ROS neg other than the symptoms noted above in the HPI.        Medical History  Surgical History Family History Social History   Past Medical History:   Diagnosis Date    BPH (benign prostatic hypertrophy)     Cancer (H)     Skin on R.chest wall.    Chest discomfort     Coronary artery disease     Detached retina, left Oct 2014    Hyperlipidemia     Hypertension      Past Surgical History:   Procedure Laterality Date    BYPASS GRAFT ARTERY CORONARY N/A 12/28/2015    Procedure: CORONARY ARTERY BYPASS x 3, RIGHT ENDOSCOPIC VEIN HARVEST, LEFT INTERNAL MAMMARY ARTERY, ANESTHESIA TRANSESOPHAGEAL ECHOCARDIOGRAM;  Surgeon: Jayson Alvarado MD;  Location: [facility];  Service:     CARDIAC SURGERY      CATARACT EXTRACTION  2015    " CYST REMOVAL      Ganglion cyst removal of both wrist    HC REMOVAL PREPATELLA BURSA      Description: Excision Of Prepatellar Bursa;  Recorded: 09/30/2014;    HC REPAIR OF NASAL SEPTUM      Description: Septoplasty;  Recorded: 09/30/2014;    HERNIA REPAIR, UMBILICAL      IR LOWER EXTREMITY ANGIOGRAM LEFT  12/16/2023    IR THROMBOLYSIS ARTERIAL INFUSION INITIAL DAY  12/17/2023    PARS PLANA VITRECTOMY W/ REPAIR OF MACULAR HOLE  Nov 2014    VA EXCIS TENDON SHEATH LESION, HAND/FINGER      Description: Hand Excision Of A Tendon Cyst;  Recorded: 01/04/2011;    VA LAP, VENTRAL HERNIA REPAIR,REDUCIBLE      Description: Laparoscopy Repair Of Umbilical Hernia;  Recorded: 01/04/2011;    RETINAL DETACHMENT SURGERY  oct 2014    SKIN CANCER EXCISION  Nov 2015    right chest    THORACOSCOPY Right 4/14/2016    Procedure: RIGHT VIDEO ASSISTED THORACOSCOPY, RIGHT LOBECTOMY;  Surgeon: Vinny Chase MD;  Location: [facility];  Service:     VASECTOMY       Family History   Problem Relation Age of Onset    Acute Myocardial Infarction Mother     Aneurysm Mother         brain    Acute Myocardial Infarction Father     Acute Myocardial Infarction Brother     Aortic aneurysm Brother         d @ 72    Lung Cancer Cousin     Coronary Artery Disease Cousin     Leukemia Brother         d @ 55    Colitis Brother     Colon Cancer Paternal Grandfather         Social History     Socioeconomic History    Marital status:      Spouse name: Not on file    Number of children: Not on file    Years of education: Not on file    Highest education level: Not on file   Occupational History    Not on file   Tobacco Use    Smoking status: Never    Smokeless tobacco: Never   Vaping Use    Vaping status: Never Used   Substance and Sexual Activity    Alcohol use: Yes     Alcohol/week: 8.0 standard drinks of alcohol    Drug use: No    Sexual activity: Never   Other Topics Concern    Not on file   Social History Narrative    Not on file      Social Determinants of Health     Financial Resource Strain: Low Risk  (1/19/2024)    Financial Resource Strain     Within the past 12 months, have you or your family members you live with been unable to get utilities (heat, electricity) when it was really needed?: No   Food Insecurity: Low Risk  (1/19/2024)    Food Insecurity     Within the past 12 months, did you worry that your food would run out before you got money to buy more?: No     Within the past 12 months, did the food you bought just not last and you didn t have money to get more?: No   Transportation Needs: Low Risk  (1/19/2024)    Transportation Needs     Within the past 12 months, has lack of transportation kept you from medical appointments, getting your medicines, non-medical meetings or appointments, work, or from getting things that you need?: No   Physical Activity: Not on file   Stress: Not on file   Social Connections: Not on file   Interpersonal Safety: Low Risk  (1/19/2024)    Interpersonal Safety     Do you feel physically and emotionally safe where you currently live?: Yes     Within the past 12 months, have you been hit, slapped, kicked or otherwise physically hurt by someone?: No     Within the past 12 months, have you been humiliated or emotionally abused in other ways by your partner or ex-partner?: No   Housing Stability: Low Risk  (1/19/2024)    Housing Stability     Do you have housing? : Yes     Are you worried about losing your housing?: No           Medications  Allergies   Current Outpatient Medications   Medication Sig Dispense Refill    acetaminophen (TYLENOL) 500 MG tablet [ACETAMINOPHEN (TYLENOL) 500 MG TABLET] Take 500 mg by mouth every 6 (six) hours as needed for pain.      amLODIPine (NORVASC) 10 MG tablet TAKE 1 TABLET(10 MG) BY MOUTH DAILY 90 tablet 2    finasteride (PROSCAR) 5 MG tablet Take 1 tablet (5 mg) by mouth daily 90 tablet 3    metoprolol tartrate (LOPRESSOR) 50 MG tablet TAKE 1 TABLET(50 MG) BY MOUTH TWICE  DAILY 180 tablet 1    rosuvastatin (CRESTOR) 5 MG tablet Take 1 tablet (5 mg) by mouth at bedtime 30 tablet 1    tamsulosin (FLOMAX) 0.4 MG capsule TAKE 1 CAPSULE BY MOUTH DAILY AFTER BREAKFAST 90 capsule 3    warfarin ANTICOAGULANT (COUMADIN) 1 MG tablet Take 2-3 tablets daily as directed based on inr results. 270 tablet 1       Allergies   Allergen Reactions    Niacin Hives and Rash     Burning of the skin    Atorvastatin Muscle Pain (Myalgia)    Pravastatin Muscle Pain (Myalgia)          Lab Results    Chemistry/lipid CBC Cardiac Enzymes/BNP/TSH/INR   Recent Labs   Lab Test 01/10/24  0750   CHOL 141   HDL 49   LDL 77   TRIG 77     Recent Labs   Lab Test 01/10/24  0750 12/15/23  2025 08/12/22  0843   LDL 77 151* 114     Recent Labs   Lab Test 04/02/24  1355 01/30/24  0851 01/24/24  1121   NA  --  138 139   POTASSIUM  --  4.4 4.5   CHLORIDE  --  104 104   CO2  --  26 24   ANIONGAP  --  8 11   GLC  --  76 91   BUN  --  23.1* 18.4   CR 1.3 1.19* 1.24*   BIGG  --  9.3 9.4      CBC RESULTS:   Recent Labs   Lab Test 01/24/24  1121   WBC 5.9   RBC 4.61   HGB 14.2   HCT 45.2   MCV 98   MCH 30.8   MCHC 31.4*   RDW 14.7           TSH   Date Value Ref Range Status   10/25/2018 1.25 0.30 - 5.00 uIU/mL Final     Lab Results   Component Value Date    INR 1.8 04/24/2024    INR 1.6 04/16/2024    INR 2.1 01/26/2024    INR 4.3 01/10/2024            The longitudinal plan of care for atrial fibrillation was addressed during this visit. Due to the added complexity in care, I will continue to support Jack in the subsequent management of this condition(s) and with the ongoing continuity of care of this condition(s).            Thank you for allowing me to participate in the care of your patient.      Sincerely,     LILI Concepcion CNP     [facility]  cc:   LILI Concepcion CNP  [address], [address]  [place] [id number]

## 2024-04-29 NOTE — PROGRESS NOTES
HEART CARE ELECTROPHYSIOLOGY NOTE      [facility]  [phone]      Assessment/Recommendations   Assessment/Plan:  1.  Paroxysmal Atrial Fibrillation: Episode of PAF-RVR noted on telemetry 12/17/2023.  Symptoms status indeterminate.  Amiodarone discontinued for a trial off membrane active antiarrhythmic medications.  No symptomatology or evidence of AF recurrence.  We reviewed the natural progression of atrial fibrillation and treatment options with medications or pulmonary vein isolation ablation.  Recommend sotalol versus ablation for recurrent AF.  -- Continue Daily pulse checks, to call if frequent or prolonged A fib    He was reassured that atrial fibrillation is not life-threatening, but carries an increased risk for stroke.  He has a [id number] score of 6 for age 73, vascular disease, HTN, thrombotic event.  We discussed OAC options including Eliquis, Xarelto, Pradaxa, and warfarin.  If he has adequate insurance coverage, recommend switching to Eliquis.  Continue warfarin for stroke prophylaxis, goal INR 2.0-3.0.  Okay to hold warfarin for 5 days prior to surgery.    2.  Coronary artery disease: Three-vessel CABG in 2015.  Exercise nuclear stress testing 10/16/2023 negative for ischemia.  Denies anginal symptoms.  Continue statin and aspirin.    3.  Hypertension: Controlled with amlodipine and metoprolol.    Follow up in 3 months     History of Present Illness/Subjective    HPI: Jack Brown is a 73 year old male who comes in today for EP follow-up of atrial fibrillation.  He has a history of paroxysmal atrial fibrillation, coronary artery disease (CABG x 3V in 2015: LIMA-LAD, SVG-OM, SVG-RCA), hypertension, hyperlipidemia, peripheral arterial disease with acute lower extremity thrombosis, common iliac aneurysm, infrarenal abdominal aortic aneurysm.  Plan for surgical repair of right pseudoaneurysm on 5/1/2024.    Arrhythmia history  Dx/date: PAF-RVR diagnosed 12/17/2023 (seen on  telemetry).  Converted to sinus rhythm on amiodarone.   Sx: Palpitations  [id number] score: 6 for age 73, vascular disease, HTN, thrombotic event  Oral anticoagulation: Warfarin  Antiarrhythmic medications, AV michel blocking agents: Amiodarone discontinued 1/26/2024  Procedures  DCCV: N/A  Ablation: N/A    Jack states that he is feeling well.  He has been checking his pulse every day and has not noted any irregularity.  He denies chest discomfort, palpitations, abdominal fullness/bloating or peripheral edema, shortness of breath, paroxysmal nocturnal dyspnea, orthopnea, lightheadedness, dizziness, pre-syncope, or syncope.    Cardiographics (EKG personally reviewed):  EKG done 12/18/2023 shows sinus rhythm at 87 bpm, PACs, QRS 98 ms, QT/QTc 336/404 ms    ECHO done 12/19/2023:  Left ventricular size, wall motion and function are normal. The ejection  fraction is 55-60%.  The left atrium is mildly dilated.  There is mild (1+) aortic regurgitation.    NM stress test done 10/16/2023:    The exercise nuclear stress test is negative for inducible myocardial ischemia or infarction.    The exercise stress electrocardiogram is negative for inducible ischemic EKG changes.    The patient's exercise capacity is average for age and gender. The patient exercised for 6:01 minutes on the Florencio protocol, achieving 7.3 METs and 103% the age-predicted maximum heart rate. The patient did not experience any symptoms.    The left ventricular ejection fraction at stress is 52%.    The patient is at a low risk of future cardiac ischemic events.    A prior study was conducted on 5/17/2007.  Prior images were unavailable for comparison review.    I have reviewed and updated the patient's Past Medical History, Social History, Family History and Medication List.  Outside records reviewed.     Physical Examination  Review of Systems   Vitals: /76 (BP Location: Right arm, Patient Position: Sitting, Cuff Size: Adult Regular)   Pulse 71   " Resp 16   Ht 1.651 m (5' 5\")   Wt 74.8 kg (165 lb)   BMI 27.46 kg/m    BMI= Body mass index is 27.46 kg/m .  Wt Readings from Last 3 Encounters:   04/29/24 74.8 kg (165 lb)   04/24/24 75 kg (165 lb 6.4 oz)   02/12/24 71.5 kg (157 lb 11.2 oz)       General Appearance:   Alert, well-appearing and in no acute distress.   HEENT: Atraumatic, normocephalic.  No scleral icterus, normal conjunctivae, EOMs intact, PERRL.  Mucous membranes pink and moist.     Chest/Lungs:   Chest symmetric, spine straight.  Respirations unlabored.  Lungs are clear to auscultation.   Cardiovascular:   Regular rate and rhythm.  Normal first and second heart sounds with no murmurs, rubs, or gallops; radial pulses are intact,  no edema.   Abdomen:  Soft, nondistended.   Extremities: No cyanosis or clubbing.   Musculoskeletal: Moves all extremities.    Skin: Warm, dry, intact.    Neurologic: Mood and affect are appropriate.  Alert and oriented to person, place, time, and situation.     ROS: 10 point ROS neg other than the symptoms noted above in the HPI.        Medical History  Surgical History Family History Social History   Past Medical History:   Diagnosis Date    BPH (benign prostatic hypertrophy)     Cancer (H)     Skin on R.chest wall.    Chest discomfort     Coronary artery disease     Detached retina, left Oct 2014    Hyperlipidemia     Hypertension      Past Surgical History:   Procedure Laterality Date    BYPASS GRAFT ARTERY CORONARY N/A 12/28/2015    Procedure: CORONARY ARTERY BYPASS x 3, RIGHT ENDOSCOPIC VEIN HARVEST, LEFT INTERNAL MAMMARY ARTERY, ANESTHESIA TRANSESOPHAGEAL ECHOCARDIOGRAM;  Surgeon: Jayson Alvarado MD;  Location: [facility];  Service:     CARDIAC SURGERY      CATARACT EXTRACTION  2015    CYST REMOVAL      Ganglion cyst removal of both wrist    HC REMOVAL PREPATELLA BURSA      Description: Excision Of Prepatellar Bursa;  Recorded: 09/30/2014;    HC REPAIR OF NASAL SEPTUM      Description: " Septoplasty;  Recorded: 09/30/2014;    HERNIA REPAIR, UMBILICAL      IR LOWER EXTREMITY ANGIOGRAM LEFT  12/16/2023    IR THROMBOLYSIS ARTERIAL INFUSION INITIAL DAY  12/17/2023    PARS PLANA VITRECTOMY W/ REPAIR OF MACULAR HOLE  Nov 2014    NM EXCIS TENDON SHEATH LESION, HAND/FINGER      Description: Hand Excision Of A Tendon Cyst;  Recorded: 01/04/2011;    NM LAP, VENTRAL HERNIA REPAIR,REDUCIBLE      Description: Laparoscopy Repair Of Umbilical Hernia;  Recorded: 01/04/2011;    RETINAL DETACHMENT SURGERY  oct 2014    SKIN CANCER EXCISION  Nov 2015    right chest    THORACOSCOPY Right 4/14/2016    Procedure: RIGHT VIDEO ASSISTED THORACOSCOPY, RIGHT LOBECTOMY;  Surgeon: Vinny Chase MD;  Location: [facility];  Service:     VASECTOMY       Family History   Problem Relation Age of Onset    Acute Myocardial Infarction Mother     Aneurysm Mother         brain    Acute Myocardial Infarction Father     Acute Myocardial Infarction Brother     Aortic aneurysm Brother         d @ 72    Lung Cancer Cousin     Coronary Artery Disease Cousin     Leukemia Brother         d @ 55    Colitis Brother     Colon Cancer Paternal Grandfather         Social History     Socioeconomic History    Marital status:      Spouse name: Not on file    Number of children: Not on file    Years of education: Not on file    Highest education level: Not on file   Occupational History    Not on file   Tobacco Use    Smoking status: Never    Smokeless tobacco: Never   Vaping Use    Vaping status: Never Used   Substance and Sexual Activity    Alcohol use: Yes     Alcohol/week: 8.0 standard drinks of alcohol    Drug use: No    Sexual activity: Never   Other Topics Concern    Not on file   Social History Narrative    Not on file     Social Determinants of Health     Financial Resource Strain: Low Risk  (1/19/2024)    Financial Resource Strain     Within the past 12 months, have you or your family members you live with been unable to  get utilities (heat, electricity) when it was really needed?: No   Food Insecurity: Low Risk  (1/19/2024)    Food Insecurity     Within the past 12 months, did you worry that your food would run out before you got money to buy more?: No     Within the past 12 months, did the food you bought just not last and you didn t have money to get more?: No   Transportation Needs: Low Risk  (1/19/2024)    Transportation Needs     Within the past 12 months, has lack of transportation kept you from medical appointments, getting your medicines, non-medical meetings or appointments, work, or from getting things that you need?: No   Physical Activity: Not on file   Stress: Not on file   Social Connections: Not on file   Interpersonal Safety: Low Risk  (1/19/2024)    Interpersonal Safety     Do you feel physically and emotionally safe where you currently live?: Yes     Within the past 12 months, have you been hit, slapped, kicked or otherwise physically hurt by someone?: No     Within the past 12 months, have you been humiliated or emotionally abused in other ways by your partner or ex-partner?: No   Housing Stability: Low Risk  (1/19/2024)    Housing Stability     Do you have housing? : Yes     Are you worried about losing your housing?: No           Medications  Allergies   Current Outpatient Medications   Medication Sig Dispense Refill    acetaminophen (TYLENOL) 500 MG tablet [ACETAMINOPHEN (TYLENOL) 500 MG TABLET] Take 500 mg by mouth every 6 (six) hours as needed for pain.      amLODIPine (NORVASC) 10 MG tablet TAKE 1 TABLET(10 MG) BY MOUTH DAILY 90 tablet 2    finasteride (PROSCAR) 5 MG tablet Take 1 tablet (5 mg) by mouth daily 90 tablet 3    metoprolol tartrate (LOPRESSOR) 50 MG tablet TAKE 1 TABLET(50 MG) BY MOUTH TWICE DAILY 180 tablet 1    rosuvastatin (CRESTOR) 5 MG tablet Take 1 tablet (5 mg) by mouth at bedtime 30 tablet 1    tamsulosin (FLOMAX) 0.4 MG capsule TAKE 1 CAPSULE BY MOUTH DAILY AFTER BREAKFAST 90 capsule 3     warfarin ANTICOAGULANT (COUMADIN) 1 MG tablet Take 2-3 tablets daily as directed based on inr results. 270 tablet 1       Allergies   Allergen Reactions    Niacin Hives and Rash     Burning of the skin    Atorvastatin Muscle Pain (Myalgia)    Pravastatin Muscle Pain (Myalgia)          Lab Results    Chemistry/lipid CBC Cardiac Enzymes/BNP/TSH/INR   Recent Labs   Lab Test 01/10/24  0750   CHOL 141   HDL 49   LDL 77   TRIG 77     Recent Labs   Lab Test 01/10/24  0750 12/15/23  2025 08/12/22  0843   LDL 77 151* 114     Recent Labs   Lab Test 04/02/24  1355 01/30/24  0851 01/24/24  1121   NA  --  138 139   POTASSIUM  --  4.4 4.5   CHLORIDE  --  104 104   CO2  --  26 24   ANIONGAP  --  8 11   GLC  --  76 91   BUN  --  23.1* 18.4   CR 1.3 1.19* 1.24*   BIGG  --  9.3 9.4      CBC RESULTS:   Recent Labs   Lab Test 01/24/24  1121   WBC 5.9   RBC 4.61   HGB 14.2   HCT 45.2   MCV 98   MCH 30.8   MCHC 31.4*   RDW 14.7           TSH   Date Value Ref Range Status   10/25/2018 1.25 0.30 - 5.00 uIU/mL Final     Lab Results   Component Value Date    INR 1.8 04/24/2024    INR 1.6 04/16/2024    INR 2.1 01/26/2024    INR 4.3 01/10/2024            The longitudinal plan of care for atrial fibrillation was addressed during this visit. Due to the added complexity in care, I will continue to support Jack in the subsequent management of this condition(s) and with the ongoing continuity of care of this condition(s).

## 2024-05-01 ENCOUNTER — HOSPITAL ENCOUNTER (INPATIENT)
Facility: HOSPITAL | Age: 74
LOS: 2 days | Discharge: HOME | DRG: 253 | End: 2024-05-03
Attending: SURGERY | Admitting: SURGERY
Payer: MEDICARE

## 2024-05-01 ENCOUNTER — ANESTHESIA (OUTPATIENT)
Dept: SURGERY | Facility: HOSPITAL | Age: 74
DRG: 253 | End: 2024-05-01
Payer: MEDICARE

## 2024-05-01 ENCOUNTER — ANESTHESIA EVENT (OUTPATIENT)
Dept: SURGERY | Facility: HOSPITAL | Age: 74
DRG: 253 | End: 2024-05-01
Payer: MEDICARE

## 2024-05-01 DIAGNOSIS — I48.0 PAROXYSMAL ATRIAL FIBRILLATION (H): ICD-10-CM

## 2024-05-01 DIAGNOSIS — T81.718A FEMORAL ARTERY PSEUDOANEURYSM COMPLICATING CARDIAC CATHETERIZATION, INITIAL ENCOUNTER (H): Primary | ICD-10-CM

## 2024-05-01 DIAGNOSIS — I72.4 FEMORAL ARTERY PSEUDOANEURYSM COMPLICATING CARDIAC CATHETERIZATION, INITIAL ENCOUNTER (H): Primary | ICD-10-CM

## 2024-05-01 LAB
ABO/RH(D): NORMAL
ANTIBODY SCREEN: NEGATIVE
BASOPHILS # BLD AUTO: 0.1 10E3/UL (ref 0–0.2)
BASOPHILS NFR BLD AUTO: 1 %
CREAT SERPL-MCNC: 1 MG/DL (ref 0.67–1.17)
EGFRCR SERPLBLD CKD-EPI 2021: 78 ML/MIN/1.73M2
EOSINOPHIL # BLD AUTO: 0.2 10E3/UL (ref 0–0.7)
EOSINOPHIL NFR BLD AUTO: 3 %
ERYTHROCYTE [DISTWIDTH] IN BLOOD BY AUTOMATED COUNT: 13.4 % (ref 10–15)
GLUCOSE BLDC GLUCOMTR-MCNC: 105 MG/DL (ref 70–99)
GLUCOSE SERPL-MCNC: 100 MG/DL (ref 70–99)
HCT VFR BLD AUTO: 43.2 % (ref 40–53)
HGB BLD-MCNC: 14.6 G/DL (ref 13.3–17.7)
IMM GRANULOCYTES # BLD: 0 10E3/UL
IMM GRANULOCYTES NFR BLD: 0 %
LYMPHOCYTES # BLD AUTO: 1.3 10E3/UL (ref 0.8–5.3)
LYMPHOCYTES NFR BLD AUTO: 20 %
MCH RBC QN AUTO: 30.2 PG (ref 26.5–33)
MCHC RBC AUTO-ENTMCNC: 33.8 G/DL (ref 31.5–36.5)
MCV RBC AUTO: 89 FL (ref 78–100)
MONOCYTES # BLD AUTO: 0.6 10E3/UL (ref 0–1.3)
MONOCYTES NFR BLD AUTO: 9 %
NEUTROPHILS # BLD AUTO: 4.5 10E3/UL (ref 1.6–8.3)
NEUTROPHILS NFR BLD AUTO: 67 %
NRBC # BLD AUTO: 0 10E3/UL
NRBC BLD AUTO-RTO: 0 /100
PLATELET # BLD AUTO: 203 10E3/UL (ref 150–450)
POTASSIUM SERPL-SCNC: 4.1 MMOL/L (ref 3.4–5.3)
RBC # BLD AUTO: 4.84 10E6/UL (ref 4.4–5.9)
SPECIMEN EXPIRATION DATE: NORMAL
WBC # BLD AUTO: 6.7 10E3/UL (ref 4–11)

## 2024-05-01 PROCEDURE — 258N000003 HC RX IP 258 OP 636: Performed by: ANESTHESIOLOGY

## 2024-05-01 PROCEDURE — 250N000011 HC RX IP 250 OP 636: Performed by: SURGERY

## 2024-05-01 PROCEDURE — 120N000001 HC R&B MED SURG/OB

## 2024-05-01 PROCEDURE — 710N000009 HC RECOVERY PHASE 1, LEVEL 1, PER MIN: Performed by: SURGERY

## 2024-05-01 PROCEDURE — 360N000077 HC SURGERY LEVEL 4, PER MIN: Performed by: SURGERY

## 2024-05-01 PROCEDURE — 88304 TISSUE EXAM BY PATHOLOGIST: CPT | Mod: TC | Performed by: SURGERY

## 2024-05-01 PROCEDURE — 250N000025 HC SEVOFLURANE, PER MIN: Performed by: SURGERY

## 2024-05-01 PROCEDURE — 36415 COLL VENOUS BLD VENIPUNCTURE: CPT | Performed by: SURGERY

## 2024-05-01 PROCEDURE — 04QK0ZZ REPAIR RIGHT FEMORAL ARTERY, OPEN APPROACH: ICD-10-PCS | Performed by: SURGERY

## 2024-05-01 PROCEDURE — 250N000009 HC RX 250: Performed by: ANESTHESIOLOGY

## 2024-05-01 PROCEDURE — 272N000004 HC RX 272: Performed by: SURGERY

## 2024-05-01 PROCEDURE — 250N000011 HC RX IP 250 OP 636: Performed by: ANESTHESIOLOGY

## 2024-05-01 PROCEDURE — 86900 BLOOD TYPING SEROLOGIC ABO: CPT | Performed by: SURGERY

## 2024-05-01 PROCEDURE — 04CK0ZZ EXTIRPATION OF MATTER FROM RIGHT FEMORAL ARTERY, OPEN APPROACH: ICD-10-PCS | Performed by: SURGERY

## 2024-05-01 PROCEDURE — 35141 REPAIR DEFECT OF ARTERY: CPT | Mod: RT | Performed by: SURGERY

## 2024-05-01 PROCEDURE — 250N000013 HC RX MED GY IP 250 OP 250 PS 637: Performed by: SURGERY

## 2024-05-01 PROCEDURE — 76998 US GUIDE INTRAOP: CPT | Mod: 26 | Performed by: SURGERY

## 2024-05-01 PROCEDURE — 272N000001 HC OR GENERAL SUPPLY STERILE: Performed by: SURGERY

## 2024-05-01 PROCEDURE — 85041 AUTOMATED RBC COUNT: CPT | Performed by: SURGERY

## 2024-05-01 PROCEDURE — 272N000002 HC OR SUPPLY OTHER OPNP: Performed by: SURGERY

## 2024-05-01 PROCEDURE — 82947 ASSAY GLUCOSE BLOOD QUANT: CPT | Performed by: SURGERY

## 2024-05-01 PROCEDURE — 82565 ASSAY OF CREATININE: CPT | Performed by: SURGERY

## 2024-05-01 PROCEDURE — C1757 CATH, THROMBECTOMY/EMBOLECT: HCPCS | Performed by: SURGERY

## 2024-05-01 PROCEDURE — 258N000003 HC RX IP 258 OP 636: Performed by: STUDENT IN AN ORGANIZED HEALTH CARE EDUCATION/TRAINING PROGRAM

## 2024-05-01 PROCEDURE — 84132 ASSAY OF SERUM POTASSIUM: CPT | Performed by: SURGERY

## 2024-05-01 PROCEDURE — 999N000141 HC STATISTIC PRE-PROCEDURE NURSING ASSESSMENT: Performed by: SURGERY

## 2024-05-01 PROCEDURE — 370N000017 HC ANESTHESIA TECHNICAL FEE, PER MIN: Performed by: SURGERY

## 2024-05-01 RX ORDER — TAMSULOSIN HYDROCHLORIDE 0.4 MG/1
0.4 CAPSULE ORAL DAILY
Status: DISCONTINUED | OUTPATIENT
Start: 2024-05-02 | End: 2024-05-03 | Stop reason: HOSPADM

## 2024-05-01 RX ORDER — CEFAZOLIN SODIUM/WATER 2 G/20 ML
2 SYRINGE (ML) INTRAVENOUS
Status: DISCONTINUED | OUTPATIENT
Start: 2024-05-01 | End: 2024-05-01 | Stop reason: HOSPADM

## 2024-05-01 RX ORDER — NALOXONE HYDROCHLORIDE 0.4 MG/ML
0.2 INJECTION, SOLUTION INTRAMUSCULAR; INTRAVENOUS; SUBCUTANEOUS
Status: DISCONTINUED | OUTPATIENT
Start: 2024-05-01 | End: 2024-05-03 | Stop reason: HOSPADM

## 2024-05-01 RX ORDER — ONDANSETRON 4 MG/1
4 TABLET, ORALLY DISINTEGRATING ORAL EVERY 6 HOURS PRN
Status: DISCONTINUED | OUTPATIENT
Start: 2024-05-01 | End: 2024-05-03 | Stop reason: HOSPADM

## 2024-05-01 RX ORDER — VITAMIN B COMPLEX
1 TABLET ORAL DAILY
COMMUNITY

## 2024-05-01 RX ORDER — HYDROMORPHONE HCL IN WATER/PF 6 MG/30 ML
0.2 PATIENT CONTROLLED ANALGESIA SYRINGE INTRAVENOUS
Status: DISCONTINUED | OUTPATIENT
Start: 2024-05-01 | End: 2024-05-03 | Stop reason: HOSPADM

## 2024-05-01 RX ORDER — CEFAZOLIN SODIUM/WATER 2 G/20 ML
2 SYRINGE (ML) INTRAVENOUS SEE ADMIN INSTRUCTIONS
Status: DISCONTINUED | OUTPATIENT
Start: 2024-05-01 | End: 2024-05-01 | Stop reason: HOSPADM

## 2024-05-01 RX ORDER — OXYCODONE HYDROCHLORIDE 5 MG/1
5 TABLET ORAL EVERY 4 HOURS PRN
Status: DISCONTINUED | OUTPATIENT
Start: 2024-05-01 | End: 2024-05-03 | Stop reason: HOSPADM

## 2024-05-01 RX ORDER — SODIUM CHLORIDE, SODIUM LACTATE, POTASSIUM CHLORIDE, CALCIUM CHLORIDE 600; 310; 30; 20 MG/100ML; MG/100ML; MG/100ML; MG/100ML
INJECTION, SOLUTION INTRAVENOUS CONTINUOUS
Status: DISCONTINUED | OUTPATIENT
Start: 2024-05-01 | End: 2024-05-01 | Stop reason: HOSPADM

## 2024-05-01 RX ORDER — ACETAMINOPHEN 325 MG/1
975 TABLET ORAL EVERY 8 HOURS
Qty: 27 TABLET | Refills: 0 | Status: DISCONTINUED | OUTPATIENT
Start: 2024-05-01 | End: 2024-05-03 | Stop reason: HOSPADM

## 2024-05-01 RX ORDER — ROSUVASTATIN CALCIUM 5 MG/1
5 TABLET, COATED ORAL AT BEDTIME
Status: DISCONTINUED | OUTPATIENT
Start: 2024-05-01 | End: 2024-05-03 | Stop reason: HOSPADM

## 2024-05-01 RX ORDER — LIDOCAINE HYDROCHLORIDE 10 MG/ML
INJECTION, SOLUTION INFILTRATION; PERINEURAL PRN
Status: DISCONTINUED | OUTPATIENT
Start: 2024-05-01 | End: 2024-05-01

## 2024-05-01 RX ORDER — HEPARIN SODIUM 1000 [USP'U]/ML
INJECTION, SOLUTION INTRAVENOUS; SUBCUTANEOUS PRN
Status: DISCONTINUED | OUTPATIENT
Start: 2024-05-01 | End: 2024-05-01

## 2024-05-01 RX ORDER — KETAMINE HYDROCHLORIDE 10 MG/ML
INJECTION INTRAMUSCULAR; INTRAVENOUS PRN
Status: DISCONTINUED | OUTPATIENT
Start: 2024-05-01 | End: 2024-05-01

## 2024-05-01 RX ORDER — SODIUM CHLORIDE 9 MG/ML
INJECTION, SOLUTION INTRAVENOUS CONTINUOUS PRN
Status: DISCONTINUED | OUTPATIENT
Start: 2024-05-01 | End: 2024-05-01

## 2024-05-01 RX ORDER — PROPOFOL 10 MG/ML
INJECTION, EMULSION INTRAVENOUS PRN
Status: DISCONTINUED | OUTPATIENT
Start: 2024-05-01 | End: 2024-05-01

## 2024-05-01 RX ORDER — BISACODYL 10 MG
10 SUPPOSITORY, RECTAL RECTAL DAILY PRN
Status: DISCONTINUED | OUTPATIENT
Start: 2024-05-04 | End: 2024-05-03 | Stop reason: HOSPADM

## 2024-05-01 RX ORDER — FENTANYL CITRATE 50 UG/ML
25 INJECTION, SOLUTION INTRAMUSCULAR; INTRAVENOUS EVERY 5 MIN PRN
Status: DISCONTINUED | OUTPATIENT
Start: 2024-05-01 | End: 2024-05-01 | Stop reason: HOSPADM

## 2024-05-01 RX ORDER — AMOXICILLIN 250 MG
1 CAPSULE ORAL 2 TIMES DAILY
Status: DISCONTINUED | OUTPATIENT
Start: 2024-05-01 | End: 2024-05-03 | Stop reason: HOSPADM

## 2024-05-01 RX ORDER — FINASTERIDE 5 MG/1
5 TABLET, FILM COATED ORAL DAILY
Status: DISCONTINUED | OUTPATIENT
Start: 2024-05-02 | End: 2024-05-03 | Stop reason: HOSPADM

## 2024-05-01 RX ORDER — ONDANSETRON 2 MG/ML
INJECTION INTRAMUSCULAR; INTRAVENOUS PRN
Status: DISCONTINUED | OUTPATIENT
Start: 2024-05-01 | End: 2024-05-01

## 2024-05-01 RX ORDER — NALOXONE HYDROCHLORIDE 0.4 MG/ML
0.4 INJECTION, SOLUTION INTRAMUSCULAR; INTRAVENOUS; SUBCUTANEOUS
Status: DISCONTINUED | OUTPATIENT
Start: 2024-05-01 | End: 2024-05-03 | Stop reason: HOSPADM

## 2024-05-01 RX ORDER — PROCHLORPERAZINE MALEATE 5 MG
5 TABLET ORAL EVERY 6 HOURS PRN
Status: DISCONTINUED | OUTPATIENT
Start: 2024-05-01 | End: 2024-05-03 | Stop reason: HOSPADM

## 2024-05-01 RX ORDER — DEXAMETHASONE SODIUM PHOSPHATE 4 MG/ML
4 INJECTION, SOLUTION INTRA-ARTICULAR; INTRALESIONAL; INTRAMUSCULAR; INTRAVENOUS; SOFT TISSUE
Status: DISCONTINUED | OUTPATIENT
Start: 2024-05-01 | End: 2024-05-01 | Stop reason: HOSPADM

## 2024-05-01 RX ORDER — LIDOCAINE 40 MG/G
CREAM TOPICAL
Status: DISCONTINUED | OUTPATIENT
Start: 2024-05-01 | End: 2024-05-01 | Stop reason: HOSPADM

## 2024-05-01 RX ORDER — DEXAMETHASONE SODIUM PHOSPHATE 10 MG/ML
INJECTION, SOLUTION INTRAMUSCULAR; INTRAVENOUS PRN
Status: DISCONTINUED | OUTPATIENT
Start: 2024-05-01 | End: 2024-05-01

## 2024-05-01 RX ORDER — ONDANSETRON 2 MG/ML
4 INJECTION INTRAMUSCULAR; INTRAVENOUS EVERY 30 MIN PRN
Status: DISCONTINUED | OUTPATIENT
Start: 2024-05-01 | End: 2024-05-01 | Stop reason: HOSPADM

## 2024-05-01 RX ORDER — FENTANYL CITRATE 50 UG/ML
50 INJECTION, SOLUTION INTRAMUSCULAR; INTRAVENOUS EVERY 5 MIN PRN
Status: DISCONTINUED | OUTPATIENT
Start: 2024-05-01 | End: 2024-05-01 | Stop reason: HOSPADM

## 2024-05-01 RX ORDER — AMLODIPINE BESYLATE 5 MG/1
10 TABLET ORAL DAILY
Status: DISCONTINUED | OUTPATIENT
Start: 2024-05-02 | End: 2024-05-03 | Stop reason: HOSPADM

## 2024-05-01 RX ORDER — NALOXONE HYDROCHLORIDE 1 MG/ML
0.1 INJECTION INTRAMUSCULAR; INTRAVENOUS; SUBCUTANEOUS
Status: DISCONTINUED | OUTPATIENT
Start: 2024-05-01 | End: 2024-05-01 | Stop reason: HOSPADM

## 2024-05-01 RX ORDER — SODIUM CHLORIDE, SODIUM LACTATE, POTASSIUM CHLORIDE, CALCIUM CHLORIDE 600; 310; 30; 20 MG/100ML; MG/100ML; MG/100ML; MG/100ML
INJECTION, SOLUTION INTRAVENOUS CONTINUOUS
Status: DISCONTINUED | OUTPATIENT
Start: 2024-05-01 | End: 2024-05-02

## 2024-05-01 RX ORDER — KETOROLAC TROMETHAMINE 15 MG/ML
15 INJECTION, SOLUTION INTRAMUSCULAR; INTRAVENOUS EVERY 6 HOURS
Qty: 4 ML | Refills: 0 | Status: COMPLETED | OUTPATIENT
Start: 2024-05-01 | End: 2024-05-02

## 2024-05-01 RX ORDER — PROTAMINE SULFATE 10 MG/ML
INJECTION, SOLUTION INTRAVENOUS PRN
Status: DISCONTINUED | OUTPATIENT
Start: 2024-05-01 | End: 2024-05-01

## 2024-05-01 RX ORDER — ASPIRIN 81 MG/1
81 TABLET ORAL DAILY
Status: DISCONTINUED | OUTPATIENT
Start: 2024-05-02 | End: 2024-05-03 | Stop reason: HOSPADM

## 2024-05-01 RX ORDER — FENTANYL CITRATE 50 UG/ML
INJECTION, SOLUTION INTRAMUSCULAR; INTRAVENOUS PRN
Status: DISCONTINUED | OUTPATIENT
Start: 2024-05-01 | End: 2024-05-01

## 2024-05-01 RX ORDER — LIDOCAINE 40 MG/G
CREAM TOPICAL
Status: DISCONTINUED | OUTPATIENT
Start: 2024-05-01 | End: 2024-05-03 | Stop reason: HOSPADM

## 2024-05-01 RX ORDER — BUPIVACAINE HYDROCHLORIDE 2.5 MG/ML
INJECTION, SOLUTION INFILTRATION; PERINEURAL PRN
Status: DISCONTINUED | OUTPATIENT
Start: 2024-05-01 | End: 2024-05-01 | Stop reason: HOSPADM

## 2024-05-01 RX ORDER — WARFARIN SODIUM 1 MG/1
4 TABLET ORAL DAILY
COMMUNITY
End: 2024-05-20

## 2024-05-01 RX ORDER — ACETAMINOPHEN 325 MG/1
650 TABLET ORAL EVERY 4 HOURS PRN
Status: DISCONTINUED | OUTPATIENT
Start: 2024-05-04 | End: 2024-05-03 | Stop reason: HOSPADM

## 2024-05-01 RX ORDER — POLYETHYLENE GLYCOL 3350 17 G/17G
17 POWDER, FOR SOLUTION ORAL DAILY
Status: DISCONTINUED | OUTPATIENT
Start: 2024-05-02 | End: 2024-05-03 | Stop reason: HOSPADM

## 2024-05-01 RX ORDER — ONDANSETRON 4 MG/1
4 TABLET, ORALLY DISINTEGRATING ORAL EVERY 30 MIN PRN
Status: DISCONTINUED | OUTPATIENT
Start: 2024-05-01 | End: 2024-05-01 | Stop reason: HOSPADM

## 2024-05-01 RX ORDER — HYDROMORPHONE HCL IN WATER/PF 6 MG/30 ML
0.4 PATIENT CONTROLLED ANALGESIA SYRINGE INTRAVENOUS
Status: DISCONTINUED | OUTPATIENT
Start: 2024-05-01 | End: 2024-05-03 | Stop reason: HOSPADM

## 2024-05-01 RX ORDER — ONDANSETRON 2 MG/ML
4 INJECTION INTRAMUSCULAR; INTRAVENOUS EVERY 6 HOURS PRN
Status: DISCONTINUED | OUTPATIENT
Start: 2024-05-01 | End: 2024-05-03 | Stop reason: HOSPADM

## 2024-05-01 RX ORDER — OXYCODONE HYDROCHLORIDE 5 MG/1
10 TABLET ORAL EVERY 4 HOURS PRN
Status: DISCONTINUED | OUTPATIENT
Start: 2024-05-01 | End: 2024-05-03 | Stop reason: HOSPADM

## 2024-05-01 RX ORDER — METOPROLOL TARTRATE 25 MG/1
50 TABLET, FILM COATED ORAL 2 TIMES DAILY
Status: DISCONTINUED | OUTPATIENT
Start: 2024-05-01 | End: 2024-05-03 | Stop reason: HOSPADM

## 2024-05-01 RX ORDER — HEPARIN SODIUM 5000 [USP'U]/.5ML
5000 INJECTION, SOLUTION INTRAVENOUS; SUBCUTANEOUS EVERY 8 HOURS
Status: DISCONTINUED | OUTPATIENT
Start: 2024-05-02 | End: 2024-05-03 | Stop reason: HOSPADM

## 2024-05-01 RX ORDER — HEPARIN SODIUM 1000 [USP'U]/ML
INJECTION, SOLUTION INTRAVENOUS; SUBCUTANEOUS PRN
Status: DISCONTINUED | OUTPATIENT
Start: 2024-05-01 | End: 2024-05-01 | Stop reason: HOSPADM

## 2024-05-01 RX ORDER — CEFAZOLIN SODIUM 2 G/100ML
2 INJECTION, SOLUTION INTRAVENOUS EVERY 8 HOURS
Qty: 200 ML | Refills: 0 | Status: COMPLETED | OUTPATIENT
Start: 2024-05-01 | End: 2024-05-02

## 2024-05-01 RX ORDER — EPHEDRINE SULFATE 50 MG/ML
INJECTION, SOLUTION INTRAMUSCULAR; INTRAVENOUS; SUBCUTANEOUS PRN
Status: DISCONTINUED | OUTPATIENT
Start: 2024-05-01 | End: 2024-05-01

## 2024-05-01 RX ADMIN — ROCURONIUM BROMIDE 50 MG: 50 INJECTION, SOLUTION INTRAVENOUS at 14:56

## 2024-05-01 RX ADMIN — Medication 5 MG: at 16:17

## 2024-05-01 RX ADMIN — FENTANYL CITRATE 50 MCG: 50 INJECTION, SOLUTION INTRAMUSCULAR; INTRAVENOUS at 17:44

## 2024-05-01 RX ADMIN — SUGAMMADEX 200 MG: 100 INJECTION, SOLUTION INTRAVENOUS at 15:57

## 2024-05-01 RX ADMIN — KETOROLAC TROMETHAMINE 15 MG: 15 INJECTION, SOLUTION INTRAMUSCULAR; INTRAVENOUS at 20:11

## 2024-05-01 RX ADMIN — LIDOCAINE HYDROCHLORIDE 5 ML: 10 INJECTION, SOLUTION INFILTRATION; PERINEURAL at 14:19

## 2024-05-01 RX ADMIN — FENTANYL CITRATE 100 MCG: 50 INJECTION INTRAMUSCULAR; INTRAVENOUS at 14:19

## 2024-05-01 RX ADMIN — SENNOSIDES AND DOCUSATE SODIUM 1 TABLET: 8.6; 5 TABLET ORAL at 20:11

## 2024-05-01 RX ADMIN — DEXMEDETOMIDINE HYDROCHLORIDE 12 MCG: 100 INJECTION, SOLUTION INTRAVENOUS at 16:06

## 2024-05-01 RX ADMIN — HEPARIN SODIUM 8000 UNITS: 1000 INJECTION INTRAVENOUS; SUBCUTANEOUS at 15:29

## 2024-05-01 RX ADMIN — ACETAMINOPHEN 975 MG: 325 TABLET ORAL at 20:11

## 2024-05-01 RX ADMIN — SODIUM CHLORIDE, POTASSIUM CHLORIDE, SODIUM LACTATE AND CALCIUM CHLORIDE: 600; 310; 30; 20 INJECTION, SOLUTION INTRAVENOUS at 23:11

## 2024-05-01 RX ADMIN — Medication 5 MG: at 14:32

## 2024-05-01 RX ADMIN — SODIUM CHLORIDE, POTASSIUM CHLORIDE, SODIUM LACTATE AND CALCIUM CHLORIDE: 600; 310; 30; 20 INJECTION, SOLUTION INTRAVENOUS at 13:08

## 2024-05-01 RX ADMIN — ROSUVASTATIN CALCIUM 5 MG: 5 TABLET, FILM COATED ORAL at 20:11

## 2024-05-01 RX ADMIN — DEXAMETHASONE SODIUM PHOSPHATE 10 MG: 10 INJECTION, SOLUTION INTRAMUSCULAR; INTRAVENOUS at 14:19

## 2024-05-01 RX ADMIN — PROPOFOL 20 MG: 10 INJECTION, EMULSION INTRAVENOUS at 15:19

## 2024-05-01 RX ADMIN — SODIUM CHLORIDE, POTASSIUM CHLORIDE, SODIUM LACTATE AND CALCIUM CHLORIDE: 600; 310; 30; 20 INJECTION, SOLUTION INTRAVENOUS at 17:44

## 2024-05-01 RX ADMIN — PROTAMINE SULFATE 40 MG: 10 INJECTION, SOLUTION INTRAVENOUS at 15:46

## 2024-05-01 RX ADMIN — KETAMINE HYDROCHLORIDE 50 MG: 10 INJECTION INTRAMUSCULAR; INTRAVENOUS at 14:19

## 2024-05-01 RX ADMIN — ONDANSETRON 4 MG: 2 INJECTION INTRAMUSCULAR; INTRAVENOUS at 15:56

## 2024-05-01 RX ADMIN — PROPOFOL 50 MG: 10 INJECTION, EMULSION INTRAVENOUS at 16:39

## 2024-05-01 RX ADMIN — Medication 2 G: at 14:30

## 2024-05-01 RX ADMIN — METOPROLOL TARTRATE 50 MG: 25 TABLET, FILM COATED ORAL at 20:11

## 2024-05-01 RX ADMIN — PROPOFOL 40 MG: 10 INJECTION, EMULSION INTRAVENOUS at 15:56

## 2024-05-01 RX ADMIN — Medication 5 MG: at 16:19

## 2024-05-01 RX ADMIN — SODIUM CHLORIDE: 9 INJECTION, SOLUTION INTRAVENOUS at 14:32

## 2024-05-01 RX ADMIN — FENTANYL CITRATE 50 MCG: 50 INJECTION INTRAMUSCULAR; INTRAVENOUS at 15:08

## 2024-05-01 RX ADMIN — PROPOFOL 160 MG: 10 INJECTION, EMULSION INTRAVENOUS at 14:19

## 2024-05-01 RX ADMIN — Medication 5 MG: at 15:44

## 2024-05-01 RX ADMIN — FENTANYL CITRATE 50 MCG: 50 INJECTION INTRAMUSCULAR; INTRAVENOUS at 15:19

## 2024-05-01 RX ADMIN — ROCURONIUM BROMIDE 50 MG: 50 INJECTION, SOLUTION INTRAVENOUS at 14:19

## 2024-05-01 RX ADMIN — Medication 5 MG: at 14:54

## 2024-05-01 RX ADMIN — CEFAZOLIN SODIUM 2 G: 2 INJECTION, SOLUTION INTRAVENOUS at 22:30

## 2024-05-01 RX ADMIN — DEXMEDETOMIDINE HYDROCHLORIDE 8 MCG: 100 INJECTION, SOLUTION INTRAVENOUS at 16:41

## 2024-05-01 ASSESSMENT — ACTIVITIES OF DAILY LIVING (ADL)
ADLS_ACUITY_SCORE: 24
ADLS_ACUITY_SCORE: 22
ADLS_ACUITY_SCORE: 26
ADLS_ACUITY_SCORE: 22

## 2024-05-01 NOTE — ANESTHESIA PROCEDURE NOTES
Arterial Line Procedure Note    Pre-Procedure   Staff -        Anesthesiologist:  Alexsander Grewal MD       Performed By: anesthesiologist       Location: OR       Pre-Anesthestic Checklist: patient identified, IV checked, risks and benefits discussed, informed consent, monitors and equipment checked, pre-op evaluation and at physician/surgeon's request  Timeout:       Correct Patient: Yes        Correct Procedure: Yes        Correct Site: Yes        Correct Position: Yes   Line Placement:   This line was placed Post Induction starting at 5/1/2024 2:25 PM and ending at 5/1/2024 2:35 PM  Procedure        Laterality: left       Insertion Site: brachial.  Sterile Prep        Standard elements of sterile barrier followed       Skin prep: Chloraprep  Insertion/Injection        Technique: ultrasound guided        1. Ultrasound was used to evaluate the access site.       2. Artery evaluated via ultrasound for patency/adequacy.       3. Using real-time ultrasound the needle/catheter was observed entering the artery/vein.       4. Permanent image was captured and entered into the patient's record.       5. The visualized structures were anatomically normal.       6. There were no apparent abnormal pathologic findings.       Catheter Type/Size: 20 G, 12 cm  Narrative        Tegaderm and Biopatch dressing used.       Complications: None apparent,        Arterial waveform: Yes

## 2024-05-01 NOTE — ANESTHESIA POSTPROCEDURE EVALUATION
Patient: Jack Brown    Procedure: Procedure(s):  REPAIR, PSEUDOANEURYSM, ARTERY, FEMORAL       Anesthesia Type:  General    Note:  Disposition: Admission   Postop Pain Control: Uneventful            Sign Out: Well controlled pain   PONV: No   Neuro/Psych: Uneventful            Sign Out: Acceptable/Baseline neuro status   Airway/Respiratory: Uneventful            Sign Out: Acceptable/Baseline resp. status   CV/Hemodynamics: Uneventful            Sign Out: Acceptable CV status; No obvious hypovolemia; No obvious fluid overload   Other NRE: NONE   DID A NON-ROUTINE EVENT OCCUR? No           Last vitals:  Vitals Value Taken Time   /83 05/01/24 1745   Temp 36.5  C (97.7  F) 05/01/24 1745   Pulse 63 05/01/24 1752   Resp 20 05/01/24 1749   SpO2 94 % 05/01/24 1752   Vitals shown include unfiled device data.    Electronically Signed By: Naz Rodrigues MD  May 1, 2024  6:53 PM

## 2024-05-01 NOTE — ANESTHESIA PROCEDURE NOTES
Airway       Patient location during procedure: OR       Procedure Start/Stop Times: 5/1/2024 2:21 PM  Staff -        Anesthesiologist:  Alexsander Grewal MD       CRNA: Julián Elias APRN CRNA       Performed By: CRNAIndications and Patient Condition       Indications for airway management: shruthi-procedural       Induction type:intravenous       Mask difficulty assessment: 1 - vent by mask    Final Airway Details       Final airway type: endotracheal airway       Successful airway: ETT - single  Endotracheal Airway Details        ETT size (mm): 7.5       Cuffed: yes       Cuff volume (mL): 8       Successful intubation technique: direct laryngoscopy       DL Blade Type: Blake 2       Grade View of Cords: 1       Adjucts: stylet       Position: Right       Measured from: gums/teeth       Secured at (cm): 22       Bite block used: None    Post intubation assessment        Placement verified by: capnometry, equal breath sounds and chest rise        Number of attempts at approach: 1       Secured with: silk tape       Ease of procedure: easy       Dentition: Intact and Unchanged    Medication(s) Administered   Medication Administration Time: 5/1/2024 2:21 PM

## 2024-05-01 NOTE — ANESTHESIA PREPROCEDURE EVALUATION
Anesthesia Pre-Procedure Evaluation    Patient: Jack Brown   MRN: [id number] : 1950        Procedure : Procedure(s):  REPAIR, PSEUDOANEURYSM, ARTERY, FEMORAL          Past Medical History:   Diagnosis Date    BPH (benign prostatic hypertrophy)     Cancer (H)     Skin on R.chest wall.    Chest discomfort     Coronary artery disease     Detached retina, left 10/01/2014    Hyperlipidemia     Hypertension     PAD (peripheral artery disease) (H24)     Palpitations     Paroxysmal atrial fibrillation (H)     Pseudoaneurysm (H24)     Thrombosis       Past Surgical History:   Procedure Laterality Date    BYPASS GRAFT ARTERY CORONARY N/A 2015    Procedure: CORONARY ARTERY BYPASS x 3, RIGHT ENDOSCOPIC VEIN HARVEST, LEFT INTERNAL MAMMARY ARTERY, ANESTHESIA TRANSESOPHAGEAL ECHOCARDIOGRAM;  Surgeon: Jayson Alvarado MD;  Location: [facility];  Service:     CARDIAC SURGERY      CATARACT EXTRACTION      CYST REMOVAL      Ganglion cyst removal of both wrist    HC REMOVAL PREPATELLA BURSA      Description: Excision Of Prepatellar Bursa;  Recorded: 2014;    HC REPAIR OF NASAL SEPTUM      Description: Septoplasty;  Recorded: 2014;    HERNIA REPAIR, UMBILICAL      IR LOWER EXTREMITY ANGIOGRAM LEFT  2023    IR THROMBOLYSIS ARTERIAL INFUSION INITIAL DAY  2023    PARS PLANA VITRECTOMY W/ REPAIR OF MACULAR HOLE  2014    IA EXCIS TENDON SHEATH LESION, HAND/FINGER      Description: Hand Excision Of A Tendon Cyst;  Recorded: 2011;    IA LAP, VENTRAL HERNIA REPAIR,REDUCIBLE      Description: Laparoscopy Repair Of Umbilical Hernia;  Recorded: 2011;    RETINAL DETACHMENT SURGERY  oct 2014    SKIN CANCER EXCISION  2015    right chest    THORACOSCOPY Right 2016    Procedure: RIGHT VIDEO ASSISTED THORACOSCOPY, RIGHT LOBECTOMY;  Surgeon: Vinny Chase MD;  Location: [facility];  Service:     VASECTOMY        Allergies   Allergen Reactions     Niacin Hives and Rash     Burning of the skin    Atorvastatin Muscle Pain (Myalgia)    Pravastatin Muscle Pain (Myalgia)      Social History     Tobacco Use    Smoking status: Never    Smokeless tobacco: Never   Substance Use Topics    Alcohol use: Yes     Alcohol/week: 8.0 standard drinks of alcohol      Wt Readings from Last 1 Encounters:   04/29/24 74.8 kg (165 lb)        Anesthesia Evaluation            ROS/MED HX  ENT/Pulmonary:  - neg pulmonary ROS     Neurologic:  - neg neurologic ROS     Cardiovascular:  - neg cardiovascular ROS   (+)  hypertension- -  CAD -  - -                                      METS/Exercise Tolerance: >4 METS    Hematologic:  - neg hematologic  ROS   (+) History of blood clots,               Musculoskeletal:  - neg musculoskeletal ROS     GI/Hepatic:  - neg GI/hepatic ROS     Renal/Genitourinary:  - neg Renal ROS     Endo:  - neg endo ROS   (+)          thyroid problem,            Psychiatric/Substance Use:  - neg psychiatric ROS     Infectious Disease:  - neg infectious disease ROS     Malignancy:  - neg malignancy ROS     Other:  - neg other ROS          Physical Exam    Airway        Mallampati: II    Neck ROM: full     Respiratory Devices and Support         Dental           Cardiovascular   cardiovascular exam normal          Pulmonary   pulmonary exam normal                OUTSIDE LABS:  CBC:   Lab Results   Component Value Date    WBC 6.7 05/01/2024    WBC 5.9 01/24/2024    HGB 14.6 05/01/2024    HGB 14.2 01/24/2024    HCT 43.2 05/01/2024    HCT 45.2 01/24/2024     05/01/2024     01/24/2024     BMP:   Lab Results   Component Value Date     01/30/2024     01/24/2024    POTASSIUM 4.1 05/01/2024    POTASSIUM 4.4 01/30/2024    CHLORIDE 104 01/30/2024    CHLORIDE 104 01/24/2024    CO2 26 01/30/2024    CO2 24 01/24/2024    BUN 23.1 (H) 01/30/2024    BUN 18.4 01/24/2024    CR 1.00 05/01/2024    CR 1.3 04/02/2024     (H) 05/01/2024    GLC 76 01/30/2024  "    COAGS:   Lab Results   Component Value Date     (HH) 12/17/2023    INR 1.8 (H) 04/24/2024    FIBR 195 12/17/2023     POC: No results found for: \"BGM\", \"HCG\", \"HCGS\"  HEPATIC:   Lab Results   Component Value Date    ALBUMIN 4.0 01/24/2024    PROTTOTAL 7.0 01/24/2024    ALT 21 01/24/2024    AST 27 01/24/2024    ALKPHOS 71 01/24/2024    BILITOTAL 0.4 01/24/2024     OTHER:   Lab Results   Component Value Date    LACT 1.6 12/17/2023    A1C 5.5 12/15/2023    BIGG 9.3 01/30/2024    PHOS 2.8 12/19/2023    MAG 2.2 12/20/2023    TSH 1.25 10/25/2018    CRP 0.2 10/25/2018       Anesthesia Plan    ASA Status:  3       Anesthesia Type: General.     - Airway: ETT   Induction: Intravenous.      Techniques and Equipment:     - Lines/Monitors: Arterial Line, 2nd IV     Consents    Anesthesia Plan(s) and associated risks, benefits, and realistic alternatives discussed. Questions answered and patient/representative(s) expressed understanding.     - Discussed:     - Discussed with:  Patient            Postoperative Care       PONV prophylaxis: Ondansetron (or other 5HT-3), Dexamethasone or Solumedrol     Comments:               Alexsander Grewal MD    I have reviewed the pertinent notes and labs in the chart from the past 30 days and (re)examined the patient.  Any updates or changes from those notes are reflected in this note.            # Drug Induced Coagulation Defect: home medication list includes an anticoagulant medication   # Overweight: Estimated body mass index is 27.46 kg/m  as calculated from the following:    Height as of 4/29/24: 1.651 m (5' 5\").    Weight as of 4/29/24: 74.8 kg (165 lb).      "

## 2024-05-01 NOTE — ANESTHESIA CARE TRANSFER NOTE
Patient: Jack Brown    Procedure: Procedure(s):  REPAIR, PSEUDOANEURYSM, ARTERY, FEMORAL       Diagnosis: Pseudoaneurysm of femoral artery (H24) [I72.4]  Diagnosis Additional Information: No value filed.    Anesthesia Type:   General     Note:    Oropharynx: oropharynx clear of all foreign objects and spontaneously breathing  Level of Consciousness: drowsy  Oxygen Supplementation: face mask  Level of Supplemental Oxygen (L/min / FiO2): 8  Independent Airway: airway patency satisfactory and stable  Dentition: dentition unchanged  Vital Signs Stable: post-procedure vital signs reviewed and stable  Report to RN Given: handoff report given  Patient transferred to: PACU    Handoff Report: Identifed the Patient, Identified the Reponsible Provider, Reviewed the pertinent medical history, Discussed the surgical course, Reviewed Intra-OP anesthesia mangement and issues during anesthesia, Set expectations for post-procedure period and Allowed opportunity for questions and acknowledgement of understanding    Vitals:  Vitals Value Taken Time   /74 05/01/24 1706   Temp     Pulse 63 05/01/24 1706   Resp 26 05/01/24 1706   SpO2 95 % 05/01/24 1706   Vitals shown include unfiled device data.    Electronically Signed By: LILI Gonzalez CRNA  May 1, 2024  5:08 PM

## 2024-05-01 NOTE — PHARMACY-ADMISSION MEDICATION HISTORY
Pharmacist Admission Medication History    Admission medication history is complete. The information provided in this note is only as accurate as the sources available at the time of the update.    Information Source(s): Patient via in-person    Pertinent Information: Patient has been holding Warfarin since 4/26/24 in preparation for surgery. Current home dose is 4 mg five days per week and 3 mg two days per week.     Changes made to PTA medication list:  Added: Vitamin D  Deleted: None  Changed: None    Allergies reviewed with patient and updates made in EHR: yes    Medication History Completed By: MARICHUY AGUERO RPH 5/1/2024 1:21 PM    PTA Med List   Medication Sig Last Dose    acetaminophen (TYLENOL) 500 MG tablet [ACETAMINOPHEN (TYLENOL) 500 MG TABLET] Take 500 mg by mouth every 6 (six) hours as needed for pain. Past Week at prn    amLODIPine (NORVASC) 10 MG tablet TAKE 1 TABLET(10 MG) BY MOUTH DAILY 5/1/2024 at am    finasteride (PROSCAR) 5 MG tablet Take 1 tablet (5 mg) by mouth daily 5/1/2024 at am    metoprolol tartrate (LOPRESSOR) 50 MG tablet TAKE 1 TABLET(50 MG) BY MOUTH TWICE DAILY 5/1/2024 at 0600    rosuvastatin (CRESTOR) 5 MG tablet Take 1 tablet (5 mg) by mouth at bedtime 4/30/2024 at hs    tamsulosin (FLOMAX) 0.4 MG capsule TAKE 1 CAPSULE BY MOUTH DAILY AFTER BREAKFAST 5/1/2024 at am    Vitamin D3 (VITAMIN D, CHOLECALCIFEROL,) 25 mcg (1000 units) tablet Take 1 tablet by mouth daily 5/1/2024 at am    warfarin ANTICOAGULANT (COUMADIN) 1 MG tablet Take 4 mg by mouth daily 4 mg five days per week and 3 mg two days per week has been home dose - has been on hold since 4/26/24 in preparation for surgery Past Week at 4/26/24

## 2024-05-02 LAB
ANION GAP SERPL CALCULATED.3IONS-SCNC: 9 MMOL/L (ref 7–15)
BASOPHILS # BLD AUTO: 0 10E3/UL (ref 0–0.2)
BASOPHILS NFR BLD AUTO: 0 %
BUN SERPL-MCNC: 27.5 MG/DL (ref 8–23)
CALCIUM SERPL-MCNC: 9.4 MG/DL (ref 8.8–10.2)
CHLORIDE SERPL-SCNC: 106 MMOL/L (ref 98–107)
CREAT SERPL-MCNC: 1.05 MG/DL (ref 0.67–1.17)
DEPRECATED HCO3 PLAS-SCNC: 22 MMOL/L (ref 22–29)
EGFRCR SERPLBLD CKD-EPI 2021: 74 ML/MIN/1.73M2
EOSINOPHIL # BLD AUTO: 0 10E3/UL (ref 0–0.7)
EOSINOPHIL NFR BLD AUTO: 0 %
ERYTHROCYTE [DISTWIDTH] IN BLOOD BY AUTOMATED COUNT: 13.3 % (ref 10–15)
GLUCOSE SERPL-MCNC: 120 MG/DL (ref 70–99)
HCT VFR BLD AUTO: 39.2 % (ref 40–53)
HGB BLD-MCNC: 13.3 G/DL (ref 13.3–17.7)
IMM GRANULOCYTES # BLD: 0.1 10E3/UL
IMM GRANULOCYTES NFR BLD: 1 %
INR PPP: 1.01 (ref 0.85–1.15)
LYMPHOCYTES # BLD AUTO: 0.8 10E3/UL (ref 0.8–5.3)
LYMPHOCYTES NFR BLD AUTO: 7 %
MCH RBC QN AUTO: 30 PG (ref 26.5–33)
MCHC RBC AUTO-ENTMCNC: 33.9 G/DL (ref 31.5–36.5)
MCV RBC AUTO: 89 FL (ref 78–100)
MONOCYTES # BLD AUTO: 0.4 10E3/UL (ref 0–1.3)
MONOCYTES NFR BLD AUTO: 3 %
NEUTROPHILS # BLD AUTO: 10.8 10E3/UL (ref 1.6–8.3)
NEUTROPHILS NFR BLD AUTO: 89 %
NRBC # BLD AUTO: 0 10E3/UL
NRBC BLD AUTO-RTO: 0 /100
PLATELET # BLD AUTO: 203 10E3/UL (ref 150–450)
POTASSIUM SERPL-SCNC: 4.5 MMOL/L (ref 3.4–5.3)
RBC # BLD AUTO: 4.43 10E6/UL (ref 4.4–5.9)
SODIUM SERPL-SCNC: 137 MMOL/L (ref 135–145)
WBC # BLD AUTO: 12 10E3/UL (ref 4–11)

## 2024-05-02 PROCEDURE — 36415 COLL VENOUS BLD VENIPUNCTURE: CPT | Performed by: SURGERY

## 2024-05-02 PROCEDURE — 85610 PROTHROMBIN TIME: CPT | Performed by: STUDENT IN AN ORGANIZED HEALTH CARE EDUCATION/TRAINING PROGRAM

## 2024-05-02 PROCEDURE — 36415 COLL VENOUS BLD VENIPUNCTURE: CPT | Performed by: STUDENT IN AN ORGANIZED HEALTH CARE EDUCATION/TRAINING PROGRAM

## 2024-05-02 PROCEDURE — 80048 BASIC METABOLIC PNL TOTAL CA: CPT | Performed by: SURGERY

## 2024-05-02 PROCEDURE — 250N000013 HC RX MED GY IP 250 OP 250 PS 637: Performed by: SURGERY

## 2024-05-02 PROCEDURE — 250N000011 HC RX IP 250 OP 636: Performed by: SURGERY

## 2024-05-02 PROCEDURE — 120N000001 HC R&B MED SURG/OB

## 2024-05-02 PROCEDURE — 85025 COMPLETE CBC W/AUTO DIFF WBC: CPT | Performed by: SURGERY

## 2024-05-02 RX ORDER — WARFARIN SODIUM 2 MG/1
4 TABLET ORAL ONCE
Status: COMPLETED | OUTPATIENT
Start: 2024-05-02 | End: 2024-05-03

## 2024-05-02 RX ADMIN — ASPIRIN 81 MG: 81 TABLET, COATED ORAL at 08:54

## 2024-05-02 RX ADMIN — KETOROLAC TROMETHAMINE 15 MG: 15 INJECTION, SOLUTION INTRAMUSCULAR; INTRAVENOUS at 08:51

## 2024-05-02 RX ADMIN — SENNOSIDES AND DOCUSATE SODIUM 1 TABLET: 8.6; 5 TABLET ORAL at 08:54

## 2024-05-02 RX ADMIN — FINASTERIDE 5 MG: 5 TABLET, FILM COATED ORAL at 08:54

## 2024-05-02 RX ADMIN — ROSUVASTATIN CALCIUM 5 MG: 5 TABLET, FILM COATED ORAL at 20:16

## 2024-05-02 RX ADMIN — POLYETHYLENE GLYCOL 3350 17 G: 17 POWDER, FOR SOLUTION ORAL at 08:54

## 2024-05-02 RX ADMIN — METOPROLOL TARTRATE 50 MG: 25 TABLET, FILM COATED ORAL at 08:54

## 2024-05-02 RX ADMIN — SENNOSIDES AND DOCUSATE SODIUM 1 TABLET: 8.6; 5 TABLET ORAL at 20:16

## 2024-05-02 RX ADMIN — TAMSULOSIN HYDROCHLORIDE 0.4 MG: 0.4 CAPSULE ORAL at 08:54

## 2024-05-02 RX ADMIN — AMLODIPINE BESYLATE 10 MG: 5 TABLET ORAL at 08:54

## 2024-05-02 RX ADMIN — CEFAZOLIN SODIUM 2 G: 2 INJECTION, SOLUTION INTRAVENOUS at 06:10

## 2024-05-02 RX ADMIN — KETOROLAC TROMETHAMINE 15 MG: 15 INJECTION, SOLUTION INTRAMUSCULAR; INTRAVENOUS at 14:24

## 2024-05-02 RX ADMIN — METOPROLOL TARTRATE 50 MG: 25 TABLET, FILM COATED ORAL at 20:15

## 2024-05-02 RX ADMIN — ACETAMINOPHEN 975 MG: 325 TABLET ORAL at 02:29

## 2024-05-02 RX ADMIN — ACETAMINOPHEN 975 MG: 325 TABLET ORAL at 10:45

## 2024-05-02 RX ADMIN — KETOROLAC TROMETHAMINE 15 MG: 15 INJECTION, SOLUTION INTRAMUSCULAR; INTRAVENOUS at 02:28

## 2024-05-02 RX ADMIN — ACETAMINOPHEN 975 MG: 325 TABLET ORAL at 19:46

## 2024-05-02 ASSESSMENT — ACTIVITIES OF DAILY LIVING (ADL)
ADLS_ACUITY_SCORE: 24
ADLS_ACUITY_SCORE: 24
ADLS_ACUITY_SCORE: 22
ADLS_ACUITY_SCORE: 26
ADLS_ACUITY_SCORE: 22
ADLS_ACUITY_SCORE: 24
ADLS_ACUITY_SCORE: 25
ADLS_ACUITY_SCORE: 22
ADLS_ACUITY_SCORE: 25
ADLS_ACUITY_SCORE: 26
ADLS_ACUITY_SCORE: 28
ADLS_ACUITY_SCORE: 26
ADLS_ACUITY_SCORE: 25
ADLS_ACUITY_SCORE: 26
ADLS_ACUITY_SCORE: 24
ADLS_ACUITY_SCORE: 24

## 2024-05-02 NOTE — PROGRESS NOTES
Care Management Initial Consult    General Information  Assessment completed with:  ,         Primary Care Provider verified and updated as needed:   yes  Readmission within the last 30 days:  no         Advance Care Planning:    no documents        Communication Assessment  Patient's communication style: spoken language (English or Bilingual)    Hearing Difficulty or Deaf: no   Wear Glasses or Blind: yes    Cognitive  Cognitive/Neuro/Behavioral: WDL  Level of Consciousness: alert                   Living Environment:   People in home: spouse     Current living Arrangements:        Able to return to prior arrangements:  yes       Family/Social Support:  Care provided by: self, spouse/significant other  Provides care for:                  Description of Support System:    supportive       Current Resources:   Patient receiving home care services:  no     Community Resources:    Equipment currently used at home: none  Supplies currently used at home:      Employment/Financial:  Employment Status:   retired, not a         Financial Concerns:   none identified          Does the patient's insurance plan have a 3 day qualifying hospital stay waiver?  No    Lifestyle & Psychosocial Needs:  Social Determinants of Health     Food Insecurity: Low Risk  (1/19/2024)    Food Insecurity     Within the past 12 months, did you worry that your food would run out before you got money to buy more?: No     Within the past 12 months, did the food you bought just not last and you didn t have money to get more?: No   Depression: Not at risk (4/5/2024)    PHQ-2     PHQ-2 Score: 0   Housing Stability: Low Risk  (1/19/2024)    Housing Stability     Do you have housing? : Yes     Are you worried about losing your housing?: No   Tobacco Use: Low Risk  (5/1/2024)    Patient History     Smoking Tobacco Use: Never     Smokeless Tobacco Use: Never     Passive Exposure: Not on file   Financial Resource Strain: Low Risk  (1/19/2024)     Financial Resource Strain     Within the past 12 months, have you or your family members you live with been unable to get utilities (heat, electricity) when it was really needed?: No   Alcohol Use: Not on file   Transportation Needs: Low Risk  (1/19/2024)    Transportation Needs     Within the past 12 months, has lack of transportation kept you from medical appointments, getting your medicines, non-medical meetings or appointments, work, or from getting things that you need?: No   Physical Activity: Not on file   Interpersonal Safety: Low Risk  (1/19/2024)    Interpersonal Safety     Do you feel physically and emotionally safe where you currently live?: Yes     Within the past 12 months, have you been hit, slapped, kicked or otherwise physically hurt by someone?: No     Within the past 12 months, have you been humiliated or emotionally abused in other ways by your partner or ex-partner?: No   Stress: Not on file   Social Connections: Not on file   Health Literacy: Not on file       Functional Status:  Prior to admission patient needed assistance: independent, drives             Mental Health Status: no concerns          Chemical Dependency Status: no concerns                Values/Beliefs:  Spiritual, Cultural Beliefs, Jew Practices, Values that affect care:  Shinto               Additional Information:  Patient is alert,oriented and independent. He expects to discharge to home with wife. Sister-in-law to transport. He expects to do OP follow up.    LEMUEL Hoff

## 2024-05-02 NOTE — PLAN OF CARE
"  Problem: Adult Inpatient Plan of Care  Goal: Plan of Care Review  Description: The Plan of Care Review/Shift note should be completed every shift.  The Outcome Evaluation is a brief statement about your assessment that the patient is improving, declining, or no change.  This information will be displayed automatically on your shift  note.  5/1/2024 2302 by Oxana Mccall, RN  Outcome: Progressing  5/1/2024 2300 by Oxana Mccall, RN  Outcome: Progressing  Goal: Patient-Specific Goal (Individualized)  Description: You can add care plan individualizations to a care plan. Examples of Individualization might be:  \"Parent requests to be called daily at 9am for status\", \"I have a hard time hearing out of my right ear\", or \"Do not touch me to wake me up as it startles  me\".  Outcome: Progressing  Goal: Optimal Comfort and Wellbeing  Intervention: Monitor Pain and Promote Comfort  Recent Flowsheet Documentation  Taken 5/1/2024 2011 by Oxana Mccall, RN  Pain Management Interventions: medication (see MAR)     Problem: Risk for Delirium  Goal: Improved Sleep  Outcome: Progressing     Problem: Infection  Goal: Absence of Infection Signs and Symptoms  Outcome: Progressing     Problem: Pain Acute  Goal: Optimal Pain Control and Function  Outcome: Progressing  Intervention: Develop Pain Management Plan  Recent Flowsheet Documentation  Taken 5/1/2024 2011 by Oxana Mccall, RN  Pain Management Interventions: medication (see MAR)     Goal Outcome Evaluation:  Patient is alert and oriented x 4 and able to make needs known. Patient's pain controlled with scheduled medication. Roper in place in place until POD 1. Patient remains on bedrest. Patient has BEHZAD drain in R anterior grown, put out about 40 mL of dark red output.                       "

## 2024-05-02 NOTE — PLAN OF CARE
Problem: Pain Acute  Goal: Optimal Pain Control and Function  Outcome: Progressing  Intervention: Prevent or Manage Pain  Recent Flowsheet Documentation  Taken 5/2/2024 0530 by Sherri Christianson RN  Medication Review/Management: medications reviewed  Taken 5/2/2024 0130 by Sherri Christianson, RN  Medication Review/Management: medications reviewed     Problem: Risk for Delirium  Goal: Improved Sleep  Outcome: Progressing   Goal Outcome Evaluation:  No acute events overnight.  Reports he slept well.  Has denied pain this shift, only tender to touch. Has received scheduled toradol and tylenol.  BEHZAD without output this shift. Roper patent though low urine output this shift.

## 2024-05-02 NOTE — BRIEF OP NOTE
[facility]    Brief Operative Note    Pre-operative diagnosis: Pseudoaneurysm of femoral artery (H24) [I72.4]  Post-operative diagnosis Same as pre-operative diagnosis    Procedure: REPAIR, PSEUDOANEURYSM, ARTERY, FEMORAL, Right - Groin    Surgeon: Surgeons and Role:     * Laverne Crum MD - Primary     * Nanci Harry PA-C - Assisting     * Kristen Abarca DO - Fellow - Assisting  Anesthesia: General   Estimated Blood Loss: 100 mL from 5/1/2024  2:15 PM to 5/1/2024  5:01 PM      Drains:BEHZAD drain  Specimens:   ID Type Source Tests Collected by Time Destination   1 : RIGHT FEMORAL PSEUDO-ANEURYSM CAPSULE WITH THROMBUS Tissue Artery, Femoral, Right SURGICAL PATHOLOGY EXAM Laverne Crum MD 5/1/2024  3:54 PM      Findings:   Large pseudoaneurysm in distal CFA with active bleeding and large PSA capsule with large amount of thrombus .2+ DP bilaterally at conclusion of case.   Complications: None.  Implants: * No implants in log *

## 2024-05-02 NOTE — PLAN OF CARE
Problem: Pain Acute  Goal: Optimal Pain Control and Function  Outcome: Progressing   Goal Outcome Evaluation:       Patient received scheduled tylenol and toradol for pain this shift.  Patient denies any pain.  Up ambulating in hallway x2.  Sat in the chair for a couple hours this afternoon and tolerated well.  Denies numbness f tingling in LE's. Tolerating regular diet.

## 2024-05-02 NOTE — PROGRESS NOTES
VASCULAR SURGERY PROGRESS NOTE    Subjective:  POD#1 repair of right CFA PSA with evacuation of right thigh hematoma, recovering well. Minimal pain, no complaints. Slept well overnight.     Objective:  Intake/Output Summary (Last 24 hours) at 5/2/2024 0830  Last data filed at 5/2/2024 0600  Gross per 24 hour   Intake 2693 ml   Output 840 ml   Net 1853 ml     PHYSICAL EXAM:  /68 (BP Location: Right arm)   Pulse 60   Temp 97.6  F (36.4  C) (Oral)   Resp 16   Wt 71.1 kg (156 lb 12 oz)   SpO2 92%   BMI 26.08 kg/m    General: The patient is alert and oriented. Appropriate. No acute distress  Psych: pleasant affect, answers questions appropriately  Skin: Color appropriate for race, warm, dry.  Respiratory: The patient does not require supplemental oxygen. Breathing unlabored  GI:  Abdomen soft, nontender to light palpation.  Extremities: Strong 2+ DP and PT on right, motor and sensation intact bilaterally. Right thigh incisions without hematoma, suprainguinal incision with shruthi incisional ecchymosis. BEHZAD drain with small amount of venous blood in bulb.       Labs:  Na 137  Cr 1.05  WBC 12  Hgb 13.3  Platelet 203      ASSESSMENT:  74 yo male with multiple arterial aneurysms and hx of left popliteal aneurysm with occlusion s/p lytics with right femoral access, now improved but with residual >7cm right femoral PSA, symptomatic. Now s/p repair of RLE PSA on 5/1, recovering well      PLAN:  -activity as tolerated  -remove henderson  -stop IV fluids  -cont BEHZAD drain   -multimodal pain control  -home meds restarted, appreciate medical team assistance with care  -asa 81mg daily, SQH for DVT ppx  -increase mobility today, anticipate discharge in AM on 5/3    D/W Dr Skyla Abarca, DO  Fellow  Vascular Surgery

## 2024-05-03 ENCOUNTER — TELEPHONE (OUTPATIENT)
Dept: ANTICOAGULATION | Facility: CLINIC | Age: 74
End: 2024-05-03
Payer: MEDICARE

## 2024-05-03 VITALS
OXYGEN SATURATION: 92 % | DIASTOLIC BLOOD PRESSURE: 79 MMHG | WEIGHT: 156.75 LBS | RESPIRATION RATE: 18 BRPM | TEMPERATURE: 98.7 F | HEART RATE: 70 BPM | BODY MASS INDEX: 26.08 KG/M2 | SYSTOLIC BLOOD PRESSURE: 140 MMHG

## 2024-05-03 DIAGNOSIS — I48.0 PAROXYSMAL ATRIAL FIBRILLATION (H): ICD-10-CM

## 2024-05-03 DIAGNOSIS — I70.90 ARTERIAL OCCLUSION: Primary | ICD-10-CM

## 2024-05-03 LAB
GLUCOSE BLDC GLUCOMTR-MCNC: 94 MG/DL (ref 70–99)
INR PPP: 1.04 (ref 0.85–1.15)
PATH REPORT.COMMENTS IMP SPEC: NORMAL
PATH REPORT.COMMENTS IMP SPEC: NORMAL
PATH REPORT.FINAL DX SPEC: NORMAL
PATH REPORT.GROSS SPEC: NORMAL
PATH REPORT.MICROSCOPIC SPEC OTHER STN: NORMAL
PATH REPORT.RELEVANT HX SPEC: NORMAL
PHOTO IMAGE: NORMAL

## 2024-05-03 PROCEDURE — 88304 TISSUE EXAM BY PATHOLOGIST: CPT | Mod: 26 | Performed by: PATHOLOGY

## 2024-05-03 PROCEDURE — 85610 PROTHROMBIN TIME: CPT | Performed by: STUDENT IN AN ORGANIZED HEALTH CARE EDUCATION/TRAINING PROGRAM

## 2024-05-03 PROCEDURE — 36415 COLL VENOUS BLD VENIPUNCTURE: CPT | Performed by: STUDENT IN AN ORGANIZED HEALTH CARE EDUCATION/TRAINING PROGRAM

## 2024-05-03 PROCEDURE — 250N000013 HC RX MED GY IP 250 OP 250 PS 637: Performed by: STUDENT IN AN ORGANIZED HEALTH CARE EDUCATION/TRAINING PROGRAM

## 2024-05-03 PROCEDURE — 250N000013 HC RX MED GY IP 250 OP 250 PS 637: Performed by: SURGERY

## 2024-05-03 RX ORDER — WARFARIN SODIUM 2 MG/1
4 TABLET ORAL
Status: DISCONTINUED | OUTPATIENT
Start: 2024-05-03 | End: 2024-05-03 | Stop reason: HOSPADM

## 2024-05-03 RX ORDER — ASPIRIN 81 MG/1
81 TABLET ORAL DAILY
Qty: 90 TABLET | Refills: 3 | Status: SHIPPED | OUTPATIENT
Start: 2024-05-03

## 2024-05-03 RX ADMIN — SENNOSIDES AND DOCUSATE SODIUM 1 TABLET: 8.6; 5 TABLET ORAL at 09:36

## 2024-05-03 RX ADMIN — AMLODIPINE BESYLATE 10 MG: 5 TABLET ORAL at 09:36

## 2024-05-03 RX ADMIN — WARFARIN SODIUM 4 MG: 2 TABLET ORAL at 00:55

## 2024-05-03 RX ADMIN — POLYETHYLENE GLYCOL 3350 17 G: 17 POWDER, FOR SOLUTION ORAL at 09:37

## 2024-05-03 RX ADMIN — ACETAMINOPHEN 975 MG: 325 TABLET ORAL at 11:43

## 2024-05-03 RX ADMIN — TAMSULOSIN HYDROCHLORIDE 0.4 MG: 0.4 CAPSULE ORAL at 09:34

## 2024-05-03 RX ADMIN — METOPROLOL TARTRATE 50 MG: 25 TABLET, FILM COATED ORAL at 09:36

## 2024-05-03 RX ADMIN — ACETAMINOPHEN 975 MG: 325 TABLET ORAL at 03:20

## 2024-05-03 RX ADMIN — FINASTERIDE 5 MG: 5 TABLET, FILM COATED ORAL at 09:36

## 2024-05-03 RX ADMIN — ASPIRIN 81 MG: 81 TABLET, COATED ORAL at 11:43

## 2024-05-03 ASSESSMENT — ACTIVITIES OF DAILY LIVING (ADL)
ADLS_ACUITY_SCORE: 32
ADLS_ACUITY_SCORE: 28
ADLS_ACUITY_SCORE: 28
ADLS_ACUITY_SCORE: 31
ADLS_ACUITY_SCORE: 32
ADLS_ACUITY_SCORE: 28
ADLS_ACUITY_SCORE: 32
ADLS_ACUITY_SCORE: 28

## 2024-05-03 NOTE — PROGRESS NOTES
VASCULAR SURGERY PROGRESS NOTE    Subjective:  No acute events overnight, ambulated well yesterday and pain well-controlled.  Minimal output in BEHZAD drain, 10 cc overnight.    Objective:  Intake/Output Summary (Last 24 hours) at 5/2/2024 0830  Last data filed at 5/2/2024 0600  Gross per 24 hour   Intake 2693 ml   Output 840 ml   Net 1853 ml     PHYSICAL EXAM:  BP (!) 140/79 (BP Location: Left arm)   Pulse 70   Temp 98.7  F (37.1  C) (Oral)   Resp 18   Wt 71.1 kg (156 lb 12 oz)   SpO2 92%   BMI 26.08 kg/m    General: The patient is alert and oriented. Appropriate. No acute distress  Psych: pleasant affect, answers questions appropriately  Skin: Color appropriate for race, warm, dry.  Respiratory: The patient does not require supplemental oxygen. Breathing unlabored  GI:  Abdomen soft, nontender to light palpation.  Extremities: Strong 2+ DP and PT on right, motor and sensation intact bilaterally. Right thigh incisions without hematoma, suprainguinal incision with shruthi incisional ecchymosis. BEHZAD drain with small amount of venous blood in bulb.       ASSESSMENT:  74 yo male with multiple arterial aneurysms and hx of left popliteal aneurysm with occlusion s/p lytics with right femoral access, now improved but with residual >7cm right femoral PSA, symptomatic. Now s/p repair of RLE PSA on 5/1, recovering well      PLAN:  -activity as tolerated  -remove BEHZAD drain today  -multimodal pain control  -home meds restarted, appreciate medical team assistance with care  -asa 81mg daily, SQH for DVT ppx  -Coumadin restarted, patient will follow-up for INR checks and continue home dose as he was on preop  -Discussed need for future bilateral popliteal bypass and common iliac artery repair.  Will discuss plans for left popliteal bypass in the future when patient is seen for postop visit in clinic.    D/W Dr Skyla Abarca,   Fellow  Vascular Surgery

## 2024-05-03 NOTE — PLAN OF CARE
Problem: Adult Inpatient Plan of Care  Goal: Plan of Care Review  Description: The Plan of Care Review/Shift note should be completed every shift.  The Outcome Evaluation is a brief statement about your assessment that the patient is improving, declining, or no change.  This information will be displayed automatically on your shift  note.  Outcome: Progressing     Problem: Comorbidity Management  Goal: Blood Pressure in Desired Range  Outcome: Progressing     Problem: Adult Inpatient Plan of Care  Goal: Absence of Hospital-Acquired Illness or Injury  Intervention: Prevent Skin Injury  Recent Flowsheet Documentation  Taken 5/2/2024 1654 by Kristine Vazquez RN  Device Skin Pressure Protection: absorbent pad utilized/changed  Intervention: Prevent and Manage VTE (Venous Thromboembolism) Risk  Recent Flowsheet Documentation  Taken 5/2/2024 1654 by Kristine Vazquez RN  VTE Prevention/Management: compression stockings off  Intervention: Prevent Infection  Recent Flowsheet Documentation  Taken 5/2/2024 1654 by Kristine Vazquez RN  Infection Prevention: hand hygiene promoted     Problem: Risk for Delirium  Goal: Optimal Coping  Intervention: Optimize Psychosocial Adjustment to Delirium  Recent Flowsheet Documentation  Taken 5/2/2024 1654 by Kristine Vazquez RN  Supportive Measures: active listening utilized  Goal: Improved Behavioral Control  Intervention: Prevent and Manage Agitation  Recent Flowsheet Documentation  Taken 5/2/2024 1654 by Kristine Vazquez RN  Environment Familiarity/Consistency: daily routine followed  Intervention: Minimize Safety Risk  Recent Flowsheet Documentation  Taken 5/2/2024 1654 by Kristine Vazquez RN  Communication Enhancement Strategies: call light answered in person  Goal: Improved Attention and Thought Clarity  Intervention: Maximize Cognitive Function  Recent Flowsheet Documentation  Taken 5/2/2024 1654 by Kristine Vazquez RN  Sensory Stimulation Regulation: quiet environment  promoted  Reorientation Measures: clock in view     Problem: Pain Acute  Goal: Optimal Pain Control and Function  Intervention: Prevent or Manage Pain  Recent Flowsheet Documentation  Taken 5/2/2024 1654 by Kristine Vazquez, RN  Sensory Stimulation Regulation: quiet environment promoted  Intervention: Optimize Psychosocial Wellbeing  Recent Flowsheet Documentation  Taken 5/2/2024 1654 by Kristine Vazquez, RN  Supportive Measures: active listening utilized   Goal Outcome Evaluation:                  Pt. Is A&0. Denied pain during the shift. He ambulated from room thro the hallway. His BEHZAD drain had an OP of 10ml. Continue with POC.

## 2024-05-03 NOTE — PROGRESS NOTES
Drain removed prior by vascular staff. Discussed discharge paperwork with pt & pts spouse present at bedside. Both parties voiced understanding. Personal belongings and discharge paperwork given to pt. Pt discharged via w/c accompanied by aide & spouse.

## 2024-05-03 NOTE — PHARMACY-ANTICOAGULATION SERVICE
Clinical Pharmacy - Warfarin Dosing Consult     Pharmacy has been consulted to manage this patient s warfarin therapy.  Indication: Atrial Fibrillation  Therapy Goal: INR 2-3  Provider/Team: Vascular surg  Warfarin Prior to Admission: Yes  Warfarin PTA Regimen: 4 mg 5 days/wk, 3 mg 2 days/wk  Significant drug interactions: ASA, ketorolac  Recent documented change in oral intake/nutrition: Unknown  Dose Comments: resuming warfarin today, will give 4 mg dose    INR   Date Value Ref Range Status   05/02/2024 1.01 0.85 - 1.15 Final   04/24/2024 1.8 (H) 0.9 - 1.1 Final       Recommend warfarin 4 mg today.  Pharmacy will monitor Jack Brown daily and order warfarin doses to achieve specified goal.      Please contact pharmacy as soon as possible if the warfarin needs to be held for a procedure or if the warfarin goals change.

## 2024-05-03 NOTE — PROGRESS NOTES
Care Management Discharge Note    Discharge Date: 05/03/2024       Discharge Disposition: Home    Discharge Services: None    Discharge DME:  (per treatment team)    Discharge Transportation:      Private pay costs discussed: Not applicable    Does the patient's insurance plan have a 3 day qualifying hospital stay waiver?  No    PAS Confirmation Code: NA  Patient/family educated on Medicare website which has current facility and service quality ratings: no    Education Provided on the Discharge Plan: Yes (per treatment team)  Persons Notified of Discharge Plans: patient   Patient/Family in Agreement with the Plan: yes    Handoff Referral Completed: NA    Additional Information:  Patient discharging home to prior living environment with spouse; no CM needs identified. Spouse to transport.     LEMUEL Watson

## 2024-05-03 NOTE — TELEPHONE ENCOUNTER
ANTICOAGULATION  MANAGEMENT: Discharge Review    Jack MYLES Kevin chart reviewed for anticoagulation continuity of care    Hospital Admission on may1 for pseudoaneurism repair.    Discharge disposition: Home    Results:    Recent labs: (last 7 days)     05/02/24 2110 05/03/24  0551   INR 1.01 1.04     Anticoagulation inpatient management:     home regimen continued    Anticoagulation discharge instructions:     Warfarin dosing: home regimen continued   Bridging: No   INR goal change: No      Medication changes affecting anticoagulation: No    Additional factors affecting anticoagulation: No     PLAN     No adjustment to anticoagulation plan needed    Spoke with Shayy reviewed plan and scheduled inr    Anticoagulation Calendar updated    Felisa Carnes RN

## 2024-05-03 NOTE — PROGRESS NOTES
2 vascular staff members present at bedside. Updated on pts refusal of subcutaneous heparin. Updated on new L eye orbital bruising & LUE bruising that had been outlined by NOC RN. Vascular removed R groin BEHZAD drain.

## 2024-05-03 NOTE — PLAN OF CARE
Problem: Adult Inpatient Plan of Care  Goal: Optimal Comfort and Wellbeing  Outcome: Progressing     Problem: Adult Inpatient Plan of Care  Goal: Readiness for Transition of Care  Outcome: Progressing   Goal Outcome Evaluation:  Pt had an uneventful shift. Slept well between cares.  Denies pain.  BEHZAD drain with 10cc output this shift.  Voiding large amounts in the urinal.  Incision sites bruised but no drainage.  Noted large purple bruise on left inner elbow and forearm.  Bruise outline per pt request.

## 2024-05-06 ENCOUNTER — TELEPHONE (OUTPATIENT)
Dept: VASCULAR SURGERY | Facility: CLINIC | Age: 74
End: 2024-05-06
Payer: MEDICARE

## 2024-05-06 NOTE — TELEPHONE ENCOUNTER
Spoke with wife Shayy regarding Jack and she reports he is doing really good and not complaining of any pain since home. Relayed message from Dr. Abarca, he can shower and okay to let water run over wounds. The tegaderm on groin will stay in place until it falls off and if falls off should apply dry gauze dressing. At previous drain site he can put a dry dressing with gauze. He should not submerge wounds in bath tub or pool for 6 weeks or until healed. She was understanding of the plan and will call back if any other questions. Has follow-up 5/20/24.

## 2024-05-06 NOTE — TELEPHONE ENCOUNTER
Caller: Shayy De La Torre     Provider: MD Laverne Crum    Detailed reason for call: Patient and wife would like to know if it is OK for patient to shower normally/what special instructions he should follow post-op.    Best phone number to contact: [phone]    Best time to contact: Any time    Ok to leave a detailed message: Yes    Ok to speak to authorized person if needed: Yes: wife      (Noted to patient if reason is related to wound or incision, to please send a photo via email or [employer].)

## 2024-05-07 NOTE — OR NURSING
Post Operative Phone Call     Surgery: REPAIR, PSEUDOANEURYSM, ARTERY, FEMORAL RIGHT     Date of Surgery:5/1/24    Surgeon: Dr. Laverne Crum    Patient Discharged from Hospital: 5/3/24      Spoke with: Patient      Discussed patients status since discharging home after surgery.     Pt reports their pain is  Currently 0/10 and they are using NSAIDs:  Ibuprofen (Advil, Motrin) to control their pain.     The incision is Healing well. Pt reports they are eating and drinking normal.     Pt is voiding normal and had a bowel movement on hospital.    Pt is not having any stroke like symptoms or new onset of headaches.         VQI measures: Pt has been prescribed Crestor and Coumadin; INR = 1.4 on 5/3  ASA and reports they are taking it as prescribed.    Confirmed follow up appointments: YES    Callback number provided for further questions/concerns.    Alayna Saunders RN

## 2024-05-09 ENCOUNTER — ANTICOAGULATION THERAPY VISIT (OUTPATIENT)
Dept: ANTICOAGULATION | Facility: CLINIC | Age: 74
End: 2024-05-09

## 2024-05-09 ENCOUNTER — LAB (OUTPATIENT)
Dept: LAB | Facility: CLINIC | Age: 74
End: 2024-05-09
Payer: MEDICARE

## 2024-05-09 DIAGNOSIS — I70.90 ARTERIAL OCCLUSION: Primary | ICD-10-CM

## 2024-05-09 DIAGNOSIS — I48.0 PAROXYSMAL ATRIAL FIBRILLATION (H): ICD-10-CM

## 2024-05-09 DIAGNOSIS — I70.90 ARTERIAL OCCLUSION: ICD-10-CM

## 2024-05-09 LAB — INR BLD: 1.3 (ref 0.9–1.1)

## 2024-05-09 PROCEDURE — 36415 COLL VENOUS BLD VENIPUNCTURE: CPT

## 2024-05-09 PROCEDURE — 85610 PROTHROMBIN TIME: CPT

## 2024-05-09 NOTE — PROGRESS NOTES
ANTICOAGULATION MANAGEMENT     Jack Brown 73 year old male is on warfarin with subtherapeutic INR result. (Goal INR 2.0-3.0)    Recent labs: (last 7 days)     05/09/24  1142   INR 1.3*       ASSESSMENT     Source(s): Chart Review and Patient/Caregiver Call     Warfarin doses taken: Warfarin taken as instructed  Diet: No new diet changes identified  Medication/supplement changes: None noted  New illness, injury, or hospitalization: Yes: pseudoaneurysm repair 5/2, held 5 days prior  Signs or symptoms of bleeding or clotting: No  Previous result: Subtherapeutic  Additional findings: None       PLAN     Recommended plan for no diet, medication or health factor changes affecting INR     Dosing Instructions: booster dose then Increase your warfarin dose (10.7% change) with next INR in 1 week       Summary  As of 5/9/2024      Full warfarin instructions:  5/9: 8 mg; Otherwise 5 mg every Sun, Tue, Fri; 4 mg all other days   Next INR check:  5/16/2024               Telephone call with Shayy who verbalizes understanding and agrees to plan    Lab visit scheduled    Education provided:   Contact [phone] with any changes, questions or concerns.     Plan made per [facility] anticoagulation protocol    Felisa Carnes RN  Anticoagulation Clinic  5/9/2024    _______________________________________________________________________     Anticoagulation Episode Summary       Current INR goal:  2.0-3.0   TTR:  11.7% (3.9 mo)   Target end date:  Indefinite   Send INR reminders to:  [facility]    Indications    Arterial occlusion [I70.90]  Paroxysmal atrial fibrillation (H) [I48.0]             Comments:               Anticoagulation Care Providers       Provider Role Specialty Phone number    Mercedes Adorno MD Referring Family Medicine [phone]

## 2024-05-15 NOTE — PATIENT INSTRUCTIONS
Abebe Santiago,    Thank you for entrusting your care with us today. After your visit today with MARIAN Harry this is the plan that was discussed at your appointment.    Follow up on 6/17 with Dr. Crum to discuss bypass.        I am including additional information on these things and our contact information if you have any questions or concerns.   Please do not hesitate to reach out to us if you felt we did not answer your questions or you are unsure of the treatment plan after your visit today. Our number is [phone].Thank you for trusting us with your care.         Again thank you for your time.

## 2024-05-15 NOTE — OP NOTE
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  Nanci Harry PA-C assisted by performing and providing most of the steps of this operation including tissue dissection, vascular anastomosis, and wound closure. Nanci's assistance was also necessary because  a fellow/resident was not fully qualified to assist with all steps of the procedure.                        Name of the procedure    Exposure of the right external iliac artery for proximal control  Exploration of the right groin pseudoaneurysm  Primary repair of the right common femoral artery    Anesthesia:    General    Indication for procedure:    73-year-old male with a history of coronary artery angiogram via right common carotid artery.  Patient is on anticoagulation, and unfortunately developed large pseudoaneurysm measuring 8 cm x 8 cm.  No overlying skin changes were observed.  Patient was scheduled for a repair of the pseudoaneurysm versus interposition graft.  Risk and benefits of this operation were explained, patient agreed to proceed.    Description of procedure:    Patient was brought to the operating room and transferred to the operating room table in stable condition.  Patient was induced with general endotracheal anesthesia.  Roper catheter was then placed without any incident.  Patient was then positioned in the supine position with both arms out.  Prior to prepping,  using ultrasound, the right femoral bifurcation was marked on the skin.  Patient was then prepped and draped from the level of the umbilicus down to the level of the left mid thigh and the right leg circumferentially in the usual sterile fashion.    We started with suprainguinal incision 2 cm above the ankle ligament.  Subcutaneous tissue was divided using electrocautery.  The aponeurosis of external oblique muscle was also divided using electrocautery.  The muscle was retracted medially and laterally through the fibers.  The internal oblique muscle and its aponeurosis were also divided.  The transversalis muscle and the transversalis fascia were divided, and we entered the preperitoneal space.  The abdominal contents with peritoneum were retracted medially until we visualized the psoas muscle.  Immediately medial to it we encountered the external iliac artery which was dissected circumferentially and encircled with Vesseloops.  A separate incision was also made on the inner surface of the right thigh just distal to the bulge.  Electrocautery was used for division of subcutaneous tissue and Sraah's fascia.  The sartorius muscle was moved medially, and superficial femoral artery was identified and encircled with Vesseloops.  Then the incision was extended more proximally onto the bulge.  We encountered significant mount of old thrombus with the capsule of the pseudoaneurysm.  All of them were excised and sent for cultures.  The common femoral artery which appeared to be intact with a hole all on the slightly lateral side of the artery.  It was primary repaired using 5-0 Prolene suture.  We did not find any additional injuries to the artery.  At that point procedure was relatively concluded.  The wound was copiously irrigated using saline solution.  A BEHZAD drain was placed and secured to the skin using 3-0 nylon suture.  The wound was closed in layered fashion using 2-0 Vicryl, 3-0 Vicryl and 4 Monocryl.  Skin glue was  applied.  Patient tolerated procedure well, he was awoken from anesthesia and transferred to the PACU in stable condition.    Estimated blood loss: 100 cc    Specimens: Capsule and thrombus from the pseudoaneurysm    Complications: None            Laverne Crum MD,  [facility]  VASCULAR AND ENDOVASCULAR SURGERY   [facility] Vascular Surgery  [phone]  Fax: [phone]

## 2024-05-15 NOTE — PROGRESS NOTES
[facility]        Patient is here for a 2 week f/u Right femoral pseudoaneurysm repair 5/1. US PRIOR     Pt is currently taking Aspirin and Statin.    BP (!) 151/79   Pulse 60   Temp 97.5  F (36.4  C)   Resp 12   SpO2 95%     The provider has been notified that the patient has no concerns.     Questions patient would like addressed today are: N/A.    Refills are needed: No    Has homecare services and agency name:  No

## 2024-05-20 ENCOUNTER — OFFICE VISIT (OUTPATIENT)
Dept: VASCULAR SURGERY | Facility: CLINIC | Age: 74
End: 2024-05-20
Attending: SURGERY
Payer: MEDICARE

## 2024-05-20 ENCOUNTER — ANTICOAGULATION THERAPY VISIT (OUTPATIENT)
Dept: ANTICOAGULATION | Facility: CLINIC | Age: 74
End: 2024-05-20

## 2024-05-20 ENCOUNTER — ANCILLARY PROCEDURE (OUTPATIENT)
Dept: VASCULAR ULTRASOUND | Facility: CLINIC | Age: 74
End: 2024-05-20
Attending: SURGERY
Payer: MEDICARE

## 2024-05-20 ENCOUNTER — LAB (OUTPATIENT)
Dept: LAB | Facility: CLINIC | Age: 74
End: 2024-05-20
Payer: MEDICARE

## 2024-05-20 ENCOUNTER — TELEPHONE (OUTPATIENT)
Dept: FAMILY MEDICINE | Facility: CLINIC | Age: 74
End: 2024-05-20

## 2024-05-20 VITALS
HEART RATE: 60 BPM | OXYGEN SATURATION: 95 % | TEMPERATURE: 97.5 F | DIASTOLIC BLOOD PRESSURE: 79 MMHG | RESPIRATION RATE: 12 BRPM | SYSTOLIC BLOOD PRESSURE: 151 MMHG

## 2024-05-20 DIAGNOSIS — I70.90 ARTERIAL OCCLUSION: Primary | ICD-10-CM

## 2024-05-20 DIAGNOSIS — I72.4 FEMORAL ARTERY PSEUDO-ANEURYSM, RIGHT (H): Primary | ICD-10-CM

## 2024-05-20 DIAGNOSIS — I48.0 PAROXYSMAL ATRIAL FIBRILLATION (H): ICD-10-CM

## 2024-05-20 DIAGNOSIS — I70.90 ARTERIAL OCCLUSION: ICD-10-CM

## 2024-05-20 DIAGNOSIS — I72.4 FEMORAL ARTERY PSEUDO-ANEURYSM, RIGHT (H): ICD-10-CM

## 2024-05-20 LAB — INR BLD: 3.1 (ref 0.9–1.1)

## 2024-05-20 PROCEDURE — 99024 POSTOP FOLLOW-UP VISIT: CPT | Performed by: PHYSICIAN ASSISTANT

## 2024-05-20 PROCEDURE — 36416 COLLJ CAPILLARY BLOOD SPEC: CPT

## 2024-05-20 PROCEDURE — 85610 PROTHROMBIN TIME: CPT

## 2024-05-20 PROCEDURE — 93926 LOWER EXTREMITY STUDY: CPT | Mod: 26 | Performed by: SURGERY

## 2024-05-20 PROCEDURE — G0463 HOSPITAL OUTPT CLINIC VISIT: HCPCS | Mod: 25 | Performed by: PHYSICIAN ASSISTANT

## 2024-05-20 PROCEDURE — 93926 LOWER EXTREMITY STUDY: CPT | Mod: RT

## 2024-05-20 RX ORDER — WARFARIN SODIUM 1 MG/1
TABLET ORAL
Qty: 400 TABLET | Refills: 1 | Status: SHIPPED | OUTPATIENT
Start: 2024-05-20

## 2024-05-20 ASSESSMENT — PAIN SCALES - GENERAL: PAINLEVEL: NO PAIN (0)

## 2024-05-20 NOTE — TELEPHONE ENCOUNTER
Medication Question or Refill        What medication are you calling about (include dose and sig)?: Warfarin    Preferred Pharmacy:   [facility] [place] [address] [facility]  [address]  [facility] [address]  Phone: [phone] Fax: [phone]      Controlled Substance Agreement on file:   CSA -- Patient Level:    CSA: None found at the patient level.       Who prescribed the medication?: Not Sure    Do you need a refill? Yes    When did you use the medication last? Only Has 4 left    Patient offered an appointment? No    Do you have any questions or concerns?  Yes patient needs his Warfarin refilled he only has 4 pills left which he will take tonight.     Could we send this information to you in [employer] or would you prefer to receive a phone call?:   Patient would prefer a phone call   Okay to leave a detailed message?: Yes at Home number on file [phone] (home)

## 2024-05-20 NOTE — DISCHARGE SUMMARY
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  he tolerated the procedure well and postoperatively was transferred to the general post-surgical unit.  The remainder of his course was essentiallly uncomplicated.  Prior to discharge, his pain was controlled well with PO pain medication.  he was able to perform ADLs and ambulate independently without difficulty, and had full return of bowel and bladder function.  On 5/3/2024, he was discharged to home in stable condition.    DISCHARGE INSTRUCTIONS:  See AVS    FOLLOW UP APPOINTMENTS:   See AVS    DISCHARGE MEDICATIONS:     Review of your medicines        START taking        Dose / Directions   aspirin 81 MG EC tablet  Used for: Femoral artery pseudoaneurysm complicating cardiac catheterization, initial encounter (H24)      Dose: 81 mg  Take 1 tablet (81 mg) by mouth daily  Quantity: 90 tablet  Refills: 3            CONTINUE these medicines which have NOT CHANGED        Dose / Directions   acetaminophen 500 MG tablet  Commonly known as: TYLENOL      Dose: 500 mg  [ACETAMINOPHEN (TYLENOL) 500 MG TABLET] Take 500 mg by mouth every 6 (six) hours as needed for pain.  Refills: 0     amLODIPine 10 MG tablet  Commonly known as: NORVASC  Used for: Essential hypertension      TAKE 1 TABLET(10 MG) BY MOUTH DAILY  Quantity: 90 tablet  Refills: 2     finasteride 5 MG tablet  Commonly known as:  PROSCAR  Used for: Gross hematuria, Benign prostatic hyperplasia without lower urinary tract symptoms      Dose: 5 mg  Take 1 tablet (5 mg) by mouth daily  Quantity: 90 tablet  Refills: 3     metoprolol tartrate 50 MG tablet  Commonly known as: LOPRESSOR  Used for: Benign essential hypertension      TAKE 1 TABLET(50 MG) BY MOUTH TWICE DAILY  Quantity: 180 tablet  Refills: 1     rosuvastatin 5 MG tablet  Commonly known as: CRESTOR  Used for: Arterial occlusion      Dose: 5 mg  Take 1 tablet (5 mg) by mouth at bedtime  Quantity: 30 tablet  Refills: 1     tamsulosin 0.4 MG capsule  Commonly known as: FLOMAX  Used for: Primary hypertension      TAKE 1 CAPSULE BY MOUTH DAILY AFTER BREAKFAST  Quantity: 90 capsule  Refills: 3     Vitamin D3 25 mcg (1000 units) tablet  Commonly known as: CHOLECALCIFEROL      Dose: 1 tablet  Take 1 tablet by mouth daily  Refills: 0               Where to get your medicines        These medications were sent to [id number] - [place], [place] - [facility] [address] AT [facility] [address]  [facility] [address] [place] [address]      Phone: [phone]   aspirin 81 MG EC tablet

## 2024-05-20 NOTE — TELEPHONE ENCOUNTER
ANTICOAGULATION MANAGEMENT:  Medication Refill    Anticoagulation Summary  As of 5/20/2024      Warfarin maintenance plan:  5 mg (1 mg x 5) every Sun, Tue, Fri; 4 mg (1 mg x 4) all other days   Next INR check:  5/31/2024   Target end date:  Indefinite    Indications    Arterial occlusion [I70.90]  Paroxysmal atrial fibrillation (H) [I48.0]                 Anticoagulation Care Providers       Provider Role Specialty Phone number    Mercedes Adorno MD Referring Family Medicine [phone]            Refill Criteria    Visit with referring provider/group: Meets criteria: office visit within referring provider group in the last 1 year on 4/24/24    [facility] referral last signed: 05/03/2024; within last year: Yes    Lab monitoring not exceeding 2 weeks overdue: Yes    Jack meets all criteria for refill. Rx instructions and quantity supplied updated to match patient's current dosing plan. Warfarin 90 day supply with 1 refill granted per [facility] protocol     Felisa Carnes RN  Anticoagulation Clinic

## 2024-05-20 NOTE — PROGRESS NOTES
VASCULAR SURGERY PROGRESS NOTE    LOCATION:  [facility]     Jack Brown  Medical Record #: [id number]  YOB: 1950  Age: 73 year old     Date of Service: 5/20/2024    PRIMARY CARE PROVIDER: Mercedes Adorno    Reason for visit: Follow up s/p right femoral pseudoaneurysm repair    IMPRESSION: 72 YO male presenting for follow up s/p right femoral pseudoaneurysm repair. Incision site is healing nicely with no signs of infection. Pain is controlled. There is a hematoma/seroma to the area on ultrasound without flow and triphasic waveforms in right common femoral artery.    RECOMMENDATION/RISKS: Continue best medical management with aspirin and statin. Follow up in 3-4 weeks to discuss additional aneurysm repairs.    HPI:  Jack Brown is a 73 year old male with past medical history significant for hypertension, hyperlipidemia, paroxysmal atrial fibrillation, history of skin cancer, and peripheral arterial disease with known history of bilateral common iliac artery aneurysms and left popliteal artery aneurysm. The left occluded for which he underwent angiogram with thrombolysis.  Following this, he developed a large >7cm right femoral pseudoaneurysm from his access site and underwent repair on 5/1/2024.     Patient presents today for follow up. Mr. Brown is accompanied by his wife. His incision sites are healing nicely without surrounding redness or drainage. He denies any fevers or chills. Tegaderm dressing remains in place. Patient denies any significant discomfort to his right groin. He is looking forward to getting back to gardening and resuming his normal activity. Compliant with medications.     Ultrasound results were discussed and all questions answered. No other concerns.     REVIEW OF SYSTEMS:    A 12 point ROS was reviewed and is negative except for what is listed above in HPI.    PHH:    Past Medical History:   Diagnosis Date    BPH (benign prostatic hypertrophy)     Cancer  (H)     Skin on R.chest wall.    Chest discomfort     Coronary artery disease     Detached retina, left 10/01/2014    Hyperlipidemia     Hypertension     PAD (peripheral artery disease) (H24)     Palpitations     Paroxysmal atrial fibrillation (H)     Pseudoaneurysm (H24)     Thrombosis       Past Surgical History:   Procedure Laterality Date    BYPASS GRAFT ARTERY CORONARY N/A 12/28/2015    Procedure: CORONARY ARTERY BYPASS x 3, RIGHT ENDOSCOPIC VEIN HARVEST, LEFT INTERNAL MAMMARY ARTERY, ANESTHESIA TRANSESOPHAGEAL ECHOCARDIOGRAM;  Surgeon: Jayson Alvarado MD;  Location: [facility];  Service:     CARDIAC SURGERY      CATARACT EXTRACTION  2015    CYST REMOVAL      Ganglion cyst removal of both wrist    HC REMOVAL PREPATELLA BURSA      Description: Excision Of Prepatellar Bursa;  Recorded: 09/30/2014;    HC REPAIR OF NASAL SEPTUM      Description: Septoplasty;  Recorded: 09/30/2014;    HERNIA REPAIR, UMBILICAL      IR LOWER EXTREMITY ANGIOGRAM LEFT  12/16/2023    IR THROMBOLYSIS ARTERIAL INFUSION INITIAL DAY  12/17/2023    PARS PLANA VITRECTOMY W/ REPAIR OF MACULAR HOLE  Nov 2014    SD EXCIS TENDON SHEATH LESION, HAND/FINGER      Description: Hand Excision Of A Tendon Cyst;  Recorded: 01/04/2011;    SD LAP, VENTRAL HERNIA REPAIR,REDUCIBLE      Description: Laparoscopy Repair Of Umbilical Hernia;  Recorded: 01/04/2011;    PSEUDOANEURYSM REPAIR Right 5/1/2024    Procedure: REPAIR, PSEUDOANEURYSM, ARTERY, FEMORAL RIGHT;  Surgeon: Laverne Crum MD;  Location: [facility]    RETINAL DETACHMENT SURGERY  oct 2014    SKIN CANCER EXCISION  Nov 2015    right chest    THORACOSCOPY Right 4/14/2016    Procedure: RIGHT VIDEO ASSISTED THORACOSCOPY, RIGHT LOBECTOMY;  Surgeon: Vinny Chase MD;  Location: [facility];  Service:     VASECTOMY       ALLERGIES:  Niacin, Atorvastatin, and Pravastatin    MEDS:    Current Outpatient Medications:     acetaminophen (TYLENOL) 500 MG  tablet, [ACETAMINOPHEN (TYLENOL) 500 MG TABLET] Take 500 mg by mouth every 6 (six) hours as needed for pain., Disp: , Rfl:     amLODIPine (NORVASC) 10 MG tablet, TAKE 1 TABLET(10 MG) BY MOUTH DAILY, Disp: 90 tablet, Rfl: 2    aspirin 81 MG EC tablet, Take 1 tablet (81 mg) by mouth daily, Disp: 90 tablet, Rfl: 3    finasteride (PROSCAR) 5 MG tablet, Take 1 tablet (5 mg) by mouth daily, Disp: 90 tablet, Rfl: 3    metoprolol tartrate (LOPRESSOR) 50 MG tablet, TAKE 1 TABLET(50 MG) BY MOUTH TWICE DAILY, Disp: 180 tablet, Rfl: 1    rosuvastatin (CRESTOR) 5 MG tablet, Take 1 tablet (5 mg) by mouth at bedtime, Disp: 30 tablet, Rfl: 1    tamsulosin (FLOMAX) 0.4 MG capsule, TAKE 1 CAPSULE BY MOUTH DAILY AFTER BREAKFAST, Disp: 90 capsule, Rfl: 3    Vitamin D3 (VITAMIN D, CHOLECALCIFEROL,) 25 mcg (1000 units) tablet, Take 1 tablet by mouth daily, Disp: , Rfl:     warfarin ANTICOAGULANT (COUMADIN) 1 MG tablet, Take 4 mg by mouth daily 4 mg five days per week and 3 mg two days per week has been home dose - has been on hold since 4/26/24 in preparation for surgery, Disp: , Rfl:     SOCIAL HABITS:    History   Smoking Status    Never   Smokeless Tobacco    Never     Social History    Substance and Sexual Activity      Alcohol use: Yes        Alcohol/week: 8.0 standard drinks of alcohol      History   Drug Use No     FAMILY HISTORY:    Family History   Problem Relation Age of Onset    Acute Myocardial Infarction Mother     Aneurysm Mother         brain    Acute Myocardial Infarction Father     Acute Myocardial Infarction Brother     Aortic aneurysm Brother         d @ 72    Lung Cancer Cousin     Coronary Artery Disease Cousin     Leukemia Brother         d @ 55    Colitis Brother     Colon Cancer Paternal Grandfather      PE:  BP (!) 151/79   Pulse 60   Temp 97.5  F (36.4  C)   Resp 12   SpO2 95%   Wt Readings from Last 1 Encounters:   05/02/24 71.1 kg (156 lb 12 oz)     There is no height or weight on file to calculate  BMI.    EXAM:  GENERAL: well-developed 73 year old male who appears his stated age  CARDIAC: normal   CHEST/LUNG: normal respiratory effort   MUSCULOSKELETAL: grossly normal and both lower extremities are intact, no lower extremity edema  NEUROLOGIC: focally intact, alert and oriented x 3  PSYCH: appropriate affect  VASCULAR: right groin incisions clean, dry, and intact without surrounding erythema, warmth, or active drainage    DIAGNOSTIC STUDIES:     Images:    US Lower Extremity Arterial Duplex Right     I personally reviewed the images and my interpretation is triphasic flow in the right CFA, normal compression study, and large hematoma/seroma in the right groin without flow.    LABS:      Sodium   Date Value Ref Range Status   05/02/2024 137 135 - 145 mmol/L Final     Comment:     Reference intervals for this test were updated on 09/26/2023 to more accurately reflect our healthy population. There may be differences in the flagging of prior results with similar values performed with this method. Interpretation of those prior results can be made in the context of the updated reference intervals.    01/30/2024 138 135 - 145 mmol/L Final     Comment:     Reference intervals for this test were updated on 09/26/2023 to more accurately reflect our healthy population. There may be differences in the flagging of prior results with similar values performed with this method. Interpretation of those prior results can be made in the context of the updated reference intervals.    01/24/2024 139 135 - 145 mmol/L Final     Comment:     Reference intervals for this test were updated on 09/26/2023 to more accurately reflect our healthy population. There may be differences in the flagging of prior results with similar values performed with this method. Interpretation of those prior results can be made in the context of the updated reference intervals.      Urea Nitrogen   Date Value Ref Range Status   05/02/2024 27.5 (H) 8.0 -  23.0 mg/dL Final   01/30/2024 23.1 (H) 8.0 - 23.0 mg/dL Final   01/24/2024 18.4 8.0 - 23.0 mg/dL Final   04/27/2022 18 8 - 28 mg/dL Final   08/16/2021 19 8 - 28 mg/dL Final   05/20/2021 16 8 - 28 mg/dL Final     Hemoglobin   Date Value Ref Range Status   05/02/2024 13.3 13.3 - 17.7 g/dL Final   05/01/2024 14.6 13.3 - 17.7 g/dL Final   01/24/2024 14.2 13.3 - 17.7 g/dL Final     Platelet Count   Date Value Ref Range Status   05/02/2024 203 150 - 450 10e3/uL Final   05/01/2024 203 150 - 450 10e3/uL Final   01/24/2024 236 150 - 450 10e3/uL Final     INR   Date Value Ref Range Status   05/09/2024 1.3 (H) 0.9 - 1.1 Final   05/03/2024 1.04 0.85 - 1.15 Final   05/02/2024 1.01 0.85 - 1.15 Final   04/24/2024 1.8 (H) 0.9 - 1.1 Final   04/16/2024 1.6 (H) 0.9 - 1.1 Final   12/21/2023 1.17 (H) 0.85 - 1.15 Final     INR Point of Care   Date Value Ref Range Status   01/26/2024 2.1 (A) 0.9 - 1.1 Final   01/10/2024 4.3 (A) 0.9 - 1.1 Final     30 minutes spent on the day of encounter doing chart review, history and exam, documentation, and further activities as noted.     Nanci Harry PA-C  VASCULAR SURGERY

## 2024-05-20 NOTE — PROGRESS NOTES
ANTICOAGULATION MANAGEMENT     Jack Brown 73 year old male is on warfarin with supratherapeutic INR result. (Goal INR 2.0-3.0)    Recent labs: (last 7 days)     05/20/24  1052   INR 3.1*       ASSESSMENT     Source(s): Chart Review  Previous INR was Subtherapeutic  Medication, diet, health changes since last INR chart reviewed; none identified         PLAN     Recommended plan for no diet, medication or health factor changes affecting INR     Dosing Instructions: Continue your current warfarin dose with next INR in 10 days       Summary  As of 5/20/2024      Full warfarin instructions:  5 mg every Sun, Tue, Fri; 4 mg all other days   Next INR check:  5/31/2024               Detailed voice message left for Jack with dosing instructions and follow up date.     Lab visit scheduled    Education provided:   Please call back if any changes to your diet, medications or how you've been taking warfarin    Plan made per [facility] anticoagulation protocol    Felisa Carnes RN  Anticoagulation Clinic  5/20/2024    _______________________________________________________________________     Anticoagulation Episode Summary       Current INR goal:  2.0-3.0   TTR:  15.5% (4.2 mo)   Target end date:  Indefinite   Send INR reminders to:  [facility]    Indications    Arterial occlusion [I70.90]  Paroxysmal atrial fibrillation (H) [I48.0]             Comments:               Anticoagulation Care Providers       Provider Role Specialty Phone number    Mercedes Adorno MD Referring Family Medicine [phone]

## 2024-05-21 DIAGNOSIS — C7A.090 ATYPICAL CARCINOID LUNG TUMOR (H): Primary | ICD-10-CM

## 2024-05-29 ENCOUNTER — HOSPITAL ENCOUNTER (OUTPATIENT)
Dept: CT IMAGING | Facility: HOSPITAL | Age: 74
Discharge: HOME | End: 2024-05-29
Attending: NURSE PRACTITIONER
Payer: MEDICARE

## 2024-05-29 ENCOUNTER — HOSPITAL ENCOUNTER (OUTPATIENT)
Dept: ULTRASOUND IMAGING | Facility: HOSPITAL | Age: 74
Discharge: HOME | End: 2024-05-29
Attending: NURSE PRACTITIONER
Payer: MEDICARE

## 2024-05-29 DIAGNOSIS — E04.1 THYROID NODULE: ICD-10-CM

## 2024-05-29 DIAGNOSIS — C7A.090 ATYPICAL CARCINOID LUNG TUMOR (H): ICD-10-CM

## 2024-05-29 DIAGNOSIS — I71.23 ANEURYSM OF DESCENDING THORACIC AORTA WITHOUT RUPTURE (H): ICD-10-CM

## 2024-05-29 PROCEDURE — 71260 CT THORAX DX C+: CPT | Mod: MG

## 2024-05-29 PROCEDURE — 76536 US EXAM OF HEAD AND NECK: CPT

## 2024-05-29 PROCEDURE — 250N000011 HC RX IP 250 OP 636: Performed by: NURSE PRACTITIONER

## 2024-05-29 RX ORDER — IOPAMIDOL 755 MG/ML
77 INJECTION, SOLUTION INTRAVASCULAR ONCE
Status: COMPLETED | OUTPATIENT
Start: 2024-05-29 | End: 2024-05-29

## 2024-05-29 RX ADMIN — IOPAMIDOL 77 ML: 755 INJECTION, SOLUTION INTRAVENOUS at 09:01

## 2024-05-31 ENCOUNTER — ONCOLOGY VISIT (OUTPATIENT)
Dept: ONCOLOGY | Facility: HOSPITAL | Age: 74
End: 2024-05-31

## 2024-05-31 ENCOUNTER — LAB (OUTPATIENT)
Dept: INFUSION THERAPY | Facility: HOSPITAL | Age: 74
End: 2024-05-31
Payer: MEDICARE

## 2024-05-31 ENCOUNTER — ANTICOAGULATION THERAPY VISIT (OUTPATIENT)
Dept: ANTICOAGULATION | Facility: CLINIC | Age: 74
End: 2024-05-31

## 2024-05-31 VITALS
WEIGHT: 165.3 LBS | BODY MASS INDEX: 27.54 KG/M2 | HEIGHT: 65 IN | OXYGEN SATURATION: 94 % | SYSTOLIC BLOOD PRESSURE: 142 MMHG | HEART RATE: 54 BPM | TEMPERATURE: 97.6 F | DIASTOLIC BLOOD PRESSURE: 73 MMHG | RESPIRATION RATE: 20 BRPM

## 2024-05-31 DIAGNOSIS — I48.0 PAROXYSMAL ATRIAL FIBRILLATION (H): Primary | ICD-10-CM

## 2024-05-31 DIAGNOSIS — I70.90 ARTERIAL OCCLUSION: Primary | ICD-10-CM

## 2024-05-31 DIAGNOSIS — C7A.090 ATYPICAL CARCINOID LUNG TUMOR (H): ICD-10-CM

## 2024-05-31 DIAGNOSIS — I48.0 PAROXYSMAL ATRIAL FIBRILLATION (H): ICD-10-CM

## 2024-05-31 DIAGNOSIS — C7A.090 ATYPICAL CARCINOID LUNG TUMOR (H): Primary | ICD-10-CM

## 2024-05-31 DIAGNOSIS — M89.9 DISORDER OF BONE, UNSPECIFIED: ICD-10-CM

## 2024-05-31 LAB
ALBUMIN SERPL BCG-MCNC: 4.1 G/DL (ref 3.5–5.2)
ALP SERPL-CCNC: 70 U/L (ref 40–150)
ALT SERPL W P-5'-P-CCNC: 13 U/L (ref 0–70)
ANION GAP SERPL CALCULATED.3IONS-SCNC: 8 MMOL/L (ref 7–15)
AST SERPL W P-5'-P-CCNC: 21 U/L (ref 0–45)
BASOPHILS # BLD AUTO: 0.1 10E3/UL (ref 0–0.2)
BASOPHILS NFR BLD AUTO: 1 %
BILIRUB SERPL-MCNC: 0.4 MG/DL
BUN SERPL-MCNC: 22.7 MG/DL (ref 8–23)
CALCIUM SERPL-MCNC: 9.5 MG/DL (ref 8.8–10.2)
CHLORIDE SERPL-SCNC: 107 MMOL/L (ref 98–107)
CREAT SERPL-MCNC: 1.16 MG/DL (ref 0.67–1.17)
DEPRECATED HCO3 PLAS-SCNC: 26 MMOL/L (ref 22–29)
EGFRCR SERPLBLD CKD-EPI 2021: 66 ML/MIN/1.73M2
EOSINOPHIL # BLD AUTO: 0.4 10E3/UL (ref 0–0.7)
EOSINOPHIL NFR BLD AUTO: 7 %
ERYTHROCYTE [DISTWIDTH] IN BLOOD BY AUTOMATED COUNT: 14.1 % (ref 10–15)
GLUCOSE SERPL-MCNC: 118 MG/DL (ref 70–99)
HCT VFR BLD AUTO: 42.2 % (ref 40–53)
HGB BLD-MCNC: 14.1 G/DL (ref 13.3–17.7)
IMM GRANULOCYTES # BLD: 0 10E3/UL
IMM GRANULOCYTES NFR BLD: 0 %
INR PPP: 2.55 (ref 0.85–1.15)
LYMPHOCYTES # BLD AUTO: 1.4 10E3/UL (ref 0.8–5.3)
LYMPHOCYTES NFR BLD AUTO: 23 %
MCH RBC QN AUTO: 29.9 PG (ref 26.5–33)
MCHC RBC AUTO-ENTMCNC: 33.4 G/DL (ref 31.5–36.5)
MCV RBC AUTO: 89 FL (ref 78–100)
MONOCYTES # BLD AUTO: 0.6 10E3/UL (ref 0–1.3)
MONOCYTES NFR BLD AUTO: 9 %
NEUTROPHILS # BLD AUTO: 3.7 10E3/UL (ref 1.6–8.3)
NEUTROPHILS NFR BLD AUTO: 60 %
NRBC # BLD AUTO: 0 10E3/UL
NRBC BLD AUTO-RTO: 0 /100
PLATELET # BLD AUTO: 229 10E3/UL (ref 150–450)
POTASSIUM SERPL-SCNC: 3.8 MMOL/L (ref 3.4–5.3)
PROT SERPL-MCNC: 7.2 G/DL (ref 6.4–8.3)
RBC # BLD AUTO: 4.72 10E6/UL (ref 4.4–5.9)
SODIUM SERPL-SCNC: 141 MMOL/L (ref 135–145)
WBC # BLD AUTO: 6.2 10E3/UL (ref 4–11)

## 2024-05-31 PROCEDURE — 80053 COMPREHEN METABOLIC PANEL: CPT

## 2024-05-31 PROCEDURE — 36415 COLL VENOUS BLD VENIPUNCTURE: CPT

## 2024-05-31 PROCEDURE — G2211 COMPLEX E/M VISIT ADD ON: HCPCS | Performed by: INTERNAL MEDICINE

## 2024-05-31 PROCEDURE — G0463 HOSPITAL OUTPT CLINIC VISIT: HCPCS | Performed by: INTERNAL MEDICINE

## 2024-05-31 PROCEDURE — 85610 PROTHROMBIN TIME: CPT

## 2024-05-31 PROCEDURE — 85025 COMPLETE CBC W/AUTO DIFF WBC: CPT

## 2024-05-31 PROCEDURE — 82306 VITAMIN D 25 HYDROXY: CPT

## 2024-05-31 PROCEDURE — 99214 OFFICE O/P EST MOD 30 MIN: CPT | Performed by: INTERNAL MEDICINE

## 2024-05-31 ASSESSMENT — PAIN SCALES - GENERAL: PAINLEVEL: MODERATE PAIN (5)

## 2024-05-31 NOTE — PROGRESS NOTES
ANTICOAGULATION MANAGEMENT     Jack Brown 73 year old male is on warfarin with therapeutic INR result. (Goal INR 2.0-3.0)    Recent labs: (last 7 days)     05/31/24  0803   INR 2.55*       ASSESSMENT     Source(s): Chart Review and Patient/Caregiver Call     Warfarin doses taken: Warfarin taken as instructed  Diet: No new diet changes identified  Medication/supplement changes: None noted  New illness, injury, or hospitalization: No  Signs or symptoms of bleeding or clotting: No  Previous result: Supratherapeutic  Additional findings: None       PLAN     Recommended plan for no diet, medication or health factor changes affecting INR     Dosing Instructions: Continue your current warfarin dose with next INR in 2 weeks       Summary  As of 5/31/2024      Full warfarin instructions:  5 mg every Sun, Tue, Fri; 4 mg all other days   Next INR check:  6/14/2024               Telephone call with Shayy who verbalizes understanding and agrees to plan    Lab visit scheduled    Education provided:   Please call back if any changes to your diet, medications or how you've been taking warfarin    Plan made per [facility] anticoagulation protocol    Felisa Carnes RN  Anticoagulation Clinic  5/31/2024    _______________________________________________________________________     Anticoagulation Episode Summary       Current INR goal:  2.0-3.0   TTR:  20.7% (4.6 mo)   Target end date:  Indefinite   Send INR reminders to:  [facility]    Indications    Arterial occlusion [I70.90]  Paroxysmal atrial fibrillation (H) [I48.0]             Comments:               Anticoagulation Care Providers       Provider Role Specialty Phone number    Mercedes Adorno MD Referring Family Medicine [phone]

## 2024-05-31 NOTE — PROGRESS NOTES
[facility] Hematology and Oncology Progress Note    Patient: Jack Brown  MRN: [id number]  Date of Service: May 31, 2024        Assessment and Plan:    1. Lung carcinoid tumor: Overall he is doing well.  No clinical evidence of recurrence.  He had a CT scan on May 29.  I personally reviewed the images and shared them with Jack.  There is no evidence of recurrent carcinoid.  Will continue to check yearly until April 2026.     2.  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  I reviewed the aneurysms radiographically with the patient today.  He is now following regularly with vascular surgery.     3. Thyroid nodule: Thyroid ultrasound performed May 29, 2024.  This shows no suspicious thyroid nodules.  Previously biopsied rim calcified isthmic nodule is stable since 2017.  No additional follow-up needed.    Data review:  CBC from today is reviewed and shows a white count of 6.2, hemoglobin 14.1, platelets 229,000.    ECOG Performance  0    Diagnosis:    1.  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     Treatment:    Right middle lobectomy in April 2016: Pathology shows atypical carcinoid tumor.  2.2 cm.  No lymphovascular invasion.  15 lymph nodes tested all negative.  Less than 2 mitoses per 10 high-power field.     Interim History:    Jack returns today for follow-up visit.  He has been doing well since his last visit.  No chest pain or shortness of breath.  No cough.  Energy and appetite are okay.    Review of Systems:    As above in the history.     Review of Systems otherwise Negative for:  General: chills, fever or night sweats  Psychological: anxiety or depression  Ophthalmic: blurry vision, double vision or loss of vision, vision change  ENT: epistaxis, oral lesions, hearing changes  Hematological and Lymphatic: bleeding, bruising, jaundice, swollen lymph nodes  Endocrine: hot flashes, unexpected weight changes  Respiratory: cough, hemoptysis,  "orthopnea or shortness of breath/CERVANTES  Cardiovascular: chest pain, edema, palpitations or PND  Gastrointestinal: abdominal pain, blood in stools, change in bowel habits, constipation, diarrhea or nausea/vomiting  Genito-Urinary: change in urinary stream, incontinence, frequency/urgency  Musculoskeletal: joint pain, stiffness, swelling, muscle pain  Neurological: dizziness, headaches, numbness/tingling  Dermatological: lumps and rash    Past History:    Past Medical History:   Diagnosis Date    BPH (benign prostatic hypertrophy)     Cancer (H)     Skin on R.chest wall.    Chest discomfort     Coronary artery disease     Detached retina, left 10/01/2014    Hyperlipidemia     Hypertension     PAD (peripheral artery disease) (H24)     Palpitations     Paroxysmal atrial fibrillation (H)     Pseudoaneurysm (H24)     Thrombosis      Physical Exam:    BP (!) 142/73 (BP Location: Right arm, Patient Position: Sitting, Cuff Size: Adult Regular)   Pulse 54   Temp 97.6  F (36.4  C) (Tympanic)   Resp 20   Ht 1.651 m (5' 5\")   Wt 75 kg (165 lb 4.8 oz)   SpO2 94%   BMI 27.51 kg/m      General: patient appears stated age of 73 year old. Nontoxic and in no distress.   HEENT: Head: atraumatic, normocephalic. Sclerae anicteric.  Chest:  Normal respiratory effort  Cardiac:  No edema.   Abdomen: abdomen is soft, non-distended  Extremities: normal tone and muscle bulk.  Skin: no lesions or rash on visible skin. Warm and dry.   CNS: alert and oriented. Grossly non-focal.   Psychiatric: normal mood and affect.     Lab Results:    Recent Results (from the past 168 hour(s))   Comprehensive metabolic panel   Result Value Ref Range    Sodium 141 135 - 145 mmol/L    Potassium 3.8 3.4 - 5.3 mmol/L    Carbon Dioxide (CO2) 26 22 - 29 mmol/L    Anion Gap 8 7 - 15 mmol/L    Urea Nitrogen 22.7 8.0 - 23.0 mg/dL    Creatinine 1.16 0.67 - 1.17 mg/dL    GFR Estimate 66 >60 mL/min/1.73m2    Calcium 9.5 8.8 - 10.2 mg/dL    Chloride 107 98 - 107 mmol/L "    Glucose 118 (H) 70 - 99 mg/dL    Alkaline Phosphatase 70 40 - 150 U/L    AST 21 0 - 45 U/L    ALT 13 0 - 70 U/L    Protein Total 7.2 6.4 - 8.3 g/dL    Albumin 4.1 3.5 - 5.2 g/dL    Bilirubin Total 0.4 <=1.2 mg/dL   INR   Result Value Ref Range    INR 2.55 (H) 0.85 - 1.15   CBC with platelets and differential   Result Value Ref Range    WBC Count 6.2 4.0 - 11.0 10e3/uL    RBC Count 4.72 4.40 - 5.90 10e6/uL    Hemoglobin 14.1 13.3 - 17.7 g/dL    Hematocrit 42.2 40.0 - 53.0 %    MCV 89 78 - 100 fL    MCH 29.9 26.5 - 33.0 pg    MCHC 33.4 31.5 - 36.5 g/dL    RDW 14.1 10.0 - 15.0 %    Platelet Count 229 150 - 450 10e3/uL    % Neutrophils 60 %    % Lymphocytes 23 %    % Monocytes 9 %    % Eosinophils 7 %    % Basophils 1 %    % Immature Granulocytes 0 %    NRBCs per 100 WBC 0 <1 /100    Absolute Neutrophils 3.7 1.6 - 8.3 10e3/uL    Absolute Lymphocytes 1.4 0.8 - 5.3 10e3/uL    Absolute Monocytes 0.6 0.0 - 1.3 10e3/uL    Absolute Eosinophils 0.4 0.0 - 0.7 10e3/uL    Absolute Basophils 0.1 0.0 - 0.2 10e3/uL    Absolute Immature Granulocytes 0.0 <=0.4 10e3/uL    Absolute NRBCs 0.0 10e3/uL     Imaging:    CT Chest/Abdomen/Pelvis w Contrast    Result Date: 5/29/2024  EXAM: CT CHEST/ABDOMEN/PELVIS W CONTRAST LOCATION: [facility] DATE: 5/29/2024 INDICATION:  Atypical carcinoid lung tumor (H), 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 Multiplanar reformats were obtained. Dose reduction techniques were used. CONTRAST: 77 mL iopamidol FINDINGS: LUNGS AND PLEURA: Central airways are patent. Possible findings from right middle lobectomy. No areas of consolidation. No new or enlarging lung nodules. No pleural effusion. MEDIASTINUM/AXILLAE: Unchanged heart size. No pericardial effusion. The thoracic aorta is  nonaneurysmal with severe calcified and noncalcified atherosclerotic plaquing. Visualized proximal arch vessels are unremarkable. No thoracic lymphadenopathy. CORONARY ARTERY CALCIFICATION: Severe with postoperative changes from CABG. HEPATOBILIARY: Unchanged liver contours. No worrisome liver lesions. Normal gallbladder. PANCREAS: Normal. SPLEEN: Normal. ADRENAL GLANDS: Unchanged bilateral benign adrenal adenomas, which require no dedicated follow-up. KIDNEYS/BLADDER: Kidneys enhance symmetrically with unchanged benign bilateral cysts, which require no follow-up. Unchanged nonobstructing right nephrolithiasis. No hydronephrosis. Minimally distended bladder. BOWEL: Normal caliber small and large bowel. Normal appendix. No free fluid or free air. LYMPH NODES: No lymphadenopathy. VASCULATURE: Unchanged multifocal ectasia/mild aneurysmal dilation of the infrarenal abdominal aorta up to 3.1 cm with severe background atherosclerotic vascular calcifications. The bilateral common iliac artery aneurysms near their bifurcations are also  unchanged, measuring up to 2.6 x 3.1 cm on the right and 3.5 x 3.8 cm on the left. As before, the aneurysmal dilation extends into the internal iliac arteries. There are expected interval postoperative findings from right femoral artery pseudoaneurysm repair with small adjacent fluid collections, likely seromas. PELVIC ORGANS: Severe prostatomegaly. MUSCULOSKELETAL: Degenerative changes of the spine. No destructive osseous lesions.     IMPRESSION: 1.  No findings of recurrent or metastatic disease in the chest, abdomen or pelvis. 2.  Interval postoperative findings from repair of the right femoral artery pseudoaneurysm without complication evident. 3.  Unchanged infrarenal abdominal aortic and bilateral common iliac artery aneurysms as described. 4.  Additional noncritical details in the findings.    US Thyroid    Result Date: 5/29/2024  EXAM: US THYROID LOCATION: [facility]  HOSPITAL DATE: 5/29/2024 INDICATION:  Thyroid nodule. Previously biopsied isthmic nodule in 2015. COMPARISON: 2/15/2021, 3/19/2019 TECHNIQUE: Thyroid ultrasound. FINDINGS: RIGHT lobe: 3.8 x 1.7 x 1.5 cm. Homogeneous echotexture. Small cystic nodule superiorly measuring 8 mm is stable. Isthmus: 5 mm. Rim calcified 12 x 10 mm nodule is stable. LEFT lobe: 3.6 x 1.7 x 1.6 cm. Homogeneous echotexture. NECK: No cervical lymphadenopathy.     IMPRESSION: 1.  No suspicious thyroid nodules. 2.  Previously biopsied, rim calcified isthmic nodule has been stable by report since 2017. This needs no additional follow-up. Nodules are characterized per ACR Thyroid Imaging, Reporting and Data System (TI-RADS): White Paper of the ACR TI-RADS Committee Robe Olivas et al. Journal of the American College of Radiology 2017. Volume 14 (2017), Issue 5, 939-105.     US Lower Extremity Arterial Duplex Right    Result Date: 5/21/2024  Table formatting from the original result was not included. Right Groin Ultrasound (Date: 05/20/24) Indication: Follow-up large pseudoaneurysm repair of the right common femoral artery (05/01/24) Region of Interest: Right Groin Access Site. History: CAD, Angioplasty, Vascular Surgery, and Rest Pain Technique: Duplex imaging is performed utilizing gray-scale, two-dimensional images, and color-flow imaging. Doppler waveform analysis and spectral Doppler imaging is also performed. LOWER EXTREMITY ARTERIAL DUPLEX EXAM WITH WAVEFORMS Right leg (cm/s): Location: Velocities Waveforms Right CFA: 41 T Waveforms: T=Triphasic, M=Monophasic, B=Biphasic Right Leg: Location Right CFV Compressibility (FC,PC,NC) FC Spontaneous + Phasic  + Augmentation + Patent + Compression Study:  Normal Comments: Large hematoma/seroma extending from the right groin to the proximal thigh. To long to measure.  No flow seen in hematoma/seroma area. Impression: Right Lower Extremity: No evidence of pseudoaneurysm, no evidence of deep  venous thrombosis Reference: Category Normal 1-19% 20-49% 50-99% Occluded PSV <160 cm/sec without spectral broadening <160 cm/sec with spectral broadening Increased Increased Absent Flow Ratio N/A N/A < 2.0 >2.0 N/A Post-Stenotic Turbulence No No No Yes N/A Compressibility: FC= Fully compressible, PC= Partially compressible, NC= Non-compressible, NV= Not Visualized Venous Doppler: (+) = Present  (0) = Absent  (-) = Decreased/Unable to Evaluate, (NV) = Not Visualized Reflux: (+) Incompetent  (-) Competent, (NV) = Not Visualized        Signed by: Orville Harmon MD

## 2024-05-31 NOTE — LETTER
5/31/2024         RE: Jack Brown  [address] [facility] [address]        Dear Colleague,    Thank you for referring your patient, Jack Brown, to the [facility] CANCER [facility]. Please see a copy of my visit note below.    [facility], [facility] Hematology and Oncology Progress Note    Patient: Jack Brown  MRN: [id number]  Date of Service: May 31, 2024        Assessment and Plan:    1. Lung carcinoid tumor: Overall he is doing well.  No clinical evidence of recurrence.  He had a CT scan on May 29.  I personally reviewed the images and shared them with Jack.  There is no evidence of recurrent carcinoid.  Will continue to check yearly until April 2026.     2.  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  I reviewed the aneurysms radiographically with the patient today.  He is now following regularly with vascular surgery.     3. Thyroid nodule: Thyroid ultrasound performed May 29, 2024.  This shows no suspicious thyroid nodules.  Previously biopsied rim calcified isthmic nodule is stable since 2017.  No additional follow-up needed.    Data review:  CBC from today is reviewed and shows a white count of 6.2, hemoglobin 14.1, platelets 229,000.    ECOG Performance  0    Diagnosis:    1.  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     Treatment:    Right middle lobectomy in April 2016: Pathology shows atypical carcinoid tumor.  2.2 cm.  No lymphovascular invasion.  15 lymph nodes tested all negative.  Less than 2 mitoses per 10 high-power field.     Interim History:    Jack returns today for follow-up visit.  He has been doing well since his last visit.  No chest pain or shortness of breath.  No cough.  Energy and appetite are okay.    Review of Systems:    As above in the history.     Review of Systems otherwise Negative for:  General: chills, fever or night sweats  Psychological: anxiety or depression  Ophthalmic: blurry vision, double  "vision or loss of vision, vision change  ENT: epistaxis, oral lesions, hearing changes  Hematological and Lymphatic: bleeding, bruising, jaundice, swollen lymph nodes  Endocrine: hot flashes, unexpected weight changes  Respiratory: cough, hemoptysis, orthopnea or shortness of breath/CERVANTES  Cardiovascular: chest pain, edema, palpitations or PND  Gastrointestinal: abdominal pain, blood in stools, change in bowel habits, constipation, diarrhea or nausea/vomiting  Genito-Urinary: change in urinary stream, incontinence, frequency/urgency  Musculoskeletal: joint pain, stiffness, swelling, muscle pain  Neurological: dizziness, headaches, numbness/tingling  Dermatological: lumps and rash    Past History:    Past Medical History:   Diagnosis Date     BPH (benign prostatic hypertrophy)      Cancer (H)     Skin on R.chest wall.     Chest discomfort      Coronary artery disease      Detached retina, left 10/01/2014     Hyperlipidemia      Hypertension      PAD (peripheral artery disease) (H24)      Palpitations      Paroxysmal atrial fibrillation (H)      Pseudoaneurysm (H24)      Thrombosis      Physical Exam:    BP (!) 142/73 (BP Location: Right arm, Patient Position: Sitting, Cuff Size: Adult Regular)   Pulse 54   Temp 97.6  F (36.4  C) (Tympanic)   Resp 20   Ht 1.651 m (5' 5\")   Wt 75 kg (165 lb 4.8 oz)   SpO2 94%   BMI 27.51 kg/m      General: patient appears stated age of 73 year old. Nontoxic and in no distress.   HEENT: Head: atraumatic, normocephalic. Sclerae anicteric.  Chest:  Normal respiratory effort  Cardiac:  No edema.   Abdomen: abdomen is soft, non-distended  Extremities: normal tone and muscle bulk.  Skin: no lesions or rash on visible skin. Warm and dry.   CNS: alert and oriented. Grossly non-focal.   Psychiatric: normal mood and affect.     Lab Results:    Recent Results (from the past 168 hour(s))   Comprehensive metabolic panel   Result Value Ref Range    Sodium 141 135 - 145 mmol/L    Potassium 3.8 " 3.4 - 5.3 mmol/L    Carbon Dioxide (CO2) 26 22 - 29 mmol/L    Anion Gap 8 7 - 15 mmol/L    Urea Nitrogen 22.7 8.0 - 23.0 mg/dL    Creatinine 1.16 0.67 - 1.17 mg/dL    GFR Estimate 66 >60 mL/min/1.73m2    Calcium 9.5 8.8 - 10.2 mg/dL    Chloride 107 98 - 107 mmol/L    Glucose 118 (H) 70 - 99 mg/dL    Alkaline Phosphatase 70 40 - 150 U/L    AST 21 0 - 45 U/L    ALT 13 0 - 70 U/L    Protein Total 7.2 6.4 - 8.3 g/dL    Albumin 4.1 3.5 - 5.2 g/dL    Bilirubin Total 0.4 <=1.2 mg/dL   INR   Result Value Ref Range    INR 2.55 (H) 0.85 - 1.15   CBC with platelets and differential   Result Value Ref Range    WBC Count 6.2 4.0 - 11.0 10e3/uL    RBC Count 4.72 4.40 - 5.90 10e6/uL    Hemoglobin 14.1 13.3 - 17.7 g/dL    Hematocrit 42.2 40.0 - 53.0 %    MCV 89 78 - 100 fL    MCH 29.9 26.5 - 33.0 pg    MCHC 33.4 31.5 - 36.5 g/dL    RDW 14.1 10.0 - 15.0 %    Platelet Count 229 150 - 450 10e3/uL    % Neutrophils 60 %    % Lymphocytes 23 %    % Monocytes 9 %    % Eosinophils 7 %    % Basophils 1 %    % Immature Granulocytes 0 %    NRBCs per 100 WBC 0 <1 /100    Absolute Neutrophils 3.7 1.6 - 8.3 10e3/uL    Absolute Lymphocytes 1.4 0.8 - 5.3 10e3/uL    Absolute Monocytes 0.6 0.0 - 1.3 10e3/uL    Absolute Eosinophils 0.4 0.0 - 0.7 10e3/uL    Absolute Basophils 0.1 0.0 - 0.2 10e3/uL    Absolute Immature Granulocytes 0.0 <=0.4 10e3/uL    Absolute NRBCs 0.0 10e3/uL     Imaging:    CT Chest/Abdomen/Pelvis w Contrast    Result Date: 5/29/2024  EXAM: CT CHEST/ABDOMEN/PELVIS W CONTRAST LOCATION: 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 Multiplanar reformats were obtained. Dose reduction techniques were used. CONTRAST: 77 mL iopamidol FINDINGS:  LUNGS AND PLEURA: Central airways are patent. Possible findings from right middle lobectomy. No areas of consolidation. No new or enlarging lung nodules. No pleural effusion. MEDIASTINUM/AXILLAE: Unchanged heart size. No pericardial effusion. The thoracic aorta is nonaneurysmal with severe calcified and noncalcified atherosclerotic plaquing. Visualized proximal arch vessels are unremarkable. No thoracic lymphadenopathy. CORONARY ARTERY CALCIFICATION: Severe with postoperative changes from CABG. HEPATOBILIARY: Unchanged liver contours. No worrisome liver lesions. Normal gallbladder. PANCREAS: Normal. SPLEEN: Normal. ADRENAL GLANDS: Unchanged bilateral benign adrenal adenomas, which require no dedicated follow-up. KIDNEYS/BLADDER: Kidneys enhance symmetrically with unchanged benign bilateral cysts, which require no follow-up. Unchanged nonobstructing right nephrolithiasis. No hydronephrosis. Minimally distended bladder. BOWEL: Normal caliber small and large bowel. Normal appendix. No free fluid or free air. LYMPH NODES: No lymphadenopathy. VASCULATURE: Unchanged multifocal ectasia/mild aneurysmal dilation of the infrarenal abdominal aorta up to 3.1 cm with severe background atherosclerotic vascular calcifications. The bilateral common iliac artery aneurysms near their bifurcations are also  unchanged, measuring up to 2.6 x 3.1 cm on the right and 3.5 x 3.8 cm on the left. As before, the aneurysmal dilation extends into the internal iliac arteries. There are expected interval postoperative findings from right femoral artery pseudoaneurysm repair with small adjacent fluid collections, likely seromas. PELVIC ORGANS: Severe prostatomegaly. MUSCULOSKELETAL: Degenerative changes of the spine. No destructive osseous lesions.     IMPRESSION: 1.  No findings of recurrent or metastatic disease in the chest, abdomen or pelvis. 2.  Interval postoperative findings from repair of the right femoral artery pseudoaneurysm without  complication evident. 3.  Unchanged infrarenal abdominal aortic and bilateral common iliac artery aneurysms as described. 4.  Additional noncritical details in the findings.    US Thyroid    Result Date: 5/29/2024  EXAM: US THYROID LOCATION: [facility] DATE: 5/29/2024 INDICATION:  Thyroid nodule. Previously biopsied isthmic nodule in 2015. COMPARISON: 2/15/2021, 3/19/2019 TECHNIQUE: Thyroid ultrasound. FINDINGS: RIGHT lobe: 3.8 x 1.7 x 1.5 cm. Homogeneous echotexture. Small cystic nodule superiorly measuring 8 mm is stable. Isthmus: 5 mm. Rim calcified 12 x 10 mm nodule is stable. LEFT lobe: 3.6 x 1.7 x 1.6 cm. Homogeneous echotexture. NECK: No cervical lymphadenopathy.     IMPRESSION: 1.  No suspicious thyroid nodules. 2.  Previously biopsied, rim calcified isthmic nodule has been stable by report since 2017. This needs no additional follow-up. Nodules are characterized per ACR Thyroid Imaging, Reporting and Data System (TI-RADS): White Paper of the ACR TI-RADS Committee Robe Olivas et al. Journal of the American College of Radiology 2017. Volume 14 (2017), Issue 5, 788-165.     US Lower Extremity Arterial Duplex Right    Result Date: 5/21/2024  Table formatting from the original result was not included. Right Groin Ultrasound (Date: 05/20/24) Indication: Follow-up large pseudoaneurysm repair of the right common femoral artery (05/01/24) Region of Interest: Right Groin Access Site. History: CAD, Angioplasty, Vascular Surgery, and Rest Pain Technique: Duplex imaging is performed utilizing gray-scale, two-dimensional images, and color-flow imaging. Doppler waveform analysis and spectral Doppler imaging is also performed. LOWER EXTREMITY ARTERIAL DUPLEX EXAM WITH WAVEFORMS Right leg (cm/s): Location: Velocities Waveforms Right CFA: 41 T Waveforms: T=Triphasic, M=Monophasic, B=Biphasic Right Leg: Location Right CFV Compressibility (FC,PC,NC) FC Spontaneous + Phasic  + Augmentation +  "Patent + Compression Study:  Normal Comments: Large hematoma/seroma extending from the right groin to the proximal thigh. To long to measure.  No flow seen in hematoma/seroma area. Impression: Right Lower Extremity: No evidence of pseudoaneurysm, no evidence of deep venous thrombosis Reference: Category Normal 1-19% 20-49% 50-99% Occluded PSV <160 cm/sec without spectral broadening <160 cm/sec with spectral broadening Increased Increased Absent Flow Ratio N/A N/A < 2.0 >2.0 N/A Post-Stenotic Turbulence No No No Yes N/A Compressibility: FC= Fully compressible, PC= Partially compressible, NC= Non-compressible, NV= Not Visualized Venous Doppler: (+) = Present  (0) = Absent  (-) = Decreased/Unable to Evaluate, (NV) = Not Visualized Reflux: (+) Incompetent  (-) Competent, (NV) = Not Visualized        Signed by: Orville Harmon MD      Oncology Rooming Note    May 31, 2024 8:52 AM   Jack Brown is a 73 year old male who presents for:    Chief Complaint   Patient presents with     Oncology Clinic Visit     1 year return visit with labs, review CT/US of 5/29/24, related to Atypical carcinoid lung tumor.     Initial Vitals: BP (!) 142/73 (BP Location: Right arm, Patient Position: Sitting, Cuff Size: Adult Regular)   Pulse 54   Temp 97.6  F (36.4  C) (Tympanic)   Resp 20   Ht 1.651 m (5' 5\")   Wt 75 kg (165 lb 4.8 oz)   SpO2 94%   BMI 27.51 kg/m   Estimated body mass index is 27.51 kg/m  as calculated from the following:    Height as of this encounter: 1.651 m (5' 5\").    Weight as of this encounter: 75 kg (165 lb 4.8 oz). Body surface area is 1.85 meters squared.  Moderate Pain (5) Comment: Data Unavailable   No LMP for male patient.  Allergies reviewed: Yes  Medications reviewed: Yes    Medications: Medication refills not needed today.  Pharmacy name entered into [facility]:    "[id number] - [place], [place] - [address] [facility] [address]  [facility] [place], [place] " - [address]    Frailty Screening:   Is the patient here for a new oncology consult visit in cancer care? 2. No      Clinical concerns: Jack states he would like to discuss abdominal aneurism, lower right quadrant, then Dr. Crum, a vascular surgeon stated he found 2 more recently w/imaging done. He states that he was told he would need stents for those two more recently found.  Dr. Harmon was notified.      Valentina Campos CMA                Again, thank you for allowing me to participate in the care of your patient.        Sincerely,        Orville Harmon MD

## 2024-05-31 NOTE — PROGRESS NOTES
"Oncology Rooming Note    May 31, 2024 8:52 AM   Jack Brown is a 73 year old male who presents for:    Chief Complaint   Patient presents with    Oncology Clinic Visit     1 year return visit with labs, review CT/US of 5/29/24, related to Atypical carcinoid lung tumor.     Initial Vitals: BP (!) 142/73 (BP Location: Right arm, Patient Position: Sitting, Cuff Size: Adult Regular)   Pulse 54   Temp 97.6  F (36.4  C) (Tympanic)   Resp 20   Ht 1.651 m (5' 5\")   Wt 75 kg (165 lb 4.8 oz)   SpO2 94%   BMI 27.51 kg/m   Estimated body mass index is 27.51 kg/m  as calculated from the following:    Height as of this encounter: 1.651 m (5' 5\").    Weight as of this encounter: 75 kg (165 lb 4.8 oz). Body surface area is 1.85 meters squared.  Moderate Pain (5) Comment: Data Unavailable   No LMP for male patient.  Allergies reviewed: Yes  Medications reviewed: Yes    Medications: Medication refills not needed today.  Pharmacy name entered into [employer]:    [facility][id number] - [place], [place] - [address] [facility] [place], [place] - [address] [place] [address]    Frailty Screening:   Is the patient here for a new oncology consult visit in cancer care? 2. No      Clinical concerns: Jack states he would like to discuss abdominal aneurism, lower right quadrant, then Dr. Crum, a vascular surgeon stated he found 2 more recently w/imaging done. He states that he was told he would need stents for those two more recently found.  Dr. Harmon was notified.      Valentina Campos, CHACE              "

## 2024-06-01 LAB — VIT D+METAB SERPL-MCNC: 25 NG/ML (ref 20–50)

## 2024-06-12 ENCOUNTER — TELEPHONE (OUTPATIENT)
Dept: FAMILY MEDICINE | Facility: CLINIC | Age: 74
End: 2024-06-12
Payer: MEDICARE

## 2024-06-12 NOTE — TELEPHONE ENCOUNTER
Called patient to review recent elevated blood pressure reading.  Per [place] Community Measures guidelines, patients blood pressure is out of parameters and recheck blood pressure is recommended.    Last Blood Pressure: 142/73   Last Heart Rate: 53  Date: 5/3/24  Location: Other Specialty    Patient will call [phone] ([facility]) and leave a message with their name, date of birth, and blood pressure reading that was completed within the last 24 hours and where it was completed.  Will await call back for further review.

## 2024-06-14 ENCOUNTER — LAB (OUTPATIENT)
Dept: LAB | Facility: CLINIC | Age: 74
End: 2024-06-14
Payer: MEDICARE

## 2024-06-14 ENCOUNTER — ANTICOAGULATION THERAPY VISIT (OUTPATIENT)
Dept: ANTICOAGULATION | Facility: CLINIC | Age: 74
End: 2024-06-14

## 2024-06-14 DIAGNOSIS — I70.90 ARTERIAL OCCLUSION: ICD-10-CM

## 2024-06-14 DIAGNOSIS — I48.0 PAROXYSMAL ATRIAL FIBRILLATION (H): ICD-10-CM

## 2024-06-14 DIAGNOSIS — I70.90 ARTERIAL OCCLUSION: Primary | ICD-10-CM

## 2024-06-14 LAB — INR BLD: 2.4 (ref 0.9–1.1)

## 2024-06-14 PROCEDURE — 85610 PROTHROMBIN TIME: CPT

## 2024-06-14 PROCEDURE — 36416 COLLJ CAPILLARY BLOOD SPEC: CPT

## 2024-06-14 NOTE — PROGRESS NOTES
ANTICOAGULATION MANAGEMENT     Jack Brown 73 year old male is on warfarin with therapeutic INR result. (Goal INR 2.0-3.0)    Recent labs: (last 7 days)     06/14/24  0916   INR 2.4*       ASSESSMENT     Source(s): Chart Review and Patient/Caregiver Call     Warfarin doses taken: Warfarin taken as instructed  Diet: No new diet changes identified  Medication/supplement changes: None noted  New illness, injury, or hospitalization: No  Signs or symptoms of bleeding or clotting: No  Previous result: Therapeutic last visit; previously outside of goal range  Additional findings: None       PLAN     Recommended plan for no diet, medication or health factor changes affecting INR     Dosing Instructions: Continue your current warfarin dose with next INR in 4 weeks       Summary  As of 6/14/2024      Full warfarin instructions:  5 mg every Sun, Tue, Fri; 4 mg all other days   Next INR check:  7/12/2024               Telephone call with Shayy who verbalizes understanding and agrees to plan    Lab visit scheduled    Education provided:   Please call back if any changes to your diet, medications or how you've been taking warfarin    Plan made per [facility] anticoagulation protocol    Felisa Carnes RN  Anticoagulation Clinic  6/14/2024    _______________________________________________________________________     Anticoagulation Episode Summary       Current INR goal:  2.0-3.0   TTR:  28.0% (5.1 mo)   Target end date:  Indefinite   Send INR reminders to:  TIFFANIE MIDWAY    Indications    Arterial occlusion [I70.90]  Paroxysmal atrial fibrillation (H) [I48.0]             Comments:               Anticoagulation Care Providers       Provider Role Specialty Phone number    Mercedes Adorno MD Referring Family Medicine [phone]

## 2024-06-19 NOTE — PROGRESS NOTES
[facility]        Patient is here for a 1 month F/U to discuss bypass surgery. s/p 5/1 right femoral pseudoaneurysm repair   If patient walks across the parking lot, they have to stop to take a break    Pt is currently taking Aspirin, Statin, and Warfarin.    The provider has been notified that the patient discuss bypass, and two other aneurysms, what is limit on driving distance.     Questions patient would like addressed today are: N/A.    Refills are needed: N/A    Has homecare services and agency name:  No

## 2024-06-20 ENCOUNTER — OFFICE VISIT (OUTPATIENT)
Dept: VASCULAR SURGERY | Facility: CLINIC | Age: 74
End: 2024-06-20
Attending: SURGERY
Payer: MEDICARE

## 2024-06-20 ENCOUNTER — DOCUMENTATION ONLY (OUTPATIENT)
Dept: VASCULAR SURGERY | Facility: CLINIC | Age: 74
End: 2024-06-20
Payer: MEDICARE

## 2024-06-20 VITALS
DIASTOLIC BLOOD PRESSURE: 73 MMHG | RESPIRATION RATE: 16 BRPM | SYSTOLIC BLOOD PRESSURE: 154 MMHG | OXYGEN SATURATION: 96 % | HEART RATE: 61 BPM

## 2024-06-20 DIAGNOSIS — I70.218 ATHEROSCLEROSIS OF NATIVE ARTERIES OF EXTREMITIES WITH INTERMITTENT CLAUDICATION, OTHER EXTREMITY (H): ICD-10-CM

## 2024-06-20 DIAGNOSIS — I73.9 PAD (PERIPHERAL ARTERY DISEASE) (H): Primary | ICD-10-CM

## 2024-06-20 PROCEDURE — 99024 POSTOP FOLLOW-UP VISIT: CPT | Performed by: SURGERY

## 2024-06-20 PROCEDURE — G0463 HOSPITAL OUTPT CLINIC VISIT: HCPCS | Performed by: SURGERY

## 2024-06-20 ASSESSMENT — PAIN SCALES - GENERAL: PAINLEVEL: NO PAIN (0)

## 2024-06-20 NOTE — PATIENT INSTRUCTIONS
Jack Brown,    Your visit to [facility] for your procedure is coming soon and we look forward to seeing you! This friendly reminder and pre-procedure checklist will help to ensure your procedure goes smoothly and meets your expectations. At [facility], our goal is to provide you with a great patient experience and to deliver genuine, professional care to every patient.     Please complete all the steps in advance of your visit. If you have any questions about the items listed below, please give our office a call. We can be reached at [phone] or visit our website at [email or web address] for more information.     Procedure: Left  Leg  Angiogram     Procedure Date :  8/21/24    Procedure Time :  8am    Arrival Time: 7am    2 week Post Procedure Appointment: TBD    Admission Type: Outpatient    Surgeon:     Procedure Location: [facility]:  [address] [place] [address] (phone: [phone], Fax: [phone])    WARFARIN MUST BE HELD STARTING 5 DAYS BEFORE, PLEASE CHECK WITH YOUR ANTICOAGULATION CLINIC WHETHER BRIDGING MEDICATION IS NEEDED      If you take blood thinners: SEE SPECIFIC INSTRUCTIONS BELOW   PLEASE DO NOT STOP YOUR ASPIRIN OR PLAVIX UNLESS SPECIFICALLY DIRECTED BY THE VASCULAR SURGEON TO STOP!  - In most cases Vascular providers want you to continue these. This is different from most NON vascular surgeries and may not be well known by your Primary Care Provider     If you take these diabetic medications, please discuss with your primary doctor and follow the hold instructions:   Hold seven (7) days prior for once weekly injectable doses [semaglutide (Ozempic, Wegovy), dulaglutide  (Trulicity), exenatide ER (Bydureon), tirzepatide (Mounjaro)]  Hold the day before and day of for once daily injectable GLP-1 agonists [exenatide (Byetta), liraglutide (Saxenda,Victoza)]  Hold seven (7)  days for oral semaglutide (Rybelsus)    Prepare for the peripheral angiography as follows:  Do not eat 8 hours before your procedure. You may have clear liquids up to 1 hour before surgery.  Tell your healthcare provider about all medicines you take and any allergies you may have.  Arrange for a family member or friend to drive you home.  If you are on Metformin, please HOLD for 48 hours after the procedure.  Please be sure to address your diabetic medications with your Primary Care Physician prior to your angiogram.    Peripheral Angiography    Peripheral angiography is an outpatient procedure that makes a  map  of the vessels (arteries) in your lower body, legs, and arms, using X-ray and dye.This map can show where blood flow may be blocked.    An angiogram is commonly performed under sedation with the use of local anesthesia.    The procedure usually starts with a needle put into the femoral (groin) artery. From one treatment site, areas all over the body can be treated.  After access is established, catheters (thin tubes) and wires are threaded through the arterial system to a specific area of interest or throughout the entire body.  As a contrast agent (iodine dye) is injected, X-ray images are taken to let your provider view the flow of the dye and identify blockages. The surgeon can then choose the best mode of therapy for you - whether during or following the angiogram. This decision depends on your symptoms and the severity and characteristics of the blockages.  Two common therapies that can be provided during the angiogram are balloon angioplasty and stent placement.                   Angioplasty can be used to open arterial blockages. Guided by X-ray, your provider navigates through the blockage with a wire and introduces a special device equipped with an inflatable balloon. After positioning the balloon device across the blocked portion of the artery, the provider inflates the balloon to expand the  artery and compress the blockage. The balloon is then deflated and removed while keeping the wire in place across the area that has been treated. Next, contrast dye is injected to assess the result. Treatment is considered a success if blood flow is improved and less than 30% of the blockage remains. If the vessel is still considerably narrowed, placing a stent may be the next step.    Stents are used to prop open an artery at the site of a narrowing. Stents are generally placed after balloon angioplasty when there is residual narrowing or insufficient blood flow in a treated vessel. Stents are considered a permanent implant and cannot be used if you have a metal allergy. Stents that are used in the leg are constructed of a nickel-titanium alloy (Nitinol), a memory-shaped metal. This alloy has a predetermined size and shape at body temperature and expands to this size and shape after being introduced through a catheter. These stents resist kinking and are flexible so that damage from activities that involve your legs is minimized.  Your procedure may require other techniques to address the problem or plaque.     If surgery is felt to be a better option, your vascular provider will obtain any additional X-ray images needed to plan a surgical bypass of the blocked vessel/s and will then conclude the angiogram.      During the procedure  Here is what to expect:  You may get medicine through an IV (intravenous) line to relax you. You re given an injection to numb the insertion site. Then, a tiny skin cut (incision) is made near an artery in your groin.  Your provider inserts a thin tube (catheter) through the incision. He or she then threads the catheter into an artery while looking at a video monitor.  Contrast  dye  is injected into the catheter to confirm position. You may feel warmth or pressure in your legs and back. You lie still as X-rays are taken. The catheter is then taken out.  After the procedure  You ll be  taken to a recovery area. A healthcare provider will apply pressure to the site for about 10 minutes. Your healthcare provider will tell you how long to lie down and keep the insertion site still. Your healthcare provider will discuss the results with you soon after the procedure.      Angiogram Procedure Discharge Instructions:     1. If you received sedation for your procedure: Do not drive or operate heavy machinery for the rest of the day.  2. Avoid strenuous activity for 72 hours (3 days):                        - Do not lift greater than 10 pounds.                        - Excessive exercise                        - Straining                        - Return to your normal activities as you tolerate after the 3 days restriction  3. Avoid tub baths, Jacuzzis, hot tubs and pools for 72 hours (3 days) or until puncture site is will healed.  4. You may shower beginning tomorrow. Do not scrub puncture site(s) until well healed, pat dry.  5. You can expect to return to work 1-2 days after your procedure - depending on the nature of your profession.  6. It is normal to have some tenderness and minimal swelling at puncture site. A small area of discoloration may be present. Tenderness typically subsides in 24-48 hours. A small knot may also be present at puncture site for 6-8 weeks, this can be a normal part of the healing process.       After the angiogram If you:      1. Experience any bleeding or active swelling from puncture site: Lie down, firmly apply pressure to puncture site and CALL 9-1-1  2. Fever greater than 101 degrees Fahrenheit.  3. Redness, swelling, warmth to touch, or purulent (yellow/green/foul smelling) drainage from the puncture site.  4. Increasing pain, tenderness or swelling at puncture site OR of arm/leg near puncture site.  5. Feeling weak or faint.  6. Change in color, temperature, or sensation of arm/leg where puncture was made.  7. You can t feel your thrill (pulse at your dialysis fistula  "site) or it feels weak (If you had fistulogram done).     Call us with any other questions or concerns after your procedure: [phone]      All invasive procedures can have complications. While the risk of an angiogram is low it is not zero. The most common complications are related to the arterial access site.       Risks/ Complications   Bruising is Common  You will likely have bruising (ecchymosis) where the artery was entered.    Pain and Bleeding  Less commonly, patients experience pain and bleeding that may include blood collecting under the skin (hematoma).    Blockage and Leakage   In rare cases, the access artery can become blocked. Infrequently, patients experience persistent leakage of blood where the artery was entered, which can result in the formation of a pseudoaneurysm--a blood-filled sac--that may require further treatment.  Other complications related to an angiogram include:   Allergic reaction to the iodine contrast dye, which can lead to the development of kidney failure.  Very rarely during balloon angioplasty and/or stent placement, part of the arterial blockage can break off (embolism) and travel to more distant arteries. This can worsen blood flow.    Pre-Procedure checklist:    [] A Pre-op physical within 30 days of the procedure is required. You will need to set up an appointment with your primary care provider.  [] Contact your insurance regarding coverage  If you would like a Good [place] Estimate for your upcoming procedure at [facility] Location, contact Cost of Care Estimates   Advocates are available Monday through Friday 8am - 5pm     You may also submit a request online at [email or web address]  - Complete the secure online form found under \"Services and Procedure Pricing\"   If your procedure is at [facility], please contact the numbers below for Cost of Care Estimates.   - Facility Charge: [phone]    Anesthesiology charge:  [phone] "   [] DO NOT BRING FMLA WITH TO SURGERY.  These should be sent to the provider's office by fax to .     [] Day of Surgery  Medications - Take as indicated with sips of water.   Wear comfortable loose-fitting clothes. Wear your glasses-Not contacts. Do not wear jewelry and remove body piercings. Surgery may be cancelled if they are not removed.   You may have 1 family member wait in the lobby during your surgery. Visitor restrictions are subject to change. Please verify with the surgery nurse when they call.   If same day surgery-Have a someone come with you to surgery that can help you understand the surgeon's instructions, drive you home and stay with you overnight the first night.    [] You will receive a call from a nurse 1-3 days prior to the procedure. They will go over more details with you    Notify our office right away, if you have any changes in your health status, or if you develop a cold, flu, diarrhea, infection, fever or sore throat before your scheduled surgery date. We can be reached at  [phone] Monday-Friday 8 am-4:30 pm if you have any questions.   Thank you for choosing  [email or web address]    Please scan QR codes to watch videos about Angioplasty for Peripheral Artery Disease. There are links provided if you are unable to use the code.                                                                                                                                       [email or web address]                    [email or web address]=[email or web address]  Angioplasty for Peripheral Artery Disease: Before Your Procedure                                               Angioplasty for Peripheral Artery Disease: Returning Home

## 2024-06-20 NOTE — PROGRESS NOTES
Procedure: Left  Leg  Angiogram     Procedure Date :  8/21/2024    Procedure Time :  800 AM    Arrival Time: 700 AM    Admission Type: Outpatient    Surgeon:     Procedure Location: [facility]:  [address] [place] [address] (phone: [phone], Fax: [phone])    Consent Completed:No, Scanned: No    Scheduled with: Maeve in OR scheduling    Anesthesia Needed: no IF Yes Please clarify that the CRNA, anesthesia and VENTURA/PACU resources are being added at scheduling    Blood Thinners Address: n/a    2 week Post Procedure Appointments: see appt desk

## 2024-06-24 NOTE — PROGRESS NOTES
VASCULAR SURGERY PROGRESS NOTE   VASCULAR SURGEON: Laverne Crum MD, RPVI     LOCATION:  [facility]     Jack Brown   Medical Record #:  [id number]  YOB: 1950  Age:  73 year old     Date of Service: 6/20/2024    PRIMARY CARE PROVIDER: Mercedes Adorno      Reason for visit: Follow-up    IMPRESSION: 73-year-old male with a history bilateral common iliac artery aneurysms and bilateral popliteal artery aneurysms, the left is larger than the right.  Patient had a history of thrombosed popliteal artery aneurysm treated with thrombolysis which was partially effective.  Patient also developed large pseudoaneurysm involving right femoral artery which was repaired in May of this year.  Patient is currently in clinic to discuss surgical options.    RECOMMENDATION/RISKS: Given history of left orbital artery aneurysm thrombosis and the size of the aneurysm I would recommend treatment.  Based on most recent angiogram I am not sure about the runoff so I would like to proceed with left lower extremity angiogram via antegrade approach.  Potentially we might consider endovascular treatment if the outflow is 2 or 3 vessel runoff.    HPI:  Jack Brown is a 73 year old male who was seen today for follow-up.  Patient is doing well   REVIEW OF SYSTEMS:    A 12 point ROS was reviewed and is negative   PHH:    Past Medical History:   Diagnosis Date    BPH (benign prostatic hypertrophy)     Cancer (H)     Skin on R.chest wall.    Chest discomfort     Coronary artery disease     Detached retina, left 10/01/2014    Hyperlipidemia     Hypertension     PAD (peripheral artery disease) (H24)     Palpitations     Paroxysmal atrial fibrillation (H)     Pseudoaneurysm (H24)     Thrombosis           Past Surgical History:   Procedure Laterality Date    BYPASS GRAFT ARTERY CORONARY N/A 12/28/2015    Procedure: CORONARY ARTERY BYPASS x 3, RIGHT ENDOSCOPIC VEIN HARVEST, LEFT INTERNAL MAMMARY ARTERY, ANESTHESIA  TRANSESOPHAGEAL ECHOCARDIOGRAM;  Surgeon: Jayson Alvarado MD;  Location: [facility];  Service:     CARDIAC SURGERY      CATARACT EXTRACTION  2015    CYST REMOVAL      Ganglion cyst removal of both wrist    HC REMOVAL PREPATELLA BURSA      Description: Excision Of Prepatellar Bursa;  Recorded: 09/30/2014;    HC REPAIR OF NASAL SEPTUM      Description: Septoplasty;  Recorded: 09/30/2014;    HERNIA REPAIR, UMBILICAL      IR LOWER EXTREMITY ANGIOGRAM LEFT  12/16/2023    IR THROMBOLYSIS ARTERIAL INFUSION INITIAL DAY  12/17/2023    PARS PLANA VITRECTOMY W/ REPAIR OF MACULAR HOLE  Nov 2014    MA EXCIS TENDON SHEATH LESION, HAND/FINGER      Description: Hand Excision Of A Tendon Cyst;  Recorded: 01/04/2011;    MA LAP, VENTRAL HERNIA REPAIR,REDUCIBLE      Description: Laparoscopy Repair Of Umbilical Hernia;  Recorded: 01/04/2011;    PSEUDOANEURYSM REPAIR Right 5/1/2024    Procedure: REPAIR, PSEUDOANEURYSM, ARTERY, FEMORAL RIGHT;  Surgeon: Laverne Crum MD;  Location: [facility]    RETINAL DETACHMENT SURGERY  oct 2014    SKIN CANCER EXCISION  Nov 2015    right chest    THORACOSCOPY Right 4/14/2016    Procedure: RIGHT VIDEO ASSISTED THORACOSCOPY, RIGHT LOBECTOMY;  Surgeon: Vinny Chase MD;  Location: [facility];  Service:     VASECTOMY         ALLERGIES:  Niacin, Atorvastatin, and Pravastatin    MEDS:    Current Outpatient Medications:     acetaminophen (TYLENOL) 500 MG tablet, [ACETAMINOPHEN (TYLENOL) 500 MG TABLET] Take 500 mg by mouth every 6 (six) hours as needed for pain., Disp: , Rfl:     amLODIPine (NORVASC) 10 MG tablet, TAKE 1 TABLET(10 MG) BY MOUTH DAILY, Disp: 90 tablet, Rfl: 2    aspirin 81 MG EC tablet, Take 1 tablet (81 mg) by mouth daily, Disp: 90 tablet, Rfl: 3    finasteride (PROSCAR) 5 MG tablet, Take 1 tablet (5 mg) by mouth daily, Disp: 90 tablet, Rfl: 3    metoprolol tartrate (LOPRESSOR) 50 MG tablet, TAKE 1 TABLET(50 MG) BY MOUTH TWICE DAILY, Disp:  180 tablet, Rfl: 1    rosuvastatin (CRESTOR) 5 MG tablet, Take 1 tablet (5 mg) by mouth at bedtime, Disp: 30 tablet, Rfl: 1    tamsulosin (FLOMAX) 0.4 MG capsule, TAKE 1 CAPSULE BY MOUTH DAILY AFTER BREAKFAST, Disp: 90 capsule, Rfl: 3    Vitamin D3 (VITAMIN D, CHOLECALCIFEROL,) 25 mcg (1000 units) tablet, Take 1 tablet by mouth daily, Disp: , Rfl:     warfarin ANTICOAGULANT (COUMADIN) 1 MG tablet, Take 4- tablets daily as directed based on inr results, Disp: 400 tablet, Rfl: 1    SOCIAL HABITS:    History   Smoking Status    Never   Smokeless Tobacco    Never     Social History    Substance and Sexual Activity      Alcohol use: Yes        Alcohol/week: 8.0 standard drinks of alcohol      History   Drug Use No       FAMILY HISTORY:    Family History   Problem Relation Age of Onset    Acute Myocardial Infarction Mother     Aneurysm Mother         brain    Acute Myocardial Infarction Father     Acute Myocardial Infarction Brother     Aortic aneurysm Brother         d @ 72    Lung Cancer Cousin     Coronary Artery Disease Cousin     Leukemia Brother         d @ 55    Colitis Brother     Colon Cancer Paternal Grandfather        PE:  BP (!) 154/73   Pulse 61   Resp 16   SpO2 96%   Wt Readings from Last 1 Encounters:   05/31/24 75 kg (165 lb 4.8 oz)     There is no height or weight on file to calculate BMI.    EXAM:  GENERAL: This is a well-developed 73 year old male who appears his stated age  EYES: Grossly normal.  MOUTH: Buccal mucosa normal   CARDIAC: Normal   CHEST/LUNG: Clear to auscultation bilaterally  GASTROINTESINAL soft nontender nondistended  MUSCULOSKELETAL: Grossly normal and both lower extremities are intact.  HEME/LYMPH: No lymphedema  NEUROLOGIC: Focally intact, Alert and oriented x 3.   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 Multiplanar reformats were obtained. Dose reduction techniques were used. CONTRAST: 77 mL iopamidol FINDINGS: LUNGS AND PLEURA: Central airways are patent. Possible findings from right middle lobectomy. No areas of consolidation. No new or enlarging lung nodules. No pleural effusion. MEDIASTINUM/AXILLAE: Unchanged heart size. No pericardial effusion. The thoracic aorta is nonaneurysmal with severe calcified and noncalcified atherosclerotic plaquing. Visualized proximal arch vessels are unremarkable. No thoracic lymphadenopathy. CORONARY ARTERY CALCIFICATION: Severe with postoperative changes from CABG. HEPATOBILIARY: Unchanged liver contours. No worrisome liver lesions. Normal gallbladder. PANCREAS: Normal. SPLEEN: Normal. ADRENAL GLANDS: Unchanged bilateral benign adrenal adenomas, which require no dedicated follow-up. KIDNEYS/BLADDER: Kidneys enhance symmetrically with unchanged benign bilateral cysts, which require no follow-up. Unchanged nonobstructing right nephrolithiasis. No hydronephrosis. Minimally distended bladder. BOWEL: Normal caliber small and large bowel. Normal appendix. No free fluid or free air. LYMPH NODES: No lymphadenopathy. VASCULATURE: Unchanged multifocal ectasia/mild aneurysmal dilation of the infrarenal abdominal aorta up to 3.1 cm with severe background atherosclerotic vascular calcifications. The bilateral common iliac artery aneurysms near their bifurcations are also  unchanged, measuring up to 2.6 x 3.1 cm on the right and 3.5 x 3.8 cm on the left. As before, the aneurysmal dilation extends into the internal iliac arteries. There are expected interval postoperative findings from  right femoral artery pseudoaneurysm repair with small adjacent fluid collections, likely seromas. PELVIC ORGANS: Severe prostatomegaly. MUSCULOSKELETAL: Degenerative changes of the spine. No destructive osseous lesions.     IMPRESSION: 1.  No findings of recurrent or metastatic disease in the chest, abdomen or pelvis. 2.  Interval postoperative findings from repair of the right femoral artery pseudoaneurysm without complication evident. 3.  Unchanged infrarenal abdominal aortic and bilateral common iliac artery aneurysms as described. 4.  Additional noncritical details in the findings.    US Thyroid    Result Date: 5/29/2024  EXAM: US THYROID LOCATION: [facility] DATE: 5/29/2024 INDICATION:  Thyroid nodule. Previously biopsied isthmic nodule in 2015. COMPARISON: 2/15/2021, 3/19/2019 TECHNIQUE: Thyroid ultrasound. FINDINGS: RIGHT lobe: 3.8 x 1.7 x 1.5 cm. Homogeneous echotexture. Small cystic nodule superiorly measuring 8 mm is stable. Isthmus: 5 mm. Rim calcified 12 x 10 mm nodule is stable. LEFT lobe: 3.6 x 1.7 x 1.6 cm. Homogeneous echotexture. NECK: No cervical lymphadenopathy.     IMPRESSION: 1.  No suspicious thyroid nodules. 2.  Previously biopsied, rim calcified isthmic nodule has been stable by report since 2017. This needs no additional follow-up. Nodules are characterized per ACR Thyroid Imaging, Reporting and Data System (TI-RADS): White Paper of the ACR TI-RADS Committee Robe Olivas et al. Journal of the American College of Radiology 2017. Volume 14 (2017), Issue 5, 251-425.           LABS:      Sodium   Date Value Ref Range Status   05/31/2024 141 135 - 145 mmol/L Final     Comment:     Reference intervals for this test were updated on 09/26/2023 to more accurately reflect our healthy population. There may be differences in the flagging of prior results with similar values performed with this method. Interpretation of those prior results can be made in the context of  the updated reference intervals.    05/02/2024 137 135 - 145 mmol/L Final     Comment:     Reference intervals for this test were updated on 09/26/2023 to more accurately reflect our healthy population. There may be differences in the flagging of prior results with similar values performed with this method. Interpretation of those prior results can be made in the context of the updated reference intervals.    01/30/2024 138 135 - 145 mmol/L Final     Comment:     Reference intervals for this test were updated on 09/26/2023 to more accurately reflect our healthy population. There may be differences in the flagging of prior results with similar values performed with this method. Interpretation of those prior results can be made in the context of the updated reference intervals.      Urea Nitrogen   Date Value Ref Range Status   05/31/2024 22.7 8.0 - 23.0 mg/dL Final   05/02/2024 27.5 (H) 8.0 - 23.0 mg/dL Final   01/30/2024 23.1 (H) 8.0 - 23.0 mg/dL Final   04/27/2022 18 8 - 28 mg/dL Final   08/16/2021 19 8 - 28 mg/dL Final   05/20/2021 16 8 - 28 mg/dL Final     Hemoglobin   Date Value Ref Range Status   05/31/2024 14.1 13.3 - 17.7 g/dL Final   05/02/2024 13.3 13.3 - 17.7 g/dL Final   05/01/2024 14.6 13.3 - 17.7 g/dL Final     Platelet Count   Date Value Ref Range Status   05/31/2024 229 150 - 450 10e3/uL Final   05/02/2024 203 150 - 450 10e3/uL Final   05/01/2024 203 150 - 450 10e3/uL Final     INR   Date Value Ref Range Status   06/14/2024 2.4 (H) 0.9 - 1.1 Final   05/31/2024 2.55 (H) 0.85 - 1.15 Final   05/20/2024 3.1 (H) 0.9 - 1.1 Final   05/09/2024 1.3 (H) 0.9 - 1.1 Final   05/03/2024 1.04 0.85 - 1.15 Final   05/02/2024 1.01 0.85 - 1.15 Final     INR Point of Care   Date Value Ref Range Status   01/26/2024 2.1 (A) 0.9 - 1.1 Final   01/10/2024 4.3 (A) 0.9 - 1.1 Final       30 minutes spent on the day of encounter doing chart review, history and exam, documentation, and further activities as noted.         Laverne EVANS  MD Skyla, RPVI  VASCULAR SURGERY

## 2024-06-27 ENCOUNTER — TELEPHONE (OUTPATIENT)
Dept: CARDIOLOGY | Facility: CLINIC | Age: 74
End: 2024-06-27
Payer: MEDICARE

## 2024-06-27 NOTE — TELEPHONE ENCOUNTER
Called patient to review recent elevated blood pressure reading.  Per [place] Community Measures guidelines, patients blood pressure is out of parameters and recheck blood pressure is recommended.    Last Blood Pressure: 154/73  Last Heart Rate: 61  Date: 06/20/24  Location: Other Specialty    Spoke with and gave her the # for the BP voicemail.

## 2024-07-12 ENCOUNTER — ANTICOAGULATION THERAPY VISIT (OUTPATIENT)
Dept: ANTICOAGULATION | Facility: CLINIC | Age: 74
End: 2024-07-12

## 2024-07-12 ENCOUNTER — LAB (OUTPATIENT)
Dept: LAB | Facility: CLINIC | Age: 74
End: 2024-07-12
Payer: MEDICARE

## 2024-07-12 DIAGNOSIS — I48.0 PAROXYSMAL ATRIAL FIBRILLATION (H): ICD-10-CM

## 2024-07-12 DIAGNOSIS — I70.90 ARTERIAL OCCLUSION: Primary | ICD-10-CM

## 2024-07-12 DIAGNOSIS — I70.90 ARTERIAL OCCLUSION: ICD-10-CM

## 2024-07-12 LAB — INR BLD: 2.3 (ref 0.9–1.1)

## 2024-07-12 PROCEDURE — 85610 PROTHROMBIN TIME: CPT

## 2024-07-12 PROCEDURE — 36416 COLLJ CAPILLARY BLOOD SPEC: CPT

## 2024-07-12 NOTE — PROGRESS NOTES
ANTICOAGULATION MANAGEMENT     Jack Brown 73 year old male is on warfarin with therapeutic INR result. (Goal INR 2.0-3.0)    Recent labs: (last 7 days)     07/12/24  0853   INR 2.3*       ASSESSMENT     Source(s): Chart Review  Previous INR was Therapeutic last 2(+) visits  Medication, diet, health changes since last INR chart reviewed; none identified         PLAN     Recommended plan for no diet, medication or health factor changes affecting INR     Dosing Instructions: Continue your current warfarin dose with next INR in 4 weeks       Summary  As of 7/12/2024      Full warfarin instructions:  5 mg every Sun, Tue, Fri; 4 mg all other days   Next INR check:  8/9/2024               Detailed voice message left for Jack with dosing instructions and follow up date.     Contact [phone] to schedule and with any changes, questions or concerns.     Education provided: Please call back if any changes to your diet, medications or how you've been taking warfarin    Plan made per [facility] anticoagulation protocol    Felisa Carnes RN  Anticoagulation Clinic  7/12/2024    _______________________________________________________________________     Anticoagulation Episode Summary       Current INR goal:  2.0-3.0   TTR:  39.2% (6 mo)   Target end date:  Indefinite   Send INR reminders to:  [facility]    Indications    Arterial occlusion [I70.90]  Paroxysmal atrial fibrillation (H) [I48.0]             Comments:               Anticoagulation Care Providers       Provider Role Specialty Phone number    Mercedes Adorno MD Referring Family Medicine [phone]

## 2024-07-22 ENCOUNTER — TELEPHONE (OUTPATIENT)
Dept: FAMILY MEDICINE | Facility: CLINIC | Age: 74
End: 2024-07-22
Payer: MEDICARE

## 2024-07-22 NOTE — TELEPHONE ENCOUNTER
Spoke to Shayy, updated on recheck date as per last [facility] dosing note and scheduled his appointment. No other questions at this time.

## 2024-07-22 NOTE — TELEPHONE ENCOUNTER
General Call      Reason for Call:  INR    What are your questions or concerns:  spouse wondering when he is due for his INR    Date of last appointment with provider: 7-12-24    Could we send this information to you in [facility] or would you prefer to receive a phone call?:   Patient would prefer a phone call   Okay to leave a detailed message?: Yes [phone]

## 2024-07-24 ENCOUNTER — OFFICE VISIT (OUTPATIENT)
Dept: CARDIOLOGY | Facility: CLINIC | Age: 74
End: 2024-07-24
Payer: MEDICARE

## 2024-07-24 VITALS
DIASTOLIC BLOOD PRESSURE: 76 MMHG | WEIGHT: 166 LBS | OXYGEN SATURATION: 96 % | HEART RATE: 75 BPM | BODY MASS INDEX: 27.62 KG/M2 | RESPIRATION RATE: 16 BRPM | SYSTOLIC BLOOD PRESSURE: 138 MMHG

## 2024-07-24 DIAGNOSIS — I48.0 PAROXYSMAL ATRIAL FIBRILLATION (H): Primary | ICD-10-CM

## 2024-07-24 DIAGNOSIS — I10 PRIMARY HYPERTENSION: ICD-10-CM

## 2024-07-24 DIAGNOSIS — I25.83 CORONARY ARTERY DISEASE DUE TO LIPID RICH PLAQUE: ICD-10-CM

## 2024-07-24 DIAGNOSIS — I25.10 CORONARY ARTERY DISEASE DUE TO LIPID RICH PLAQUE: ICD-10-CM

## 2024-07-24 PROCEDURE — 99214 OFFICE O/P EST MOD 30 MIN: CPT | Performed by: NURSE PRACTITIONER

## 2024-07-24 PROCEDURE — G2211 COMPLEX E/M VISIT ADD ON: HCPCS | Performed by: NURSE PRACTITIONER

## 2024-07-24 NOTE — PATIENT INSTRUCTIONS
Jack Brown,    It was a pleasure to see you today at the [facility].     My recommendations after this visit include:    Continue current medications    Look into insurance coverage for alternative blood thinners (Eliquis, Xarelto, Pradaxa)    Continue daily pulse checks.  Note if fast/irregular (A fib)--how do you feel?  Call for recurrent A fib    Follow up with Dr. Estrada in 3 months  Follow up with me in 6 months, or sooner if needed    Funmilayo Jacob, CNP  [facility], Electrophysiology  [phone]  EP nurses [phone]

## 2024-07-24 NOTE — PROGRESS NOTES
HEART CARE ELECTROPHYSIOLOGY NOTE      [facility]  [phone]      Assessment/Recommendations   Assessment/Plan:  1.  Paroxysmal Atrial Fibrillation: No symptomatology or evidence of AF recurrence.  Only known episode of PAF-RVR noted on telemetry 12/17/2023.  Symptoms status indeterminate.  He remains off membrane active antiarrhythmic medications.  We reviewed the natural progression of atrial fibrillation and treatment options with medications or pulmonary vein isolation ablation.  Recommend sotalol versus ablation for recurrent AF.  -- Continue daily pulse checks, to call when he has recurrent AF for further evaluation and management    He was reassured that atrial fibrillation is not life-threatening, but carries an increased risk for stroke.  He has a [id number] score of 6 for age 73, vascular disease, HTN, thrombotic event.  We again discussed OAC options including Eliquis, Xarelto, Pradaxa, and warfarin.  If he has adequate insurance coverage, recommend switching to Eliquis.  Continue warfarin for stroke prophylaxis, goal INR 2.0-3.0.  Okay to hold warfarin for 5 days prior to surgery.    2.  Coronary artery disease: Three-vessel CABG in 2015.  Exercise nuclear stress testing 10/16/2023 negative for ischemia.  Denies anginal symptoms.  Continue statin and aspirin.    3.  Hypertension: Controlled with amlodipine and metoprolol.    Follow up with Dr. Estrada in 3 months  Follow up with me in 6 months     History of Present Illness/Subjective    HPI: Jack Brown is a 73 year old male who comes in today for EP follow-up of atrial fibrillation.  He has a history of paroxysmal atrial fibrillation, coronary artery disease (CABG x 3V in 2015: LIMA-LAD, SVG-OM, SVG-RCA), hypertension, hyperlipidemia, peripheral arterial disease with acute lower extremity thrombosis, bilateral common iliac aneurysm, infrarenal abdominal aortic aneurysm.  Surgical repair of right CFA pseudoaneurysm on  5/1/2024.  Followed by vascular surgery; scheduled for left leg angiogram/stent 8/21/2024.    Arrhythmia history  Dx/date: PAF-RVR diagnosed 12/17/2023 (seen on telemetry).  Converted to sinus rhythm on amiodarone.   Sx: Palpitations  [id number] score: 6 for age 73, vascular disease, HTN, thrombotic event  Oral anticoagulation: Warfarin  Antiarrhythmic medications, AV michel blocking agents: Amiodarone discontinued 1/26/2024  Procedures  DCCV: N/A  Ablation: N/A    Jack states that he is feells well.  He has not had any A-fib of which he is aware.  None noted in hospital/clinic notes.  He has been checking his pulse every day and has not noted any irregularity.  He denies chest discomfort, palpitations, abdominal fullness/bloating or peripheral edema, shortness of breath, paroxysmal nocturnal dyspnea, orthopnea, lightheadedness, dizziness, pre-syncope, or syncope.    Cardiographics (EKG personally reviewed):  EKG done 12/18/2023 shows sinus rhythm at 87 bpm, PACs, QRS 98 ms, QT/QTc 336/404 ms    ECHO done 12/19/2023:  Left ventricular size, wall motion and function are normal. The ejection  fraction is 55-60%.  The left atrium is mildly dilated.  There is mild (1+) aortic regurgitation.    NM stress test done 10/16/2023:    The exercise nuclear stress test is negative for inducible myocardial ischemia or infarction.    The exercise stress electrocardiogram is negative for inducible ischemic EKG changes.    The patient's exercise capacity is average for age and gender. The patient exercised for 6:01 minutes on the Florencio protocol, achieving 7.3 METs and 103% the age-predicted maximum heart rate. The patient did not experience any symptoms.    The left ventricular ejection fraction at stress is 52%.    The patient is at a low risk of future cardiac ischemic events.    A prior study was conducted on 5/17/2007.  Prior images were unavailable for comparison review.    I have reviewed and updated the patient's Past  Medical History, Social History, Family History and Medication List.  Outside records reviewed.     Physical Examination  Review of Systems   Vitals: /76 (BP Location: Right arm, Patient Position: Sitting, Cuff Size: Adult Regular)   Pulse 75   Resp 16   Wt 75.3 kg (166 lb)   SpO2 96%   BMI 27.62 kg/m    BMI= Body mass index is 27.62 kg/m .  Wt Readings from Last 3 Encounters:   07/24/24 75.3 kg (166 lb)   05/31/24 75 kg (165 lb 4.8 oz)   05/02/24 71.1 kg (156 lb 12 oz)       General Appearance:   Alert, well-appearing and in no acute distress.   HEENT: Atraumatic, normocephalic.  No scleral icterus, normal conjunctivae, EOMs intact, PERRL.  Mucous membranes pink and moist.     Chest/Lungs:   Chest symmetric, spine straight.  Respirations unlabored.  Lungs are clear to auscultation.   Cardiovascular:   Regular rate and rhythm.  Normal first and second heart sounds with no murmurs, rubs, or gallops; radial pulses are intact,  no edema.   Abdomen:  Soft, nondistended.   Extremities: No cyanosis or clubbing.   Musculoskeletal: Moves all extremities.    Skin: Warm, dry, intact.    Neurologic: Mood and affect are appropriate.  Alert and oriented to person, place, time, and situation.     ROS: 10 point ROS neg other than the symptoms noted above in the HPI.        Medical History  Surgical History Family History Social History   Past Medical History:   Diagnosis Date    BPH (benign prostatic hypertrophy)     Cancer (H)     Skin on R.chest wall.    Chest discomfort     Coronary artery disease     Detached retina, left 10/01/2014    Hyperlipidemia     Hypertension     PAD (peripheral artery disease) (H24)     Palpitations     Paroxysmal atrial fibrillation (H)     Pseudoaneurysm (H24)     Thrombosis      Past Surgical History:   Procedure Laterality Date    BYPASS GRAFT ARTERY CORONARY N/A 12/28/2015    Procedure: CORONARY ARTERY BYPASS x 3, RIGHT ENDOSCOPIC VEIN HARVEST, LEFT INTERNAL MAMMARY ARTERY, ANESTHESIA  TRANSESOPHAGEAL ECHOCARDIOGRAM;  Surgeon: Jayson Alvarado MD;  Location: [facility];  Service:     CARDIAC SURGERY      CATARACT EXTRACTION  2015    CYST REMOVAL      Ganglion cyst removal of both wrist    HC REMOVAL PREPATELLA BURSA      Description: Excision Of Prepatellar Bursa;  Recorded: 09/30/2014;    HC REPAIR OF NASAL SEPTUM      Description: Septoplasty;  Recorded: 09/30/2014;    HERNIA REPAIR, UMBILICAL      IR LOWER EXTREMITY ANGIOGRAM LEFT  12/16/2023    IR THROMBOLYSIS ARTERIAL INFUSION INITIAL DAY  12/17/2023    PARS PLANA VITRECTOMY W/ REPAIR OF MACULAR HOLE  Nov 2014    HI EXCIS TENDON SHEATH LESION, HAND/FINGER      Description: Hand Excision Of A Tendon Cyst;  Recorded: 01/04/2011;    HI LAP, VENTRAL HERNIA REPAIR,REDUCIBLE      Description: Laparoscopy Repair Of Umbilical Hernia;  Recorded: 01/04/2011;    PSEUDOANEURYSM REPAIR Right 5/1/2024    Procedure: REPAIR, PSEUDOANEURYSM, ARTERY, FEMORAL RIGHT;  Surgeon: Laverne Crum MD;  Location: [facility]    RETINAL DETACHMENT SURGERY  oct 2014    SKIN CANCER EXCISION  Nov 2015    right chest    THORACOSCOPY Right 4/14/2016    Procedure: RIGHT VIDEO ASSISTED THORACOSCOPY, RIGHT LOBECTOMY;  Surgeon: Vinny Chase MD;  Location: [facility];  Service:     VASECTOMY       Family History   Problem Relation Age of Onset    Acute Myocardial Infarction Mother     Aneurysm Mother         brain    Acute Myocardial Infarction Father     Acute Myocardial Infarction Brother     Aortic aneurysm Brother         d @ 72    Lung Cancer Cousin     Coronary Artery Disease Cousin     Leukemia Brother         d @ 55    Colitis Brother     Colon Cancer Paternal Grandfather         Social History     Socioeconomic History    Marital status:      Spouse name: Not on file    Number of children: Not on file    Years of education: Not on file    Highest education level: Not on file   Occupational History    Not on  file   Tobacco Use    Smoking status: Never    Smokeless tobacco: Never   Vaping Use    Vaping status: Never Used   Substance and Sexual Activity    Alcohol use: Yes     Alcohol/week: 8.0 standard drinks of alcohol    Drug use: No    Sexual activity: Never   Other Topics Concern    Not on file   Social History Narrative    Not on file     Social Determinants of Health     Financial Resource Strain: Low Risk  (1/19/2024)    Financial Resource Strain     Within the past 12 months, have you or your family members you live with been unable to get utilities (heat, electricity) when it was really needed?: No   Food Insecurity: Low Risk  (1/19/2024)    Food Insecurity     Within the past 12 months, did you worry that your food would run out before you got money to buy more?: No     Within the past 12 months, did the food you bought just not last and you didn t have money to get more?: No   Transportation Needs: Low Risk  (1/19/2024)    Transportation Needs     Within the past 12 months, has lack of transportation kept you from medical appointments, getting your medicines, non-medical meetings or appointments, work, or from getting things that you need?: No   Physical Activity: Not on file   Stress: Not on file   Social Connections: Not on file   Interpersonal Safety: Low Risk  (1/19/2024)    Interpersonal Safety     Do you feel physically and emotionally safe where you currently live?: Yes     Within the past 12 months, have you been hit, slapped, kicked or otherwise physically hurt by someone?: No     Within the past 12 months, have you been humiliated or emotionally abused in other ways by your partner or ex-partner?: No   Housing Stability: Low Risk  (1/19/2024)    Housing Stability     Do you have housing? : Yes     Are you worried about losing your housing?: No           Medications  Allergies   Current Outpatient Medications   Medication Sig Dispense Refill    acetaminophen (TYLENOL) 500 MG tablet [ACETAMINOPHEN  (TYLENOL) 500 MG TABLET] Take 500 mg by mouth every 6 (six) hours as needed for pain.      amLODIPine (NORVASC) 10 MG tablet TAKE 1 TABLET(10 MG) BY MOUTH DAILY 90 tablet 2    aspirin 81 MG EC tablet Take 1 tablet (81 mg) by mouth daily 90 tablet 3    finasteride (PROSCAR) 5 MG tablet Take 1 tablet (5 mg) by mouth daily 90 tablet 3    metoprolol tartrate (LOPRESSOR) 50 MG tablet TAKE 1 TABLET(50 MG) BY MOUTH TWICE DAILY 180 tablet 1    rosuvastatin (CRESTOR) 5 MG tablet Take 1 tablet (5 mg) by mouth at bedtime 30 tablet 1    tamsulosin (FLOMAX) 0.4 MG capsule TAKE 1 CAPSULE BY MOUTH DAILY AFTER BREAKFAST 90 capsule 3    Vitamin D3 (VITAMIN D, CHOLECALCIFEROL,) 25 mcg (1000 units) tablet Take 1 tablet by mouth daily      warfarin ANTICOAGULANT (COUMADIN) 1 MG tablet Take 4- tablets daily as directed based on inr results 400 tablet 1       Allergies   Allergen Reactions    Niacin Hives and Rash     Burning of the skin    Atorvastatin Muscle Pain (Myalgia)    Pravastatin Muscle Pain (Myalgia)          Lab Results    Chemistry/lipid CBC Cardiac Enzymes/BNP/TSH/INR   Recent Labs   Lab Test 01/10/24  0750 12/15/23  2025   CHOL 141 222*   HDL 49 41   LDL 77 151*   TRIG 77 152*     Recent Labs   Lab Test 05/31/24  0803 05/03/24  0626 05/02/24  0528     --  137   POTASSIUM 3.8  --  4.5   CHLORIDE 107  --  106   CO2 26  --  22   ANIONGAP 8  --  9   * 94 120*   BUN 22.7  --  27.5*   CR 1.16  --  1.05   BIGG 9.5  --  9.4     Liver Function Studies -   Recent Labs   Lab Test 05/31/24  0803   PROTTOTAL 7.2   ALBUMIN 4.1   BILITOTAL 0.4   ALKPHOS 70   AST 21   ALT 13          CBC RESULTS:   Recent Labs   Lab Test 05/31/24  0803   WBC 6.2   RBC 4.72   HGB 14.1   HCT 42.2   MCV 89   MCH 29.9   MCHC 33.4   RDW 14.1             TSH   Date Value Ref Range Status   10/25/2018 1.25 0.30 - 5.00 uIU/mL Final     Lab Results   Component Value Date    INR 2.3 07/12/2024    INR 2.4 06/14/2024    INR 2.55 05/31/2024    INR  1.04 05/03/2024    INR 2.1 01/26/2024    INR 4.3 01/10/2024             The longitudinal plan of care for atrial fibrillation was addressed during this visit. Due to the added complexity in care, I will continue to support Jack in the subsequent management of this condition(s) and with the ongoing continuity of care of this condition(s).

## 2024-07-24 NOTE — LETTER
7/24/2024    Mercedes Adorno MD  [address] [place] [address]    RE: Jack Brown       Dear Colleague,     I had the pleasure of seeing Jack Brown in the [facility].    HEART CARE ELECTROPHYSIOLOGY NOTE      [facility]  [phone]      Assessment/Recommendations   Assessment/Plan:  1.  Paroxysmal Atrial Fibrillation: No symptomatology or evidence of AF recurrence.  Only known episode of PAF-RVR noted on telemetry 12/17/2023.  Symptoms status indeterminate.  He remains off membrane active antiarrhythmic medications.  We reviewed the natural progression of atrial fibrillation and treatment options with medications or pulmonary vein isolation ablation.  Recommend sotalol versus ablation for recurrent AF.  -- Continue daily pulse checks, to call when he has recurrent AF for further evaluation and management    He was reassured that atrial fibrillation is not life-threatening, but carries an increased risk for stroke.  He has a MPW2YZ6-YZVs score of 6 for age 73, vascular disease, HTN, thrombotic event.  We again discussed OAC options including Eliquis, Xarelto, Pradaxa, and warfarin.  If he has adequate insurance coverage, recommend switching to Eliquis.  Continue warfarin for stroke prophylaxis, goal INR 2.0-3.0.  Okay to hold warfarin for 5 days prior to surgery.    2.  Coronary artery disease: Three-vessel CABG in 2015.  Exercise nuclear stress testing 10/16/2023 negative for ischemia.  Denies anginal symptoms.  Continue statin and aspirin.    3.  Hypertension: Controlled with amlodipine and metoprolol.    Follow up with Dr. Estrada in 3 months  Follow up with me in 6 months     History of Present Illness/Subjective    HPI: Jack Brown is a 73 year old male who comes in today for EP follow-up of atrial fibrillation.  He has a history of paroxysmal atrial fibrillation, coronary artery disease (CABG x 3V in 2015: LIMA-LAD, SVG-OM, SVG-RCA), hypertension,  hyperlipidemia, peripheral arterial disease with acute lower extremity thrombosis, bilateral common iliac aneurysm, infrarenal abdominal aortic aneurysm.  Surgical repair of right CFA pseudoaneurysm on 5/1/2024.  Followed by vascular surgery; scheduled for left leg angiogram/stent 8/21/2024.    Arrhythmia history  Dx/date: PAF-RVR diagnosed 12/17/2023 (seen on telemetry).  Converted to sinus rhythm on amiodarone.   Sx: Palpitations  RIB2WG8-BJVa score: 6 for age 73, vascular disease, HTN, thrombotic event  Oral anticoagulation: Warfarin  Antiarrhythmic medications, AV michel blocking agents: Amiodarone discontinued 1/26/2024  Procedures  DCCV: N/A  Ablation: N/A    Jack states that he is feells well.  He has not had any A-fib of which he is aware.  None noted in hospital/clinic notes.  He has been checking his pulse every day and has not noted any irregularity.  He denies chest discomfort, palpitations, abdominal fullness/bloating or peripheral edema, shortness of breath, paroxysmal nocturnal dyspnea, orthopnea, lightheadedness, dizziness, pre-syncope, or syncope.    Cardiographics (EKG personally reviewed):  EKG done 12/18/2023 shows sinus rhythm at 87 bpm, PACs, QRS 98 ms, QT/QTc 336/404 ms    ECHO done 12/19/2023:  Left ventricular size, wall motion and function are normal. The ejection  fraction is 55-60%.  The left atrium is mildly dilated.  There is mild (1+) aortic regurgitation.    NM stress test done 10/16/2023:    The exercise nuclear stress test is negative for inducible myocardial ischemia or infarction.    The exercise stress electrocardiogram is negative for inducible ischemic EKG changes.    The patient's exercise capacity is average for age and gender. The patient exercised for 6:01 minutes on the Florencio protocol, achieving 7.3 METs and 103% the age-predicted maximum heart rate. The patient did not experience any symptoms.    The left ventricular ejection fraction at stress is 52%.    The patient is  at a low risk of future cardiac ischemic events.    A prior study was conducted on 5/17/2007.  Prior images were unavailable for comparison review.    I have reviewed and updated the patient's Past Medical History, Social History, Family History and Medication List.  Outside records reviewed.     Physical Examination  Review of Systems   Vitals: /76 (BP Location: Right arm, Patient Position: Sitting, Cuff Size: Adult Regular)   Pulse 75   Resp 16   Wt 75.3 kg (166 lb)   SpO2 96%   BMI 27.62 kg/m    BMI= Body mass index is 27.62 kg/m .  Wt Readings from Last 3 Encounters:   07/24/24 75.3 kg (166 lb)   05/31/24 75 kg (165 lb 4.8 oz)   05/02/24 71.1 kg (156 lb 12 oz)       General Appearance:   Alert, well-appearing and in no acute distress.   HEENT: Atraumatic, normocephalic.  No scleral icterus, normal conjunctivae, EOMs intact, PERRL.  Mucous membranes pink and moist.     Chest/Lungs:   Chest symmetric, spine straight.  Respirations unlabored.  Lungs are clear to auscultation.   Cardiovascular:   Regular rate and rhythm.  Normal first and second heart sounds with no murmurs, rubs, or gallops; radial pulses are intact,  no edema.   Abdomen:  Soft, nondistended.   Extremities: No cyanosis or clubbing.   Musculoskeletal: Moves all extremities.    Skin: Warm, dry, intact.    Neurologic: Mood and affect are appropriate.  Alert and oriented to person, place, time, and situation.     ROS: 10 point ROS neg other than the symptoms noted above in the HPI.        Medical History  Surgical History Family History Social History   Past Medical History:   Diagnosis Date    BPH (benign prostatic hypertrophy)     Cancer (H)     Skin on R.chest wall.    Chest discomfort     Coronary artery disease     Detached retina, left 10/01/2014    Hyperlipidemia     Hypertension     PAD (peripheral artery disease) (H24)     Palpitations     Paroxysmal atrial fibrillation (H)     Pseudoaneurysm (H24)     Thrombosis      Past Surgical  History:   Procedure Laterality Date    BYPASS GRAFT ARTERY CORONARY N/A 12/28/2015    Procedure: CORONARY ARTERY BYPASS x 3, RIGHT ENDOSCOPIC VEIN HARVEST, LEFT INTERNAL MAMMARY ARTERY, ANESTHESIA TRANSESOPHAGEAL ECHOCARDIOGRAM;  Surgeon: Jayson Alvarado MD;  Location: [facility];  Service:     CARDIAC SURGERY      CATARACT EXTRACTION  2015    CYST REMOVAL      Ganglion cyst removal of both wrist    HC REMOVAL PREPATELLA BURSA      Description: Excision Of Prepatellar Bursa;  Recorded: 09/30/2014;    HC REPAIR OF NASAL SEPTUM      Description: Septoplasty;  Recorded: 09/30/2014;    HERNIA REPAIR, UMBILICAL      IR LOWER EXTREMITY ANGIOGRAM LEFT  12/16/2023    IR THROMBOLYSIS ARTERIAL INFUSION INITIAL DAY  12/17/2023    PARS PLANA VITRECTOMY W/ REPAIR OF MACULAR HOLE  Nov 2014    VA EXCIS TENDON SHEATH LESION, HAND/FINGER      Description: Hand Excision Of A Tendon Cyst;  Recorded: 01/04/2011;    VA LAP, VENTRAL HERNIA REPAIR,REDUCIBLE      Description: Laparoscopy Repair Of Umbilical Hernia;  Recorded: 01/04/2011;    PSEUDOANEURYSM REPAIR Right 5/1/2024    Procedure: REPAIR, PSEUDOANEURYSM, ARTERY, FEMORAL RIGHT;  Surgeon: Laverne Crum MD;  Location: [facility]    RETINAL DETACHMENT SURGERY  oct 2014    SKIN CANCER EXCISION  Nov 2015    right chest    THORACOSCOPY Right 4/14/2016    Procedure: RIGHT VIDEO ASSISTED THORACOSCOPY, RIGHT LOBECTOMY;  Surgeon: Vinny Chase MD;  Location: [facility];  Service:     VASECTOMY       Family History   Problem Relation Age of Onset    Acute Myocardial Infarction Mother     Aneurysm Mother         brain    Acute Myocardial Infarction Father     Acute Myocardial Infarction Brother     Aortic aneurysm Brother         d @ 72    Lung Cancer Cousin     Coronary Artery Disease Cousin     Leukemia Brother         d @ 55    Colitis Brother     Colon Cancer Paternal Grandfather         Social History     Socioeconomic History     Marital status:      Spouse name: Not on file    Number of children: Not on file    Years of education: Not on file    Highest education level: Not on file   Occupational History    Not on file   Tobacco Use    Smoking status: Never    Smokeless tobacco: Never   Vaping Use    Vaping status: Never Used   Substance and Sexual Activity    Alcohol use: Yes     Alcohol/week: 8.0 standard drinks of alcohol    Drug use: No    Sexual activity: Never   Other Topics Concern    Not on file   Social History Narrative    Not on file     Social Determinants of Health     Financial Resource Strain: Low Risk  (1/19/2024)    Financial Resource Strain     Within the past 12 months, have you or your family members you live with been unable to get utilities (heat, electricity) when it was really needed?: No   Food Insecurity: Low Risk  (1/19/2024)    Food Insecurity     Within the past 12 months, did you worry that your food would run out before you got money to buy more?: No     Within the past 12 months, did the food you bought just not last and you didn t have money to get more?: No   Transportation Needs: Low Risk  (1/19/2024)    Transportation Needs     Within the past 12 months, has lack of transportation kept you from medical appointments, getting your medicines, non-medical meetings or appointments, work, or from getting things that you need?: No   Physical Activity: Not on file   Stress: Not on file   Social Connections: Not on file   Interpersonal Safety: Low Risk  (1/19/2024)    Interpersonal Safety     Do you feel physically and emotionally safe where you currently live?: Yes     Within the past 12 months, have you been hit, slapped, kicked or otherwise physically hurt by someone?: No     Within the past 12 months, have you been humiliated or emotionally abused in other ways by your partner or ex-partner?: No   Housing Stability: Low Risk  (1/19/2024)    Housing Stability     Do you have housing? : Yes     Are you  worried about losing your housing?: No           Medications  Allergies   Current Outpatient Medications   Medication Sig Dispense Refill    acetaminophen (TYLENOL) 500 MG tablet [ACETAMINOPHEN (TYLENOL) 500 MG TABLET] Take 500 mg by mouth every 6 (six) hours as needed for pain.      amLODIPine (NORVASC) 10 MG tablet TAKE 1 TABLET(10 MG) BY MOUTH DAILY 90 tablet 2    aspirin 81 MG EC tablet Take 1 tablet (81 mg) by mouth daily 90 tablet 3    finasteride (PROSCAR) 5 MG tablet Take 1 tablet (5 mg) by mouth daily 90 tablet 3    metoprolol tartrate (LOPRESSOR) 50 MG tablet TAKE 1 TABLET(50 MG) BY MOUTH TWICE DAILY 180 tablet 1    rosuvastatin (CRESTOR) 5 MG tablet Take 1 tablet (5 mg) by mouth at bedtime 30 tablet 1    tamsulosin (FLOMAX) 0.4 MG capsule TAKE 1 CAPSULE BY MOUTH DAILY AFTER BREAKFAST 90 capsule 3    Vitamin D3 (VITAMIN D, CHOLECALCIFEROL,) 25 mcg (1000 units) tablet Take 1 tablet by mouth daily      warfarin ANTICOAGULANT (COUMADIN) 1 MG tablet Take 4- tablets daily as directed based on inr results 400 tablet 1       Allergies   Allergen Reactions    Niacin Hives and Rash     Burning of the skin    Atorvastatin Muscle Pain (Myalgia)    Pravastatin Muscle Pain (Myalgia)          Lab Results    Chemistry/lipid CBC Cardiac Enzymes/BNP/TSH/INR   Recent Labs   Lab Test 01/10/24  0750 12/15/23  2025   CHOL 141 222*   HDL 49 41   LDL 77 151*   TRIG 77 152*     Recent Labs   Lab Test 05/31/24  0803 05/03/24  0626 05/02/24  0528     --  137   POTASSIUM 3.8  --  4.5   CHLORIDE 107  --  106   CO2 26  --  22   ANIONGAP 8  --  9   * 94 120*   BUN 22.7  --  27.5*   CR 1.16  --  1.05   BIGG 9.5  --  9.4     Liver Function Studies -   Recent Labs   Lab Test 05/31/24  0803   PROTTOTAL 7.2   ALBUMIN 4.1   BILITOTAL 0.4   ALKPHOS 70   AST 21   ALT 13          CBC RESULTS:   Recent Labs   Lab Test 05/31/24  0803   WBC 6.2   RBC 4.72   HGB 14.1   HCT 42.2   MCV 89   MCH 29.9   MCHC 33.4   RDW 14.1              TSH   Date Value Ref Range Status   10/25/2018 1.25 0.30 - 5.00 uIU/mL Final     Lab Results   Component Value Date    INR 2.3 07/12/2024    INR 2.4 06/14/2024    INR 2.55 05/31/2024    INR 1.04 05/03/2024    INR 2.1 01/26/2024    INR 4.3 01/10/2024             The longitudinal plan of care for atrial fibrillation was addressed during this visit. Due to the added complexity in care, I will continue to support Jack in the subsequent management of this condition(s) and with the ongoing continuity of care of this condition(s).              Thank you for allowing me to participate in the care of your patient.      Sincerely,     LILI Concepcion CNP     [facility]  cc:   LILI Concepcion CNP  [address], [address]  [place] [address]

## 2024-07-29 DIAGNOSIS — I10 ESSENTIAL HYPERTENSION: ICD-10-CM

## 2024-07-29 RX ORDER — AMLODIPINE BESYLATE 10 MG/1
TABLET ORAL
Qty: 90 TABLET | Refills: 0 | Status: SHIPPED | OUTPATIENT
Start: 2024-07-29

## 2024-08-05 ENCOUNTER — TELEPHONE (OUTPATIENT)
Dept: VASCULAR SURGERY | Facility: CLINIC | Age: 74
End: 2024-08-05
Payer: MEDICARE

## 2024-08-05 ENCOUNTER — TELEPHONE (OUTPATIENT)
Dept: ANTICOAGULATION | Facility: CLINIC | Age: 74
End: 2024-08-05
Payer: MEDICARE

## 2024-08-05 NOTE — PROGRESS NOTES
Message routed to [facility] team to review coumadin hold instructions prior to his angiogram.

## 2024-08-05 NOTE — TELEPHONE ENCOUNTER
NAGA-PROCEDURAL ANTICOAGULATION  MANAGEMENT    Jack requesting pre-procedure hold orders for warfarin and review for bridging      Procedure date: 8/21/24       Procedure:  angiogram      Procedure location and phone number (if external): [place]     Number of warfarin hold days requested and/or target INR: 5 days    Pre-op date: not yet scheduled, next inr 8/9      Routing to Anticoagulation Pharmacist for review.     ACC pool: jovani Carnes RN

## 2024-08-05 NOTE — TELEPHONE ENCOUNTER
NAGA-PROCEDURAL ANTICOAGULATION  MANAGEMENT    Jack requesting pre-procedure hold orders for warfarin and review for bridging      Procedure date: 8/21/2024       Procedure:  left leg angio      Procedure location and phone number (if external): [place]     Number of warfarin hold days requested and/or target INR: 5 days    Pre-op date: Not applicable      Routing to Anticoagulation Pharmacist for review.     ACC pool: jovani Najera RN

## 2024-08-09 ENCOUNTER — LAB (OUTPATIENT)
Dept: LAB | Facility: CLINIC | Age: 74
End: 2024-08-09
Payer: MEDICARE

## 2024-08-09 ENCOUNTER — ANTICOAGULATION THERAPY VISIT (OUTPATIENT)
Dept: ANTICOAGULATION | Facility: CLINIC | Age: 74
End: 2024-08-09

## 2024-08-09 DIAGNOSIS — I70.90 ARTERIAL OCCLUSION: Primary | ICD-10-CM

## 2024-08-09 DIAGNOSIS — I70.90 ARTERIAL OCCLUSION: ICD-10-CM

## 2024-08-09 DIAGNOSIS — I48.0 PAROXYSMAL ATRIAL FIBRILLATION (H): ICD-10-CM

## 2024-08-09 LAB — INR BLD: 2.3 (ref 0.9–1.1)

## 2024-08-09 PROCEDURE — 36416 COLLJ CAPILLARY BLOOD SPEC: CPT

## 2024-08-09 PROCEDURE — 85610 PROTHROMBIN TIME: CPT

## 2024-08-09 NOTE — PROGRESS NOTES
ANTICOAGULATION MANAGEMENT     Jack Brown 73 year old male is on warfarin with therapeutic INR result. (Goal INR 2.0-3.0)    Recent labs: (last 7 days)     08/09/24  0809   INR 2.3*       ASSESSMENT     Source(s): Chart Review and Patient/Caregiver Call     Warfarin doses taken: Warfarin taken as instructed  Diet: No new diet changes identified  Medication/supplement changes: None noted  New illness, injury, or hospitalization: No  Signs or symptoms of bleeding or clotting: No  Previous result: Therapeutic last 2(+) visits  Additional findings: Upcoming surgery/procedure 8/21 lower extremity angiogram, procedure plan in underway, will call Shayy when its ready  Reviewed plan (pls see 8/5) with Shayy and scheduled post procedure inr       PLAN     Recommended plan for no diet, medication or health factor changes affecting INR     Dosing Instructions: Continue your current warfarin dose  until starting warfarin hold with next INR in 3 weeks       Summary  As of 8/9/2024      Full warfarin instructions:  8/16: Hold; 8/17: Hold; 8/18: Hold; 8/19: Hold; 8/20: Hold; 8/21: 8 mg; 8/22: 8 mg; Otherwise 5 mg every Sun, Tue, Fri; 4 mg all other days   Next INR check:  8/28/2024               Telephone call with Shayy who verbalizes understanding and agrees to plan    Lab visit scheduled    Education provided: Please call back if any changes to your diet, medications or how you've been taking warfarin    Plan made with [facility] Pharmacist Cristy Carnes, RN  Anticoagulation Clinic  8/9/2024    _______________________________________________________________________     Anticoagulation Episode Summary       Current INR goal:  2.0-3.0   TTR:  47.3% (6.9 mo)   Target end date:  Indefinite   Send INR reminders to:  [facility]    Indications    Arterial occlusion [I70.90]  Paroxysmal atrial fibrillation (H) [I48.0]             Comments:               Anticoagulation Care Providers       Provider Role  Specialty Phone number    Mercedes Adorno MD Referring Family Medicine [phone]

## 2024-08-13 NOTE — TELEPHONE ENCOUNTER
Tete Laguerre, APRN CNP  You46 minutes ago (12:47 PM)       I am the provider who is covering for Dr. Adorno while she is out of the clinic.  I agree with the plan to hold the warfarin for 5 days for angiogram purposes.  Patient can restart this medication after angiogram completion per vascular recommendation as well.    Tete Laguerre, SHANA FNP-C  Family Nurse Practitioner - Same Day Provider  [facility] [place]

## 2024-08-13 NOTE — TELEPHONE ENCOUNTER
"NAGA-PROCEDURAL ANTICOAGULATION  MANAGEMENT    ASSESSMENT     Warfarin interruption plan for Left Leg Angiogram on 8/21/24.    Indication for Anticoagulation: Atrial Fibrillation    DXO1HE2-TDMg = 4-6 (Hypertension, Age >= 75, and Vascular- CABG-2015, Arterial occlusion 12/2023)    Past procedure management  5/1/24-Pseudoaneurysm repair-chart reviewed with Dr. Crum due to hx of arterial occlusion; no bridged advised     Naga-Procedure Risk stratification for thromboembolism: low-moderate (2022 Chest guidelines)    AFIB: 2022 CHEST Perioperative Management guidelines recommends against bridging for patients with atrial fibrillation except in high risk stratification patients.    RECOMMENDATION     Pre-Procedure:  Hold warfarin for 5 days, until after procedure starting: Friday 8/16/24   No Bridge    Post-Procedure:  Resume warfarin dose if okay with provider doing procedure on night of procedure, 8/21/24 PM: 8 mg daily x 2 then resume current dose  Recheck INR ~ 7 days after resuming warfarin     Plan routed to referring provider for approval  ?   Cristy Jaquez, [facility]    SUBJECTIVE/OBJECTIVE     Jack Brown, a 73 year old male    Goal INR Range: 2.0-3.0     Wt Readings from Last 3 Encounters:   07/24/24 75.3 kg (166 lb)   05/31/24 75 kg (165 lb 4.8 oz)   05/02/24 71.1 kg (156 lb 12 oz)      Ideal body weight: 61.5 kg (135 lb 9.3 oz)  Adjusted ideal body weight: 67 kg (147 lb 12 oz)     Estimated body mass index is 27.62 kg/m  as calculated from the following:    Height as of 5/31/24: 1.651 m (5' 5\").    Weight as of 7/24/24: 75.3 kg (166 lb).    Lab Results   Component Value Date    INR 2.3 (H) 08/09/2024    INR 2.3 (H) 07/12/2024    INR 2.4 (H) 06/14/2024     Lab Results   Component Value Date    HGB 14.1 05/31/2024    HCT 42.2 05/31/2024     05/31/2024     Lab Results   Component Value Date    CR 1.16 05/31/2024    CR 1.05 05/02/2024    CR 1.00 05/01/2024     Estimated Creatinine Clearance: 52.1 mL/min " (based on SCr of 1.16 mg/dL).

## 2024-08-20 ENCOUNTER — TELEPHONE (OUTPATIENT)
Dept: VASCULAR SURGERY | Facility: CLINIC | Age: 74
End: 2024-08-20
Payer: MEDICARE

## 2024-08-20 RX ORDER — HEPARIN SODIUM 200 [USP'U]/100ML
1 INJECTION, SOLUTION INTRAVENOUS EVERY 5 MIN PRN
Status: DISCONTINUED | OUTPATIENT
Start: 2024-08-21 | End: 2024-08-22 | Stop reason: HOSPADM

## 2024-08-20 RX ORDER — DEXTROSE MONOHYDRATE 25 G/50ML
25-50 INJECTION, SOLUTION INTRAVENOUS
Status: DISCONTINUED | OUTPATIENT
Start: 2024-08-21 | End: 2024-08-22 | Stop reason: HOSPADM

## 2024-08-20 RX ORDER — NICOTINE POLACRILEX 4 MG
15-30 LOZENGE BUCCAL
Status: DISCONTINUED | OUTPATIENT
Start: 2024-08-21 | End: 2024-08-22 | Stop reason: HOSPADM

## 2024-08-20 NOTE — TELEPHONE ENCOUNTER
Procedure: LLE angiogram    Date: 8/21/24    Surgeon: Dr. Laverne Crum    Called patient to review upcoming procedure. Reviewed visitor allowance of 1 person at this time.     Discussed procedure with patients and instructions: Yes    Reviewed Post Procedure Follow up plan and Appts made: Yes    Reviewed Covid Test Scheduled/Completed: NA    Reviewed Blood Thinners and plan for holding, continuing and/or bridging:Coumadin, This will need to be held 5 days prior to surgery. We may need to have you do blood thinner injections during this time. This is called Bridging. Please contact your primary doctor or INR clinic to see if bridging is needed.   Aspirin ok to take    Reviewed Pre Op Appointment Required and Completed: N/A    Reviewed Allergies and if Contrast Allergy-Instructions and plan:  No contrast allergy    Reviewed Arrival Time of 7am and procedure time of 8am and location of procedure [facility]:  [address] [place] [address] (phone: [phone], Fax: [phone])    Please park in Lot [address]. Enter through the main entrance. Check in at the Welcome Desk and you will be directed to the surgery unit.         All questions answered and number provided if any further questions arise or changes in patient status.

## 2024-08-21 ENCOUNTER — PREP FOR PROCEDURE (OUTPATIENT)
Dept: SURGERY | Facility: CLINIC | Age: 74
End: 2024-08-21

## 2024-08-21 ENCOUNTER — HOSPITAL ENCOUNTER (OUTPATIENT)
Dept: ULTRASOUND IMAGING | Facility: HOSPITAL | Age: 74
Discharge: HOME | End: 2024-08-21
Attending: SURGERY
Payer: MEDICARE

## 2024-08-21 ENCOUNTER — HOSPITAL ENCOUNTER (OUTPATIENT)
Dept: INTERVENTIONAL RADIOLOGY/VASCULAR | Facility: HOSPITAL | Age: 74
Discharge: HOME | End: 2024-08-21
Attending: SURGERY
Payer: MEDICARE

## 2024-08-21 VITALS
HEART RATE: 62 BPM | DIASTOLIC BLOOD PRESSURE: 85 MMHG | SYSTOLIC BLOOD PRESSURE: 147 MMHG | OXYGEN SATURATION: 94 % | TEMPERATURE: 97.5 F | RESPIRATION RATE: 16 BRPM

## 2024-08-21 DIAGNOSIS — I70.218 ATHEROSCLEROSIS OF NATIVE ARTERIES OF EXTREMITIES WITH INTERMITTENT CLAUDICATION, OTHER EXTREMITY (H): ICD-10-CM

## 2024-08-21 DIAGNOSIS — I73.9 PAD (PERIPHERAL ARTERY DISEASE) (H): ICD-10-CM

## 2024-08-21 DIAGNOSIS — I72.4 POPLITEAL ARTERY ANEURYSM (H): Primary | ICD-10-CM

## 2024-08-21 PROCEDURE — 272N000566 HC SHEATH CR3

## 2024-08-21 PROCEDURE — 75710 ARTERY X-RAYS ARM/LEG: CPT | Mod: 26 | Performed by: SURGERY

## 2024-08-21 PROCEDURE — 250N000011 HC RX IP 250 OP 636: Performed by: SURGERY

## 2024-08-21 PROCEDURE — 272N000500 HC NEEDLE CR2

## 2024-08-21 PROCEDURE — C1760 CLOSURE DEV, VASC: HCPCS

## 2024-08-21 PROCEDURE — C1769 GUIDE WIRE: HCPCS

## 2024-08-21 PROCEDURE — 75710 ARTERY X-RAYS ARM/LEG: CPT | Mod: LT

## 2024-08-21 PROCEDURE — 36246 INS CATH ABD/L-EXT ART 2ND: CPT

## 2024-08-21 PROCEDURE — 255N000002 HC RX 255 OP 636: Performed by: SURGERY

## 2024-08-21 PROCEDURE — 250N000009 HC RX 250: Performed by: SURGERY

## 2024-08-21 PROCEDURE — 93970 EXTREMITY STUDY: CPT

## 2024-08-21 PROCEDURE — 258N000003 HC RX IP 258 OP 636: Performed by: SURGERY

## 2024-08-21 RX ORDER — NALOXONE HYDROCHLORIDE 0.4 MG/ML
0.4 INJECTION, SOLUTION INTRAMUSCULAR; INTRAVENOUS; SUBCUTANEOUS
Status: DISCONTINUED | OUTPATIENT
Start: 2024-08-21 | End: 2024-08-22 | Stop reason: HOSPADM

## 2024-08-21 RX ORDER — NALOXONE HYDROCHLORIDE 0.4 MG/ML
0.2 INJECTION, SOLUTION INTRAMUSCULAR; INTRAVENOUS; SUBCUTANEOUS
Status: DISCONTINUED | OUTPATIENT
Start: 2024-08-21 | End: 2024-08-22 | Stop reason: HOSPADM

## 2024-08-21 RX ORDER — LIDOCAINE 40 MG/G
CREAM TOPICAL
Status: DISCONTINUED | OUTPATIENT
Start: 2024-08-21 | End: 2024-08-22 | Stop reason: HOSPADM

## 2024-08-21 RX ORDER — SODIUM CHLORIDE 9 MG/ML
INJECTION, SOLUTION INTRAVENOUS CONTINUOUS
Status: DISCONTINUED | OUTPATIENT
Start: 2024-08-21 | End: 2024-08-22 | Stop reason: HOSPADM

## 2024-08-21 RX ORDER — IODIXANOL 320 MG/ML
100 INJECTION, SOLUTION INTRAVASCULAR ONCE
Status: COMPLETED | OUTPATIENT
Start: 2024-08-21 | End: 2024-08-21

## 2024-08-21 RX ORDER — ONDANSETRON 2 MG/ML
4 INJECTION INTRAMUSCULAR; INTRAVENOUS
Status: DISCONTINUED | OUTPATIENT
Start: 2024-08-21 | End: 2024-08-22 | Stop reason: HOSPADM

## 2024-08-21 RX ORDER — FLUMAZENIL 0.1 MG/ML
0.2 INJECTION, SOLUTION INTRAVENOUS
Status: DISCONTINUED | OUTPATIENT
Start: 2024-08-21 | End: 2024-08-22 | Stop reason: HOSPADM

## 2024-08-21 RX ORDER — FENTANYL CITRATE 50 UG/ML
25-50 INJECTION, SOLUTION INTRAMUSCULAR; INTRAVENOUS EVERY 5 MIN PRN
Status: DISCONTINUED | OUTPATIENT
Start: 2024-08-21 | End: 2024-08-22 | Stop reason: HOSPADM

## 2024-08-21 RX ADMIN — LIDOCAINE HYDROCHLORIDE 10 ML: 10 INJECTION, SOLUTION INFILTRATION; PERINEURAL at 08:28

## 2024-08-21 RX ADMIN — HEPARIN SODIUM IN SODIUM CHLORIDE 4 BAG: 200 INJECTION INTRAVENOUS at 08:08

## 2024-08-21 RX ADMIN — MIDAZOLAM HYDROCHLORIDE 0.5 MG: 1 INJECTION, SOLUTION INTRAMUSCULAR; INTRAVENOUS at 08:44

## 2024-08-21 RX ADMIN — MIDAZOLAM HYDROCHLORIDE 1 MG: 1 INJECTION, SOLUTION INTRAMUSCULAR; INTRAVENOUS at 08:06

## 2024-08-21 RX ADMIN — MIDAZOLAM HYDROCHLORIDE 0.5 MG: 1 INJECTION, SOLUTION INTRAMUSCULAR; INTRAVENOUS at 08:25

## 2024-08-21 RX ADMIN — IODIXANOL 20 ML: 320 INJECTION, SOLUTION INTRAVASCULAR at 09:07

## 2024-08-21 RX ADMIN — FENTANYL CITRATE 50 MCG: 50 INJECTION, SOLUTION INTRAMUSCULAR; INTRAVENOUS at 09:00

## 2024-08-21 RX ADMIN — FENTANYL CITRATE 50 MCG: 50 INJECTION, SOLUTION INTRAMUSCULAR; INTRAVENOUS at 08:27

## 2024-08-21 RX ADMIN — SODIUM CHLORIDE: 9 INJECTION, SOLUTION INTRAVENOUS at 08:00

## 2024-08-21 RX ADMIN — FENTANYL CITRATE 50 MCG: 50 INJECTION, SOLUTION INTRAMUSCULAR; INTRAVENOUS at 08:08

## 2024-08-21 NOTE — IR NOTE
Tech had to hold pressure on angio site for about 20 minutes d/t swelling at the procedure site. Site dry and intact without swelling at this time.

## 2024-08-21 NOTE — IR NOTE
Patient Name: Jack Brown  Medical Record Number: [id number]  Today's Date: 8/21/2024    Procedure: angio  Proceduralist:  Dr Crum and Dr Jennings    Procedure Start: 0820  Procedure end: 0843  Sedation medications administered: 2 mg midazolam and 150 mcg fentanyl   Sedation time: 23 minutes

## 2024-08-21 NOTE — DISCHARGE INSTRUCTIONS
Angiogram Discharge Instructions:  You had an angiogram procedure. An angiogram is a procedure that uses x-rays to take pictures of your blood vessels. A long, flexible tube or catheter is inserted through the blood stream (through the procedure site) to help deliver contrast (dye) into the arteries so they can be visible on the x-ray. Angiograms are used to evaluate possible blockages in the arterial system. Please follow the below instructions after your angiogram, including monitoring of your procedure site.    Care instructions after angiogram procedure:  -  If you received sedation for your procedure, do not drive or operate heavy machinery for the rest of the day.  -  Do not lift objects greater than 10 pounds for 3 days following angiogram procedure.  -  Avoid excessive exercise and straining for 3 days.   -  Avoid tub baths, pools, hot tubs and Jacuzzis for 3 days or until procedure site is well healed.   -  You may shower beginning tomorrow. Do not scrub procedure site until well healed; pat dry.  -  Return to your normal activities as you tolerate after the 3 day restriction.  -  You can expect to return to work 1-3 days after your procedure - depending on the nature of your profession.  -  It is normal to have some tenderness and minimal swelling at procedure puncture site. A small area of discoloration may be present. Tenderness typically subsides in 1-2 days. A small knot may also be present at puncture site for 6-8 weeks. This can be a normal part of the healing process.     Follow up:  - Follow up with your vascular surgeon or the ordering provider. [facility] may contact you to help arrange for additional follow up.    Please seek medical evaluation for:  - If you develop fevers (greater than 101 F (38.3C)).  - If you develop increasing pain, redness, purulent drainage, tenderness, or swelling at procedure site.   - If you experience any bleeding from procedure/puncture site: lie down, firmly  apply pressure to puncture site and call 911.  - Seek emergent evaluation if you experience any new leg/arm pain, discoloration or numbness.

## 2024-08-22 ENCOUNTER — DOCUMENTATION ONLY (OUTPATIENT)
Dept: VASCULAR SURGERY | Facility: CLINIC | Age: 74
End: 2024-08-22
Payer: MEDICARE

## 2024-08-22 ENCOUNTER — TELEPHONE (OUTPATIENT)
Dept: ANTICOAGULATION | Facility: CLINIC | Age: 74
End: 2024-08-22
Payer: MEDICARE

## 2024-08-22 DIAGNOSIS — I73.9 PAD (PERIPHERAL ARTERY DISEASE) (H): Primary | ICD-10-CM

## 2024-08-22 NOTE — TELEPHONE ENCOUNTER
Reason for Call:  Other call back    Detailed comments: Wife needs to know ASAP what is  his dosing after his surgery?    Phone Number Patient can be reached at: Home number on file [phone] (home)    Best Time: any    Can we leave a detailed message on this number? YES    Call taken on 8/22/2024 at 2:33 PM by Darlyn Iqbal

## 2024-08-22 NOTE — TELEPHONE ENCOUNTER
Called and talked with Shayy,     - she just wanted to ensure she got instruction correct re:  resuming warfarin.     - she stated back - cleared to resume warfarin - will take 8mg for 2 days, then resume his usual dose.   - and INR already scheduled on 8/28/24.

## 2024-08-22 NOTE — PROGRESS NOTES
Surgery Scheduled    Discussed surgery plan/instructions. Pt will schedule a pre-op exam. Will send the pt a surgery packet once the patients medications have been reviewed.     Surgery/Procedure: CREATION, BYPASS, ARTERIAL, FEMORAL TO TIBIAL (Left)     Special Equipment: c arm  Length of Surgery: 5 hrs  Preoperative physical needed: Yes  Product Rep needed? No  Company? NA    Location: [facility]:  [address] [place] [address] (phone: [phone], Fax: [phone])    Please park in Lot A. Enter through the main entrance. Check in at the Welcome Desk and you will be directed to the surgery unit.     Date: 10/16/2024    Time: 7:20am    Admission Type: Inpatient    Surgeon: Dr. Crum    OR Confirmed & :  Yes with Renae on 8/22/2024    IR Tech Needed:  No     Entered on provider calendar:  Yes    Post Op: See appt desk.     Wound Vac Needed: No    Home Care Needed: No    Ultrasound Contacted: Not needed    Reps Contacted: Not needed    Blood Thinners Addressed:  Message sent to support pool to review medications.     Stress Test Clearance: NO

## 2024-08-22 NOTE — TELEPHONE ENCOUNTER
Several attempts to call.   - however, calls kept getting disconnected.   - did LM with Shayy .      - she is requesting dose of warfarin after procedure.       Post-Procedure:  Resume warfarin dose if okay with provider doing procedure on night of procedure, 8/21/24 PM: 8 mg daily x 2 then resume current dose  Recheck INR ~ 7 days after resuming warfarin

## 2024-08-26 ENCOUNTER — TELEPHONE (OUTPATIENT)
Dept: ANTICOAGULATION | Facility: CLINIC | Age: 74
End: 2024-08-26

## 2024-08-26 NOTE — TELEPHONE ENCOUNTER
NAGA-PROCEDURAL ANTICOAGULATION  MANAGEMENT    Jack requesting pre-procedure hold orders for warfarin and review for bridging      Procedure date: 10/12/24 CORRECT DATE OF PROCEDURE IS 10/16/24.      Procedure:  Vascular left leg bypass    We did a work up for this 8/5 and he saw Cardiolgy 7/24 with ok to hold 5 days. This is to review/refresh recommendations.      Procedure location and phone number (if external): Gerri Crum     Number of warfarin hold days requested and/or target INR: 5 days    Pre-op date: not yet scheduled      Routing to Anticoagulation Pharmacist for review.     [facility] pool: p TIFFANIE MIDWAY     Felisa Carnes RN

## 2024-08-28 ENCOUNTER — ANTICOAGULATION THERAPY VISIT (OUTPATIENT)
Dept: ANTICOAGULATION | Facility: CLINIC | Age: 74
End: 2024-08-28

## 2024-08-28 ENCOUNTER — LAB (OUTPATIENT)
Dept: LAB | Facility: CLINIC | Age: 74
End: 2024-08-28
Payer: MEDICARE

## 2024-08-28 DIAGNOSIS — I70.90 ARTERIAL OCCLUSION: ICD-10-CM

## 2024-08-28 DIAGNOSIS — I70.90 ARTERIAL OCCLUSION: Primary | ICD-10-CM

## 2024-08-28 DIAGNOSIS — I48.0 PAROXYSMAL ATRIAL FIBRILLATION (H): ICD-10-CM

## 2024-08-28 LAB — INR BLD: 2.1 (ref 0.9–1.1)

## 2024-08-28 PROCEDURE — 36415 COLL VENOUS BLD VENIPUNCTURE: CPT

## 2024-08-28 PROCEDURE — 85610 PROTHROMBIN TIME: CPT

## 2024-08-28 NOTE — PROGRESS NOTES
Awaiting reply back. Last message received below on 8/26/24 from Felisa regarding warfarin hold. Surgery scheduled for 10/16/24.     RE: warfarin hold  Received: 2 days ago  Felisa Carnes RN  P [facility] Support Pool  Eduardo Wilson,  We did a work up for this 8/5 and he saw Cardiolgy 7/24 with ok to hold 5 days. I resent it to our [facility] to review/refresh recommendations since some time has passed since last review. I will forward recommendations when they come through. Thanks!          Previous Messages       ----- Message -----  From: Katie Zhang RN  Sent: 8/26/2024  10:28 AM CDT  To: [facility]; *  Subject: FW: warfarin hold                                Hi, reaching out again to the [place] antico clinic to see if okay to hold warfarin 5 days prior to surgery with Dr. Crum on 10/12 for left leg bypass and review if bridging is needed. A message was sent on 8/22 also. Please reply back once reviewed, thanks!    ----- Message -----  From: Katie Zhang RN  Sent: 8/22/2024  10:22 AM CDT  [facility]; *  Subject: warfarin hold                                    Hi, reaching out to get the okay to hold warfarin 5 days prior to surgery with Dr. Crum on 10/12 for left leg bypass and review if bridging is needed.    Thanks,  Katie

## 2024-08-28 NOTE — PROGRESS NOTES
ANTICOAGULATION MANAGEMENT     Jack Brown 73 year old male is on warfarin with therapeutic INR result. (Goal INR 2.0-3.0)    Recent labs: (last 7 days)     08/28/24  0705   INR 2.1*       ASSESSMENT     Source(s): Chart Review and Patient/Caregiver Call     Warfarin doses taken: Warfarin taken as instructed-two boost doses after planned hold last week.  Diet: No new diet changes identified  Medication/supplement changes: None noted  New illness, injury, or hospitalization: No  Signs or symptoms of bleeding or clotting: No  Previous result: Therapeutic last 2(+) visits  Additional findings: Had lower extremity angiogram 8/21. Procedure plan request also sent to [facility] for vascular left leg bypass on 10/12/24.       PLAN     Recommended plan for no diet, medication or health factor changes affecting INR     Dosing Instructions: Continue your current warfarin dose with next INR in 3 weeks       Summary  As of 8/28/2024      Full warfarin instructions:  5 mg every Sun, Tue, Fri; 4 mg all other days   Next INR check:  9/18/2024               Telephone call with Flori who verbalizes understanding and agrees to plan    Lab visit scheduled    Education provided: Please call back if any changes to your diet, medications or how you've been taking warfarin  Goal range and lab monitoring: goal range and significance of current result  Contact [phone] with any changes, questions or concerns.     Plan made per [facility] anticoagulation protocol    Eva Oleary RN  Anticoagulation Clinic  8/28/2024    _______________________________________________________________________     Anticoagulation Episode Summary       Current INR goal:  2.0-3.0   TTR:  51.7% (7.6 mo)   Target end date:  Indefinite   Send INR reminders to:  TIFFANIE MIDWAY    Indications    Arterial occlusion [I70.90]  Paroxysmal atrial fibrillation (H) [I48.0]             Comments:               Anticoagulation Care Providers       Provider Role Specialty  Phone number    Mercedes Adorno MD Referring Family Medicine [phone]

## 2024-09-03 NOTE — TELEPHONE ENCOUNTER
Per chart on 8/28-   Additional findings: Had lower extremity angiogram 8/21. Procedure plan request also sent to [facility] for vascular left leg bypass on 10/12/24.    Still awaiting anticoagulation for bypass

## 2024-09-05 DIAGNOSIS — I10 BENIGN ESSENTIAL HYPERTENSION: ICD-10-CM

## 2024-09-06 RX ORDER — METOPROLOL TARTRATE 50 MG
TABLET ORAL
Qty: 180 TABLET | Refills: 1 | Status: SHIPPED | OUTPATIENT
Start: 2024-09-06

## 2024-09-18 ENCOUNTER — ANTICOAGULATION THERAPY VISIT (OUTPATIENT)
Dept: ANTICOAGULATION | Facility: CLINIC | Age: 74
End: 2024-09-18

## 2024-09-18 ENCOUNTER — LAB (OUTPATIENT)
Dept: LAB | Facility: CLINIC | Age: 74
End: 2024-09-18
Payer: MEDICARE

## 2024-09-18 DIAGNOSIS — I70.90 ARTERIAL OCCLUSION: ICD-10-CM

## 2024-09-18 DIAGNOSIS — I70.90 ARTERIAL OCCLUSION: Primary | ICD-10-CM

## 2024-09-18 DIAGNOSIS — I48.0 PAROXYSMAL ATRIAL FIBRILLATION (H): ICD-10-CM

## 2024-09-18 LAB — INR BLD: 3.7 (ref 0.9–1.1)

## 2024-09-18 PROCEDURE — 36416 COLLJ CAPILLARY BLOOD SPEC: CPT

## 2024-09-18 PROCEDURE — 85610 PROTHROMBIN TIME: CPT

## 2024-09-18 NOTE — PROGRESS NOTES
"NAGA-PROCEDURAL ANTICOAGULATION  MANAGEMENT    ASSESSMENT     Warfarin interruption plan for Left leg bypass on 10/16/24.    Indication for Anticoagulation: Atrial Fibrillation     [id number] = 4-6 (Hypertension, Age >= 75, and Vascular- CABG-2015, Arterial occlusion 12/2023)     Past procedure management  8/1/24-Left leg angiogram-5 day hold, no bridge  5/1/24-Pseudoaneurysm repair-chart reviewed with Dr. Crum due to hx of arterial occlusion; no bridged advised     Naga-Procedure Risk stratification for thromboembolism: low-moderate (2022 Chest guidelines)    AFIB: 2022 CHEST Perioperative Management guidelines recommends against bridging for patients with atrial fibrillation except in high risk stratification patients.    RECOMMENDATION     Pre-Procedure:  Hold warfarin for 5 days, until after procedure starting: Friday 10/11/24   No Bridge prior to procedure    Post-Procedure  per inpatient team, suggest  Recheck INR ~ 3-4 days days after hospital discharge    Plan routed to referring provider for approval  ?   Cristy Jaquez, [facility]    SUBJECTIVE/OBJECTIVE     Jack Brown, a 73 year old male    Goal INR Range: 2.0-3.0     Wt Readings from Last 3 Encounters:   07/24/24 75.3 kg (166 lb)   05/31/24 75 kg (165 lb 4.8 oz)   05/02/24 71.1 kg (156 lb 12 oz)      Ideal body weight: 61.5 kg (135 lb 9.3 oz)  Adjusted ideal body weight: 67 kg (147 lb 12 oz)     Estimated body mass index is 27.62 kg/m  as calculated from the following:    Height as of 5/31/24: 1.651 m (5' 5\").    Weight as of 7/24/24: 75.3 kg (166 lb).    Lab Results   Component Value Date    INR 3.7 (H) 09/18/2024    INR 2.1 (H) 08/28/2024    INR 2.3 (H) 08/09/2024     Lab Results   Component Value Date    HGB 14.1 05/31/2024    HCT 42.2 05/31/2024     05/31/2024     Lab Results   Component Value Date    CR 1.16 05/31/2024    CR 1.05 05/02/2024    CR 1.00 05/01/2024     Estimated Creatinine Clearance: 52.1 mL/min (based on SCr of 1.16 " mg/dL).

## 2024-09-18 NOTE — PROGRESS NOTES
ANTICOAGULATION MANAGEMENT     Jack Brown 73 year old male is on warfarin with supratherapeutic INR result. (Goal INR 2.0-3.0)    Recent labs: (last 7 days)     09/18/24  0701   INR 3.7*       ASSESSMENT     Source(s): Chart Review and Patient/Caregiver Call     Warfarin doses taken: Warfarin taken as instructed. Thinks he might have missed Monday dose this week but not completely sure.  Diet: No new diet changes identified  Medication/supplement changes: None noted  New illness, injury, or hospitalization: No  Signs or symptoms of bleeding or clotting: No  Previous result: Therapeutic last 2(+) visits  Additional findings: Pre op 10/1/24 for upcoming left leg bypass on 10/12/24. Hold plan request to [facility] 8/26/24.       PLAN     Recommended plan for no diet, medication or health factor changes affecting INR     Dosing Instructions: hold dose then decrease your warfarin dose (9.7% change) with next INR in up to 2 weeks       Summary  As of 9/18/2024      Full warfarin instructions:  9/18: Hold; Otherwise 4 mg every day   Next INR check:  10/1/2024               Telephone call with Jack who verbalizes understanding and agrees to plan and who agrees to plan and repeated back plan correctly    Check at provider office visit    Education provided: Please call back if any changes to your diet, medications or how you've been taking warfarin  Goal range and lab monitoring: goal range and significance of current result  Symptom monitoring: monitoring for bleeding signs and symptoms  Contact [phone] with any changes, questions or concerns.     Plan made per [facility] anticoagulation protocol    Eva Oleary RN  9/18/2024  Anticoagulation Clinic  [employer] for routing messages: jovani MORENO MIDWAY  [facility] patient phone line: [phone]        _______________________________________________________________________     Anticoagulation Episode Summary       Current INR goal:  2.0-3.0   TTR:  52.1% (8.3 mo)   Target end date:   Indefinite   Send INR reminders to:  ANT[facility]    Indications    Arterial occlusion [I70.90]  Paroxysmal atrial fibrillation (H) [I48.0]             Comments:               Anticoagulation Care Providers       Provider Role Specialty Phone number    Mercedes Adorno MD Referring [facility] [phone]

## 2024-09-19 NOTE — PROGRESS NOTES
"Reviewed Blood Thinners and plan for holding, continuing and/or bridging: Coumadin: THIS MUST BE HELD 5 DAYS PRIOR TO THE PROCEDURE/ SURGERY. We may need to have you do blood thinner injections during this time. This is called Bridging. NO BRIDGING IS NEEDED and Aspirin: PLEASE DO NOT STOP THIS MEDICATION PRIOR TO SURGERY/ PROCEDURE.     Per Cristy Jaquez [facility], \"Previously had held warfarin 5 days, no bridge.  Recommend okay to hold for procedure without bridge prior to procedure.\"    Per Dr. Adorno, \"  Thank you Cynthia, I approve\"     Reviewed Diabetic medications that are GLP-1 agonists: NA  Please discuss with your primary doctor and follow the hold instructions:   []  Hold seven (7) days prior for once weekly injectable doses [semaglutide (Ozempic, Wegovy), dulaglutide (Trulicity), exenatide ER (Bydureon), tirzepatide (Mounjaro)]  []  Hold the day before and day of for once daily injectable GLP-1 agonists [exenatide (Byetta), liraglutide (Saxenda, Victoza)]  []  Hold seven (7) days for oral semaglutide (Rybelsus)         "

## 2024-09-25 NOTE — PROGRESS NOTES
Yes thank you. Jack will be in oct 1 for inr and review of instruction but also I did talk with wife Shayy today to be sure she is aware.

## 2024-09-29 ENCOUNTER — HEALTH MAINTENANCE LETTER (OUTPATIENT)
Age: 74
End: 2024-09-29

## 2024-10-01 ENCOUNTER — LAB (OUTPATIENT)
Dept: LAB | Facility: CLINIC | Age: 74
End: 2024-10-01
Payer: MEDICARE

## 2024-10-01 ENCOUNTER — OFFICE VISIT (OUTPATIENT)
Dept: FAMILY MEDICINE | Facility: CLINIC | Age: 74
End: 2024-10-01
Payer: MEDICARE

## 2024-10-01 ENCOUNTER — ANTICOAGULATION THERAPY VISIT (OUTPATIENT)
Dept: ANTICOAGULATION | Facility: CLINIC | Age: 74
End: 2024-10-01

## 2024-10-01 VITALS
DIASTOLIC BLOOD PRESSURE: 82 MMHG | HEART RATE: 66 BPM | WEIGHT: 165.2 LBS | RESPIRATION RATE: 17 BRPM | BODY MASS INDEX: 26.55 KG/M2 | TEMPERATURE: 96.8 F | OXYGEN SATURATION: 95 % | SYSTOLIC BLOOD PRESSURE: 136 MMHG | HEIGHT: 66 IN

## 2024-10-01 DIAGNOSIS — Z95.1 S/P CABG X 3: ICD-10-CM

## 2024-10-01 DIAGNOSIS — Z01.818 PREOP GENERAL PHYSICAL EXAM: Primary | ICD-10-CM

## 2024-10-01 DIAGNOSIS — I72.4 POPLITEAL ARTERY ANEURYSM (H): ICD-10-CM

## 2024-10-01 DIAGNOSIS — I48.0 PAROXYSMAL ATRIAL FIBRILLATION (H): ICD-10-CM

## 2024-10-01 DIAGNOSIS — I70.90 ARTERIAL OCCLUSION: Primary | ICD-10-CM

## 2024-10-01 DIAGNOSIS — I70.90 ARTERIAL OCCLUSION: ICD-10-CM

## 2024-10-01 PROBLEM — I71.43 INFRARENAL ABDOMINAL AORTIC ANEURYSM (AAA) WITHOUT RUPTURE (H): Status: ACTIVE | Noted: 2023-12-28

## 2024-10-01 PROBLEM — N40.1 BENIGN PROSTATIC HYPERPLASIA WITH URINARY FREQUENCY: Status: ACTIVE | Noted: 2024-10-01

## 2024-10-01 PROBLEM — M79.622 PAIN OF LEFT UPPER ARM: Status: RESOLVED | Noted: 2023-10-10 | Resolved: 2024-10-01

## 2024-10-01 PROBLEM — R35.0 BENIGN PROSTATIC HYPERPLASIA WITH URINARY FREQUENCY: Status: ACTIVE | Noted: 2024-10-01

## 2024-10-01 PROBLEM — M79.662 PAIN OF LEFT LOWER LEG: Status: RESOLVED | Noted: 2023-12-15 | Resolved: 2024-10-01

## 2024-10-01 LAB — INR BLD: 3.4 (ref 0.9–1.1)

## 2024-10-01 PROCEDURE — 36416 COLLJ CAPILLARY BLOOD SPEC: CPT

## 2024-10-01 PROCEDURE — 93010 ELECTROCARDIOGRAM REPORT: CPT | Mod: OFF | Performed by: STUDENT IN AN ORGANIZED HEALTH CARE EDUCATION/TRAINING PROGRAM

## 2024-10-01 PROCEDURE — 85610 PROTHROMBIN TIME: CPT

## 2024-10-01 PROCEDURE — 93005 ELECTROCARDIOGRAM TRACING: CPT | Performed by: FAMILY MEDICINE

## 2024-10-01 PROCEDURE — 99214 OFFICE O/P EST MOD 30 MIN: CPT | Performed by: FAMILY MEDICINE

## 2024-10-01 NOTE — Clinical Note
Eduardo Muniz,  Thanks for your preop warfarin instructions for this patient - can he continue his aspirin through surgery? Or should he hold that as well?  Thanks,  -Mercedes

## 2024-10-01 NOTE — PATIENT INSTRUCTIONS
Please consider making an appointment for  an annual wellness visit after you have recovered from surgery    I understand you did not want your flu and covid vaccine today - I hope you will reconsider    Also, you can get these two, as well as vaccine for Respiratory Synechial virus (RSV) and Shingles Vaccine from your pharmacy    Your warfarin instructions (by pharmacist : (by pharmacist Cristy Jaquez, [facility])    Pre-Procedure:  Hold warfarin for 5 days, until after procedure starting: Friday 10/11/24   No Bridge prior to procedure     Post-Procedure  per inpatient team, suggest  Recheck INR ~ 3-4 days days after hospital discharge     ?

## 2024-10-01 NOTE — PROGRESS NOTES
Preoperative Evaluation  [facility]  [address] [place] [address]  Phone: [phone]  Fax: [phone]  Primary Provider: Mercedes Adorno MD  Pre-op Performing Provider: Mercedes Adorno MD  Oct 1, 2024             10/1/2024   Surgical Information   What procedure is being done? DVT Bypass   Facility or Hospital where procedure/surgery will be performed: [facility]   Who is doing the procedure / surgery? Dr. Livingston   Date of surgery / procedure: 10/16   Time of surgery / procedure: 10/16/24   Where do you plan to recover after surgery? at home with family        Fax number for surgical facility: Note does not need to be faxed, will be available electronically in Epic.    Assessment & Plan     The proposed surgical procedure is considered INTERMEDIATE risk.    Preop general physical exam    Popliteal artery aneurysm (H)    S/P CABG x 3: Three-vessel CABG in 2015. Exercise nuclear stress testing 10/16/2023 negative for ischemia. Denies anginal symptoms. Continues statin and aspirin.   Not symptomatic, but as a precaution for preop I am ordering  - EKG 12-lead, tracing only     - No identified additional risk factors other than previously addressed    Antiplatelet or Anticoagulation Medication Instructions   - warfarin: see patient instructions below    Additional Medication Instructions  Take all other scheduled medications on the day of surgery    Recommendation  Approval given to proceed with proposed procedure, without further diagnostic evaluation.      Pre-Procedure:  Hold warfarin for 5 days, until after procedure starting: Friday 10/11/24   No Bridge prior to procedure     Post-Procedure  per inpatient team, suggest  Recheck INR ~ 3-4 days after hospital discharge     Plan routed to referring provider for approval  ?   Cristy Jaquez, [facility]    ---  Patient Instructions   Please consider making an appointment for  an annual wellness visit after you have recovered  from surgery    I understand you did not want your flu and Covid vaccine today - I hope you will reconsider    Also, you can get these two, as well as vaccine for Respiratory Synechial virus (RSV) and Shingles Vaccine from your pharmacy    Your warfarin instructions (by pharmacist  Cristy Jaquez, [facility])    Pre-Procedure:  Hold warfarin for 5 days, until after procedure starting: Friday 10/11/24   No Bridge prior to procedure     Post-Procedure  per inpatient team, suggest  Recheck INR ~ 3-4 days after hospital discharge     ?         Marcie Santiago is a 73 year old, presenting for the following:  Pre-Op Exam (Pre-op for DVT bypass 10/6/2024 at [place] with Dr. Livingston)          10/1/2024     9:23 AM   Additional Questions   Roomed by Jose JEFFERY RN     HPI related to upcoming procedure: left popliteal artery aneurysm not amenable to repair, requiring bypass      Hospitalization 12/15/23; 73-year-old male with a past medical history of CAD, HTN, HLD, PAD, BPH. He presents to the PCP with 5 days of left knee pain with ambulation found to have acute on chronic left lower extremity limb ischemia secondary to left popliteal artery aneurysm, subsequent sent to ER. Admitted on 12/15 and was taken for immediate LLE angiogram with lytic therapy by IR and was placed on lytic therapy but developed a right groin hematoma and therefore this was held. 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  Aneurysmal changes are noted in the  distal SFA and popliteal artery with significant amount of irregularities.  Sluggish flow noted through the popliteal artery and tibial vessels.  Posterior tibial artery is the main vessel runoff.  Anterior tibial artery is completely occluded most likely from previous embolization.  Peroneal artery is patent but with significantly sluggish flow.   IMPRESSION   Patient is not a candidate for endovascular stent placement to repair of aneurysm due to limited outflow.  Patient will benefit from a bypass operation.      10/1/2024   Pre-Op Questionnaire   Have you ever had a heart attack or stroke? No   Have you ever had surgery on your heart or blood vessels, such as a stent placement, a coronary artery bypass, or surgery on an artery in your head, neck, heart, or legs? (!) YES  - CABG   Do you have chest pain with activity? No   Do you have a history of heart failure? No   Do you currently have a cold, bronchitis or symptoms of other infection? No   Do you have a cough, shortness of breath, or wheezing? No   Do you or anyone in your family have previous history of blood clots? (!) YES : brother Orville had a clot after colon surgery (colostomy) for ulcerative colitis. Also mother had multiple brain strokes between surgeries years ago   Do you or does anyone in your family have a serious bleeding problem such as prolonged bleeding following surgeries or cuts? No   Have you ever had problems with anemia or been told to take iron pills? No   Have you had any abnormal blood loss such as black, tarry or bloody stools? No   Have you ever had a blood transfusion? No   Are you willing to have a blood transfusion if it is medically needed before, during, or after your surgery? Yes   Have you or any of your relatives ever had problems with anesthesia? No   Do you have sleep apnea, excessive snoring or daytime drowsiness? No   Do you have any artifical heart valves or other implanted medical devices like a pacemaker, defibrillator,  or continuous glucose monitor? No   Do you have artificial joints? No   Are you allergic to latex? No        Health Care Directive  Patient does not have a Health Care Directive or Living Will: Discussed advance care planning with patient; information given to patient to review.    Previously given to us in [facility] - lost during switch to [facility]  Advance Directives and Living Will Received 12/28/15 Advance Directives and Living Will   Advance Directives and Living Will Received 01/04/16        Preoperative Review of    reviewed - no record of controlled substances prescribed.      Status of Chronic Conditions:      Hypertension    BP Readings from Last 6 Encounters:   10/01/24 136/82   08/21/24 (!) 147/85   07/24/24 138/76   06/20/24 (!) 154/73   05/31/24 (!) 142/73   05/20/24 (!) 151/79     LDL Cholesterol Calculated   Date Value Ref Range Status   01/10/2024 77 <=100 mg/dL Final   12/15/2023 151 (H) <=100 mg/dL Final      Thyroid nodule  Stable by last ultrasound 2/15/21.  Thyroid exam is normal today    AAA  5/29/24 CT: Unchanged multifocal ectasia/mild aneurysmal dilation of the infrarenal abdominal aorta up to 3.1 cm with severe background atherosclerotic vascular calcifications.      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  Visit with CHIRAG Jacob 7/24/24: Three-vessel CABG in 2015. Exercise nuclear stress testing 10/16/2023 negative for ischemia. Denies anginal symptoms. Continue statin and aspirin.        Patient Active Problem List    Diagnosis Date Noted    Benign prostatic hyperplasia with urinary frequency 10/01/2024     Priority: Medium     Followed by urology - on proscar and flomax      Femoral artery pseudoaneurysm complicating cardiac catheterization (H) 05/01/2024     Priority: Medium     Paroxysmal atrial fibrillation (H) 01/19/2024     Priority: Medium    Infrarenal abdominal aortic aneurysm (AAA) without rupture (H) 12/28/2023     Priority: Medium     5/29/24 CT: Unchanged multifocal ectasia/mild aneurysmal dilation of the infrarenal abdominal aorta up to 3.1 cm with severe background atherosclerotic vascular calcifications.       Common iliac aneurysm (H) 12/28/2023     Priority: Medium    Arterial occlusion 12/15/2023     Priority: Medium    Hypertension 10/12/2021     Priority: Medium     Formatting of this note might be different from the original.  Created by Conversion    Replacement Utility updated for latest IMO load      Hyperlipemia      Priority: Medium     Created by Conversion      Formatting of this note might be different from the original.  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  Stable 1.2 cm nodule in the isthmus. Most likely benign.      S/P CABG x 3 12/28/2015     Priority: Medium    Coronary artery disease due to lipid rich plaque 11/17/2015     Priority: Medium    History of melanoma 10/01/2015     Priority: Medium     Right chest        History of basal cell carcinoma 10/01/2015     Priority: Medium     Posterior ear        History of squamous cell carcinoma 01/01/2015     Priority: Medium     Invasive, left arm          Past Medical History:   Diagnosis Date    BPH (benign prostatic hypertrophy)     Cancer (H)     Skin on R.chest wall.    Chest discomfort     Coronary artery disease     Detached retina, left 10/01/2014    Hyperlipidemia     Hypertension     PAD (peripheral artery disease) (H)     Pain of left lower leg 12/15/2023    Pain of left upper arm 10/10/2023    Palpitations     Paroxysmal atrial fibrillation (H)     Pseudoaneurysm (H)     Thrombosis      Past Surgical History:   Procedure Laterality Date    BYPASS GRAFT ARTERY  CORONARY N/A 12/28/2015    Procedure: CORONARY ARTERY BYPASS x 3, RIGHT ENDOSCOPIC VEIN HARVEST, LEFT INTERNAL MAMMARY ARTERY, ANESTHESIA TRANSESOPHAGEAL ECHOCARDIOGRAM;  Surgeon: Jayson Alvarado MD;  Location: [facility];  Service:     CARDIAC SURGERY      CATARACT EXTRACTION  2015    CYST REMOVAL      Ganglion cyst removal of both wrist    HC REMOVAL PREPATELLA BURSA      Description: Excision Of Prepatellar Bursa;  Recorded: 09/30/2014;    HC REPAIR OF NASAL SEPTUM      Description: Septoplasty;  Recorded: 09/30/2014;    HERNIA REPAIR, UMBILICAL      IR LOWER EXTREMITY ANGIOGRAM LEFT  12/16/2023    IR LOWER EXTREMITY ANGIOGRAM LEFT  8/21/2024    IR THROMBOLYSIS ARTERIAL INFUSION INITIAL DAY  12/17/2023    PARS PLANA VITRECTOMY W/ REPAIR OF MACULAR HOLE  Nov 2014    NH EXCIS TENDON SHEATH LESION, HAND/FINGER      Description: Hand Excision Of A Tendon Cyst;  Recorded: 01/04/2011;    NH LAP, VENTRAL HERNIA REPAIR,REDUCIBLE      Description: Laparoscopy Repair Of Umbilical Hernia;  Recorded: 01/04/2011;    PSEUDOANEURYSM REPAIR Right 5/1/2024    Procedure: REPAIR, PSEUDOANEURYSM, ARTERY, FEMORAL RIGHT;  Surgeon: Laverne Crum MD;  Location: [facility]    RETINAL DETACHMENT SURGERY  oct 2014    SKIN CANCER EXCISION  Nov 2015    right chest    THORACOSCOPY Right 4/14/2016    Procedure: RIGHT VIDEO ASSISTED THORACOSCOPY, RIGHT LOBECTOMY;  Surgeon: Vinny Chase MD;  Location: [facility];  Service:     VASECTOMY       Current Outpatient Medications   Medication Sig Dispense Refill    acetaminophen (TYLENOL) 500 MG tablet [ACETAMINOPHEN (TYLENOL) 500 MG TABLET] Take 500 mg by mouth every 6 (six) hours as needed for pain.      amLODIPine (NORVASC) 10 MG tablet TAKE 1 TABLET(10 MG) BY MOUTH DAILY 90 tablet 0    aspirin 81 MG EC tablet Take 1 tablet (81 mg) by mouth daily 90 tablet 3    finasteride (PROSCAR) 5 MG tablet Take 1 tablet (5 mg) by mouth daily 90 tablet 3     metoprolol tartrate (LOPRESSOR) 50 MG tablet TAKE 1 TABLET(50 MG) BY MOUTH TWICE DAILY 180 tablet 1    rosuvastatin (CRESTOR) 5 MG tablet Take 1 tablet (5 mg) by mouth at bedtime 30 tablet 1    tamsulosin (FLOMAX) 0.4 MG capsule TAKE 1 CAPSULE BY MOUTH DAILY AFTER BREAKFAST 90 capsule 3    Vitamin D3 (VITAMIN D, CHOLECALCIFEROL,) 25 mcg (1000 units) tablet Take 1 tablet by mouth daily      warfarin ANTICOAGULANT (COUMADIN) 1 MG tablet Take 4- tablets daily as directed based on inr results 400 tablet 1       Allergies   Allergen Reactions    Niacin Hives and Rash     Burning of the skin    Atorvastatin Muscle Pain (Myalgia)    Pravastatin Muscle Pain (Myalgia)        Social History     Tobacco Use    Smoking status: Never    Smokeless tobacco: Never   Substance Use Topics    Alcohol use: Yes     Alcohol/week: 8.0 standard drinks of alcohol     Family History   Problem Relation Age of Onset    Acute Myocardial Infarction Mother     Aneurysm Mother         brain    Acute Myocardial Infarction Father     Colitis Brother     Abdominal Aortic Aneurysm Brother         had surgery and was supposed to have a second - time ran out    Leukemia Brother     Other - See Comments Maternal Grandmother          of hemorrhage after childbirht    Pneumonia Maternal Grandfather     No Known Problems Paternal Grandmother     Colon Cancer Paternal Grandfather     Lung Cancer Cousin     Coronary Artery Disease Cousin      History   Drug Use No             Review of Systems  CONSTITUTIONAL: NEGATIVE for fever, chills, change in weight  ENT/MOUTH: NEGATIVE for ear, mouth and throat problems  RESP: NEGATIVE for significant cough or SOB  CV: NEGATIVE for chest pain, palpitations or peripheral edema  GI: NEGATIVE for nausea, abdominal pain, heartburn, or change in bowel habits    Objective    /82 (BP Location: Left arm, Patient Position: Sitting, Cuff Size: Adult Regular)   Pulse 66   Temp 96.8  F (36  C) (Tympanic)   Resp 17   " Ht 1.664 m (5' 5.5\")   Wt 74.9 kg (165 lb 3.2 oz)   SpO2 95%   BMI 27.07 kg/m     Estimated body mass index is 27.07 kg/m  as calculated from the following:    Height as of this encounter: 1.664 m (5' 5.5\").    Weight as of this encounter: 74.9 kg (165 lb 3.2 oz).  Physical Exam  General appearance - alert, well appearing, and in no distress  Mental status - normal mood, behavior, speech, dress, motor activity, and thought processes  Eyes - pupils equal and reactive, extraocular eye movements intact, funduscopic exam normal, discs flat and sharp  Ears - bilateral TM's and external ear canals normal  Mouth - mucous membranes moist, pharynx normal without lesions  Neck - supple, no significant adenopathy, carotids upstroke normal bilaterally, no bruits, thyroid exam: thyroid is normal in size without nodules or tenderness  Chest - clear to auscultation, no wheezes, rales or rhonchi, symmetric air entry  Heart - normal rate, regular rhythm, normal S1, S2, no murmurs, rubs, clicks or gallops  Abdomen - soft, nontender, nondistended, no masses or organomegaly  Neurological - alert, oriented, normal speech, no focal findings or movement disorder noted, DTR's normal and symmetric  Extremities - no pedal edema        Recent Labs   Lab Test 10/01/24  0916 09/18/24  0701 06/14/24  0916 05/31/24  0803 05/02/24  2110 05/02/24  0528 12/16/23  0833 12/15/23  2025   HGB  --   --   --  14.1  --  13.3   < > 16.1   PLT  --   --   --  229  --  203   < > 267   INR 3.4* 3.7*   < > 2.55*   < >  --    < > 1.02   NA  --   --   --  141  --  137   < > 138   POTASSIUM  --   --   --  3.8  --  4.5   < > 4.4   CR  --   --   --  1.16  --  1.05   < > 0.91   A1C  --   --   --   --   --   --   --  5.5    < > = values in this interval not displayed.        Diagnostics  Recent Results (from the past 24 hour(s))   INR point of care    Collection Time: 10/01/24  9:16 AM   Result Value Ref Range    INR 3.4 (H) 0.9 - 1.1   EKG 12-lead, tracing only "    Collection Time: 10/01/24 11:30 AM   Result Value Ref Range    Systolic Blood Pressure  mmHg    Diastolic Blood Pressure  mmHg    Ventricular Rate 61 BPM    Atrial Rate 61 BPM    CO Interval 228 ms    QRS Duration 102 ms     ms    QTc 436 ms    P Axis 76 degrees    R AXIS -24 degrees    T Axis 34 degrees    Interpretation ECG       Sinus rhythm with 1st degree A-V block  Voltage criteria for left ventricular hypertrophy  Abnormal ECG  When compared with ECG of 18-Dec-2023 17:36,  CO interval has increased  T wave inversion no longer evident in Anterior leads            Charles Ordonez MD  [phone] 10/16/2023 Routine     Addenda       The exercise nuclear stress test is negative for inducible myocardial ischemia or infarction.    The exercise stress electrocardiogram is negative for inducible ischemic EKG changes.    The patient's exercise capacity is average for age and gender. The patient exercised for 6:01 minutes on the Florencio protocol, achieving 7.3 METs and 103% the age-predicted maximum heart rate. The patient did not experience any symptoms.    The left ventricular ejection fraction at stress is 52%.    The patient is at a low risk of future cardiac ischemic events.    A prior study was conducted on 5/17/2007.  Prior images were unavailable for comparison review.    Assessment/Plan:  1.  Paroxysmal Atrial Fibrillation: No symptomatology or evidence of AF recurrence.  Only known episode of PAF-RVR noted on telemetry 12/17/2023.  Symptoms status indeterminate.  He remains off membrane active antiarrhythmic medications.  We reviewed the natural progression of atrial fibrillation and treatment options with medications or pulmonary vein isolation ablation.  Recommend sotalol versus ablation for recurrent AF.  -- Continue daily pulse checks, to call when he has recurrent AF for further evaluation and management     He was reassured that atrial fibrillation is not life-threatening, but carries an increased  risk for stroke.  He has a [id number] score of 6 for age 73, vascular disease, HTN, thrombotic event.  We again discussed OAC options including Eliquis, Xarelto, Pradaxa, and warfarin.  If he has adequate insurance coverage, recommend switching to Eliquis.  Continue warfarin for stroke prophylaxis, goal INR 2.0-3.0.  Okay to hold warfarin for 5 days prior to surgery.     2.  Coronary artery disease: Three-vessel CABG in 2015.  Exercise nuclear stress testing 10/16/2023 negative for ischemia.  Denies anginal symptoms.  Continue statin and aspirin.     3.  Hypertension: Controlled with amlodipine and metoprolol.     Follow up with Dr. Estrada in 3 months  Follow up with me in 6 months    Revised Cardiac Risk Index (RCRI)  The patient has the following serious cardiovascular risks for perioperative complications:   - Coronary Artery Disease (MI, positive stress test, angina, Qs on EKG) = 1 point     RCRI Interpretation: 1 point: Class II (low risk - 0.9% complication rate)         Signed Electronically by: Mercedes Adorno MD  A copy of this evaluation report is provided to the requesting physician.

## 2024-10-01 NOTE — PROGRESS NOTES
ANTICOAGULATION MANAGEMENT     Jack Brown 73 year old male is on warfarin with supratherapeutic INR result. (Goal INR 2.0-3.0)    Recent labs: (last 7 days)     10/01/24  0916   INR 3.4*       ASSESSMENT     Source(s): Chart Review and Patient/Caregiver Call     Warfarin doses taken: Warfarin taken as instructed  Diet: No new diet changes identified  Medication/supplement changes: None noted  New illness, injury, or hospitalization: No  Signs or symptoms of bleeding or clotting: No  Previous result: Supratherapeutic  Additional findings: Upcoming surgery/procedure Arterial bypass Femoral to Tibial, 10/16 pre op was today. Was instructed to hold warfarin starting 10/11. Will resume with surgery team instruction, inr 3-4 days post hospital discharge       PLAN     Recommended plan for no diet, medication or health factor changes affecting INR     Dosing Instructions: decrease your warfarin dose (7.1% change) , stop warfarin on 10/11 n prep for surgery 10/16. Will resume with surgeon's advice with next INR in 3-4 days after hospital discharge    Summary  As of 10/1/2024      Full warfarin instructions:  10/11: Hold; 10/12: Hold; 10/13: Hold; 10/14: Hold; 10/15: Hold; Otherwise 2 mg every Tue; 4 mg all other days   Next INR check:  10/24/2024               Telephone call with Shayy who agrees to plan and repeated back plan correctly    Contact [phone] to schedule and with any changes, questions or concerns.     Education provided: Please call back if any changes to your diet, medications or how you've been taking warfarin    Plan made per [facility] anticoagulation protocol    Felisa Carnes RN  10/1/2024  Anticoagulation Clinic  Roadmap for routing messages: [facility]ovani [facility]  [facility] patient phone line: [phone]        _______________________________________________________________________     Anticoagulation Episode Summary       Current INR goal:  2.0-3.0   TTR:  49.5% (8.7 mo)   Target end date:  Indefinite    Send INR reminders to:  [facility]    Indications    Arterial occlusion [I70.90]  Paroxysmal atrial fibrillation (H) [I48.0]             Comments:               Anticoagulation Care Providers       Provider Role Specialty Phone number    Mercedes Adorno MD [facility] [phone]

## 2024-10-02 LAB
ATRIAL RATE - MUSE: 61 BPM
DIASTOLIC BLOOD PRESSURE - MUSE: NORMAL MMHG
INTERPRETATION ECG - MUSE: NORMAL
P AXIS - MUSE: 76 DEGREES
PR INTERVAL - MUSE: 228 MS
QRS DURATION - MUSE: 102 MS
QT - MUSE: 434 MS
QTC - MUSE: 436 MS
R AXIS - MUSE: -24 DEGREES
SYSTOLIC BLOOD PRESSURE - MUSE: NORMAL MMHG
T AXIS - MUSE: 34 DEGREES
VENTRICULAR RATE- MUSE: 61 BPM

## 2024-10-04 ENCOUNTER — OFFICE VISIT (OUTPATIENT)
Dept: FAMILY MEDICINE | Facility: CLINIC | Age: 74
End: 2024-10-04
Payer: MEDICARE

## 2024-10-04 VITALS
OXYGEN SATURATION: 98 % | HEART RATE: 70 BPM | DIASTOLIC BLOOD PRESSURE: 76 MMHG | RESPIRATION RATE: 20 BRPM | SYSTOLIC BLOOD PRESSURE: 132 MMHG | TEMPERATURE: 98 F

## 2024-10-04 DIAGNOSIS — R21 RASH: Primary | ICD-10-CM

## 2024-10-04 PROCEDURE — 99213 OFFICE O/P EST LOW 20 MIN: CPT | Performed by: FAMILY MEDICINE

## 2024-10-04 RX ORDER — CEPHALEXIN 500 MG/1
500 CAPSULE ORAL 2 TIMES DAILY
Qty: 14 CAPSULE | Refills: 0 | Status: SHIPPED | OUTPATIENT
Start: 2024-10-04 | End: 2024-10-11

## 2024-10-04 RX ORDER — VALACYCLOVIR HYDROCHLORIDE 1 G/1
1000 TABLET, FILM COATED ORAL 3 TIMES DAILY
Qty: 21 TABLET | Refills: 0 | Status: ON HOLD | OUTPATIENT
Start: 2024-10-04 | End: 2024-10-18

## 2024-10-04 NOTE — PATIENT INSTRUCTIONS
Take prescribed medication as directed.      Moisturizing lotion to the area.      Observe. Have this rechecked if not resolving.

## 2024-10-04 NOTE — PROGRESS NOTES
(R21) Rash  (primary encounter diagnosis)  Comment:     Cluster of small red papules left upper chest.  No crusting or purulent material.  Question if this is a modified presentation of shingles due to his previous exposure.  A nonspecific follicular eruption would be a second choice although somewhat atypical also.    Plan: valACYclovir (VALTREX) 1000 mg tablet,         cephALEXin (KEFLEX) 500 MG capsule          Will have him take both antiviral and antibiotic and manage expectantly.  Moisturizing lotion advised.    He is also advised to have follow-up if these do not resolve by the time his procedure comes around.  That would be October 16.      CHIEF COMPLAINT    Question of recurrence of shingles.      HISTORY    He has developed a rash in the left upper chest.  It is somewhat pruritic.    He had shingles 5 years ago and took medication at the time.    Further complicating matters, the patient has a stent procedure in his leg scheduled for October 16.      REVIEW OF SYSTEMS    No fever or chills.  No sore throat or ear pain.  No cough or SOB.  No chest pain, palpitations, edema.  No nausea or abdominal pain.  Skin cluster of individual papules left upper chest they are crusted over, not vesicular at this point.  There are couple similar papules over on the right but many more in this cluster on the left.  No crusting.  No pustules or purulent drainage.      EXAM  /76   Pulse 70   Temp 98  F (36.7  C)   Resp 20   SpO2 98%

## 2024-10-04 NOTE — PROGRESS NOTES
SURGERY UPDATE    Date: 10/18/2024    Time: 11:40am    Spoke to OR scheduling, Viridiana. Case moved to 10/18/2024.    Writer called the patient to inform the patient of the surgery details. Spouse informed the surgery scheduler that the pt has shingles. Pt was diagnoses with shingles this morning, 10/4/2024. Pt is starting antibiotics today. Spouse is concerned about the patient having surgery.

## 2024-10-07 ENCOUNTER — PRE VISIT (OUTPATIENT)
Dept: UROLOGY | Facility: CLINIC | Age: 74
End: 2024-10-07
Payer: MEDICARE

## 2024-10-07 NOTE — TELEPHONE ENCOUNTER
Reason for visit: follow-up     Relevant information: 6 month, gross hematuria/BPH    Records/imaging/labs/orders: all records available    Lory Jonas  10/7/2024  11:48 AM

## 2024-10-07 NOTE — PROGRESS NOTES
Writer reached out to the pt's spouse. Spouse said the rash is fading. Rash is located on his left upper chest. Pt started antibiotics on 10/4/2024. Last dose of antibiotics will be 10/11/2024.

## 2024-10-09 NOTE — PROGRESS NOTES
Per provider case needs to start earlier. Patient made aware.    Pt's spouse has some questions regarding the procedure and requested a RN to call back to go over surgery details.    Writer called OR scheduling, spoke to Alcon. Cases have been adjusted per provider request.

## 2024-10-10 NOTE — PROGRESS NOTES
Called back patient's spouse, she is wondering what is being used for the bypass (vein?). Will discuss with Dr. Crum and call her back.

## 2024-10-14 ENCOUNTER — TELEPHONE (OUTPATIENT)
Dept: UROLOGY | Facility: CLINIC | Age: 74
End: 2024-10-14
Payer: MEDICARE

## 2024-10-14 NOTE — H&P (VIEW-ONLY)
Patient scheduled for video visit today. Attempted to connect via both [employer] and [employer] but unsuccessful with both. Attempted on both his computer and phone. Offered to call patient to do telephone visit instead but he opts to reschedule to in-person.     Veronica Costa, CNP  Department of Urology

## 2024-10-14 NOTE — TELEPHONE ENCOUNTER
Called and changed to virtual appointment.     NAYELI Ivan  Care Coordinator- Urology   [phone]

## 2024-10-14 NOTE — TELEPHONE ENCOUNTER
[facility] Call Center    Phone Message    May a detailed message be left on voicemail: yes     Reason for Call: Other: Patient called in requesting 10/15/24 appointment be switched to a telephone or video visit. States he has surgery later this week and is unable to come in person. Please call patient to further discuss appointment changing.      Action Taken: Other: Urology    Travel Screening: Not Applicable

## 2024-10-15 ENCOUNTER — TELEPHONE (OUTPATIENT)
Dept: UROLOGY | Facility: CLINIC | Age: 74
End: 2024-10-15

## 2024-10-15 NOTE — TELEPHONE ENCOUNTER
Patient confirmed scheduled appointment:  Date: Jan 14th   Time: 9:30am  Visit type: Return   Provider: Veronica Costa   Location: [facility] [place]  Additional notes: per message received -  patient needs to be rescheduled for an in person visit with Veronica Costa CNP. Virtual doesn't work with his devices.

## 2024-10-17 ENCOUNTER — ANESTHESIA EVENT (OUTPATIENT)
Dept: SURGERY | Facility: HOSPITAL | Age: 74
DRG: 253 | End: 2024-10-17
Payer: MEDICARE

## 2024-10-18 ENCOUNTER — ANESTHESIA (OUTPATIENT)
Dept: SURGERY | Facility: HOSPITAL | Age: 74
DRG: 253 | End: 2024-10-18
Payer: MEDICARE

## 2024-10-18 ENCOUNTER — HOSPITAL ENCOUNTER (INPATIENT)
Facility: HOSPITAL | Age: 74
LOS: 4 days | Discharge: HOME HEALTH | DRG: 253 | End: 2024-10-22
Attending: SURGERY | Admitting: SURGERY
Payer: MEDICARE

## 2024-10-18 DIAGNOSIS — I72.4 POPLITEAL ARTERY ANEURYSM (H): ICD-10-CM

## 2024-10-18 DIAGNOSIS — I25.83 CORONARY ARTERY DISEASE DUE TO LIPID RICH PLAQUE: Primary | ICD-10-CM

## 2024-10-18 DIAGNOSIS — I25.10 CORONARY ARTERY DISEASE DUE TO LIPID RICH PLAQUE: Primary | ICD-10-CM

## 2024-10-18 LAB
ABO/RH(D): NORMAL
ANION GAP SERPL CALCULATED.3IONS-SCNC: 11 MMOL/L (ref 7–15)
ANTIBODY SCREEN: NEGATIVE
BASOPHILS # BLD AUTO: 0.1 10E3/UL (ref 0–0.2)
BASOPHILS NFR BLD AUTO: 1 %
BUN SERPL-MCNC: 25.7 MG/DL (ref 8–23)
CALCIUM SERPL-MCNC: 9.4 MG/DL (ref 8.8–10.4)
CHLORIDE SERPL-SCNC: 106 MMOL/L (ref 98–107)
CREAT SERPL-MCNC: 1.16 MG/DL (ref 0.67–1.17)
EGFRCR SERPLBLD CKD-EPI 2021: 66 ML/MIN/1.73M2
EOSINOPHIL # BLD AUTO: 0.2 10E3/UL (ref 0–0.7)
EOSINOPHIL NFR BLD AUTO: 3 %
ERYTHROCYTE [DISTWIDTH] IN BLOOD BY AUTOMATED COUNT: 14 % (ref 10–15)
GLUCOSE BLDC GLUCOMTR-MCNC: 111 MG/DL (ref 70–99)
GLUCOSE SERPL-MCNC: 103 MG/DL (ref 70–99)
HCO3 SERPL-SCNC: 22 MMOL/L (ref 22–29)
HCT VFR BLD AUTO: 43.1 % (ref 40–53)
HGB BLD-MCNC: 14.5 G/DL (ref 13.3–17.7)
IMM GRANULOCYTES # BLD: 0 10E3/UL
IMM GRANULOCYTES NFR BLD: 0 %
LYMPHOCYTES # BLD AUTO: 1.5 10E3/UL (ref 0.8–5.3)
LYMPHOCYTES NFR BLD AUTO: 23 %
MCH RBC QN AUTO: 30.3 PG (ref 26.5–33)
MCHC RBC AUTO-ENTMCNC: 33.6 G/DL (ref 31.5–36.5)
MCV RBC AUTO: 90 FL (ref 78–100)
MONOCYTES # BLD AUTO: 0.6 10E3/UL (ref 0–1.3)
MONOCYTES NFR BLD AUTO: 9 %
NEUTROPHILS # BLD AUTO: 4.3 10E3/UL (ref 1.6–8.3)
NEUTROPHILS NFR BLD AUTO: 64 %
NRBC # BLD AUTO: 0 10E3/UL
NRBC BLD AUTO-RTO: 0 /100
PLATELET # BLD AUTO: 233 10E3/UL (ref 150–450)
POTASSIUM SERPL-SCNC: 3.6 MMOL/L (ref 3.4–5.3)
RBC # BLD AUTO: 4.79 10E6/UL (ref 4.4–5.9)
SODIUM SERPL-SCNC: 139 MMOL/L (ref 135–145)
SPECIMEN EXPIRATION DATE: NORMAL
WBC # BLD AUTO: 6.6 10E3/UL (ref 4–11)

## 2024-10-18 PROCEDURE — 250N000011 HC RX IP 250 OP 636

## 2024-10-18 PROCEDURE — P9045 ALBUMIN (HUMAN), 5%, 250 ML: HCPCS | Performed by: NURSE ANESTHETIST, CERTIFIED REGISTERED

## 2024-10-18 PROCEDURE — 35656 BPG FEMORAL-POPLITEAL: CPT | Performed by: ANESTHESIOLOGY

## 2024-10-18 PROCEDURE — 99100 ANES PT EXTEME AGE<1 YR&>70: CPT | Performed by: NURSE ANESTHETIST, CERTIFIED REGISTERED

## 2024-10-18 PROCEDURE — 85025 COMPLETE CBC W/AUTO DIFF WBC: CPT | Performed by: SURGERY

## 2024-10-18 PROCEDURE — C1760 CLOSURE DEV, VASC: HCPCS | Performed by: SURGERY

## 2024-10-18 PROCEDURE — 250N000013 HC RX MED GY IP 250 OP 250 PS 637

## 2024-10-18 PROCEDURE — 80048 BASIC METABOLIC PNL TOTAL CA: CPT | Performed by: SURGERY

## 2024-10-18 PROCEDURE — 272N000001 HC OR GENERAL SUPPLY STERILE: Performed by: SURGERY

## 2024-10-18 PROCEDURE — 250N000009 HC RX 250: Performed by: NURSE ANESTHETIST, CERTIFIED REGISTERED

## 2024-10-18 PROCEDURE — 35656 BPG FEMORAL-POPLITEAL: CPT | Mod: LT | Performed by: SURGERY

## 2024-10-18 PROCEDURE — 86901 BLOOD TYPING SEROLOGIC RH(D): CPT | Performed by: SURGERY

## 2024-10-18 PROCEDURE — 370N000017 HC ANESTHESIA TECHNICAL FEE, PER MIN: Performed by: SURGERY

## 2024-10-18 PROCEDURE — C1768 GRAFT, VASCULAR: HCPCS | Performed by: SURGERY

## 2024-10-18 PROCEDURE — 120N000001 HC R&B MED SURG/OB

## 2024-10-18 PROCEDURE — 86900 BLOOD TYPING SEROLOGIC ABO: CPT | Performed by: SURGERY

## 2024-10-18 PROCEDURE — 250N000011 HC RX IP 250 OP 636: Performed by: NURSE ANESTHETIST, CERTIFIED REGISTERED

## 2024-10-18 PROCEDURE — 258N000003 HC RX IP 258 OP 636: Performed by: NURSE ANESTHETIST, CERTIFIED REGISTERED

## 2024-10-18 PROCEDURE — 272N000004 HC RX 272: Performed by: SURGERY

## 2024-10-18 PROCEDURE — 041L0JL BYPASS LEFT FEMORAL ARTERY TO POPLITEAL ARTERY WITH SYNTHETIC SUBSTITUTE, OPEN APPROACH: ICD-10-PCS | Performed by: SURGERY

## 2024-10-18 PROCEDURE — 999N000141 HC STATISTIC PRE-PROCEDURE NURSING ASSESSMENT: Performed by: SURGERY

## 2024-10-18 PROCEDURE — 04LN0ZZ OCCLUSION OF LEFT POPLITEAL ARTERY, OPEN APPROACH: ICD-10-PCS | Performed by: SURGERY

## 2024-10-18 PROCEDURE — 250N000011 HC RX IP 250 OP 636: Performed by: SURGERY

## 2024-10-18 PROCEDURE — 258N000003 HC RX IP 258 OP 636: Performed by: SURGERY

## 2024-10-18 PROCEDURE — 250N000025 HC SEVOFLURANE, PER MIN: Performed by: SURGERY

## 2024-10-18 PROCEDURE — 360N000077 HC SURGERY LEVEL 4, PER MIN: Performed by: SURGERY

## 2024-10-18 PROCEDURE — 250N000009 HC RX 250: Performed by: SURGERY

## 2024-10-18 PROCEDURE — 710N000010 HC RECOVERY PHASE 1, LEVEL 2, PER MIN: Performed by: SURGERY

## 2024-10-18 PROCEDURE — 35656 BPG FEMORAL-POPLITEAL: CPT | Performed by: NURSE ANESTHETIST, CERTIFIED REGISTERED

## 2024-10-18 PROCEDURE — 36415 COLL VENOUS BLD VENIPUNCTURE: CPT | Performed by: SURGERY

## 2024-10-18 DEVICE — PROPATEN VASCULAR GRAFT TW RR 8MMX50CM 40CM RINGS HEPARIN
Type: IMPLANTABLE DEVICE | Site: LEG | Status: FUNCTIONAL
Brand: GORE PROPATEN VASCULAR GRAFT

## 2024-10-18 RX ORDER — ASPIRIN 81 MG/1
81 TABLET ORAL DAILY
Status: DISCONTINUED | OUTPATIENT
Start: 2024-10-19 | End: 2024-10-22 | Stop reason: HOSPADM

## 2024-10-18 RX ORDER — POLYETHYLENE GLYCOL 3350 17 G/17G
17 POWDER, FOR SOLUTION ORAL DAILY
Status: DISCONTINUED | OUTPATIENT
Start: 2024-10-19 | End: 2024-10-22 | Stop reason: HOSPADM

## 2024-10-18 RX ORDER — OXYCODONE HYDROCHLORIDE 5 MG/1
5 TABLET ORAL EVERY 4 HOURS PRN
Status: DISCONTINUED | OUTPATIENT
Start: 2024-10-18 | End: 2024-10-22 | Stop reason: HOSPADM

## 2024-10-18 RX ORDER — NALOXONE HYDROCHLORIDE 0.4 MG/ML
0.2 INJECTION, SOLUTION INTRAMUSCULAR; INTRAVENOUS; SUBCUTANEOUS
Status: DISCONTINUED | OUTPATIENT
Start: 2024-10-18 | End: 2024-10-22 | Stop reason: HOSPADM

## 2024-10-18 RX ORDER — SODIUM CHLORIDE, SODIUM LACTATE, POTASSIUM CHLORIDE, CALCIUM CHLORIDE 600; 310; 30; 20 MG/100ML; MG/100ML; MG/100ML; MG/100ML
INJECTION, SOLUTION INTRAVENOUS CONTINUOUS
Status: DISCONTINUED | OUTPATIENT
Start: 2024-10-18 | End: 2024-10-18 | Stop reason: HOSPADM

## 2024-10-18 RX ORDER — OXYCODONE HYDROCHLORIDE 5 MG/1
10 TABLET ORAL EVERY 4 HOURS PRN
Status: DISCONTINUED | OUTPATIENT
Start: 2024-10-18 | End: 2024-10-22 | Stop reason: HOSPADM

## 2024-10-18 RX ORDER — BISACODYL 10 MG
10 SUPPOSITORY, RECTAL RECTAL DAILY PRN
Status: DISCONTINUED | OUTPATIENT
Start: 2024-10-21 | End: 2024-10-22 | Stop reason: HOSPADM

## 2024-10-18 RX ORDER — HYDROMORPHONE HCL IN WATER/PF 6 MG/30 ML
0.4 PATIENT CONTROLLED ANALGESIA SYRINGE INTRAVENOUS
Status: DISCONTINUED | OUTPATIENT
Start: 2024-10-18 | End: 2024-10-22 | Stop reason: HOSPADM

## 2024-10-18 RX ORDER — HYDROMORPHONE HCL IN WATER/PF 6 MG/30 ML
0.2 PATIENT CONTROLLED ANALGESIA SYRINGE INTRAVENOUS
Status: DISCONTINUED | OUTPATIENT
Start: 2024-10-18 | End: 2024-10-22 | Stop reason: HOSPADM

## 2024-10-18 RX ORDER — CEFAZOLIN SODIUM 1 G/3ML
1 INJECTION, POWDER, FOR SOLUTION INTRAMUSCULAR; INTRAVENOUS EVERY 8 HOURS
Status: COMPLETED | OUTPATIENT
Start: 2024-10-18 | End: 2024-10-19

## 2024-10-18 RX ORDER — HYDROMORPHONE HCL IN WATER/PF 6 MG/30 ML
0.2 PATIENT CONTROLLED ANALGESIA SYRINGE INTRAVENOUS EVERY 5 MIN PRN
Status: DISCONTINUED | OUTPATIENT
Start: 2024-10-18 | End: 2024-10-18 | Stop reason: HOSPADM

## 2024-10-18 RX ORDER — ONDANSETRON 2 MG/ML
4 INJECTION INTRAMUSCULAR; INTRAVENOUS EVERY 6 HOURS PRN
Status: DISCONTINUED | OUTPATIENT
Start: 2024-10-18 | End: 2024-10-22 | Stop reason: HOSPADM

## 2024-10-18 RX ORDER — HEPARIN SODIUM 5000 [USP'U]/.5ML
5000 INJECTION, SOLUTION INTRAVENOUS; SUBCUTANEOUS EVERY 8 HOURS
Status: DISCONTINUED | OUTPATIENT
Start: 2024-10-19 | End: 2024-10-22 | Stop reason: HOSPADM

## 2024-10-18 RX ORDER — CLOPIDOGREL BISULFATE 75 MG/1
75 TABLET ORAL DAILY
Status: COMPLETED | OUTPATIENT
Start: 2024-10-19 | End: 2024-10-22

## 2024-10-18 RX ORDER — LABETALOL HYDROCHLORIDE 5 MG/ML
10 INJECTION, SOLUTION INTRAVENOUS EVERY 4 HOURS PRN
Status: DISCONTINUED | OUTPATIENT
Start: 2024-10-18 | End: 2024-10-22 | Stop reason: HOSPADM

## 2024-10-18 RX ORDER — ACETAMINOPHEN 325 MG/1
975 TABLET ORAL EVERY 8 HOURS
Status: COMPLETED | OUTPATIENT
Start: 2024-10-18 | End: 2024-10-21

## 2024-10-18 RX ORDER — ONDANSETRON 2 MG/ML
INJECTION INTRAMUSCULAR; INTRAVENOUS PRN
Status: DISCONTINUED | OUTPATIENT
Start: 2024-10-18 | End: 2024-10-18

## 2024-10-18 RX ORDER — ACETAMINOPHEN 325 MG/1
650 TABLET ORAL EVERY 4 HOURS PRN
Status: DISCONTINUED | OUTPATIENT
Start: 2024-10-21 | End: 2024-10-22 | Stop reason: HOSPADM

## 2024-10-18 RX ORDER — LIDOCAINE 40 MG/G
CREAM TOPICAL
Status: DISCONTINUED | OUTPATIENT
Start: 2024-10-18 | End: 2024-10-18 | Stop reason: HOSPADM

## 2024-10-18 RX ORDER — DEXAMETHASONE SODIUM PHOSPHATE 4 MG/ML
4 INJECTION, SOLUTION INTRA-ARTICULAR; INTRALESIONAL; INTRAMUSCULAR; INTRAVENOUS; SOFT TISSUE
Status: DISCONTINUED | OUTPATIENT
Start: 2024-10-18 | End: 2024-10-18 | Stop reason: HOSPADM

## 2024-10-18 RX ORDER — DEXMEDETOMIDINE HYDROCHLORIDE 4 UG/ML
INJECTION, SOLUTION INTRAVENOUS CONTINUOUS PRN
Status: DISCONTINUED | OUTPATIENT
Start: 2024-10-18 | End: 2024-10-18

## 2024-10-18 RX ORDER — HYDRALAZINE HYDROCHLORIDE 20 MG/ML
20 INJECTION INTRAMUSCULAR; INTRAVENOUS EVERY 4 HOURS PRN
Status: DISCONTINUED | OUTPATIENT
Start: 2024-10-18 | End: 2024-10-22 | Stop reason: HOSPADM

## 2024-10-18 RX ORDER — EPHEDRINE SULFATE 50 MG/ML
INJECTION, SOLUTION INTRAMUSCULAR; INTRAVENOUS; SUBCUTANEOUS PRN
Status: DISCONTINUED | OUTPATIENT
Start: 2024-10-18 | End: 2024-10-18

## 2024-10-18 RX ORDER — CEFAZOLIN SODIUM/WATER 2 G/20 ML
2 SYRINGE (ML) INTRAVENOUS SEE ADMIN INSTRUCTIONS
Status: DISCONTINUED | OUTPATIENT
Start: 2024-10-18 | End: 2024-10-18 | Stop reason: HOSPADM

## 2024-10-18 RX ORDER — AMOXICILLIN 250 MG
1 CAPSULE ORAL 2 TIMES DAILY
Status: DISCONTINUED | OUTPATIENT
Start: 2024-10-18 | End: 2024-10-22 | Stop reason: HOSPADM

## 2024-10-18 RX ORDER — FENTANYL CITRATE 50 UG/ML
100 INJECTION, SOLUTION INTRAMUSCULAR; INTRAVENOUS
Status: DISCONTINUED | OUTPATIENT
Start: 2024-10-18 | End: 2024-10-18 | Stop reason: HOSPADM

## 2024-10-18 RX ORDER — CEFAZOLIN SODIUM/WATER 2 G/20 ML
2 SYRINGE (ML) INTRAVENOUS
Status: DISCONTINUED | OUTPATIENT
Start: 2024-10-18 | End: 2024-10-18 | Stop reason: HOSPADM

## 2024-10-18 RX ORDER — KETAMINE HYDROCHLORIDE 10 MG/ML
INJECTION INTRAMUSCULAR; INTRAVENOUS PRN
Status: DISCONTINUED | OUTPATIENT
Start: 2024-10-18 | End: 2024-10-18

## 2024-10-18 RX ORDER — NALOXONE HYDROCHLORIDE 0.4 MG/ML
0.4 INJECTION, SOLUTION INTRAMUSCULAR; INTRAVENOUS; SUBCUTANEOUS
Status: DISCONTINUED | OUTPATIENT
Start: 2024-10-18 | End: 2024-10-22 | Stop reason: HOSPADM

## 2024-10-18 RX ORDER — SODIUM CHLORIDE 9 MG/ML
INJECTION, SOLUTION INTRAVENOUS CONTINUOUS PRN
Status: DISCONTINUED | OUTPATIENT
Start: 2024-10-18 | End: 2024-10-18

## 2024-10-18 RX ORDER — ONDANSETRON 2 MG/ML
4 INJECTION INTRAMUSCULAR; INTRAVENOUS EVERY 30 MIN PRN
Status: DISCONTINUED | OUTPATIENT
Start: 2024-10-18 | End: 2024-10-18 | Stop reason: HOSPADM

## 2024-10-18 RX ORDER — HYDROMORPHONE HCL IN WATER/PF 6 MG/30 ML
0.4 PATIENT CONTROLLED ANALGESIA SYRINGE INTRAVENOUS EVERY 5 MIN PRN
Status: DISCONTINUED | OUTPATIENT
Start: 2024-10-18 | End: 2024-10-18 | Stop reason: HOSPADM

## 2024-10-18 RX ORDER — FENTANYL CITRATE 50 UG/ML
100 INJECTION, SOLUTION INTRAMUSCULAR; INTRAVENOUS ONCE
Status: DISCONTINUED | OUTPATIENT
Start: 2024-10-18 | End: 2024-10-18 | Stop reason: HOSPADM

## 2024-10-18 RX ORDER — PROPOFOL 10 MG/ML
INJECTION, EMULSION INTRAVENOUS PRN
Status: DISCONTINUED | OUTPATIENT
Start: 2024-10-18 | End: 2024-10-18

## 2024-10-18 RX ORDER — PROTAMINE SULFATE 10 MG/ML
INJECTION, SOLUTION INTRAVENOUS PRN
Status: DISCONTINUED | OUTPATIENT
Start: 2024-10-18 | End: 2024-10-18

## 2024-10-18 RX ORDER — FENTANYL CITRATE 50 UG/ML
50 INJECTION, SOLUTION INTRAMUSCULAR; INTRAVENOUS EVERY 5 MIN PRN
Status: DISCONTINUED | OUTPATIENT
Start: 2024-10-18 | End: 2024-10-18 | Stop reason: HOSPADM

## 2024-10-18 RX ORDER — HEPARIN SODIUM 1000 [USP'U]/ML
INJECTION, SOLUTION INTRAVENOUS; SUBCUTANEOUS PRN
Status: DISCONTINUED | OUTPATIENT
Start: 2024-10-18 | End: 2024-10-18

## 2024-10-18 RX ORDER — FENTANYL CITRATE 50 UG/ML
25 INJECTION, SOLUTION INTRAMUSCULAR; INTRAVENOUS EVERY 5 MIN PRN
Status: DISCONTINUED | OUTPATIENT
Start: 2024-10-18 | End: 2024-10-18 | Stop reason: HOSPADM

## 2024-10-18 RX ORDER — LIDOCAINE HYDROCHLORIDE 10 MG/ML
INJECTION, SOLUTION INFILTRATION; PERINEURAL PRN
Status: DISCONTINUED | OUTPATIENT
Start: 2024-10-18 | End: 2024-10-18

## 2024-10-18 RX ORDER — MAGNESIUM HYDROXIDE 1200 MG/15ML
LIQUID ORAL PRN
Status: DISCONTINUED | OUTPATIENT
Start: 2024-10-18 | End: 2024-10-18 | Stop reason: HOSPADM

## 2024-10-18 RX ORDER — LIDOCAINE 40 MG/G
CREAM TOPICAL
Status: DISCONTINUED | OUTPATIENT
Start: 2024-10-18 | End: 2024-10-22 | Stop reason: HOSPADM

## 2024-10-18 RX ORDER — SODIUM CHLORIDE, SODIUM LACTATE, POTASSIUM CHLORIDE, CALCIUM CHLORIDE 600; 310; 30; 20 MG/100ML; MG/100ML; MG/100ML; MG/100ML
INJECTION, SOLUTION INTRAVENOUS CONTINUOUS
Status: DISCONTINUED | OUTPATIENT
Start: 2024-10-18 | End: 2024-10-19

## 2024-10-18 RX ORDER — PAPAVERINE HYDROCHLORIDE 30 MG/ML
INJECTION INTRAMUSCULAR; INTRAVENOUS
Status: DISCONTINUED
Start: 2024-10-18 | End: 2024-10-18 | Stop reason: WASHOUT

## 2024-10-18 RX ORDER — PROCHLORPERAZINE MALEATE 5 MG/1
5 TABLET ORAL EVERY 6 HOURS PRN
Status: DISCONTINUED | OUTPATIENT
Start: 2024-10-18 | End: 2024-10-22 | Stop reason: HOSPADM

## 2024-10-18 RX ORDER — ONDANSETRON 4 MG/1
4 TABLET, ORALLY DISINTEGRATING ORAL EVERY 30 MIN PRN
Status: DISCONTINUED | OUTPATIENT
Start: 2024-10-18 | End: 2024-10-18 | Stop reason: HOSPADM

## 2024-10-18 RX ORDER — ONDANSETRON 4 MG/1
4 TABLET, ORALLY DISINTEGRATING ORAL EVERY 6 HOURS PRN
Status: DISCONTINUED | OUTPATIENT
Start: 2024-10-18 | End: 2024-10-22 | Stop reason: HOSPADM

## 2024-10-18 RX ORDER — NALOXONE HYDROCHLORIDE 0.4 MG/ML
0.1 INJECTION, SOLUTION INTRAMUSCULAR; INTRAVENOUS; SUBCUTANEOUS
Status: DISCONTINUED | OUTPATIENT
Start: 2024-10-18 | End: 2024-10-18 | Stop reason: HOSPADM

## 2024-10-18 RX ORDER — FENTANYL CITRATE 50 UG/ML
INJECTION, SOLUTION INTRAMUSCULAR; INTRAVENOUS PRN
Status: DISCONTINUED | OUTPATIENT
Start: 2024-10-18 | End: 2024-10-18

## 2024-10-18 RX ORDER — METHOCARBAMOL 750 MG/1
750 TABLET, FILM COATED ORAL 4 TIMES DAILY
Status: DISCONTINUED | OUTPATIENT
Start: 2024-10-18 | End: 2024-10-22 | Stop reason: HOSPADM

## 2024-10-18 RX ADMIN — KETAMINE HYDROCHLORIDE 10 MG: 10 INJECTION INTRAMUSCULAR; INTRAVENOUS at 09:37

## 2024-10-18 RX ADMIN — HYDROMORPHONE HYDROCHLORIDE 0.5 MG: 1 INJECTION, SOLUTION INTRAMUSCULAR; INTRAVENOUS; SUBCUTANEOUS at 09:11

## 2024-10-18 RX ADMIN — ACETAMINOPHEN 975 MG: 325 TABLET ORAL at 13:36

## 2024-10-18 RX ADMIN — HEPARIN SODIUM 2000 UNITS: 1000 INJECTION INTRAVENOUS; SUBCUTANEOUS at 10:22

## 2024-10-18 RX ADMIN — CEFAZOLIN 1 G: 1 INJECTION, POWDER, FOR SOLUTION INTRAMUSCULAR; INTRAVENOUS at 16:00

## 2024-10-18 RX ADMIN — SENNOSIDES AND DOCUSATE SODIUM 1 TABLET: 8.6; 5 TABLET ORAL at 19:58

## 2024-10-18 RX ADMIN — PHENYLEPHRINE HYDROCHLORIDE 100 MCG: 10 INJECTION INTRAVENOUS at 10:13

## 2024-10-18 RX ADMIN — PROTAMINE SULFATE 50 MG: 10 INJECTION, SOLUTION INTRAVENOUS at 10:45

## 2024-10-18 RX ADMIN — HEPARIN SODIUM 9000 UNITS: 1000 INJECTION INTRAVENOUS; SUBCUTANEOUS at 09:35

## 2024-10-18 RX ADMIN — METHOCARBAMOL 750 MG: 750 TABLET ORAL at 16:00

## 2024-10-18 RX ADMIN — DEXMEDETOMIDINE HYDROCHLORIDE 0.5 MCG/KG/HR: 400 INJECTION INTRAVENOUS at 08:25

## 2024-10-18 RX ADMIN — PROPOFOL 100 MG: 10 INJECTION, EMULSION INTRAVENOUS at 07:55

## 2024-10-18 RX ADMIN — KETAMINE HYDROCHLORIDE 20 MG: 10 INJECTION INTRAMUSCULAR; INTRAVENOUS at 08:35

## 2024-10-18 RX ADMIN — KETAMINE HYDROCHLORIDE 10 MG: 10 INJECTION INTRAMUSCULAR; INTRAVENOUS at 10:37

## 2024-10-18 RX ADMIN — LIDOCAINE HYDROCHLORIDE 5 ML: 10 INJECTION, SOLUTION INFILTRATION; PERINEURAL at 07:55

## 2024-10-18 RX ADMIN — Medication 2 G: at 08:28

## 2024-10-18 RX ADMIN — SODIUM CHLORIDE: 9 INJECTION, SOLUTION INTRAVENOUS at 08:25

## 2024-10-18 RX ADMIN — Medication 10 MG: at 09:24

## 2024-10-18 RX ADMIN — PHENYLEPHRINE HYDROCHLORIDE 100 MCG: 10 INJECTION INTRAVENOUS at 11:02

## 2024-10-18 RX ADMIN — ALBUMIN HUMAN: 0.05 INJECTION, SOLUTION INTRAVENOUS at 07:55

## 2024-10-18 RX ADMIN — FENTANYL CITRATE 50 MCG: 50 INJECTION INTRAMUSCULAR; INTRAVENOUS at 09:07

## 2024-10-18 RX ADMIN — DEXMEDETOMIDINE HYDROCHLORIDE 16 MCG: 100 INJECTION, SOLUTION INTRAVENOUS at 07:42

## 2024-10-18 RX ADMIN — SUGAMMADEX 200 MG: 100 INJECTION, SOLUTION INTRAVENOUS at 11:12

## 2024-10-18 RX ADMIN — KETAMINE HYDROCHLORIDE 10 MG: 10 INJECTION INTRAMUSCULAR; INTRAVENOUS at 10:52

## 2024-10-18 RX ADMIN — METHOCARBAMOL 750 MG: 750 TABLET ORAL at 13:36

## 2024-10-18 RX ADMIN — ROCURONIUM BROMIDE 50 MG: 50 INJECTION, SOLUTION INTRAVENOUS at 07:55

## 2024-10-18 RX ADMIN — Medication 5 MG: at 10:13

## 2024-10-18 RX ADMIN — FENTANYL CITRATE 25 MCG: 50 INJECTION INTRAMUSCULAR; INTRAVENOUS at 07:55

## 2024-10-18 RX ADMIN — FENTANYL CITRATE 25 MCG: 50 INJECTION INTRAMUSCULAR; INTRAVENOUS at 07:42

## 2024-10-18 RX ADMIN — METHOCARBAMOL 750 MG: 750 TABLET ORAL at 19:58

## 2024-10-18 RX ADMIN — ROCURONIUM BROMIDE 20 MG: 50 INJECTION, SOLUTION INTRAVENOUS at 09:07

## 2024-10-18 RX ADMIN — ACETAMINOPHEN 975 MG: 325 TABLET ORAL at 21:15

## 2024-10-18 RX ADMIN — ONDANSETRON 4 MG: 2 INJECTION INTRAMUSCULAR; INTRAVENOUS at 11:00

## 2024-10-18 RX ADMIN — OXYCODONE HYDROCHLORIDE 5 MG: 5 TABLET ORAL at 17:24

## 2024-10-18 RX ADMIN — Medication 10 MG: at 10:11

## 2024-10-18 RX ADMIN — OXYCODONE HYDROCHLORIDE 5 MG: 5 TABLET ORAL at 13:36

## 2024-10-18 ASSESSMENT — ACTIVITIES OF DAILY LIVING (ADL)
CONCENTRATING,_REMEMBERING_OR_MAKING_DECISIONS_DIFFICULTY: NO
DIFFICULTY_COMMUNICATING: NO
ADLS_ACUITY_SCORE: 23
ADLS_ACUITY_SCORE: 23
HEARING_DIFFICULTY_OR_DEAF: NO
DOING_ERRANDS_INDEPENDENTLY_DIFFICULTY: NO
ADLS_ACUITY_SCORE: 22
ADLS_ACUITY_SCORE: 23
ADLS_ACUITY_SCORE: 22
WEAR_GLASSES_OR_BLIND: YES
ADLS_ACUITY_SCORE: 22
ADLS_ACUITY_SCORE: 23
ADLS_ACUITY_SCORE: 22
ADLS_ACUITY_SCORE: 23
ADLS_ACUITY_SCORE: 23
ADLS_ACUITY_SCORE: 22
ADLS_ACUITY_SCORE: 23
ADLS_ACUITY_SCORE: 23
DRESSING/BATHING_DIFFICULTY: NO
CHANGE_IN_FUNCTIONAL_STATUS_SINCE_ONSET_OF_CURRENT_ILLNESS/INJURY: NO
ADLS_ACUITY_SCORE: 22
DIFFICULTY_EATING/SWALLOWING: NO
ADLS_ACUITY_SCORE: 22
ADLS_ACUITY_SCORE: 23
WALKING_OR_CLIMBING_STAIRS_DIFFICULTY: NO
ADLS_ACUITY_SCORE: 31
TOILETING_ISSUES: NO
FALL_HISTORY_WITHIN_LAST_SIX_MONTHS: NO
ADLS_ACUITY_SCORE: 23

## 2024-10-18 ASSESSMENT — ENCOUNTER SYMPTOMS: DYSRHYTHMIAS: 1

## 2024-10-18 NOTE — BRIEF OP NOTE
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 Anastomoses made end-to-end with oversewing and clipping of mid SFA and distal pop .  Complications: None.  Implants:   Implant Name Type Inv. Item Serial No.  Lot No. LRB No. Used Action   GRAFT PROPATEN RR [id number] THIN WALL [id number] - [id number] Graft GRAFT PROPATEN RR [id number] THIN WALL [id number] [employer]  Left 1 Implanted

## 2024-10-18 NOTE — ANESTHESIA CARE TRANSFER NOTE
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    Anesthesia Type:   General     Note:    Oropharynx: oral airway in place and spontaneously breathing  Level of Consciousness: drowsy and unresponsive  Oxygen Supplementation: face mask  Level of Supplemental Oxygen (L/min / FiO2): 8  Independent Airway: airway patency satisfactory and stable  Dentition: dentition unchanged  Vital Signs Stable: post-procedure vital signs reviewed and stable  Report to RN Given: handoff report given  Patient transferred to: PACU    Handoff Report: Identifed the Patient, Identified the Reponsible Provider, Reviewed the pertinent medical history, Discussed the surgical course, Reviewed Intra-OP anesthesia mangement and issues during anesthesia, Set expectations for post-procedure period and Allowed opportunity for questions and acknowledgement of understanding  Vitals:  Vitals Value Taken Time   /66 10/18/24 1131   Temp 35.5  C (95.9  F) 10/18/24 1135   Pulse 48 10/18/24 1135   Resp 21 10/18/24 1135   SpO2 97 % 10/18/24 1135   Vitals shown include unfiled device data.    Electronically Signed By: LILI Loza CRNA  October 18, 2024  11:36 AM

## 2024-10-18 NOTE — ANESTHESIA PROCEDURE NOTES
Arterial Line Procedure Note    Pre-Procedure   Staff -        Anesthesiologist:  Rah Patiño MD       Performed By: anesthesiologist       Pre-Anesthestic Checklist: patient identified, IV checked, risks and benefits discussed, informed consent, monitors and equipment checked, pre-op evaluation and at physician/surgeon's request  Timeout:       Correct Patient: Yes        Correct Procedure: Yes        Correct Site: Yes        Correct Position: Yes   Line Placement:   This line was placed Post Induction starting at 10/18/2024 7:55 AM and ending at 10/18/2024 8:05 AM  Procedure   Procedure: new line and arterial line       Laterality: left       Insertion Site: radial.  Sterile Prep        Standard elements of sterile barrier followed       Skin prep: Chloraprep  Insertion/Injection        Technique: Eugene's test completed and Seldinger Technique        Catheter Type/Size: 20 G, 12 cm  Narrative         Secured by: suture       Tegaderm dressing used.       Complications: None apparent,        Arterial waveform: Yes        IBP within 10% of NIBP: Yes

## 2024-10-18 NOTE — ANESTHESIA PREPROCEDURE EVALUATION
Anesthesia Pre-Procedure Evaluation    Patient: Jack Brown   MRN: [id number] : 1950        Procedure : Procedure(s):  CREATION, BYPASS, ARTERIAL, FEMORAL TO TIBIAL          Past Medical History:   Diagnosis Date    AAA (abdominal aortic aneurysm) (H)     Basal cell carcinoma     BPH (benign prostatic hypertrophy)     Cancer (H)     Skin on R.chest wall.    Carcinoid tumor (H)     Chest discomfort     Coronary artery disease     Detached retina, left 10/01/2014    Hyperlipidemia     Hypertension     Melanoma of skin (H)     PAD (peripheral artery disease) (H)     Pain of left lower leg 12/15/2023    Pain of left upper arm 10/10/2023    Palpitations     Paroxysmal atrial fibrillation (H)     Popliteal artery aneurysm (H)     Pseudoaneurysm (H)     Thrombosis     Thyroid nodule       Past Surgical History:   Procedure Laterality Date    BYPASS GRAFT ARTERY CORONARY N/A 2015    Procedure: CORONARY ARTERY BYPASS x 3, RIGHT ENDOSCOPIC VEIN HARVEST, LEFT INTERNAL MAMMARY ARTERY, ANESTHESIA TRANSESOPHAGEAL ECHOCARDIOGRAM;  Surgeon: Jayson Alvarado MD;  Location: [facility];  Service:     CARDIAC SURGERY      CATARACT EXTRACTION  2015    CYST REMOVAL      Ganglion cyst removal of both wrist    EYE SURGERY      HC REMOVAL PREPATELLA BURSA      Description: Excision Of Prepatellar Bursa;  Recorded: 2014;    HC REPAIR OF NASAL SEPTUM      Description: Septoplasty;  Recorded: 2014;    HERNIA REPAIR, UMBILICAL      IR LOWER EXTREMITY ANGIOGRAM LEFT  2023    IR LOWER EXTREMITY ANGIOGRAM LEFT  2024    IR THROMBOLYSIS ARTERIAL INFUSION INITIAL DAY  2023    PARS PLANA VITRECTOMY W/ REPAIR OF MACULAR HOLE  2014    DE EXCIS TENDON SHEATH LESION, HAND/FINGER      Description: Hand Excision Of A Tendon Cyst;  Recorded: 2011;    DE LAP, VENTRAL HERNIA REPAIR,REDUCIBLE      Description: Laparoscopy Repair Of Umbilical Hernia;  Recorded: 2011;     PSEUDOANEURYSM REPAIR Right 05/01/2024    Procedure: REPAIR, PSEUDOANEURYSM, ARTERY, FEMORAL RIGHT;  Surgeon: Laverne Crum MD;  Location: [facility]    RETINAL DETACHMENT SURGERY  10/01/2014    SKIN CANCER EXCISION  11/01/2015    right chest    THORACOSCOPY Right 04/14/2016    Procedure: RIGHT VIDEO ASSISTED THORACOSCOPY, RIGHT LOBECTOMY;  Surgeon: Vinny Chase MD;  Location: [facility];  Service:     VASECTOMY        Allergies   Allergen Reactions    Niacin Hives and Rash     Burning of the skin    Atorvastatin Muscle Pain (Myalgia)    Pravastatin Muscle Pain (Myalgia)      Social History     Tobacco Use    Smoking status: Never    Smokeless tobacco: Never   Substance Use Topics    Alcohol use: Yes     Alcohol/week: 8.0 standard drinks of alcohol      Wt Readings from Last 1 Encounters:   10/18/24 73.9 kg (163 lb)        Anesthesia Evaluation   Pt has had prior anesthetic.         ROS/MED HX  ENT/Pulmonary:  - neg pulmonary ROS     Neurologic:       Cardiovascular:     (+)  hypertension- Peripheral Vascular Disease-  CAD -  CABG- -                        dysrhythmias, a-fib,             METS/Exercise Tolerance:     Hematologic:     (+) History of blood clots,               Musculoskeletal:       GI/Hepatic:       Renal/Genitourinary:     (+)        BPH,      Endo:     (+)          thyroid problem,            Psychiatric/Substance Use:  - neg psychiatric ROS     Infectious Disease:       Malignancy:   (+) Malignancy, History of Lung.    Other:            Physical Exam    Airway  airway exam normal      Mallampati: II   TM distance: > 3 FB   Neck ROM: full   Mouth opening: > 3 cm    Respiratory Devices and Support         Dental       (+) Modest Abnormalities - crowns, retainers, 1 or 2 missing teeth      Cardiovascular   cardiovascular exam normal       Rhythm and rate: regular and normal     Pulmonary   pulmonary exam normal        breath sounds clear to auscultation  "          OUTSIDE LABS:  CBC:   Lab Results   Component Value Date    WBC 6.6 10/18/2024    WBC 6.2 05/31/2024    HGB 14.5 10/18/2024    HGB 14.1 05/31/2024    HCT 43.1 10/18/2024    HCT 42.2 05/31/2024     10/18/2024     05/31/2024     BMP:   Lab Results   Component Value Date     10/18/2024     05/31/2024    POTASSIUM 3.6 10/18/2024    POTASSIUM 3.8 05/31/2024    CHLORIDE 106 10/18/2024    CHLORIDE 107 05/31/2024    CO2 22 10/18/2024    CO2 26 05/31/2024    BUN 25.7 (H) 10/18/2024    BUN 22.7 05/31/2024    CR 1.16 10/18/2024    CR 1.16 05/31/2024     (H) 10/18/2024     (H) 10/18/2024     COAGS:   Lab Results   Component Value Date     (HH) 12/17/2023    INR 3.4 (H) 10/01/2024    FIBR 195 12/17/2023     POC: No results found for: \"BGM\", \"HCG\", \"HCGS\"  HEPATIC:   Lab Results   Component Value Date    ALBUMIN 4.1 05/31/2024    PROTTOTAL 7.2 05/31/2024    ALT 13 05/31/2024    AST 21 05/31/2024    ALKPHOS 70 05/31/2024    BILITOTAL 0.4 05/31/2024     OTHER:   Lab Results   Component Value Date    LACT 1.6 12/17/2023    A1C 5.5 12/15/2023    BIGG 9.4 10/18/2024    PHOS 2.8 12/19/2023    MAG 2.2 12/20/2023    TSH 1.25 10/25/2018    CRP 0.2 10/25/2018       Anesthesia Plan    ASA Status:  3    NPO Status:  NPO Appropriate    Anesthesia Type: General.     - Airway: ETT   Induction: Intravenous, Propofol.   Maintenance: Inhalation.   Techniques and Equipment:     - Lines/Monitors: 2nd IV, Arterial Line     - Drips/Meds: Dexmed. infusion, Ketamine     Consents    Anesthesia Plan(s) and associated risks, benefits, and realistic alternatives discussed. Questions answered and patient/representative(s) expressed understanding.     - Discussed: Risks, Benefits and Alternatives for BOTH SEDATION and the PROCEDURE were discussed     - Discussed with:  Patient, Spouse      - Extended Intubation/Ventilatory Support Discussed: No.           Postoperative Care    Pain management: IV " "analgesics.   PONV prophylaxis: Ondansetron (or other 5HT-3), Dexamethasone or Solumedrol     Comments:               Rah Patiño MD    I have reviewed the pertinent notes and labs in the chart from the past 30 days and (re)examined the patient.  Any updates or changes from those notes are reflected in this note.            # Drug Induced Coagulation Defect: home medication list includes an anticoagulant medication  # Drug Induced Platelet Defect: home medication list includes an antiplatelet medication   # Hypertension: Noted on problem list         # Overweight: Estimated body mass index is 26.71 kg/m  as calculated from the following:    Height as of 10/1/24: 1.664 m (5' 5.5\").    Weight as of this encounter: 73.9 kg (163 lb).        # History of CABG: noted on surgical history      "

## 2024-10-18 NOTE — PHARMACY - PREOPERATIVE ASSESSMENT CENTER
Pharmacist Admission Medication History    Admission medication history is complete. The information provided in this note is only as accurate as the sources available at the time of the update.    Information Source(s): Patient, Clinic records, and CareEverywhere/SureScripts via in-person    Pertinent Information: Warfarin dose was decreased at last [facility] clinic appt on 10/1/24.    Allergies reviewed with patient and updates made in EHR: yes    Medication History Completed By: Terry Rasheed [facility] 10/18/2024 6:30 AM    PTA Med List   Medication Sig Last Dose    acetaminophen (TYLENOL) 500 MG tablet [ACETAMINOPHEN (TYLENOL) 500 MG TABLET] Take 500 mg by mouth every 6 (six) hours as needed for pain. 10/17/2024    amLODIPine (NORVASC) 10 MG tablet TAKE 1 TABLET(10 MG) BY MOUTH DAILY 10/17/2024    aspirin 81 MG EC tablet Take 1 tablet (81 mg) by mouth daily 10/17/2024    finasteride (PROSCAR) 5 MG tablet Take 1 tablet (5 mg) by mouth daily 10/17/2024    metoprolol tartrate (LOPRESSOR) 50 MG tablet TAKE 1 TABLET(50 MG) BY MOUTH TWICE DAILY 10/18/2024 at 0430    rosuvastatin (CRESTOR) 5 MG tablet Take 1 tablet (5 mg) by mouth at bedtime 10/17/2024    tamsulosin (FLOMAX) 0.4 MG capsule TAKE 1 CAPSULE BY MOUTH DAILY AFTER BREAKFAST 10/17/2024    Vitamin D3 (VITAMIN D, CHOLECALCIFEROL,) 25 mcg (1000 units) tablet Take 1 tablet by mouth daily 10/17/2024    warfarin ANTICOAGULANT (COUMADIN) 1 MG tablet Take 4- tablets daily as directed based on inr results (Patient taking differently: Take by mouth See Admin Instructions.  2 mg every Tue; 4 mg all other days) 10/13/2024

## 2024-10-18 NOTE — OP NOTE
VASCULAR SURGERY OPERATIVE REPORT        LOCATION:    [facility]    Jack Brown   Medical Record #:  [id number]  YOB: 1950  Age:  73 year old     Date of Service: October 18, 2024    PRIMARY CARE PROVIDER: Mercedes Adorno    Preoperative diagnosis    Left popliteal artery aneurysm    Postoperative diagnosis    Same       Surgeon: Laverne Crum MD, RPVI     Assistant: Rusty Jennings MD, vascular surgery resident PGY 3                         Name of the procedure    Left superficial femoral artery to below the knee popliteal artery bypass with 8 mm ringed PTFE graft    Anesthesia:    General    Indication for procedure:    73-year-old male with a history of left common iliac artery aneurysm, bilateral popliteal artery aneurysms who is scheduled for left popliteal artery aneurysm repair.  Patient with history of acute occlusion of left popliteal artery aneurysm with acute limb ischemia status post tPA infusion and resolution of thrombosis.  Today he is scheduled for definitive treatment of the popliteal artery aneurysm.  Risk and benefits of this operation were explained, patient agreed to proceed.    Description of procedure:    Patient brought to the OR and positioned on OR table.  The left leg was sequentially prepped and draped in normal surgical fashion.  General anesthesia was induced.  Timeout was performed.  Preoperative antibiotics were given 30 minutes prior to the incision time.  We started with exposure of superficial femoral artery.  An incision was made on the medial surface  of the medial thigh.  Subcutaneous tissue was divided using electrocautery.  Sarah's fascia was divided.  Sartorius muscle was retracted inferiorly.  Then we were able to dissect superficial femoral artery without any difficulties.  Proximal and distal control was obtained.  Then additional incision was made on the medial surface of the left leg.  Subcutaneous tissue was divided using electrocautery.   The fascia was divided.  Then gastrocnemius muscle was retracted medially.  Upon entrance of the popliteal fossa, the popliteal vein was dissected and retracted inferiorly.  Popliteal artery was dissected sharply, proximal and distal control was obtained.  Then we proceeded with a tunneling between 2 incisions.  8 mm PTFE graft was introduced and passed through the tunnel making sure that the marks face up.  Then we proceeded with creation of proximal anastomosis.  8000 units of IV heparin was given.  We ligated above-knee popliteal artery with 2-0 Ethibond sutures x 2.  Additionally place 2 large vascular clips.  The superficial femoral artery was transected, and end-to-end anastomosis between the graft and the artery was created using running 5-0 Sikes-Marcos suture x 2.  Once the anastomosis was completed, the graft was clamped.  We additionally checked hemostasis of the anastomosis.  Then, we proceeded with distal anastomosis.  Similarly, the below-knee popliteal artery was ligated proximally in the wound.  The artery was transected, and end-to-end anastomosis between the graft and artery was created using 5-0 Sikes-Marcos suture.  Prior to the completion of the anastomosis, antegrade and retrograde flow were confirmed.  Anastomosis was completed.  Hemostasis was achieved.  Both wounds were copiously irrigated.  Both wounds were closed in layered fashion using 2-0 Vicryl, 3-0 Vicryl, and 4 Monocryl.  Skin glue and Tegaderms were applied.  Patient tolerated procedure well.  The count was correct.  Patient was transferred to the PACU in stable condition.    Blood loss 400 ml    Complications: None    Laverne Crum MD, [facility]  VASCULAR SURGERY

## 2024-10-18 NOTE — INTERVAL H&P NOTE
"I have reviewed the surgical (or preoperative) H&P that is linked to this encounter, and examined the patient. There are no significant changes    Clinical Conditions Present on Arrival:  Clinically Significant Risk Factors Present on Admission                 # Drug Induced Coagulation Defect: home medication list includes an anticoagulant medication  # Drug Induced Platelet Defect: home medication list includes an antiplatelet medication      # Overweight: Estimated body mass index is 26.71 kg/m  as calculated from the following:    Height as of 10/1/24: 1.664 m (5' 5.5\").    Weight as of this encounter: 73.9 kg (163 lb).       "

## 2024-10-18 NOTE — ANESTHESIA PROCEDURE NOTES
Airway       Patient location during procedure: OR       Procedure Start/Stop Times: 10/18/2024 7:57 AM  Staff -        CRNA: Master Parekh APRN CRNA       Performed By: CRNA  Consent for Airway        Urgency: elective  Indications and Patient Condition       Indications for airway management: shruthi-procedural       Induction type:intravenous       Mask difficulty assessment: 2 - vent by mask + OA or adjuvant +/- NMBA    Final Airway Details       Final airway type: endotracheal airway       Successful airway: ETT - single  Endotracheal Airway Details        ETT size (mm): 8.0       Cuffed: yes       Successful intubation technique: direct laryngoscopy       DL Blade Type: Blake 2       Grade View of Cords: 1       Adjucts: stylet       Position: Right       Measured from: lips       Secured at (cm): 22       Bite block used: None    Post intubation assessment        Placement verified by: capnometry, equal breath sounds and chest rise        Number of attempts at approach: 1       Number of other approaches attempted: 0       Secured with: silk tape       Ease of procedure: easy       Dentition: Intact    Medication(s) Administered   Medication Administration Time: 10/18/2024 7:57 AM

## 2024-10-18 NOTE — ANESTHESIA POSTPROCEDURE EVALUATION
Patient: Jack Brown    Procedure: Procedure(s):  CREATION, BYPASS, ARTERIAL, FEMORAL TO TIBIAL, LEFT       Anesthesia Type:  General    Note:  Disposition: Admission   Postop Pain Control: Uneventful            Sign Out: Well controlled pain   PONV: No   Neuro/Psych: Uneventful            Sign Out: Acceptable/Baseline neuro status   Airway/Respiratory: Uneventful            Sign Out: Acceptable/Baseline resp. status   CV/Hemodynamics: Uneventful            Sign Out: Acceptable CV status; No obvious hypovolemia; No obvious fluid overload   Other NRE: NONE   DID A NON-ROUTINE EVENT OCCUR? No           Last vitals:  Vitals Value Taken Time   /66 10/18/24 1131   Temp 35.6  C (96.08  F) 10/18/24 1231   Pulse 52 10/18/24 1231   Resp 14 10/18/24 1231   SpO2 94 % 10/18/24 1231   Vitals shown include unfiled device data.    Electronically Signed By: Rah Patiño MD  October 18, 2024  12:32 PM

## 2024-10-19 ENCOUNTER — APPOINTMENT (OUTPATIENT)
Dept: PHYSICAL THERAPY | Facility: HOSPITAL | Age: 74
DRG: 253 | End: 2024-10-19
Attending: SURGERY
Payer: MEDICARE

## 2024-10-19 LAB
ANION GAP SERPL CALCULATED.3IONS-SCNC: 9 MMOL/L (ref 7–15)
BUN SERPL-MCNC: 22.1 MG/DL (ref 8–23)
CALCIUM SERPL-MCNC: 8.9 MG/DL (ref 8.8–10.4)
CHLORIDE SERPL-SCNC: 107 MMOL/L (ref 98–107)
CREAT SERPL-MCNC: 1.07 MG/DL (ref 0.67–1.17)
EGFRCR SERPLBLD CKD-EPI 2021: 72 ML/MIN/1.73M2
ERYTHROCYTE [DISTWIDTH] IN BLOOD BY AUTOMATED COUNT: 14.2 % (ref 10–15)
GLUCOSE SERPL-MCNC: 103 MG/DL (ref 70–99)
HCO3 SERPL-SCNC: 23 MMOL/L (ref 22–29)
HCT VFR BLD AUTO: 36.1 % (ref 40–53)
HGB BLD-MCNC: 12.2 G/DL (ref 13.3–17.7)
MAGNESIUM SERPL-MCNC: 2.1 MG/DL (ref 1.7–2.3)
MCH RBC QN AUTO: 30.7 PG (ref 26.5–33)
MCHC RBC AUTO-ENTMCNC: 33.8 G/DL (ref 31.5–36.5)
MCV RBC AUTO: 91 FL (ref 78–100)
PHOSPHATE SERPL-MCNC: 2.5 MG/DL (ref 2.5–4.5)
PLATELET # BLD AUTO: 176 10E3/UL (ref 150–450)
POTASSIUM SERPL-SCNC: 4.1 MMOL/L (ref 3.4–5.3)
RBC # BLD AUTO: 3.98 10E6/UL (ref 4.4–5.9)
SODIUM SERPL-SCNC: 139 MMOL/L (ref 135–145)
WBC # BLD AUTO: 9.6 10E3/UL (ref 4–11)

## 2024-10-19 PROCEDURE — 36415 COLL VENOUS BLD VENIPUNCTURE: CPT

## 2024-10-19 PROCEDURE — 83735 ASSAY OF MAGNESIUM: CPT

## 2024-10-19 PROCEDURE — 80048 BASIC METABOLIC PNL TOTAL CA: CPT

## 2024-10-19 PROCEDURE — 85027 COMPLETE CBC AUTOMATED: CPT

## 2024-10-19 PROCEDURE — 84100 ASSAY OF PHOSPHORUS: CPT

## 2024-10-19 PROCEDURE — 97110 THERAPEUTIC EXERCISES: CPT | Mod: GP

## 2024-10-19 PROCEDURE — 97530 THERAPEUTIC ACTIVITIES: CPT | Mod: GP

## 2024-10-19 PROCEDURE — 250N000011 HC RX IP 250 OP 636

## 2024-10-19 PROCEDURE — 120N000001 HC R&B MED SURG/OB

## 2024-10-19 PROCEDURE — 250N000013 HC RX MED GY IP 250 OP 250 PS 637

## 2024-10-19 PROCEDURE — 97162 PT EVAL MOD COMPLEX 30 MIN: CPT | Mod: GP

## 2024-10-19 RX ADMIN — ACETAMINOPHEN 975 MG: 325 TABLET ORAL at 13:54

## 2024-10-19 RX ADMIN — HEPARIN SODIUM 5000 UNITS: 10000 INJECTION, SOLUTION INTRAVENOUS; SUBCUTANEOUS at 06:11

## 2024-10-19 RX ADMIN — ACETAMINOPHEN 975 MG: 325 TABLET ORAL at 21:47

## 2024-10-19 RX ADMIN — SENNOSIDES AND DOCUSATE SODIUM 1 TABLET: 8.6; 5 TABLET ORAL at 19:43

## 2024-10-19 RX ADMIN — CEFAZOLIN 1 G: 1 INJECTION, POWDER, FOR SOLUTION INTRAMUSCULAR; INTRAVENOUS at 01:02

## 2024-10-19 RX ADMIN — SENNOSIDES AND DOCUSATE SODIUM 1 TABLET: 8.6; 5 TABLET ORAL at 09:26

## 2024-10-19 RX ADMIN — ASPIRIN 81 MG: 81 TABLET, COATED ORAL at 09:26

## 2024-10-19 RX ADMIN — METHOCARBAMOL 750 MG: 750 TABLET ORAL at 19:43

## 2024-10-19 RX ADMIN — OXYCODONE HYDROCHLORIDE 5 MG: 5 TABLET ORAL at 09:25

## 2024-10-19 RX ADMIN — METHOCARBAMOL 750 MG: 750 TABLET ORAL at 09:27

## 2024-10-19 RX ADMIN — HEPARIN SODIUM 5000 UNITS: 10000 INJECTION, SOLUTION INTRAVENOUS; SUBCUTANEOUS at 21:47

## 2024-10-19 RX ADMIN — METHOCARBAMOL 750 MG: 750 TABLET ORAL at 15:58

## 2024-10-19 RX ADMIN — METHOCARBAMOL 750 MG: 750 TABLET ORAL at 13:55

## 2024-10-19 RX ADMIN — CLOPIDOGREL BISULFATE 75 MG: 75 TABLET ORAL at 09:26

## 2024-10-19 RX ADMIN — ACETAMINOPHEN 975 MG: 325 TABLET ORAL at 06:11

## 2024-10-19 RX ADMIN — HEPARIN SODIUM 5000 UNITS: 10000 INJECTION, SOLUTION INTRAVENOUS; SUBCUTANEOUS at 13:54

## 2024-10-19 ASSESSMENT — ACTIVITIES OF DAILY LIVING (ADL)
ADLS_ACUITY_SCORE: 25
ADLS_ACUITY_SCORE: 23
ADLS_ACUITY_SCORE: 23
ADLS_ACUITY_SCORE: 31
ADLS_ACUITY_SCORE: 25
ADLS_ACUITY_SCORE: 23
ADLS_ACUITY_SCORE: 31
ADLS_ACUITY_SCORE: 23
ADLS_ACUITY_SCORE: 25
ADLS_ACUITY_SCORE: 31
ADLS_ACUITY_SCORE: 31
ADLS_ACUITY_SCORE: 23
ADLS_ACUITY_SCORE: 25
ADLS_ACUITY_SCORE: 25
ADLS_ACUITY_SCORE: 23
ADLS_ACUITY_SCORE: 31
ADLS_ACUITY_SCORE: 23
ADLS_ACUITY_SCORE: 25
ADLS_ACUITY_SCORE: 23
ADLS_ACUITY_SCORE: 23
ADLS_ACUITY_SCORE: 31
ADLS_ACUITY_SCORE: 25
ADLS_ACUITY_SCORE: 31

## 2024-10-19 NOTE — PHARMACY-ADMISSION MEDICATION HISTORY
Admission medication history completed at [facility]. Please see Pharmacist Admission Medication History note from 10/18/2024.

## 2024-10-19 NOTE — PLAN OF CARE
Goal Outcome Evaluation:     Pt is A&Ox4, can be forgetful. Participating in therapies, assist x1 with transfers, up in chair for 2-3 hours today. Pt reports pain 6/10 with activity in RLE, received scheduled tylenol and robaxin as well as prn oxycodone which was effective. Denied SOB and cough, sats WNL, LS clear. RLE tegaderm intact with bruising . No drainage, redness or swelling assessed. Pedal and post tibial pulse present.  Roper removed around 11am. Pt voided 75ml of brownish, pink urine, bladder scanned for 84ml. Encouraging fluids. Pt is passing gas. Eating/drinking well. VSS, labs stable, no s/s of bleeding  Problem: Adult Inpatient Plan of Care  Goal: Optimal Comfort and Wellbeing  Outcome: Progressing  Intervention: Monitor Pain and Promote Comfort  Recent Flowsheet Documentation  Taken 10/19/2024 0925 by Talisha Hidalgo RN  Pain Management Interventions:   medication (see MAR)   breathing exercises   distraction   emotional support   relaxation techniques promoted   repositioned     Problem: Risk for Delirium  Goal: Improved Behavioral Control  Outcome: Progressing  Intervention: Minimize Safety Risk  Recent Flowsheet Documentation  Taken 10/19/2024 0931 by Talisha Hidalgo RN  Enhanced Safety Measures:   pain management   patient/family teach back on injury risk   assistive devices when indicated  Goal: Improved Attention and Thought Clarity  Outcome: Progressing     Problem: Comorbidity Management  Goal: Blood Pressure in Desired Range  Outcome: Progressing     Problem: Surgery Nonspecified  Goal: Absence of Bleeding  Outcome: Progressing  Goal: Effective Bowel Elimination  Outcome: Progressing  Goal: Absence of Infection Signs and Symptoms  Outcome: Progressing  Goal: Anesthesia/Sedation Recovery  Outcome: Progressing  Intervention: Optimize Anesthesia Recovery  Recent Flowsheet Documentation  Taken 10/19/2024 0931 by Talisha Hidalgo RN  Safety Promotion/Fall Prevention:   activity supervised    assistive device/personal items within reach   nonskid shoes/slippers when out of bed   patient and family education  Goal: Optimal Pain Control and Function  Outcome: Progressing  Intervention: Prevent or Manage Pain  Recent Flowsheet Documentation  Taken 10/19/2024 0925 by Talisha Hidalgo RN  Pain Management Interventions:   medication (see MAR)   breathing exercises   distraction   emotional support   relaxation techniques promoted   repositioned  Goal: Nausea and Vomiting Relief  Outcome: Progressing  Goal: Effective Urinary Elimination  Outcome: Progressing  Goal: Effective Oxygenation and Ventilation  Outcome: Progressing  Intervention: Optimize Oxygenation and Ventilation  Recent Flowsheet Documentation  Taken 10/19/2024 0931 by Talisha Hidalgo RN  Head of Bed (HOB) Positioning: HOB at 30 degrees     Problem: Pain Acute  Goal: Optimal Pain Control and Function  Outcome: Progressing  Intervention: Develop Pain Management Plan  Recent Flowsheet Documentation  Taken 10/19/2024 0925 by Talisha Hidalgo RN  Pain Management Interventions:   medication (see MAR)   breathing exercises   distraction   emotional support   relaxation techniques promoted   repositioned     Problem: Surgery Nonspecified  Goal: Blood Glucose Level Within Targeted Range  Outcome: Met     Problem: Adult Inpatient Plan of Care  Goal: Absence of Hospital-Acquired Illness or Injury  Intervention: Identify and Manage Fall Risk  Recent Flowsheet Documentation  Taken 10/19/2024 0931 by Talisha Hidalgo RN  Safety Promotion/Fall Prevention:   activity supervised   assistive device/personal items within reach   nonskid shoes/slippers when out of bed   patient and family education  Intervention: Prevent Skin Injury  Recent Flowsheet Documentation  Taken 10/19/2024 0931 by Talisha Hidalgo RN  Body Position: position changed independently

## 2024-10-19 NOTE — PLAN OF CARE
Problem: Adult Inpatient Plan of Care  Goal: Optimal Comfort and Wellbeing  Outcome: Progressing     Problem: Risk for Delirium  Goal: Improved Sleep  Outcome: Progressing     Problem: Surgery Nonspecified  Goal: Optimal Pain Control and Function  10/19/2024 0854 by Fransisca Martell RN  Outcome: Progressing  10/19/2024 0853 by Fransisca Martell RN  Outcome: Progressing   Goal Outcome Evaluation:  Patient is alert and oriented x4 and makes his needs known, Pain has been controlled with scheduled Tylenol and PRN Oxycodone. Surgery sites intact, covered with skin glue and Tegaderm. Roper patent, encouraged po fluids. VSS, Plan of Care reviewed.

## 2024-10-19 NOTE — PLAN OF CARE
Problem: Adult Inpatient Plan of Care  Goal: Plan of Care Review  Outcome: Progressing     Problem: Adult Inpatient Plan of Care  Goal: Patient-Specific Goal (Individualized)  Outcome: Progressing     Problem: Adult Inpatient Plan of Care  Goal: Absence of Hospital-Acquired Illness or Injury  Outcome: Progressing  Intervention: Identify and Manage Fall Risk  Recent Flowsheet Documentation  Taken 10/18/2024 1553 by Adegun, Oluwadamilola, RN  Safety Promotion/Fall Prevention:   nonskid shoes/slippers when out of bed   activity supervised   assistive device/personal items within reach  Intervention: Prevent Skin Injury  Recent Flowsheet Documentation  Taken 10/18/2024 1553 by Adegun, Oluwadamilola, RN  Body Position: position changed independently  Intervention: Prevent and Manage VTE (Venous Thromboembolism) Risk  Recent Flowsheet Documentation  Taken 10/18/2024 1553 by Adegun, Oluwadamilola, RN  VTE Prevention/Management: SCDs on (sequential compression devices)     Problem: Adult Inpatient Plan of Care  Goal: Optimal Comfort and Wellbeing  Outcome: Progressing  Intervention: Monitor Pain and Promote Comfort  Recent Flowsheet Documentation  Taken 10/18/2024 2115 by Adegun, Oluwadamilola, RN  Pain Management Interventions: medication (see MAR)     Problem: Risk for Delirium  Goal: Improved Behavioral Control  Outcome: Progressing  Intervention: Minimize Safety Risk  Recent Flowsheet Documentation  Taken 10/18/2024 1553 by Adegun, Oluwadamilola, RN  Enhanced Safety Measures: pain management     Problem: Comorbidity Management  Goal: Blood Pressure in Desired Range  Outcome: Progressing     Problem: Surgery Nonspecified  Goal: Absence of Infection Signs and Symptoms  Outcome: Progressing     Problem: Surgery Nonspecified  Goal: Anesthesia/Sedation Recovery  Outcome: Progressing  Intervention: Optimize Anesthesia Recovery  Recent Flowsheet Documentation  Taken 10/18/2024 1553 by Adegun, Oluwadamilola, RN  Safety  Promotion/Fall Prevention:   nonskid shoes/slippers when out of bed   activity supervised   assistive device/personal items within reach     Problem: Surgery Nonspecified  Goal: Optimal Pain Control and Function  Outcome: Progressing  Intervention: Prevent or Manage Pain  Recent Flowsheet Documentation  Taken 10/18/2024 2115 by Adegun, Oluwadamilola, RN  Pain Management Interventions: medication (see MAR)     Problem: Surgery Nonspecified  Goal: Effective Oxygenation and Ventilation  Outcome: Progressing  Intervention: Optimize Oxygenation and Ventilation  Recent Flowsheet Documentation  Taken 10/18/2024 1553 by Adegun, Oluwadamilola, RN  Head of Bed (HOB) Positioning: HOB at 20-30 degrees   Goal Outcome Evaluation: Pt alert and oriented, intermittent confusion noted. VSS, on RA. Pain managed with scheduled meds and PRN oxy X1. Incision sites on L leg WDL. Pt on tele, NSR this shift. IV abx given per order. Roper in place, voiding good amounts.

## 2024-10-19 NOTE — PROGRESS NOTES
"PT Evaluation   10/19/24 1050   Appointment Info   Signing Clinician's Name / Credentials (PT) Hyun Dave PT, DPT   Living Environment   People in Home spouse   Current Living Arrangements house   Home Accessibility stairs to enter home;stairs within home   Number of Stairs, Main Entrance 3   Stair Railings, Main Entrance railings on both sides of stairs   Number of Stairs, Within Home, Primary ten  (down to basement)   Stair Railings, Within Home, Primary railings on both sides of stairs   Living Environment Comments bed and bath on main level   Self-Care   Usual Activity Tolerance good   Current Activity Tolerance fair   Regular Exercise Yes   Activity/Exercise Type other (see comments)  (gardening)   Equipment Currently Used at Home walker, rolling;other (see comments);commode chair  (2 walking poles)   Fall history within last six months no   Activity/Exercise/Self-Care Comment IND with ADLs. Drives. Spouse does not drive. Shares cooking and cleaning with wife. Does not use AD.   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  Uneventful surgery.  Patient doing very well with some mild confusion overnight that has resolved.  Still some significant discomfort with movement of the limb as expected in the postop period.  Resume oral intake.  Still has Roper catheter in place.\"   Existing Precautions/Restrictions fall   Cognition   Affect/Mental Status (Cognition) WNL   Orientation Status (Cognition) oriented to;person;place;situation;time  (did not formally assess)   Follows Commands (Cognition) WNL   Pain Assessment "   Patient Currently in Pain Yes, see Vital Sign flowsheet  (LLE with any mobility)   Integumentary/Edema   Integumentary/Edema other (describe)   Integumentary/Edema Comments 2 Incision sites in LLE   Posture    Posture Not impaired   Range of Motion (ROM)   Range of Motion ROM deficits secondary to pain;ROM deficits secondary to surgical procedure;ROM deficits secondary to weakness   ROM Comment LLE AROM severely limited, improves with work on PROM then AAROM in session   Strength (Manual Muscle Testing)   Strength (Manual Muscle Testing) Deficits observed during functional mobility   Bed Mobility   Bed Mobility supine-sit   Supine-Sit Jamison (Bed Mobility) moderate assist (50% patient effort);1 person assist   Bed Mobility Limitations decreased ability to use legs for bridging/pushing   Impairments Contributing to Impaired Bed Mobility pain;decreased strength   Assistive Device (Bed Mobility) bed rails;draw sheet   Comment, (Bed Mobility) audibly in pain throughout, very slow, unable to initiate movement of LLE without assistance   Transfers   Transfers sit-stand transfer   Transfer Safety Concerns Noted losing balance backward;decreased balance during turns;decreased sequencing ability   Impairments Contributing to Impaired Transfers pain;impaired balance;decreased strength   Sit-Stand Transfer   Sit-Stand Jamison (Transfers) minimum assist (75% patient effort);1 person assist;verbal cues;nonverbal cues (demo/gesture)   Assistive Device (Sit-Stand Transfers) walker, front-wheeled   Comment, (Sit-Stand Transfer) Painful LLE in standing and during sit<>stand movement   Gait/Stairs (Locomotion)   Jamison Level (Gait) 1 person assist;minimum assist (75% patient effort);verbal cues;nonverbal cues (demo/gesture)   Assistive Device (Gait) walker, front-wheeled   Distance in Feet (Gait) 1'   Pattern (Gait) step-to   Deviations/Abnormal Patterns (Gait) antalgic;stride length decreased;gait speed  decreased;mar decreased;weight shifting decreased   Comment, (Gait/Stairs) Deferred stair assessment per high levels of pain with amb. Pt reports 9/10 on pain scale with amb.   Balance   Balance other (describe)   Balance Comments cga-Rob with FWW for amb. CGA for static standing. SBA sitting balance with pt using BUEs on EOB for support.   Clinical Impression   Criteria for Skilled Therapeutic Intervention Yes, treatment indicated   PT Diagnosis (PT) Impaired functional mobility   Influenced by the following impairments Impaired strength, balance, activity tolerance; pain   Functional limitations due to impairments Bed mobility, transfers, gait, endurance   Clinical Presentation (PT Evaluation Complexity) evolving   Clinical Presentation Rationale Clinical judgment   Clinical Decision Making (Complexity) moderate complexity   Planned Therapy Interventions (PT) balance training;bed mobility training;gait training;home exercise program;neuromuscular re-education;patient/family education;stair training;strengthening;stretching;transfer training;progressive activity/exercise;home program guidelines   Risk & Benefits of therapy have been explained evaluation/treatment results reviewed;participants included;patient;participants voiced agreement with care plan   PT Total Evaluation Time   PT Eval, Moderate Complexity Minutes (49390) 12   Physical Therapy Goals   PT Frequency 5x/week   PT Predicted Duration/Target Date for Goal Attainment 10/26/24   PT Goals Bed Mobility;Transfers;Gait;Stairs   PT: Bed Mobility Supervision/stand-by assist;Supine to/from sit   PT: Transfers Supervision/stand-by assist;Sit to/from stand;Bed to/from chair;Assistive device   PT: Gait Supervision/stand-by assist;Rolling walker;50 feet   PT: Stairs Minimal assist;3 stairs;Rail on both sides   Interventions   Interventions Quick Adds Therapeutic Procedure;Therapeutic Activity   Therapeutic Procedure/Exercise   Group Therapeutic Procedures  Minutes (96339) 16   Symptoms Noted During/After Treatment fatigue;increased pain   Treatment Detail/Skilled Intervention instruction and demo provided on seated therex for pt to perform IND to improve activity tolerance and strength of BLEs - Pt initially unable to move LLE - pt stating not due to pain but that he felt his brain was not connecting to LLE. had pt perform first on RLE to understand exercise, then began with maxA on LLE copying the same exercise. hip flex, hip abd/add, LAQ, heel slide, ankle pf/df. Improves to Rob by end of session with repeated reps, and then IND with exercise on BLEs, notably effortful and smaller AROM on LLE compared to RLE   Therapeutic Activity   Therapeutic Activities: dynamic activities to improve functional performance Minutes (61373) 14   Symptoms Noted During/After Treatment Fatigue;Increased pain   Treatment Detail/Skilled Intervention eval completed, tx initiated.additional 5' to recliner Rob with FWW, maximum verbal and visual cueing for sequencing, pt slow, demos high effort, and painful throughout. Patient motivated to participate throughout session. sit<>stand practice recliner<>FWW to improve transfers and standing tolerance with FWW, cueing for safe technique - x5 Rob progressing to CGA, pt stands for <10 sec each stand d/t pain in LLE. ended session with pt sitting up in recliner, call light & all other needs in reach.   PT Discharge Planning   PT Plan Prog bed mob, transf, gait FWW   PT Discharge Recommendation (DC Rec) Transitional Care Facility   PT Rationale for DC Rec Patient needing Ax1 for all mobility at this time, with activity tolerance severely limited by pain. Unable to amb functional distance. Recommend TCU at discharge.   PT Brief overview of current status modA sup>sit, cga-Rob sit<>stands FWW, Rob 6' amb FWW   PT Equipment Needed at Discharge walker, rolling  (fww)   Total Session Time   Timed Code Treatment Minutes 14   Total Session Time (sum  of timed and untimed services) 42

## 2024-10-19 NOTE — PROGRESS NOTES
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  Uneventful surgery.  Patient doing very well with some mild confusion overnight that has resolved.  Still some significant discomfort with movement of the limb as expected in the postop period.  Resume oral intake.  Still has Roper catheter in place.    Exam:    Normal motor and sensory exam of the foot  Palpable pedal pulses of the right foot.  Incisions with Dermabond clean and dry.    Impression: Excellent early outcome post bypass surgery for popliteal artery aneurysm.    Plan: Up to chair today.  Work with physical therapy.  Roper catheter out today.  Stop IV fluids today.

## 2024-10-20 ENCOUNTER — APPOINTMENT (OUTPATIENT)
Dept: PHYSICAL THERAPY | Facility: HOSPITAL | Age: 74
DRG: 253 | End: 2024-10-20
Attending: SURGERY
Payer: MEDICARE

## 2024-10-20 ENCOUNTER — APPOINTMENT (OUTPATIENT)
Dept: OCCUPATIONAL THERAPY | Facility: HOSPITAL | Age: 74
DRG: 253 | End: 2024-10-20
Attending: STUDENT IN AN ORGANIZED HEALTH CARE EDUCATION/TRAINING PROGRAM
Payer: MEDICARE

## 2024-10-20 LAB — GLUCOSE BLDC GLUCOMTR-MCNC: 122 MG/DL (ref 70–99)

## 2024-10-20 PROCEDURE — 250N000013 HC RX MED GY IP 250 OP 250 PS 637

## 2024-10-20 PROCEDURE — 97110 THERAPEUTIC EXERCISES: CPT | Mod: GP

## 2024-10-20 PROCEDURE — 97530 THERAPEUTIC ACTIVITIES: CPT | Mod: GP

## 2024-10-20 PROCEDURE — 250N000011 HC RX IP 250 OP 636: Performed by: STUDENT IN AN ORGANIZED HEALTH CARE EDUCATION/TRAINING PROGRAM

## 2024-10-20 PROCEDURE — 250N000011 HC RX IP 250 OP 636

## 2024-10-20 PROCEDURE — 97166 OT EVAL MOD COMPLEX 45 MIN: CPT | Mod: GO

## 2024-10-20 PROCEDURE — 97535 SELF CARE MNGMENT TRAINING: CPT | Mod: GO

## 2024-10-20 PROCEDURE — 97116 GAIT TRAINING THERAPY: CPT | Mod: GP

## 2024-10-20 PROCEDURE — 97530 THERAPEUTIC ACTIVITIES: CPT | Mod: GO

## 2024-10-20 PROCEDURE — 120N000001 HC R&B MED SURG/OB

## 2024-10-20 RX ORDER — KETOROLAC TROMETHAMINE 15 MG/ML
15 INJECTION, SOLUTION INTRAMUSCULAR; INTRAVENOUS EVERY 6 HOURS
Status: DISCONTINUED | OUTPATIENT
Start: 2024-10-20 | End: 2024-10-22 | Stop reason: HOSPADM

## 2024-10-20 RX ADMIN — OXYCODONE HYDROCHLORIDE 5 MG: 5 TABLET ORAL at 07:38

## 2024-10-20 RX ADMIN — KETOROLAC TROMETHAMINE 15 MG: 15 INJECTION, SOLUTION INTRAMUSCULAR; INTRAVENOUS at 16:36

## 2024-10-20 RX ADMIN — CLOPIDOGREL BISULFATE 75 MG: 75 TABLET ORAL at 07:39

## 2024-10-20 RX ADMIN — SENNOSIDES AND DOCUSATE SODIUM 1 TABLET: 8.6; 5 TABLET ORAL at 19:43

## 2024-10-20 RX ADMIN — METHOCARBAMOL 750 MG: 750 TABLET ORAL at 07:38

## 2024-10-20 RX ADMIN — ASPIRIN 81 MG: 81 TABLET, COATED ORAL at 07:38

## 2024-10-20 RX ADMIN — METHOCARBAMOL 750 MG: 750 TABLET ORAL at 12:32

## 2024-10-20 RX ADMIN — KETOROLAC TROMETHAMINE 15 MG: 15 INJECTION, SOLUTION INTRAMUSCULAR; INTRAVENOUS at 22:17

## 2024-10-20 RX ADMIN — HEPARIN SODIUM 5000 UNITS: 10000 INJECTION, SOLUTION INTRAVENOUS; SUBCUTANEOUS at 22:17

## 2024-10-20 RX ADMIN — ACETAMINOPHEN 975 MG: 325 TABLET ORAL at 22:18

## 2024-10-20 RX ADMIN — KETOROLAC TROMETHAMINE 15 MG: 15 INJECTION, SOLUTION INTRAMUSCULAR; INTRAVENOUS at 10:36

## 2024-10-20 RX ADMIN — METHOCARBAMOL 750 MG: 750 TABLET ORAL at 16:36

## 2024-10-20 RX ADMIN — ACETAMINOPHEN 975 MG: 325 TABLET ORAL at 05:48

## 2024-10-20 RX ADMIN — SENNOSIDES AND DOCUSATE SODIUM 1 TABLET: 8.6; 5 TABLET ORAL at 07:38

## 2024-10-20 RX ADMIN — POLYETHYLENE GLYCOL 3350 17 G: 17 POWDER, FOR SOLUTION ORAL at 10:41

## 2024-10-20 RX ADMIN — HEPARIN SODIUM 5000 UNITS: 10000 INJECTION, SOLUTION INTRAVENOUS; SUBCUTANEOUS at 05:49

## 2024-10-20 RX ADMIN — METHOCARBAMOL 750 MG: 750 TABLET ORAL at 19:43

## 2024-10-20 RX ADMIN — ACETAMINOPHEN 975 MG: 325 TABLET ORAL at 14:12

## 2024-10-20 RX ADMIN — HEPARIN SODIUM 5000 UNITS: 10000 INJECTION, SOLUTION INTRAVENOUS; SUBCUTANEOUS at 14:12

## 2024-10-20 ASSESSMENT — ACTIVITIES OF DAILY LIVING (ADL)
ADLS_ACUITY_SCORE: 31

## 2024-10-20 NOTE — PROGRESS NOTES
"Occupational Therapy     10/20/24 6478   Appointment Info   Signing Clinician's Name / Credentials (OT) Bianka Mccartneyyeyokell OTR/L   Living Environment   People in Home spouse   Current Living Arrangements house   Home Accessibility stairs to enter home;stairs within home   Number of Stairs, Main Entrance 3   Stair Railings, Main Entrance railings on both sides of stairs   Number of Stairs, Within Home, Primary ten   Stair Railings, Within Home, Primary railings on both sides of stairs   Self-Care   Usual Activity Tolerance good   Current Activity Tolerance fair   Regular Exercise Yes   Activity/Exercise Type other (see comments)  (gardening)   Equipment Currently Used at Home other (see comments)  (uses 2 walking poles for community distances/walks otherwise doesn't use AD)   Fall history within last six months no   Activity/Exercise/Self-Care Comment Prior to admit, independent w/ADLs/IADLs.   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 and underwent thrombolytic therapy.\"   Cognitive Status Examination   Orientation Status orientation to person, place and time   Affect/Mental Status (Cognitive) WNL   Follows Commands WNL   Pain Assessment   Patient Currently in Pain Yes, see Vital Sign flowsheet  (L LE w/activity-intermittent pain)   Posture   Posture not impaired   Range of Motion Comprehensive   General Range of Motion no range of motion deficits identified   Strength Comprehensive (MMT)   Comment, General Manual Muscle Testing (MMT) Assessment demo WFL B UE strength, but does fatigue w/activity "   Coordination   Upper Extremity Coordination No deficits were identified   Bed Mobility   Comment (Bed Mobility) mod A for L LE sup to sit w/use of bedrail & increased time & effort.   Transfers   Transfer Comments Min A STS from elevated bed ht & chair.   Balance   Balance Comments CGA static standing & min A dynamic standing w/FWW; limited standing ~1 minute d/t LE pain & fatigue   Activities of Daily Living   BADL Assessment/Intervention lower body dressing;toileting   Lower Body Dressing Assessment/Training   Comment, (Lower Body Dressing) dependent w/donning socks   Toileting   Comment, (Toileting) min/mod A to use urinal; unable to stand to perform or amb to BR yet.   Clinical Impression   Criteria for Skilled Therapeutic Interventions Met (OT) Yes, treatment indicated   OT Diagnosis decreased ADL activity tolerance   OT Problem List-Impairments impacting ADL problems related to;activity tolerance impaired;balance;mobility;pain;strength   Assessment of Occupational Performance 5 or more Performance Deficits   Identified Performance Deficits decreased activity tolerance, trfs, LB drsg, pain, toileting   Planned Therapy Interventions (OT) ADL retraining;bed mobility training;strengthening;transfer training;progressive activity/exercise   Clinical Decision Making Complexity (OT) detailed assessment/moderate complexity   Risk & Benefits of therapy have been explained evaluation/treatment results reviewed;care plan/treatment goals reviewed;current/potential barriers reviewed;risks/benefits reviewed;participants included;patient   Clinical Impression Comments Pt presents post surgery w/L LE pain, decreased balance & activity tolerance impacting his ability to perform all ADLs, trfs & functional mobility.   OT Total Evaluation Time   OT Eval, Moderate Complexity Minutes (93472) 10   OT Goals   Therapy Frequency (OT) 5 times/week   OT Predicted Duration/Target Date for Goal Attainment 10/27/24   OT Goals OT Goal 1;OT  Goal 2;Toilet Transfer/Toileting;Lower Body Dressing   OT: Toilet Transfer/Toileting Minimal assist  (on/off commode)   OT: Goal 1 Pt to demo CGA moving L LE in/OOB with AE PRN in prep EOB ADLs.   OT: Goal 2 Pt to demo @ least 5 minutes standing tolerance w/CGA & FWW in prep GH @ sink.   Interventions   Interventions Quick Adds Therapeutic Activity   Self-Care/Home Management   Self-Care/Home Mgmt/ADL, Compensatory, Meal Prep Minutes (14847) 12   Symptoms Noted During/After Treatment (Meal Preparation/Planning Training) fatigue;increased pain   Treatment Detail/Skilled Intervention Pt needing step by step instruction & increased time for bed. mob. Pt needing L LE supported & lowering it to the floor in slow increments d/t pain & edema. Bed mob the most painful activity for pt. Briefly discussed trial of leg . Additional bed & chair trfs w/min A-cues for tech-increased pain w/stand to sit/controlled descent w/LE limitations. Intro easiest clothing & footwear options to don/doff & TH demo use of reacher for LB clothing (has a reacher @ home). Pt tends to hold his breath-cues for breathing through activity.   Therapeutic Activities   Therapeutic Activity Minutes (46102) 9   Symptoms noted during/after treatment fatigue;increased pain   Treatment Detail/Skilled Intervention Additional static standing ~1 minute w/CGA & FWW. Very slow amb bed to chair w/pt needing step by step directions for taking steps & using UEs on walker to offload to take steps w/L foot. Incr time & effort to complete; cues for breathing & relaxing @ ldrs. At end of session, pt reclined in chair w/alarm activated & call light in hand.   OT Discharge Planning   OT Plan Very painful L LE-trial leg , bed mob, close trfs, trfs, standing tolerance   OT Discharge Recommendation (DC Rec) Transitional Care Facility   OT Rationale for DC Rec Pt currently needing 24 hr A of 1 for ADLs/IADLs & significantly below his prior level of independence.  Pt could benefit from continued OT.   OT Brief overview of current status mod A bed mob, min/mod A trfs, dependent w/socks   Total Session Time   Timed Code Treatment Minutes 21   Total Session Time (sum of timed and untimed services) 31

## 2024-10-20 NOTE — PROGRESS NOTES
Vascular Surgery Progress Note    POD 2 s/p RLE SFA to popliteal artery bypass. Voided well after Roper removal. Tolerating diet. Worked with PT yesterday, however still requires a lot of assistance and will likely need discharge to TCU.     Decreased pain today, although still very tender on exam. Strong PT pulse palpable. Incisions healing well, CDI, no SOI. Sensorimotor intact.     Plan:  - Toradol added to pain regimen, continue to monitor creatinine  - Continue getting OOBTC, ambulating with nurse/PT  - Bowel regimen to counteract any constipation  - Continue ASA/plavix  - DVT ppx: SQH    S/e and plan d/w Dr. Vargas.      Vidhi Issa MD  PGY-6  Vascular Surgery  Pager: [phone]    Please page me directly with any questions/concerns during regular weekday hours before 5PM. If there is no response, if it is a weekend, or if it is during evening hours, then please use the [facility] [employer] system to page the first-call resident on call for vascular surgery.

## 2024-10-20 NOTE — PLAN OF CARE
Goal Outcome Evaluation:     Pt is A&Ox4, forgetful, assistx1 with walker for transfers and ambulation. Pt is participating in therapies, up in chair a few hours today. Reports pain/stiffness with activity, received scheduled tordol, robaxin and tylenol, prn oxycodone given x1 which ahs been effective. Tegadrem to incisions intact, bruising present, no swellin, redness pr draiange assessed. Post tibial pulse present, weak pedal pulse present. Pt is passing gas, received scheduled bowel medications, denied nausea, eating/ drinking well. Pt is voiding WNL. LS clear, denied SOB and cough, sats WNL. VSS, labs stable  Problem: Adult Inpatient Plan of Care  Goal: Optimal Comfort and Wellbeing  Outcome: Progressing     Problem: Risk for Delirium  Goal: Improved Behavioral Control  Outcome: Progressing  Intervention: Minimize Safety Risk  Recent Flowsheet Documentation  Taken 10/20/2024 0730 by Talisha Hidalgo RN  Enhanced Safety Measures:   pain management   patient/family teach back on injury risk   assistive devices when indicated  Goal: Improved Attention and Thought Clarity  Outcome: Progressing     Problem: Comorbidity Management  Goal: Blood Pressure in Desired Range  Outcome: Progressing     Problem: Surgery Nonspecified  Goal: Absence of Bleeding  Outcome: Progressing  Goal: Effective Bowel Elimination  Outcome: Progressing  Goal: Absence of Infection Signs and Symptoms  Outcome: Progressing  Goal: Optimal Pain Control and Function  Outcome: Progressing  Goal: Effective Urinary Elimination  Outcome: Progressing     Problem: Pain Acute  Goal: Optimal Pain Control and Function  Outcome: Progressing  Intervention: Prevent or Manage Pain  Recent Flowsheet Documentation  Taken 10/20/2024 0730 by Talisha Hidalgo RN  Bowel Elimination Promotion:   adequate fluid intake promoted   ambulation promoted     Problem: Surgery Nonspecified  Goal: Fluid and Electrolyte Balance  Outcome: Met  Goal: Anesthesia/Sedation  Recovery  Outcome: Met  Intervention: Optimize Anesthesia Recovery  Recent Flowsheet Documentation  Taken 10/20/2024 0730 by Talisha Hidalgo, RN  Safety Promotion/Fall Prevention:   activity supervised   assistive device/personal items within reach   nonskid shoes/slippers when out of bed   patient and family education  Goal: Nausea and Vomiting Relief  Outcome: Met  Goal: Effective Oxygenation and Ventilation  Outcome: Met  Intervention: Optimize Oxygenation and Ventilation  Recent Flowsheet Documentation  Taken 10/20/2024 0730 by Talisha Hidalgo, RN  Head of Bed (HOB) Positioning: HOB at 30 degrees

## 2024-10-20 NOTE — PLAN OF CARE
Problem: Adult Inpatient Plan of Care  Goal: Plan of Care Review  Outcome: Progressing  Flowsheets (Taken 10/19/2024 1953)  Plan of Care Reviewed With:   patient   spouse  Overall Patient Progress: improving     Problem: Adult Inpatient Plan of Care  Goal: Patient-Specific Goal (Individualized)  Outcome: Progressing     Problem: Adult Inpatient Plan of Care  Goal: Absence of Hospital-Acquired Illness or Injury  Outcome: Progressing  Intervention: Identify and Manage Fall Risk  Recent Flowsheet Documentation  Taken 10/19/2024 1555 by Adegun, Oluwadamilola, RN  Safety Promotion/Fall Prevention:   nonskid shoes/slippers when out of bed   activity supervised   assistive device/personal items within reach  Intervention: Prevent Skin Injury  Recent Flowsheet Documentation  Taken 10/19/2024 1555 by Adegun, Oluwadamilola, RN  Body Position: position changed independently  Intervention: Prevent and Manage VTE (Venous Thromboembolism) Risk  Recent Flowsheet Documentation  Taken 10/19/2024 1555 by Adegun, Oluwadamilola, RN  VTE Prevention/Management: SCDs on (sequential compression devices)     Problem: Adult Inpatient Plan of Care  Goal: Optimal Comfort and Wellbeing  Outcome: Progressing     Problem: Risk for Delirium  Goal: Improved Behavioral Control  Outcome: Progressing  Intervention: Minimize Safety Risk  Recent Flowsheet Documentation  Taken 10/19/2024 1555 by Adegun, Oluwadamilola, RN  Enhanced Safety Measures: pain management     Problem: Comorbidity Management  Goal: Blood Pressure in Desired Range  Outcome: Progressing     Problem: Surgery Nonspecified  Goal: Absence of Bleeding  Outcome: Progressing     Problem: Surgery Nonspecified  Goal: Fluid and Electrolyte Balance  Outcome: Progressing     Problem: Surgery Nonspecified  Goal: Absence of Infection Signs and Symptoms  Outcome: Progressing     Problem: Surgery Nonspecified  Goal: Optimal Pain Control and Function  Outcome: Progressing     Problem: Surgery  Nonspecified  Goal: Effective Urinary Elimination  Outcome: Progressing     Problem: Surgery Nonspecified  Goal: Effective Oxygenation and Ventilation  Outcome: Progressing  Intervention: Optimize Oxygenation and Ventilation  Recent Flowsheet Documentation  Taken 10/19/2024 1555 by Adegun, Oluwadamilola, RN  Head of Bed (HOB) Positioning: HOB at 20-30 degrees     Problem: Pain Acute  Goal: Optimal Pain Control and Function  Outcome: Progressing   Goal Outcome Evaluation: Pt alert and oriented, able to make needs known. VSS, denies pain. Dressings on RLE WDL. Pt voiding appropriately with bedside urinal. No BM yet, passing gas.      Plan of Care Reviewed With: patient, spouse    Overall Patient Progress: improvingOverall Patient Progress: improving

## 2024-10-20 NOTE — PLAN OF CARE
Problem: Adult Inpatient Plan of Care  Goal: Absence of Hospital-Acquired Illness or Injury  Outcome: Progressing  Intervention: Identify and Manage Fall Risk  Recent Flowsheet Documentation  Taken 10/20/2024 0112 by Mercedes Blackwell RN  Safety Promotion/Fall Prevention:   nonskid shoes/slippers when out of bed   activity supervised   assistive device/personal items within reach  Intervention: Prevent and Manage VTE (Venous Thromboembolism) Risk  Recent Flowsheet Documentation  Taken 10/20/2024 0112 by Mercedes Blackwell RN  VTE Prevention/Management: SCDs on (sequential compression devices)     Problem: Risk for Delirium  Goal: Improved Sleep  Outcome: Progressing     Problem: Comorbidity Management  Goal: Blood Pressure in Desired Range  Outcome: Progressing     Problem: Surgery Nonspecified  Goal: Absence of Bleeding  Outcome: Progressing  Goal: Absence of Infection Signs and Symptoms  Outcome: Progressing  Goal: Optimal Pain Control and Function  Outcome: Progressing  Goal: Effective Urinary Elimination  Outcome: Progressing  Goal: Effective Oxygenation and Ventilation  Outcome: Progressing     Problem: Pain Acute  Goal: Optimal Pain Control and Function  Outcome: Progressing  Intervention: Prevent or Manage Pain  Recent Flowsheet Documentation  Taken 10/20/2024 0112 by Mercedes Blackwell RN  Bowel Elimination Promotion: adequate fluid intake promoted     Goal Outcome Evaluation:  Pt is A&Ox4. Surgical incision site is C/D/I. No signs of bleeding and infection. Denies pain overnight. Urinal at bedside. On Mg & phosp protocol, AM check. Slept well throughout the night.

## 2024-10-21 ENCOUNTER — APPOINTMENT (OUTPATIENT)
Dept: PHYSICAL THERAPY | Facility: HOSPITAL | Age: 74
DRG: 253 | End: 2024-10-21
Attending: SURGERY
Payer: MEDICARE

## 2024-10-21 ENCOUNTER — APPOINTMENT (OUTPATIENT)
Dept: OCCUPATIONAL THERAPY | Facility: HOSPITAL | Age: 74
DRG: 253 | End: 2024-10-21
Attending: SURGERY
Payer: MEDICARE

## 2024-10-21 LAB
CREAT SERPL-MCNC: 0.98 MG/DL (ref 0.67–1.17)
EGFRCR SERPLBLD CKD-EPI 2021: 80 ML/MIN/1.73M2
PLATELET # BLD AUTO: 180 10E3/UL (ref 150–450)

## 2024-10-21 PROCEDURE — 250N000013 HC RX MED GY IP 250 OP 250 PS 637

## 2024-10-21 PROCEDURE — 120N000001 HC R&B MED SURG/OB

## 2024-10-21 PROCEDURE — 250N000011 HC RX IP 250 OP 636

## 2024-10-21 PROCEDURE — 97116 GAIT TRAINING THERAPY: CPT | Mod: GP

## 2024-10-21 PROCEDURE — 97535 SELF CARE MNGMENT TRAINING: CPT | Mod: GO

## 2024-10-21 PROCEDURE — 97530 THERAPEUTIC ACTIVITIES: CPT | Mod: GP

## 2024-10-21 PROCEDURE — 250N000011 HC RX IP 250 OP 636: Performed by: STUDENT IN AN ORGANIZED HEALTH CARE EDUCATION/TRAINING PROGRAM

## 2024-10-21 PROCEDURE — 36415 COLL VENOUS BLD VENIPUNCTURE: CPT | Performed by: SURGERY

## 2024-10-21 PROCEDURE — 85049 AUTOMATED PLATELET COUNT: CPT | Performed by: SURGERY

## 2024-10-21 PROCEDURE — 82565 ASSAY OF CREATININE: CPT | Performed by: SURGERY

## 2024-10-21 RX ADMIN — HEPARIN SODIUM 5000 UNITS: 10000 INJECTION, SOLUTION INTRAVENOUS; SUBCUTANEOUS at 05:32

## 2024-10-21 RX ADMIN — ASPIRIN 81 MG: 81 TABLET, COATED ORAL at 09:59

## 2024-10-21 RX ADMIN — METHOCARBAMOL 750 MG: 750 TABLET ORAL at 09:59

## 2024-10-21 RX ADMIN — CLOPIDOGREL BISULFATE 75 MG: 75 TABLET ORAL at 09:59

## 2024-10-21 RX ADMIN — METHOCARBAMOL 750 MG: 750 TABLET ORAL at 20:57

## 2024-10-21 RX ADMIN — ACETAMINOPHEN 975 MG: 325 TABLET ORAL at 05:32

## 2024-10-21 RX ADMIN — METHOCARBAMOL 750 MG: 750 TABLET ORAL at 17:00

## 2024-10-21 RX ADMIN — METHOCARBAMOL 750 MG: 750 TABLET ORAL at 12:03

## 2024-10-21 RX ADMIN — HEPARIN SODIUM 5000 UNITS: 10000 INJECTION, SOLUTION INTRAVENOUS; SUBCUTANEOUS at 14:06

## 2024-10-21 RX ADMIN — HEPARIN SODIUM 5000 UNITS: 10000 INJECTION, SOLUTION INTRAVENOUS; SUBCUTANEOUS at 21:00

## 2024-10-21 RX ADMIN — KETOROLAC TROMETHAMINE 15 MG: 15 INJECTION, SOLUTION INTRAMUSCULAR; INTRAVENOUS at 05:32

## 2024-10-21 RX ADMIN — KETOROLAC TROMETHAMINE 15 MG: 15 INJECTION, SOLUTION INTRAMUSCULAR; INTRAVENOUS at 12:03

## 2024-10-21 RX ADMIN — KETOROLAC TROMETHAMINE 15 MG: 15 INJECTION, SOLUTION INTRAMUSCULAR; INTRAVENOUS at 16:59

## 2024-10-21 ASSESSMENT — ACTIVITIES OF DAILY LIVING (ADL)
ADLS_ACUITY_SCORE: 30
ADLS_ACUITY_SCORE: 31
ADLS_ACUITY_SCORE: 31
DEPENDENT_IADLS:: INDEPENDENT
ADLS_ACUITY_SCORE: 31
ADLS_ACUITY_SCORE: 30
ADLS_ACUITY_SCORE: 31
ADLS_ACUITY_SCORE: 30
ADLS_ACUITY_SCORE: 31
ADLS_ACUITY_SCORE: 30
ADLS_ACUITY_SCORE: 31
ADLS_ACUITY_SCORE: 30
ADLS_ACUITY_SCORE: 30
ADLS_ACUITY_SCORE: 31
ADLS_ACUITY_SCORE: 30
ADLS_ACUITY_SCORE: 31
ADLS_ACUITY_SCORE: 30
ADLS_ACUITY_SCORE: 31
ADLS_ACUITY_SCORE: 30
ADLS_ACUITY_SCORE: 31

## 2024-10-21 NOTE — PLAN OF CARE
"  Problem: Adult Inpatient Plan of Care  Goal: Plan of Care Review  Description: The Plan of Care Review/Shift note should be completed every shift.  The Outcome Evaluation is a brief statement about your assessment that the patient is improving, declining, or no change.  This information will be displayed automatically on your shift  note.  Outcome: Progressing  Flowsheets (Taken 10/21/2024 1855)  Plan of Care Reviewed With: patient  Goal: Patient-Specific Goal (Individualized)  Description: You can add care plan individualizations to a care plan. Examples of Individualization might be:  \"Parent requests to be called daily at 9am for status\", \"I have a hard time hearing out of my right ear\", or \"Do not touch me to wake me up as it startles  me\".  Outcome: Progressing  Goal: Absence of Hospital-Acquired Illness or Injury  Outcome: Progressing  Intervention: Identify and Manage Fall Risk  Recent Flowsheet Documentation  Taken 10/21/2024 1600 by Kareem Stover RN  Safety Promotion/Fall Prevention:   clutter free environment maintained   nonskid shoes/slippers when out of bed  Intervention: Prevent Skin Injury  Recent Flowsheet Documentation  Taken 10/21/2024 1600 by Kareem Stover RN  Body Position: position changed independently  Intervention: Prevent and Manage VTE (Venous Thromboembolism) Risk  Recent Flowsheet Documentation  Taken 10/21/2024 1600 by Kareem Stover RN  VTE Prevention/Management: SCDs off (sequential compression devices)  Intervention: Prevent Infection  Recent Flowsheet Documentation  Taken 10/21/2024 1600 by Kareem Stover RN  Infection Prevention:   rest/sleep promoted   single patient room provided  Goal: Optimal Comfort and Wellbeing  Outcome: Progressing  Goal: Readiness for Transition of Care  Outcome: Progressing     Problem: Risk for Delirium  Goal: Improved Sleep  Outcome: Progressing     Problem: Comorbidity Management  Goal: Blood Pressure in Desired Range  Outcome: Progressing   "   Problem: Surgery Nonspecified  Goal: Effective Bowel Elimination  Outcome: Progressing  Goal: Absence of Infection Signs and Symptoms  Outcome: Progressing  Goal: Effective Urinary Elimination  Outcome: Progressing     Problem: Pain Acute  Goal: Optimal Pain Control and Function  Outcome: Progressing  Intervention: Prevent or Manage Pain  Recent Flowsheet Documentation  Taken 10/21/2024 1600 by Kareem Stover RN  Bowel Elimination Promotion: adequate fluid intake promoted  Intervention: Optimize Psychosocial Wellbeing  Recent Flowsheet Documentation  Taken 10/21/2024 1600 by Kareem Stover RN  Supportive Measures:   active listening utilized   positive reinforcement provided   Goal Outcome Evaluation:      Plan of Care Reviewed With: patient

## 2024-10-21 NOTE — PROGRESS NOTES
SPIRITUAL HEALTH SERVICES Progress Note    Saw pt Jack Brown and offered [employer] sacr[employer] of anointing for the healing of the sick.     Fr. Reginald Thompson

## 2024-10-21 NOTE — PROGRESS NOTES
[facility]        Patient is here for a 2 week post-op S/P CREATION, BYPASS, ARTERIAL, FEMORAL TO TIBIAL (Left) on 10/18 with Dr. Crum    Pt is currently taking Aspirin, Statin, and Warfarin.    /64 (BP Location: Left arm, Patient Position: Sitting, Cuff Size: Adult Regular)   Pulse 84   Temp 97.7  F (36.5  C) (Oral)   Resp 20     The provider has been notified that the patient has concerns of concern and Left pain .     Questions patient would like addressed today are: N/A.    Refills are needed: No    Has homecare services and agency name:  [facility]

## 2024-10-21 NOTE — PROGRESS NOTES
VASCULAR SURGERY PROGRESS NOTE    Subjective:  Patient was seen and evaluated at the bedside for surgical follow-up.  Pain is controlled.  Has worked with PT but continues to require a lot of assistance with recommendations for TCU.  No other concerns today.    Objective:  Intake/Output Summary (Last 24 hours) at 10/21/2024 0831  Last data filed at 10/21/2024 0800  Gross per 24 hour   Intake 480 ml   Output 1315 ml   Net -835 ml     PHYSICAL EXAM:  BP (!) 145/87 (BP Location: Right arm)   Pulse 74   Temp 98.1  F (36.7  C) (Oral)   Resp 16   Wt 73.9 kg (163 lb)   SpO2 95%   BMI 26.71 kg/m    General: The patient is alert and oriented. Appropriate. No acute distress  Psych: Pleasant affect, answers questions appropriately  Skin: Color appropriate for race, warm, dry  Respiratory: Normal respiratory effort  Extremities: Mild lower extremity edema. Left foot warm with palpable PT. 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

## 2024-10-21 NOTE — PLAN OF CARE
Problem: Adult Inpatient Plan of Care  Goal: Absence of Hospital-Acquired Illness or Injury  Outcome: Progressing  Intervention: Identify and Manage Fall Risk  Recent Flowsheet Documentation  Taken 10/21/2024 0054 by Mercedes Blackwell RN  Safety Promotion/Fall Prevention:   nonskid shoes/slippers when out of bed   activity supervised   assistive device/personal items within reach  Intervention: Prevent and Manage VTE (Venous Thromboembolism) Risk  Recent Flowsheet Documentation  Taken 10/21/2024 0054 by Mercedes Blackwell RN  VTE Prevention/Management: SCDs on (sequential compression devices)     Problem: Risk for Delirium  Goal: Improved Sleep  Outcome: Progressing     Problem: Comorbidity Management  Goal: Blood Pressure in Desired Range  Outcome: Progressing     Problem: Surgery Nonspecified  Goal: Absence of Bleeding  Outcome: Progressing     Problem: Surgery Nonspecified  Goal: Absence of Infection Signs and Symptoms  Outcome: Progressing     Problem: Surgery Nonspecified  Goal: Effective Urinary Elimination  Outcome: Progressing     Problem: Surgery Nonspecified  Goal: Optimal Pain Control and Function  Outcome: Progressing     Problem: Pain Acute  Goal: Optimal Pain Control and Function  Outcome: Progressing  Intervention: Prevent or Manage Pain  Recent Flowsheet Documentation  Taken 10/21/2024 0054 by Mercedes Blackwell RN  Bowel Elimination Promotion: adequate fluid intake promoted     Goal Outcome Evaluation:  Pt is A&Ox4. Urinal at bedside. LLE tegaderm intact, no signs of bleeding and infection. Denies pain overnight. Sleeping well in between cares.

## 2024-10-21 NOTE — PLAN OF CARE
Problem: Surgery Nonspecified  Goal: Absence of Bleeding  Outcome: Progressing   Goal Outcome Evaluation:       Incisions to left lower extremities are approximated and clean, dry and intact. Minimal swelling to extremity, patient  up with assist of one, reports current pain regimen is effective for pain management and no PRNs administered this shift. Patient has good oral intake.

## 2024-10-21 NOTE — PLAN OF CARE
Goal Outcome Evaluation:      Plan of Care Reviewed With: patient          Outcome Evaluation: CM to follow for medical progression, recommendations and final discharge plan.

## 2024-10-21 NOTE — CONSULTS
Care Management Initial Consult    General Information  Assessment completed with: Patient,    Type of CM/SW Visit: Initial Assessment    Primary Care Provider verified and updated as needed: Yes   Readmission within the last 30 days: no previous admission in last 30 days      Reason for Consult: discharge planning  Advance Care Planning: Advance Care Planning Reviewed: no concerns identified          Communication Assessment  Patient's communication style: spoken language (English or Bilingual)    Hearing Difficulty or Deaf: no   Wear Glasses or Blind: yes    Cognitive  Cognitive/Neuro/Behavioral: WDL  Level of Consciousness: alert  Arousal Level: opens eyes spontaneously  Orientation: oriented x 4     Best Language: 0 - No aphasia  Speech: clear    Living Environment:   People in home: spouse  Shayy  Current living Arrangements: house      Able to return to prior arrangements:         Family/Social Support:  Care provided by: self  Provides care for: no one  Marital Status:   Support system: Wife          Description of Support System: Supportive, Involved         Current Resources:   Patient receiving home care services: No        Community Resources: None  Equipment currently used at home: other (see comments) (uses 2 walking poles for community distances/walks otherwise doesn't use AD)  Supplies currently used at home: None    Employment/Financial:  Employment Status: retired        Financial Concerns:             Does the patient's insurance plan have a 3 day qualifying hospital stay waiver?  No    Lifestyle & Psychosocial Needs:  Social Determinants of Health     Food Insecurity: Low Risk  (10/18/2024)    Food Insecurity     Within the past 12 months, did you worry that your food would run out before you got money to buy more?: No     Within the past 12 months, did the food you bought just not last and you didn t have money to get more?: No   Depression: Not at risk (10/15/2024)    PHQ-2     PHQ-2  Score: 0   Housing Stability: Low Risk  (10/18/2024)    Housing Stability     Do you have housing? : Yes     Are you worried about losing your housing?: No   Tobacco Use: Low Risk  (10/18/2024)    Patient History     Smoking Tobacco Use: Never     Smokeless Tobacco Use: Never     Passive Exposure: Not on file   Financial Resource Strain: Low Risk  (10/18/2024)    Financial Resource Strain     Within the past 12 months, have you or your family members you live with been unable to get utilities (heat, electricity) when it was really needed?: No   Alcohol Use: Not on file   Transportation Needs: Low Risk  (10/18/2024)    Transportation Needs     Within the past 12 months, has lack of transportation kept you from medical appointments, getting your medicines, non-medical meetings or appointments, work, or from getting things that you need?: No   Physical Activity: Not on file   Interpersonal Safety: High Risk (10/18/2024)    Interpersonal Safety     Do you feel physically and emotionally safe where you currently live?: No     Within the past 12 months, have you been hit, slapped, kicked or otherwise physically hurt by someone?: No     Within the past 12 months, have you been humiliated or emotionally abused in other ways by your partner or ex-partner?: No   Stress: Not on file   Social Connections: Not on file   Health Literacy: Not on file       Functional Status:  Prior to admission patient needed assistance:   Dependent ADLs:: Independent  Dependent IADLs:: Independent       Mental Health Status:          Chemical Dependency Status:                Values/Beliefs:  Spiritual, Cultural Beliefs, Shinto Practices, Values that affect care: no               Discussed  Partnership in Safe Discharge Planning  document with patient/family: No    Additional Information:  Met with patient to review role of care management, progression of care and possible need for services at discharge, including OP services, home care, or  skilled nursing care. Patient alert, oriented and engaged in the conversation. Writer then verified patient demographics and updated any changes needed in the patient chart. Pt lives in a house with his wife Shayy. He is independent with ADLs and IADLs. No services or equipment. Friend to transport at discharge.    Therapy recommendations is TCU and pt is unsure if he wants to go to TCU. Writer gave pt the TCU list and told pt to look it over and the plan is for pt to work with PT today and see how things go before he decides on TCU or home. Writer to follow up with the pt after PT.      Next Steps: Follow up with pt on if he would like TCU or Home.    2:51 PM  Went to pt's room to see how PT went, pt reports that PT went well and he does not think he will need TCU and family was in the room and voiced they can stay with him for a bit until he feels stronger.    Rhoda Cote RN

## 2024-10-21 NOTE — PATIENT INSTRUCTIONS
Abebe Santiago,    Thank you for entrusting your care with us today. After your visit today with MARIAN Harry this is the plan that was discussed at your appointment.    Follow up as scheduled on 11/25 for ultrasound and visit with Dr. Crum    I am including additional information on these things and our contact information if you have any questions or concerns.   Please do not hesitate to reach out to us if you felt we did not answer your questions or you are unsure of the treatment plan after your visit today. Our number is [phone].Thank you for trusting us with your care.         Again thank you for your time.

## 2024-10-21 NOTE — PLAN OF CARE
Problem: Adult Inpatient Plan of Care  Goal: Plan of Care Review  Outcome: Progressing  Flowsheets (Taken 10/20/2024 2126)  Plan of Care Reviewed With:   patient   spouse  Overall Patient Progress: improving     Problem: Adult Inpatient Plan of Care  Goal: Patient-Specific Goal (Individualized)  Outcome: Progressing     Problem: Adult Inpatient Plan of Care  Goal: Absence of Hospital-Acquired Illness or Injury  Outcome: Progressing  Intervention: Identify and Manage Fall Risk  Recent Flowsheet Documentation  Taken 10/20/2024 1636 by Adegun, Oluwadamilola, RN  Safety Promotion/Fall Prevention:   nonskid shoes/slippers when out of bed   activity supervised   assistive device/personal items within reach  Intervention: Prevent Skin Injury  Recent Flowsheet Documentation  Taken 10/20/2024 1636 by Adegun, Oluwadamilola, RN  Body Position: position changed independently  Intervention: Prevent and Manage VTE (Venous Thromboembolism) Risk  Recent Flowsheet Documentation  Taken 10/20/2024 1636 by Adegun, Oluwadamilola, RN  VTE Prevention/Management: SCDs on (sequential compression devices)     Problem: Adult Inpatient Plan of Care  Goal: Optimal Comfort and Wellbeing  Outcome: Progressing     Problem: Comorbidity Management  Goal: Blood Pressure in Desired Range  Outcome: Progressing     Problem: Surgery Nonspecified  Goal: Effective Bowel Elimination  Outcome: Progressing     Problem: Surgery Nonspecified  Goal: Effective Urinary Elimination  Outcome: Progressing   Goal Outcome Evaluation: Pt alert and oriented. VSS, denies pain. Voiding with bedside urinal. Surgical incision site CDI. Good appetite. Scheduled meds given per order.      Plan of Care Reviewed With: patient, spouse    Overall Patient Progress: improvingOverall Patient Progress: improving

## 2024-10-22 ENCOUNTER — APPOINTMENT (OUTPATIENT)
Dept: PHYSICAL THERAPY | Facility: HOSPITAL | Age: 74
DRG: 253 | End: 2024-10-22
Attending: SURGERY
Payer: MEDICARE

## 2024-10-22 VITALS
TEMPERATURE: 98.1 F | WEIGHT: 163 LBS | RESPIRATION RATE: 18 BRPM | BODY MASS INDEX: 26.71 KG/M2 | SYSTOLIC BLOOD PRESSURE: 121 MMHG | OXYGEN SATURATION: 95 % | DIASTOLIC BLOOD PRESSURE: 86 MMHG | HEART RATE: 86 BPM

## 2024-10-22 LAB
ANION GAP SERPL CALCULATED.3IONS-SCNC: 12 MMOL/L (ref 7–15)
BUN SERPL-MCNC: 22.3 MG/DL (ref 8–23)
CALCIUM SERPL-MCNC: 8.9 MG/DL (ref 8.8–10.4)
CHLORIDE SERPL-SCNC: 106 MMOL/L (ref 98–107)
CREAT SERPL-MCNC: 1.02 MG/DL (ref 0.67–1.17)
EGFRCR SERPLBLD CKD-EPI 2021: 77 ML/MIN/1.73M2
ERYTHROCYTE [DISTWIDTH] IN BLOOD BY AUTOMATED COUNT: 14.7 % (ref 10–15)
GLUCOSE BLDC GLUCOMTR-MCNC: 91 MG/DL (ref 70–99)
GLUCOSE SERPL-MCNC: 89 MG/DL (ref 70–99)
HCO3 SERPL-SCNC: 22 MMOL/L (ref 22–29)
HCT VFR BLD AUTO: 34.2 % (ref 40–53)
HGB BLD-MCNC: 11.5 G/DL (ref 13.3–17.7)
INR PPP: 1.07 (ref 0.85–1.15)
MCH RBC QN AUTO: 30.7 PG (ref 26.5–33)
MCHC RBC AUTO-ENTMCNC: 33.6 G/DL (ref 31.5–36.5)
MCV RBC AUTO: 91 FL (ref 78–100)
PLATELET # BLD AUTO: 211 10E3/UL (ref 150–450)
POTASSIUM SERPL-SCNC: 4.2 MMOL/L (ref 3.4–5.3)
RBC # BLD AUTO: 3.75 10E6/UL (ref 4.4–5.9)
SODIUM SERPL-SCNC: 140 MMOL/L (ref 135–145)
WBC # BLD AUTO: 7.9 10E3/UL (ref 4–11)

## 2024-10-22 PROCEDURE — 250N000011 HC RX IP 250 OP 636: Performed by: STUDENT IN AN ORGANIZED HEALTH CARE EDUCATION/TRAINING PROGRAM

## 2024-10-22 PROCEDURE — 250N000011 HC RX IP 250 OP 636

## 2024-10-22 PROCEDURE — 250N000013 HC RX MED GY IP 250 OP 250 PS 637

## 2024-10-22 PROCEDURE — 250N000013 HC RX MED GY IP 250 OP 250 PS 637: Performed by: STUDENT IN AN ORGANIZED HEALTH CARE EDUCATION/TRAINING PROGRAM

## 2024-10-22 PROCEDURE — 85610 PROTHROMBIN TIME: CPT | Performed by: STUDENT IN AN ORGANIZED HEALTH CARE EDUCATION/TRAINING PROGRAM

## 2024-10-22 PROCEDURE — 82947 ASSAY GLUCOSE BLOOD QUANT: CPT | Performed by: STUDENT IN AN ORGANIZED HEALTH CARE EDUCATION/TRAINING PROGRAM

## 2024-10-22 PROCEDURE — 36415 COLL VENOUS BLD VENIPUNCTURE: CPT | Performed by: STUDENT IN AN ORGANIZED HEALTH CARE EDUCATION/TRAINING PROGRAM

## 2024-10-22 PROCEDURE — 97116 GAIT TRAINING THERAPY: CPT | Mod: GP

## 2024-10-22 PROCEDURE — 80048 BASIC METABOLIC PNL TOTAL CA: CPT | Performed by: STUDENT IN AN ORGANIZED HEALTH CARE EDUCATION/TRAINING PROGRAM

## 2024-10-22 PROCEDURE — 85014 HEMATOCRIT: CPT | Performed by: STUDENT IN AN ORGANIZED HEALTH CARE EDUCATION/TRAINING PROGRAM

## 2024-10-22 PROCEDURE — 97535 SELF CARE MNGMENT TRAINING: CPT | Mod: GO

## 2024-10-22 PROCEDURE — 97530 THERAPEUTIC ACTIVITIES: CPT | Mod: GP

## 2024-10-22 RX ORDER — ROSUVASTATIN CALCIUM 5 MG/1
5 TABLET, COATED ORAL AT BEDTIME
Status: DISCONTINUED | OUTPATIENT
Start: 2024-10-22 | End: 2024-10-22 | Stop reason: HOSPADM

## 2024-10-22 RX ORDER — FINASTERIDE 5 MG/1
5 TABLET, FILM COATED ORAL DAILY
Status: DISCONTINUED | OUTPATIENT
Start: 2024-10-22 | End: 2024-10-22 | Stop reason: HOSPADM

## 2024-10-22 RX ORDER — IBUPROFEN 600 MG/1
600 TABLET, FILM COATED ORAL EVERY 6 HOURS PRN
COMMUNITY
Start: 2024-10-22 | End: 2024-10-29

## 2024-10-22 RX ORDER — OXYCODONE HYDROCHLORIDE 5 MG/1
5 TABLET ORAL EVERY 4 HOURS PRN
Qty: 12 TABLET | Refills: 0 | Status: SHIPPED | OUTPATIENT
Start: 2024-10-22

## 2024-10-22 RX ORDER — WARFARIN SODIUM 1 MG/1
2-4 TABLET ORAL SEE ADMIN INSTRUCTIONS
COMMUNITY

## 2024-10-22 RX ORDER — WARFARIN SODIUM 3 MG/1
6 TABLET ORAL
Status: DISCONTINUED | OUTPATIENT
Start: 2024-10-22 | End: 2024-10-22 | Stop reason: HOSPADM

## 2024-10-22 RX ORDER — METOPROLOL TARTRATE 25 MG/1
50 TABLET, FILM COATED ORAL 2 TIMES DAILY
Status: DISCONTINUED | OUTPATIENT
Start: 2024-10-22 | End: 2024-10-22 | Stop reason: HOSPADM

## 2024-10-22 RX ORDER — TAMSULOSIN HYDROCHLORIDE 0.4 MG/1
0.4 CAPSULE ORAL DAILY
Status: DISCONTINUED | OUTPATIENT
Start: 2024-10-22 | End: 2024-10-22 | Stop reason: HOSPADM

## 2024-10-22 RX ADMIN — METOPROLOL TARTRATE 50 MG: 25 TABLET, FILM COATED ORAL at 08:31

## 2024-10-22 RX ADMIN — FINASTERIDE 5 MG: 5 TABLET, FILM COATED ORAL at 08:31

## 2024-10-22 RX ADMIN — TAMSULOSIN HYDROCHLORIDE 0.4 MG: 0.4 CAPSULE ORAL at 08:30

## 2024-10-22 RX ADMIN — SENNOSIDES AND DOCUSATE SODIUM 1 TABLET: 8.6; 5 TABLET ORAL at 08:31

## 2024-10-22 RX ADMIN — KETOROLAC TROMETHAMINE 15 MG: 15 INJECTION, SOLUTION INTRAMUSCULAR; INTRAVENOUS at 06:29

## 2024-10-22 RX ADMIN — KETOROLAC TROMETHAMINE 15 MG: 15 INJECTION, SOLUTION INTRAMUSCULAR; INTRAVENOUS at 11:24

## 2024-10-22 RX ADMIN — HEPARIN SODIUM 5000 UNITS: 10000 INJECTION, SOLUTION INTRAVENOUS; SUBCUTANEOUS at 06:29

## 2024-10-22 RX ADMIN — POLYETHYLENE GLYCOL 3350 17 G: 17 POWDER, FOR SOLUTION ORAL at 08:32

## 2024-10-22 RX ADMIN — METHOCARBAMOL 750 MG: 750 TABLET ORAL at 08:31

## 2024-10-22 RX ADMIN — ASPIRIN 81 MG: 81 TABLET, COATED ORAL at 08:30

## 2024-10-22 RX ADMIN — CLOPIDOGREL BISULFATE 75 MG: 75 TABLET ORAL at 08:30

## 2024-10-22 RX ADMIN — METHOCARBAMOL 750 MG: 750 TABLET ORAL at 11:24

## 2024-10-22 ASSESSMENT — ACTIVITIES OF DAILY LIVING (ADL)
ADLS_ACUITY_SCORE: 31
ADLS_ACUITY_SCORE: 30
ADLS_ACUITY_SCORE: 31
ADLS_ACUITY_SCORE: 31
ADLS_ACUITY_SCORE: 30
ADLS_ACUITY_SCORE: 31
ADLS_ACUITY_SCORE: 30
ADLS_ACUITY_SCORE: 31
ADLS_ACUITY_SCORE: 30

## 2024-10-22 NOTE — PLAN OF CARE
Occupational Therapy Discharge Summary    Reason for therapy discharge:    Discharged to home with home therapy.    Progress towards therapy goal(s). See goals on Care Plan in [facility] electronic health record for goal details.  Goals not met.  Barriers to achieving goals:   discharge from facility.    Therapy recommendation(s):    Continued therapy is recommended.  Rationale/Recommendations:  pt is going home w/family support and home OT.  Goal Outcome Evaluation:

## 2024-10-22 NOTE — PHARMACY-ANTICOAGULATION SERVICE
Clinical Pharmacy - Warfarin Dosing Consult     Pharmacy has been consulted to manage this patient s warfarin therapy.  Indication: Atrial Fibrillation  Therapy Goal: INR 2-3  Provider/Team: Vascular Surgery  OP Anticoag Clinic: [facility]  Warfarin Prior to Admission: Yes  Warfarin PTA Regimen: 2 mg on Tuesday, 4 mg all other days  Significant drug interactions: Triple therapy for now, will come off plavix in 4 days  Recent documented change in oral intake/nutrition: No  Dose Comments: Give 6 mg today, patient hasn't received warfarin since 10/13    INR   Date Value Ref Range Status   10/22/2024 1.07 0.85 - 1.15 Final   10/01/2024 3.4 (H) 0.9 - 1.1 Final       Recommend warfarin 6 mg today.  Pharmacy will monitor Jack Brown daily and order warfarin doses to achieve specified goal.      Please contact pharmacy as soon as possible if the warfarin needs to be held for a procedure or if the warfarin goals change.

## 2024-10-22 NOTE — PLAN OF CARE
"  Problem: Adult Inpatient Plan of Care  Goal: Plan of Care Review  Description: The Plan of Care Review/Shift note should be completed every shift.  The Outcome Evaluation is a brief statement about your assessment that the patient is improving, declining, or no change.  This information will be displayed automatically on your shift  note.  Outcome: Progressing  Goal: Patient-Specific Goal (Individualized)  Description: You can add care plan individualizations to a care plan. Examples of Individualization might be:  \"Parent requests to be called daily at 9am for status\", \"I have a hard time hearing out of my right ear\", or \"Do not touch me to wake me up as it startles  me\".  Outcome: Progressing  Goal: Absence of Hospital-Acquired Illness or Injury  Outcome: Progressing  Intervention: Identify and Manage Fall Risk  Recent Flowsheet Documentation  Taken 10/21/2024 2337 by Carin Mcgill, RN  Safety Promotion/Fall Prevention:   activity supervised   clutter free environment maintained  Intervention: Prevent Skin Injury  Recent Flowsheet Documentation  Taken 10/21/2024 2337 by Carin Mcgill, RN  Body Position: position changed independently  Goal: Optimal Comfort and Wellbeing  Outcome: Progressing  Goal: Readiness for Transition of Care  Outcome: Progressing   Goal Outcome Evaluation:     Pt A/O x 4 and able to make needs known. Pt denies any pain or nausea.                    "

## 2024-10-22 NOTE — PLAN OF CARE
Physical Therapy Discharge Summary    Reason for therapy discharge:    Discharged to home with home therapy.    Progress towards therapy goal(s). See goals on Care Plan in [facility] electronic health record for goal details.  Goals partially met.  Barriers to achieving goals:   discharge from facility.    Therapy recommendation(s):    Continued therapy is recommended.  Rationale/Recommendations:  Home PT to improve functional mobility and decrease fall risk.

## 2024-10-22 NOTE — DISCHARGE SUMMARY
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 Crum's operative note dated 10/18/2024:      73-year-old male with a history of left common iliac artery aneurysm, bilateral popliteal artery aneurysms who is scheduled for left popliteal artery aneurysm repair.  Patient with history of acute occlusion of left popliteal artery aneurysm with acute limb ischemia status post tPA infusion and resolution of thrombosis.  Today he is scheduled for definitive treatment of the popliteal artery aneurysm.  Risk and benefits of this operation were explained, patient agreed to proceed.            Hospital Course:     The patient underwent left above to below-knee popliteal bypass and interval popliteal artery ligation on 10/18/24, which was he tolerated well without immediate complications. He was transferred to the PACU and then floor for routine postoperative care. The remainder of his postoperative course was unremarkable. Roper was removed, diet was advance, and activity was also advanced.  He made significant progress work with physical and Occupational Therapy during his hospital stay and was ultimately recommended that he discharged home under the care of his wife  "with continued outpatient physical therapy.  He was initially managed with dual antiplatelet therapy before transitioning to warfarin while continued on aspirin at the time of discharge.  At the day of discharge his pain was well-controlled with oral analgesics.  He discharged home on POD#4 and will continue warfarin for atrial fibrillation as well as aspirin 81 mg daily.  He will follow-up in vascular surgery clinic in approximately 2 weeks for routine wound check.         Imaging Studies:     Results for orders placed or performed during the hospital encounter of 10/18/24   POC US Guidance Needle Placement    Narrative    Ultrasound was performed as guidance to an anesthesia procedure.  Click   \"PACS images\" hyperlink below to view any stored images.  For specific   procedure details, view procedure note authored by anesthesia.              Medications Prior to Admission:     Medications Prior to Admission   Medication Sig Dispense Refill Last Dose    acetaminophen (TYLENOL) 500 MG tablet [ACETAMINOPHEN (TYLENOL) 500 MG TABLET] Take 500 mg by mouth every 6 (six) hours as needed for pain.   10/17/2024    amLODIPine (NORVASC) 10 MG tablet TAKE 1 TABLET(10 MG) BY MOUTH DAILY 90 tablet 0 10/17/2024    aspirin 81 MG EC tablet Take 1 tablet (81 mg) by mouth daily 90 tablet 3 10/17/2024    finasteride (PROSCAR) 5 MG tablet Take 1 tablet (5 mg) by mouth daily 90 tablet 3 10/17/2024    metoprolol tartrate (LOPRESSOR) 50 MG tablet TAKE 1 TABLET(50 MG) BY MOUTH TWICE DAILY 180 tablet 1 10/18/2024 at 0430    rosuvastatin (CRESTOR) 5 MG tablet Take 1 tablet (5 mg) by mouth at bedtime 30 tablet 1 10/17/2024    tamsulosin (FLOMAX) 0.4 MG capsule TAKE 1 CAPSULE BY MOUTH DAILY AFTER BREAKFAST 90 capsule 3 10/17/2024    Vitamin D3 (VITAMIN D, CHOLECALCIFEROL,) 25 mcg (1000 units) tablet Take 1 tablet by mouth daily   10/17/2024    warfarin ANTICOAGULANT (COUMADIN) 1 MG tablet Take 4- tablets daily as directed based on inr results " (Patient taking differently: Take by mouth See Admin Instructions.  2 mg every Tue; 4 mg all other days) 400 tablet 1 10/13/2024              Discharge Medications:     Current Discharge Medication List        CONTINUE these medications which have NOT CHANGED    Details   acetaminophen (TYLENOL) 500 MG tablet [ACETAMINOPHEN (TYLENOL) 500 MG TABLET] Take 500 mg by mouth every 6 (six) hours as needed for pain.      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, initial encounter (H)      finasteride (PROSCAR) 5 MG tablet Take 1 tablet (5 mg) by mouth daily  Qty: 90 tablet, Refills: 3    Associated Diagnoses: Gross hematuria; Benign prostatic hyperplasia without lower urinary tract symptoms      metoprolol tartrate (LOPRESSOR) 50 MG tablet TAKE 1 TABLET(50 MG) BY MOUTH TWICE DAILY  Qty: 180 tablet, Refills: 1    Associated Diagnoses: Benign essential hypertension      rosuvastatin (CRESTOR) 5 MG tablet Take 1 tablet (5 mg) by mouth at bedtime  Qty: 30 tablet, Refills: 1    Associated Diagnoses: Arterial occlusion      tamsulosin (FLOMAX) 0.4 MG capsule TAKE 1 CAPSULE BY MOUTH DAILY AFTER BREAKFAST  Qty: 90 capsule, Refills: 3    Associated Diagnoses: Primary hypertension      Vitamin D3 (VITAMIN D, CHOLECALCIFEROL,) 25 mcg (1000 units) tablet Take 1 tablet by mouth daily      warfarin ANTICOAGULANT (COUMADIN) 1 MG tablet Take 4- tablets daily as directed based on inr results  Qty: 400 tablet, Refills: 1    Associated Diagnoses: Arterial occlusion                     Antibiotics Prescribed at Discharge:   None prescribed           Day of Discharge Physical Exam:   Temp:  [98  F  (36.7  C)-99  F (37.2  C)] 99  F (37.2  C)  Pulse:  [62-93] 69  Resp:  [16] 16  BP: (132-169)/(83-90) 132/90  SpO2:  [95 %-96 %] 96 %    resting comfortably in bed  Nonlabored breathing on room air  Palpable bilateral femoral pulses  Left medial thigh and calf incisions are clean, dry, intact, with Tegaderm dressing  Left foot is warm, normal color, brisk capillary refill  Palpable left posterior tibial artery pulse at the ankle             Discharge Instructions and Follow-Up:     Discharge Procedure Orders   Primary Care - Care Coordination Referral   Standing Status: Future   Referral Priority: Routine: Next available opening Referral Type: Care Coordination   Number of Visits Requested: 1     Physical Therapy  Referral   Standing Status: Future   Referral Priority: Routine Referral Type: Rehab Therapy Physical Therapy   Number of Visits Requested: 1     Reason for your hospital stay   Order Comments: Left popliteal artery aneurysm repair     Follow-up and recommended labs and tests    Order Comments: Follow-up in vascular surgery clinic for wound check as scheduled on October 28, 2024.    With general vascular surgery questions, concerns, or to request/change an appointment, please call the [facility] Vascular Surgery Clinic:    [facility]  Phone: [phone]    [address]  [address] [place], [place], [address]     Activity   Order Comments: Your activity upon discharge: Walking is encouraged.  Elevate your leg above your heart when lying in bed to reduce swelling.     Order Specific Question Answer Comments   Is discharge order? Yes      Wound care and dressings   Order Comments: Instructions to care for your wound at home: Your incisions are closed with absorbable suture beneath the skin.  There are no sutures that need to be removed.  Leave the clear plastic dressings in place.  These will be removed at your postop clinic visit.  It is okay to shower.   Let soapy water run over the the dressings and pat dry.  Do not scrub the wounds or dressings.  Do not soak the wounds or dressings-no tub baths, hot tubs, or swimming pools.    Please call the vascular surgery clinic if you experience sudden swelling, pain, redness, drainage, fevers, or chills.     Diet   Order Comments: Follow this diet upon discharge: Current Diet:Orders Placed This Encounter      Low Saturated Fat Na <2400 mg     Order Specific Question Answer Comments   Is discharge order? Yes              Discharge Disposition:     Discharged to home      Condition at discharge: Stable    Patient was discussed with staff, Dr. Crum, on the day of discharge.    Denzel Boyd MD

## 2024-10-22 NOTE — PLAN OF CARE
"  Problem: Adult Inpatient Plan of Care  Goal: Plan of Care Review  Description: The Plan of Care Review/Shift note should be completed every shift.  The Outcome Evaluation is a brief statement about your assessment that the patient is improving, declining, or no change.  This information will be displayed automatically on your shift  note.  10/22/2024 0804 by Carin Mcgill RN  Outcome: Progressing  10/22/2024 0457 by Carin Mcgill RN  Outcome: Progressing  Goal: Patient-Specific Goal (Individualized)  Description: You can add care plan individualizations to a care plan. Examples of Individualization might be:  \"Parent requests to be called daily at 9am for status\", \"I have a hard time hearing out of my right ear\", or \"Do not touch me to wake me up as it startles  me\".  10/22/2024 0804 by Carin Mcgill RN  Outcome: Progressing  10/22/2024 0457 by Carin Mcgill RN  Outcome: Progressing  Goal: Absence of Hospital-Acquired Illness or Injury  10/22/2024 0804 by Carin Mcgill RN  Outcome: Progressing  10/22/2024 0457 by Carin Mcgill RN  Outcome: Progressing  Intervention: Identify and Manage Fall Risk  Recent Flowsheet Documentation  Taken 10/21/2024 2337 by Carin Mcgill RN  Safety Promotion/Fall Prevention:   activity supervised   clutter free environment maintained  Intervention: Prevent Skin Injury  Recent Flowsheet Documentation  Taken 10/21/2024 2337 by Carin Mcgill RN  Body Position: position changed independently  Goal: Optimal Comfort and Wellbeing  10/22/2024 0804 by Carin Mcgill RN  Outcome: Progressing  10/22/2024 0457 by Carin Mcgill RN  Outcome: Progressing  Goal: Readiness for Transition of Care  10/22/2024 0804 by Carin Mcgill RN  Outcome: Progressing  10/22/2024 0457 by Carin Mcgill RN  Outcome: Progressing   Goal Outcome Evaluation:      Pt A/O x 4 and able to make needs known. VSS. Denies pain and nausea. Legs incisions " intact and approximated. Drsgs intact. Left pedal pulse weak but palpable. 1+ edema in left foot and ankle.

## 2024-10-22 NOTE — PROGRESS NOTES
Vascular Surgery Progress Note    Improved mobility with PT, climbed stairs yesterday. Pain well controlled.    BP (!) 132/90 (BP Location: Right arm, Patient Position: Semi-More's)   Pulse 69   Temp 99  F (37.2  C) (Oral)   Resp 16   Wt 73.9 kg (163 lb)   SpO2 96%   BMI 26.71 kg/m      One exam resting comfortably in bed  Nonlabored breathing on room air  Palpable bilateral femoral pulses  Left medial thigh and calf incisions are clean, dry, intact, with Tegaderm dressing  Left foot is warm, normal color, brisk capillary refill  Palpable left posterior tibial artery pulse at the ankle    AM labs pending    Assessment/Plan:  72 yo male with left popliteal artery aneurysm, now POD#4 s/p left above to below-knee bypass with PTFE and popliteal artery ligation. Recovering well.    - continue multimodal pain control  - resume home amlodipine and metoprolol  - resume home finasteride and flomax  - continue aspirin, plavix, and prophylactic subQ heparin today, resume warfarin for A-fib.    He will work with PT and OT again and if making continued progress can discharge home later today.  Will discharge on warfarin and aspirin.    Denzel Boyd MD

## 2024-10-22 NOTE — PROGRESS NOTES
Care Management Discharge Note    Discharge Date: 10/22/2024       Discharge Disposition:  home    Discharge Services:  [facility]    Discharge DME:  per PT/OT    Discharge Transportation: family or friend will provide    Private pay costs discussed: Not applicable    Does the patient's insurance plan have a 3 day qualifying hospital stay waiver?  No    PAS Confirmation Code:    Patient/family educated on Medicare website which has current facility and service quality ratings:      Education Provided on the Discharge Plan:    Persons Notified of Discharge Plans: yes  Patient/Family in Agreement with the Plan:      Handoff Referral Completed: No, handoff not indicated or clinically appropriate    Additional Information:  Pt adequate for discharge. Therapy recommendation has changed from TCU to home with home care and pt accepting of this discharge plan. Home care has been established with [facility] for RN/PT/OT. Orders faxed. No further CM needs at this time. CM will sign off.    Rhoda Cote RN

## 2024-10-23 ENCOUNTER — TELEPHONE (OUTPATIENT)
Dept: ANTICOAGULATION | Facility: CLINIC | Age: 74
End: 2024-10-23
Payer: MEDICARE

## 2024-10-23 ENCOUNTER — PATIENT OUTREACH (OUTPATIENT)
Dept: CARE COORDINATION | Facility: CLINIC | Age: 74
End: 2024-10-23
Payer: MEDICARE

## 2024-10-23 DIAGNOSIS — I70.90 ARTERIAL OCCLUSION: Primary | ICD-10-CM

## 2024-10-23 DIAGNOSIS — I48.0 PAROXYSMAL ATRIAL FIBRILLATION (H): ICD-10-CM

## 2024-10-23 DIAGNOSIS — I70.90 ARTERIAL OCCLUSION: ICD-10-CM

## 2024-10-23 RX ORDER — ROSUVASTATIN CALCIUM 5 MG/1
5 TABLET, COATED ORAL DAILY
Qty: 90 TABLET | Refills: 0 | Status: SHIPPED | OUTPATIENT
Start: 2024-10-23

## 2024-10-23 NOTE — OR NURSING
Post Operative Phone Call     Surgery: CREATION, BYPASS, ARTERIAL, FEMORAL TO TIBIAL, LEFT     Date of Surgery:10/18    Surgeon: Dr. Laverne Crum    Patient Discharged from Hospital: 10/22      Spoke with: Family - Spouse Shayy      Discussed patients status since discharging home after surgery.     Pt reports their pain is  Currently 0/10 and they are using Other medicines for pain: Tylenol  to control their pain.     The incision is drainage. Pt reports they are eating and drinking normal.     Pt is voiding normal and had a bowel movement on 10/23.    Pt is not having any stroke like symptoms or new onset of headaches.         VQI measures: Pt has been prescribed Crestor and Coumadin; INR = 1.07 10/22  ASA  Plavix and reports they are taking it as prescribed.    Confirmed follow up appointments: YES    Callback number provided for further questions/concerns.    Amina Jones RN

## 2024-10-23 NOTE — LETTER
[facility] CARE COORDINATION  [facility]    October 25, 2024    Jack Brown  [address]  [place] [address]      Dear Jack,    I am a clinic care coordinator who works with Mercedes Adorno MD with the [facility]. I wanted to introduce myself and provide you with my contact information for you to be able to call me with any questions or concerns. Below is a description of clinic care coordination and how I can further assist you.       The clinic care coordination team is made up of a registered nurse, , financial resource worker and community health worker who understand the health care system. The goal of clinic care coordination is to help you manage your health and improve access to the health care system. Our team works alongside your provider to assist you in determining your health and social needs. We can help you obtain health care and community resources, providing you with necessary information and education. We can work with you through any barriers and develop a care plan that helps coordinate and strengthen the communication between you and your care team.  Our services are voluntary and are offered without charge to you personally.    Please feel free to contact me with any questions or concerns regarding care coordination and what we can offer.      We are focused on providing you with the highest-quality healthcare experience possible.    Sincerely,     Rica King,   [facility]  [phone]

## 2024-10-23 NOTE — PROGRESS NOTES
[facility]/Voicemail    Clinical Data: Care Coordinator Outreach    Outreach Documentation Number of Outreach Attempt   10/23/2024  11:36 AM 1   10/25/2024   1:45 PM 2       Left message on patient's voicemail with call back information and requested return call.      Plan: Care Coordinator will send care coordination introduction letter with care coordinator contact information and explanation of care coordination services via [employer]. Care Coordinator will do no further outreaches at this time.    Social Chase Petersen  [facility]  [phone]            [facility]    Clinical Data: Care Coordinator Outreach    Outreach Documentation Number of Outreach Attempt   10/23/2024  11:36 AM 1       Left message on patient's voicemail with call back information and requested return call.      Plan: Care Coordinator will try to reach patient again in 1-2 business days.    Social Nicola Worker  [facility]  [phone]

## 2024-10-23 NOTE — TELEPHONE ENCOUNTER
ANTICOAGULATION  MANAGEMENT: Discharge Review    Jack Brown chart reviewed for anticoagulation continuity of care    Hospital Admission on 10/18 -10/22 for vascular surgery. Popliteal artery bypass    Discharge disposition: Home    Results:    Recent labs: (last 7 days)     10/22/24  0702   INR 1.07     Anticoagulation inpatient management:     Held 5+ days for surgery    Anticoagulation discharge instructions:     Warfarin dosing: home regimen continued   Bridging: No   INR goal change: No      Medication changes affecting anticoagulation: No    Additional factors affecting anticoagulation: No     PLAN     No adjustment to anticoagulation plan needed    Left a detailed message for Shayy , inr scheduled 10/28 with ov    Anticoagulation Calendar updated    Felisa Carnes RN  10/23/2024  Anticoagulation Clinic  [facility] for routing messages: [address]  [facility] patient phone line: [phone]

## 2024-10-24 ENCOUNTER — TELEPHONE (OUTPATIENT)
Dept: FAMILY MEDICINE | Facility: CLINIC | Age: 74
End: 2024-10-24
Payer: MEDICARE

## 2024-10-24 NOTE — TELEPHONE ENCOUNTER
Abebe Adorno,  We at [facility] have received a referral to start home care services for this patient for the services of Physical Therapy. We are requesting your approval to delay our start of care assessment to 10/27/24 per patient request.   Please respond to this message string with your approval. Thank you!    NAYELI Brown [phone]

## 2024-10-27 ENCOUNTER — MEDICAL CORRESPONDENCE (OUTPATIENT)
Dept: HEALTH INFORMATION MANAGEMENT | Facility: CLINIC | Age: 74
End: 2024-10-27

## 2024-10-28 ENCOUNTER — LAB (OUTPATIENT)
Dept: LAB | Facility: CLINIC | Age: 74
End: 2024-10-28
Payer: MEDICARE

## 2024-10-28 ENCOUNTER — OFFICE VISIT (OUTPATIENT)
Dept: VASCULAR SURGERY | Facility: CLINIC | Age: 74
End: 2024-10-28
Attending: PHYSICIAN ASSISTANT
Payer: MEDICARE

## 2024-10-28 ENCOUNTER — ANTICOAGULATION THERAPY VISIT (OUTPATIENT)
Dept: ANTICOAGULATION | Facility: CLINIC | Age: 74
End: 2024-10-28

## 2024-10-28 VITALS
TEMPERATURE: 97.7 F | DIASTOLIC BLOOD PRESSURE: 64 MMHG | HEART RATE: 84 BPM | SYSTOLIC BLOOD PRESSURE: 132 MMHG | RESPIRATION RATE: 20 BRPM

## 2024-10-28 DIAGNOSIS — I70.90 ARTERIAL OCCLUSION: Primary | ICD-10-CM

## 2024-10-28 DIAGNOSIS — I73.9 PAD (PERIPHERAL ARTERY DISEASE) (H): Primary | ICD-10-CM

## 2024-10-28 DIAGNOSIS — I70.90 ARTERIAL OCCLUSION: ICD-10-CM

## 2024-10-28 DIAGNOSIS — I48.0 PAROXYSMAL ATRIAL FIBRILLATION (H): ICD-10-CM

## 2024-10-28 LAB — INR BLD: 1.9 (ref 0.9–1.1)

## 2024-10-28 PROCEDURE — 99024 POSTOP FOLLOW-UP VISIT: CPT | Performed by: PHYSICIAN ASSISTANT

## 2024-10-28 PROCEDURE — 85610 PROTHROMBIN TIME: CPT | Performed by: INTERNAL MEDICINE

## 2024-10-28 PROCEDURE — 36416 COLLJ CAPILLARY BLOOD SPEC: CPT | Performed by: INTERNAL MEDICINE

## 2024-10-28 PROCEDURE — G0463 HOSPITAL OUTPT CLINIC VISIT: HCPCS | Performed by: PHYSICIAN ASSISTANT

## 2024-10-28 NOTE — PROGRESS NOTES
VASCULAR SURGERY PROGRESS NOTE    LOCATION:  [facility]     Jack Brown  Medical Record #: [id number]  YOB: 1950  Age: 73 year old     Date of Service: 10/28/2024    PRIMARY CARE PROVIDER: Mercedes Adorno    Reason for visit: Follow-up status post left above to below-knee bypass with PTFE    IMPRESSION: 73-year-old male presenting for follow-up status post left above to below-knee popliteal artery bypass with PTFE and popliteal artery ligation due to popliteal artery aneurysm.  Patient continues to experience some left lower extremity discomfort. He has recently started working with home physical therapy. Incision sites are healing nicely without signs of infection. Medically optimized.    RECOMMENDATION/RISKS: Continue best medical management with aspirin and statin. Also on warfarin for atrial fibrillation. OK to shower and let soap and water run over his incision sites at this time. Avoid soaking or submerging in a bathtub or pool. They may remove the tegaderm dressings this coming Friday at the earliest.  Educated on signs/symptoms of incision infection instructed to call if any of these develop. Encouraged regular activity/ambulation.  Follow-up as previously scheduled in 4 weeks with ultrasound studies.    HPI:  Jack Brown is a 73 year old male with past medical history significant for hypertension, hyperlipidemia, coronary artery disease, paroxysmal atrial fibrillation on chronic anticoagulation with warfarin, history of basal cell carcinoma, abdominal aortic aneurysm, and a popliteal artery aneurysm status post recent left above-knee to below-knee popliteal artery bypass with PTFE. Patient presents today for follow-up and is accompanied by his wife. Patient continues to experience discomfort to his left lower extremity and denies any significant improvement since his discharge. Mr. Brown has been using over-the-counter Tylenol as well as as needed oxycodone for pain  control.  He has been ambulating at home and recently started working with home physical therapy. His incision sites are healing nicely he denies any surrounding redness, fevers, or chills.  They have been doing sponge baths at home but are planning to get him in the shower later today.  Patient is compliant with his medications.  All questions answered.  No other concerns.    REVIEW OF SYSTEMS:    A 12 point ROS was reviewed and is negative except for what is listed above in HPI.    PHH:    Past Medical History:   Diagnosis Date    AAA (abdominal aortic aneurysm) (H)     Basal cell carcinoma     BPH (benign prostatic hypertrophy)     Cancer (H)     Skin on R.chest wall.    Carcinoid tumor (H)     Chest discomfort     Coronary artery disease     Detached retina, left 10/01/2014    Hyperlipidemia     Hypertension     Melanoma of skin (H)     PAD (peripheral artery disease) (H)     Pain of left lower leg 12/15/2023    Pain of left upper arm 10/10/2023    Palpitations     Paroxysmal atrial fibrillation (H)     Popliteal artery aneurysm (H)     Pseudoaneurysm (H)     Thrombosis     Thyroid nodule       Past Surgical History:   Procedure Laterality Date    BYPASS GRAFT ARTERY CORONARY N/A 12/28/2015    Procedure: CORONARY ARTERY BYPASS x 3, RIGHT ENDOSCOPIC VEIN HARVEST, LEFT INTERNAL MAMMARY ARTERY, ANESTHESIA TRANSESOPHAGEAL ECHOCARDIOGRAM;  Surgeon: Jayson Alvarado MD;  Location: [facility];  Service:     CARDIAC SURGERY      CATARACT EXTRACTION  01/01/2015    CYST REMOVAL      Ganglion cyst removal of both wrist    EYE SURGERY      FEMORAL ARTERY - TIBIAL ARTERY BYPASS GRAFT Left 10/18/2024    Procedure: CREATION, BYPASS, ARTERIAL, FEMORAL TO TIBIAL, LEFT;  Surgeon: Laverne Crum MD;  Location: [facility]    HC REMOVAL PREPATELLA BURSA      Description: Excision Of Prepatellar Bursa;  Recorded: 09/30/2014;    HC REPAIR OF NASAL SEPTUM      Description: Septoplasty;  Recorded:  09/30/2014;    HERNIA REPAIR, UMBILICAL      IR LOWER EXTREMITY ANGIOGRAM LEFT  12/16/2023    IR LOWER EXTREMITY ANGIOGRAM LEFT  08/21/2024    IR THROMBOLYSIS ARTERIAL INFUSION INITIAL DAY  12/17/2023    PARS PLANA VITRECTOMY W/ REPAIR OF MACULAR HOLE  11/01/2014    IN EXCIS TENDON SHEATH LESION, HAND/FINGER      Description: Hand Excision Of A Tendon Cyst;  Recorded: 01/04/2011;    IN LAP, VENTRAL HERNIA REPAIR,REDUCIBLE      Description: Laparoscopy Repair Of Umbilical Hernia;  Recorded: 01/04/2011;    PSEUDOANEURYSM REPAIR Right 05/01/2024    Procedure: REPAIR, PSEUDOANEURYSM, ARTERY, FEMORAL RIGHT;  Surgeon: Laverne Crum MD;  Location: [facility]    RETINAL DETACHMENT SURGERY  10/01/2014    SKIN CANCER EXCISION  11/01/2015    right chest    THORACOSCOPY Right 04/14/2016    Procedure: RIGHT VIDEO ASSISTED THORACOSCOPY, RIGHT LOBECTOMY;  Surgeon: Vinny Chase MD;  Location: [facility];  Service:     VASECTOMY       ALLERGIES:  Niacin, Atorvastatin, and Pravastatin    MEDS:    Current Outpatient Medications:     acetaminophen (TYLENOL) 500 MG tablet, [ACETAMINOPHEN (TYLENOL) 500 MG TABLET] Take 500 mg by mouth every 6 (six) hours as needed for pain., Disp: , Rfl:     amLODIPine (NORVASC) 10 MG tablet, TAKE 1 TABLET(10 MG) BY MOUTH DAILY, Disp: 90 tablet, Rfl: 0    aspirin 81 MG EC tablet, Take 1 tablet (81 mg) by mouth daily, Disp: 90 tablet, Rfl: 3    finasteride (PROSCAR) 5 MG tablet, Take 1 tablet (5 mg) by mouth daily, Disp: 90 tablet, Rfl: 3    ibuprofen (ADVIL/MOTRIN) 600 MG tablet, Take 1 tablet (600 mg) by mouth every 6 hours as needed for moderate pain., Disp: , Rfl:     metoprolol tartrate (LOPRESSOR) 50 MG tablet, TAKE 1 TABLET(50 MG) BY MOUTH TWICE DAILY, Disp: 180 tablet, Rfl: 1    oxyCODONE (ROXICODONE) 5 MG tablet, Take 1 tablet (5 mg) by mouth every 4 hours as needed for moderate pain., Disp: 12 tablet, Rfl: 0    rosuvastatin (CRESTOR) 5 MG tablet, TAKE 1  TABLET(5 MG) BY MOUTH DAILY, Disp: 90 tablet, Rfl: 0    tamsulosin (FLOMAX) 0.4 MG capsule, TAKE 1 CAPSULE BY MOUTH DAILY AFTER BREAKFAST, Disp: 90 capsule, Rfl: 3    Vitamin D3 (VITAMIN D, CHOLECALCIFEROL,) 25 mcg (1000 units) tablet, Take 1 tablet by mouth daily, Disp: , Rfl:     warfarin ANTICOAGULANT (COUMADIN) 1 MG tablet, Take 2-4 mg by mouth See Admin Instructions. 2 mg on , 4 mg all other days, Disp: , Rfl:     SOCIAL HABITS:    History   Smoking Status    Never   Smokeless Tobacco    Never     Social History    Substance and Sexual Activity      Alcohol use: Yes        Alcohol/week: 8.0 standard drinks of alcohol      History   Drug Use No     FAMILY HISTORY:    Family History   Problem Relation Age of Onset    Acute Myocardial Infarction Mother     Aneurysm Mother         brain    Acute Myocardial Infarction Father     Colitis Brother     Abdominal Aortic Aneurysm Brother         had surgery and was supposed to have a second - time ran out    Leukemia Brother     Other - See Comments Maternal Grandmother          of hemorrhage after childbirht    Pneumonia Maternal Grandfather     No Known Problems Paternal Grandmother     Colon Cancer Paternal Grandfather     Lung Cancer Cousin     Coronary Artery Disease Cousin      PE:  /64 (BP Location: Left arm, Patient Position: Sitting, Cuff Size: Adult Regular)   Pulse 84   Temp 97.7  F (36.5  C) (Oral)   Resp 20   Wt Readings from Last 1 Encounters:   10/18/24 73.9 kg (163 lb)     There is no height or weight on file to calculate BMI.    EXAM:  GENERAL: Well-developed 73 year old male who appears his stated age  CARDIAC: Normal   CHEST/LUNG: Normal respiratory effort   MUSCULOSKELETAL: Grossly normal and both lower extremities are intact, mild left lower extremity edema  NEUROLOGIC: Focally intact, alert and oriented x 3  PSYCH: Appropriate affect  VASCULAR: Left leg incisions clean, dry, and intact with Tegaderm dressings. Left foot warm and  perfused with DP and PT signals present via Doppler. No wounds or ulcers    DIAGNOSTIC STUDIES:     Images:  Pertinent imaging reviewed     LABS:      Sodium   Date Value Ref Range Status   10/22/2024 140 135 - 145 mmol/L Final   10/19/2024 139 135 - 145 mmol/L Final   10/18/2024 139 135 - 145 mmol/L Final     Urea Nitrogen   Date Value Ref Range Status   10/22/2024 22.3 8.0 - 23.0 mg/dL Final   10/19/2024 22.1 8.0 - 23.0 mg/dL Final   10/18/2024 25.7 (H) 8.0 - 23.0 mg/dL Final   04/27/2022 18 8 - 28 mg/dL Final   08/16/2021 19 8 - 28 mg/dL Final   05/20/2021 16 8 - 28 mg/dL Final     Hemoglobin   Date Value Ref Range Status   10/22/2024 11.5 (L) 13.3 - 17.7 g/dL Final   10/19/2024 12.2 (L) 13.3 - 17.7 g/dL Final   10/18/2024 14.5 13.3 - 17.7 g/dL Final     Platelet Count   Date Value Ref Range Status   10/22/2024 211 150 - 450 10e3/uL Final   10/21/2024 180 150 - 450 10e3/uL Final   10/19/2024 176 150 - 450 10e3/uL Final     INR   Date Value Ref Range Status   10/22/2024 1.07 0.85 - 1.15 Final   10/01/2024 3.4 (H) 0.9 - 1.1 Final   09/18/2024 3.7 (H) 0.9 - 1.1 Final   08/28/2024 2.1 (H) 0.9 - 1.1 Final   05/31/2024 2.55 (H) 0.85 - 1.15 Final   05/03/2024 1.04 0.85 - 1.15 Final     INR Point of Care   Date Value Ref Range Status   01/26/2024 2.1 (A) 0.9 - 1.1 Final   01/10/2024 4.3 (A) 0.9 - 1.1 Final     30 minutes spent on the day of encounter doing chart review, history and exam, documentation, and further activities as noted.     Nanci Harry PA-C  VASCULAR SURGERY

## 2024-10-28 NOTE — PROGRESS NOTES
ANTICOAGULATION MANAGEMENT     Jack Brown 73 year old male is on warfarin with subtherapeutic INR result. (Goal INR 2.0-3.0)    Recent labs: (last 7 days)     10/28/24  0857   INR 1.9*       ASSESSMENT     Source(s): Chart Review and Patient/Caregiver Call     Warfarin doses taken: More warfarin taken than planned which may be affecting INR took 5 mg on sat  Diet: No new diet changes identified  Medication/supplement changes: None noted  New illness, injury, or hospitalization: No: had popliteal bypass on 10/18, now with PT  Signs or symptoms of bleeding or clotting: No  Previous result: Subtherapeutic  Additional findings: None       PLAN     Recommended plan for no diet, medication or health factor changes affecting INR     Dosing Instructions: Increase your warfarin dose (3.6% change) with next INR in 1 week       Summary  As of 10/28/2024      Full warfarin instructions:  4 mg every day   Next INR check:  11/4/2024               Telephone call with Shayy who verbalizes understanding and agrees to plan    Lab visit scheduled    Education provided: Please call back if any changes to your diet, medications or how you've been taking warfarin    Plan made per [facility] anticoagulation protocol    Felisa Carnes RN  10/28/2024  Anticoagulation Clinic  One[employer] for routing messages: jovani [address]OR[address]  [facility] patient phone line: [phone]        _______________________________________________________________________     Anticoagulation Episode Summary       Current INR goal:  2.0-3.0   TTR:  49.5% (8.7 mo)   Target end date:  Indefinite   Send INR reminders to:  TIFFANIE MIDWAY    Indications    Arterial occlusion [I70.90]  Paroxysmal atrial fibrillation (H) [I48.0]             Comments:  --             Anticoagulation Care Providers       Provider Role Specialty Phone number    Mercedes Adorno MD Referring Family Medicine [phone]

## 2024-10-29 ENCOUNTER — OFFICE VISIT (OUTPATIENT)
Dept: CARDIOLOGY | Facility: CLINIC | Age: 74
End: 2024-10-29
Payer: MEDICARE

## 2024-10-29 ENCOUNTER — TELEPHONE (OUTPATIENT)
Dept: FAMILY MEDICINE | Facility: CLINIC | Age: 74
End: 2024-10-29

## 2024-10-29 VITALS
BODY MASS INDEX: 26.52 KG/M2 | RESPIRATION RATE: 16 BRPM | HEIGHT: 66 IN | HEART RATE: 66 BPM | SYSTOLIC BLOOD PRESSURE: 136 MMHG | DIASTOLIC BLOOD PRESSURE: 70 MMHG | WEIGHT: 165 LBS

## 2024-10-29 DIAGNOSIS — E78.2 MIXED HYPERLIPIDEMIA: Primary | ICD-10-CM

## 2024-10-29 DIAGNOSIS — I48.0 PAROXYSMAL ATRIAL FIBRILLATION (H): ICD-10-CM

## 2024-10-29 DIAGNOSIS — I25.10 CORONARY ARTERY DISEASE DUE TO LIPID RICH PLAQUE: ICD-10-CM

## 2024-10-29 DIAGNOSIS — Z95.1 S/P CABG X 3: ICD-10-CM

## 2024-10-29 DIAGNOSIS — I10 PRIMARY HYPERTENSION: ICD-10-CM

## 2024-10-29 DIAGNOSIS — I25.83 CORONARY ARTERY DISEASE DUE TO LIPID RICH PLAQUE: ICD-10-CM

## 2024-10-29 PROCEDURE — G2211 COMPLEX E/M VISIT ADD ON: HCPCS | Performed by: INTERNAL MEDICINE

## 2024-10-29 PROCEDURE — 99214 OFFICE O/P EST MOD 30 MIN: CPT | Performed by: INTERNAL MEDICINE

## 2024-10-29 NOTE — LETTER
10/29/2024    Mercedes Adorno MD  [address] [place] [address]    RE: Jack Brown       Dear Colleague,     I had the pleasure of seeing Jack Brown in the [facility].      [facility]  [phone]          Assessment/Recommendations   Patient with known history of coronary artery disease, status post bypass surgery in the distant past.  More recently this year he was noted to have paroxysmal atrial fibrillation in the hospital but no recurrence that he is aware of.  He was seen in the A-fib clinic.  He is anticoagulated with warfarin for elevated CHADS2 vascular score.  He takes his pulse every day and it is regular.  He does not feel he has recurrent atrial fibrillation.    His LDL cholesterol was not quite at goal earlier this year and he will have a repeat lipid panel next week as he is having blood drawn and will go in fasting.    Blood pressure is at goal.    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    Will plan on seeing him back in 1 year, but of course would be happy to see him sooner if questions or problems arise.    The longitudinal plan of care for the diagnosis(es)/condition(s) as documented were addressed during this visit. Due to the added complexity in care, I will continue to support Jack in the subsequent management and with ongoing continuity of care.        History of Present Illness/Subjective    Mr. Jack Brown is a 73 year old male with known coronary artery disease, more recent diagnosis of paroxysmal atrial fibrillation which occurred in the hospital but resolved.  Known symptomatic A-fib since being home and was seen in the A-fib clinic.  Amiodarone was discontinued.    Patient denies any palpitations, syncope or near syncopal episodes.  Denies chest pain, orthopnea, paroxysmal nocturnal dyspnea.  He had recent peripheral vascular  "surgery to bypass a popliteal aneurysm and has some mild edema in his left lower extremity.  No chest pains.    ECG: Personally reviewed.        Physical Examination Review of Systems   /70 (BP Location: Right arm, Patient Position: Sitting, Cuff Size: Adult Regular)   Pulse 66   Resp 16   Ht 1.664 m (5' 5.5\")   Wt 74.8 kg (165 lb)   BMI 27.04 kg/m    Body mass index is 27.04 kg/m .  Wt Readings from Last 3 Encounters:   10/29/24 74.8 kg (165 lb)   10/18/24 73.9 kg (163 lb)   10/01/24 74.9 kg (165 lb 3.2 oz)     General Appearance:   Alert, cooperative and in no acute distress.   ENT/Mouth: Pink/moist oral mucosa   EYES:  no scleral icterus, normal conjunctivae   Neck: JVP normal. No Hepatojugular reflux. Thyroid not visualized.   Chest/Lungs:   Lungs are clear to auscultation, equal chest wall expansion.   Cardiovascular:   S1, S2 without murmur ,clicks or rubs. Brachial, radial  pulses are intact and symetric. No carotid bruits noted   Abdomen:  Nontender. BS+.    Extremities: No cyanosis, clubbing and mild left lower extremity pretibial edema   Skin: no xanthelasma, warm.    Neurologic: normal arm movement bilateral, no tremors     Psychiatric: Appropriate affect.      Encounter Vitals  BP: 136/70  Pulse: 66  Resp: 16  Weight: 74.8 kg (165 lb)  Height: 166.4 cm (5' 5.5\")                                           Medical History  Surgical History Family History Social History   Past Medical History:   Diagnosis Date     AAA (abdominal aortic aneurysm) (H)      Basal cell carcinoma      BPH (benign prostatic hypertrophy)      Cancer (H)     Skin on R.chest wall.     Carcinoid tumor (H)      Chest discomfort      Coronary artery disease      Detached retina, left 10/01/2014     Hyperlipidemia      Hypertension      Melanoma of skin (H)      PAD (peripheral artery disease) (H)      Pain of left lower leg 12/15/2023     Pain of left upper arm 10/10/2023     Palpitations      Paroxysmal atrial fibrillation " (H)      Popliteal artery aneurysm (H)      Pseudoaneurysm (H)      Thrombosis      Thyroid nodule     Past Surgical History:   Procedure Laterality Date     BYPASS GRAFT ARTERY CORONARY N/A 12/28/2015    Procedure: CORONARY ARTERY BYPASS x 3, RIGHT ENDOSCOPIC VEIN HARVEST, LEFT INTERNAL MAMMARY ARTERY, ANESTHESIA TRANSESOPHAGEAL ECHOCARDIOGRAM;  Surgeon: Jayson Alvarado MD;  Location: [facility];  Service:      CARDIAC SURGERY       CATARACT EXTRACTION  01/01/2015     CYST REMOVAL      Ganglion cyst removal of both wrist     EYE SURGERY       FEMORAL ARTERY - TIBIAL ARTERY BYPASS GRAFT Left 10/18/2024    Procedure: CREATION, BYPASS, ARTERIAL, FEMORAL TO TIBIAL, LEFT;  Surgeon: Laverne Crum MD;  Location: [facility]     HC REMOVAL PREPATELLA BURSA      Description: Excision Of Prepatellar Bursa;  Recorded: 09/30/2014;     HC REPAIR OF NASAL SEPTUM      Description: Septoplasty;  Recorded: 09/30/2014;     HERNIA REPAIR, UMBILICAL       IR LOWER EXTREMITY ANGIOGRAM LEFT  12/16/2023     IR LOWER EXTREMITY ANGIOGRAM LEFT  08/21/2024     IR THROMBOLYSIS ARTERIAL INFUSION INITIAL DAY  12/17/2023     PARS PLANA VITRECTOMY W/ REPAIR OF MACULAR HOLE  11/01/2014     CA EXCIS TENDON SHEATH LESION, HAND/FINGER      Description: Hand Excision Of A Tendon Cyst;  Recorded: 01/04/2011;     CA LAP, VENTRAL HERNIA REPAIR,REDUCIBLE      Description: Laparoscopy Repair Of Umbilical Hernia;  Recorded: 01/04/2011;     PSEUDOANEURYSM REPAIR Right 05/01/2024    Procedure: REPAIR, PSEUDOANEURYSM, ARTERY, FEMORAL RIGHT;  Surgeon: Laverne Crum MD;  Location: [facility]     RETINAL DETACHMENT SURGERY  10/01/2014     SKIN CANCER EXCISION  11/01/2015    right chest     THORACOSCOPY Right 04/14/2016    Procedure: RIGHT VIDEO ASSISTED THORACOSCOPY, RIGHT LOBECTOMY;  Surgeon: Vinny Chase MD;  Location: [facility];  Service:      VASECTOMY      Family History   Problem  Relation Age of Onset     Acute Myocardial Infarction Mother      Aneurysm Mother         brain     Acute Myocardial Infarction Father      Colitis Brother      Abdominal Aortic Aneurysm Brother         had surgery and was supposed to have a second - time ran out     Leukemia Brother      Other - See Comments Maternal Grandmother          of hemorrhage after childbirht     Pneumonia Maternal Grandfather      No Known Problems Paternal Grandmother      Colon Cancer Paternal Grandfather      Lung Cancer Cousin      Coronary Artery Disease Cousin     Social History     Socioeconomic History     Marital status:      Spouse name: Not on file     Number of children: Not on file     Years of education: Not on file     Highest education level: Not on file   Occupational History     Not on file   Tobacco Use     Smoking status: Never     Smokeless tobacco: Never   Vaping Use     Vaping status: Never Used   Substance and Sexual Activity     Alcohol use: Yes     Alcohol/week: 8.0 standard drinks of alcohol     Drug use: No     Sexual activity: Never   Other Topics Concern     Not on file   Social History Narrative     Not on file     Social Drivers of Health     Financial Resource Strain: Low Risk  (10/18/2024)    Financial Resource Strain      Within the past 12 months, have you or your family members you live with been unable to get utilities (heat, electricity) when it was really needed?: No   Food Insecurity: Low Risk  (10/18/2024)    Food Insecurity      Within the past 12 months, did you worry that your food would run out before you got money to buy more?: No      Within the past 12 months, did the food you bought just not last and you didn t have money to get more?: No   Transportation Needs: Low Risk  (10/18/2024)    Transportation Needs      Within the past 12 months, has lack of transportation kept you from medical appointments, getting your medicines, non-medical meetings or appointments, work, or from  getting things that you need?: No   Physical Activity: Not on file   Stress: Not on file   Social Connections: Not on file   Interpersonal Safety: High Risk (10/18/2024)    Interpersonal Safety      Do you feel physically and emotionally safe where you currently live?: No      Within the past 12 months, have you been hit, slapped, kicked or otherwise physically hurt by someone?: No      Within the past 12 months, have you been humiliated or emotionally abused in other ways by your partner or ex-partner?: No   Housing Stability: Low Risk  (10/18/2024)    Housing Stability      Do you have housing? : Yes      Are you worried about losing your housing?: No          Medications  Allergies   Current Outpatient Medications   Medication Sig Dispense Refill     acetaminophen (TYLENOL) 500 MG tablet [ACETAMINOPHEN (TYLENOL) 500 MG TABLET] Take 500 mg by mouth every 6 (six) hours as needed for pain.       amLODIPine (NORVASC) 10 MG tablet TAKE 1 TABLET(10 MG) BY MOUTH DAILY 90 tablet 0     aspirin 81 MG EC tablet Take 1 tablet (81 mg) by mouth daily 90 tablet 3     finasteride (PROSCAR) 5 MG tablet Take 1 tablet (5 mg) by mouth daily 90 tablet 3     ibuprofen (ADVIL/MOTRIN) 600 MG tablet Take 1 tablet (600 mg) by mouth every 6 hours as needed for moderate pain.       metoprolol tartrate (LOPRESSOR) 50 MG tablet TAKE 1 TABLET(50 MG) BY MOUTH TWICE DAILY 180 tablet 1     oxyCODONE (ROXICODONE) 5 MG tablet Take 1 tablet (5 mg) by mouth every 4 hours as needed for moderate pain. 12 tablet 0     rosuvastatin (CRESTOR) 5 MG tablet TAKE 1 TABLET(5 MG) BY MOUTH DAILY 90 tablet 0     tamsulosin (FLOMAX) 0.4 MG capsule TAKE 1 CAPSULE BY MOUTH DAILY AFTER BREAKFAST 90 capsule 3     Vitamin D3 (VITAMIN D, CHOLECALCIFEROL,) 25 mcg (1000 units) tablet Take 1 tablet by mouth daily       warfarin ANTICOAGULANT (COUMADIN) 1 MG tablet Take 2-4 mg by mouth See Admin Instructions. 2 mg on Tuesdays, 4 mg all other days      Allergies   Allergen  Reactions     Niacin Hives and Rash     Burning of the skin     Atorvastatin Muscle Pain (Myalgia)     Pravastatin Muscle Pain (Myalgia)         Lab Results    Chemistry/lipid CBC Cardiac Enzymes/BNP/TSH/INR   Lab Results   Component Value Date    CHOL 141 01/10/2024    HDL 49 01/10/2024    TRIG 77 01/10/2024    BUN 22.3 10/22/2024     10/22/2024    CO2 22 10/22/2024    Lab Results   Component Value Date    WBC 7.9 10/22/2024    HGB 11.5 (L) 10/22/2024    HCT 34.2 (L) 10/22/2024    MCV 91 10/22/2024     10/22/2024    Lab Results   Component Value Date    TSH 1.25 10/25/2018    INR 1.9 (H) 10/28/2024                                                Thank you for allowing me to participate in the care of your patient.      Sincerely,     Yobany Estrada MD     [facility]  cc:   LILI Concepcion CNP  [address], [address]  [place] [address]

## 2024-10-29 NOTE — TELEPHONE ENCOUNTER
Home Care is calling regarding an established patient with [facility].       Requesting orders from: Mercedes Adorno  Provider is following patient: Yes  Is this a 60-day recertification request?  No    Orders Requested    Physical Therapy  Request for initial certification (first set of orders)   Frequency: 1x/wk for 4 wk  1x/every other week/4k    Occupational Therapy  Request for initial evaluation and treatment (one time)       Verbal orders given for OT.  Information was gathered for PT.  Provider review needed.  RN will contact Home Care with information after provider review.  Confirmed ok to leave a detailed message with call back.  Contact information confirmed and updated as needed.    Tatiana Velasquez RN

## 2024-10-29 NOTE — PROGRESS NOTES
[facility]  [phone]          Assessment/Recommendations   Patient with known history of coronary artery disease, status post bypass surgery in the distant past.  More recently this year he was noted to have paroxysmal atrial fibrillation in the hospital but no recurrence that he is aware of.  He was seen in the A-fib clinic.  He is anticoagulated with warfarin for elevated CHADS2 vascular score.  He takes his pulse every day and it is regular.  He does not feel he has recurrent atrial fibrillation.    His LDL cholesterol was not quite at goal earlier this year and he will have a repeat lipid panel next week as he is having blood drawn and will go in fasting.    Blood pressure is at goal.    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    Will plan on seeing him back in 1 year, but of course would be happy to see him sooner if questions or problems arise.    The longitudinal plan of care for the diagnosis(es)/condition(s) as documented were addressed during this visit. Due to the added complexity in care, I will continue to support Jack in the subsequent management and with ongoing continuity of care.        History of Present Illness/Subjective    Mr. Jack Brown is a 73 year old male with known coronary artery disease, more recent diagnosis of paroxysmal atrial fibrillation which occurred in the hospital but resolved.  Known symptomatic A-fib since being home and was seen in the A-fib clinic.  Amiodarone was discontinued.    Patient denies any palpitations, syncope or near syncopal episodes.  Denies chest pain, orthopnea, paroxysmal nocturnal dyspnea.  He had recent peripheral vascular surgery to bypass a popliteal aneurysm and has some mild edema in his left lower extremity.  No chest pains.    ECG: Personally reviewed.        Physical Examination Review of Systems   /70 (BP Location: Right  "arm, Patient Position: Sitting, Cuff Size: Adult Regular)   Pulse 66   Resp 16   Ht 1.664 m (5' 5.5\")   Wt 74.8 kg (165 lb)   BMI 27.04 kg/m    Body mass index is 27.04 kg/m .  Wt Readings from Last 3 Encounters:   10/29/24 74.8 kg (165 lb)   10/18/24 73.9 kg (163 lb)   10/01/24 74.9 kg (165 lb 3.2 oz)     General Appearance:   Alert, cooperative and in no acute distress.   ENT/Mouth: Pink/moist oral mucosa   EYES:  no scleral icterus, normal conjunctivae   Neck: JVP normal. No Hepatojugular reflux. Thyroid not visualized.   Chest/Lungs:   Lungs are clear to auscultation, equal chest wall expansion.   Cardiovascular:   S1, S2 without murmur ,clicks or rubs. Brachial, radial  pulses are intact and symetric. No carotid bruits noted   Abdomen:  Nontender. BS+.    Extremities: No cyanosis, clubbing and mild left lower extremity pretibial edema   Skin: no xanthelasma, warm.    Neurologic: normal arm movement bilateral, no tremors     Psychiatric: Appropriate affect.      Encounter Vitals  BP: 136/70  Pulse: 66  Resp: 16  Weight: 74.8 kg (165 lb)  Height: 166.4 cm (5' 5.5\")                                           Medical History  Surgical History Family History Social History   Past Medical History:   Diagnosis Date    AAA (abdominal aortic aneurysm) (H)     Basal cell carcinoma     BPH (benign prostatic hypertrophy)     Cancer (H)     Skin on R.chest wall.    Carcinoid tumor (H)     Chest discomfort     Coronary artery disease     Detached retina, left 10/01/2014    Hyperlipidemia     Hypertension     Melanoma of skin (H)     PAD (peripheral artery disease) (H)     Pain of left lower leg 12/15/2023    Pain of left upper arm 10/10/2023    Palpitations     Paroxysmal atrial fibrillation (H)     Popliteal artery aneurysm (H)     Pseudoaneurysm (H)     Thrombosis     Thyroid nodule     Past Surgical History:   Procedure Laterality Date    BYPASS GRAFT ARTERY CORONARY N/A 12/28/2015    Procedure: CORONARY ARTERY BYPASS " x 3, RIGHT ENDOSCOPIC VEIN HARVEST, LEFT INTERNAL MAMMARY ARTERY, ANESTHESIA TRANSESOPHAGEAL ECHOCARDIOGRAM;  Surgeon: Jayson Alvarado MD;  Location: [facility];  Service:     CARDIAC SURGERY      CATARACT EXTRACTION  01/01/2015    CYST REMOVAL      Ganglion cyst removal of both wrist    EYE SURGERY      FEMORAL ARTERY - TIBIAL ARTERY BYPASS GRAFT Left 10/18/2024    Procedure: CREATION, BYPASS, ARTERIAL, FEMORAL TO TIBIAL, LEFT;  Surgeon: Laverne Crum MD;  Location: [facility] [place]    HC REMOVAL PREPATELLA BURSA      Description: Excision Of Prepatellar Bursa;  Recorded: 09/30/2014;    HC REPAIR OF NASAL SEPTUM      Description: Septoplasty;  Recorded: 09/30/2014;    HERNIA REPAIR, UMBILICAL      IR LOWER EXTREMITY ANGIOGRAM LEFT  12/16/2023    IR LOWER EXTREMITY ANGIOGRAM LEFT  08/21/2024    IR THROMBOLYSIS ARTERIAL INFUSION INITIAL DAY  12/17/2023    PARS PLANA VITRECTOMY W/ REPAIR OF MACULAR HOLE  11/01/2014    IA EXCIS TENDON SHEATH LESION, HAND/FINGER      Description: Hand Excision Of A Tendon Cyst;  Recorded: 01/04/2011;    IA LAP, VENTRAL HERNIA REPAIR,REDUCIBLE      Description: Laparoscopy Repair Of Umbilical Hernia;  Recorded: 01/04/2011;    PSEUDOANEURYSM REPAIR Right 05/01/2024    Procedure: REPAIR, PSEUDOANEURYSM, ARTERY, FEMORAL RIGHT;  Surgeon: Laverne Crum MD;  Location: [facility] [place]    RETINAL DETACHMENT SURGERY  10/01/2014    SKIN CANCER EXCISION  11/01/2015    right chest    THORACOSCOPY Right 04/14/2016    Procedure: RIGHT VIDEO ASSISTED THORACOSCOPY, RIGHT LOBECTOMY;  Surgeon: Vinny Chase MD;  Location: [facility];  Service:     VASECTOMY      Family History   Problem Relation Age of Onset    Acute Myocardial Infarction Mother     Aneurysm Mother         brain    Acute Myocardial Infarction Father     Colitis Brother     Abdominal Aortic Aneurysm Brother         had surgery and was supposed to have a second - time ran out     Leukemia Brother     Other - See Comments Maternal Grandmother          of hemorrhage after childbirht    Pneumonia Maternal Grandfather     No Known Problems Paternal Grandmother     Colon Cancer Paternal Grandfather     Lung Cancer Cousin     Coronary Artery Disease Cousin     Social History     Socioeconomic History    Marital status:      Spouse name: Not on file    Number of children: Not on file    Years of education: Not on file    Highest education level: Not on file   Occupational History    Not on file   Tobacco Use    Smoking status: Never    Smokeless tobacco: Never   Vaping Use    Vaping status: Never Used   Substance and Sexual Activity    Alcohol use: Yes     Alcohol/week: 8.0 standard drinks of alcohol    Drug use: No    Sexual activity: Never   Other Topics Concern    Not on file   Social History Narrative    Not on file     Social Drivers of Health     Financial Resource Strain: Low Risk  (10/18/2024)    Financial Resource Strain     Within the past 12 months, have you or your family members you live with been unable to get utilities (heat, electricity) when it was really needed?: No   Food Insecurity: Low Risk  (10/18/2024)    Food Insecurity     Within the past 12 months, did you worry that your food would run out before you got money to buy more?: No     Within the past 12 months, did the food you bought just not last and you didn t have money to get more?: No   Transportation Needs: Low Risk  (10/18/2024)    Transportation Needs     Within the past 12 months, has lack of transportation kept you from medical appointments, getting your medicines, non-medical meetings or appointments, work, or from getting things that you need?: No   Physical Activity: Not on file   Stress: Not on file   Social Connections: Not on file   Interpersonal Safety: High Risk (10/18/2024)    Interpersonal Safety     Do you feel physically and emotionally safe where you currently live?: No     Within the  past 12 months, have you been hit, slapped, kicked or otherwise physically hurt by someone?: No     Within the past 12 months, have you been humiliated or emotionally abused in other ways by your partner or ex-partner?: No   Housing Stability: Low Risk  (10/18/2024)    Housing Stability     Do you have housing? : Yes     Are you worried about losing your housing?: No          Medications  Allergies   Current Outpatient Medications   Medication Sig Dispense Refill    acetaminophen (TYLENOL) 500 MG tablet [ACETAMINOPHEN (TYLENOL) 500 MG TABLET] Take 500 mg by mouth every 6 (six) hours as needed for pain.      amLODIPine (NORVASC) 10 MG tablet TAKE 1 TABLET(10 MG) BY MOUTH DAILY 90 tablet 0    aspirin 81 MG EC tablet Take 1 tablet (81 mg) by mouth daily 90 tablet 3    finasteride (PROSCAR) 5 MG tablet Take 1 tablet (5 mg) by mouth daily 90 tablet 3    ibuprofen (ADVIL/MOTRIN) 600 MG tablet Take 1 tablet (600 mg) by mouth every 6 hours as needed for moderate pain.      metoprolol tartrate (LOPRESSOR) 50 MG tablet TAKE 1 TABLET(50 MG) BY MOUTH TWICE DAILY 180 tablet 1    oxyCODONE (ROXICODONE) 5 MG tablet Take 1 tablet (5 mg) by mouth every 4 hours as needed for moderate pain. 12 tablet 0    rosuvastatin (CRESTOR) 5 MG tablet TAKE 1 TABLET(5 MG) BY MOUTH DAILY 90 tablet 0    tamsulosin (FLOMAX) 0.4 MG capsule TAKE 1 CAPSULE BY MOUTH DAILY AFTER BREAKFAST 90 capsule 3    Vitamin D3 (VITAMIN D, CHOLECALCIFEROL,) 25 mcg (1000 units) tablet Take 1 tablet by mouth daily      warfarin ANTICOAGULANT (COUMADIN) 1 MG tablet Take 2-4 mg by mouth See Admin Instructions. 2 mg on Tuesdays, 4 mg all other days      Allergies   Allergen Reactions    Niacin Hives and Rash     Burning of the skin    Atorvastatin Muscle Pain (Myalgia)    Pravastatin Muscle Pain (Myalgia)         Lab Results    Chemistry/lipid CBC Cardiac Enzymes/BNP/TSH/INR   Lab Results   Component Value Date    CHOL 141 01/10/2024    HDL 49 01/10/2024    TRIG 77  01/10/2024    BUN 22.3 10/22/2024     10/22/2024    CO2 22 10/22/2024    Lab Results   Component Value Date    WBC 7.9 10/22/2024    HGB 11.5 (L) 10/22/2024    HCT 34.2 (L) 10/22/2024    MCV 91 10/22/2024     10/22/2024    Lab Results   Component Value Date    TSH 1.25 10/25/2018    INR 1.9 (H) 10/28/2024

## 2024-10-31 DIAGNOSIS — I10 ESSENTIAL HYPERTENSION: ICD-10-CM

## 2024-10-31 RX ORDER — AMLODIPINE BESYLATE 10 MG/1
10 TABLET ORAL DAILY
Qty: 90 TABLET | Refills: 2 | Status: SHIPPED | OUTPATIENT
Start: 2024-10-31

## 2024-11-04 ENCOUNTER — TELEPHONE (OUTPATIENT)
Dept: FAMILY MEDICINE | Facility: CLINIC | Age: 74
End: 2024-11-04

## 2024-11-04 ENCOUNTER — MYC MEDICAL ADVICE (OUTPATIENT)
Dept: ANTICOAGULATION | Facility: CLINIC | Age: 74
End: 2024-11-04

## 2024-11-04 ENCOUNTER — LAB (OUTPATIENT)
Dept: LAB | Facility: CLINIC | Age: 74
End: 2024-11-04
Payer: MEDICARE

## 2024-11-04 ENCOUNTER — ANTICOAGULATION THERAPY VISIT (OUTPATIENT)
Dept: ANTICOAGULATION | Facility: CLINIC | Age: 74
End: 2024-11-04

## 2024-11-04 DIAGNOSIS — E78.2 MIXED HYPERLIPIDEMIA: ICD-10-CM

## 2024-11-04 DIAGNOSIS — I25.83 CORONARY ARTERY DISEASE DUE TO LIPID RICH PLAQUE: ICD-10-CM

## 2024-11-04 DIAGNOSIS — I25.10 CORONARY ARTERY DISEASE DUE TO LIPID RICH PLAQUE: ICD-10-CM

## 2024-11-04 DIAGNOSIS — I48.0 PAROXYSMAL ATRIAL FIBRILLATION (H): ICD-10-CM

## 2024-11-04 DIAGNOSIS — I10 PRIMARY HYPERTENSION: ICD-10-CM

## 2024-11-04 DIAGNOSIS — I70.90 ARTERIAL OCCLUSION: ICD-10-CM

## 2024-11-04 DIAGNOSIS — Z95.1 S/P CABG X 3: ICD-10-CM

## 2024-11-04 LAB
CHOLEST SERPL-MCNC: 158 MG/DL
FASTING STATUS PATIENT QL REPORTED: YES
HDLC SERPL-MCNC: 38 MG/DL
INR BLD: 2.8 (ref 0.9–1.1)
LDLC SERPL CALC-MCNC: 93 MG/DL
NONHDLC SERPL-MCNC: 120 MG/DL
TRIGL SERPL-MCNC: 137 MG/DL

## 2024-11-04 PROCEDURE — 36416 COLLJ CAPILLARY BLOOD SPEC: CPT | Performed by: INTERNAL MEDICINE

## 2024-11-04 PROCEDURE — 80061 LIPID PANEL: CPT

## 2024-11-04 PROCEDURE — 85610 PROTHROMBIN TIME: CPT | Performed by: INTERNAL MEDICINE

## 2024-11-04 PROCEDURE — 36415 COLL VENOUS BLD VENIPUNCTURE: CPT

## 2024-11-04 NOTE — PROGRESS NOTES
ANTICOAGULATION MANAGEMENT     Jack Brown 73 year old male is on warfarin with therapeutic INR result. (Goal INR 2.0-3.0)    Recent labs: (last 7 days)     11/04/24  0933   INR 2.8*       ASSESSMENT     Source(s): Chart Review  Previous INR was Subtherapeutic  Recent popliteal below the knee artery bypass graft on 10/18/24. Hospitalization 10/18/24-10/22/24  Pt home now with home care/PT  MD was increased on 10/28/24. INR may be trending up now. Recommend small dose adjustment.  Left a voicemail for Jack and a mychart sent. Unable to leave a voicemail for his wife Shayy.  Not certain if home care can help check INR           PLAN     Recommended plan for ongoing change(s) affecting INR     Dosing Instructions: decrease your warfarin dose (3.6% change) with next INR in 1 week       Summary  As of 11/4/2024      Full warfarin instructions:  3 mg every Wed; 4 mg all other days   Next INR check:  11/11/2024               Detailed voice message left for Jack with dosing instructions and follow up date.   Sent MyChart message with dosing and follow up instructions    Contact [phone] to schedule and with any changes, questions or concerns.     Education provided: Please call back if any changes to your diet, medications or how you've been taking warfarin  Contact [phone] with any changes, questions or concerns.     Plan made per [facility] anticoagulation protocol    Norma Gaitan RN  11/4/2024  Anticoagulation Clinic  [facility] for routing messages: jovani MORENO [facility] patient phone line: [phone]        _______________________________________________________________________     Anticoagulation Episode Summary       Current INR goal:  2.0-3.0   TTR:  50.5% (8.9 mo)   Target end date:  Indefinite   Send INR reminders to:  [facility]    Indications    Arterial occlusion [I70.90]  Paroxysmal atrial fibrillation (H) [I48.0]             Comments:  --             Anticoagulation Care Providers        Provider Role Specialty Phone number    Mercedes Adorno MD Referring Family Medicine [phone]

## 2024-11-04 NOTE — TELEPHONE ENCOUNTER
November 4, 2024    Home health orders was received via fax for Dr. Adorno.  Patient label was attached to paperwork and placed in provider's inbox to be signed.    Farzana Gregg

## 2024-11-08 DIAGNOSIS — Z53.9 DIAGNOSIS NOT YET DEFINED: Primary | ICD-10-CM

## 2024-11-08 PROCEDURE — G0180 MD CERTIFICATION HHA PATIENT: HCPCS | Performed by: FAMILY MEDICINE

## 2024-11-11 ENCOUNTER — TELEPHONE (OUTPATIENT)
Dept: VASCULAR SURGERY | Facility: CLINIC | Age: 74
End: 2024-11-11
Payer: MEDICARE

## 2024-11-11 DIAGNOSIS — M79.662 PAIN OF LEFT LOWER LEG: ICD-10-CM

## 2024-11-11 DIAGNOSIS — I73.9 PAD (PERIPHERAL ARTERY DISEASE) (H): Primary | ICD-10-CM

## 2024-11-11 NOTE — TELEPHONE ENCOUNTER
November 11, 2024    Home health orders was picked up from outbox of Dr. Adorno and sent via fax to [phone].    Farzana Gregg

## 2024-11-11 NOTE — TELEPHONE ENCOUNTER
Caller: spouse, shayy    Provider: MD Laverne Crum    Detailed reason for call: Spouse, Shayy, asking for an order to be placed for outpatient physical therapy for this patient.  He has been getting home care, but she feels he is ready for outpatient.  They would like to stay in the [facility] system for this.  No call back needed unless we had further questions before placing the order.     Best phone number to contact: [phone]    Best time to contact: any- no call back requested though    Ok to leave a detailed message: Yes    Ok to speak to authorized person if needed: Yes: pt or spouse      (Noted to patient if reason is related to wound or incision, to please send a photo via email or [employer].)

## 2024-11-15 ENCOUNTER — LAB (OUTPATIENT)
Dept: LAB | Facility: CLINIC | Age: 74
End: 2024-11-15
Payer: MEDICARE

## 2024-11-15 ENCOUNTER — ANTICOAGULATION THERAPY VISIT (OUTPATIENT)
Dept: ANTICOAGULATION | Facility: CLINIC | Age: 74
End: 2024-11-15

## 2024-11-15 DIAGNOSIS — I70.90 ARTERIAL OCCLUSION: Primary | ICD-10-CM

## 2024-11-15 DIAGNOSIS — I48.0 PAROXYSMAL ATRIAL FIBRILLATION (H): ICD-10-CM

## 2024-11-15 DIAGNOSIS — I70.90 ARTERIAL OCCLUSION: ICD-10-CM

## 2024-11-15 LAB — INR BLD: 2.4 (ref 0.9–1.1)

## 2024-11-15 PROCEDURE — 36416 COLLJ CAPILLARY BLOOD SPEC: CPT

## 2024-11-15 PROCEDURE — 85610 PROTHROMBIN TIME: CPT

## 2024-11-15 NOTE — PROGRESS NOTES
ANTICOAGULATION MANAGEMENT     Jack Brown 73 year old male is on warfarin with therapeutic INR result. (Goal INR 2.0-3.0)    Recent labs: (last 7 days)     11/15/24  1503   INR 2.4*       ASSESSMENT     Source(s): Chart Review and Patient/Caregiver Call     Warfarin doses taken: Warfarin taken as instructed  Diet: No new diet changes identified  Medication/supplement changes: None noted  New illness, injury, or hospitalization: No  Signs or symptoms of bleeding or clotting: No  Previous result: Therapeutic last 2(+) visits  Additional findings: None       PLAN     Recommended plan for no diet, medication or health factor changes affecting INR     Dosing Instructions: Continue your current warfarin dose with next INR in 2 weeks       Summary  As of 11/15/2024      Full warfarin instructions:  3 mg every Wed; 4 mg all other days   Next INR check:  11/29/2024               Telephone call with Shayy who verbalizes understanding and agrees to plan    Lab visit scheduled    Education provided: Please call back if any changes to your diet, medications or how you've been taking warfarin    Plan made per [facility] anticoagulation protocol    Felisa Carnes RN  11/15/2024  Anticoagulation Clinic  [facility] for routing messages: jovani MORENO [facility] patient phone line: [phone]        _______________________________________________________________________     Anticoagulation Episode Summary       Current INR goal:  2.0-3.0   TTR:  52.5% (9.3 mo)   Target end date:  Indefinite   Send INR reminders to:  [facility] [place]    Indications    Arterial occlusion [I70.90]  Paroxysmal atrial fibrillation (H) [I48.0]             Comments:  --             Anticoagulation Care Providers       Provider Role Specialty Phone number    Mercedes Adorno MD Referring Family Medicine [phone]

## 2024-11-25 ENCOUNTER — LAB (OUTPATIENT)
Dept: LAB | Facility: CLINIC | Age: 74
End: 2024-11-25
Payer: MEDICARE

## 2024-11-25 ENCOUNTER — ANTICOAGULATION THERAPY VISIT (OUTPATIENT)
Dept: ANTICOAGULATION | Facility: CLINIC | Age: 74
End: 2024-11-25

## 2024-11-25 ENCOUNTER — ANCILLARY PROCEDURE (OUTPATIENT)
Dept: VASCULAR ULTRASOUND | Facility: CLINIC | Age: 74
End: 2024-11-25
Attending: SURGERY
Payer: MEDICARE

## 2024-11-25 ENCOUNTER — TELEPHONE (OUTPATIENT)
Dept: VASCULAR SURGERY | Facility: CLINIC | Age: 74
End: 2024-11-25

## 2024-11-25 DIAGNOSIS — I70.90 ARTERIAL OCCLUSION: ICD-10-CM

## 2024-11-25 DIAGNOSIS — I73.9 PAD (PERIPHERAL ARTERY DISEASE) (H): ICD-10-CM

## 2024-11-25 DIAGNOSIS — I48.0 PAROXYSMAL ATRIAL FIBRILLATION (H): ICD-10-CM

## 2024-11-25 DIAGNOSIS — I70.90 ARTERIAL OCCLUSION: Primary | ICD-10-CM

## 2024-11-25 LAB — INR BLD: 3 (ref 0.9–1.1)

## 2024-11-25 PROCEDURE — 36416 COLLJ CAPILLARY BLOOD SPEC: CPT

## 2024-11-25 PROCEDURE — 85610 PROTHROMBIN TIME: CPT

## 2024-11-25 PROCEDURE — 93923 UPR/LXTR ART STDY 3+ LVLS: CPT

## 2024-11-25 PROCEDURE — 93926 LOWER EXTREMITY STUDY: CPT | Mod: LT

## 2024-11-25 NOTE — TELEPHONE ENCOUNTER
Caller: Spouse    Provider: MD Laverne Crum    Detailed reason for call: Patient missed the ultrasounds prior to seeing Dr. Crum for a post op today.  Please advise for scheduling.  Double book or schedule in next opening. Looks like we could schedule ultrasounds at 2:30 today and see Dr. SILVA at the very end of his day if okay to double book    Best phone number to contact: [phone]    Best time to contact: asap    Ok to leave a detailed message: Yes    Ok to speak to authorized person if needed: Yes: spouse or patient      (Noted to patient if reason is related to wound or incision, to please send a photo via email or [employer].)

## 2024-11-25 NOTE — PROGRESS NOTES
ANTICOAGULATION MANAGEMENT     Jack Brown 73 year old male is on warfarin with therapeutic INR result. (Goal INR 2.0-3.0)    Recent labs: (last 7 days)     11/25/24  1356   INR 3.0*       ASSESSMENT     Source(s): Chart Review and Patient/Caregiver Call     Warfarin doses taken: Warfarin taken as instructed  Diet: No new diet changes identified  Medication/supplement changes: None noted  New illness, injury, or hospitalization: No  Signs or symptoms of bleeding or clotting: No  Previous result: Therapeutic last 2(+) visits  Additional findings: None       PLAN     Recommended plan for no diet, medication or health factor changes affecting INR     Dosing Instructions: Continue your current warfarin dose with next INR in 4 weeks       Summary  As of 11/25/2024      Full warfarin instructions:  3 mg every Wed; 4 mg all other days   Next INR check:  12/23/2024               Detailed voice message left for Jack with dosing instructions and follow up date.     Contact [phone] to schedule and with any changes, questions or concerns.     Education provided: Please call back if any changes to your diet, medications or how you've been taking warfarin    Plan made per [facility] anticoagulation protocol    Felisa Carnes RN  11/25/2024  Anticoagulation Clinic  [facility] for routing messages: jovani [facility]  [facility] patient phone line: [phone]        _______________________________________________________________________     Anticoagulation Episode Summary       Current INR goal:  2.0-3.0   TTR:  54.1% (9.6 mo)   Target end date:  Indefinite   Send INR reminders to:  [facility]    Indications    Arterial occlusion [I70.90]  Paroxysmal atrial fibrillation (H) [I48.0]             Comments:  --             Anticoagulation Care Providers       Provider Role Specialty Phone number    Mercedes Adorno MD Referring Family Medicine [phone]

## 2024-12-02 ENCOUNTER — THERAPY VISIT (OUTPATIENT)
Dept: PHYSICAL THERAPY | Facility: REHABILITATION | Age: 74
End: 2024-12-02
Attending: SURGERY
Payer: MEDICARE

## 2024-12-02 ENCOUNTER — VIRTUAL VISIT (OUTPATIENT)
Dept: VASCULAR SURGERY | Facility: CLINIC | Age: 74
End: 2024-12-02
Attending: SURGERY
Payer: MEDICARE

## 2024-12-02 VITALS — HEIGHT: 66 IN | WEIGHT: 158 LBS | BODY MASS INDEX: 25.39 KG/M2

## 2024-12-02 DIAGNOSIS — M79.662 PAIN OF LEFT LOWER LEG: ICD-10-CM

## 2024-12-02 DIAGNOSIS — I73.9 PAD (PERIPHERAL ARTERY DISEASE) (H): Primary | ICD-10-CM

## 2024-12-02 DIAGNOSIS — R29.898 LEG WEAKNESS, BILATERAL: ICD-10-CM

## 2024-12-02 DIAGNOSIS — I72.3 COMMON ILIAC ANEURYSM (H): ICD-10-CM

## 2024-12-02 DIAGNOSIS — Z13.6 ENCOUNTER FOR SCREENING FOR STENOSIS OF CAROTID ARTERY: ICD-10-CM

## 2024-12-02 PROCEDURE — 97161 PT EVAL LOW COMPLEX 20 MIN: CPT | Mod: GP | Performed by: PHYSICAL THERAPIST

## 2024-12-02 PROCEDURE — 97110 THERAPEUTIC EXERCISES: CPT | Mod: GP | Performed by: PHYSICAL THERAPIST

## 2024-12-02 PROCEDURE — 99024 POSTOP FOLLOW-UP VISIT: CPT | Mod: 93 | Performed by: SURGERY

## 2024-12-02 ASSESSMENT — PAIN SCALES - GENERAL: PAINLEVEL_OUTOF10: NO PAIN (0)

## 2024-12-02 NOTE — PROGRESS NOTES
Virtual Visit Details:    Type of service: Virtual telephone     Length of call: 10 minutes    Originating Location (Pt. Location): Home     Distant Location (Provider location):  [facility]     Platform used for visit:  Other: Telephone     Received verbal consent for virtual visit.     74-year-old male with a history of left SFA to below-knee popliteal artery bypass for popliteal artery aneurysm.  The bypass was performed with PTFE graft.  Patient has recovered well.  Ultrasound is unremarkable.  Patient does have a history of bilateral common iliac artery aneurysm for which he will undergo a CT scan before next follow-up appointment.

## 2024-12-02 NOTE — PROGRESS NOTES
PHYSICAL THERAPY EVALUATION  Type of Visit: Evaluation       Fall Risk Screen:  Fall screen completed by: PT  Have you fallen 2 or more times in the past year?: (Patient-Rptd) No  Have you fallen and had an injury in the past year?: (Patient-Rptd) No  Is patient a fall risk?: No    Subjective         Presenting condition or subjective complaint:    Date of onset: 10/18/24    Relevant medical history: (Patient-Rptd) DVT (blood clot); Heart problems   Dates & types of surgery: (Patient-Rptd) 12/15/2024    Prior diagnostic imaging/testing results:       Prior therapy history for the same diagnosis, illness or injury:        Patient reports that he had a blood clot that started his surgeries. He was working on home therapy on LE strengthening. He has stopped using the walker about a month ago.    Prior Level of Function  Transfers: Independent  Ambulation: Independent  ADL: Independent  IADL: Driving, Finances, Housekeeping, Laundry, Meal preparation, Medication management    Living Environment  Social support: (Patient-Rptd) With a significant other or spouse   Type of home: (Patient-Rptd) House; 1 level; Basement   Stairs to enter the home:         Ramp: (Patient-Rptd) Yes   Stairs inside the home: (Patient-Rptd) Yes (Patient-Rptd) 3 Is there a railing: (Patient-Rptd) Yes     Help at home:    Equipment owned: (Patient-Rptd) Walker     Employment: (Patient-Rptd) No    Hobbies/Interests:      Patient goals for therapy: (Patient-Rptd) sovel snow  swim  icefish    Pain assessment: Pain present  Location: Inside left lower leg/Ratin/10     Objective      Cognitive Status Examination  Orientation: Oriented to person, place and time   Level of Consciousness: Alert  Follows Commands and Answers Questions: 100% of the time  Personal Safety and Judgement: Intact  Memory: Intact    POSTURE: Standing Posture: Rounded shoulders, Forward head, Thoracic kyphosis increased    GAIT:   Level of Coryell: Independent  Assistive  Device(s): None  Gait Deviations: Base of support increased    SPECIAL TESTS  Functional Gait Assessment (FGA)      10 Meter Walk Test (Comfortable)  5.72/1.05m/s - 1.262 age norm   10 Meter Walk Test (Fast)  4.41/1.36m/s   6 Minute Walk Test (6MWT)   1091/332.5m - 527m     Did not require standing rest break   Perez Balance Scale (BBS)     5 Times Sit-to-Stand (5TSTS)  11.53 - 9.3 seconds     Dynamic Gait Index (DGI)     Timed Up and Go (TUG) - sec 8.40 seconds   Single Leg Stance Right (sec)    Single Leg Stance Left (sec)    Modified CTSIB Conditions (sec) Cond 1:   Cond 2:   Cond 4:   Cond 5 :    Romberg  (sec)    Sharpened Romberg (sec)    30 Second Sit to Stand (reps/height) 13 repetitions/standard chair   Mini-BESTest            Assessment & Plan   CLINICAL IMPRESSIONS  Medical Diagnosis: PAD (peripheral artery disease) (H)  Pain of left lower leg    Treatment Diagnosis: Deconditioning secondary to multiple bypass surgeries of L LE   Impression/Assessment: Patient is a 74 year old male with complaints of impaired strength and stamina since bypass surgery of the L LE.  The following significant findings have been identified: Pain, Decreased strength, Impaired gait, Impaired muscle performance, Decreased activity tolerance, and Impaired posture. These impairments interfere with their ability to perform recreational activities, household chores, and community mobility as compared to previous level of function. His symptoms are consistent with deconditioning secondary to multiple bypass surgeries. He is appropriate for skilled 1:1 PT services to address the listed impairments and return to function.    Clinical Decision Making (Complexity):  Clinical Presentation: Stable/Uncomplicated  Clinical Presentation Rationale: based on medical and personal factors listed in PT evaluation  Clinical Decision Making (Complexity): Low complexity    PLAN OF CARE  Treatment Interventions:  Interventions: Gait Training, Manual  Therapy, Neuromuscular Re-education, Therapeutic Activity, Therapeutic Exercise, Self-Care/Home Management    Long Term Goals     PT Goal 1  Goal Identifier: Self-management/HEP  Goal Description: Patient will be independent in self-management of condition and HEP to reach functional goals.  Target Date: 02/24/25  PT Goal 2  Goal Identifier: Ice fishing  Goal Description: Patient will be able to return to ice fishing in order to return to PLOF.  PT Goal 3  Goal Identifier: Snow blowing  Goal Description: Patient will be able to snow blow his driveway in order to return to PLOF.  Target Date: 02/24/25  PT Goal 4  Goal Identifier: Walking  Goal Description: Patient will be able to walk 1 mile at the mall daily without difficulty in order to return to PLOF.  Target Date: 02/24/25  PT Goal 5  Goal Identifier: 6MWT  Goal Description: Patient will improve 6MWT to 400+ meters in order to demonstrate improved walking tolerance and endurance.  Target Date: 01/27/25  PT Goal 6  Goal Identifier: 5x STS  Goal Description: Patient will improve 5x STS to 9.3 seconds in order to demonstrate improved functional strength.  Target Date: 01/27/25      Frequency of Treatment: 1x/week  Duration of Treatment: 12 weeks    Recommended Referrals to Other Professionals:  None  Education Assessment:   Learner/Method: Patient;Listening;Demonstration    Risks and benefits of evaluation/treatment have been explained.   Patient/Family/caregiver agrees with Plan of Care.     Evaluation Time:     PT Eval, Low Complexity Minutes (39662): 35     Signing Clinician: Samanta Blake, PT, DPT, MHA        [facility]                                                                                   OUTPATIENT PHYSICAL THERAPY      PLAN OF TREATMENT FOR OUTPATIENT REHABILITATION   Patient's Last Name, First Name, Jack Urena YOB: 1950   Provider's Name   [facility]    Medical Record No.  [id number]     Onset Date: 10/18/24  Start of Care Date: 12/02/24     Medical Diagnosis:  PAD (peripheral artery disease) (H)  Pain of left lower leg      PT Treatment Diagnosis:  Deconditioning secondary to multiple bypass surgeries of L LE Plan of Treatment  Frequency/Duration: 1x/week/ 12 weeks    Certification date from 12/02/24 to 02/24/25         See note for plan of treatment details and functional goals     Samanta Blake, PT, DPT, MHA                         I CERTIFY THE NEED FOR THESE SERVICES FURNISHED UNDER        THIS PLAN OF TREATMENT AND WHILE UNDER MY CARE     (Physician attestation of this document indicates review and certification of the therapy plan).              Referring Provider:  Laverne Crum    Initial Assessment  See Epic Evaluation- Start of Care Date: 12/02/24

## 2024-12-02 NOTE — PATIENT INSTRUCTIONS
This is the plan that was discussed at your appointment.    We will contact you to schedule your 6 month follow-up with ultrasounds and CTA        I am including additional information on these things and our contact information if you have any questions or concerns.   Please do not hesitate to reach out to us if you felt we did not answer your questions or you are unsure of the treatment plan after your visit today. Our number is [phone].  Thank you for trusting us with your care.         Again thank you for your time.

## 2024-12-02 NOTE — NURSING NOTE
Current patient location: [address] [place] [address]    Is the patient currently in the state of [place]? YES    Visit mode:TELEPHONE    If the visit is dropped, the patient can be reconnected by:TELEPHONE VISIT: Phone number: [phone]    Will anyone else be joining the visit? Yes, pt's wife Shayy is with the pt and will be joining the telephone visit per pt  (If patient encounters technical issues they should call [phone]    Are changes needed to the allergy or medication list? Pt stated no med changes    Are refills needed on medications prescribed by this physician? NO    Rooming Documentation:  Not applicable    Reason for visit: RECHECK    No other vitals to report per pt    Milena GARCIAF

## 2024-12-05 ENCOUNTER — DOCUMENTATION ONLY (OUTPATIENT)
Dept: ANTICOAGULATION | Facility: CLINIC | Age: 74
End: 2024-12-05
Payer: MEDICARE

## 2024-12-05 DIAGNOSIS — I48.0 PAROXYSMAL ATRIAL FIBRILLATION (H): ICD-10-CM

## 2024-12-05 DIAGNOSIS — I70.90 ARTERIAL OCCLUSION: Primary | ICD-10-CM

## 2024-12-05 NOTE — PROGRESS NOTES
ANTICOAGULATION CLINIC REFERRAL RENEWAL REQUEST       An annual renewal order is required for all patients referred to [facility].?  Please review and sign the pended referral order for Jack Brown.       ANTICOAGULATION SUMMARY      Warfarin indication(s)   Arterial occlusion  Paroxysmal atrial fibrillation     Mechanical heart valve present?  NO       Current goal range   INR: 2.0-3.0     Goal appropriate for indication? Goal INR 2-3, standard for indication(s) above     Time in Therapeutic Range (TTR)  (Goal > 60%) 54.1 %       Office visit with referring provider's group within last year yes on 10/1/2024 - Dr. Mercedes Mallory, NAYELI  [facility]

## 2024-12-09 ENCOUNTER — THERAPY VISIT (OUTPATIENT)
Dept: PHYSICAL THERAPY | Facility: REHABILITATION | Age: 74
End: 2024-12-09
Attending: SURGERY
Payer: MEDICARE

## 2024-12-09 DIAGNOSIS — R29.898 LEG WEAKNESS, BILATERAL: ICD-10-CM

## 2024-12-09 DIAGNOSIS — M79.662 PAIN OF LEFT LOWER LEG: Primary | ICD-10-CM

## 2024-12-09 DIAGNOSIS — I73.9 PAD (PERIPHERAL ARTERY DISEASE) (H): ICD-10-CM

## 2024-12-09 PROCEDURE — 97110 THERAPEUTIC EXERCISES: CPT | Mod: GP | Performed by: PHYSICAL THERAPIST

## 2024-12-15 DIAGNOSIS — I70.90 ARTERIAL OCCLUSION: Primary | ICD-10-CM

## 2024-12-16 RX ORDER — WARFARIN SODIUM 1 MG/1
TABLET ORAL
Qty: 400 TABLET | Refills: 1 | Status: SHIPPED | OUTPATIENT
Start: 2024-12-16

## 2024-12-16 NOTE — TELEPHONE ENCOUNTER
ANTICOAGULATION MANAGEMENT:  Medication Refill    Anticoagulation Summary  As of 11/25/2024      Warfarin maintenance plan:  3 mg (1 mg x 3) every Wed; 4 mg (1 mg x 4) all other days   Next INR check:  12/23/2024   Target end date:  Indefinite    Indications    Arterial occlusion [I70.90]  Paroxysmal atrial fibrillation (H) [I48.0]                 Anticoagulation Care Providers       Provider Role Specialty Phone number    Mercedes Adorno MD Referring Family Medicine [phone]            Refill Criteria    Visit with referring provider/group: Meets criteria: visit within referring provider group in the last 15 months on 10/1/24    [facility] referral last signed: 12/05/2024; within last year:  Yes    Lab monitoring not exceeding 2 weeks overdue: Yes    Jack meets all criteria for refill. Rx instructions and quantity supplied updated to match patient's current dosing plan. Warfarin 90 day supply with 1 refill granted per [facility] protocol     Felisa Carnes RN  Anticoagulation Clinic

## 2024-12-23 ENCOUNTER — LAB (OUTPATIENT)
Dept: LAB | Facility: CLINIC | Age: 74
End: 2024-12-23
Payer: MEDICARE

## 2024-12-23 ENCOUNTER — ANTICOAGULATION THERAPY VISIT (OUTPATIENT)
Dept: ANTICOAGULATION | Facility: CLINIC | Age: 74
End: 2024-12-23

## 2024-12-23 DIAGNOSIS — I48.0 PAROXYSMAL ATRIAL FIBRILLATION (H): ICD-10-CM

## 2024-12-23 DIAGNOSIS — I70.90 ARTERIAL OCCLUSION: ICD-10-CM

## 2024-12-23 DIAGNOSIS — I70.90 ARTERIAL OCCLUSION: Primary | ICD-10-CM

## 2024-12-23 LAB — INR BLD: 1.4 (ref 0.9–1.1)

## 2024-12-23 PROCEDURE — 85610 PROTHROMBIN TIME: CPT | Performed by: INTERNAL MEDICINE

## 2024-12-23 PROCEDURE — 36416 COLLJ CAPILLARY BLOOD SPEC: CPT | Performed by: INTERNAL MEDICINE

## 2024-12-23 NOTE — PROGRESS NOTES
ANTICOAGULATION MANAGEMENT     Jack Brown 74 year old male is on warfarin with subtherapeutic INR result. (Goal INR 2.0-3.0)    Recent labs: (last 7 days)     12/23/24  1152   INR 1.4*       ASSESSMENT     Source(s): Chart Review and Patient/Caregiver Call     Warfarin doses taken: Warfarin taken as instructed  Diet: Increased greens/vitamin K in diet; plans to resume previous intake  Medication/supplement changes: None noted  New illness, injury, or hospitalization: No  Signs or symptoms of bleeding or clotting: No  Previous result: Therapeutic last 2(+) visits  Additional findings: None       PLAN     Recommended plan for temporary change(s) affecting INR     Dosing Instructions: booster dose then continue your current warfarin dose with next INR in 1 week       Summary  As of 12/23/2024      Full warfarin instructions:  12/23: 6 mg; Otherwise 3 mg every Wed; 4 mg all other days   Next INR check:  12/30/2024               Telephone call with Shayy who verbalizes understanding and agrees to plan    Lab visit scheduled    Education provided: Please call back if any changes to your diet, medications or how you've been taking warfarin    Plan made per [facility] anticoagulation protocol    Felisa Carnes RN  12/23/2024  Anticoagulation Clinic  [employer] for routing messages: jovani MORENO MIDWAY  [facility] patient phone line: [phone]        _______________________________________________________________________     Anticoagulation Episode Summary       Current INR goal:  2.0-3.0   TTR:  54.9% (10.6 mo)   Target end date:  Indefinite   Send INR reminders to:  TIFFANIE MIDWAY    Indications    Arterial occlusion [I70.90]  Paroxysmal atrial fibrillation (H) [I48.0]             Comments:  --             Anticoagulation Care Providers       Provider Role Specialty Phone number    Mercedes Adorno MD Referring Family Medicine [phone]

## 2024-12-31 ENCOUNTER — LAB (OUTPATIENT)
Dept: LAB | Facility: CLINIC | Age: 74
End: 2024-12-31
Payer: MEDICARE

## 2024-12-31 ENCOUNTER — ANTICOAGULATION THERAPY VISIT (OUTPATIENT)
Dept: ANTICOAGULATION | Facility: CLINIC | Age: 74
End: 2024-12-31

## 2024-12-31 DIAGNOSIS — I48.0 PAROXYSMAL ATRIAL FIBRILLATION (H): ICD-10-CM

## 2024-12-31 DIAGNOSIS — I70.90 ARTERIAL OCCLUSION: ICD-10-CM

## 2024-12-31 DIAGNOSIS — I70.90 ARTERIAL OCCLUSION: Primary | ICD-10-CM

## 2024-12-31 LAB — INR BLD: 2.6 (ref 0.9–1.1)

## 2024-12-31 PROCEDURE — 36416 COLLJ CAPILLARY BLOOD SPEC: CPT

## 2024-12-31 PROCEDURE — 85610 PROTHROMBIN TIME: CPT

## 2024-12-31 NOTE — PROGRESS NOTES
ANTICOAGULATION MANAGEMENT     Jack Brown 74 year old male is on warfarin with therapeutic INR result. (Goal INR 2.0-3.0)    Recent labs: (last 7 days)     12/31/24  1052   INR 2.6*       ASSESSMENT     Source(s): Chart Review and Patient/Caregiver Call     Warfarin doses taken: Warfarin taken as instructed  Diet: No new diet changes identified  Medication/supplement changes: None noted  New illness, injury, or hospitalization: No  Signs or symptoms of bleeding or clotting: No  Previous result: Subtherapeutic-boost  Additional findings: None       PLAN     Recommended plan for no diet, medication or health factor changes affecting INR     Dosing Instructions: Continue your current warfarin dose with next INR in 2 weeks       Summary  As of 12/31/2024      Full warfarin instructions:  3 mg every Wed; 4 mg all other days   Next INR check:  1/14/2025               Telephone call with Jack who verbalizes understanding and agrees to plan    Lab visit scheduled    Education provided: Goal range and lab monitoring: goal range and significance of current result  Contact [phone] with any changes, questions or concerns.     Plan made per [facility] anticoagulation protocol    Eva Oleary RN  12/31/2024  Anticoagulation Clinic  [employer] for routing messages: jovani MORENO MIDWAY  [facility] patient phone line: [phone]        _______________________________________________________________________     Anticoagulation Episode Summary       Current INR goal:  2.0-3.0   TTR:  55.1% (10.8 mo)   Target end date:  Indefinite   Send INR reminders to:  TIFFANIE MIDWAY    Indications    Arterial occlusion [I70.90]  Paroxysmal atrial fibrillation (H) [I48.0]             Comments:  --             Anticoagulation Care Providers       Provider Role Specialty Phone number    Mercedes Adorno MD Referring Family Medicine [phone]

## 2025-01-14 ENCOUNTER — VIRTUAL VISIT (OUTPATIENT)
Dept: UROLOGY | Facility: CLINIC | Age: 75
End: 2025-01-14
Payer: MEDICARE

## 2025-01-14 DIAGNOSIS — R31.0 GROSS HEMATURIA: ICD-10-CM

## 2025-01-14 DIAGNOSIS — I10 PRIMARY HYPERTENSION: ICD-10-CM

## 2025-01-14 DIAGNOSIS — N40.0 BENIGN PROSTATIC HYPERPLASIA WITHOUT LOWER URINARY TRACT SYMPTOMS: ICD-10-CM

## 2025-01-14 RX ORDER — TAMSULOSIN HYDROCHLORIDE 0.4 MG/1
CAPSULE ORAL
Qty: 90 CAPSULE | Refills: 3 | Status: SHIPPED | OUTPATIENT
Start: 2025-01-14

## 2025-01-14 RX ORDER — FINASTERIDE 5 MG/1
5 TABLET, FILM COATED ORAL DAILY
Qty: 90 TABLET | Refills: 3 | Status: SHIPPED | OUTPATIENT
Start: 2025-01-14

## 2025-01-14 ASSESSMENT — PAIN SCALES - GENERAL: PAINLEVEL_OUTOF10: NO PAIN (0)

## 2025-01-14 NOTE — NURSING NOTE
Current patient location: [address] [place] [address]    Is the patient currently in the state of [place]? YES    Visit mode: VIDEO    If the visit is dropped, the patient can be reconnected by:VIDEO VISIT: Send to e-mail at: [email or web address]    Will anyone else be joining the visit? NO  (If patient encounters technical issues they should call [phone])    Are changes needed to the allergy or medication list? No    Are refills needed on medications prescribed by this physician? NO    Rooming Documentation:  Questionnaire(s) not done per department protocol    Reason for visit: RECHECK Shelby Kocher VVF

## 2025-01-14 NOTE — PATIENT INSTRUCTIONS
UROLOGY CLINIC VISIT PATIENT INSTRUCTIONS    -Refills of both the tamsulosin and finasteride sent to your pharmacy today. You can try discontinuing the tamsulosin for a week or two to see how it goes. If symptoms do not worsen without it, you can remain off this one and continue the finasteride.   -Follow up with me in 6 months to check in.     If you have any issues, questions or concerns in the meantime, do not hesitate to contact us at [phone] or via [employer].     Veronica Costa, CNP  Department of Urology

## 2025-01-14 NOTE — PROGRESS NOTES
Virtual Visit Details    Type of service:  Video Visit   Video Start Time: 9:40 AM  Video End Time: 9:49 AM    Originating Location (pt. Location): Home  Distant Location (provider location):  Off-site  Platform used for Video Visit: Lisa    CC: LUTS follow up    Assessment/Plan:  74 year old male with PMH of CAD s/p CABG, HTN, melanoma,hx of acinar lung cancer sp right middle lobectomy and BPH with LUTS, which have been well-managed on a combination of Flomax and finasteride. He is interested in cutting down on the number of medications he takes and asks if he would be able to safely stop either of these. We discussed that Flomax is pretty quick-acting, so he could try stopping this one for a week or two to see if symptoms worsen without it. Could always be promptly resumed if so. The finasteride, on the other hand, becomes fully effective over several months, so stopping it could result in delayed effects if he wanted to resume it later. He will therefore start with stopping the Flomax first and see how this goes and will plan to remain on the finasteride for now. Refills of both meds sent today in case he needs to resume both.   -He will follow up with me in 6 months to check in on symptoms.     Veronica Costa, CNP  Department of Urology      Subjective:   74 year old male with a PMH of CAD s/p CABG, HTN, melanoma, hx of acinar lung cancer s/p right middle lobectomy and BPH on Flomax (prostate volume 144.5 ml on MRI in 2021) who presents for follow up. Our last visit was in April and he was doing well at that time on Flomax and finasteride. Has opted to defer further PSA monitoring.     Today, he states that he has continued the Flomax and finasteride and has continued to do well on this regimen. However, he is interested in limiting the number of medications he takes and asks if he would be able to safely stop either or both of these.     Objective:     Exam:   Patient is a 74 year old male   No vital signs  obtained due to virtual visit.  General Appearance: Well groomed, hygenic  Respiratory: No cough, no respiratory distress or labored breathing  Musculoskeletal: Grossly normal with no gross deficits  Skin: No discoloration or apparent rashes  Neurologic: No tremors  Psychiatric: Alert and oriented  Further examination is deferred due to the nature of our visit.

## 2025-01-14 NOTE — LETTER
1/14/2025       RE: Jack Brown  [address]  [place] [address]     Dear Colleague,    Thank you for referring your patient, Jack Brown, to the [facility] [place] [facility]. Please see a copy of my visit note below.    Virtual Visit Details    Type of service:  Video Visit   Video Start Time: 9:40 AM  Video End Time: 9:49 AM    Originating Location (pt. Location): Home  Distant Location (provider location):  Off-site  Platform used for Video Visit: Lisa    CC: LUTS follow up    Assessment/Plan:  74 year old male with PMH of CAD s/p CABG, HTN, melanoma,hx of acinar lung cancer sp right middle lobectomy and BPH with LUTS, which have been well-managed on a combination of Flomax and finasteride. He is interested in cutting down on the number of medications he takes and asks if he would be able to safely stop either of these. We discussed that Flomax is pretty quick-acting, so he could try stopping this one for a week or two to see if symptoms worsen without it. Could always be promptly resumed if so. The finasteride, on the other hand, becomes fully effective over several months, so stopping it could result in delayed effects if he wanted to resume it later. He will therefore start with stopping the Flomax first and see how this goes and will plan to remain on the finasteride for now. Refills of both meds sent today in case he needs to resume both.   -He will follow up with me in 6 months to check in on symptoms.     Veronica Costa, CNP  Department of Urology      Subjective:   74 year old male with a PMH of CAD s/p CABG, HTN, melanoma, hx of acinar lung cancer s/p right middle lobectomy and BPH on Flomax (prostate volume 144.5 ml on MRI in 2021) who presents for follow up. Our last visit was in April and he was doing well at that time on Flomax and finasteride. Has opted to defer further PSA monitoring.     Today, he states  that he has continued the Flomax and finasteride and has continued to do well on this regimen. However, he is interested in limiting the number of medications he takes and asks if he would be able to safely stop either or both of these.     Objective:     Exam:   Patient is a 74 year old male   No vital signs obtained due to virtual visit.  General Appearance: Well groomed, hygenic  Respiratory: No cough, no respiratory distress or labored breathing  Musculoskeletal: Grossly normal with no gross deficits  Skin: No discoloration or apparent rashes  Neurologic: No tremors  Psychiatric: Alert and oriented  Further examination is deferred due to the nature of our visit.             Again, thank you for allowing me to participate in the care of your patient.      Sincerely,    Veronica Costa, CNP

## 2025-01-15 ENCOUNTER — LAB (OUTPATIENT)
Dept: LAB | Facility: CLINIC | Age: 75
End: 2025-01-15
Payer: MEDICARE

## 2025-01-15 ENCOUNTER — ANTICOAGULATION THERAPY VISIT (OUTPATIENT)
Dept: ANTICOAGULATION | Facility: CLINIC | Age: 75
End: 2025-01-15

## 2025-01-15 DIAGNOSIS — I70.90 ARTERIAL OCCLUSION: Primary | ICD-10-CM

## 2025-01-15 DIAGNOSIS — I48.0 PAROXYSMAL ATRIAL FIBRILLATION (H): ICD-10-CM

## 2025-01-15 DIAGNOSIS — I70.90 ARTERIAL OCCLUSION: ICD-10-CM

## 2025-01-15 LAB — INR BLD: 2.3 (ref 0.9–1.1)

## 2025-01-15 PROCEDURE — 36416 COLLJ CAPILLARY BLOOD SPEC: CPT

## 2025-01-15 PROCEDURE — 85610 PROTHROMBIN TIME: CPT

## 2025-01-15 NOTE — PROGRESS NOTES
ANTICOAGULATION MANAGEMENT     Jack Brown 74 year old male is on warfarin with therapeutic INR result. (Goal INR 2.0-3.0)    Recent labs: (last 7 days)     01/15/25  0808   INR 2.3*       ASSESSMENT     Source(s): Chart Review and Patient/Caregiver Call     Warfarin doses taken: Warfarin taken as instructed  Diet: No new diet changes identified  Medication/supplement changes: None noted  New illness, injury, or hospitalization: No  Signs or symptoms of bleeding or clotting: No  Previous result: Therapeutic last 2(+) visits  Additional findings: None       PLAN     Recommended plan for no diet, medication or health factor changes affecting INR     Dosing Instructions: Continue your current warfarin dose with next INR in 4 weeks       Summary  As of 1/15/2025      Full warfarin instructions:  3 mg every Wed; 4 mg all other days   Next INR check:  2/12/2025               Telephone call with Shayy who verbalizes understanding and agrees to plan    Lab visit scheduled    Education provided: Please call back if any changes to your diet, medications or how you've been taking warfarin    Plan made per [facility] anticoagulation protocol    Felisa Carnes RN  1/15/2025  Anticoagulation Clinic  [employer] for routing messages: jovani MORENO [facility] patient phone line: [phone]        _______________________________________________________________________     Anticoagulation Episode Summary       Current INR goal:  2.0-3.0   TTR:  59.2% (10.8 mo)   Target end date:  Indefinite   Send INR reminders to:  TIFFANIE [place]on    Indications    Arterial occlusion [I70.90]  Paroxysmal atrial fibrillation (H) [I48.0]             Comments:  --             Anticoagulation Care Providers       Provider Role Specialty Phone number    Mercedes Adorno MD Referring Family Medicine [phone]

## 2025-01-16 ENCOUNTER — THERAPY VISIT (OUTPATIENT)
Dept: PHYSICAL THERAPY | Facility: REHABILITATION | Age: 75
End: 2025-01-16
Payer: MEDICARE

## 2025-01-16 DIAGNOSIS — R29.898 LEG WEAKNESS, BILATERAL: ICD-10-CM

## 2025-01-16 DIAGNOSIS — M79.662 PAIN OF LEFT LOWER LEG: Primary | ICD-10-CM

## 2025-01-16 DIAGNOSIS — I73.9 PAD (PERIPHERAL ARTERY DISEASE): ICD-10-CM

## 2025-01-18 DIAGNOSIS — I70.90 ARTERIAL OCCLUSION: ICD-10-CM

## 2025-01-20 RX ORDER — ROSUVASTATIN CALCIUM 5 MG/1
5 TABLET, COATED ORAL DAILY
Qty: 90 TABLET | Refills: 1 | Status: SHIPPED | OUTPATIENT
Start: 2025-01-20

## 2025-01-30 ENCOUNTER — THERAPY VISIT (OUTPATIENT)
Dept: PHYSICAL THERAPY | Facility: REHABILITATION | Age: 75
End: 2025-01-30
Payer: MEDICARE

## 2025-01-30 ENCOUNTER — MYC REFILL (OUTPATIENT)
Dept: FAMILY MEDICINE | Facility: CLINIC | Age: 75
End: 2025-01-30

## 2025-01-30 DIAGNOSIS — M79.662 PAIN OF LEFT LOWER LEG: ICD-10-CM

## 2025-01-30 DIAGNOSIS — R29.898 LEG WEAKNESS, BILATERAL: ICD-10-CM

## 2025-01-30 DIAGNOSIS — I73.9 PAD (PERIPHERAL ARTERY DISEASE): ICD-10-CM

## 2025-01-30 DIAGNOSIS — Z29.9 PREVENTIVE MEASURE: Primary | ICD-10-CM

## 2025-02-05 RX ORDER — VITAMIN B COMPLEX
1 TABLET ORAL DAILY
Qty: 90 TABLET | Refills: 1 | Status: SHIPPED | OUTPATIENT
Start: 2025-02-05

## 2025-02-05 NOTE — TELEPHONE ENCOUNTER
1/31/25 MyChart encounter    Jack Brown to [facility] (supporting Jada Cast, NAYELI)   DK      2/4/25  8:13 PM  I am currently not taking it. please refill at same dosage  January 31, 2025  Jada Cats, RN to Jack Brown   MM      1/31/25  9:08 AM  Abebe Santiago,     We received a refill request for you Vitamin D. Our records indicate this medication has been discontinued. Can you please confirm that you are currently taking the Vitamin D and how often? Please let us know and we can send the refill request to Dr. Adorno.     Thank you,     NAYELI Elias  M. [facility]     Last read by Jack Brown at  8:12 PM on 2/4/2025.

## 2025-02-11 ENCOUNTER — ANTICOAGULATION THERAPY VISIT (OUTPATIENT)
Dept: ANTICOAGULATION | Facility: CLINIC | Age: 75
End: 2025-02-11

## 2025-02-11 ENCOUNTER — LAB (OUTPATIENT)
Dept: LAB | Facility: CLINIC | Age: 75
End: 2025-02-11
Payer: MEDICARE

## 2025-02-11 ENCOUNTER — OFFICE VISIT (OUTPATIENT)
Dept: URGENT CARE | Facility: URGENT CARE | Age: 75
End: 2025-02-11
Payer: MEDICARE

## 2025-02-11 VITALS
DIASTOLIC BLOOD PRESSURE: 78 MMHG | HEART RATE: 68 BPM | OXYGEN SATURATION: 97 % | RESPIRATION RATE: 20 BRPM | TEMPERATURE: 98 F | SYSTOLIC BLOOD PRESSURE: 130 MMHG

## 2025-02-11 DIAGNOSIS — I70.90 ARTERIAL OCCLUSION: ICD-10-CM

## 2025-02-11 DIAGNOSIS — H66.002 NON-RECURRENT ACUTE SUPPURATIVE OTITIS MEDIA OF LEFT EAR WITHOUT SPONTANEOUS RUPTURE OF TYMPANIC MEMBRANE: Primary | ICD-10-CM

## 2025-02-11 DIAGNOSIS — I48.0 PAROXYSMAL ATRIAL FIBRILLATION (H): ICD-10-CM

## 2025-02-11 DIAGNOSIS — I70.90 ARTERIAL OCCLUSION: Primary | ICD-10-CM

## 2025-02-11 LAB — INR BLD: 1.7 (ref 0.9–1.1)

## 2025-02-11 PROCEDURE — 36416 COLLJ CAPILLARY BLOOD SPEC: CPT | Performed by: INTERNAL MEDICINE

## 2025-02-11 PROCEDURE — 85610 PROTHROMBIN TIME: CPT | Performed by: INTERNAL MEDICINE

## 2025-02-11 PROCEDURE — 99213 OFFICE O/P EST LOW 20 MIN: CPT

## 2025-02-11 RX ORDER — PENICILLIN V POTASSIUM 500 MG/1
500 TABLET, FILM COATED ORAL 2 TIMES DAILY
Qty: 20 TABLET | Refills: 0 | Status: SHIPPED | OUTPATIENT
Start: 2025-02-11

## 2025-02-11 RX ORDER — PENICILLIN V POTASSIUM 500 MG/1
500 TABLET, FILM COATED ORAL 2 TIMES DAILY
Qty: 20 TABLET | Refills: 0 | Status: SHIPPED | OUTPATIENT
Start: 2025-02-11 | End: 2025-02-11

## 2025-02-11 NOTE — PATIENT INSTRUCTIONS
Thank you for coming to see me today! Here are a couple of pieces of information about my schedule and communication practices.    I am not in the clinic on Mondays or Tuesdays. Non-urgent calls and messages received on Mondays and Tuesdays will be addressed as soon as I am able when I am back in the office on the next business day. Urgent calls will be addressed by a covering clinician.      If lab work was done today as part of your evaluation, you will generally be contacted via [employer], mail, or phone with the results within 7-10 days.  If there is an alarming/concerning result we will contact you by phone. Lab results come back at varying times, I generally wait until all labs are resulted before making comments on results. Please note, labs are automatically released to [employer] once available, but it may take a couple of days for my comments on these to appear.    I try very hard to respond to medical messages with 2 business days of receiving them. Occasionally it takes me longer if I am trying to figure out the best way to respond and need to seek guidance, do some research or dig deeper into your medical history to come up with a helpful response.    If you need refills, please contact your pharmacist. They will send a refill request to me to review. Please allow 3-5 business days for us to respond to all refill requests.    Please call or send a medical message with any questions or concerns. Thank you for trusting me to be part of your healthcare team!    Dr. Jody Healy

## 2025-02-11 NOTE — PROGRESS NOTES
ANTICOAGULATION MANAGEMENT     Jack Brown 74 year old male is on warfarin with subtherapeutic INR result. (Goal INR 2.0-3.0)    Recent labs: (last 7 days)     02/11/25  1512   INR 1.7*       ASSESSMENT     Source(s): Chart Review and Patient/Caregiver Call     Warfarin doses taken: Missed dose(s) may be affecting INR  Diet: No new diet changes identified  Medication/supplement changes:  penicillin 500 mg bid 10 days today for ear  New illness, injury, or hospitalization: Yes: ear infection  Signs or symptoms of bleeding or clotting: No  Previous result: Therapeutic last 2(+) visits  Additional findings: None       PLAN     Recommended plan for temporary change(s) affecting INR     Dosing Instructions: booster dose then continue your current warfarin dose with next INR in 1 week       Summary  As of 2/11/2025      Full warfarin instructions:  2/11: 6 mg; Otherwise 3 mg every Wed; 4 mg all other days   Next INR check:  2/25/2025               Telephone call with Shayy who verbalizes understanding and agrees to plan    Lab visit scheduled    Education provided: Please call back if any changes to your diet, medications or how you've been taking warfarin    Plan made per [facility] anticoagulation protocol    Felisa Carnes, RN  2/11/2025  Anticoagulation Clinic  [employer] for routing messages: jovani MORENO [address]  [facility] patient phone line: [phone]        _______________________________________________________________________     Anticoagulation Episode Summary       Current INR goal:  2.0-3.0   TTR:  59.7% (10.8 mo)   Target end date:  Indefinite   Send INR reminders to:  TIFFANIE MIDWAY    Indications    Arterial occlusion [I70.90]  Paroxysmal atrial fibrillation (H) [I48.0]             Comments:  --             Anticoagulation Care Providers       Provider Role Specialty Phone number    Mercedes Adorno MD Referring Family Medicine [phone]

## 2025-02-11 NOTE — PROGRESS NOTES
"Assessment & Plan  Jack was seen today for ear problem.    Diagnoses and all orders for this visit:    Non-recurrent acute suppurative otitis media of left ear without spontaneous rupture of tympanic membrane  -     Discontinue: penicillin V (VEETID) 500 MG tablet; Take 1 tablet (500 mg) by mouth 2 times daily for 10 days.  -     penicillin V (VEETID) 500 MG tablet; Take 1 tablet (500 mg) by mouth 2 times daily.         Jody Healy MD  [facility]    Subjective   Jack is a 74 year old, presenting for the following health issues:  Ear Problem (L ear pain and pressure X2 wks )    Left ear pain for the past 2 weeks, wakes him up at nighttime  Pain is \"directly inside the hole\"  No fevers or URI symptoms recently  Years ago, had trigeminal neuralgia - this time doesn't affect the jaw      Objective    /78   Pulse 68   Temp 98  F (36.7  C)   Resp 20   SpO2 97%   There is no height or weight on file to calculate BMI.  GEN: Patient sitting comfortably in no acute distress.  HEEN: Head is atraumatic, normocephalic, eyes anicteric, mucous membranes moist.  Right ear canal and tympanic membrane normal.  Left ear canal normal.  Left tympanic membrane with cloudy effusion, slight bulging.  CV: Extremities well-perfused. No obvious lower extremity edema.  PULM: Breathing comfortably on room air. Speaking in full sentences.  NEURO: Alert and oriented x3.  No focal motor abnormalities.  Face symmetric.  PSYCH: Appropriate affect.  SKIN: No rashes, bruising, or other lesions visible on exposed skin.    "

## 2025-02-28 ENCOUNTER — ANCILLARY PROCEDURE (OUTPATIENT)
Dept: VASCULAR ULTRASOUND | Facility: CLINIC | Age: 75
End: 2025-02-28
Attending: SURGERY
Payer: MEDICARE

## 2025-02-28 ENCOUNTER — HOSPITAL ENCOUNTER (OUTPATIENT)
Dept: CT IMAGING | Facility: HOSPITAL | Age: 75
Discharge: HOME | End: 2025-02-28
Attending: SURGERY
Payer: MEDICARE

## 2025-02-28 DIAGNOSIS — I73.9 PAD (PERIPHERAL ARTERY DISEASE): ICD-10-CM

## 2025-02-28 DIAGNOSIS — I72.3 COMMON ILIAC ANEURYSM: ICD-10-CM

## 2025-02-28 LAB
CREAT BLD-MCNC: 1.2 MG/DL (ref 0.7–1.3)
EGFRCR SERPLBLD CKD-EPI 2021: >60 ML/MIN/1.73M2

## 2025-02-28 PROCEDURE — 93923 UPR/LXTR ART STDY 3+ LVLS: CPT

## 2025-02-28 PROCEDURE — 82565 ASSAY OF CREATININE: CPT

## 2025-02-28 PROCEDURE — 93923 UPR/LXTR ART STDY 3+ LVLS: CPT | Mod: 26 | Performed by: SURGERY

## 2025-02-28 PROCEDURE — 74174 CTA ABD&PLVS W/CONTRAST: CPT

## 2025-02-28 PROCEDURE — 93926 LOWER EXTREMITY STUDY: CPT | Mod: LT

## 2025-02-28 PROCEDURE — 93926 LOWER EXTREMITY STUDY: CPT | Mod: 26 | Performed by: SURGERY

## 2025-02-28 PROCEDURE — 250N000011 HC RX IP 250 OP 636: Performed by: SURGERY

## 2025-02-28 PROCEDURE — 250N000009 HC RX 250: Performed by: SURGERY

## 2025-02-28 RX ORDER — IOPAMIDOL 755 MG/ML
67 INJECTION, SOLUTION INTRAVASCULAR ONCE
Status: COMPLETED | OUTPATIENT
Start: 2025-02-28 | End: 2025-02-28

## 2025-02-28 RX ADMIN — IOPAMIDOL 67 ML: 755 INJECTION, SOLUTION INTRAVENOUS at 11:56

## 2025-02-28 RX ADMIN — SODIUM CHLORIDE 98 ML: 9 INJECTION, SOLUTION INTRAVENOUS at 11:56

## 2025-03-04 ENCOUNTER — TELEPHONE (OUTPATIENT)
Dept: VASCULAR SURGERY | Facility: CLINIC | Age: 75
End: 2025-03-04
Payer: MEDICARE

## 2025-03-04 NOTE — TELEPHONE ENCOUNTER
Surgery Scheduled    Discussed surgery plans/instructions. The pt will schedule a pre-op physical. Will send surgery packet once medications have been reviewed.    Surgery/Procedure: ESTABLISHMENT, VASCULAR ACCESS, FEMORAL APPROACH, FOR ENDOVASCULAR STENT INSERTION INTO ABDOMINAL AORTIC ANEURYSM (Bilateral)     Special Equipment: c arm  Length of Surgery: 4 hrs   Preoperative physical needed: Yes  Product Rep needed? Yes  Company? [employer]    Location: [facility]:  [facility] [place] [address] (phone: [phone], Fax: [phone])    Please park in [address]. Enter through the main entrance. Check in at the Welcome Desk and you will be directed to the surgery unit.     Date: 05/05/2025    Time: 7:30 am    Admission Type: Inpatient    Surgeon: Dr. Laverne Crum    OR Confirmed & :  Yes with Renae on 3/4/2025    IR Tech Needed: No     Entered on provider calendar:  Yes    Post Op: See appt desk    Wound Vac Needed: No    Home Care Needed: No    Ultrasound Scheduled: Not needed    Reps Contacted: [employer] Rep needed. Email sent to rep. See screenshot below.       Blood Thinners Addressed:  Message sent to support pool to review the patients medications.

## 2025-03-04 NOTE — TELEPHONE ENCOUNTER
Reviewed Blood Thinners and plan for holding, continuing and/or bridging: Coumadin: THIS MUST BE HELD 5 DAYS PRIOR TO THE PROCEDURE/ SURGERY. We may need to have you do blood thinner injections during this time. This is called Bridging. Please contact your primary doctor or INR clinic to see if bridging is needed.  and Aspirin: PLEASE DO NOT STOP THIS MEDICATION PRIOR TO SURGERY/ PROCEDURE.     Reviewed Diabetic medications that are GLP-1 agonists: NA  Please discuss with your primary doctor and follow the hold instructions:   []  Hold seven (7) days prior for once weekly injectable doses [semaglutide (Ozempic, Wegovy), dulaglutide (Trulicity), exenatide ER (Bydureon), tirzepatide (Mounjaro)]  []  Hold the day before and day of for once daily injectable GLP-1 agonists [exenatide (Byetta), liraglutide (Saxenda, Victoza)]  []  Hold seven (7) days for oral semaglutide (Rybelsus)

## 2025-03-20 ENCOUNTER — OFFICE VISIT (OUTPATIENT)
Dept: INTERNAL MEDICINE | Facility: CLINIC | Age: 75
End: 2025-03-20
Payer: MEDICARE

## 2025-03-20 VITALS
HEIGHT: 66 IN | WEIGHT: 163.56 LBS | RESPIRATION RATE: 18 BRPM | HEART RATE: 56 BPM | OXYGEN SATURATION: 95 % | SYSTOLIC BLOOD PRESSURE: 124 MMHG | BODY MASS INDEX: 26.29 KG/M2 | DIASTOLIC BLOOD PRESSURE: 70 MMHG

## 2025-03-20 DIAGNOSIS — M54.2 NECK PAIN: Primary | ICD-10-CM

## 2025-03-20 ASSESSMENT — PAIN SCALES - GENERAL: PAINLEVEL_OUTOF10: NO PAIN (0)

## 2025-03-20 NOTE — PROGRESS NOTES
"  Assessment & Plan   Problem List Items Addressed This Visit    None  Visit Diagnoses       Neck pain    -  Primary           Recommend PT, patient declines.  Recommend over-the-counter Tylenol massage moist heat, at home exercises are reviewed with patient.Patient advised if symptoms persist, worsen or new symptoms arise they are to seek medical care.  All patients questions addressed. Patient verbalized understanding and agreement with plan.          BMI  Estimated body mass index is 26.4 kg/m  as calculated from the following:    Height as of this encounter: 1.676 m (5' 6\").    Weight as of this encounter: 74.2 kg (163 lb 9 oz).             Subjective   Jack is a 74 year old, presenting for the following health issues:  Ear Problem (Left ear, no discharge, completed previous anbx a month ago when he was seen for the same thing. )        3/20/2025    11:13 AM   Additional Questions   Roomed by Jack LAZO CMA     [facility]        Concern - Ear infection  Onset: Has been ongoing. Was seen in UC last month and was prescribed penicillin. This has been completed. No discharge, pain off and on.   Description: See above   Intensity: moderate  Progression of Symptoms:  worsening  Accompanying Signs & Symptoms: Ear pain   Previous history of similar problem: yes  Precipitating factors:        Worsened by: NA  Alleviating factors:        Improved by: NA  Therapies tried and outcome: None        Review of Systems  Constitutional, HEENT, cardiovascular, pulmonary, GI, , musculoskeletal, neuro, skin, endocrine and psych systems are negative, except as otherwise noted.      Objective    /70 (BP Location: Right arm, Patient Position: Sitting, Cuff Size: Adult Regular)   Pulse 56   Resp 18   Ht 1.676 m (5' 6\")   Wt 74.2 kg (163 lb 9 oz)   SpO2 95%   BMI 26.40 kg/m    Body mass index is 26.4 kg/m .  Physical Exam   GENERAL: alert and no distress  EYES: Eyes grossly normal to inspection,  conjunctivae and sclerae " normal  HENT: ear canals and TM's normal, nose and mouth without ulcers or lesions  RESP: Respirations are regular nonlabored  MS: Patient noted to have left anterior cervical neck discomfort increases with rotation most prominent to the left than right he has some reduction in range of motion with rotation to the left  SKIN: no suspicious lesions or rashes  PSYCH: mentation appears normal, affect normal/bright    Current Outpatient Medications   Medication Sig Dispense Refill    acetaminophen (TYLENOL) 500 MG tablet [ACETAMINOPHEN (TYLENOL) 500 MG TABLET] Take 500 mg by mouth every 6 (six) hours as needed for pain.      amLODIPine (NORVASC) 10 MG tablet Take 1 tablet (10 mg) by mouth daily. 90 tablet 2    aspirin 81 MG EC tablet Take 1 tablet (81 mg) by mouth daily 90 tablet 3    finasteride (PROSCAR) 5 MG tablet Take 1 tablet (5 mg) by mouth daily. 90 tablet 3    metoprolol tartrate (LOPRESSOR) 50 MG tablet TAKE 1 TABLET(50 MG) BY MOUTH TWICE DAILY 180 tablet 1    rosuvastatin (CRESTOR) 5 MG tablet TAKE 1 TABLET(5 MG) BY MOUTH DAILY 90 tablet 1    tamsulosin (FLOMAX) 0.4 MG capsule TAKE 1 CAPSULE BY MOUTH DAILY AFTER BREAKFAST 90 capsule 3    Vitamin D3 (VITAMIN D, CHOLECALCIFEROL,) 25 mcg (1000 units) tablet Take 1 tablet (25 mcg) by mouth daily. 90 tablet 1    warfarin ANTICOAGULANT (COUMADIN) 1 MG tablet Take 3-4 tablets daily as directed based on inr results 400 tablet 1     No current facility-administered medications for this visit.             Signed Electronically by: Renuka James NP

## 2025-03-24 ENCOUNTER — TELEPHONE (OUTPATIENT)
Dept: ANTICOAGULATION | Facility: CLINIC | Age: 75
End: 2025-03-24
Payer: MEDICARE

## 2025-03-24 NOTE — TELEPHONE ENCOUNTER
ANTICOAGULATION     Jack Brown is overdue for an INR check.     Left message for patient to call and schedule lab appointment as soon as possible. If returning call, please schedule.     Felisa Carnes RN  3/24/2025  Anticoagulation Clinic  [facility] for routing messages: jovani MORENO [address]  [facility] patient phone line: [phone]

## 2025-03-25 ENCOUNTER — TELEPHONE (OUTPATIENT)
Dept: ANTICOAGULATION | Facility: CLINIC | Age: 75
End: 2025-03-25

## 2025-03-25 ENCOUNTER — LAB (OUTPATIENT)
Dept: LAB | Facility: CLINIC | Age: 75
End: 2025-03-25
Payer: MEDICARE

## 2025-03-25 ENCOUNTER — ANTICOAGULATION THERAPY VISIT (OUTPATIENT)
Dept: ANTICOAGULATION | Facility: CLINIC | Age: 75
End: 2025-03-25

## 2025-03-25 DIAGNOSIS — I70.90 ARTERIAL OCCLUSION: Primary | ICD-10-CM

## 2025-03-25 DIAGNOSIS — I48.0 PAROXYSMAL ATRIAL FIBRILLATION (H): ICD-10-CM

## 2025-03-25 DIAGNOSIS — I70.90 ARTERIAL OCCLUSION: ICD-10-CM

## 2025-03-25 LAB — INR BLD: 2.6 (ref 0.9–1.1)

## 2025-03-25 PROCEDURE — 85610 PROTHROMBIN TIME: CPT | Performed by: INTERNAL MEDICINE

## 2025-03-25 PROCEDURE — 36415 COLL VENOUS BLD VENIPUNCTURE: CPT | Performed by: INTERNAL MEDICINE

## 2025-03-25 NOTE — PROGRESS NOTES
ANTICOAGULATION MANAGEMENT     Jack Brown 74 year old male is on warfarin with therapeutic INR result. (Goal INR 2.0-3.0)    Recent labs: (last 7 days)     03/25/25  1133   INR 2.6*       ASSESSMENT     Source(s): Chart Review and Patient/Caregiver Call     Warfarin doses taken: Warfarin taken as instructed  Diet: No new diet changes identified  Medication/supplement changes: None noted  New illness, injury, or hospitalization: No  Signs or symptoms of bleeding or clotting: No  Previous result: Therapeutic last 2(+) visits  Additional findings:  5/5  surgery  endovascular. Sent to [facility] for plan       PLAN     Recommended plan for no diet, medication or health factor changes affecting INR     Dosing Instructions: Continue your current warfarin dose with next INR in 2 weeks       Summary  As of 3/25/2025      Full warfarin instructions:  3 mg every Wed; 4 mg all other days   Next INR check:  4/8/2025               Telephone call with Shayy who verbalizes understanding and agrees to plan    Lab visit scheduled    Education provided: Please call back if any changes to your diet, medications or how you've been taking warfarin    Plan made per [facility] anticoagulation protocol    Felisa Carnes RN  3/25/2025  Anticoagulation Clinic  [employer] for routing messages: jovani MORENO [address]  [facility] patient phone line: [phone]        _______________________________________________________________________     Anticoagulation Episode Summary       Current INR goal:  2.0-3.0   TTR:  71.6% (10.8 mo)   Target end date:  Indefinite   Send INR reminders to:  TIFFANIE MIDWAY    Indications    Arterial occlusion [I70.90]  Paroxysmal atrial fibrillation (H) [I48.0]             Comments:  --             Anticoagulation Care Providers       Provider Role Specialty Phone number    Mercedes Adorno MD Referring Family Medicine [phone]

## 2025-03-25 NOTE — TELEPHONE ENCOUNTER
NAGA-PROCEDURAL ANTICOAGULATION  MANAGEMENT    Jack requesting pre-procedure hold orders for warfarin and review for bridging      Procedure date: 5/5/25       Procedure:  vascular   endovascular bilateral common carotid repair with KEEGAN device.         Procedure location and phone number (if external): [place]     Number of warfarin hold days requested and/or target INR: unknown    Pre-op date: not yet scheduled      Routing to Anticoagulation Pharmacist for review.     ACC pool: jovani MORENO [place]     Felisa Carnes RN     no numbness/no bowel dysfunction/no constipation/no motor function loss/no fatigue/no tingling/no bladder dysfunction/no difficulty bearing weight/no neck tenderness/no anorexia

## 2025-03-27 ENCOUNTER — TELEPHONE (OUTPATIENT)
Dept: VASCULAR SURGERY | Facility: CLINIC | Age: 75
End: 2025-03-27
Payer: MEDICARE

## 2025-03-27 NOTE — TELEPHONE ENCOUNTER
"Writer spoke with pt and wife. He stated that when he saw Dr. Crum in February he mentioned that his walking \"didn't feel right and he was shuffling\". Wife states now that the pain as worsened in the last 3 weeks. The pain is in his bilateral thighs and he is having trouble walking. EVAR is scheduled in May and he does not want to move forward until the leg pain is resolved. Writer will discuss with Dr. Crum on 3/28.   "

## 2025-03-27 NOTE — TELEPHONE ENCOUNTER
Caller: Jack    Provider: Dr. Laverne Crum    Detailed reason for call: he states that he feels the recovery is not going as well as he thought it should and would like a call to discuss this.    Best phone number to contact: [phone]    Best time to contact: any    Ok to leave a detailed message: Yes    Ok to speak to authorized person if needed: Yes: wife Shayy      (Noted to patient if reason is related to wound or incision, to please send a photo via email or [employer].)

## 2025-03-31 ENCOUNTER — ANCILLARY PROCEDURE (OUTPATIENT)
Dept: VASCULAR ULTRASOUND | Facility: CLINIC | Age: 75
End: 2025-03-31
Attending: SURGERY
Payer: MEDICARE

## 2025-03-31 DIAGNOSIS — I73.9 PAD (PERIPHERAL ARTERY DISEASE): ICD-10-CM

## 2025-03-31 PROCEDURE — 93923 UPR/LXTR ART STDY 3+ LVLS: CPT

## 2025-03-31 PROCEDURE — 93923 UPR/LXTR ART STDY 3+ LVLS: CPT | Mod: 26 | Performed by: STUDENT IN AN ORGANIZED HEALTH CARE EDUCATION/TRAINING PROGRAM

## 2025-03-31 PROCEDURE — 93926 LOWER EXTREMITY STUDY: CPT | Mod: LT

## 2025-03-31 PROCEDURE — 93926 LOWER EXTREMITY STUDY: CPT | Mod: 26 | Performed by: STUDENT IN AN ORGANIZED HEALTH CARE EDUCATION/TRAINING PROGRAM

## 2025-04-08 ENCOUNTER — LAB (OUTPATIENT)
Dept: LAB | Facility: CLINIC | Age: 75
End: 2025-04-08
Payer: MEDICARE

## 2025-04-08 ENCOUNTER — ANTICOAGULATION THERAPY VISIT (OUTPATIENT)
Dept: ANTICOAGULATION | Facility: CLINIC | Age: 75
End: 2025-04-08

## 2025-04-08 DIAGNOSIS — I48.0 PAROXYSMAL ATRIAL FIBRILLATION (H): ICD-10-CM

## 2025-04-08 DIAGNOSIS — I70.90 ARTERIAL OCCLUSION: ICD-10-CM

## 2025-04-08 DIAGNOSIS — I70.90 ARTERIAL OCCLUSION: Primary | ICD-10-CM

## 2025-04-08 LAB — INR BLD: 3 (ref 0.9–1.1)

## 2025-04-08 PROCEDURE — 85610 PROTHROMBIN TIME: CPT

## 2025-04-08 PROCEDURE — 36416 COLLJ CAPILLARY BLOOD SPEC: CPT

## 2025-04-08 NOTE — TELEPHONE ENCOUNTER
RECORDS RECEIVED FROM: Internal    REASON FOR VISIT: Leg weakness   PROVIDER: Kamaljit Kwok DO   DATE OF APPT: 4/11/25 @ 10:00 am   NOTES (FOR ALL VISITS) STATUS DETAILS   OFFICE NOTE from referring provider Internal 3/27/25 (Phone Note), 2/28/25, 6/20/24 Laverne Crum MD @[facility]     OFFICE NOTE from other specialist Internal 5/20/24 Nanci Harry PA-C @[facility]     DISCHARGE SUMMARY from hospital Internal 5/1/24-5/3/24 Laverne Crum MD @[facility]      OPERATIVE REPORT Internal 5/1/24 Laverne Crum MD @[facility] REPAIR, PSEUDOANEURYSM, ARTERY, FEMORAL RIGHT      MEDICATION LIST Internal    IMAGING  (FOR ALL VISITS)     IR Internal [facility]  8/21/24 IR Lower Extremity Angiogram Left  12/16/23 IR Lower Extremity Angiogram Left     ULTRASOUND (CAROTID BILAT) *VASCULAR* Internal [facility]  3/31/25 US Low Ext Arterial Dop  3/31/25 US [place] Ext Arterial Duplex Left  2/28/25 US Lower Extremity Arterial Duplex Left  2/28/25 US Low Ext Arterial Dop Seg Pres w/o Exercise  11/25/24 US Low Ext Arterial Dop Seg Pres w/o Exercise  11/25/24 US Lower Extremity Arterial Duplex Left  8/21/24 US Lower Extremity Venous Mapping Bilateral  5/20/24 US Lower Extremity Arterial Duplex Right     CT (HEAD, NECK, SPINE) Internal [facility]  2/28/25 CTA Abdomen Pelvis

## 2025-04-08 NOTE — PROGRESS NOTES
ANTICOAGULATION MANAGEMENT     Jack Brown 74 year old male is on warfarin with therapeutic INR result. (Goal INR 2.0-3.0)    Recent labs: (last 7 days)     04/08/25  0958   INR 3.0*       ASSESSMENT     Source(s): Chart Review and Patient/Caregiver Call     Warfarin doses taken: Warfarin taken as instructed  Diet: No new diet changes identified  Medication/supplement changes: None noted  New illness, injury, or hospitalization: No  Signs or symptoms of bleeding or clotting: No  Previous result: Therapeutic last 2(+) visits  Additional findings: Will check the INR at pre op on 5/1/25 being he'll be in clinic anyway. VASCULAR ACCESS, FEMORAL APPROACH, FOR ENDOVASCULAR STENT INSERTION INTO ABDOMINAL AORTIC ANEURYSM is scheduled for 5/21/25. Message was sent to [facility] for hold plan on 3/25/25.       PLAN     Recommended plan for no diet, medication or health factor changes affecting INR     Dosing Instructions: Continue your current warfarin dose with next INR in about 3 weeks       Summary  As of 4/8/2025      Full warfarin instructions:  3 mg every Wed; 4 mg all other days   Next INR check:  5/1/2025               Telephone call with wife, Shayy, who verbalizes understanding and agrees to plan. C2C on file for Shayy.    Lab visit scheduled    Education provided: Please call back if any changes to your diet, medications or how you've been taking warfarin  Goal range and lab monitoring: goal range and significance of current result  Contact [phone] with any changes, questions or concerns.     Plan made per [facility] anticoagulation protocol    Eva Oleary RN  4/8/2025  Anticoagulation Clinic  [place] for routing messages: jovani MORENO [address]  [facility] patient phone line: [phone]        _______________________________________________________________________     Anticoagulation Episode Summary       Current INR goal:  2.0-3.0   TTR:  75.9% (10.8 mo)   Target end date:  Indefinite   Send INR reminders to:  TIFFANIE  MIDWAY    Indications    Arterial occlusion [I70.90]  Paroxysmal atrial fibrillation (H) [I48.0]             Comments:  --             Anticoagulation Care Providers       Provider Role Specialty Phone number    Mercedes Adorno MD Referring [facility] Medicine [phone]

## 2025-04-11 ENCOUNTER — PRE VISIT (OUTPATIENT)
Dept: NEUROLOGY | Facility: CLINIC | Age: 75
End: 2025-04-11

## 2025-04-16 ENCOUNTER — TELEPHONE (OUTPATIENT)
Dept: NEUROLOGY | Facility: CLINIC | Age: 75
End: 2025-04-16
Payer: MEDICARE

## 2025-04-16 NOTE — TELEPHONE ENCOUNTER
[facility]    Phone Message    May a detailed message be left on voicemail: yes     Reason for Call: Requesting Results     Name/type of test: Labs  Date of test: 04/11/2025  Was test done at a location other than [facility] (Please fill in the location if not [facility])?: No    Patient requesting call back to go over lab results. Patient states he has difficulty accessing [employer] and would prefer a phone call. Patient's call back #[phone]    Action Taken: Message routed to:  [facility] Neurology    Travel Screening: Not Applicable

## 2025-04-17 NOTE — TELEPHONE ENCOUNTER
Spoke to Jack and his wife. Relayed normal results. No EMG scheduled yet as recommended at last visit. They are wanting to hold off for now since he has an upcoming surgery. EMG scheduling # provided.

## 2025-04-21 DIAGNOSIS — I10 BENIGN ESSENTIAL HYPERTENSION: ICD-10-CM

## 2025-04-22 RX ORDER — METOPROLOL TARTRATE 50 MG
50 TABLET ORAL 2 TIMES DAILY
Qty: 180 TABLET | Refills: 1 | Status: SHIPPED | OUTPATIENT
Start: 2025-04-22

## 2025-04-23 ENCOUNTER — TELEPHONE (OUTPATIENT)
Dept: NEUROLOGY | Facility: CLINIC | Age: 75
End: 2025-04-23
Payer: MEDICARE

## 2025-04-23 NOTE — TELEPHONE ENCOUNTER
Patients spouse, Shayy confirmed scheduled appointment:  Date: 9/17/25  Time: 1:30pm  Visit type: Return Neurology  Provider: Sundar  Location: [facility]  Testing/imaging: NA  Additional notes: 3 month follow up    Valeria Hardin on 4/23/2025 at 10:10 AM

## 2025-04-23 NOTE — TELEPHONE ENCOUNTER
BP Readings from Last 6 Encounters:   04/11/25 135/90   03/20/25 124/70   02/28/25 (!) 161/96   02/11/25 130/78   10/29/24 136/70   10/28/24 132/64

## 2025-05-01 ENCOUNTER — OFFICE VISIT (OUTPATIENT)
Dept: INTERNAL MEDICINE | Facility: CLINIC | Age: 75
End: 2025-05-01
Payer: MEDICARE

## 2025-05-01 VITALS
WEIGHT: 165.2 LBS | HEIGHT: 66 IN | RESPIRATION RATE: 18 BRPM | TEMPERATURE: 98.1 F | BODY MASS INDEX: 26.55 KG/M2 | SYSTOLIC BLOOD PRESSURE: 138 MMHG | HEART RATE: 73 BPM | OXYGEN SATURATION: 95 % | DIASTOLIC BLOOD PRESSURE: 82 MMHG

## 2025-05-01 DIAGNOSIS — Z85.820 HISTORY OF MELANOMA: ICD-10-CM

## 2025-05-01 DIAGNOSIS — Z85.828 HISTORY OF BASAL CELL CARCINOMA: ICD-10-CM

## 2025-05-01 DIAGNOSIS — M79.662 PAIN OF LEFT LOWER LEG: ICD-10-CM

## 2025-05-01 DIAGNOSIS — C7A.090 ATYPICAL CARCINOID LUNG TUMOR (H): ICD-10-CM

## 2025-05-01 DIAGNOSIS — I10 PRIMARY HYPERTENSION: ICD-10-CM

## 2025-05-01 DIAGNOSIS — R35.0 BENIGN PROSTATIC HYPERPLASIA WITH URINARY FREQUENCY: ICD-10-CM

## 2025-05-01 DIAGNOSIS — I72.3 COMMON ILIAC ANEURYSM: ICD-10-CM

## 2025-05-01 DIAGNOSIS — Z95.1 S/P CABG X 3: ICD-10-CM

## 2025-05-01 DIAGNOSIS — I48.0 PAROXYSMAL ATRIAL FIBRILLATION (H): ICD-10-CM

## 2025-05-01 DIAGNOSIS — I25.10 CORONARY ARTERY DISEASE DUE TO LIPID RICH PLAQUE: ICD-10-CM

## 2025-05-01 DIAGNOSIS — R29.898 LEG WEAKNESS, BILATERAL: ICD-10-CM

## 2025-05-01 DIAGNOSIS — I71.43 INFRARENAL ABDOMINAL AORTIC ANEURYSM (AAA) WITHOUT RUPTURE: ICD-10-CM

## 2025-05-01 DIAGNOSIS — N40.1 BENIGN PROSTATIC HYPERPLASIA WITH URINARY FREQUENCY: ICD-10-CM

## 2025-05-01 DIAGNOSIS — I25.83 CORONARY ARTERY DISEASE DUE TO LIPID RICH PLAQUE: ICD-10-CM

## 2025-05-01 DIAGNOSIS — Z01.818 PRE-OP EXAM: Primary | ICD-10-CM

## 2025-05-01 DIAGNOSIS — I72.4 POPLITEAL ARTERY ANEURYSM: ICD-10-CM

## 2025-05-01 DIAGNOSIS — I70.90 ARTERIAL OCCLUSION: ICD-10-CM

## 2025-05-01 DIAGNOSIS — Z85.89 HISTORY OF SQUAMOUS CELL CARCINOMA: ICD-10-CM

## 2025-05-01 DIAGNOSIS — I73.9 PAD (PERIPHERAL ARTERY DISEASE): ICD-10-CM

## 2025-05-01 LAB
ALBUMIN SERPL BCG-MCNC: 4.1 G/DL (ref 3.5–5.2)
ALP SERPL-CCNC: 85 U/L (ref 40–150)
ALT SERPL W P-5'-P-CCNC: 11 U/L (ref 0–70)
ANION GAP SERPL CALCULATED.3IONS-SCNC: 14 MMOL/L (ref 7–15)
AST SERPL W P-5'-P-CCNC: 26 U/L (ref 0–45)
ATRIAL RATE - MUSE: 133 BPM
BASOPHILS # BLD AUTO: 0 10E3/UL (ref 0–0.2)
BASOPHILS NFR BLD AUTO: 1 %
BILIRUB SERPL-MCNC: 0.6 MG/DL
BUN SERPL-MCNC: 18.4 MG/DL (ref 8–23)
CALCIUM SERPL-MCNC: 9.7 MG/DL (ref 8.8–10.4)
CHLORIDE SERPL-SCNC: 106 MMOL/L (ref 98–107)
CREAT SERPL-MCNC: 1.17 MG/DL (ref 0.67–1.17)
DIASTOLIC BLOOD PRESSURE - MUSE: NORMAL MMHG
EGFRCR SERPLBLD CKD-EPI 2021: 65 ML/MIN/1.73M2
EOSINOPHIL # BLD AUTO: 0.1 10E3/UL (ref 0–0.7)
EOSINOPHIL NFR BLD AUTO: 2 %
ERYTHROCYTE [DISTWIDTH] IN BLOOD BY AUTOMATED COUNT: 14.2 % (ref 10–15)
GLUCOSE SERPL-MCNC: 95 MG/DL (ref 70–99)
HCO3 SERPL-SCNC: 21 MMOL/L (ref 22–29)
HCT VFR BLD AUTO: 47 % (ref 40–53)
HGB BLD-MCNC: 15.8 G/DL (ref 13.3–17.7)
IMM GRANULOCYTES # BLD: 0 10E3/UL
IMM GRANULOCYTES NFR BLD: 0 %
INR BLD: 1.2 (ref 0.9–1.1)
INTERPRETATION ECG - MUSE: NORMAL
LYMPHOCYTES # BLD AUTO: 1.6 10E3/UL (ref 0.8–5.3)
LYMPHOCYTES NFR BLD AUTO: 20 %
MCH RBC QN AUTO: 29.4 PG (ref 26.5–33)
MCHC RBC AUTO-ENTMCNC: 33.6 G/DL (ref 31.5–36.5)
MCV RBC AUTO: 87 FL (ref 78–100)
MONOCYTES # BLD AUTO: 0.7 10E3/UL (ref 0–1.3)
MONOCYTES NFR BLD AUTO: 9 %
NEUTROPHILS # BLD AUTO: 5.6 10E3/UL (ref 1.6–8.3)
NEUTROPHILS NFR BLD AUTO: 69 %
P AXIS - MUSE: 33 DEGREES
PLATELET # BLD AUTO: 228 10E3/UL (ref 150–450)
POTASSIUM SERPL-SCNC: 4.2 MMOL/L (ref 3.4–5.3)
PR INTERVAL - MUSE: NORMAL MS
PROT SERPL-MCNC: 7.7 G/DL (ref 6.4–8.3)
QRS DURATION - MUSE: 100 MS
QT - MUSE: 398 MS
QTC - MUSE: 473 MS
R AXIS - MUSE: -18 DEGREES
RBC # BLD AUTO: 5.38 10E6/UL (ref 4.4–5.9)
SODIUM SERPL-SCNC: 141 MMOL/L (ref 135–145)
SYSTOLIC BLOOD PRESSURE - MUSE: NORMAL MMHG
T AXIS - MUSE: 35 DEGREES
VENTRICULAR RATE- MUSE: 85 BPM
WBC # BLD AUTO: 8.1 10E3/UL (ref 4–11)

## 2025-05-01 NOTE — PROGRESS NOTES
Preoperative Evaluation  [address]  [facility] [address]  Phone: [phone]  Fax: [phone]  Primary Provider: Mercedes Adorno MD  Pre-op Performing Provider: Renuka James CNP  May 1, 2025               5/1/2025   Surgical Information   What procedure is being done? Stent Insertion    Facility or Hospital where procedure/surgery will be performed: [facility]    Who is doing the procedure / surgery? Dr Skyla Hartman   Date of surgery / procedure: 5/21/2025 5/21/2025   Time of surgery / procedure: 7:40 AM     6:30   Where do you plan to recover after surgery? at home with family     at home with family       Proxy-reported    Multiple values from one day are sorted in reverse-chronological order     Fax number for surgical facility: Note does not need to be faxed, will be available electronically in Epic.    Assessment & Plan     The proposed surgical procedure is considered INTERMEDIATE risk.    Problem List Items Addressed This Visit       Coronary artery disease due to lipid rich plaque    S/P CABG x 3    Atypical carcinoid lung tumor (H)    History of melanoma    History of squamous cell carcinoma    History of basal cell carcinoma    Hypertension    Arterial occlusion    Pain of left lower leg    Infrarenal abdominal aortic aneurysm (AAA) without rupture    Common iliac aneurysm    Paroxysmal atrial fibrillation (H)    Benign prostatic hyperplasia with urinary frequency    Popliteal artery aneurysm    PAD (peripheral artery disease)    Leg weakness, bilateral     Other Visit Diagnoses       Pre-op exam    -  Primary    Relevant Orders    EKG 12-lead, tracing only (Completed)    CBC with platelets and differential (Completed)    Comprehensive metabolic panel                      - No identified additional risk factors other than previously addressed    Antiplatelet or Anticoagulation Medication Instructions   - warfarin: Patient  working with [facility] for management pre and postprocedure    Additional Medication Instructions   - Beta Blockers (atenolol, metoprolol, propranolol) : Continue taking on the day of surgery.   - Calcium Channel Blockers (amlodipine, diltiazem, felodipine): May be continued on the day of surgery.    Recommendation  Approval given to proceed with proposed procedure, without further diagnostic evaluation.        Marcie Santiago is a 74 year old, presenting for the following:  Pre-Op Exam          5/1/2025    10:35 AM   Additional Questions   Roomed by Dallas KLINE MA     HPI: 74-year-old male patient presents to clinic today as patient is being followed by vascular surgery for peripheral artery disease and known artery aneurysms requiring intervention to size the left common iliac arteries 4.5 cm patient to undergo endovascular bilateral common carotid repair with KEEGAN device.  Patient reports ongoing pain and discomfort in lower extremities         5/1/2025   Pre-Op Questionnaire   Have you ever had a heart attack or stroke? No    Have you ever had surgery on your heart or blood vessels, such as a stent placement, a coronary artery bypass, or surgery on an artery in your head, neck, heart, or legs? (!) YES     Do you have chest pain with activity? No    Do you have a history of heart failure? No    Do you currently have a cold, bronchitis or symptoms of other infection? No    Do you have a cough, shortness of breath, or wheezing? No    Do you or anyone in your family have previous history of blood clots? (!) YES     Do you or does anyone in your family have a serious bleeding problem such as prolonged bleeding following surgeries or cuts? No    Have you ever had problems with anemia or been told to take iron pills? No    Have you had any abnormal blood loss such as black, tarry or bloody stools? No    Have you ever had a blood transfusion? (!) YES    Have you ever had a transfusion reaction? No    Are you willing to  have a blood transfusion if it is medically needed before, during, or after your surgery? Yes    Have you or any of your relatives ever had problems with anesthesia? No    Do you have sleep apnea, excessive snoring or daytime drowsiness? No    Do you have any artifical heart valves or other implanted medical devices like a pacemaker, defibrillator, or continuous glucose monitor? No    Do you have artificial joints? No    Are you allergic to latex? No        Proxy-reported     Advance Care Planning        Preoperative Review of    reviewed - no record of controlled substances prescribed.      Status of Chronic Conditions:  See problem list for active medical problems.  Problems all longstanding and stable, except as noted/documented.  See ROS for pertinent symptoms related to these conditions.    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       Common iliac aneurysm 12/28/2023     Priority: Medium    Arterial occlusion 12/15/2023     Priority: Medium    Pain of left lower leg 12/15/2023     Priority: Medium    Hypertension 10/12/2021     Priority: Medium     Formatting of this note might be different from the original.  Created by  Conversion    Replacement Utility updated for latest IMO load      Hyperlipemia      Priority: Medium     Created by Conversion      Formatting of this note might be different from the original.  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  Stable 1.2 cm nodule in the isthmus. Most likely benign.      S/P CABG x 3 12/28/2015     Priority: Medium    Coronary artery disease due to lipid rich plaque 11/17/2015     Priority: Medium    History of melanoma 10/01/2015     Priority: Medium     Right chest        History of basal cell carcinoma 10/01/2015     Priority: Medium     Posterior ear        History of squamous cell carcinoma 01/01/2015     Priority: Medium     Invasive, left arm          Past Medical History:   Diagnosis Date    AAA (abdominal aortic aneurysm)     Basal cell carcinoma     BPH (benign prostatic hypertrophy)     Cancer (H)     Skin on R.chest wall.    Carcinoid tumor (H)     Chest discomfort     Coronary artery disease     Detached retina, left 10/01/2014    Hyperlipidemia     Hypertension     Melanoma of skin (H)     PAD (peripheral artery disease)     Pain of left lower leg 12/15/2023    Pain of left upper arm 10/10/2023    Palpitations     Paroxysmal atrial fibrillation (H)     Popliteal artery aneurysm     Pseudoaneurysm     Thrombosis     Thyroid nodule      Past Surgical History:   Procedure Laterality Date    BYPASS GRAFT ARTERY CORONARY N/A 12/28/2015    Procedure: CORONARY ARTERY BYPASS x 3, RIGHT ENDOSCOPIC VEIN HARVEST, LEFT INTERNAL MAMMARY ARTERY, ANESTHESIA TRANSESOPHAGEAL ECHOCARDIOGRAM;  Surgeon: Jayson Alvarado MD;  Location: [facility];  Service:     CARDIAC SURGERY      CATARACT EXTRACTION  01/01/2015    CYST REMOVAL      Ganglion cyst removal of both wrist    EYE SURGERY      FEMORAL ARTERY - TIBIAL ARTERY BYPASS GRAFT Left 10/18/2024     Procedure: CREATION, BYPASS, ARTERIAL, FEMORAL TO TIBIAL, LEFT;  Surgeon: Laverne Crum MD;  Location: [facility]    HC REMOVAL PREPATELLA BURSA      Description: Excision Of Prepatellar Bursa;  Recorded: 09/30/2014;    HC REPAIR OF NASAL SEPTUM      Description: Septoplasty;  Recorded: 09/30/2014;    HERNIA REPAIR, UMBILICAL      IR LOWER EXTREMITY ANGIOGRAM LEFT  12/16/2023    IR LOWER EXTREMITY ANGIOGRAM LEFT  08/21/2024    IR THROMBOLYSIS ARTERIAL INFUSION INITIAL DAY  12/17/2023    PARS PLANA VITRECTOMY W/ REPAIR OF MACULAR HOLE  11/01/2014    WA EXCIS TENDON SHEATH LESION, HAND/FINGER      Description: Hand Excision Of A Tendon Cyst;  Recorded: 01/04/2011;    WA LAP, VENTRAL HERNIA REPAIR,REDUCIBLE      Description: Laparoscopy Repair Of Umbilical Hernia;  Recorded: 01/04/2011;    PSEUDOANEURYSM REPAIR Right 05/01/2024    Procedure: REPAIR, PSEUDOANEURYSM, ARTERY, FEMORAL RIGHT;  Surgeon: Laverne Crum MD;  Location: [facility]    RETINAL DETACHMENT SURGERY  10/01/2014    SKIN CANCER EXCISION  11/01/2015    right chest    THORACOSCOPY Right 04/14/2016    Procedure: RIGHT VIDEO ASSISTED THORACOSCOPY, RIGHT LOBECTOMY;  Surgeon: Vinny Chase MD;  Location: [facility];  Service:     VASECTOMY       Current Outpatient Medications   Medication Sig Dispense Refill    acetaminophen (TYLENOL) 500 MG tablet [ACETAMINOPHEN (TYLENOL) 500 MG TABLET] Take 500 mg by mouth every 6 (six) hours as needed for pain.      amLODIPine (NORVASC) 10 MG tablet Take 1 tablet (10 mg) by mouth daily. 90 tablet 2    aspirin 81 MG EC tablet Take 1 tablet (81 mg) by mouth daily 90 tablet 3    finasteride (PROSCAR) 5 MG tablet Take 1 tablet (5 mg) by mouth daily. 90 tablet 3    metoprolol tartrate (LOPRESSOR) 50 MG tablet Take 1 tablet (50 mg) by mouth 2 times daily. 180 tablet 1    rosuvastatin (CRESTOR) 5 MG tablet TAKE 1 TABLET(5 MG) BY MOUTH DAILY 90 tablet 1    tamsulosin  "(FLOMAX) 0.4 MG capsule TAKE 1 CAPSULE BY MOUTH DAILY AFTER BREAKFAST 90 capsule 3    Vitamin D3 (VITAMIN D, CHOLECALCIFEROL,) 25 mcg (1000 units) tablet Take 1 tablet (25 mcg) by mouth daily. 90 tablet 1    warfarin ANTICOAGULANT (COUMADIN) 1 MG tablet Take 3-4 tablets daily as directed based on inr results 400 tablet 1       Allergies   Allergen Reactions    Niacin Hives and Rash     Burning of the skin    Atorvastatin Muscle Pain (Myalgia)    Pravastatin Muscle Pain (Myalgia)        Social History     Tobacco Use    Smoking status: Never     Passive exposure: Never (per pt)    Smokeless tobacco: Never   Substance Use Topics    Alcohol use: Yes     Alcohol/week: 8.0 standard drinks of alcohol     Family History   Problem Relation Age of Onset    Acute Myocardial Infarction Mother     Aneurysm Mother         brain    Acute Myocardial Infarction Father     Colitis Brother     Abdominal Aortic Aneurysm Brother         had surgery and was supposed to have a second - time ran out    Leukemia Brother     Other - See Comments Maternal Grandmother          of hemorrhage after childbirht    Pneumonia Maternal Grandfather     No Known Problems Paternal Grandmother     Colon Cancer Paternal Grandfather     Lung Cancer Cousin     Coronary Artery Disease Cousin      History   Drug Use No             Review of Systems  Constitutional, HEENT, cardiovascular, pulmonary, GI, , musculoskeletal, neuro, skin, endocrine and psych systems are negative, except as otherwise noted.    Objective    /82   Pulse 73   Temp 98.1  F (36.7  C) (Oral)   Resp 18   Ht 1.676 m (5' 6\")   Wt 74.9 kg (165 lb 3.2 oz)   SpO2 95%   BMI 26.66 kg/m     Estimated body mass index is 26.66 kg/m  as calculated from the following:    Height as of this encounter: 1.676 m (5' 6\").    Weight as of this encounter: 74.9 kg (165 lb 3.2 oz).  Physical Exam  GENERAL: alert and no distress  EYES: Eyes grossly normal to inspection, PERRL and " conjunctivae and sclerae normal  NECK: no adenopathy, no asymmetry, masses, or scars  RESP: lungs clear to auscultation - no rales, rhonchi or wheezes  CV: regular rate and rhythm, normal S1 S2, no S3 or S4, no murmur, click or rub, no peripheral edema  MS: no gross musculoskeletal defects noted, no edema  SKIN: no suspicious lesions or rashes  PSYCH: mentation appears normal, affect normal/bright    Recent Labs   Lab Test 04/08/25  0958 03/25/25  1133 02/28/25  1154 10/28/24  0857 10/22/24  0702 10/21/24  0547 10/19/24  0606   HGB  --   --   --   --  11.5*  --  12.2*   PLT  --   --   --   --  211 180 176   INR 3.0* 2.6*  --    < > 1.07  --   --    NA  --   --   --   --  140  --  139   POTASSIUM  --   --   --   --  4.2  --  4.1   CR  --   --  1.2  --  1.02 0.98 1.07    < > = values in this interval not displayed.        Diagnostics  Recent Results (from the past 720 hours)   INR point of care    Collection Time: 04/08/25  9:58 AM   Result Value Ref Range    INR 3.0 (H) 0.9 - 1.1   [facility]; NMS1; Necrotizing Myopathy Evaluation, Serum (Laboratory Miscellaneous Order)    Collection Time: 04/11/25 12:13 PM   Result Value Ref Range    Specimen Status       Specimen received. Reordered and sent to performing laboratory. Report to follow upon completion.    Performing Laboratory [facility]     Test Name Necrotizing Myopathy Evaluation, Serum     Test Code NMS1    CK total    Collection Time: 04/11/25 12:13 PM   Result Value Ref Range    CK 52 39 - 308 U/L   Polymyositis and Dermatomyositis Panel    Collection Time: 04/11/25 12:13 PM   Result Value Ref Range    [place]ana-1 (Histidyl-tRNA Synthetase) Ab, IgG 0 0 - 40 AU/mL    PL-12 (alanyl-tRNA synthetase) Antibody Negative Negative    PL-7 (threonyl-tRNA synthetase) Antibody Negative Negative    EJ (glycyl - tRNA synthetase) Antibody Negative Negative    OJ (isoleucyl-tRNA synthetase) Antibody Negative Negative    SRP (Signal Recognition Particle)  Ab Negative Negative    Mi-2 (nuclrear helicase protein) Antibody Negative Negative    P155/140 (TIF1-gamma) Antibody Negative Negative    TIF-1 gamma (155 kDa) Ab Negative Negative    SAE1 (SUMO activating enzyme) Ab Negative Negative    MDA5 (CADM-140) Ab Negative Negative    NXP2 (Nuclear matrix protein-2) Ab Negative Negative    Myositis Interpretive Information See Note     Antinuclear Antibody (NANCY), HEp-2, IgG <1:80 <1:80    NANCY Interpretive Comment See Note     Ha (tyrosyl-tRNA synthetase) Ab Negative Negative    Ks (asparaginyl-tRNA synthetase) Ab Negative Negative    Zo (phenylalanyl-tRNA synthetase) Ab Negative Negative   Erythrocyte sedimentation rate auto    Collection Time: 04/11/25 12:13 PM   Result Value Ref Range    Erythrocyte Sedimentation Rate 8 0 - 20 mm/hr   CRP inflammation    Collection Time: 04/11/25 12:13 PM   Result Value Ref Range    CRP Inflammation <3.00 <5.00 mg/L   NMS1; Necrotizing Myopathy Evaluation, Serum [place] Miscellaneous Test    Collection Time: 04/11/25 12:13 PM   Result Value Ref Range    [place] Result SEE NOTE    EKG 12-lead, tracing only    Collection Time: 05/01/25 10:52 AM   Result Value Ref Range    Systolic Blood Pressure  mmHg    Diastolic Blood Pressure  mmHg    Ventricular Rate 85 BPM    Atrial Rate 133 BPM    NY Interval  ms    QRS Duration 100 ms     ms    QTc 473 ms    P Axis 33 degrees    R AXIS -18 degrees    T Axis 35 degrees    Interpretation ECG       Sinus tachycardia with 2nd degree A-V block (Mobitz I)  Moderate voltage criteria for LVH, may be normal variant  Abnormal ECG  When compared with ECG of 01-Oct-2024 11:30,  Sinus rhythm is now with 2nd degree A-V block (Mobitz I)  T wave amplitude has increased in Anterior leads     CBC with platelets and differential    Collection Time: 05/01/25 11:17 AM   Result Value Ref Range    WBC Count 8.1 4.0 - 11.0 10e3/uL    RBC Count 5.38 4.40 - 5.90 10e6/uL    Hemoglobin 15.8 13.3 - 17.7 g/dL    Hematocrit 47.0  40.0 - 53.0 %    MCV 87 78 - 100 fL    MCH 29.4 26.5 - 33.0 pg    MCHC 33.6 31.5 - 36.5 g/dL    RDW 14.2 10.0 - 15.0 %    Platelet Count 228 150 - 450 10e3/uL    % Neutrophils 69 %    % Lymphocytes 20 %    % Monocytes 9 %    % Eosinophils 2 %    % Basophils 1 %    % Immature Granulocytes 0 %    Absolute Neutrophils 5.6 1.6 - 8.3 10e3/uL    Absolute Lymphocytes 1.6 0.8 - 5.3 10e3/uL    Absolute Monocytes 0.7 0.0 - 1.3 10e3/uL    Absolute Eosinophils 0.1 0.0 - 0.7 10e3/uL    Absolute Basophils 0.0 0.0 - 0.2 10e3/uL    Absolute Immature Granulocytes 0.0 <=0.4 10e3/uL   INR point of care    Collection Time: 05/01/25 11:17 AM   Result Value Ref Range    INR 1.2 (H) 0.9 - 1.1          Revised Cardiac Risk Index (RCRI)  The patient has the following serious cardiovascular risks for perioperative complications:   - Coronary Artery Disease (MI, positive stress test, angina, Qs on EKG) = 1 point     RCRI Interpretation: 1 point: Class II (low risk - 0.9% complication rate)         Signed Electronically by: Renuka James CNP  A copy of this evaluation report is provided to the requesting physician.

## 2025-05-05 LAB
ATRIAL RATE - MUSE: 133 BPM
DIASTOLIC BLOOD PRESSURE - MUSE: NORMAL MMHG
INTERPRETATION ECG - MUSE: NORMAL
P AXIS - MUSE: 33 DEGREES
PR INTERVAL - MUSE: NORMAL MS
QRS DURATION - MUSE: 100 MS
QT - MUSE: 398 MS
QTC - MUSE: 473 MS
R AXIS - MUSE: -18 DEGREES
SYSTOLIC BLOOD PRESSURE - MUSE: NORMAL MMHG
T AXIS - MUSE: 35 DEGREES
VENTRICULAR RATE- MUSE: 85 BPM

## 2025-05-07 ENCOUNTER — LAB (OUTPATIENT)
Dept: LAB | Facility: CLINIC | Age: 75
End: 2025-05-07
Payer: MEDICARE

## 2025-05-07 ENCOUNTER — ANTICOAGULATION THERAPY VISIT (OUTPATIENT)
Dept: ANTICOAGULATION | Facility: CLINIC | Age: 75
End: 2025-05-07

## 2025-05-07 DIAGNOSIS — I48.0 PAROXYSMAL ATRIAL FIBRILLATION (H): ICD-10-CM

## 2025-05-07 DIAGNOSIS — I70.90 ARTERIAL OCCLUSION: Primary | ICD-10-CM

## 2025-05-07 DIAGNOSIS — I70.90 ARTERIAL OCCLUSION: ICD-10-CM

## 2025-05-07 LAB — INR BLD: 2.6 (ref 0.9–1.1)

## 2025-05-07 PROCEDURE — 85610 PROTHROMBIN TIME: CPT

## 2025-05-07 PROCEDURE — 36416 COLLJ CAPILLARY BLOOD SPEC: CPT

## 2025-05-12 ENCOUNTER — TELEPHONE (OUTPATIENT)
Dept: ANTICOAGULATION | Facility: CLINIC | Age: 75
End: 2025-05-12
Payer: MEDICARE

## 2025-05-12 DIAGNOSIS — I48.0 PAROXYSMAL ATRIAL FIBRILLATION (H): ICD-10-CM

## 2025-05-12 DIAGNOSIS — I70.90 ARTERIAL OCCLUSION: Primary | ICD-10-CM

## 2025-05-12 NOTE — TELEPHONE ENCOUNTER
NAGA-PROCEDURAL ANTICOAGULATION  MANAGEMENT    ASSESSMENT     Warfarin interruption plan for  ESTABLISHMENT, VASCULAR ACCESS, FEMORAL APPROACH, FOR ENDOVASCULAR STENT INSERTION INTO ABDOMINAL AORTIC ANEURYSM  on 5/21/25.    Indication for Anticoagulation: Atrial Fibrillation    [id number] = 4-6 (Hypertension, Age >= 75, and Vascular- CABG-2015, Arterial occlusion 12/2023)     Past procedure management  8/1/24-Left leg angiogram-5 day hold, no bridge  5/1/24-Pseudoaneurysm repair-chart reviewed with Dr. Crum due to hx of arterial occlusion; no bridged advised   10/2024-left leg bypass- 5 day hold, no bridge      [facility]aga-Procedure Risk stratification for thromboembolism: moderate (2022 Chest guidelines)    AFIB: 2022 CHEST Perioperative Management guidelines recommends against bridging for patients with atrial fibrillation except in high risk stratification patients.    RECOMMENDATION     Pre-Procedure:  Hold warfarin for 5 days, until after procedure starting: Friday 5/16/25   No Bridge    Post-Procedure:  Resume warfarin dose if okay with provider doing procedure on night of procedure, 5/21 PM: 6 mg daily x 2 then resume current dose  Recheck INR ~ 7 days after resuming warfarin     Plan routed to referring provider for approval  ?   Cristy Jaquez, [facility]    SUBJECTIVE/OBJECTIVE     Jack Brown, a 74 year old male    Goal Range: 2.0-3.0       Lab Results   Component Value Date    INR 2.6 (H) 05/07/2025    INR 1.2 (H) 05/01/2025    INR 3.0 (H) 04/08/2025     Lab Results   Component Value Date    HGB 15.8 05/01/2025     05/01/2025

## 2025-05-13 NOTE — TELEPHONE ENCOUNTER
Reviewed plan with Shayy who expresses understanding and agrees to plan. We will review with inr 5/14

## 2025-05-14 ENCOUNTER — ANTICOAGULATION THERAPY VISIT (OUTPATIENT)
Dept: ANTICOAGULATION | Facility: CLINIC | Age: 75
End: 2025-05-14

## 2025-05-14 ENCOUNTER — OFFICE VISIT (OUTPATIENT)
Dept: CARDIOLOGY | Facility: CLINIC | Age: 75
End: 2025-05-14
Payer: MEDICARE

## 2025-05-14 ENCOUNTER — LAB (OUTPATIENT)
Dept: CARDIOLOGY | Facility: CLINIC | Age: 75
End: 2025-05-14
Payer: MEDICARE

## 2025-05-14 VITALS
SYSTOLIC BLOOD PRESSURE: 163 MMHG | HEART RATE: 67 BPM | RESPIRATION RATE: 16 BRPM | BODY MASS INDEX: 26.2 KG/M2 | WEIGHT: 163 LBS | HEIGHT: 66 IN | DIASTOLIC BLOOD PRESSURE: 89 MMHG

## 2025-05-14 DIAGNOSIS — I25.10 CORONARY ARTERY DISEASE DUE TO LIPID RICH PLAQUE: ICD-10-CM

## 2025-05-14 DIAGNOSIS — I25.83 CORONARY ARTERY DISEASE DUE TO LIPID RICH PLAQUE: ICD-10-CM

## 2025-05-14 DIAGNOSIS — I73.9 PAD (PERIPHERAL ARTERY DISEASE): ICD-10-CM

## 2025-05-14 DIAGNOSIS — I48.0 PAROXYSMAL ATRIAL FIBRILLATION (H): ICD-10-CM

## 2025-05-14 DIAGNOSIS — Z95.1 S/P CABG X 3: ICD-10-CM

## 2025-05-14 DIAGNOSIS — I25.10 CORONARY ARTERY DISEASE INVOLVING NATIVE CORONARY ARTERY OF NATIVE HEART WITHOUT ANGINA PECTORIS: Primary | ICD-10-CM

## 2025-05-14 DIAGNOSIS — R79.1 ABNORMAL BLOOD COAGULATION PROFILE: Primary | ICD-10-CM

## 2025-05-14 DIAGNOSIS — I70.90 ARTERIAL OCCLUSION: Primary | ICD-10-CM

## 2025-05-14 LAB — INR POINT OF CARE: 2 (ref 0.9–1.1)

## 2025-05-14 PROCEDURE — 99214 OFFICE O/P EST MOD 30 MIN: CPT | Performed by: INTERNAL MEDICINE

## 2025-05-14 PROCEDURE — 3077F SYST BP >= 140 MM HG: CPT | Performed by: INTERNAL MEDICINE

## 2025-05-14 PROCEDURE — 3079F DIAST BP 80-89 MM HG: CPT | Performed by: INTERNAL MEDICINE

## 2025-05-14 PROCEDURE — G2211 COMPLEX E/M VISIT ADD ON: HCPCS | Performed by: INTERNAL MEDICINE

## 2025-05-14 NOTE — PROGRESS NOTES
ANTICOAGULATION MANAGEMENT     Jack Brown 74 year old male is on warfarin with therapeutic INR result. (Goal INR 2.0-3.0)    Recent labs: (last 7 days)     05/14/25  0902   INR 2.0*       ASSESSMENT     Source(s): Chart Review and Patient/Caregiver Call     Warfarin doses taken: Warfarin taken as instructed  Diet: No new diet changes identified  Medication/supplement changes: None noted  New illness, injury, or hospitalization: No  Signs or symptoms of bleeding or clotting: No  Previous result: Therapeutic last visit; previously outside of goal range  Additional findings: Upcoming surgery/procedure 5/21 angiogram, holding warfarin 5 days, no bridge       PLAN     Recommended plan for no diet, medication or health factor changes affecting INR     Dosing Instructions: Continue your current warfarin dose until hold starting Friday, resuming 5/21 with next INR in 2 weeks       Summary  As of 5/14/2025      Full warfarin instructions:  5/16: Hold; 5/17: Hold; 5/18: Hold; 5/19: Hold; 5/20: Hold; 5/21: 6 mg; 5/22: 6 mg; Otherwise 3 mg every Wed; 4 mg all other days   Next INR check:  5/28/2025               Telephone call with Shayy who verbalizes understanding and agrees to plan    Lab visit scheduled    Education provided: Please call back if any changes to your diet, medications or how you've been taking warfarin    Plan made per [facility] anticoagulation protocol    Felisa Carnes RN  5/14/2025  Anticoagulation Clinic  [employer] for routing messages: jovani MORENO [facility] patient phone line: [phone]        _______________________________________________________________________     Anticoagulation Episode Summary       Current INR goal:  2.0-3.0   TTR:  79.1% (11.3 mo)   Target end date:  Indefinite   Send INR reminders to:  TIFFANIE [place]    Indications    Arterial occlusion [I70.90]  Paroxysmal atrial fibrillation (H) [I48.0]             Comments:  --             Anticoagulation Care Providers       Provider  Role Specialty Phone number    Mercedes Adorno MD Referring Family Medicine [phone]

## 2025-05-14 NOTE — H&P (VIEW-ONLY)
[facility]  [phone]          Assessment/Recommendations   Patient with known coronary artery disease with distant history of bypass surgery also significant peripheral vascular disease.  He is scheduled for stent repair of abdominal aortic aneurysm later this month.    He has developed weakness, generalized pain and he believes it is related to the statin therapy.  In the distant past he had troubles with statin and that retry them is seem to tolerate them but over the last several months he has felt weaker and more achy.  We will discontinue the statin today and I will message his vascular surgeon.    I would have a low threshold to start him on a PCSK9 inhibitor however both he and his spouse would like to wait until after his vascular procedure later this month.  I will schedule him for a 1 month follow-up to see if he is better off the statin and discuss PCSK9 inhibitor with him at that time.    He also gets episodes where he feels like his switch is flipped and he feels more fatigued.  He has no palpitations but with his history of atrial fibrillation I would recommend checking a 3-day Zio patch monitor as this symptom happens every day.  He is in agreement.    The longitudinal plan of care for the diagnosis(es)/condition(s) as documented were addressed during this visit. Due to the added complexity in care, I will continue to support Jack in the subsequent management and with ongoing continuity of care.        History of Present Illness/Subjective    Mr. Jack Brown is a 74 year old male with known coronary artery disease with distant history of bypass surgery.  In October 2023 he had a normal pharmacologic nuclear study.  He has been feeling weak, achy, and a little bit forgetful.  He did see a neurologist and an EMG is planned as both of his legs feel particularly weak.  He has not had palpitations but he gets these feelings were switch is flipped and he feels fatigued and  "then it switches back.  He does not feel his heart racing during those time frames.  He denies orthopnea, paroxysmal nocturnal dyspnea, peripheral edema, or chest pain.  He tries to walk but the weakness limits him.       Physical Examination Review of Systems   BP (!) 163/89 (BP Location: Right arm, Patient Position: Sitting, Cuff Size: Adult Regular)   Pulse 67   Resp 16   Ht 1.676 m (5' 6\")   Wt 73.9 kg (163 lb)   BMI 26.31 kg/m    Body mass index is 26.31 kg/m .  Wt Readings from Last 3 Encounters:   05/14/25 73.9 kg (163 lb)   05/01/25 74.9 kg (165 lb 3.2 oz)   04/11/25 74.8 kg (165 lb)     General Appearance:   Alert, cooperative and in no acute distress.   ENT/Mouth: Pink/moist oral mucosa   EYES:  no scleral icterus, normal conjunctivae   Neck: JVP normal. No Hepatojugular reflux. Thyroid not visualized.   Chest/Lungs:   Lungs are clear to auscultation, equal chest wall expansion.   Cardiovascular:   S1, S2 without murmur ,clicks or rubs. Brachial, radial  pulses are intact and symetric.  Right posterior tibial pulses diminished compared to the left.  No carotid bruits noted   Abdomen:  Nontender.    Extremities: No cyanosis, clubbing or edema   Skin: no xanthelasma, warm.    Neurologic: normal arm movement bilateral, no tremors     Psychiatric: Appropriate affect.      Encounter Vitals  BP: (!) 163/89  Pulse: 67  Resp: 16  Weight: 73.9 kg (163 lb)  Height: 167.6 cm (5' 6\")                                           Medical History  Surgical History Family History Social History   Past Medical History:   Diagnosis Date    AAA (abdominal aortic aneurysm)     Basal cell carcinoma     BPH (benign prostatic hypertrophy)     Cancer (H)     Skin on R.chest wall.    Carcinoid tumor (H)     Chest discomfort     Coronary artery disease     Detached retina, left 10/01/2014    Hyperlipidemia     Hypertension     Melanoma of skin (H)     PAD (peripheral artery disease)     Pain of left lower leg 12/15/2023    Pain " of left upper arm 10/10/2023    Palpitations     Paroxysmal atrial fibrillation (H)     Popliteal artery aneurysm     Pseudoaneurysm     Thrombosis     Thyroid nodule     Past Surgical History:   Procedure Laterality Date    BYPASS GRAFT ARTERY CORONARY N/A 12/28/2015    Procedure: CORONARY ARTERY BYPASS x 3, RIGHT ENDOSCOPIC VEIN HARVEST, LEFT INTERNAL MAMMARY ARTERY, ANESTHESIA TRANSESOPHAGEAL ECHOCARDIOGRAM;  Surgeon: Jayson Alvarado MD;  Location: [facility];  Service:     CARDIAC SURGERY      CATARACT EXTRACTION  01/01/2015    CYST REMOVAL      Ganglion cyst removal of both wrist    EYE SURGERY      FEMORAL ARTERY - TIBIAL ARTERY BYPASS GRAFT Left 10/18/2024    Procedure: CREATION, BYPASS, ARTERIAL, FEMORAL TO TIBIAL, LEFT;  Surgeon: Laverne Crum MD;  Location: [facility]    HC REMOVAL PREPATELLA BURSA      Description: Excision Of Prepatellar Bursa;  Recorded: 09/30/2014;    HC REPAIR OF NASAL SEPTUM      Description: Septoplasty;  Recorded: 09/30/2014;    HERNIA REPAIR, UMBILICAL      IR LOWER EXTREMITY ANGIOGRAM LEFT  12/16/2023    IR LOWER EXTREMITY ANGIOGRAM LEFT  08/21/2024    IR THROMBOLYSIS ARTERIAL INFUSION INITIAL DAY  12/17/2023    PARS PLANA VITRECTOMY W/ REPAIR OF MACULAR HOLE  11/01/2014    MI EXCIS TENDON SHEATH LESION, HAND/FINGER      Description: Hand Excision Of A Tendon Cyst;  Recorded: 01/04/2011;    MI LAP, VENTRAL HERNIA REPAIR,REDUCIBLE      Description: Laparoscopy Repair Of Umbilical Hernia;  Recorded: 01/04/2011;    PSEUDOANEURYSM REPAIR Right 05/01/2024    Procedure: REPAIR, PSEUDOANEURYSM, ARTERY, FEMORAL RIGHT;  Surgeon: Laverne Crum MD;  Location: [facility]    RETINAL DETACHMENT SURGERY  10/01/2014    SKIN CANCER EXCISION  11/01/2015    right chest    THORACOSCOPY Right 04/14/2016    Procedure: RIGHT VIDEO ASSISTED THORACOSCOPY, RIGHT LOBECTOMY;  Surgeon: Vinny Chase MD;  Location: [facility];   Service:     VASECTOMY      Family History   Problem Relation Age of Onset    Acute Myocardial Infarction Mother     Aneurysm Mother         brain    Acute Myocardial Infarction Father     Colitis Brother     Abdominal Aortic Aneurysm Brother         had surgery and was supposed to have a second - time ran out    Leukemia Brother     Other - See Comments Maternal Grandmother          of hemorrhage after childbirht    Pneumonia Maternal Grandfather     No Known Problems Paternal Grandmother     Colon Cancer Paternal Grandfather     Lung Cancer Cousin     Coronary Artery Disease Cousin     Social History     Socioeconomic History    Marital status:      Spouse name: Not on file    Number of children: Not on file    Years of education: Not on file    Highest education level: Not on file   Occupational History    Not on file   Tobacco Use    Smoking status: Never     Passive exposure: Never (per pt)    Smokeless tobacco: Never   Vaping Use    Vaping status: Never Used   Substance and Sexual Activity    Alcohol use: Yes     Alcohol/week: 8.0 standard drinks of alcohol    Drug use: No    Sexual activity: Yes     Partners: Female     Birth control/protection: Male Surgical   Other Topics Concern    Parent/sibling w/ CABG, MI or angioplasty before 65F 55M? Yes   Social History Narrative    Not on file     Social Drivers of Health     Financial Resource Strain: Low Risk  (10/18/2024)    Financial Resource Strain     Within the past 12 months, have you or your family members you live with been unable to get utilities (heat, electricity) when it was really needed?: No   Food Insecurity: Low Risk  (10/18/2024)    Food Insecurity     Within the past 12 months, did you worry that your food would run out before you got money to buy more?: No     Within the past 12 months, did the food you bought just not last and you didn t have money to get more?: No   Transportation Needs: Low Risk  (10/18/2024)    Transportation  Needs     Within the past 12 months, has lack of transportation kept you from medical appointments, getting your medicines, non-medical meetings or appointments, work, or from getting things that you need?: No   Physical Activity: Not on file   Stress: Not on file   Social Connections: Not on file   Interpersonal Safety: Low Risk  (5/1/2025)    Interpersonal Safety     Do you feel physically and emotionally safe where you currently live?: Yes     Within the past 12 months, have you been hit, slapped, kicked or otherwise physically hurt by someone?: No     Within the past 12 months, have you been humiliated or emotionally abused in other ways by your partner or ex-partner?: No   Housing Stability: Low Risk  (10/18/2024)    Housing Stability     Do you have housing? : Yes     Are you worried about losing your housing?: No          Medications  Allergies   Current Outpatient Medications   Medication Sig Dispense Refill    acetaminophen (TYLENOL) 500 MG tablet [ACETAMINOPHEN (TYLENOL) 500 MG TABLET] Take 500 mg by mouth every 6 (six) hours as needed for pain.      amLODIPine (NORVASC) 10 MG tablet Take 1 tablet (10 mg) by mouth daily. 90 tablet 2    aspirin 81 MG EC tablet Take 1 tablet (81 mg) by mouth daily 90 tablet 3    finasteride (PROSCAR) 5 MG tablet Take 1 tablet (5 mg) by mouth daily. 90 tablet 3    metoprolol tartrate (LOPRESSOR) 50 MG tablet Take 1 tablet (50 mg) by mouth 2 times daily. 180 tablet 1    tamsulosin (FLOMAX) 0.4 MG capsule TAKE 1 CAPSULE BY MOUTH DAILY AFTER BREAKFAST 90 capsule 3    Vitamin D3 (VITAMIN D, CHOLECALCIFEROL,) 25 mcg (1000 units) tablet Take 1 tablet (25 mcg) by mouth daily. 90 tablet 1    warfarin ANTICOAGULANT (COUMADIN) 1 MG tablet Take 3-4 tablets (3-4 mg) by mouth every evening. Take 3-4 tablets daily as directed based on inr results 400 tablet 1    Allergies   Allergen Reactions    Niacin Hives and Rash     Burning of the skin    Atorvastatin Muscle Pain (Myalgia)     Pravastatin Muscle Pain (Myalgia)         Lab Results    Chemistry/lipid CBC Cardiac Enzymes/BNP/TSH/INR   Lab Results   Component Value Date    CHOL 158 11/04/2024    HDL 38 (L) 11/04/2024    TRIG 137 11/04/2024    BUN 18.4 05/01/2025     05/01/2025    CO2 21 (L) 05/01/2025    Lab Results   Component Value Date    WBC 8.1 05/01/2025    HGB 15.8 05/01/2025    HCT 47.0 05/01/2025    MCV 87 05/01/2025     05/01/2025    Lab Results   Component Value Date    TSH 1.25 10/25/2018    INR 2.6 (H) 05/07/2025

## 2025-05-14 NOTE — PROGRESS NOTES
[facility]  [phone]          Assessment/Recommendations   Patient with known coronary artery disease with distant history of bypass surgery also significant peripheral vascular disease.  He is scheduled for stent repair of abdominal aortic aneurysm later this month.    He has developed weakness, generalized pain and he believes it is related to the statin therapy.  In the distant past he had troubles with statin and that retry them is seem to tolerate them but over the last several months he has felt weaker and more achy.  We will discontinue the statin today and I will message his vascular surgeon.    I would have a low threshold to start him on a PCSK9 inhibitor however both he and his spouse would like to wait until after his vascular procedure later this month.  I will schedule him for a 1 month follow-up to see if he is better off the statin and discuss PCSK9 inhibitor with him at that time.    He also gets episodes where he feels like his switch is flipped and he feels more fatigued.  He has no palpitations but with his history of atrial fibrillation I would recommend checking a 3-day Zio patch monitor as this symptom happens every day.  He is in agreement.    The longitudinal plan of care for the diagnosis(es)/condition(s) as documented were addressed during this visit. Due to the added complexity in care, I will continue to support Jack in the subsequent management and with ongoing continuity of care.        History of Present Illness/Subjective    Mr. Jack Brown is a 74 year old male with known coronary artery disease with distant history of bypass surgery.  In October 2023 he had a normal pharmacologic nuclear study.  He has been feeling weak, achy, and a little bit forgetful.  He did see a neurologist and an EMG is planned as both of his legs feel particularly weak.  He has not had palpitations but he gets these feelings were switch is flipped and he feels fatigued and  "then it switches back.  He does not feel his heart racing during those time frames.  He denies orthopnea, paroxysmal nocturnal dyspnea, peripheral edema, or chest pain.  He tries to walk but the weakness limits him.       Physical Examination Review of Systems   BP (!) 163/89 (BP Location: Right arm, Patient Position: Sitting, Cuff Size: Adult Regular)   Pulse 67   Resp 16   Ht 1.676 m (5' 6\")   Wt 73.9 kg (163 lb)   BMI 26.31 kg/m    Body mass index is 26.31 kg/m .  Wt Readings from Last 3 Encounters:   05/14/25 73.9 kg (163 lb)   05/01/25 74.9 kg (165 lb 3.2 oz)   04/11/25 74.8 kg (165 lb)     General Appearance:   Alert, cooperative and in no acute distress.   ENT/Mouth: Pink/moist oral mucosa   EYES:  no scleral icterus, normal conjunctivae   Neck: JVP normal. No Hepatojugular reflux. Thyroid not visualized.   Chest/Lungs:   Lungs are clear to auscultation, equal chest wall expansion.   Cardiovascular:   S1, S2 without murmur ,clicks or rubs. Brachial, radial  pulses are intact and symetric.  Right posterior tibial pulses diminished compared to the left.  No carotid bruits noted   Abdomen:  Nontender.    Extremities: No cyanosis, clubbing or edema   Skin: no xanthelasma, warm.    Neurologic: normal arm movement bilateral, no tremors     Psychiatric: Appropriate affect.      Encounter Vitals  BP: (!) 163/89  Pulse: 67  Resp: 16  Weight: 73.9 kg (163 lb)  Height: 167.6 cm (5' 6\")                                           Medical History  Surgical History Family History Social History   Past Medical History:   Diagnosis Date    AAA (abdominal aortic aneurysm)     Basal cell carcinoma     BPH (benign prostatic hypertrophy)     Cancer (H)     Skin on R.chest wall.    Carcinoid tumor (H)     Chest discomfort     Coronary artery disease     Detached retina, left 10/01/2014    Hyperlipidemia     Hypertension     Melanoma of skin (H)     PAD (peripheral artery disease)     Pain of left lower leg 12/15/2023    Pain " of left upper arm 10/10/2023    Palpitations     Paroxysmal atrial fibrillation (H)     Popliteal artery aneurysm     Pseudoaneurysm     Thrombosis     Thyroid nodule     Past Surgical History:   Procedure Laterality Date    BYPASS GRAFT ARTERY CORONARY N/A 12/28/2015    Procedure: CORONARY ARTERY BYPASS x 3, RIGHT ENDOSCOPIC VEIN HARVEST, LEFT INTERNAL MAMMARY ARTERY, ANESTHESIA TRANSESOPHAGEAL ECHOCARDIOGRAM;  Surgeon: Jayson Alvarado MD;  Location: [facility];  Service:     CARDIAC SURGERY      CATARACT EXTRACTION  01/01/2015    CYST REMOVAL      Ganglion cyst removal of both wrist    EYE SURGERY      FEMORAL ARTERY - TIBIAL ARTERY BYPASS GRAFT Left 10/18/2024    Procedure: CREATION, BYPASS, ARTERIAL, FEMORAL TO TIBIAL, LEFT;  Surgeon: Laverne Crum MD;  Location: [facility]    HC REMOVAL PREPATELLA BURSA      Description: Excision Of Prepatellar Bursa;  Recorded: 09/30/2014;    HC REPAIR OF NASAL SEPTUM      Description: Septoplasty;  Recorded: 09/30/2014;    HERNIA REPAIR, UMBILICAL      IR LOWER EXTREMITY ANGIOGRAM LEFT  12/16/2023    IR LOWER EXTREMITY ANGIOGRAM LEFT  08/21/2024    IR THROMBOLYSIS ARTERIAL INFUSION INITIAL DAY  12/17/2023    PARS PLANA VITRECTOMY W/ REPAIR OF MACULAR HOLE  11/01/2014    GA EXCIS TENDON SHEATH LESION, HAND/FINGER      Description: Hand Excision Of A Tendon Cyst;  Recorded: 01/04/2011;    GA LAP, VENTRAL HERNIA REPAIR,REDUCIBLE      Description: Laparoscopy Repair Of Umbilical Hernia;  Recorded: 01/04/2011;    PSEUDOANEURYSM REPAIR Right 05/01/2024    Procedure: REPAIR, PSEUDOANEURYSM, ARTERY, FEMORAL RIGHT;  Surgeon: Laverne Crum MD;  Location: [facility]    RETINAL DETACHMENT SURGERY  10/01/2014    SKIN CANCER EXCISION  11/01/2015    right chest    THORACOSCOPY Right 04/14/2016    Procedure: RIGHT VIDEO ASSISTED THORACOSCOPY, RIGHT LOBECTOMY;  Surgeon: Vinny Chase MD;  Location: [facility];   Service:     VASECTOMY      Family History   Problem Relation Age of Onset    Acute Myocardial Infarction Mother     Aneurysm Mother         brain    Acute Myocardial Infarction Father     Colitis Brother     Abdominal Aortic Aneurysm Brother         had surgery and was supposed to have a second - time ran out    Leukemia Brother     Other - See Comments Maternal Grandmother          of hemorrhage after childbirht    Pneumonia Maternal Grandfather     No Known Problems Paternal Grandmother     Colon Cancer Paternal Grandfather     Lung Cancer Cousin     Coronary Artery Disease Cousin     Social History     Socioeconomic History    Marital status:      Spouse name: Not on file    Number of children: Not on file    Years of education: Not on file    Highest education level: Not on file   Occupational History    Not on file   Tobacco Use    Smoking status: Never     Passive exposure: Never (per pt)    Smokeless tobacco: Never   Vaping Use    Vaping status: Never Used   Substance and Sexual Activity    Alcohol use: Yes     Alcohol/week: 8.0 standard drinks of alcohol    Drug use: No    Sexual activity: Yes     Partners: Female     Birth control/protection: Male Surgical   Other Topics Concern    Parent/sibling w/ CABG, MI or angioplasty before 65F 55M? Yes   Social History Narrative    Not on file     Social Drivers of Health     Financial Resource Strain: Low Risk  (10/18/2024)    Financial Resource Strain     Within the past 12 months, have you or your family members you live with been unable to get utilities (heat, electricity) when it was really needed?: No   Food Insecurity: Low Risk  (10/18/2024)    Food Insecurity     Within the past 12 months, did you worry that your food would run out before you got money to buy more?: No     Within the past 12 months, did the food you bought just not last and you didn t have money to get more?: No   Transportation Needs: Low Risk  (10/18/2024)    Transportation  Needs     Within the past 12 months, has lack of transportation kept you from medical appointments, getting your medicines, non-medical meetings or appointments, work, or from getting things that you need?: No   Physical Activity: Not on file   Stress: Not on file   Social Connections: Not on file   Interpersonal Safety: Low Risk  (5/1/2025)    Interpersonal Safety     Do you feel physically and emotionally safe where you currently live?: Yes     Within the past 12 months, have you been hit, slapped, kicked or otherwise physically hurt by someone?: No     Within the past 12 months, have you been humiliated or emotionally abused in other ways by your partner or ex-partner?: No   Housing Stability: Low Risk  (10/18/2024)    Housing Stability     Do you have housing? : Yes     Are you worried about losing your housing?: No          Medications  Allergies   Current Outpatient Medications   Medication Sig Dispense Refill    acetaminophen (TYLENOL) 500 MG tablet [ACETAMINOPHEN (TYLENOL) 500 MG TABLET] Take 500 mg by mouth every 6 (six) hours as needed for pain.      amLODIPine (NORVASC) 10 MG tablet Take 1 tablet (10 mg) by mouth daily. 90 tablet 2    aspirin 81 MG EC tablet Take 1 tablet (81 mg) by mouth daily 90 tablet 3    finasteride (PROSCAR) 5 MG tablet Take 1 tablet (5 mg) by mouth daily. 90 tablet 3    metoprolol tartrate (LOPRESSOR) 50 MG tablet Take 1 tablet (50 mg) by mouth 2 times daily. 180 tablet 1    tamsulosin (FLOMAX) 0.4 MG capsule TAKE 1 CAPSULE BY MOUTH DAILY AFTER BREAKFAST 90 capsule 3    Vitamin D3 (VITAMIN D, CHOLECALCIFEROL,) 25 mcg (1000 units) tablet Take 1 tablet (25 mcg) by mouth daily. 90 tablet 1    warfarin ANTICOAGULANT (COUMADIN) 1 MG tablet Take 3-4 tablets (3-4 mg) by mouth every evening. Take 3-4 tablets daily as directed based on inr results 400 tablet 1    Allergies   Allergen Reactions    Niacin Hives and Rash     Burning of the skin    Atorvastatin Muscle Pain (Myalgia)     Pravastatin Muscle Pain (Myalgia)         Lab Results    Chemistry/lipid CBC Cardiac Enzymes/BNP/TSH/INR   Lab Results   Component Value Date    CHOL 158 11/04/2024    HDL 38 (L) 11/04/2024    TRIG 137 11/04/2024    BUN 18.4 05/01/2025     05/01/2025    CO2 21 (L) 05/01/2025    Lab Results   Component Value Date    WBC 8.1 05/01/2025    HGB 15.8 05/01/2025    HCT 47.0 05/01/2025    MCV 87 05/01/2025     05/01/2025    Lab Results   Component Value Date    TSH 1.25 10/25/2018    INR 2.6 (H) 05/07/2025

## 2025-05-14 NOTE — LETTER
5/14/2025    Mercedes Adorno MD  [address] [place] [address]    RE: Jack Brown       Dear Colleague,     I had the pleasure of seeing Jack Brown in the [facility].      [facility]  [phone]          Assessment/Recommendations   Patient with known coronary artery disease with distant history of bypass surgery also significant peripheral vascular disease.  He is scheduled for stent repair of abdominal aortic aneurysm later this month.    He has developed weakness, generalized pain and he believes it is related to the statin therapy.  In the distant past he had troubles with statin and that retry them is seem to tolerate them but over the last several months he has felt weaker and more achy.  We will discontinue the statin today and I will message his vascular surgeon.    I would have a low threshold to start him on a PCSK9 inhibitor however both he and his spouse would like to wait until after his vascular procedure later this month.  I will schedule him for a 1 month follow-up to see if he is better off the statin and discuss PCSK9 inhibitor with him at that time.    He also gets episodes where he feels like his switch is flipped and he feels more fatigued.  He has no palpitations but with his history of atrial fibrillation I would recommend checking a 3-day Zio patch monitor as this symptom happens every day.  He is in agreement.    The longitudinal plan of care for the diagnosis(es)/condition(s) as documented were addressed during this visit. Due to the added complexity in care, I will continue to support Jack in the subsequent management and with ongoing continuity of care.        History of Present Illness/Subjective    Mr. Jack Brown is a 74 year old male with known coronary artery disease with distant history of bypass surgery.  In October 2023 he had a normal pharmacologic nuclear study.  He has been feeling weak, achy, and a little bit  "forgetful.  He did see a neurologist and an EMG is planned as both of his legs feel particularly weak.  He has not had palpitations but he gets these feelings were switch is flipped and he feels fatigued and then it switches back.  He does not feel his heart racing during those time frames.  He denies orthopnea, paroxysmal nocturnal dyspnea, peripheral edema, or chest pain.  He tries to walk but the weakness limits him.       Physical Examination Review of Systems   BP (!) 163/89 (BP Location: Right arm, Patient Position: Sitting, Cuff Size: Adult Regular)   Pulse 67   Resp 16   Ht 1.676 m (5' 6\")   Wt 73.9 kg (163 lb)   BMI 26.31 kg/m    Body mass index is 26.31 kg/m .  Wt Readings from Last 3 Encounters:   05/14/25 73.9 kg (163 lb)   05/01/25 74.9 kg (165 lb 3.2 oz)   04/11/25 74.8 kg (165 lb)     General Appearance:   Alert, cooperative and in no acute distress.   ENT/Mouth: Pink/moist oral mucosa   EYES:  no scleral icterus, normal conjunctivae   Neck: JVP normal. No Hepatojugular reflux. Thyroid not visualized.   Chest/Lungs:   Lungs are clear to auscultation, equal chest wall expansion.   Cardiovascular:   S1, S2 without murmur ,clicks or rubs. Brachial, radial  pulses are intact and symetric.  Right posterior tibial pulses diminished compared to the left.  No carotid bruits noted   Abdomen:  Nontender.    Extremities: No cyanosis, clubbing or edema   Skin: no xanthelasma, warm.    Neurologic: normal arm movement bilateral, no tremors     Psychiatric: Appropriate affect.      Encounter Vitals  BP: (!) 163/89  Pulse: 67  Resp: 16  Weight: 73.9 kg (163 lb)  Height: 167.6 cm (5' 6\")                                           Medical History  Surgical History Family History Social History   Past Medical History:   Diagnosis Date     AAA (abdominal aortic aneurysm)      Basal cell carcinoma      BPH (benign prostatic hypertrophy)      Cancer (H)     Skin on R.chest wall.     Carcinoid tumor (H)      Chest " discomfort      Coronary artery disease      Detached retina, left 10/01/2014     Hyperlipidemia      Hypertension      Melanoma of skin (H)      PAD (peripheral artery disease)      Pain of left lower leg 12/15/2023     Pain of left upper arm 10/10/2023     Palpitations      Paroxysmal atrial fibrillation (H)      Popliteal artery aneurysm      Pseudoaneurysm      Thrombosis      Thyroid nodule     Past Surgical History:   Procedure Laterality Date     BYPASS GRAFT ARTERY CORONARY N/A 12/28/2015    Procedure: CORONARY ARTERY BYPASS x 3, RIGHT ENDOSCOPIC VEIN HARVEST, LEFT INTERNAL MAMMARY ARTERY, ANESTHESIA TRANSESOPHAGEAL ECHOCARDIOGRAM;  Surgeon: Jayson Alvarado MD;  Location: [facility];  Service:      CARDIAC SURGERY       CATARACT EXTRACTION  01/01/2015     CYST REMOVAL      Ganglion cyst removal of both wrist     EYE SURGERY       FEMORAL ARTERY - TIBIAL ARTERY BYPASS GRAFT Left 10/18/2024    Procedure: CREATION, BYPASS, ARTERIAL, FEMORAL TO TIBIAL, LEFT;  Surgeon: Laverne Crum MD;  Location: [facility]     HC REMOVAL PREPATELLA BURSA      Description: Excision Of Prepatellar Bursa;  Recorded: 09/30/2014;     HC REPAIR OF NASAL SEPTUM      Description: Septoplasty;  Recorded: 09/30/2014;     HERNIA REPAIR, UMBILICAL       IR LOWER EXTREMITY ANGIOGRAM LEFT  12/16/2023     IR LOWER EXTREMITY ANGIOGRAM LEFT  08/21/2024     IR THROMBOLYSIS ARTERIAL INFUSION INITIAL DAY  12/17/2023     PARS PLANA VITRECTOMY W/ REPAIR OF MACULAR HOLE  11/01/2014     PA EXCIS TENDON SHEATH LESION, HAND/FINGER      Description: Hand Excision Of A Tendon Cyst;  Recorded: 01/04/2011;     PA LAP, VENTRAL HERNIA REPAIR,REDUCIBLE      Description: Laparoscopy Repair Of Umbilical Hernia;  Recorded: 01/04/2011;     PSEUDOANEURYSM REPAIR Right 05/01/2024    Procedure: REPAIR, PSEUDOANEURYSM, ARTERY, FEMORAL RIGHT;  Surgeon: Laverne Crum MD;  Location: [facility]     RETINAL  DETACHMENT SURGERY  10/01/2014     SKIN CANCER EXCISION  2015    right chest     THORACOSCOPY Right 2016    Procedure: RIGHT VIDEO ASSISTED THORACOSCOPY, RIGHT LOBECTOMY;  Surgeon: Vinny Chaes MD;  Location: [facility];  Service:      VASECTOMY      Family History   Problem Relation Age of Onset     Acute Myocardial Infarction Mother      Aneurysm Mother         brain     Acute Myocardial Infarction Father      Colitis Brother      Abdominal Aortic Aneurysm Brother         had surgery and was supposed to have a second - time ran out     Leukemia Brother      Other - See Comments Maternal Grandmother          of hemorrhage after childbirht     Pneumonia Maternal Grandfather      No Known Problems Paternal Grandmother      Colon Cancer Paternal Grandfather      Lung Cancer Cousin      Coronary Artery Disease Cousin     Social History     Socioeconomic History     Marital status:      Spouse name: Not on file     Number of children: Not on file     Years of education: Not on file     Highest education level: Not on file   Occupational History     Not on file   Tobacco Use     Smoking status: Never     Passive exposure: Never (per pt)     Smokeless tobacco: Never   Vaping Use     Vaping status: Never Used   Substance and Sexual Activity     Alcohol use: Yes     Alcohol/week: 8.0 standard drinks of alcohol     Drug use: No     Sexual activity: Yes     Partners: Female     Birth control/protection: Male Surgical   Other Topics Concern     Parent/sibling w/ CABG, MI or angioplasty before 65F 55M? Yes   Social History Narrative     Not on file     Social Drivers of Health     Financial Resource Strain: Low Risk  (10/18/2024)    Financial Resource Strain      Within the past 12 months, have you or your family members you live with been unable to get utilities (heat, electricity) when it was really needed?: No   Food Insecurity: Low Risk  (10/18/2024)    Food Insecurity      Within  the past 12 months, did you worry that your food would run out before you got money to buy more?: No      Within the past 12 months, did the food you bought just not last and you didn t have money to get more?: No   Transportation Needs: Low Risk  (10/18/2024)    Transportation Needs      Within the past 12 months, has lack of transportation kept you from medical appointments, getting your medicines, non-medical meetings or appointments, work, or from getting things that you need?: No   Physical Activity: Not on file   Stress: Not on file   Social Connections: Not on file   Interpersonal Safety: Low Risk  (5/1/2025)    Interpersonal Safety      Do you feel physically and emotionally safe where you currently live?: Yes      Within the past 12 months, have you been hit, slapped, kicked or otherwise physically hurt by someone?: No      Within the past 12 months, have you been humiliated or emotionally abused in other ways by your partner or ex-partner?: No   Housing Stability: Low Risk  (10/18/2024)    Housing Stability      Do you have housing? : Yes      Are you worried about losing your housing?: No          Medications  Allergies   Current Outpatient Medications   Medication Sig Dispense Refill     acetaminophen (TYLENOL) 500 MG tablet [ACETAMINOPHEN (TYLENOL) 500 MG TABLET] Take 500 mg by mouth every 6 (six) hours as needed for pain.       amLODIPine (NORVASC) 10 MG tablet Take 1 tablet (10 mg) by mouth daily. 90 tablet 2     aspirin 81 MG EC tablet Take 1 tablet (81 mg) by mouth daily 90 tablet 3     finasteride (PROSCAR) 5 MG tablet Take 1 tablet (5 mg) by mouth daily. 90 tablet 3     metoprolol tartrate (LOPRESSOR) 50 MG tablet Take 1 tablet (50 mg) by mouth 2 times daily. 180 tablet 1     tamsulosin (FLOMAX) 0.4 MG capsule TAKE 1 CAPSULE BY MOUTH DAILY AFTER BREAKFAST 90 capsule 3     Vitamin D3 (VITAMIN D, CHOLECALCIFEROL,) 25 mcg (1000 units) tablet Take 1 tablet (25 mcg) by mouth daily. 90 tablet 1      warfarin ANTICOAGULANT (COUMADIN) 1 MG tablet Take 3-4 tablets (3-4 mg) by mouth every evening. Take 3-4 tablets daily as directed based on inr results 400 tablet 1    Allergies   Allergen Reactions     Niacin Hives and Rash     Burning of the skin     Atorvastatin Muscle Pain (Myalgia)     Pravastatin Muscle Pain (Myalgia)         Lab Results    Chemistry/lipid CBC Cardiac Enzymes/BNP/TSH/INR   Lab Results   Component Value Date    CHOL 158 11/04/2024    HDL 38 (L) 11/04/2024    TRIG 137 11/04/2024    BUN 18.4 05/01/2025     05/01/2025    CO2 21 (L) 05/01/2025    Lab Results   Component Value Date    WBC 8.1 05/01/2025    HGB 15.8 05/01/2025    HCT 47.0 05/01/2025    MCV 87 05/01/2025     05/01/2025    Lab Results   Component Value Date    TSH 1.25 10/25/2018    INR 2.6 (H) 05/07/2025                                              Thank you for allowing me to participate in the care of your patient.      Sincerely,     Yobany Estrada MD     [facility]  cc:   Yobany Estrada MD  [address]  [place] [address]

## 2025-05-15 ENCOUNTER — ORDERS ONLY (AUTO-RELEASED) (OUTPATIENT)
Dept: CARDIOLOGY | Facility: CLINIC | Age: 75
End: 2025-05-15
Payer: MEDICARE

## 2025-05-15 DIAGNOSIS — I48.0 PAROXYSMAL ATRIAL FIBRILLATION (H): ICD-10-CM

## 2025-05-15 DIAGNOSIS — I25.10 CORONARY ARTERY DISEASE INVOLVING NATIVE CORONARY ARTERY OF NATIVE HEART WITHOUT ANGINA PECTORIS: ICD-10-CM

## 2025-05-20 ENCOUNTER — ANESTHESIA EVENT (OUTPATIENT)
Dept: SURGERY | Facility: HOSPITAL | Age: 75
End: 2025-05-20
Payer: MEDICARE

## 2025-05-20 NOTE — ANESTHESIA PREPROCEDURE EVALUATION
Anesthesia Pre-Procedure Evaluation    Patient: Jack Brown   MRN: [id number] : 1950          Procedure : Procedure(s):  ESTABLISHMENT, VASCULAR ACCESS, FEMORAL APPROACH, FOR ENDOVASCULAR STENT INSERTION INTO ABDOMINAL AORTIC ANEURYSM         Past Medical History:   Diagnosis Date    AAA (abdominal aortic aneurysm)     Basal cell carcinoma     BPH (benign prostatic hypertrophy)     Cancer (H)     Skin on R.chest wall.    Carcinoid tumor (H)     Chest discomfort     Coronary artery disease     Detached retina, left 10/01/2014    Hyperlipidemia     Hypertension     Melanoma of skin (H)     PAD (peripheral artery disease)     Pain of left lower leg 12/15/2023    Pain of left upper arm 10/10/2023    Palpitations     Paroxysmal atrial fibrillation (H)     Popliteal artery aneurysm     Pseudoaneurysm     Thrombosis     Thyroid nodule       Past Surgical History:   Procedure Laterality Date    BYPASS GRAFT ARTERY CORONARY N/A 2015    Procedure: CORONARY ARTERY BYPASS x 3, RIGHT ENDOSCOPIC VEIN HARVEST, LEFT INTERNAL MAMMARY ARTERY, ANESTHESIA TRANSESOPHAGEAL ECHOCARDIOGRAM;  Surgeon: Jayson Alvarado MD;  Location: [facility];  Service:     CARDIAC SURGERY      CATARACT EXTRACTION  2015    CYST REMOVAL      Ganglion cyst removal of both wrist    EYE SURGERY      FEMORAL ARTERY - TIBIAL ARTERY BYPASS GRAFT Left 10/18/2024    Procedure: CREATION, BYPASS, ARTERIAL, FEMORAL TO TIBIAL, LEFT;  Surgeon: Laverne Crum MD;  Location: [facility] [place]    HC REMOVAL PREPATELLA BURSA      Description: Excision Of Prepatellar Bursa;  Recorded: 2014;    HC REPAIR OF NASAL SEPTUM      Description: Septoplasty;  Recorded: 2014;    HERNIA REPAIR, UMBILICAL      IR LOWER EXTREMITY ANGIOGRAM LEFT  2023    IR LOWER EXTREMITY ANGIOGRAM LEFT  2024    IR THROMBOLYSIS ARTERIAL INFUSION INITIAL DAY  2023    PARS PLANA VITRECTOMY W/ REPAIR OF MACULAR HOLE   11/01/2014    VT EXCIS TENDON SHEATH LESION, HAND/FINGER      Description: Hand Excision Of A Tendon Cyst;  Recorded: 01/04/2011;    VT LAP, VENTRAL HERNIA REPAIR,REDUCIBLE      Description: Laparoscopy Repair Of Umbilical Hernia;  Recorded: 01/04/2011;    PSEUDOANEURYSM REPAIR Right 05/01/2024    Procedure: REPAIR, PSEUDOANEURYSM, ARTERY, FEMORAL RIGHT;  Surgeon: Laverne Crum MD;  Location: [facility] [place]    RETINAL DETACHMENT SURGERY  10/01/2014    SKIN CANCER EXCISION  11/01/2015    right chest    THORACOSCOPY Right 04/14/2016    Procedure: RIGHT VIDEO ASSISTED THORACOSCOPY, RIGHT LOBECTOMY;  Surgeon: Vinny Chase MD;  Location: [facility];  Service:     VASECTOMY        Allergies   Allergen Reactions    Niacin Hives and Rash     Burning of the skin    Atorvastatin Muscle Pain (Myalgia)    Pravastatin Muscle Pain (Myalgia)      Social History     Tobacco Use    Smoking status: Never     Passive exposure: Never (per pt)    Smokeless tobacco: Never   Substance Use Topics    Alcohol use: Yes     Alcohol/week: 8.0 standard drinks of alcohol      Wt Readings from Last 1 Encounters:   05/14/25 73.9 kg (163 lb)        Anesthesia Evaluation   Pt has had prior anesthetic. Type: General.    History of anesthetic complications       ROS/MED HX  ENT/Pulmonary:  - neg pulmonary ROS     Neurologic:    (-) no CVA   Cardiovascular:     (+)  hypertension- Peripheral Vascular Disease (AAA)-- Other.  CAD -  CABG- -   Taking blood thinners (stopped coumadin 5 days ago)                     dysrhythmias (single episode during hospitalization), a-fib,             METS/Exercise Tolerance:     Hematologic:     (+) History of blood clots,               Musculoskeletal:       GI/Hepatic:       Renal/Genitourinary:     (+)        BPH,      Endo:     (+)          thyroid problem,            Psychiatric/Substance Use:  - neg psychiatric ROS     Infectious Disease:       Malignancy:   (+) Malignancy, History  "of Lung.    Other:              Physical Exam  Airway  Mallampati: II  TM distance: >3 FB  Neck ROM: full    Cardiovascular   Rhythm: regular     Dental   (+) Modest Abnormalities - crowns, retainers, 1 or 2 missing teeth        Pulmonary Breath sounds clear to auscultation        Neurological   He appears awake and alert.    Other Findings       OUTSIDE LABS:  CBC:   Lab Results   Component Value Date    WBC 8.1 05/01/2025    WBC 7.9 10/22/2024    HGB 15.8 05/01/2025    HGB 11.5 (L) 10/22/2024    HCT 47.0 05/01/2025    HCT 34.2 (L) 10/22/2024     05/01/2025     10/22/2024     BMP:   Lab Results   Component Value Date     05/01/2025     10/22/2024    POTASSIUM 4.2 05/01/2025    POTASSIUM 4.2 10/22/2024    CHLORIDE 106 05/01/2025    CHLORIDE 106 10/22/2024    CO2 21 (L) 05/01/2025    CO2 22 10/22/2024    BUN 18.4 05/01/2025    BUN 22.3 10/22/2024    CR 1.17 05/01/2025    CR 1.2 02/28/2025    GLC 95 05/01/2025    GLC 91 10/22/2024     COAGS:   Lab Results   Component Value Date     (HH) 12/17/2023    INR 2.0 (A) 05/14/2025    FIBR 195 12/17/2023     POC: No results found for: \"BGM\", \"HCG\", \"HCGS\"  HEPATIC:   Lab Results   Component Value Date    ALBUMIN 4.1 05/01/2025    PROTTOTAL 7.7 05/01/2025    ALT 11 05/01/2025    AST 26 05/01/2025    ALKPHOS 85 05/01/2025    BILITOTAL 0.6 05/01/2025     OTHER:   Lab Results   Component Value Date    LACT 1.6 12/17/2023    A1C 5.5 12/15/2023    BIGG 9.7 05/01/2025    PHOS 2.5 10/19/2024    MAG 2.1 10/19/2024    TSH 1.25 10/25/2018    CRP 0.2 10/25/2018    SED 8 04/11/2025       Anesthesia Plan    ASA Status:  3      NPO Status: NPO Appropriate   Anesthesia Type: General.  Airway: oral.  Induction: intravenous.  Maintenance: Inhalation.   Techniques and Equipment:     - Airway:   Arterial Line Kit: Radial     - Monitoring Plan: standard ASA monitoring, arterial line kit     Consents    Anesthesia Plan(s) and associated risks, benefits, and realistic " "alternatives discussed. Questions answered and patient/representative(s) expressed understanding.     - Discussed:     - Discussed with:  Patient, family        - Pt is DNR/DNI Status: no DNR     Blood Consent:      - Discussed with: patient, family.     - Consented: consented to blood products     Postoperative Care    Pain management: non-narcotic analgesics, plan for postoperative opioid use.     Comments:                   Christopher J. Behrens, MD    I have reviewed the pertinent notes and labs in the chart from the past 30 days and (re)examined the patient.  Any updates or changes from those notes are reflected in this note.    Clinically Significant Risk Factors Present on Admission                   # Hypertension: Noted on problem list           # Overweight: Estimated body mass index is 26.31 kg/m  as calculated from the following:    Height as of 5/14/25: 1.676 m (5' 6\").    Weight as of 5/14/25: 73.9 kg (163 lb).        # History of CABG: noted on surgical history             "

## 2025-05-21 ENCOUNTER — HOSPITAL ENCOUNTER (OUTPATIENT)
Dept: INTERVENTIONAL RADIOLOGY/VASCULAR | Facility: HOSPITAL | Age: 75
Discharge: HOME | End: 2025-05-21
Attending: SURGERY | Admitting: SURGERY
Payer: MEDICARE

## 2025-05-21 ENCOUNTER — HOSPITAL ENCOUNTER (INPATIENT)
Facility: HOSPITAL | Age: 75
LOS: 1 days | Discharge: HOME | End: 2025-05-22
Attending: SURGERY | Admitting: SURGERY
Payer: MEDICARE

## 2025-05-21 ENCOUNTER — APPOINTMENT (OUTPATIENT)
Dept: RADIOLOGY | Facility: HOSPITAL | Age: 75
DRG: 271 | End: 2025-05-21
Attending: SURGERY
Payer: MEDICARE

## 2025-05-21 ENCOUNTER — ANESTHESIA (OUTPATIENT)
Dept: SURGERY | Facility: HOSPITAL | Age: 75
End: 2025-05-21
Payer: MEDICARE

## 2025-05-21 DIAGNOSIS — I72.3 COMMON ILIAC ANEURYSM: ICD-10-CM

## 2025-05-21 DIAGNOSIS — I72.3 ILIAC ANEURYSM: Primary | ICD-10-CM

## 2025-05-21 DIAGNOSIS — I73.9 PAD (PERIPHERAL ARTERY DISEASE): ICD-10-CM

## 2025-05-21 LAB
ABO + RH BLD: NORMAL
ACT BLD: 251 SECONDS (ref 74–150)
ACT BLD: 259 SECONDS (ref 74–150)
ACT BLD: 259 SECONDS (ref 74–150)
ACT BLD: 280 SECONDS (ref 74–150)
ANION GAP SERPL CALCULATED.3IONS-SCNC: 9 MMOL/L (ref 7–15)
BASOPHILS # BLD AUTO: 0.1 10E3/UL (ref 0–0.2)
BASOPHILS NFR BLD AUTO: 1 %
BLD GP AB SCN SERPL QL: NEGATIVE
BLD PROD TYP BPU: NORMAL
BLD PROD TYP BPU: NORMAL
BLOOD COMPONENT TYPE: NORMAL
BLOOD COMPONENT TYPE: NORMAL
BUN SERPL-MCNC: 29.3 MG/DL (ref 8–23)
CALCIUM SERPL-MCNC: 9.3 MG/DL (ref 8.8–10.4)
CHLORIDE SERPL-SCNC: 109 MMOL/L (ref 98–107)
CODING SYSTEM: NORMAL
CODING SYSTEM: NORMAL
CREAT SERPL-MCNC: 1.35 MG/DL (ref 0.67–1.17)
CROSSMATCH: NORMAL
CROSSMATCH: NORMAL
EGFRCR SERPLBLD CKD-EPI 2021: 54 ML/MIN/1.73M2
EOSINOPHIL # BLD AUTO: 0.1 10E3/UL (ref 0–0.7)
EOSINOPHIL NFR BLD AUTO: 2 %
ERYTHROCYTE [DISTWIDTH] IN BLOOD BY AUTOMATED COUNT: 14.2 % (ref 10–15)
GLUCOSE BLDC GLUCOMTR-MCNC: 90 MG/DL (ref 70–99)
GLUCOSE SERPL-MCNC: 94 MG/DL (ref 70–99)
HCO3 SERPL-SCNC: 24 MMOL/L (ref 22–29)
HCT VFR BLD AUTO: 42.6 % (ref 40–53)
HGB BLD-MCNC: 14.7 G/DL (ref 13.3–17.7)
IMM GRANULOCYTES # BLD: 0 10E3/UL
IMM GRANULOCYTES NFR BLD: 0 %
ISSUE DATE AND TIME: NORMAL
ISSUE DATE AND TIME: NORMAL
LYMPHOCYTES # BLD AUTO: 1.2 10E3/UL (ref 0.8–5.3)
LYMPHOCYTES NFR BLD AUTO: 16 %
MCH RBC QN AUTO: 29.9 PG (ref 26.5–33)
MCHC RBC AUTO-ENTMCNC: 34.5 G/DL (ref 31.5–36.5)
MCV RBC AUTO: 87 FL (ref 78–100)
MONOCYTES # BLD AUTO: 0.6 10E3/UL (ref 0–1.3)
MONOCYTES NFR BLD AUTO: 8 %
NEUTROPHILS # BLD AUTO: 5.6 10E3/UL (ref 1.6–8.3)
NEUTROPHILS NFR BLD AUTO: 73 %
NRBC # BLD AUTO: 0 10E3/UL
NRBC BLD AUTO-RTO: 0 /100
PLATELET # BLD AUTO: 205 10E3/UL (ref 150–450)
POTASSIUM SERPL-SCNC: 3.8 MMOL/L (ref 3.4–5.3)
RBC # BLD AUTO: 4.91 10E6/UL (ref 4.4–5.9)
SODIUM SERPL-SCNC: 142 MMOL/L (ref 135–145)
SPECIMEN EXP DATE BLD: NORMAL
UNIT ABO/RH: NORMAL
UNIT ABO/RH: NORMAL
UNIT NUMBER: NORMAL
UNIT NUMBER: NORMAL
UNIT STATUS: NORMAL
UNIT STATUS: NORMAL
UNIT TYPE ISBT: 5100
UNIT TYPE ISBT: 5100
WBC # BLD AUTO: 7.6 10E3/UL (ref 4–11)

## 2025-05-21 PROCEDURE — 250N000011 HC RX IP 250 OP 636: Performed by: STUDENT IN AN ORGANIZED HEALTH CARE EDUCATION/TRAINING PROGRAM

## 2025-05-21 PROCEDURE — 272N000001 HC OR GENERAL SUPPLY STERILE: Performed by: SURGERY

## 2025-05-21 PROCEDURE — 86850 RBC ANTIBODY SCREEN: CPT | Performed by: SURGERY

## 2025-05-21 PROCEDURE — 99222 1ST HOSP IP/OBS MODERATE 55: CPT | Mod: AI | Performed by: STUDENT IN AN ORGANIZED HEALTH CARE EDUCATION/TRAINING PROGRAM

## 2025-05-21 PROCEDURE — 272N000002 HC OR SUPPLY OTHER OPNP: Performed by: SURGERY

## 2025-05-21 PROCEDURE — 710N000010 HC RECOVERY PHASE 1, LEVEL 2, PER MIN: Performed by: SURGERY

## 2025-05-21 PROCEDURE — B41C1ZZ FLUOROSCOPY OF PELVIC ARTERIES USING LOW OSMOLAR CONTRAST: ICD-10-PCS | Performed by: SURGERY

## 2025-05-21 PROCEDURE — C1769 GUIDE WIRE: HCPCS | Performed by: SURGERY

## 2025-05-21 PROCEDURE — 250N000013 HC RX MED GY IP 250 OP 250 PS 637: Performed by: STUDENT IN AN ORGANIZED HEALTH CARE EDUCATION/TRAINING PROGRAM

## 2025-05-21 PROCEDURE — 999N000180 XR SURGERY CARM FLUORO LESS THAN 5 MIN

## 2025-05-21 PROCEDURE — 80048 BASIC METABOLIC PNL TOTAL CA: CPT | Performed by: SURGERY

## 2025-05-21 PROCEDURE — 999N000083 IR OR ANGIOGRAM

## 2025-05-21 PROCEDURE — 258N000003 HC RX IP 258 OP 636: Performed by: STUDENT IN AN ORGANIZED HEALTH CARE EDUCATION/TRAINING PROGRAM

## 2025-05-21 PROCEDURE — 999N000156 HC STATISTIC RCP CONSULT EA 30 MIN

## 2025-05-21 PROCEDURE — 999N000141 HC STATISTIC PRE-PROCEDURE NURSING ASSESSMENT: Performed by: SURGERY

## 2025-05-21 PROCEDURE — 360N000084 HC SURGERY LEVEL 4 W/ FLUORO, PER MIN: Performed by: SURGERY

## 2025-05-21 PROCEDURE — C1894 INTRO/SHEATH, NON-LASER: HCPCS | Performed by: SURGERY

## 2025-05-21 PROCEDURE — 04VD3EZ RESTRICTION OF LEFT COMMON ILIAC ARTERY WITH BRANCHED OR FENESTRATED INTRALUMINAL DEVICE, ONE OR TWO ARTERIES, PERCUTANEOUS APPROACH: ICD-10-PCS | Performed by: SURGERY

## 2025-05-21 PROCEDURE — 258N000001 HC RX 258: Performed by: SURGERY

## 2025-05-21 PROCEDURE — 250N000009 HC RX 250: Performed by: STUDENT IN AN ORGANIZED HEALTH CARE EDUCATION/TRAINING PROGRAM

## 2025-05-21 PROCEDURE — C1768 GRAFT, VASCULAR: HCPCS | Performed by: SURGERY

## 2025-05-21 PROCEDURE — 250N000025 HC SEVOFLURANE, PER MIN: Performed by: SURGERY

## 2025-05-21 PROCEDURE — 120N000001 HC R&B MED SURG/OB

## 2025-05-21 PROCEDURE — 85347 COAGULATION TIME ACTIVATED: CPT

## 2025-05-21 PROCEDURE — 85018 HEMOGLOBIN: CPT | Performed by: SURGERY

## 2025-05-21 PROCEDURE — 04VC3EZ RESTRICTION OF RIGHT COMMON ILIAC ARTERY WITH BRANCHED OR FENESTRATED INTRALUMINAL DEVICE, ONE OR TWO ARTERIES, PERCUTANEOUS APPROACH: ICD-10-PCS | Performed by: SURGERY

## 2025-05-21 PROCEDURE — 250N000011 HC RX IP 250 OP 636: Performed by: SURGERY

## 2025-05-21 PROCEDURE — 34718 EVASC RPR N/A A-ILIAC NDGFT: CPT | Mod: 50 | Performed by: SURGERY

## 2025-05-21 PROCEDURE — 36415 COLL VENOUS BLD VENIPUNCTURE: CPT | Performed by: SURGERY

## 2025-05-21 PROCEDURE — 34713 PERQ ACCESS & CLSR FEM ART: CPT | Mod: RT | Performed by: SURGERY

## 2025-05-21 PROCEDURE — P9016 RBC LEUKOCYTES REDUCED: HCPCS | Performed by: STUDENT IN AN ORGANIZED HEALTH CARE EDUCATION/TRAINING PROGRAM

## 2025-05-21 PROCEDURE — 255N000002 HC RX 255 OP 636: Performed by: SURGERY

## 2025-05-21 PROCEDURE — 86923 COMPATIBILITY TEST ELECTRIC: CPT | Performed by: STUDENT IN AN ORGANIZED HEALTH CARE EDUCATION/TRAINING PROGRAM

## 2025-05-21 PROCEDURE — 370N000017 HC ANESTHESIA TECHNICAL FEE, PER MIN: Performed by: SURGERY

## 2025-05-21 PROCEDURE — C1725 CATH, TRANSLUMIN NON-LASER: HCPCS | Performed by: SURGERY

## 2025-05-21 PROCEDURE — C1760 CLOSURE DEV, VASC: HCPCS | Performed by: SURGERY

## 2025-05-21 PROCEDURE — P9045 ALBUMIN (HUMAN), 5%, 250 ML: HCPCS | Performed by: STUDENT IN AN ORGANIZED HEALTH CARE EDUCATION/TRAINING PROGRAM

## 2025-05-21 PROCEDURE — C1887 CATHETER, GUIDING: HCPCS | Performed by: SURGERY

## 2025-05-21 DEVICE — EXCLUDER ILIAC BRANCH ENDO 23MMX14.5MMX10CM 16FR
Type: IMPLANTABLE DEVICE | Site: ILIAC/FEMORALS | Status: FUNCTIONAL
Brand: GORE EXCLUDER ILIAC BRANCH ENDOPROSTHESIS

## 2025-05-21 DEVICE — IMPLANTABLE DEVICE: Type: IMPLANTABLE DEVICE | Site: ILIAC/FEMORALS | Status: FUNCTIONAL

## 2025-05-21 RX ORDER — ACETAMINOPHEN 325 MG/1
975 TABLET ORAL
Status: DISCONTINUED | OUTPATIENT
Start: 2025-05-21 | End: 2025-05-21 | Stop reason: HOSPADM

## 2025-05-21 RX ORDER — ONDANSETRON 4 MG/1
4 TABLET, ORALLY DISINTEGRATING ORAL EVERY 6 HOURS PRN
Status: DISCONTINUED | OUTPATIENT
Start: 2025-05-21 | End: 2025-05-22 | Stop reason: HOSPADM

## 2025-05-21 RX ORDER — HYDROMORPHONE HCL IN WATER/PF 6 MG/30 ML
0.1 PATIENT CONTROLLED ANALGESIA SYRINGE INTRAVENOUS EVERY 4 HOURS PRN
Status: DISCONTINUED | OUTPATIENT
Start: 2025-05-21 | End: 2025-05-22 | Stop reason: HOSPADM

## 2025-05-21 RX ORDER — HYDROMORPHONE HCL IN WATER/PF 6 MG/30 ML
0.2 PATIENT CONTROLLED ANALGESIA SYRINGE INTRAVENOUS EVERY 5 MIN PRN
Refills: 0 | Status: DISCONTINUED | OUTPATIENT
Start: 2025-05-21 | End: 2025-05-21 | Stop reason: HOSPADM

## 2025-05-21 RX ORDER — ONDANSETRON 2 MG/ML
4 INJECTION INTRAMUSCULAR; INTRAVENOUS EVERY 6 HOURS PRN
Status: DISCONTINUED | OUTPATIENT
Start: 2025-05-21 | End: 2025-05-22 | Stop reason: HOSPADM

## 2025-05-21 RX ORDER — ONDANSETRON 2 MG/ML
4 INJECTION INTRAMUSCULAR; INTRAVENOUS EVERY 30 MIN PRN
Status: DISCONTINUED | OUTPATIENT
Start: 2025-05-21 | End: 2025-05-21 | Stop reason: HOSPADM

## 2025-05-21 RX ORDER — ONDANSETRON 2 MG/ML
INJECTION INTRAMUSCULAR; INTRAVENOUS PRN
Status: DISCONTINUED | OUTPATIENT
Start: 2025-05-21 | End: 2025-05-21

## 2025-05-21 RX ORDER — PROPOFOL 10 MG/ML
INJECTION, EMULSION INTRAVENOUS PRN
Status: DISCONTINUED | OUTPATIENT
Start: 2025-05-21 | End: 2025-05-21

## 2025-05-21 RX ORDER — ONDANSETRON 4 MG/1
4 TABLET, ORALLY DISINTEGRATING ORAL EVERY 30 MIN PRN
Status: CANCELLED | OUTPATIENT
Start: 2025-05-21

## 2025-05-21 RX ORDER — DEXAMETHASONE SODIUM PHOSPHATE 4 MG/ML
4 INJECTION, SOLUTION INTRA-ARTICULAR; INTRALESIONAL; INTRAMUSCULAR; INTRAVENOUS; SOFT TISSUE
Status: DISCONTINUED | OUTPATIENT
Start: 2025-05-21 | End: 2025-05-21 | Stop reason: HOSPADM

## 2025-05-21 RX ORDER — DEXAMETHASONE SODIUM PHOSPHATE 10 MG/ML
INJECTION, SOLUTION INTRAMUSCULAR; INTRAVENOUS PRN
Status: DISCONTINUED | OUTPATIENT
Start: 2025-05-21 | End: 2025-05-21

## 2025-05-21 RX ORDER — CEFAZOLIN SODIUM/WATER 2 G/20 ML
2 SYRINGE (ML) INTRAVENOUS SEE ADMIN INSTRUCTIONS
Status: DISCONTINUED | OUTPATIENT
Start: 2025-05-21 | End: 2025-05-21 | Stop reason: HOSPADM

## 2025-05-21 RX ORDER — NALOXONE HYDROCHLORIDE 0.4 MG/ML
0.4 INJECTION, SOLUTION INTRAMUSCULAR; INTRAVENOUS; SUBCUTANEOUS
Status: DISCONTINUED | OUTPATIENT
Start: 2025-05-21 | End: 2025-05-22 | Stop reason: HOSPADM

## 2025-05-21 RX ORDER — SODIUM CHLORIDE, SODIUM LACTATE, POTASSIUM CHLORIDE, CALCIUM CHLORIDE 600; 310; 30; 20 MG/100ML; MG/100ML; MG/100ML; MG/100ML
INJECTION, SOLUTION INTRAVENOUS CONTINUOUS PRN
Status: DISCONTINUED | OUTPATIENT
Start: 2025-05-21 | End: 2025-05-21

## 2025-05-21 RX ORDER — ONDANSETRON 4 MG/1
4 TABLET, ORALLY DISINTEGRATING ORAL EVERY 30 MIN PRN
Status: DISCONTINUED | OUTPATIENT
Start: 2025-05-21 | End: 2025-05-21 | Stop reason: HOSPADM

## 2025-05-21 RX ORDER — NALOXONE HYDROCHLORIDE 0.4 MG/ML
0.2 INJECTION, SOLUTION INTRAMUSCULAR; INTRAVENOUS; SUBCUTANEOUS
Status: DISCONTINUED | OUTPATIENT
Start: 2025-05-21 | End: 2025-05-22 | Stop reason: HOSPADM

## 2025-05-21 RX ORDER — AMLODIPINE BESYLATE 5 MG/1
10 TABLET ORAL DAILY
Status: DISCONTINUED | OUTPATIENT
Start: 2025-05-21 | End: 2025-05-22 | Stop reason: HOSPADM

## 2025-05-21 RX ORDER — FENTANYL CITRATE 50 UG/ML
INJECTION, SOLUTION INTRAMUSCULAR; INTRAVENOUS PRN
Status: DISCONTINUED | OUTPATIENT
Start: 2025-05-21 | End: 2025-05-21

## 2025-05-21 RX ORDER — ACETAMINOPHEN 325 MG/1
975 TABLET ORAL EVERY 8 HOURS
Status: DISCONTINUED | OUTPATIENT
Start: 2025-05-21 | End: 2025-05-22 | Stop reason: HOSPADM

## 2025-05-21 RX ORDER — LABETALOL HYDROCHLORIDE 5 MG/ML
10 INJECTION, SOLUTION INTRAVENOUS EVERY 4 HOURS PRN
Status: DISCONTINUED | OUTPATIENT
Start: 2025-05-21 | End: 2025-05-22 | Stop reason: HOSPADM

## 2025-05-21 RX ORDER — LIDOCAINE 40 MG/G
CREAM TOPICAL
Status: DISCONTINUED | OUTPATIENT
Start: 2025-05-21 | End: 2025-05-22 | Stop reason: HOSPADM

## 2025-05-21 RX ORDER — OXYCODONE HYDROCHLORIDE 5 MG/1
10 TABLET ORAL
Refills: 0 | Status: CANCELLED | OUTPATIENT
Start: 2025-05-21

## 2025-05-21 RX ORDER — FENTANYL CITRATE 50 UG/ML
25 INJECTION, SOLUTION INTRAMUSCULAR; INTRAVENOUS EVERY 5 MIN PRN
Refills: 0 | Status: DISCONTINUED | OUTPATIENT
Start: 2025-05-21 | End: 2025-05-21 | Stop reason: HOSPADM

## 2025-05-21 RX ORDER — HYDROMORPHONE HCL IN WATER/PF 6 MG/30 ML
0.2 PATIENT CONTROLLED ANALGESIA SYRINGE INTRAVENOUS EVERY 4 HOURS PRN
Status: DISCONTINUED | OUTPATIENT
Start: 2025-05-21 | End: 2025-05-22 | Stop reason: HOSPADM

## 2025-05-21 RX ORDER — OXYCODONE HYDROCHLORIDE 5 MG/1
5 TABLET ORAL EVERY 4 HOURS PRN
Status: DISCONTINUED | OUTPATIENT
Start: 2025-05-21 | End: 2025-05-22 | Stop reason: HOSPADM

## 2025-05-21 RX ORDER — CEFAZOLIN SODIUM/WATER 2 G/20 ML
2 SYRINGE (ML) INTRAVENOUS
Status: COMPLETED | OUTPATIENT
Start: 2025-05-21 | End: 2025-05-21

## 2025-05-21 RX ORDER — ASPIRIN 81 MG/1
81 TABLET ORAL DAILY PRN
COMMUNITY

## 2025-05-21 RX ORDER — TAMSULOSIN HYDROCHLORIDE 0.4 MG/1
0.4 CAPSULE ORAL
Status: DISCONTINUED | OUTPATIENT
Start: 2025-05-22 | End: 2025-05-22 | Stop reason: HOSPADM

## 2025-05-21 RX ORDER — OXYCODONE HYDROCHLORIDE 5 MG/1
5 TABLET ORAL
Refills: 0 | Status: CANCELLED | OUTPATIENT
Start: 2025-05-21

## 2025-05-21 RX ORDER — HALOPERIDOL 5 MG/ML
2 INJECTION INTRAMUSCULAR
Status: DISCONTINUED | OUTPATIENT
Start: 2025-05-21 | End: 2025-05-21 | Stop reason: HOSPADM

## 2025-05-21 RX ORDER — MEPERIDINE HYDROCHLORIDE 25 MG/ML
12.5 INJECTION INTRAMUSCULAR; INTRAVENOUS; SUBCUTANEOUS EVERY 5 MIN PRN
Refills: 0 | Status: DISCONTINUED | OUTPATIENT
Start: 2025-05-21 | End: 2025-05-21 | Stop reason: HOSPADM

## 2025-05-21 RX ORDER — WARFARIN SODIUM 1 MG/1
TABLET ORAL SEE ADMIN INSTRUCTIONS
COMMUNITY

## 2025-05-21 RX ORDER — HEPARIN SODIUM 1000 [USP'U]/ML
INJECTION, SOLUTION INTRAVENOUS; SUBCUTANEOUS PRN
Status: DISCONTINUED | OUTPATIENT
Start: 2025-05-21 | End: 2025-05-21

## 2025-05-21 RX ORDER — PROTAMINE SULFATE 10 MG/ML
INJECTION, SOLUTION INTRAVENOUS PRN
Status: DISCONTINUED | OUTPATIENT
Start: 2025-05-21 | End: 2025-05-21

## 2025-05-21 RX ORDER — LORAZEPAM 2 MG/ML
.5-1 INJECTION INTRAMUSCULAR
Status: DISCONTINUED | OUTPATIENT
Start: 2025-05-21 | End: 2025-05-21 | Stop reason: HOSPADM

## 2025-05-21 RX ORDER — ACETAMINOPHEN 325 MG/1
975 TABLET ORAL ONCE
Status: CANCELLED | OUTPATIENT
Start: 2025-05-21 | End: 2025-05-21

## 2025-05-21 RX ORDER — SODIUM CHLORIDE, SODIUM LACTATE, POTASSIUM CHLORIDE, CALCIUM CHLORIDE 600; 310; 30; 20 MG/100ML; MG/100ML; MG/100ML; MG/100ML
INJECTION, SOLUTION INTRAVENOUS CONTINUOUS
Status: DISCONTINUED | OUTPATIENT
Start: 2025-05-21 | End: 2025-05-21 | Stop reason: HOSPADM

## 2025-05-21 RX ORDER — HYDRALAZINE HYDROCHLORIDE 20 MG/ML
10 INJECTION INTRAMUSCULAR; INTRAVENOUS EVERY 6 HOURS PRN
Status: DISCONTINUED | OUTPATIENT
Start: 2025-05-21 | End: 2025-05-22 | Stop reason: HOSPADM

## 2025-05-21 RX ORDER — METOPROLOL TARTRATE 25 MG/1
50 TABLET, FILM COATED ORAL 2 TIMES DAILY
Status: DISCONTINUED | OUTPATIENT
Start: 2025-05-21 | End: 2025-05-22 | Stop reason: HOSPADM

## 2025-05-21 RX ORDER — ENOXAPARIN SODIUM 100 MG/ML
40 INJECTION SUBCUTANEOUS EVERY 24 HOURS
Status: DISCONTINUED | OUTPATIENT
Start: 2025-05-22 | End: 2025-05-22 | Stop reason: HOSPADM

## 2025-05-21 RX ORDER — LIDOCAINE 40 MG/G
CREAM TOPICAL
Status: DISCONTINUED | OUTPATIENT
Start: 2025-05-21 | End: 2025-05-21 | Stop reason: HOSPADM

## 2025-05-21 RX ORDER — FENTANYL CITRATE 50 UG/ML
50 INJECTION, SOLUTION INTRAMUSCULAR; INTRAVENOUS EVERY 5 MIN PRN
Refills: 0 | Status: DISCONTINUED | OUTPATIENT
Start: 2025-05-21 | End: 2025-05-21 | Stop reason: HOSPADM

## 2025-05-21 RX ORDER — SODIUM CHLORIDE, SODIUM LACTATE, POTASSIUM CHLORIDE, CALCIUM CHLORIDE 600; 310; 30; 20 MG/100ML; MG/100ML; MG/100ML; MG/100ML
INJECTION, SOLUTION INTRAVENOUS CONTINUOUS
Status: ACTIVE | OUTPATIENT
Start: 2025-05-21 | End: 2025-05-21

## 2025-05-21 RX ORDER — PROCHLORPERAZINE MALEATE 5 MG/1
5 TABLET ORAL EVERY 6 HOURS PRN
Status: DISCONTINUED | OUTPATIENT
Start: 2025-05-21 | End: 2025-05-22 | Stop reason: HOSPADM

## 2025-05-21 RX ORDER — NALOXONE HYDROCHLORIDE 0.4 MG/ML
0.1 INJECTION, SOLUTION INTRAMUSCULAR; INTRAVENOUS; SUBCUTANEOUS
Status: DISCONTINUED | OUTPATIENT
Start: 2025-05-21 | End: 2025-05-21 | Stop reason: HOSPADM

## 2025-05-21 RX ORDER — CEFAZOLIN SODIUM 2 G/50ML
2 SOLUTION INTRAVENOUS EVERY 8 HOURS
Status: COMPLETED | OUTPATIENT
Start: 2025-05-21 | End: 2025-05-22

## 2025-05-21 RX ORDER — NALOXONE HYDROCHLORIDE 0.4 MG/ML
0.1 INJECTION, SOLUTION INTRAMUSCULAR; INTRAVENOUS; SUBCUTANEOUS
Status: CANCELLED | OUTPATIENT
Start: 2025-05-21

## 2025-05-21 RX ORDER — HYDROMORPHONE HCL IN WATER/PF 6 MG/30 ML
0.4 PATIENT CONTROLLED ANALGESIA SYRINGE INTRAVENOUS EVERY 5 MIN PRN
Refills: 0 | Status: DISCONTINUED | OUTPATIENT
Start: 2025-05-21 | End: 2025-05-21 | Stop reason: HOSPADM

## 2025-05-21 RX ORDER — ONDANSETRON 2 MG/ML
4 INJECTION INTRAMUSCULAR; INTRAVENOUS EVERY 30 MIN PRN
Status: CANCELLED | OUTPATIENT
Start: 2025-05-21

## 2025-05-21 RX ORDER — AMOXICILLIN 250 MG
1 CAPSULE ORAL 2 TIMES DAILY
Status: DISCONTINUED | OUTPATIENT
Start: 2025-05-21 | End: 2025-05-22 | Stop reason: HOSPADM

## 2025-05-21 RX ORDER — BISACODYL 10 MG
10 SUPPOSITORY, RECTAL RECTAL DAILY PRN
Status: DISCONTINUED | OUTPATIENT
Start: 2025-05-24 | End: 2025-05-22 | Stop reason: HOSPADM

## 2025-05-21 RX ORDER — HYDROXYZINE HYDROCHLORIDE 10 MG/1
10 TABLET, FILM COATED ORAL EVERY 6 HOURS PRN
Status: DISCONTINUED | OUTPATIENT
Start: 2025-05-21 | End: 2025-05-21 | Stop reason: HOSPADM

## 2025-05-21 RX ORDER — POLYETHYLENE GLYCOL 3350 17 G/17G
17 POWDER, FOR SOLUTION ORAL DAILY
Status: DISCONTINUED | OUTPATIENT
Start: 2025-05-22 | End: 2025-05-22 | Stop reason: HOSPADM

## 2025-05-21 RX ORDER — ASPIRIN 81 MG/1
81 TABLET ORAL DAILY
Status: DISCONTINUED | OUTPATIENT
Start: 2025-05-21 | End: 2025-05-22 | Stop reason: HOSPADM

## 2025-05-21 RX ORDER — LIDOCAINE HYDROCHLORIDE 10 MG/ML
INJECTION, SOLUTION INFILTRATION; PERINEURAL PRN
Status: DISCONTINUED | OUTPATIENT
Start: 2025-05-21 | End: 2025-05-21

## 2025-05-21 RX ORDER — DEXAMETHASONE SODIUM PHOSPHATE 4 MG/ML
4 INJECTION, SOLUTION INTRA-ARTICULAR; INTRALESIONAL; INTRAMUSCULAR; INTRAVENOUS; SOFT TISSUE
Status: CANCELLED | OUTPATIENT
Start: 2025-05-21

## 2025-05-21 RX ORDER — ALBUTEROL SULFATE 0.83 MG/ML
2.5 SOLUTION RESPIRATORY (INHALATION) EVERY 4 HOURS PRN
Status: DISCONTINUED | OUTPATIENT
Start: 2025-05-21 | End: 2025-05-21 | Stop reason: HOSPADM

## 2025-05-21 RX ADMIN — LIDOCAINE HYDROCHLORIDE 5 ML: 10 INJECTION, SOLUTION INFILTRATION; PERINEURAL at 07:57

## 2025-05-21 RX ADMIN — PROTAMINE SULFATE 10 MG: 10 INJECTION, SOLUTION INTRAVENOUS at 11:33

## 2025-05-21 RX ADMIN — HEPARIN SODIUM 2000 UNITS: 1000 INJECTION, SOLUTION INTRAVENOUS; SUBCUTANEOUS at 09:40

## 2025-05-21 RX ADMIN — ALBUMIN HUMAN: 0.05 INJECTION, SOLUTION INTRAVENOUS at 08:30

## 2025-05-21 RX ADMIN — SODIUM CHLORIDE, SODIUM LACTATE, POTASSIUM CHLORIDE, AND CALCIUM CHLORIDE: .6; .31; .03; .02 INJECTION, SOLUTION INTRAVENOUS at 09:55

## 2025-05-21 RX ADMIN — DEXAMETHASONE SODIUM PHOSPHATE 10 MG: 10 INJECTION, SOLUTION INTRAMUSCULAR; INTRAVENOUS at 08:06

## 2025-05-21 RX ADMIN — ROCURONIUM 50 MG: 50 INJECTION, SOLUTION INTRAVENOUS at 07:58

## 2025-05-21 RX ADMIN — ACETAMINOPHEN 975 MG: 325 TABLET ORAL at 15:12

## 2025-05-21 RX ADMIN — HYDROMORPHONE HYDROCHLORIDE 0.5 MG: 1 INJECTION, SOLUTION INTRAMUSCULAR; INTRAVENOUS; SUBCUTANEOUS at 11:19

## 2025-05-21 RX ADMIN — PHENYLEPHRINE HYDROCHLORIDE 0.3 MCG/KG/MIN: 10 INJECTION INTRAVENOUS at 10:28

## 2025-05-21 RX ADMIN — SENNOSIDES AND DOCUSATE SODIUM 1 TABLET: 50; 8.6 TABLET ORAL at 21:22

## 2025-05-21 RX ADMIN — CEFAZOLIN SODIUM 2 G: 2 SOLUTION INTRAVENOUS at 16:58

## 2025-05-21 RX ADMIN — PROTAMINE SULFATE 60 MG: 10 INJECTION, SOLUTION INTRAVENOUS at 11:37

## 2025-05-21 RX ADMIN — ASPIRIN 81 MG: 81 TABLET, COATED ORAL at 15:12

## 2025-05-21 RX ADMIN — PROPOFOL 120 MG: 10 INJECTION, EMULSION INTRAVENOUS at 07:57

## 2025-05-21 RX ADMIN — AMLODIPINE BESYLATE 10 MG: 5 TABLET ORAL at 15:12

## 2025-05-21 RX ADMIN — FENTANYL CITRATE 100 MCG: 50 INJECTION, SOLUTION INTRAMUSCULAR; INTRAVENOUS at 07:57

## 2025-05-21 RX ADMIN — HEPARIN SODIUM 2000 UNITS: 1000 INJECTION, SOLUTION INTRAVENOUS; SUBCUTANEOUS at 10:15

## 2025-05-21 RX ADMIN — SUGAMMADEX 200 MG: 100 INJECTION, SOLUTION INTRAVENOUS at 11:49

## 2025-05-21 RX ADMIN — ROCURONIUM 30 MG: 50 INJECTION, SOLUTION INTRAVENOUS at 09:47

## 2025-05-21 RX ADMIN — SODIUM CHLORIDE, SODIUM LACTATE, POTASSIUM CHLORIDE, AND CALCIUM CHLORIDE: .6; .31; .03; .02 INJECTION, SOLUTION INTRAVENOUS at 07:48

## 2025-05-21 RX ADMIN — ONDANSETRON 4 MG: 2 INJECTION INTRAMUSCULAR; INTRAVENOUS at 11:24

## 2025-05-21 RX ADMIN — HYDRALAZINE HYDROCHLORIDE 10 MG: 20 INJECTION INTRAMUSCULAR; INTRAVENOUS at 16:18

## 2025-05-21 RX ADMIN — HYDROMORPHONE HYDROCHLORIDE 0.5 MG: 1 INJECTION, SOLUTION INTRAMUSCULAR; INTRAVENOUS; SUBCUTANEOUS at 09:42

## 2025-05-21 RX ADMIN — ROCURONIUM 50 MG: 50 INJECTION, SOLUTION INTRAVENOUS at 08:31

## 2025-05-21 RX ADMIN — HEPARIN SODIUM 10000 UNITS: 1000 INJECTION, SOLUTION INTRAVENOUS; SUBCUTANEOUS at 09:24

## 2025-05-21 RX ADMIN — ACETAMINOPHEN 975 MG: 325 TABLET ORAL at 22:09

## 2025-05-21 RX ADMIN — SODIUM CHLORIDE, SODIUM LACTATE, POTASSIUM CHLORIDE, AND CALCIUM CHLORIDE: .6; .31; .03; .02 INJECTION, SOLUTION INTRAVENOUS at 14:26

## 2025-05-21 RX ADMIN — METOPROLOL TARTRATE 50 MG: 25 TABLET, FILM COATED ORAL at 21:22

## 2025-05-21 RX ADMIN — ROCURONIUM 20 MG: 50 INJECTION, SOLUTION INTRAVENOUS at 10:39

## 2025-05-21 RX ADMIN — SODIUM CHLORIDE 8 MCG: 9 INJECTION, SOLUTION INTRAVENOUS at 09:42

## 2025-05-21 RX ADMIN — Medication 2 G: at 08:40

## 2025-05-21 ASSESSMENT — ACTIVITIES OF DAILY LIVING (ADL)
ADLS_ACUITY_SCORE: 50
ADLS_ACUITY_SCORE: 50
ADLS_ACUITY_SCORE: 53
ADLS_ACUITY_SCORE: 48
ADLS_ACUITY_SCORE: 48
ADLS_ACUITY_SCORE: 50
ADLS_ACUITY_SCORE: 50
ADLS_ACUITY_SCORE: 48
ADLS_ACUITY_SCORE: 48
ADLS_ACUITY_SCORE: 53
ADLS_ACUITY_SCORE: 63
ADLS_ACUITY_SCORE: 53
ADLS_ACUITY_SCORE: 46
ADLS_ACUITY_SCORE: 46
ADLS_ACUITY_SCORE: 48
ADLS_ACUITY_SCORE: 50
ADLS_ACUITY_SCORE: 48
ADLS_ACUITY_SCORE: 50

## 2025-05-21 ASSESSMENT — ENCOUNTER SYMPTOMS: DYSRHYTHMIAS: 1

## 2025-05-21 NOTE — ADDENDUM NOTE
Addendum  created 05/21/25 1335 by Behrens, Christopher J, MD    Child order released for a procedure order, Clinical Note Signed, Intraprocedure Blocks edited, LDA created via procedure documentation, SmartForm saved

## 2025-05-21 NOTE — ANESTHESIA CARE TRANSFER NOTE
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    Anesthesia Type:   General     Note:    Oropharynx: oropharynx clear of all foreign objects and spontaneously breathing  Level of Consciousness: drowsy  Oxygen Supplementation: face mask  Level of Supplemental Oxygen (L/min / FiO2): 8  Independent Airway: airway patency satisfactory and stable  Dentition: dentition unchanged  Vital Signs Stable: post-procedure vital signs reviewed and stable  Report to RN Given: handoff report given  Patient transferred to: PACU    Handoff Report: Identifed the Patient, Identified the Reponsible Provider, Reviewed the pertinent medical history, Discussed the surgical course, Reviewed Intra-OP anesthesia mangement and issues during anesthesia, Set expectations for post-procedure period and Allowed opportunity for questions and acknowledgement of understanding      Vitals:  Vitals Value Taken Time   /90 1208 05/21/25   Temp 97.0 1208 05/21/25      Pulse 75 1208 05/21/25      Resp 16    SpO2 96 1208 05/21/25          Electronically Signed By: LILI Salazar CRNA  May 21, 2025  12:08 PM  
Vital Signs Last 24 Hrs  T(C): 36.4 (03 Jan 2021 15:12), Max: 36.4 (03 Jan 2021 15:12)  T(F): 97.5 (03 Jan 2021 15:12), Max: 97.5 (03 Jan 2021 15:12)  HR: 51 (03 Jan 2021 17:00) (46 - 54)  BP: 181/50 (03 Jan 2021 17:00) (125/62 - 194/67)  BP(mean): --  RR: 17 (03 Jan 2021 17:00) (15 - 18)  SpO2: 100% (03 Jan 2021 17:00) (95% - 100%)    Telemedicine precludes detailed physical examination  Pt is lethargic after receiving meds in ED above  Easily arousable  oriented to person, place and date  appears comfortable at present

## 2025-05-21 NOTE — PLAN OF CARE
Problem: Lower Extremity Revascularization  Goal: Effective Tissue Perfusion  Intervention: Optimize Tissue Perfusion  Recent Flowsheet Documentation  Taken 5/21/2025 1400 by Kamaljit Arizmendi RN  Body Position:   supine   weight shifting     Problem: Adult Inpatient Plan of Care  Goal: Absence of Hospital-Acquired Illness or Injury  Intervention: Prevent and Manage VTE (Venous Thromboembolism) Risk  Recent Flowsheet Documentation  Taken 5/21/2025 1700 by Kamaljit Arizmendi RN  VTE Prevention/Management: SCDs on (sequential compression devices)  Taken 5/21/2025 1600 by Kamaljit Arizmendi RN  VTE Prevention/Management: SCDs on (sequential compression devices)  Taken 5/21/2025 1500 by Kamaljit Arizmendi RN  VTE Prevention/Management: SCDs on (sequential compression devices)  Taken 5/21/2025 1445 by Kamaljit Arizmendi RN  VTE Prevention/Management: SCDs on (sequential compression devices)  Taken 5/21/2025 1430 by Kamaljit Arizmendi RN  VTE Prevention/Management: SCDs on (sequential compression devices)  Taken 5/21/2025 1415 by aKmaljit Arizmendi RN  VTE Prevention/Management: SCDs on (sequential compression devices)  Taken 5/21/2025 1400 by Kamaljit Arizmendi RN  VTE Prevention/Management: SCDs on (sequential compression devices)   Goal Outcome Evaluation:      Plan of Care Reviewed With: patient, spouse    Overall Patient Progress: improvingOverall Patient Progress: improving     A&Ox4 w/forgetfulness. Able to express needs to staff. Denies any pain or discomfort. Has been on scheduled bedrest until 1800. BUE/BLE CMS intact and pulses palpable. Tolerating a clear liquid diet, advanced per order. Roper catheter removed at the end of scheduled bedrest per order. IVF infusing. Surgical sites to bilateral groin are C/D/I. VSS on 2L via NC.

## 2025-05-21 NOTE — ANESTHESIA PROCEDURE NOTES
Arterial Line Procedure Note    Pre-Procedure   Staff -        Anesthesiologist:  Behrens, Christopher J, MD       Performed By: anesthesiologist       Location: OR       Pre-Anesthestic Checklist: patient identified, IV checked, risks and benefits discussed, informed consent, monitors and equipment checked, pre-op evaluation and at physician/surgeon's request  Timeout:       Correct Patient: Yes        Correct Procedure: Yes        Correct Site: Yes        Correct Position: Yes   Line Placement:   This line was placed Post Induction  Procedure   Procedure: arterial line       Laterality: left       Insertion Site: radial.  Sterile Prep        Standard elements of sterile barrier followed       Skin prep: Chloraprep  Insertion/Injection        Technique: Seldinger Technique and ultrasound guided        1. Ultrasound was used to evaluate the access site.       2. Artery evaluated via ultrasound for patency/adequacy.       3. Using real-time ultrasound the needle/catheter was observed entering the artery/vein.       5. The visualized structures were anatomically normal.       6. There were no apparent abnormal pathologic findings.       Catheter Type/Size: 20 G, 12 cm  Narrative         Secured by: other       Complications: None apparent,        Arterial waveform: Yes        IBP within 10% of NIBP: Yes

## 2025-05-21 NOTE — PHARMACY-ADMISSION MEDICATION HISTORY
Pharmacist Admission Medication History    Admission medication history is complete. The information provided in this note is only as accurate as the sources available at the time of the update.    Information Source(s): Patient via in-person    Pertinent Information: Patient has not taken any medications today PTA 5/21/25.     Home Warfarin regimen: 3 mg on Wednesday, 4 mg all other days. Patient has been holding for 5 days prior to procedure.     Patient has been holding Finasteride for ~3 months per doctors instructions.     Patient stated he has been taking Aspirin 81 mg tablets as needed .    Changes made to PTA medication list:  Added: None  Deleted: Finasteride   Changed: None    Allergies reviewed with patient and updates made in EHR: yes    Medication History Completed By: MARICHUY AGUERO RPH 5/21/2025 7:32 AM    PTA Med List   Medication Sig Last Dose/Taking    acetaminophen (TYLENOL) 500 MG tablet [ACETAMINOPHEN (TYLENOL) 500 MG TABLET] Take 500 mg by mouth every 6 (six) hours as needed for pain. Past Month    amLODIPine (NORVASC) 10 MG tablet Take 1 tablet (10 mg) by mouth daily. 5/20/2025 Morning    aspirin 81 MG EC tablet Take 81 mg by mouth daily as needed for moderate pain. Past Month Morning    metoprolol tartrate (LOPRESSOR) 50 MG tablet Take 1 tablet (50 mg) by mouth 2 times daily. 5/20/2025 Evening    tamsulosin (FLOMAX) 0.4 MG capsule TAKE 1 CAPSULE BY MOUTH DAILY AFTER BREAKFAST 5/20/2025 Morning    Vitamin D3 (VITAMIN D, CHOLECALCIFEROL,) 25 mcg (1000 units) tablet Take 1 tablet (25 mcg) by mouth daily. 5/20/2025 Morning    warfarin ANTICOAGULANT (COUMADIN) 1 MG tablet Take by mouth See Admin Instructions. Take 3 mg on Wednesdays, Take 4 mg all other days of the week Past Week Evening

## 2025-05-21 NOTE — CONSULTS
"[facility]  Consult Note - Hospitalist Service  Date of Admission:  5/21/2025  Consult Requested by: Dr. Crum  Reason for Consult: Medical management     Assessment & Plan   Jack Brown is a 74 year old male admitted on 5/21/2025.  Patient underwent endovascular stent insertion for bilateral common iliac aneurysm on 5/21/2025 via vascular surgery.  [facility] [place] for medical management.    Bilateral common iliac aneurysm s/p stent  - Perioperative management per vascular surgery  - Continue pain control, agree with bowel regimen  - Will see how patient does with a diet and plan to discontinue IVF pending oral intake  - DVT PPx per vascular surgery, holding home Coumadin in the meantime  - Added IS  - Roper in place, trial of void per protocol   - Consulted PT/ OT for dispo planning when off bedrest     THOM  - Perioperative IVF, anticipate improvement post surgery  - Monitor with AM labs   - Avoid nephrotoxins    CAD  HTN  - Continue PTA aspirin, amlodipine, metoprolol  - Noted myalgia side effect to statins  - Check lipids could consider ezetimibe  - PRN hydralazine, pain control for post-op HTN    Paroxysmal atrial fibrillation  - PTA metoprolol on board  - Holding home Coumadin postop, restart per vascular surgery       Clinically Significant Risk Factors Present on Admission          # Hyperchloremia: Highest Cl = 109 mmol/L in last 2 days, will monitor as appropriate           # Drug Induced Coagulation Defect: home medication list includes an anticoagulant medication  # Drug Induced Platelet Defect: home medication list includes an antiplatelet medication   # Hypertension: Noted on problem list           # Overweight: Estimated body mass index is 26.55 kg/m  as calculated from the following:    Height as of 5/14/25: 1.676 m (5' 6\").    Weight as of this encounter: 74.6 kg (164 lb 8 oz).        # History of CABG: noted on surgical history       Ozzie Vicente DO  Hospitalist " Service  Securely message with [employer] (more info)  Text page via [facility] Paging/Directory   ______________________________________________________________________    Chief Complaint   Bilateral common iliac aneurysm    History is obtained from the patient    History of Present Illness   Jack Brown is a 74 year old male who is admitted status post bilateral endovascular stent for iliac aneurysm.  Patient seen in room postsurgery doing well.  Friend and wife are at bedside.  Patient states he feels like the surgery went well and does not have any additional complaints at this time.  He states pain is relatively well-controlled.  No change in his urine output.  No current chest pain, shortness of breath or lightheadedness.      Past Medical History    Past Medical History:   Diagnosis Date    AAA (abdominal aortic aneurysm)     Basal cell carcinoma     BPH (benign prostatic hypertrophy)     Cancer (H)     Skin on R.chest wall.    Carcinoid tumor (H)     Chest discomfort     Coronary artery disease     Detached retina, left 10/01/2014    Hyperlipidemia     Hypertension     Melanoma of skin (H)     PAD (peripheral artery disease)     Pain of left lower leg 12/15/2023    Pain of left upper arm 10/10/2023    Palpitations     Paroxysmal atrial fibrillation (H)     Popliteal artery aneurysm     Pseudoaneurysm     Thrombosis     Thyroid nodule        Past Surgical History   Past Surgical History:   Procedure Laterality Date    BYPASS GRAFT ARTERY CORONARY N/A 12/28/2015    Procedure: CORONARY ARTERY BYPASS x 3, RIGHT ENDOSCOPIC VEIN HARVEST, LEFT INTERNAL MAMMARY ARTERY, ANESTHESIA TRANSESOPHAGEAL ECHOCARDIOGRAM;  Surgeon: Jayson Alvarado MD;  Location: [facility];  Service:     CARDIAC SURGERY      CATARACT EXTRACTION  01/01/2015    CYST REMOVAL      Ganglion cyst removal of both wrist    EYE SURGERY      FEMORAL ARTERY - TIBIAL ARTERY BYPASS GRAFT Left 10/18/2024    Procedure: CREATION, BYPASS, ARTERIAL,  FEMORAL TO TIBIAL, LEFT;  Surgeon: Laverne Crum MD;  Location: [facility] [place]    HC REMOVAL PREPATELLA BURSA      Description: Excision Of Prepatellar Bursa;  Recorded: 09/30/2014;    HC REPAIR OF NASAL SEPTUM      Description: Septoplasty;  Recorded: 09/30/2014;    HERNIA REPAIR, UMBILICAL      IR LOWER EXTREMITY ANGIOGRAM LEFT  12/16/2023    IR LOWER EXTREMITY ANGIOGRAM LEFT  08/21/2024    IR THROMBOLYSIS ARTERIAL INFUSION INITIAL DAY  12/17/2023    PARS PLANA VITRECTOMY W/ REPAIR OF MACULAR HOLE  11/01/2014    CT EXCIS TENDON SHEATH LESION, HAND/FINGER      Description: Hand Excision Of A Tendon Cyst;  Recorded: 01/04/2011;    CT LAP, VENTRAL HERNIA REPAIR,REDUCIBLE      Description: Laparoscopy Repair Of Umbilical Hernia;  Recorded: 01/04/2011;    PSEUDOANEURYSM REPAIR Right 05/01/2024    Procedure: REPAIR, PSEUDOANEURYSM, ARTERY, FEMORAL RIGHT;  Surgeon: Laverne Crum MD;  Location: [facility] [place]    RETINAL DETACHMENT SURGERY  10/01/2014    SKIN CANCER EXCISION  11/01/2015    right chest    THORACOSCOPY Right 04/14/2016    Procedure: RIGHT VIDEO ASSISTED THORACOSCOPY, RIGHT LOBECTOMY;  Surgeon: Vinny Chase MD;  Location: [facility];  Service:     VASECTOMY         Medications   I have reviewed this patient's current medications       Social History   I have reviewed this patient's social history and updated it with pertinent information if needed.  Social History     Tobacco Use    Smoking status: Never     Passive exposure: Never (per pt)    Smokeless tobacco: Never   Vaping Use    Vaping status: Never Used   Substance Use Topics    Alcohol use: Yes     Alcohol/week: 8.0 standard drinks of alcohol     Types: 8 Standard drinks or equivalent per week     Comment: very rarely    Drug use: No         Family History   I have reviewed this patient's family history and updated it with pertinent information if needed.  Family History   Problem Relation Age of Onset     Acute Myocardial Infarction Mother     Aneurysm Mother         brain    Acute Myocardial Infarction Father     Colitis Brother     Abdominal Aortic Aneurysm Brother         had surgery and was supposed to have a second - time ran out    Leukemia Brother     Other - See Comments Maternal Grandmother          of hemorrhage after childbirht    Pneumonia Maternal Grandfather     No Known Problems Paternal Grandmother     Colon Cancer Paternal Grandfather     Lung Cancer Cousin     Coronary Artery Disease Cousin          Allergies   Allergies   Allergen Reactions    Niacin Hives and Rash     Burning of the skin    Atorvastatin Muscle Pain (Myalgia)    Pravastatin Muscle Pain (Myalgia)        Physical Exam   Vital Signs: Temp: 97.6  F (36.4  C) Temp src: Oral BP: (!) 169/91 Pulse: 63   Resp: 16 SpO2: 96 % O2 Device: Nasal cannula Oxygen Delivery: 2 LPM  Weight: 164 lbs 8 oz    General Appearance: Fatigued, somewhat tangential, cooperative and no acute distress  Respiratory: Nonlabored breathing, good inspiratory effort without rales, rhonchi or wheezing  Cardiovascular: RRR  GI: No pain throughout  Skin: Normal femoral sites are clean without overlying hematoma or erythema  Other: No focal deficit    Medical Decision Making       85 MINUTES SPENT BY ME on the date of service doing chart review, history, exam, documentation & further activities per the note.      Data     I have personally reviewed the following data over the past 24 hrs:    7.6  \   14.7   / 205     142 109 (H) 29.3 (H) /  94   3.8 24 1.35 (H) \       Imaging results reviewed over the past 24 hrs:   Recent Results (from the past 24 hours)   XR Surgery PINEDA Fluoro L/T 5 Min    Narrative    This exam was marked as non-reportable because it will not be read by a   radiologist or a [place] non-radiologist provider.

## 2025-05-21 NOTE — BRIEF OP NOTE
[facility]    Brief Operative Note    Pre-operative diagnosis: Common iliac aneurysm [I72.3]  Post-operative diagnosis Same as pre-operative diagnosis    Procedure: ESTABLISHMENT, VASCULAR ACCESS, FEMORAL APPROACH, FOR ENDOVASCULAR STENT INSERTION INTO ABDOMINAL AORTIC ANEURYSM, Bilateral - Chest    Surgeon: Surgeons and Role:     * Laverne Crum MD - Primary  Anesthesia: General   Estimated Blood Loss: 100 mL from 5/21/2025  7:48 AM to 5/21/2025 12:03 PM      Drains: None  Specimens: * No specimens in log *  Findings:   Successful treatment of bilateral iliac aneurysms using: R - 23 x 14.5 x 10 main body and two 14 x 7 HGB (post-dilated using 14 mm); L - 23 x 14.5 x 10 main body and 10 x 7 HGB and 8 x 59 VBX (post-dilated using 14 mm).  Complications: None.  Implants:   Implant Name Type Inv. Item Serial No.  Lot No. LRB No. Used Action   GRAFT EVAS 10CM 14.5MM GORE XCLDR [facility] AAA - [id number] Graft GRAFT EVAS 10CM 14.5MM GORE XCLDR [facility] AAA [id number] GORE  Left 1 Implanted   [place] excluder iliac branch endoprosthesis Stent Graft  [id number] GORE  Left 1 Implanted   [place] viabahn VBX balloon expandable endoprosthesis Stent Graft  [id number] GORE  Left 1 Implanted   GRAFT EVAS 10CM 14.5MM GORE XCLDR [facility] AAA - [id number] Graft GRAFT EVAS 10CM 14.5MM GORE XCLDR [facility] AAA [id number] GORE  Right 1 Implanted   GORE EXCLUDER ENDOPROSTHESIS Stent  [id number] GORE  Right 1 Implanted   GORE EXCLUDER ENDOPROSTHESIS Stent Graft  [id number] GORE  Right 1 Implanted     Palp pulses post-op in PT bilaterally

## 2025-05-21 NOTE — ANESTHESIA PROCEDURE NOTES
Airway       Patient location during procedure: OR       Procedure Start/Stop Times: 5/21/2025 8:01 AM  Staff -        CRNA: Rah Okeefe APRN CRNA       Performed By: CRNA  Consent for Airway        Urgency: elective  Indications and Patient Condition       Indications for airway management: shruthi-procedural       Induction type:intravenous       Mask difficulty assessment: 1 - vent by mask    Final Airway Details       Final airway type: endotracheal airway       Successful airway: ETT - single  Endotracheal Airway Details        ETT size (mm): 7.5       Successful intubation technique: video laryngoscopy       VL Blade Size: Glidescope 4       Grade View of Cords: 1       Adjucts: stylet       Position: Right       Measured from: gums/teeth       Secured at (cm): 21       Bite block used: None    Post intubation assessment        Placement verified by: capnometry, equal breath sounds and chest rise        Number of attempts at approach: 1       Number of other approaches attempted: 0       Secured with: tape       Ease of procedure: easy       Dentition: Intact    Medication(s) Administered   Medication Administration Time: 5/21/2025 8:01 AM

## 2025-05-21 NOTE — OP NOTE
VASCULAR SURGERY OPERATIVE REPORT      LOCATION:    [facility]    Jack Brown   Medical Record #:  [id number]  YOB: 1950  Age:  74 year old     Date of Service: 05/21/25    PRIMARY CARE PROVIDER: Mercedes Adorno    Preoperative diagnosis  Asymptomatic 4.0 cm LEFT common iliac artery aneurysm  Asymptomatic 3.0 cm RIGHT common iliac artery aneurysm    Postoperative diagnosis    Same      Surgeon: Laverne Crum MD, RPVI     Assistant: Tabitha Qureshi MD, vascular surgery fellow                         Name of the procedure  Bilateral ultrasound-guided access of common femoral arteries  Deployment of bilateral ProGlide closure devices in order to facilitate placement of 16 Fr sheath on the right and 16 Fr sheath on the left  Advancement of the catheter into the aorta  Pelvic angiogram  Supervision and interpretation of radiologic findings  Endovascular repair of left iliac artery aneurysm using using [place] excluder iliac branch device  Endovascular repair of right iliac artery aneurysm using using [place] excluder iliac branch device  Bilateral endovascular closure of the common femoral arteries using ProGlide suture devices    Anesthesia:    General    Indication for procedure:    This is a 74-year-old male with 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  These are asymptomatic.  To prevent rupture of this aneurysm at this size, we recommended repair of his common iliac artery aneurysm on the left.  We also recommended repair of the right common iliac artery aneurysm concurrently.  Discussed the benefits and risks of the surgery including death, MI, stroke, renal failure, paralysis, access complications such as bleeding, thrombosis, or dissection, infection, etc. were discussed with the patient.  The patient voiced understanding and wanted to proceed with the surgery.  Informed consent was obtained.    Description of procedure:    Patient was  brought to the OR and was placed on the operating table in supine position with bilateral arms tucked.  General esthesia was induced, and patient was successfully intubated.  Perioperative antibiotics were given.  Roper catheter was inserted.  The patient was prepped and draped in usual sterile fashion after marking the bilateral common femoral artery and femoral bifurcation.    Timeout was held identifying proper patient, procedure, body part, and laterality.    We started the procedure by obtaining an access in the left common femoral artery.  Under ultrasound guidance, the common femoral artery was accessed using micropuncture needle.  The tract was spread with hemostat clamp.  Then, with modified Seldinger technique, micropuncture catheter was inserted.  0.035 GlideAdvantage wire was then advanced into the aorta.  Then, 6 [place] sheath was inserted briefly, and this was exchanged to first Pro-glide device.  This was deployed at 2:00, and the next one was deployed at 10:00 in preclose technique.  Then, a 9 [place] sheath was placed in the left common femoral artery.  Then, the same was done to gain access to the right common femoral artery under ultrasound guidance, and 2 Perclose devices were deployed in preclosed fashion with eventual placement of 9 [place] sheath.    Then, on the left side, over the GlideAdvantage wire, Kumpe catheter was inserted into the descending thoracic aorta, and GlideAdvantage wire was exchanged to Lunderquist wire.  The end of the wire was marked on the table.  Then, a 9 [place] sheath on the left was exchanged to 16 [place] dry seal sheath.    For the right side, over the GlideAdvantage wire, a Kumpe catheter was inserted into descending thoracic aorta, and this was exchanged to Lunderquist wire.  Then a, 9 [place] sheath was exchanged to 12 [place] dry seal sheath.    Through the left side, GlideAdvantage wire was inserted as a eric wire into the infrarenal aorta.  From the right side  ensnare was inserted into the infrarenal aorta.  Then, using the ensnare device, GlideAdvantage wire was grabbed and pulled out of 12 [place] sheath to obtain through and through wire access.  Then, 23 x 14.5 x 10 [place] KEEGAN main body was prepped.  Then, through the left side, the main body was inserted.  This was positioned with the gate approximately in the proximal portion of the aneurysm sac, and this was confirmed after obtaining pelvic angiogram after setting the C arm in an angle they can visualize iliac bifurcation the best.  Then, after adjusting the final position and making sure that the wires are wrapped, the the first stage of the main body was deployed.  Then, through the through and through wire, a 12 [place] dry seal sheath from the right side was advanced up and over into the the gate of left iliac KEEGAN device.  Then, GlideAdvantage wire was brought into 12 [place] sheath as a eric wire fashion, and posterior branch of internal iliac artery was cannulated, and this was confirmed after obtaining selective angiogram after placing a Kumpe catheter.  Then, the wire was exchanged to 1 cm soft tip Amplatz stiff wire.  After positioning this, we advanced a 10 x 7 hypogastric branch endograft through the gate into the internal iliac artery.  This was properly positioned and then deployed.  Then, we extended this with 8 x 59 mm VBX into the posterior branch of internal iliac artery.  Then, internal iliac gate to the end of VBX stent was angioplastied using 14 mm balloon.  Then, angiogram was done showing excellent flow through the internal iliac artery to patent branches.  There is no retrograde flow from covered branches.  Satisfied with this result, we finished deploying the main body of iliac branch endograft.  Balloon was inserted from the left femoral access, and common and external iliac artery were angioplastied.  Then, we positioned Omni Flush catheter in the aortic bifurcation, an angiogram was done,  showing patent and excellent flow through the iliac branched endograft with no evidence of endoleak's.    Then, we turned our attention to the repairing of the right iliac artery aneurysm.  Lunderquist wire was passed through the right sided 12 [place] sheath into the descending thoracic aorta.  Then, the 12 [place] sheath was exchanged to a 16 [place] sheath.  Then, through this right sided 16 [place] sheath, GlideAdvantage wire was inserted into the infrarenal aorta.  Then, through the left-sided 16 [place] sheath, 12 [place] sheath was inserted.  Then, through this 12 [place] sheath, and snare was inserted.  The GlideAdvantage guidewire was snared using a snare, and through and through wire access was achieved again.  Then, through the right sided 16 [place] sheath, the main body of [place] KEEGAN 23 x 14.5 x 10 graft was inserted.  Then, the first stage of the main body was deployed after placing the graft in proper position after obtaining angiogram and unwrapping of the wires.  This was followed by advancement of 12 [place] sheath into the internal iliac gate.  Through the 12 [place] sheath, GlideAdvantage wire and Kumpe catheter was advanced to cannulate the anterior branch of internal iliac artery.  We are having difficulty advancing Kumpe catheter.  This was exchanged to 90 cm 0.035 [place] catheter, and we are able to cannulate into anterior branch of the internal iliac artery.  Angiogram was performed to confirm intraluminal placement of this catheter.  Then, 1 cm stiff Amplatz wire was advanced through the Navicross catheter.  Then, the catheter was exchanged to 14 x 7 hypogastric branch grafts.  Then, this was deployed from the gate to internal iliac artery bifurcation.  We had to use 2 hypogastric branch graft to obtain the seal.  Then, the grafts were angioplastied using 40 mm balloon.  Then, angiogram was performed through the 12 [place] sheath confirming excellent flow into the internal iliac artery and its  branches.  Satisfied with this, we finished deployment of the iliac branch endograft main body.  Reliant balloon was advanced through the 16 [place]haric sheath on the right side, and common iliac artery and external iliac artery portions of KEEGAN were angioplastied.  Then, Omni Flush catheter was placed into the aortic bifurcation, and the final angiogram was done showing no endoleak's and excellent flow through both external and internal iliac artery.  At this time, the repair of bilateral iliac artery aneurysms was completed.    We turned our attention to closure of the arteriotomy.  Starting from the right side, we cinched down preplaced closure devices with excellent hemostasis.  For the left side, we placed another Perclose device at 12:00 to obtain excellent hemostasis.  10 minutes of manual pressure were held after administration of protamine.  Post closure ultrasound of bilateral femoral artery showed excellent pulsatile flow in the bilateral distal common femoral artery, SFA, and profunda.    Then, the patient was awoken from anesthesia, extubated, and transported to PACU in stable condition.  Counts were correct x 2.  The patient tolerated the procedure well.    Dr. Crum was scrubbed in for the entirety of the case.    Estimated blood loss: 100 cc    Specimens: none     Complications: None    FLUOROSCOPIC TIME: 36 minutes  RADIATION DOSE: 470.93 mGy  CONTRAST: 100 cc            Tabitha Qureshi MD  Vascular Surgery Fellow

## 2025-05-21 NOTE — PROGRESS NOTES
P1 RN updated on patient bed rest status. Per Dr. Qureshi, patient is to remain on bedrest until 6pm. Unable to reach wife via telephone in PACU to update her with room assignment. Also unable to leave voicemail due to voicemail not being set up.

## 2025-05-21 NOTE — ANESTHESIA POSTPROCEDURE EVALUATION
Patient: Jack Brown    Procedure: Procedure(s):  ESTABLISHMENT, VASCULAR ACCESS, FEMORAL APPROACH, FOR ENDOVASCULAR STENT INSERTION INTO ABDOMINAL AORTIC ANEURYSM       Anesthesia Type:  General    Note:  Disposition: Admission   Postop Pain Control: Uneventful            Sign Out: Well controlled pain   PONV: No   Neuro/Psych: Uneventful            Sign Out: Acceptable/Baseline neuro status   Airway/Respiratory: Uneventful            Sign Out: Acceptable/Baseline resp. status   CV/Hemodynamics: Uneventful            Sign Out: Acceptable CV status   Other NRE: NONE   DID A NON-ROUTINE EVENT OCCUR? No           Last vitals:  Vitals Value Taken Time   /87 05/21/25 12:32   Temp 36.2  C (97.2  F) 05/21/25 12:45   Pulse 72 05/21/25 12:50   Resp 19 05/21/25 12:50   SpO2 96 % 05/21/25 12:50   Vitals shown include unfiled device data.    Electronically Signed By: Christopher J. Behrens, MD  May 21, 2025  12:52 PM

## 2025-05-22 VITALS
RESPIRATION RATE: 18 BRPM | DIASTOLIC BLOOD PRESSURE: 70 MMHG | OXYGEN SATURATION: 93 % | BODY MASS INDEX: 26.55 KG/M2 | WEIGHT: 164.5 LBS | TEMPERATURE: 98.1 F | SYSTOLIC BLOOD PRESSURE: 137 MMHG | HEART RATE: 68 BPM

## 2025-05-22 LAB
ANION GAP SERPL CALCULATED.3IONS-SCNC: 6 MMOL/L (ref 7–15)
BASOPHILS # BLD AUTO: 0 10E3/UL (ref 0–0.2)
BASOPHILS NFR BLD AUTO: 0 %
BUN SERPL-MCNC: 17.5 MG/DL (ref 8–23)
CALCIUM SERPL-MCNC: 9.2 MG/DL (ref 8.8–10.4)
CHLORIDE SERPL-SCNC: 107 MMOL/L (ref 98–107)
CHOLEST SERPL-MCNC: 161 MG/DL
CREAT SERPL-MCNC: 1.1 MG/DL (ref 0.67–1.17)
EGFRCR SERPLBLD CKD-EPI 2021: 70 ML/MIN/1.73M2
EOSINOPHIL # BLD AUTO: 0 10E3/UL (ref 0–0.7)
EOSINOPHIL NFR BLD AUTO: 0 %
ERYTHROCYTE [DISTWIDTH] IN BLOOD BY AUTOMATED COUNT: 14.6 % (ref 10–15)
GLUCOSE BLDC GLUCOMTR-MCNC: 82 MG/DL (ref 70–99)
GLUCOSE SERPL-MCNC: 89 MG/DL (ref 70–99)
HCO3 SERPL-SCNC: 27 MMOL/L (ref 22–29)
HCT VFR BLD AUTO: 37.8 % (ref 40–53)
HDLC SERPL-MCNC: 36 MG/DL
HGB BLD-MCNC: 12.5 G/DL (ref 13.3–17.7)
IMM GRANULOCYTES # BLD: 0 10E3/UL
IMM GRANULOCYTES NFR BLD: 0 %
LDLC SERPL CALC-MCNC: 105 MG/DL
LYMPHOCYTES # BLD AUTO: 1.4 10E3/UL (ref 0.8–5.3)
LYMPHOCYTES NFR BLD AUTO: 13 %
MCH RBC QN AUTO: 29.2 PG (ref 26.5–33)
MCHC RBC AUTO-ENTMCNC: 33.1 G/DL (ref 31.5–36.5)
MCV RBC AUTO: 88 FL (ref 78–100)
MONOCYTES # BLD AUTO: 0.9 10E3/UL (ref 0–1.3)
MONOCYTES NFR BLD AUTO: 9 %
NEUTROPHILS # BLD AUTO: 8.2 10E3/UL (ref 1.6–8.3)
NEUTROPHILS NFR BLD AUTO: 77 %
NONHDLC SERPL-MCNC: 125 MG/DL
NRBC # BLD AUTO: 0 10E3/UL
NRBC BLD AUTO-RTO: 0 /100
PLATELET # BLD AUTO: 187 10E3/UL (ref 150–450)
POTASSIUM SERPL-SCNC: 4.2 MMOL/L (ref 3.4–5.3)
RBC # BLD AUTO: 4.28 10E6/UL (ref 4.4–5.9)
SODIUM SERPL-SCNC: 140 MMOL/L (ref 135–145)
TRIGL SERPL-MCNC: 102 MG/DL
WBC # BLD AUTO: 10.6 10E3/UL (ref 4–11)

## 2025-05-22 PROCEDURE — 36415 COLL VENOUS BLD VENIPUNCTURE: CPT | Performed by: STUDENT IN AN ORGANIZED HEALTH CARE EDUCATION/TRAINING PROGRAM

## 2025-05-22 PROCEDURE — 99232 SBSQ HOSP IP/OBS MODERATE 35: CPT | Performed by: INTERNAL MEDICINE

## 2025-05-22 PROCEDURE — 999N000111 HC STATISTIC OT IP EVAL DEFER

## 2025-05-22 PROCEDURE — 250N000011 HC RX IP 250 OP 636: Performed by: STUDENT IN AN ORGANIZED HEALTH CARE EDUCATION/TRAINING PROGRAM

## 2025-05-22 PROCEDURE — 82465 ASSAY BLD/SERUM CHOLESTEROL: CPT | Performed by: STUDENT IN AN ORGANIZED HEALTH CARE EDUCATION/TRAINING PROGRAM

## 2025-05-22 PROCEDURE — 250N000013 HC RX MED GY IP 250 OP 250 PS 637: Performed by: STUDENT IN AN ORGANIZED HEALTH CARE EDUCATION/TRAINING PROGRAM

## 2025-05-22 PROCEDURE — 999N000147 HC STATISTIC PT IP EVAL DEFER

## 2025-05-22 PROCEDURE — 82435 ASSAY OF BLOOD CHLORIDE: CPT | Performed by: STUDENT IN AN ORGANIZED HEALTH CARE EDUCATION/TRAINING PROGRAM

## 2025-05-22 PROCEDURE — 85018 HEMOGLOBIN: CPT | Performed by: STUDENT IN AN ORGANIZED HEALTH CARE EDUCATION/TRAINING PROGRAM

## 2025-05-22 PROCEDURE — 250N000013 HC RX MED GY IP 250 OP 250 PS 637: Performed by: INTERNAL MEDICINE

## 2025-05-22 RX ORDER — CALCIUM CARBONATE 500 MG/1
500 TABLET, CHEWABLE ORAL 3 TIMES DAILY PRN
Status: DISCONTINUED | OUTPATIENT
Start: 2025-05-22 | End: 2025-05-22 | Stop reason: HOSPADM

## 2025-05-22 RX ADMIN — TAMSULOSIN HYDROCHLORIDE 0.4 MG: 0.4 CAPSULE ORAL at 06:33

## 2025-05-22 RX ADMIN — ASPIRIN 81 MG: 81 TABLET, COATED ORAL at 08:32

## 2025-05-22 RX ADMIN — POLYETHYLENE GLYCOL 3350 17 G: 17 POWDER, FOR SOLUTION ORAL at 08:32

## 2025-05-22 RX ADMIN — AMLODIPINE BESYLATE 10 MG: 5 TABLET ORAL at 08:32

## 2025-05-22 RX ADMIN — CEFAZOLIN SODIUM 2 G: 2 SOLUTION INTRAVENOUS at 00:29

## 2025-05-22 RX ADMIN — CALCIUM CARBONATE (ANTACID) CHEW TAB 500 MG 500 MG: 500 CHEW TAB at 00:29

## 2025-05-22 RX ADMIN — SENNOSIDES AND DOCUSATE SODIUM 1 TABLET: 50; 8.6 TABLET ORAL at 08:33

## 2025-05-22 RX ADMIN — METOPROLOL TARTRATE 50 MG: 25 TABLET, FILM COATED ORAL at 08:33

## 2025-05-22 RX ADMIN — ENOXAPARIN SODIUM 40 MG: 40 INJECTION SUBCUTANEOUS at 08:32

## 2025-05-22 ASSESSMENT — ACTIVITIES OF DAILY LIVING (ADL)
ADLS_ACUITY_SCORE: 46
ADLS_ACUITY_SCORE: 46
ADLS_ACUITY_SCORE: 44
ADLS_ACUITY_SCORE: 46
ADLS_ACUITY_SCORE: 46
ADLS_ACUITY_SCORE: 44
ADLS_ACUITY_SCORE: 46
ADLS_ACUITY_SCORE: 46
ADLS_ACUITY_SCORE: 44
ADLS_ACUITY_SCORE: 46
ADLS_ACUITY_SCORE: 44
ADLS_ACUITY_SCORE: 46
ADLS_ACUITY_SCORE: 44

## 2025-05-22 NOTE — DISCHARGE SUMMARY
[facility]  Discharge Summary  Vascular Surgery     Jack Brown MRN# [id number]   YOB: 1950 Age: 74 year old     Date of Admission:  5/21/2025  Date of Discharge::  5/22/2025  3:41 PM  Admitting Physician:  Laverne Crum MD  Discharge Physician:  Solomon Jennings MD  Primary Care Physician:        Mercedes Adorno          Admission Diagnoses:   Common iliac aneurysm [I72.3]  Iliac aneurysm [I72.3]          Discharge Diagnosis:   Same as admission diagnoses         Procedures:   Procedure(s):  ESTABLISHMENT, VASCULAR ACCESS, FEMORAL APPROACH, FOR ENDOVASCULAR STENT INSERTION INTO ABDOMINAL AORTIC ANEURYSM          Consultations:   SMOKING CESSATION PROGRAM IP CONSULT  HOSPITALIST IP CONSULT  PHYSICAL THERAPY ADULT IP CONSULT  OCCUPATIONAL THERAPY ADULT IP CONSULT          Brief History of Illness:   This is a 74-year-old male with 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  These are asymptomatic.            Hospital Course:   The patient underwent the above surgery on 5/21/2025, which he tolerated well without immediate complications. He was transferred to the PACU and then floor for routine postoperative care. The remainder of his postoperative course was unremarkable. Roper was removed on POD#0. On the day of discharge, he was tolerating a regular diet, his pain was well controlled with oral pain medications, he was voiding spontaneously, and ambulating independently. Patient with follow up with Dr. Crum in clinic in 4 weeks.           Imaging Studies:     Results for orders placed or performed during the hospital encounter of 05/21/25   XR Surgery PINEDA Fluoro L/T 5 Min    Narrative    This exam was marked as non-reportable because it will not be read by a   radiologist or a [place] non-radiologist provider.                    Medications Prior to Admission:     Medications Prior to Admission   Medication Sig Dispense  Refill Last Dose/Taking    acetaminophen (TYLENOL) 500 MG tablet [ACETAMINOPHEN (TYLENOL) 500 MG TABLET] Take 500 mg by mouth every 6 (six) hours as needed for pain.   Past Month    amLODIPine (NORVASC) 10 MG tablet Take 1 tablet (10 mg) by mouth daily. 90 tablet 2 5/20/2025 Morning    aspirin 81 MG EC tablet Take 81 mg by mouth daily as needed for moderate pain.   Past Month Morning    metoprolol tartrate (LOPRESSOR) 50 MG tablet Take 1 tablet (50 mg) by mouth 2 times daily. 180 tablet 1 5/20/2025 Evening    tamsulosin (FLOMAX) 0.4 MG capsule TAKE 1 CAPSULE BY MOUTH DAILY AFTER BREAKFAST 90 capsule 3 5/20/2025 Morning    Vitamin D3 (VITAMIN D, CHOLECALCIFEROL,) 25 mcg (1000 units) tablet Take 1 tablet (25 mcg) by mouth daily. 90 tablet 1 5/20/2025 Morning    warfarin ANTICOAGULANT (COUMADIN) 1 MG tablet Take by mouth See Admin Instructions. Take 3 mg on Wednesdays, Take 4 mg all other days of the week   Past Week Evening              Discharge Medications:     Current Discharge Medication List        CONTINUE these medications which have NOT CHANGED    Details   acetaminophen (TYLENOL) 500 MG tablet [ACETAMINOPHEN (TYLENOL) 500 MG TABLET] Take 500 mg by mouth every 6 (six) hours as needed for pain.      amLODIPine (NORVASC) 10 MG tablet Take 1 tablet (10 mg) by mouth daily.  Qty: 90 tablet, Refills: 2    Associated Diagnoses: Essential hypertension      aspirin 81 MG EC tablet Take 81 mg by mouth daily as needed for moderate pain.      metoprolol tartrate (LOPRESSOR) 50 MG tablet Take 1 tablet (50 mg) by mouth 2 times daily.  Qty: 180 tablet, Refills: 1    Associated Diagnoses: Benign essential hypertension      tamsulosin (FLOMAX) 0.4 MG capsule TAKE 1 CAPSULE BY MOUTH DAILY AFTER BREAKFAST  Qty: 90 capsule, Refills: 3    Associated Diagnoses: Primary hypertension      Vitamin D3 (VITAMIN D, CHOLECALCIFEROL,) 25 mcg (1000 units) tablet Take 1 tablet (25 mcg) by mouth daily.  Qty: 90 tablet, Refills: 1     Associated Diagnoses: Preventive measure      warfarin ANTICOAGULANT (COUMADIN) 1 MG tablet Take by mouth See Admin Instructions. Take 3 mg on Wednesdays, Take 4 mg all other days of the week                     Medications Discontinued or Adjusted During This Hospitalization:   No change           Antibiotics Prescribed at Discharge:   None prescribed           Day of Discharge Physical Exam:   Temp:  [97.1  F (36.2  C)-98.6  F (37  C)] 97.8  F (36.6  C)  Pulse:  [54-73] 60  Resp:  [13-21] 17  BP: (140-176)/(71-92) 155/73  MAP:  [100 mmHg-118 mmHg] 110 mmHg  Arterial Line BP: (160-170)/(77-83) 160/78  SpO2:  [93 %-97 %] 96 %    General: awake, alert, no acute distress, laying comfortably in bed   CV: warm, well perfused   Pulm: breathing comfortably on room air   Abdomen: soft, non-distended, appropriately tender, no rebound or guarding;  Groin access sites soft with no hematoma, skin glue in place   Extremities: no edema, moving all extremities spontaneously and without apparent deficit, palpable pedal and femoral pulses            Final Pathology Result:   N/A           Discharge Instructions and Follow-Up:     Discharge Procedure Orders   ANTICOAGULATION CLINIC REFERRAL   Referral Priority: Routine     Reason for your hospital stay   Order Comments: Endovascular aortoiliac repair     Activity   Order Comments: Your activity upon discharge: activity as tolerated, no significant exertion for 2 weeks     Order Specific Question Answer Comments   Is discharge order? Yes      Follow Up   Order Comments: Follow up with Dr. Crum in 1 month with [facility]A     Discharge Instructions   Order Comments: Endovascular Abdominal Aortic Aneurysm Repair     Post-Procedure Instructions     Incision Care   You will have an incision in one or both groins.  There will be white strips of tape, called steri-strips, applied over the incisions.  The ends will loosen after 5-7 days, at which time they can be removed.   Cover your incisions  with dry gauze and change as needed.   You may shower 2-3 days after your surgery - pat the incision dry to prevent irritation from moisture.     You may develop bruising and firmness near your incision.  This will soften and resolve over the next 1-3 weeks.  Call our office if it enlarges, becomes red, or painful.   On occasion a soft, fluid-filled bulge can develop near a surgical incision - this is called a seroma.  It will likely resolve on its own, but occasionally requires your doctor to drain it in clinic.  You should call our office if you have questions about this.   You may notice numbness around your incision.  This is due to nerve irritation from the surgery and will gradually improve over the next several weeks.     Activity   Walking is important after surgery.  You will begin walking before you leave the hospital, and then you should gradually increase your distance as tolerated.  You should be taking at least 3-4 short walks a day.   You may climb stairs when you feel strong enough.   You should not drive for 1-2 weeks or while taking prescription strength pain medication.   You must feel strong enough to drive safely.   You may return to work once you feel ready - this can be decided at your 2 week post-operative visit.   You should avoid strenuous activity, including running, biking, or weight-lifting until discussed at your post-operative appointment.     Diet   You may resume a regular diet before you leave the hospital.  You may find that it is best to try smaller, more frequent meals until your appetite returns.     Medications   Resume all previous medications as prior to surgery, unless otherwise instructed.   See further information regarding medications containing Metformin    Special Instructions   Always carry your stent graft card with you; there may be restrictions pertaining to MRI testing for one month from the date of your procedure.   If you will be having an invasive procedure,  such as a colonoscopy or dental work, within one year of your surgery, you will need to take an antibiotic prior to the procedure.  Please call us so we can assist you with this.     Follow-Up Appointments   You will need to see your surgeon 10-14 days after your procedure.   You will also see either your surgeon or your Interventional Radiologist 1 month after your procedure and annually thereafter.  We will arrange for you to have a CT angiogram prior to these appointments and will notify you by mail when you are due to schedule.     When to Call our Office   Temperature greater than 101.   Concerns regarding your incision.  If you notice new onset of numbness, tingling, coolness or pain in your legs.   Severe pain, especially if it is new and preventing sleep.     [facility] [place]  [address]   [address] [place] [address] [phone]     Diet   Order Comments: Follow this diet upon discharge: Current Diet:Orders Placed This Encounter      Advance Diet as Tolerated: Regular Diet Adult; Low Saturated Fat Na <2400mg Diet     Order Specific Question Answer Comments   Is discharge order? Yes               Home Health Care:     Not needed           Discharge Disposition:     Discharged to home      Condition at discharge: Good    Patient was seen, examined, and discussed with staff surgeon Dr. Crum.    Solomon Jennings MD  Vascular Surgery Resident

## 2025-05-22 NOTE — PROGRESS NOTES
Occupational Therapy: Orders received & chart reviewed. Spoke w/pt & explained role of OT. Pt declines the need for evaluation; expressed no concerns for managing ADLs or transfers. Will complete orders.    Bianka ZAMORA/EDILBERTO

## 2025-05-22 NOTE — PROGRESS NOTES
Physical Therapy: Orders received. Chart reviewed and discussed with care team.? Physical Therapy not indicated due to pt declined PT evaluation. Educated pt on reason for acute care PT evaluation post procedure, pt adamantly declining. Pt reported feeling confident in completing mobility at home.? Defer discharge recommendations to Care Team.? Will complete orders.

## 2025-05-22 NOTE — PLAN OF CARE
Problem: Lower Extremity Revascularization  Goal: Absence of Infection Signs and Symptoms  Outcome: Progressing     Problem: Comorbidity Management  Goal: Blood Pressure in Desired Range  Outcome: Progressing   Goal Outcome Evaluation:  Jack POD 1. No significant concerns overnight. Neuro assessment WNL, CMS WNL. Oriented x4, maybe a little forgetful. Weaned to room air but minimally desatting to 89% on room air so 1L NC applied. Can be bradycardic, lowest HR noted was 49. No telemetry. Roper out at 1800 yesterday, voiding in frequent small amounts. Last PVR 184ml. Ambulated to bathroom, SBA with gb/walker. C/O heartburn, cross cover ordered tums. Scheduled Tylenol given, he c/o mild headache rating 2/10 but otherwise no pain from surgery. Reported he is passing gas and hopeful to tolerate a regular diet for breakfast.

## 2025-05-22 NOTE — PROGRESS NOTES
SPIRITUAL HEALTH SERVICES Note     Saw pt Jack Brown and administered [employer] sacrament of anointing for the healing of the sick.    Fr. Chris Thompson

## 2025-05-22 NOTE — PROGRESS NOTES
"[facility]  Consult Note - Hospitalist Service  Date of Admission:  5/21/2025  Consult Requested by: Dr. Crum  Reason for Consult: Medical management     Assessment & Plan   Jack Brown is a 74 year old male admitted on 5/21/2025.  Patient underwent endovascular stent insertion for bilateral common iliac aneurysm on 5/21/2025 via vascular surgery.  [facility] was consulted for medical management.      5/22      Post op day 1  Continue post op care  Vascular surgery following  Continue baby aspirin    Creatinine normalized  Resume coumadin for h/o a fib per vascular surgery    To be discharged once ok with vascular surgery            A/p :     Bilateral common iliac aneurysm s/p stent  - Perioperative management per vascular surgery  - Continue pain control, agree with bowel regimen  - Will see how patient does with a diet and plan to discontinue IVF pending oral intake  - DVT PPx per vascular surgery, holding home Coumadin in the meantime  - Added IS  - Roper in place, trial of void per protocol   - Consulted PT/ OT for dispo planning when off bedrest     THOM  - Perioperative IVF, anticipate improvement post surgery  - Monitor with AM labs   - Avoid nephrotoxins    CAD  HTN  - Continue PTA aspirin, amlodipine, metoprolol  - Noted myalgia side effect to statins  - Check lipids could consider ezetimibe  - PRN hydralazine, pain control for post-op HTN    Paroxysmal atrial fibrillation  - PTA metoprolol on board  - Holding home Coumadin postop, restart per vascular surgery       Clinically Significant Risk Factors          # Hyperchloremia: Highest Cl = 109 mmol/L in last 2 days, will monitor as appropriate                # Hypertension: Noted on problem list            # Overweight: Estimated body mass index is 26.55 kg/m  as calculated from the following:    Height as of 5/14/25: 1.676 m (5' 6\").    Weight as of this encounter: 74.6 kg (164 lb 8 oz)., PRESENT ON ADMISSION      # History of " Pt has appointment with Dr. Singh's previous office with Dr. Contreras.  Currently we have decline Rx since Dr. Singh has not seen pt in new location.     Note entered by Lisa Mehta CMA at 10:59 AM on 12/17/2020.     CABG: noted on surgical history       Edgar Chaudhry MD  Hospitalist Service  Securely message with [employer] (more info)  Text page via [facility] Paging/Directory   ______________________________________________________________________    Chief Complaint   Bilateral common iliac aneurysm    History is obtained from the patient    History of Present Illness   Jack Brown is a 74 year old male who is admitted status post bilateral endovascular stent for iliac aneurysm.  Patient seen in room postsurgery doing well.  Friend and wife are at bedside.  Patient states he feels like the surgery went well and does not have any additional complaints at this time.  He states pain is relatively well-controlled.  No change in his urine output.  No current chest pain, shortness of breath or lightheadedness.      Past Medical History    Past Medical History:   Diagnosis Date    AAA (abdominal aortic aneurysm)     Basal cell carcinoma     BPH (benign prostatic hypertrophy)     Cancer (H)     Skin on R.chest wall.    Carcinoid tumor (H)     Chest discomfort     Coronary artery disease     Detached retina, left 10/01/2014    Hyperlipidemia     Hypertension     Melanoma of skin (H)     PAD (peripheral artery disease)     Pain of left lower leg 12/15/2023    Pain of left upper arm 10/10/2023    Palpitations     Paroxysmal atrial fibrillation (H)     Popliteal artery aneurysm     Pseudoaneurysm     Thrombosis     Thyroid nodule        Past Surgical History   Past Surgical History:   Procedure Laterality Date    BYPASS GRAFT ARTERY CORONARY N/A 12/28/2015    Procedure: CORONARY ARTERY BYPASS x 3, RIGHT ENDOSCOPIC VEIN HARVEST, LEFT INTERNAL MAMMARY ARTERY, ANESTHESIA TRANSESOPHAGEAL ECHOCARDIOGRAM;  Surgeon: Jayson Alvarado MD;  Location: [facility];  Service:     CARDIAC SURGERY      CATARACT EXTRACTION  01/01/2015    CYST REMOVAL      Ganglion cyst removal of both wrist    ESTABLISHMENT, VASCULAR ACCESS, FEM APPROACH, FOR  ENDOVASC STENT INSERTION INTO ABD AORTIC ANEURYSM Bilateral 5/21/2025    Procedure: BILATERAL ESTABLISHMENT, VASCULAR ACCESS, FEMORAL APPROACH, FOR ENDOVASCULAR STENT INSERTION INTO ABDOMINAL AORTIC ANEURYSM;  Surgeon: Laverne Crum MD;  Location: [facility] [place]    EYE SURGERY      FEMORAL ARTERY - TIBIAL ARTERY BYPASS GRAFT Left 10/18/2024    Procedure: CREATION, BYPASS, ARTERIAL, FEMORAL TO TIBIAL, LEFT;  Surgeon: Laverne Crum MD;  Location[place] [facility] [place]    HC REMOVAL PREPATELLA BURSA      Description: Excision Of Prepatellar Bursa;  Recorded: 09/30/2014;    HC REPAIR OF NASAL SEPTUM      Description: Septoplasty;  Recorded: 09/30/2014;    HERNIA REPAIR, UMBILICAL      IR LOWER EXTREMITY ANGIOGRAM LEFT  12/16/2023    IR LOWER EXTREMITY ANGIOGRAM LEFT  08/21/2024    IR THROMBOLYSIS ARTERIAL INFUSION INITIAL DAY  12/17/2023    PARS PLANA VITRECTOMY W/ REPAIR OF MACULAR HOLE  11/01/2014    NM EXCIS TENDON SHEATH LESION, HAND/FINGER      Description: Hand Excision Of A Tendon Cyst;  Recorded: 01/04/2011;    NM LAP, VENTRAL HERNIA REPAIR,REDUCIBLE      Description: Laparoscopy Repair Of Umbilical Hernia;  Recorded: 01/04/2011;    PSEUDOANEURYSM REPAIR Right 05/01/2024    Procedure: REPAIR, PSEUDOANEURYSM, ARTERY, FEMORAL RIGHT;  Surgeon: Laverne Crum MD;  Location: [facility]    RETINAL DETACHMENT SURGERY  10/01/2014    SKIN CANCER EXCISION  11/01/2015    right chest    THORACOSCOPY Right 04/14/2016    Procedure: RIGHT VIDEO ASSISTED THORACOSCOPY, RIGHT LOBECTOMY;  Surgeon: Vinny Chase MD;  Location: [facility];  Service:     VASECTOMY         Medications   I have reviewed this patient's current medications       Social History   I have reviewed this patient's social history and updated it with pertinent information if needed.  Social History     Tobacco Use    Smoking status: Never     Passive exposure: Never (per pt)    Smokeless tobacco:  Never   Vaping Use    Vaping status: Never Used   Substance Use Topics    Alcohol use: Yes     Alcohol/week: 8.0 standard drinks of alcohol     Types: 8 Standard drinks or equivalent per week     Comment: very rarely    Drug use: No         Family History   I have reviewed this patient's family history and updated it with pertinent information if needed.  Family History   Problem Relation Age of Onset    Acute Myocardial Infarction Mother     Aneurysm Mother         brain    Acute Myocardial Infarction Father     Colitis Brother     Abdominal Aortic Aneurysm Brother         had surgery and was supposed to have a second - time ran out    Leukemia Brother     Other - See Comments Maternal Grandmother          of hemorrhage after childbirht    Pneumonia Maternal Grandfather     No Known Problems Paternal Grandmother     Colon Cancer Paternal Grandfather     Lung Cancer Cousin     Coronary Artery Disease Cousin          Allergies   Allergies   Allergen Reactions    Niacin Hives and Rash     Burning of the skin    Atorvastatin Muscle Pain (Myalgia)    Pravastatin Muscle Pain (Myalgia)        Physical Exam   Vital Signs: Temp: 98.1  F (36.7  C) Temp src: Oral BP: 137/70 Pulse: 68   Resp: 18 SpO2: 93 % O2 Device: None (Room air) Oxygen Delivery: 1 LPM  Weight: 164 lbs 8 oz    General Appearance: Fatigued, somewhat tangential, cooperative and no acute distress  Respiratory: Nonlabored breathing, good inspiratory effort without rales, rhonchi or wheezing  Cardiovascular: RRR  GI: No pain throughout  Skin: Normal femoral sites are clean without overlying hematoma or erythema  Other: No focal deficit    Medical Decision Making       85 MINUTES SPENT BY ME on the date of service doing chart review, history, exam, documentation & further activities per the note.      Data     I have personally reviewed the following data over the past 24 hrs:    10.6  \   12.5 (L)   / 187     140 107 17.5 /  89   4.2 27 1.10 \       Imaging  results reviewed over the past 24 hrs:   No results found for this or any previous visit (from the past 24 hours).

## 2025-05-22 NOTE — PLAN OF CARE
Problem: Comorbidity Management  Goal: Blood Pressure in Desired Range  Intervention: Maintain Blood Pressure Management  Recent Flowsheet Documentation  Taken 5/22/2025 0832 by Kamaljit Arizmendi, RN  Medication Review/Management: medications reviewed     Problem: Lower Extremity Revascularization  Goal: Effective Tissue Perfusion  Intervention: Optimize Tissue Perfusion  Recent Flowsheet Documentation  Taken 5/22/2025 0832 by Kamaljit Arizmendi, RN  Body Position:   supine   weight shifting   Goal Outcome Evaluation:      Plan of Care Reviewed With: patient, spouse    Overall Patient Progress: improvingOverall Patient Progress: improving     A&Ox4, able to use call light and express needs to staff. Denies any pain or discomfort. Neuros intact. BUE/BLE CMS and pulses WNL. Up walking the unit with SBA using walker. Urinating without issue. Bilateral groin incision sites are C/D/I with no drainage noted. No acute changes. VSS on RA.

## 2025-05-23 ENCOUNTER — TELEPHONE (OUTPATIENT)
Dept: CARDIOLOGY | Facility: CLINIC | Age: 75
End: 2025-05-23
Payer: MEDICARE

## 2025-05-23 NOTE — TELEPHONE ENCOUNTER
[facility] Call Center    Phone Message    May a detailed message be left on voicemail: yes     Reason for Call: Other: Patient's spouse Shayy stated patient have not been able to wear the heart monitor that was sent to them due to surgery, and will not like to wear as of now since they are going through some family issues and blood pressure is high so don't want it to affect the results. Shayy had to cancel appt on 06/24 with Dr. Estrada and will like to know of recommendations on what they should do in regards to heart monitor and follow up. Please call back at [phone] to further discuss.     Action Taken: Other: Cardiology    Travel Screening: Not Applicable     Thank you!  [facility]

## 2025-05-27 NOTE — OR NURSING
Post Operative Phone Call     Surgery: BILATERAL ESTABLISHMENT, VASCULAR ACCESS, FEMORAL APPROACH, FOR ENDOVASCULAR STENT INSERTION INTO ABDOMINAL AORTIC ANEURYSM     Date of Surgery: 5/21/25    Surgeon: Dr. Laverne Crum    Patient Discharged from Hospital: 5/22/25      Spoke with: Family (spouse - consent on file)      Discussed patients status since discharging home after surgery.     Pt reports their pain is good and they are using Other medicines for pain: acetaminophen to control their pain.     The incision is Healing well. Pt reports they are eating and drinking - appetite is improving, drinking well.     Pt is voiding normal and had a bowel movement on 5/26/25.    Pt is not having any stroke like symptoms or new onset of headaches.         VQI measures: Pt has been prescribed no Statins and Coumadin; INR = unknown, ASA and reports they are taking it as prescribed.    Confirmed follow up appointments: YES    Call back number provided for further questions/concerns.    Cynthia Mensah RN, CWOCN

## 2025-05-28 ENCOUNTER — ANTICOAGULATION THERAPY VISIT (OUTPATIENT)
Dept: ANTICOAGULATION | Facility: CLINIC | Age: 75
End: 2025-05-28

## 2025-05-28 ENCOUNTER — LAB (OUTPATIENT)
Dept: LAB | Facility: CLINIC | Age: 75
End: 2025-05-28
Payer: MEDICARE

## 2025-05-28 DIAGNOSIS — I48.0 PAROXYSMAL ATRIAL FIBRILLATION (H): ICD-10-CM

## 2025-05-28 DIAGNOSIS — I70.90 ARTERIAL OCCLUSION: Primary | ICD-10-CM

## 2025-05-28 DIAGNOSIS — I72.3 ILIAC ANEURYSM: ICD-10-CM

## 2025-05-28 DIAGNOSIS — I70.90 ARTERIAL OCCLUSION: ICD-10-CM

## 2025-05-28 LAB — INR BLD: 2.1 (ref 0.9–1.1)

## 2025-05-28 PROCEDURE — 36416 COLLJ CAPILLARY BLOOD SPEC: CPT

## 2025-05-28 PROCEDURE — 85610 PROTHROMBIN TIME: CPT

## 2025-05-28 NOTE — PROGRESS NOTES
ANTICOAGULATION MANAGEMENT     Jack Brown 74 year old male is on warfarin with therapeutic INR result. (Goal INR 2.0-3.0)    Recent labs: (last 7 days)     05/28/25  1126   INR 2.1*       ASSESSMENT     Source(s): Chart Review and Patient/Caregiver Call     Warfarin doses taken: Warfarin taken as instructed  Diet: No new diet changes identified  Medication/supplement changes: None noted  New illness, injury, or hospitalization: No  Signs or symptoms of bleeding or clotting: No  Previous result: Therapeutic last 2(+) visits  Additional findings: None       PLAN     Recommended plan for no diet, medication or health factor changes affecting INR     Dosing Instructions: Continue your current warfarin dose with next INR in 2 weeks       Summary  As of 5/28/2025      Full warfarin instructions:  3 mg every Wed; 4 mg all other days   Next INR check:  6/11/2025               Telephone call with Shayy who verbalizes understanding and agrees to plan    Lab visit scheduled    Education provided: Please call back if any changes to your diet, medications or how you've been taking warfarin    Plan made per [facility] anticoagulation protocol    Felisa Carnes RN  5/28/2025  Anticoagulation Clinic  [employer] for routing messages: jovani MORENO [facility] patient phone line: [phone]        _______________________________________________________________________     Anticoagulation Episode Summary       Current INR goal:  2.0-3.0   TTR:  79.0% (10.8 mo)   Target end date:  Indefinite   Send INR reminders to:  TIFFANIE Casmalia    Indications    Arterial occlusion [I70.90]  Paroxysmal atrial fibrillation (H) [I48.0]  Iliac aneurysm [I72.3]             Comments:  --             Anticoagulation Care Providers       Provider Role Specialty Phone number    Mercedes Adorno MD Referring Family Medicine [phone]    Solomon Jennings MD Referring Vascular Surgery [phone]

## 2025-05-29 ENCOUNTER — TELEPHONE (OUTPATIENT)
Dept: CARDIOLOGY | Facility: CLINIC | Age: 75
End: 2025-05-29
Payer: MEDICARE

## 2025-05-29 PROBLEM — M79.662 PAIN OF LEFT LOWER LEG: Status: RESOLVED | Noted: 2023-12-15 | Resolved: 2025-05-29

## 2025-05-29 PROBLEM — R29.898 LEG WEAKNESS, BILATERAL: Status: RESOLVED | Noted: 2024-12-02 | Resolved: 2025-05-29

## 2025-05-29 PROBLEM — I73.9 PAD (PERIPHERAL ARTERY DISEASE): Status: RESOLVED | Noted: 2024-12-02 | Resolved: 2025-05-29

## 2025-05-29 NOTE — TELEPHONE ENCOUNTER
[facility] Call Center    Phone Message    May a detailed message be left on voicemail: yes     Reason for Call: Other: Shayy is wondering if Jack can come to the clinic to have the zio patch placed. She received it in the mail and is to nervous to place it. Brendan call her back to discuss and schedule as needed. Thank you      Action Taken: Other: cardiology    Travel Screening: Not Applicable   Thank you!  [facility]      Date of Service:

## 2025-06-04 ENCOUNTER — HOSPITAL ENCOUNTER (EMERGENCY)
Facility: HOSPITAL | Age: 75
Discharge: HOME | End: 2025-06-04
Payer: MEDICARE

## 2025-06-04 ENCOUNTER — TELEPHONE (OUTPATIENT)
Dept: VASCULAR SURGERY | Facility: CLINIC | Age: 75
End: 2025-06-04
Payer: MEDICARE

## 2025-06-04 ENCOUNTER — OFFICE VISIT (OUTPATIENT)
Dept: URGENT CARE | Facility: URGENT CARE | Age: 75
End: 2025-06-04
Payer: MEDICARE

## 2025-06-04 VITALS
TEMPERATURE: 98 F | OXYGEN SATURATION: 95 % | DIASTOLIC BLOOD PRESSURE: 96 MMHG | WEIGHT: 158.1 LBS | BODY MASS INDEX: 26.34 KG/M2 | SYSTOLIC BLOOD PRESSURE: 153 MMHG | HEART RATE: 71 BPM | HEIGHT: 65 IN | RESPIRATION RATE: 18 BRPM

## 2025-06-04 VITALS
DIASTOLIC BLOOD PRESSURE: 91 MMHG | TEMPERATURE: 97.4 F | RESPIRATION RATE: 18 BRPM | SYSTOLIC BLOOD PRESSURE: 152 MMHG | OXYGEN SATURATION: 96 % | HEART RATE: 70 BPM

## 2025-06-04 DIAGNOSIS — R19.09 RIGHT GROIN MASS: ICD-10-CM

## 2025-06-04 DIAGNOSIS — R19.09 GROIN MASS: Primary | ICD-10-CM

## 2025-06-04 LAB
ANION GAP SERPL CALCULATED.3IONS-SCNC: 12 MMOL/L (ref 7–15)
APTT PPP: 42 SECONDS (ref 22–38)
BASOPHILS # BLD AUTO: 0.1 10E3/UL (ref 0–0.2)
BASOPHILS NFR BLD AUTO: 1 %
BUN SERPL-MCNC: 24.4 MG/DL (ref 8–23)
CALCIUM SERPL-MCNC: 9.6 MG/DL (ref 8.8–10.4)
CHLORIDE SERPL-SCNC: 107 MMOL/L (ref 98–107)
CREAT SERPL-MCNC: 1.07 MG/DL (ref 0.67–1.17)
EGFRCR SERPLBLD CKD-EPI 2021: 72 ML/MIN/1.73M2
EOSINOPHIL # BLD AUTO: 0.1 10E3/UL (ref 0–0.7)
EOSINOPHIL NFR BLD AUTO: 2 %
ERYTHROCYTE [DISTWIDTH] IN BLOOD BY AUTOMATED COUNT: 14.3 % (ref 10–15)
GLUCOSE SERPL-MCNC: 90 MG/DL (ref 70–99)
HCO3 SERPL-SCNC: 22 MMOL/L (ref 22–29)
HCT VFR BLD AUTO: 43.3 % (ref 40–53)
HGB BLD-MCNC: 14.4 G/DL (ref 13.3–17.7)
IMM GRANULOCYTES # BLD: 0 10E3/UL
IMM GRANULOCYTES NFR BLD: 0 %
INR PPP: 2.52 (ref 0.85–1.15)
LYMPHOCYTES # BLD AUTO: 1.6 10E3/UL (ref 0.8–5.3)
LYMPHOCYTES NFR BLD AUTO: 21 %
MCH RBC QN AUTO: 29.1 PG (ref 26.5–33)
MCHC RBC AUTO-ENTMCNC: 33.3 G/DL (ref 31.5–36.5)
MCV RBC AUTO: 88 FL (ref 78–100)
MONOCYTES # BLD AUTO: 0.5 10E3/UL (ref 0–1.3)
MONOCYTES NFR BLD AUTO: 7 %
NEUTROPHILS # BLD AUTO: 5.2 10E3/UL (ref 1.6–8.3)
NEUTROPHILS NFR BLD AUTO: 69 %
NRBC # BLD AUTO: 0 10E3/UL
NRBC BLD AUTO-RTO: 0 /100
PLATELET # BLD AUTO: 342 10E3/UL (ref 150–450)
POTASSIUM SERPL-SCNC: 3.9 MMOL/L (ref 3.4–5.3)
PROTHROMBIN TIME: 27.2 SECONDS (ref 11.8–14.8)
RBC # BLD AUTO: 4.94 10E6/UL (ref 4.4–5.9)
SODIUM SERPL-SCNC: 141 MMOL/L (ref 135–145)
WBC # BLD AUTO: 7.5 10E3/UL (ref 4–11)

## 2025-06-04 PROCEDURE — 36415 COLL VENOUS BLD VENIPUNCTURE: CPT | Performed by: PHYSICIAN ASSISTANT

## 2025-06-04 PROCEDURE — 3080F DIAST BP >= 90 MM HG: CPT | Performed by: FAMILY MEDICINE

## 2025-06-04 PROCEDURE — 3077F SYST BP >= 140 MM HG: CPT | Performed by: FAMILY MEDICINE

## 2025-06-04 PROCEDURE — 85730 THROMBOPLASTIN TIME PARTIAL: CPT | Performed by: PHYSICIAN ASSISTANT

## 2025-06-04 PROCEDURE — 82374 ASSAY BLOOD CARBON DIOXIDE: CPT | Performed by: PHYSICIAN ASSISTANT

## 2025-06-04 PROCEDURE — 82310 ASSAY OF CALCIUM: CPT | Performed by: PHYSICIAN ASSISTANT

## 2025-06-04 PROCEDURE — 85004 AUTOMATED DIFF WBC COUNT: CPT | Performed by: PHYSICIAN ASSISTANT

## 2025-06-04 PROCEDURE — 99204 OFFICE O/P NEW MOD 45 MIN: CPT | Mod: GC | Performed by: SURGERY

## 2025-06-04 PROCEDURE — 99207 PR NON-BILLABLE SERV PER CHARTING: CPT | Performed by: FAMILY MEDICINE

## 2025-06-04 PROCEDURE — 99284 EMERGENCY DEPT VISIT MOD MDM: CPT | Mod: 25

## 2025-06-04 PROCEDURE — 85610 PROTHROMBIN TIME: CPT | Performed by: PHYSICIAN ASSISTANT

## 2025-06-04 ASSESSMENT — ACTIVITIES OF DAILY LIVING (ADL)
ADLS_ACUITY_SCORE: 59

## 2025-06-04 NOTE — PROGRESS NOTES
Discussed with Dr. Crum office and directed to ER. They may come up with alternative plan but family concerned about growing mass in right groin. Not pulsatile. Concern about developing aneurysm as has had similar in past.   Dr. Perdue   Isabel

## 2025-06-04 NOTE — TELEPHONE ENCOUNTER
Spoke with Dr. Crum via phone to notify him of patient's UC visit, and subsequent ER visit. He states he will notify Dr. Jennings (resident with vascular surgery) to have patient examined when he arrives at the ER.

## 2025-06-04 NOTE — ED PROVIDER NOTES
EMERGENCY DEPARTMENT ENCOUNTER      NAME: Jack Brown  AGE: 74 year old male  YOB: 1950  MRN: [id number]  EVALUATION DATE & TIME: 6/4/2025  5:46 PM    PCP: Mercedes Adorno    ED PROVIDER: Dario Domingo MD    FINAL IMPRESSION:  1. Right groin mass        ED COURSE & MEDICAL DECISION MAKING:    Pertinent Labs & Imaging studies reviewed. (See chart for details)  74 year old male presents to the Emergency Department for evaluation of left leg mass.  Differential diagnosis considered hematoma, pseudoaneurysm, cranial clot, aortic dissection.     Triage Note: Patient referred here from urgent care for a mass on his right upper thigh. Concern about developing aneurysm as has had similar in past.       ED Course as of 06/05/25 2248 Wed Jun 04, 2025 1748 I met with the patient, obtained history, performed an initial exam, and discussed options and plan for diagnostics and treatment here in the ED.   Patient iliac aneurysm repair performed by Dr. Banerjee on 5/21.  The last few days she has had enlargement of his right groin and felt a mass.  On exam he has no rest pain well-appearing nontoxic is ambulatory.  Strong palpable dorsalis pedis pulse intact sensation distally.  Leg appears warm and well-perfused.  There is a small approximately 2 cm mobile mass that is nonpulsatile.  My bedside ultrasound had a well-circumscribed area that could potentially be a pseudoaneurysm however again is not pulsatile.  Will obtain an ultrasound.  Consulted vascular will evaluate the patient at bedside.  Recommended starting with an ultrasound and then if his pseudoaneurysm proceeding with a CTA.   1748 I spoke with Dr. Jennings from vascular surgery.    1941 Us Post Vascular Access Low Ext Duplex  FINDINGS:    The right external iliac, common femoral, proximal superficial femoral, and proximal profunda femoral arteries are patent with normal velocities and multiphase appropriate waveforms. The right external iliac, common  femoral, proximal femoral, and   proximal profunda femoral veins are patent normal phasic waveforms and antegrade flow. No evidence of pseudoaneurysm or arteriovenous fistula. There is a small subcutaneous complex circumscribed subcutaneous collection along the right groin measuring 1.9   x 2.0 x 2.0 cm, without associated Doppler flow.                                                                      IMPRESSION:  1.  Negative for right groin pseudoaneurysm.  2.  Small presumed residual hematoma along the right groin.      Small hematoma.  Does not have the appearance of abscess.  No fluctuance, no overlying skin changes.  No pain out of proportion.  I prescription vascular surgeon will discharge home and follow-up in the outpatient setting.  Patient agreeable with plan.       Not Applicable    I discussed the plan for discharge with the patient and patient is agreeable. We discussed supportive cares at home and reasons to return to the ER including new or worsening symptoms. All questions and concerns addressed to the best of my ability. Strict return precautions discussed. Patient to be discharge by RN.    At the conclusion of the encounter I discussed the results of the tests and the disposition. The questions were answered. The patient or family acknowledged understanding and was agreeable with the care plan.     MEDICATIONS GIVEN IN THE EMERGENCY:  Medications - No data to display    NEW PRESCRIPTIONS STARTED AT TODAY'S ER VISIT  Discharge Medication List as of 6/4/2025  8:18 PM        Discharge Medication List as of 6/4/2025  8:18 PM          =================================================================    HPI    Jack Brown is a 74 year old male with a pertinent history of coronary artery disease, s/p CABG x3, AAA without rupture, hypertension, lung tumor, melanoma, squamous cell carcinoma, basal cell carcinoma, femoral artery pseudoaneurysm and recent iliac artery aneurysms (see chart review below)  "who presents to this ED for evaluation of leg mass.    Per chart review, the patient was admitted on 5/21 with a 4cm left common iliac artery aneurysm and 3cm right common iliac artery aneurysm. Both were asymptomatic. He was admitted and had bilateral stent insertion for the aneurysms. Roper removed on POD#0. Discharged 5/22 in good condition.     The patient had surgery on on 5/21 (14 days ago) with Dr. Crum (vascular surgery at [facility]) for his bilateral common iliac artery aneurysms. Over the last 3-4 days there has been a gradually increasing well-circumscribed area in his right inguinal area. He has no pain but is cold now. His last food was at 10:00 AM this morning. He denies any other complaints at this time.     PHYSICAL EXAM    BP (!) 153/96   Pulse 71   Temp 98  F (36.7  C) (Oral)   Resp 18   Ht 1.651 m (5' 5\")   Wt 71.7 kg (158 lb 1.6 oz)   SpO2 95%   BMI 26.31 kg/m    Constitutional: Comfortable appearing.  Head: Normocephalic, atraumatic, mucous membranes moist, nose normal.   Neck: Supple, gross ROM intact.   Eyes: Pupils mid-range, sclera white.  Respiratory: Clear to auscultation bilaterally, no respiratory distress, no wheezing, speaks full sentences easily.  Cardiovascular: Normal heart rate, regular rhythm, no murmurs. No lower extremity edema.   GI: Soft, no tenderness to deep palpation in all quadrants.  Musculoskeletal: Moving all 4 extremities intentionally and without pain. Well-circumscribed, nontender, mobile mass in area inferior to right inguinal scar.  No pulsatile mass.  No audible thrill.  Strong dorsalis pedis pulse distally with less 3-second capillary refill warm well-perfused right lower extremity.   Skin: Warm, dry, no rash.  Neurologic: Alert & oriented x 3, speech clear, moving all extremities spontaneously   Psychiatric: Affect normal, cooperative.      LAB:  All pertinent labs reviewed and interpreted.  Results for orders placed or performed during the hospital " encounter of 06/04/25   Us Post Vascular Access Low Ext Duplex    Impression    IMPRESSION:  1.  Negative for right groin pseudoaneurysm.  2.  Small presumed residual hematoma along the right groin.   Basic metabolic panel   Result Value Ref Range    Sodium 141 135 - 145 mmol/L    Potassium 3.9 3.4 - 5.3 mmol/L    Chloride 107 98 - 107 mmol/L    Carbon Dioxide (CO2) 22 22 - 29 mmol/L    Anion Gap 12 7 - 15 mmol/L    Urea Nitrogen 24.4 (H) 8.0 - 23.0 mg/dL    Creatinine 1.07 0.67 - 1.17 mg/dL    GFR Estimate 72 >60 mL/min/1.73m2    Calcium 9.6 8.8 - 10.4 mg/dL    Glucose 90 70 - 99 mg/dL   INR   Result Value Ref Range    INR 2.52 (H) 0.85 - 1.15    PT 27.2 (H) 11.8 - 14.8 Seconds   Result Value Ref Range    aPTT 42 (H) 22 - 38 Seconds   CBC with platelets and differential   Result Value Ref Range    WBC Count 7.5 4.0 - 11.0 10e3/uL    RBC Count 4.94 4.40 - 5.90 10e6/uL    Hemoglobin 14.4 13.3 - 17.7 g/dL    Hematocrit 43.3 40.0 - 53.0 %    MCV 88 78 - 100 fL    MCH 29.1 26.5 - 33.0 pg    MCHC 33.3 31.5 - 36.5 g/dL    RDW 14.3 10.0 - 15.0 %    Platelet Count 342 150 - 450 10e3/uL    % Neutrophils 69 %    % Lymphocytes 21 %    % Monocytes 7 %    % Eosinophils 2 %    % Basophils 1 %    % Immature Granulocytes 0 %    NRBCs per 100 WBC 0 <1 /100    Absolute Neutrophils 5.2 1.6 - 8.3 10e3/uL    Absolute Lymphocytes 1.6 0.8 - 5.3 10e3/uL    Absolute Monocytes 0.5 0.0 - 1.3 10e3/uL    Absolute Eosinophils 0.1 0.0 - 0.7 10e3/uL    Absolute Basophils 0.1 0.0 - 0.2 10e3/uL    Absolute Immature Granulocytes 0.0 <=0.4 10e3/uL    Absolute NRBCs 0.0 10e3/uL       RADIOLOGY:  Reviewed all pertinent imaging. Please see official radiology report.  Us Post Vascular Access Low Ext Duplex   Final Result   IMPRESSION:   1.  Negative for right groin pseudoaneurysm.   2.  Small presumed residual hematoma along the right groin.            Dario Domingo MD  [facility] EMERGENCY DEPARTMENT  [address]  [facility] [address]  [phone]   =================================================================    BILLING:  Data  Category 1  Non-ED record review, if applicable. External record reviewed: N/A     Clinical information was obtained from an independent historian. History was obtained from: Patient and Significant other provided additional information in the HPI     The following testing was considered but ultimately not selected after discussion with patient/family: N/A     Category 2  My independent interpretation of EKG, rhythm strip, radiology study: N/A     Category 3  Discussion of management with other physician/healthcare provider/other source: Spoke to vascular surgeon who agrees to consult       Risk  Prescription medication was considered, but ultimately not given after discussion with patient/family: I considered ordering a prescription for narcotic pain medicine.  However, I feel patient's condition can be adequately treated with non-narcotic medications and that the risk of a narcotic pain medicine prescription outweighs the benefits.     Chronic conditions affecting care: Hypertension, CAD, and Hyperlipemia     Consideration of Admission/Observation: Escalation of care including admission/observation was considered given the complexity and risk of the patient's presenting complaint, exam findings, and/or their underlying comorbidities. However, ultimately I feel the patient is safe for outpatient management with close follow up. Reasoning: Work-up reassuring, does not reveal any acute life/organ threatening processes, patient's symptoms well controlled upon reevaluation, reexamination is reassuring, vitals are stable, patient agreeable with discharge, reliable for follow-up.     See admission for pseudoaneurysm and vascular admission for operative management however no evidence of pseudoaneurysm.  Believe he can safely discharge home after discussion with vascular surgeon.      I,  Don Diez, am serving as a scribe to document services personally performed by Dario Domingo MD based on my observation and the provider's statements to me. I, Dario Domingo MD, attest that Don Diez is acting in a scribe capacity, has observed my performance of the services and has documented them in accordance with my direction.     Dario Domingo MD  06/05/25 9813

## 2025-06-04 NOTE — ED TRIAGE NOTES
Patient referred here from urgent care for a mass on his right upper thigh. Concern about developing aneurysm as has had similar in past.

## 2025-06-05 ENCOUNTER — DOCUMENTATION ONLY (OUTPATIENT)
Dept: ANTICOAGULATION | Facility: CLINIC | Age: 75
End: 2025-06-05
Payer: MEDICARE

## 2025-06-05 DIAGNOSIS — I70.90 ARTERIAL OCCLUSION: Primary | ICD-10-CM

## 2025-06-05 DIAGNOSIS — I48.0 PAROXYSMAL ATRIAL FIBRILLATION (H): ICD-10-CM

## 2025-06-05 DIAGNOSIS — I72.3 ILIAC ANEURYSM: ICD-10-CM

## 2025-06-05 NOTE — PROGRESS NOTES
ANTICOAGULATION  MANAGEMENT: Discharge Review    Jack MYLES Kevin chart reviewed for anticoagulation continuity of care    Emergency room visit on 6/4/25 for right groin mass.    Discharge disposition: Home    Results:    Recent labs: (last 7 days)     06/04/25  1808   INR 2.52*     Anticoagulation inpatient management:     not applicable     Anticoagulation discharge instructions:     Warfarin dosing: home regimen continued   Bridging: No   INR goal change: No      Medication changes affecting anticoagulation: No    Additional factors affecting anticoagulation: No     PLAN     No adjustment to anticoagulation plan needed    Spoke with Shayy    No adjustment to Anticoagulation Calendar was required inr is scheduled 6/13/25    Felisa Carnes RN  6/5/2025  Anticoagulation Clinic  [facility] for routing messages: [address]  [facility] patient phone line: [phone]

## 2025-06-05 NOTE — CONSULTS
Vascular Surgery Consultation    Jack Brown MRN# [id number]   Age: 74 year old YOB: 1950     Date of Admission:  6/4/2025    Date of Consult:   06/04/25    Reason for consult: R groin mass       Requesting service: ED; requesting provider: Dr. Domingo                   Assessment and Plan:   74M who recently underwent bilateral IBEs for asymptomatic NAJMA aneurysms who returns today with several days of a R groin mass which is asymptomatic. No flow seen on duplex. No evidence of infection. Suspect this is either a thrombosed pseudoaneurysm or other circumscribed fluid collection. Regardless, no intervention needed at this time.    - Ok to discharge from ED  - Will schedule to see in clinic in about 1 week    Discussed with staff, Dr. Crum.    Solomon Jennings MD  Vascular Surgery resident             Chief Complaint:   R groin mass         History of Present Illness:   Mr. Brown is a 74M with hx CAD s/p CABG, HTN, and common iliac artery aneurysms who is now s/p bilateral IBEs on 5/21/2025. Over the last several days, his wife noticed a small mass in high right groin several centimeters below his access site. The area is not tender to palpation or with walking. There has been no overlying skin changes. He denies any fevers, chills, shortness of breath, or chest pain.          Past Medical History:     Past Medical History:   Diagnosis Date    AAA (abdominal aortic aneurysm)     Basal cell carcinoma     BPH (benign prostatic hypertrophy)     Cancer (H)     Skin on R.chest wall.    Carcinoid tumor (H)     Chest discomfort     Coronary artery disease     Detached retina, left 10/01/2014    Hyperlipidemia     Hypertension     Melanoma of skin (H)     PAD (peripheral artery disease)     Pain of left lower leg 12/15/2023    Pain of left upper arm 10/10/2023    Palpitations     Paroxysmal atrial fibrillation (H)     Popliteal artery aneurysm     Pseudoaneurysm     Thrombosis     Thyroid nodule               Past Surgical History:     Past Surgical History:   Procedure Laterality Date    BYPASS GRAFT ARTERY CORONARY N/A 12/28/2015    Procedure: CORONARY ARTERY BYPASS x 3, RIGHT ENDOSCOPIC VEIN HARVEST, LEFT INTERNAL MAMMARY ARTERY, ANESTHESIA TRANSESOPHAGEAL ECHOCARDIOGRAM;  Surgeon: Jayson Alvarado MD;  Location: Health system;  Service:     CARDIAC SURGERY      CATARACT EXTRACTION  01/01/2015    CYST REMOVAL      Ganglion cyst removal of both wrist    ESTABLISHMENT, VASCULAR ACCESS, FEM APPROACH, FOR ENDOVASC STENT INSERTION INTO ABD AORTIC ANEURYSM Bilateral 5/21/2025    Procedure: BILATERAL ESTABLISHMENT, VASCULAR ACCESS, FEMORAL APPROACH, FOR ENDOVASCULAR STENT INSERTION INTO ABDOMINAL AORTIC ANEURYSM;  Surgeon: Laverne Crum MD;  Location: [facility]    EYE SURGERY      FEMORAL ARTERY - TIBIAL ARTERY BYPASS GRAFT Left 10/18/2024    Procedure: CREATION, BYPASS, ARTERIAL, FEMORAL TO TIBIAL, LEFT;  Surgeon: Laverne Crum MD;  Location: [facility]    HC REMOVAL PREPATELLA BURSA      Description: Excision Of Prepatellar Bursa;  Recorded: 09/30/2014;    HC REPAIR OF NASAL SEPTUM      Description: Septoplasty;  Recorded: 09/30/2014;    HERNIA REPAIR, UMBILICAL      IR LOWER EXTREMITY ANGIOGRAM LEFT  12/16/2023    IR LOWER EXTREMITY ANGIOGRAM LEFT  08/21/2024    IR THROMBOLYSIS ARTERIAL INFUSION INITIAL DAY  12/17/2023    PARS PLANA VITRECTOMY W/ REPAIR OF MACULAR HOLE  11/01/2014    MD EXCIS TENDON SHEATH LESION, HAND/FINGER      Description: Hand Excision Of A Tendon Cyst;  Recorded: 01/04/2011;    MD LAP, VENTRAL HERNIA REPAIR,REDUCIBLE      Description: Laparoscopy Repair Of Umbilical Hernia;  Recorded: 01/04/2011;    PSEUDOANEURYSM REPAIR Right 05/01/2024    Procedure: REPAIR, PSEUDOANEURYSM, ARTERY, FEMORAL RIGHT;  Surgeon: Laverne Crum MD;  Location: [facility]    RETINAL DETACHMENT SURGERY  10/01/2014    SKIN CANCER EXCISION  11/01/2015     right chest    THORACOSCOPY Right 2016    Procedure: RIGHT VIDEO ASSISTED THORACOSCOPY, RIGHT LOBECTOMY;  Surgeon: Vinny Chase MD;  Location: [facility];  Service:     VASECTOMY               Social History:     Social History     Tobacco Use    Smoking status: Never     Passive exposure: Never (per pt)    Smokeless tobacco: Never   Substance Use Topics    Alcohol use: Yes     Alcohol/week: 8.0 standard drinks of alcohol     Types: 8 Standard drinks or equivalent per week     Comment: very rarely             Family History:     Family History   Problem Relation Age of Onset    Acute Myocardial Infarction Mother     Aneurysm Mother         brain    Acute Myocardial Infarction Father     Colitis Brother     Abdominal Aortic Aneurysm Brother         had surgery and was supposed to have a second - time ran out    Leukemia Brother     Other - See Comments Maternal Grandmother          of hemorrhage after childbirht    Pneumonia Maternal Grandfather     No Known Problems Paternal Grandmother     Colon Cancer Paternal Grandfather     Lung Cancer Cousin     Coronary Artery Disease Cousin                 Allergies:     Allergies   Allergen Reactions    Niacin Hives and Rash     Burning of the skin    Atorvastatin Muscle Pain (Myalgia)    Pravastatin Muscle Pain (Myalgia)             Medications:     No current facility-administered medications for this encounter.     Current Outpatient Medications   Medication Sig Dispense Refill    acetaminophen (TYLENOL) 500 MG tablet [ACETAMINOPHEN (TYLENOL) 500 MG TABLET] Take 500 mg by mouth every 6 (six) hours as needed for pain.      amLODIPine (NORVASC) 10 MG tablet Take 1 tablet (10 mg) by mouth daily. 90 tablet 2    aspirin 81 MG EC tablet Take 81 mg by mouth daily as needed for moderate pain.      metoprolol tartrate (LOPRESSOR) 50 MG tablet Take 1 tablet (50 mg) by mouth 2 times daily. 180 tablet 1    tamsulosin (FLOMAX) 0.4 MG capsule TAKE 1  "CAPSULE BY MOUTH DAILY AFTER BREAKFAST 90 capsule 3    Vitamin D3 (VITAMIN D, CHOLECALCIFEROL,) 25 mcg (1000 units) tablet Take 1 tablet (25 mcg) by mouth daily. 90 tablet 1    warfarin ANTICOAGULANT (COUMADIN) 1 MG tablet Take by mouth See Admin Instructions. Take 3 mg on Wednesdays, Take 4 mg all other days of the week                 Review of Systems:     A 12 point review of systems was completed and found to be negative unless otherwise stated in the above HPI            Physical Exam:   BP (!) 146/89   Pulse 73   Temp 98  F (36.7  C) (Oral)   Resp 18   Ht 1.651 m (5' 5\")   Wt 71.7 kg (158 lb 1.6 oz)   SpO2 96%   BMI 26.31 kg/m      No intake or output data in the 24 hours ending 06/04/25 1952    GEN: A&Ox3, no acute distress  NEURO: CN II-XII grossly intact. No gross neurologic deficits  NECK: trachea midline, no JVD  HEENT: No scleral icterus.  RESP: Nonlabored breathing on room air  CV: RRR by femoral pulse, noncyanotic  ABD: soft, non-tender, nondistended. No guarding or rebound tenderness. R groin with 1-2 cm well circumscribed mass that is easily mobile, nontender  MSK: Moves all extremities independently. Normal active range of motion.  PSYCH: cooperative   PULSES:Palpable DP/PT and femoral pulses bilaterally          Data:   All laboratory data reviewed    Results:  BMP  Recent Labs   Lab 06/04/25  1808      POTASSIUM 3.9   CHLORIDE 107   CO2 22   BUN 24.4*   CR 1.07   GLC 90     CBC  Recent Labs   Lab 06/04/25  1808   WBC 7.5   HGB 14.4        LFT  Recent Labs   Lab 06/04/25  1808   INR 2.52*     Recent Labs   Lab 06/04/25  1808   GLC 90       Imaging:  Us Post Vascular Access Low Ext Duplex  Result Date: 6/4/2025  EXAM: LOWER EXTREMITY ARTERIAL DUPLEX LOCATION: [facility] DATE: 6/4/2025 INDICATION: Calling possible pseudoaneurysm. Recent iliac artery aneurysm repair. COMPARISON: None TECHNIQUE: Gray-scale imaging, spectral wave analysis, and color-flow imaging " was performed of the right groin. FINDINGS:  The right external iliac, common femoral, proximal superficial femoral, and proximal profunda femoral arteries are patent with normal velocities and multiphase appropriate waveforms. The right external iliac, common femoral, proximal femoral, and proximal profunda femoral veins are patent normal phasic waveforms and antegrade flow. No evidence of pseudoaneurysm or arteriovenous fistula. There is a small subcutaneous complex circumscribed subcutaneous collection along the right groin measuring 1.9  x 2.0 x 2.0 cm, without associated Doppler flow.     IMPRESSION: 1.  Negative for right groin pseudoaneurysm. 2.  Small presumed residual hematoma along the right groin.    XR Surgery PINEDA Fluoro L/T 5 Min  Result Date: 5/21/2025  This exam was marked as non-reportable because it will not be read by a radiologist or a [place] non-radiologist provider.

## 2025-06-05 NOTE — DISCHARGE INSTRUCTIONS
You are found to have mass in your right groin.  Please place ice and heat and should resolve with time.  If you have severe pain numbness weakness of your leg present to the emergency department.  Please follow-up with Dr. Banerjee for reevaluation.

## 2025-06-10 NOTE — PATIENT INSTRUCTIONS
Abebe Santiago,    Thank you for entrusting your care with us today. After your visit today with Dr. Laverne Crum this is the plan that was discussed at your appointment.    Follow-up as scheduled.    I am including additional information on these things and our contact information if you have any questions or concerns.   Please do not hesitate to reach out to us if you felt we did not answer your questions or you are unsure of the treatment plan after your visit today. Our number is [phone].Thank you for trusting us with your care.         Again thank you for your time.

## 2025-06-12 LAB — CV ZIO PRELIM RESULTS: NORMAL

## 2025-06-12 NOTE — TELEPHONE ENCOUNTER
PC to patients spouse Shayy to discuss monitor and she reports he has already worn it and sent it back and are waiting for results. Per chart review preliminary results only and will call once reviewed by provider. No questions or concerns at this time.  -sea

## 2025-06-13 ENCOUNTER — OFFICE VISIT (OUTPATIENT)
Dept: VASCULAR SURGERY | Facility: CLINIC | Age: 75
End: 2025-06-13
Attending: SURGERY
Payer: MEDICARE

## 2025-06-13 VITALS
RESPIRATION RATE: 16 BRPM | OXYGEN SATURATION: 96 % | SYSTOLIC BLOOD PRESSURE: 147 MMHG | DIASTOLIC BLOOD PRESSURE: 83 MMHG | TEMPERATURE: 97.5 F | HEART RATE: 58 BPM

## 2025-06-13 ASSESSMENT — PAIN SCALES - GENERAL: PAINLEVEL_OUTOF10: MILD PAIN (3)

## 2025-06-13 NOTE — PATIENT INSTRUCTIONS
Abebe Santiago,    Thank you for entrusting your care with us today. After your visit today with Dr. Laverne Crum this is the plan that was discussed at your appointment.      Follow up in 1 year with Dr. Crum with [facility] prior. The scheduling team will reach out to you prior to help you schedule this visit.    I am including additional information on these things and our contact information if you have any questions or concerns.   Please do not hesitate to reach out to us if you felt we did not answer your questions or you are unsure of the treatment plan after your visit today. Our number is [phone].Thank you for trusting us with your care.         Again thank you for your time.     Computed Tomography (CT) Scan: About This Test    What is it?  A computed tomography (CT) scan uses X-rays to make detailed pictures of parts of your body and the structures inside your body. During the test, you will lie on a table that is attached to the CT scanner. The CT scanner is a large doughnut-shaped machine.    Why is this test done?  Doctors use CT scans to study areas of the body, such as the brain, chest, belly, spine, bones, or joints. CT scans are also used to assist with or check on the success of a procedure or surgery.    How do you prepare for the test?  In general, there's nothing you have to do before this test, unless your doctor tells you to.    Tell your doctor if you get nervous in tight spaces. You may get a medicine to help you relax. If you think you'll get this medicine, be sure you have someone to take you home.    How is the test done?  Before the test  You may have to take off jewelry.  You will take off all or most of your clothes and change into a gown. If you do leave some clothes on, make sure you take everything out of your pockets.  You may have contrast material (dye) put into your arm through a tube called an IV.    During the test  You will lie on a table that is attached to the CT  scanner.  The table slides into the round opening of the scanner. The table will move during the scan. The scanner moves within the doughnut-shaped casing around your body.  You will be asked to hold still during the scan. You may be asked to hold your breath for short periods.  You may be alone in the scanning room. But a technologist will watch you through a window and talk with you during the test.    How does the test feel?  The test will not cause pain, but some people feel nervous inside the CT scanner.    If a medicine to help you relax (sedative) or dye is used, you may feel a quick sting or pinch when the IV is started. The dye may make you feel warm and flushed and give you a metallic taste in your mouth. Some people feel sick to their stomach or get a headache. Tell the technologist or your doctor how you are feeling.    How long does the test take?  The test will take about 30 to 60 minutes. Most of this time is spent getting ready for the scan. The actual test takes a few minutes.    What happens after the test?  You will probably be able to go home right away.  You can go back to your usual activities right away.  If dye was used, drink plenty of fluids for 24 hours after the test, unless your doctor tells you not to.  Follow-up care is a key part of your treatment and safety. Be sure to make and go to all appointments, and call your doctor if you are having problems. It's also a good idea to keep a list of the medicines you take. Ask your doctor when you can expect to have your test results.    Current as of: December 19, 2022  Author: [employer] Staff  Medical Review:Mane Kam MD - Family Medicine & SANTA Monroy MD - Internal Medicine & Leo Howe MD - Family Medicine & Misael Starr MD - Family Medicine & Braeden Walker MD - Diagnostic Radiology

## 2025-06-13 NOTE — PROGRESS NOTES
Vascular Clinic Rooming Questions      Patient is here for 1 mnth follow up  for Peripheral Artery Disease. Symptoms are  thigh pain.      There were no vitals taken for this visit.    The provider has been notified that the patient has concerns of lump on thigh.     Questions patient would like addressed today are: N/A.    Refills are needed: No    Has homecare services and agency name:  No

## 2025-06-14 NOTE — PROGRESS NOTES
VASCULAR SURGERY OUTPATIENT CONSULT OR VISIT    VASCULAR SURGEON: Dr. He Crum MD    LOCATION:  [facility]    Jack Brown  Medical Record #: [id number]   YOB: 1950   Age:  74 year old     Date of Service: June 13, 2025     PRIMARY CARE PROVIDER: Mercedes Adorno       Reason for visit:  Post-op eval    IMPRESSION:  74M with recent bilateral IBEs for bilateral common iliac aneurysms who presents for follow-up after being seen in the ED on 6/4/2025 for asymptomatic R groin mass. At the time, duplex showed no flow in the mass, and the patient was discharged. Mass remains approximately same size and without symptoms. Likely reactive lymph node vs thrombosed prior pseudoaneurysm.     RECOMMENDATION:    - No intervention needed for this  - Will see patient again in 2 weeks with CT for normal KEEGAN follow-up    HPI:  Jack Brown  is a 74 year old  male who was seen today in evaluation for right groin mass after KEEGAN surgery. Since he was last seen in the ED, he denies any new pain or discomfort from the groin mass. His wife feels that it may have grown slightly. Patient continues to deny pain or discomfort from this. No overlying skin changes or drainage. No fevers or chills, weight loss, or B symptoms.    PHH:    Past Medical History:   Diagnosis Date    AAA (abdominal aortic aneurysm)     Basal cell carcinoma     BPH (benign prostatic hypertrophy)     Cancer (H)     Skin on R.chest wall.    Carcinoid tumor (H)     Chest discomfort     Coronary artery disease     Detached retina, left 10/01/2014    Hyperlipidemia     Hypertension     Melanoma of skin (H)     PAD (peripheral artery disease)     Pain of left lower leg 12/15/2023    Pain of left upper arm 10/10/2023    Palpitations     Paroxysmal atrial fibrillation (H)     Popliteal artery aneurysm     Pseudoaneurysm     Thrombosis     Thyroid nodule          Past Surgical History:   Procedure Laterality Date    BYPASS GRAFT  ARTERY CORONARY N/A 12/28/2015    Procedure: CORONARY ARTERY BYPASS x 3, RIGHT ENDOSCOPIC VEIN HARVEST, LEFT INTERNAL MAMMARY ARTERY, ANESTHESIA TRANSESOPHAGEAL ECHOCARDIOGRAM;  Surgeon: Jayson Alvarado MD;  Location: [facility];  Service:     CARDIAC SURGERY      CATARACT EXTRACTION  01/01/2015    CYST REMOVAL      Ganglion cyst removal of both wrist    ESTABLISHMENT, VASCULAR ACCESS, FEM APPROACH, FOR ENDOVASC STENT INSERTION INTO ABD AORTIC ANEURYSM Bilateral 5/21/2025    Procedure: BILATERAL ESTABLISHMENT, VASCULAR ACCESS, FEMORAL APPROACH, FOR ENDOVASCULAR STENT INSERTION INTO ABDOMINAL AORTIC ANEURYSM;  Surgeon: Laverne Crum MD;  [place] [facility]    [place] SURGERY      FEMORAL ARTERY - TIBIAL ARTERY BYPASS GRAFT Left 10/18/2024    Procedure: CREATION, BYPASS, ARTERIAL, FEMORAL TO TIBIAL, LEFT;  Surgeon: Laverne Crum MD;  [place] [facility]    HC REMOVAL PREPATELLA BURSA      Description: Excision Of Prepatellar Bursa;  Recorded: 09/30/2014;    HC REPAIR OF NASAL SEPTUM      Description: Septoplasty;  Recorded: 09/30/2014;    HERNIA REPAIR, UMBILICAL      IR LOWER EXTREMITY ANGIOGRAM LEFT  12/16/2023    IR LOWER EXTREMITY ANGIOGRAM LEFT  08/21/2024    IR OR ANGIOGRAM  5/21/2025    IR THROMBOLYSIS ARTERIAL INFUSION INITIAL DAY  12/17/2023    PARS PLANA VITRECTOMY W/ REPAIR OF MACULAR HOLE  11/01/2014    NM EXCIS TENDON SHEATH LESION, HAND/FINGER      Description: Hand Excision Of A Tendon Cyst;  Recorded: 01/04/2011;    NM LAP, VENTRAL HERNIA REPAIR,REDUCIBLE      Description: Laparoscopy Repair Of Umbilical Hernia;  Recorded: 01/04/2011;    PSEUDOANEURYSM REPAIR Right 05/01/2024    Procedure: REPAIR, PSEUDOANEURYSM, ARTERY, FEMORAL RIGHT;  Surgeon: Laverne Crum MD;  [place] [facility]    RETINAL DETACHMENT SURGERY  10/01/2014    SKIN CANCER EXCISION  11/01/2015    right chest    THORACOSCOPY Right 04/14/2016    Procedure:  RIGHT VIDEO ASSISTED THORACOSCOPY, RIGHT LOBECTOMY;  Surgeon: Vinny Chase MD;  Location: [facility];  Service:     VASECTOMY          ALLERGIES:     Allergies   Allergen Reactions    Niacin Hives and Rash     Burning of the skin    Atorvastatin Muscle Pain (Myalgia)    Pravastatin Muscle Pain (Myalgia)        MEDS:    Current Outpatient Medications   Medication Sig Dispense Refill    acetaminophen (TYLENOL) 500 MG tablet [ACETAMINOPHEN (TYLENOL) 500 MG TABLET] Take 500 mg by mouth every 6 (six) hours as needed for pain.      amLODIPine (NORVASC) 10 MG tablet Take 1 tablet (10 mg) by mouth daily. 90 tablet 2    aspirin 81 MG EC tablet Take 81 mg by mouth daily as needed for moderate pain.      metoprolol tartrate (LOPRESSOR) 50 MG tablet Take 1 tablet (50 mg) by mouth 2 times daily. 180 tablet 1    tamsulosin (FLOMAX) 0.4 MG capsule TAKE 1 CAPSULE BY MOUTH DAILY AFTER BREAKFAST 90 capsule 3    Vitamin D3 (VITAMIN D, CHOLECALCIFEROL,) 25 mcg (1000 units) tablet Take 1 tablet (25 mcg) by mouth daily. 90 tablet 1    warfarin ANTICOAGULANT (COUMADIN) 1 MG tablet Take by mouth See Admin Instructions. Take 3 mg on , Take 4 mg all other days of the week       No current facility-administered medications for this visit.        SOCIAL HABITS:    Tobacco Use      Smoking status: Never        Passive exposure: Never (per pt)      Smokeless tobacco: Never       Alcohol Use: Not on file       History   Drug Use No        FAMILY HISTORY:  I have reviewed this patient's family history and updated it with pertinent information if needed.  Family History   Problem Relation Age of Onset    Acute Myocardial Infarction Mother     Aneurysm Mother         brain    Acute Myocardial Infarction Father     Colitis Brother     Abdominal Aortic Aneurysm Brother         had surgery and was supposed to have a second - time ran out    Leukemia Brother     Other - See Comments Maternal Grandmother          of  hemorrhage after childbirht    Pneumonia Maternal Grandfather     No Known Problems Paternal Grandmother     Colon Cancer Paternal Grandfather     Lung Cancer Cousin     Coronary Artery Disease Cousin           REVIEW OF SYSTEMS:    A 12 point ROS was reviewed and except for what is listed in the HPI above, all others are negative    PE:  BP (!) 147/83   Pulse 58   Temp 97.5  F (36.4  C)   Resp 16   SpO2 96%    0 lbs 0 oz   There is no height or weight on file to calculate BMI.     EXAM:  GENERAL: This is a well-developed.74 year old male who appears his  stated age  EYES: Grossly normal.  MOUTH: Buccal mucosa normal   CARDIAC:  Mildly hypertensive, not tachycardic  CHEST/LUNG:  Breathing unlabored on RA  GASTROINTESINAL (ABDOMEN):Approximately 2 cm mobile R groin mass, nontender   MUSCULOSKELETAL: Grossly normal and both lower extremities are intact.  HEME/LYMPH: No lymphedema  NEUROLOGIC: Focally intact, Alert and oriented x 3.   PSYCH: appropriate affect  INTEGUMENT: No open lesions or ulcers  Pulse Exam:   Palpable DP/PT bilaterally. Access sites healed    DIAGNOSTIC STUDIES:     Images:  ZIO PATCH MAIL OUT  Result Date: 6/12/2025  Zio monitoring from 5/30/2025 to 6/2/2025 (duration 3d 1h). Predominant underlying rhythm was sinus rhythm, 49 to 109bpm, average 79bpm. No atrial fibrillation. There were no pauses of greater than 3 seconds. Frequent supraventricular ectopic beats (19.1%). 1082 Supraventricular Tachycardia runs occurred, the run with the fastest interval lasting 9 beats with a max rate of 200 bpm, the longest lasting 19.5 secs with an avg rate of 117 bpm. Rare premature ventricular contractions (<1%). Symptom triggers - none. Electronically signed by Breanne Díaz MD 6/12/2025  5:57 PM    IR OR Angiogram  Result Date: 6/9/2025  This exam was marked as non-reportable because it will not be read by a radiologist or a [place] non-radiologist provider.     Us Post Vascular Access Low Ext  Duplex  Result Date: 6/4/2025  EXAM: LOWER EXTREMITY ARTERIAL DUPLEX LOCATION: [facility] H[facility] DATE: 6/4/2025 INDICATION: Calling possible pseudoaneurysm. Recent iliac artery aneurysm repair. COMPARISON: None TECHNIQUE: Gray-scale imaging, spectral wave analysis, and color-flow imaging was performed of the right groin. FINDINGS:  The right external iliac, common femoral, proximal superficial femoral, and proximal profunda femoral arteries are patent with normal velocities and multiphase appropriate waveforms. The right external iliac, common femoral, proximal femoral, and proximal profunda femoral veins are patent normal phasic waveforms and antegrade flow. No evidence of pseudoaneurysm or arteriovenous fistula. There is a small subcutaneous complex circumscribed subcutaneous collection along the right groin measuring 1.9  x 2.0 x 2.0 cm, without associated Doppler flow.     IMPRESSION: 1.  Negative for right groin pseudoaneurysm. 2.  Small presumed residual hematoma along the right groin.    XR Surgery PINEDA Fluoro L/T 5 Min  Result Date: 5/21/2025  This exam was marked as non-reportable because it will not be read by a radiologist or a [place] non-radiologist provider.        No new studies to review.    LABS:      Sodium   Date Value Ref Range Status   06/04/2025 141 135 - 145 mmol/L Final   05/22/2025 140 135 - 145 mmol/L Final   05/21/2025 142 135 - 145 mmol/L Final     Urea Nitrogen   Date Value Ref Range Status   06/04/2025 24.4 (H) 8.0 - 23.0 mg/dL Final   05/22/2025 17.5 8.0 - 23.0 mg/dL Final   05/21/2025 29.3 (H) 8.0 - 23.0 mg/dL Final   04/27/2022 18 8 - 28 mg/dL Final   08/16/2021 19 8 - 28 mg/dL Final   05/20/2021 16 8 - 28 mg/dL Final     Hemoglobin   Date Value Ref Range Status   06/04/2025 14.4 13.3 - 17.7 g/dL Final   05/22/2025 12.5 (L) 13.3 - 17.7 g/dL Final   05/21/2025 14.7 13.3 - 17.7 g/dL Final     Platelet Count   Date Value Ref Range Status   06/04/2025 342 150 - 450 10e3/uL  Final   05/22/2025 187 150 - 450 10e3/uL Final   05/21/2025 205 150 - 450 10e3/uL Final     INR   Date Value Ref Range Status   06/13/2025 2.8 (H) 0.9 - 1.1 Final   06/04/2025 2.52 (H) 0.85 - 1.15 Final   05/28/2025 2.1 (H) 0.9 - 1.1 Final   05/07/2025 2.6 (H) 0.9 - 1.1 Final   10/22/2024 1.07 0.85 - 1.15 Final   05/31/2024 2.55 (H) 0.85 - 1.15 Final     INR Point of Care   Date Value Ref Range Status   05/14/2025 2.0 (A) 0.9 - 1.1 Final   01/26/2024 2.1 (A) 0.9 - 1.1 Final   01/10/2024 4.3 (A) 0.9 - 1.1 Final         20 minutes spent on the day of encounter doing chart review, history and exam, documentation, and further activities as noted.     Solomon Jennings MD  [facility]

## 2025-06-27 ENCOUNTER — OFFICE VISIT (OUTPATIENT)
Dept: VASCULAR SURGERY | Facility: CLINIC | Age: 75
End: 2025-06-27
Attending: SURGERY
Payer: MEDICARE

## 2025-06-27 ENCOUNTER — HOSPITAL ENCOUNTER (OUTPATIENT)
Dept: CT IMAGING | Facility: HOSPITAL | Age: 75
Discharge: HOME | End: 2025-06-27
Attending: SURGERY
Payer: MEDICARE

## 2025-06-27 VITALS — HEART RATE: 56 BPM | OXYGEN SATURATION: 95 % | SYSTOLIC BLOOD PRESSURE: 147 MMHG | DIASTOLIC BLOOD PRESSURE: 83 MMHG

## 2025-06-27 DIAGNOSIS — I72.4 FEMORAL ARTERY PSEUDO-ANEURYSM, RIGHT: ICD-10-CM

## 2025-06-27 DIAGNOSIS — I73.9 PAD (PERIPHERAL ARTERY DISEASE): ICD-10-CM

## 2025-06-27 DIAGNOSIS — I72.3 COMMON ILIAC ANEURYSM: Primary | ICD-10-CM

## 2025-06-27 DIAGNOSIS — I72.3 COMMON ILIAC ANEURYSM: ICD-10-CM

## 2025-06-27 PROCEDURE — 250N000011 HC RX IP 250 OP 636: Performed by: SURGERY

## 2025-06-27 PROCEDURE — 74174 CTA ABD&PLVS W/CONTRAST: CPT

## 2025-06-27 PROCEDURE — 250N000009 HC RX 250: Performed by: SURGERY

## 2025-06-27 RX ORDER — IOPAMIDOL 755 MG/ML
72 INJECTION, SOLUTION INTRAVASCULAR ONCE
Status: COMPLETED | OUTPATIENT
Start: 2025-06-27 | End: 2025-06-27

## 2025-06-27 RX ADMIN — IOPAMIDOL 72 ML: 755 INJECTION, SOLUTION INTRAVENOUS at 12:16

## 2025-06-27 RX ADMIN — SODIUM CHLORIDE 98 ML: 9 INJECTION, SOLUTION INTRAVENOUS at 12:17

## 2025-06-27 NOTE — PROGRESS NOTES
Vascular Clinic Rooming Questions      Patient is here for post-op for Abdominal Aortic Aneurysm.      BP (!) 147/83 (BP Location: Right arm)   Pulse 56   SpO2 95%     The provider has been notified that the patient has no concerns.     Questions patient would like addressed today are: N/A.    Refills are needed: N/A    Has homecare services and agency name:  No

## 2025-06-27 NOTE — PROGRESS NOTES
VASCULAR SURGERY OUTPATIENT CONSULT OR VISIT    VASCULAR SURGEON: Dr. He Crum MD    LOCATION:  [facility]    Jack Brown  Medical Record #: [id number]   YOB: 1950   Age:  74 year old     Date of Service: June 27, 2025     PRIMARY CARE PROVIDER: Mercedes Adorno       Reason for consultation:  Follow-up after bilateral KEEGAN    IMPRESSION:  74M with hx bilateral iliac arteries aneurysms s/p bilateral IBEs on 5/21/2025. Doing well post-op with no endoleak seen on CTA today. Slight thrombus burden on L internal iliac branch. Aneurysm size slowly decreasing.     RECOMMENDATION:    - Repeat CTA in 6 months    HPI:  Jack Brown  is a 74 year old  male who was seen today for follow-up of his bilateral IBEs. Patient states that he is feeling well with no discomfort or pains. The mass in his right groin is slightly smaller and nontender. No overlying skin changes. Continues slow return to normal activities.    PHH:    Past Medical History:   Diagnosis Date    AAA (abdominal aortic aneurysm)     Basal cell carcinoma     BPH (benign prostatic hypertrophy)     Cancer (H)     Skin on R.chest wall.    Carcinoid tumor (H)     Chest discomfort     Coronary artery disease     Detached retina, left 10/01/2014    Hyperlipidemia     Hypertension     Melanoma of skin (H)     PAD (peripheral artery disease)     Pain of left lower leg 12/15/2023    Pain of left upper arm 10/10/2023    Palpitations     Paroxysmal atrial fibrillation (H)     Popliteal artery aneurysm     Pseudoaneurysm     Thrombosis     Thyroid nodule          Past Surgical History:   Procedure Laterality Date    BYPASS GRAFT ARTERY CORONARY N/A 12/28/2015    Procedure: CORONARY ARTERY BYPASS x 3, RIGHT ENDOSCOPIC VEIN HARVEST, LEFT INTERNAL MAMMARY ARTERY, ANESTHESIA TRANSESOPHAGEAL ECHOCARDIOGRAM;  Surgeon: Jayson Alvarado MD;  Location: [facility];  Service:     CARDIAC SURGERY      CATARACT EXTRACTION   01/01/2015    CYST REMOVAL      Ganglion cyst removal of both wrist    ESTABLISHMENT, VASCULAR ACCESS, FEM APPROACH, FOR ENDOVASC STENT INSERTION INTO ABD AORTIC ANEURYSM Bilateral 5/21/2025    Procedure: BILATERAL ESTABLISHMENT, VASCULAR ACCESS, FEMORAL APPROACH, FOR ENDOVASCULAR STENT INSERTION INTO ABDOMINAL AORTIC ANEURYSM;  Surgeon: Laverne Crum MD;  Location: [facility]    EYE SURGERY      FEMORAL ARTERY - TIBIAL ARTERY BYPASS GRAFT Left 10/18/2024    Procedure: CREATION, BYPASS, ARTERIAL, FEMORAL TO TIBIAL, LEFT;  Surgeon: Laverne Crum MD;  Location: [facility] OR    HC REMOVAL PREPATELLA BURSA      Description: Excision Of Prepatellar Bursa;  Recorded: 09/30/2014;    HC REPAIR OF NASAL SEPTUM      Description: Septoplasty;  Recorded: 09/30/2014;    HERNIA REPAIR, UMBILICAL      IR LOWER EXTREMITY ANGIOGRAM LEFT  12/16/2023    IR LOWER EXTREMITY ANGIOGRAM LEFT  08/21/2024    IR OR ANGIOGRAM  5/21/2025    IR THROMBOLYSIS ARTERIAL INFUSION INITIAL DAY  12/17/2023    PARS PLANA VITRECTOMY W/ REPAIR OF MACULAR HOLE  11/01/2014    MI EXCIS TENDON SHEATH LESION, HAND/FINGER      Description: Hand Excision Of A Tendon Cyst;  Recorded: 01/04/2011;    MI LAP, VENTRAL HERNIA REPAIR,REDUCIBLE      Description: Laparoscopy Repair Of Umbilical Hernia;  Recorded: 01/04/2011;    PSEUDOANEURYSM REPAIR Right 05/01/2024    Procedure: REPAIR, PSEUDOANEURYSM, ARTERY, FEMORAL RIGHT;  Surgeon: Laverne Crum MD;  Location: [facility]    RETINAL DETACHMENT SURGERY  10/01/2014    SKIN CANCER EXCISION  11/01/2015    right chest    THORACOSCOPY Right 04/14/2016    Procedure: RIGHT VIDEO ASSISTED THORACOSCOPY, RIGHT LOBECTOMY;  Surgeon: Vinny Chase MD;  Location: [facility];  Service:     VASECTOMY          ALLERGIES:     Allergies   Allergen Reactions    Niacin Hives and Rash     Burning of the skin    Atorvastatin Muscle Pain (Myalgia)    Pravastatin Muscle  Pain (Myalgia)        MEDS:    Current Outpatient Medications   Medication Sig Dispense Refill    acetaminophen (TYLENOL) 500 MG tablet [ACETAMINOPHEN (TYLENOL) 500 MG TABLET] Take 500 mg by mouth every 6 (six) hours as needed for pain.      amLODIPine (NORVASC) 10 MG tablet Take 1 tablet (10 mg) by mouth daily. 90 tablet 2    aspirin 81 MG EC tablet Take 81 mg by mouth daily as needed for moderate pain.      metoprolol tartrate (LOPRESSOR) 50 MG tablet Take 1 tablet (50 mg) by mouth 2 times daily. 180 tablet 1    tamsulosin (FLOMAX) 0.4 MG capsule TAKE 1 CAPSULE BY MOUTH DAILY AFTER BREAKFAST 90 capsule 3    Vitamin D3 (VITAMIN D, CHOLECALCIFEROL,) 25 mcg (1000 units) tablet Take 1 tablet (25 mcg) by mouth daily. 90 tablet 1    warfarin ANTICOAGULANT (COUMADIN) 1 MG tablet Take by mouth See Admin Instructions. Take 3 mg on , Take 4 mg all other days of the week       No current facility-administered medications for this visit.        SOCIAL HABITS:    Tobacco Use      Smoking status: Never        Passive exposure: Never (per pt)      Smokeless tobacco: Never       Alcohol Use: Not on file       History   Drug Use No        FAMILY HISTORY:  I have reviewed this patient's family history and updated it with pertinent information if needed.  Family History   Problem Relation Age of Onset    Acute Myocardial Infarction Mother     Aneurysm Mother         brain    Acute Myocardial Infarction Father     Colitis Brother     Abdominal Aortic Aneurysm Brother         had surgery and was supposed to have a second - time ran out    Leukemia Brother     Other - See Comments Maternal Grandmother          of hemorrhage after childbirht    Pneumonia Maternal Grandfather     No Known Problems Paternal Grandmother     Colon Cancer Paternal Grandfather     Lung Cancer Cousin     Coronary Artery Disease Cousin           REVIEW OF SYSTEMS:    A 12 point ROS was reviewed and except for what is listed in the HPI above, all  others are negative    PE:  BP (!) 147/83 (BP Location: Right arm)   Pulse 56   SpO2 95%    0 lbs 0 oz   There is no height or weight on file to calculate BMI.     EXAM:  GENERAL: This is a well-developed.74 year old male who appears his  stated age  EYES: Grossly normal.  MOUTH: Buccal mucosa normal   CARDIAC:  Mildly hypertensive, not tachycardic  CHEST/LUNG:  Breathing unlabored on RA  GASTROINTESINAL (ABDOMEN):R groin with small, nonpulsatile, mobile mass   MUSCULOSKELETAL: Grossly normal and both lower extremities are intact.  HEME/LYMPH: No lymphedema  NEUROLOGIC: Focally intact, Alert and oriented x 3.   PSYCH: appropriate affect  INTEGUMENT: No open lesions or ulcers  Pulse Exam:   Palpable femoral, DP, and PT pulses bilaterally      DIAGNOSTIC STUDIES:     Images:  ZIO PATCH MAIL OUT  Result Date: 6/12/2025  Zio monitoring from 5/30/2025 to 6/2/2025 (duration 3d 1h). Predominant underlying rhythm was sinus rhythm, 49 to 109bpm, average 79bpm. No atrial fibrillation. There were no pauses of greater than 3 seconds. Frequent supraventricular ectopic beats (19.1%). 1082 Supraventricular Tachycardia runs occurred, the run with the fastest interval lasting 9 beats with a max rate of 200 bpm, the longest lasting 19.5 secs with an avg rate of 117 bpm. Rare premature ventricular contractions (<1%). Symptom triggers - none. Electronically signed by Breanne Díaz MD 6/12/2025  5:57 PM    Us Post Vascular Access Low Ext Duplex  Result Date: 6/4/2025  EXAM: LOWER EXTREMITY ARTERIAL DUPLEX LOCATION: [facility] DATE: 6/4/2025 INDICATION: Calling possible pseudoaneurysm. Recent iliac artery aneurysm repair. COMPARISON: None TECHNIQUE: Gray-scale imaging, spectral wave analysis, and color-flow imaging was performed of the right groin. FINDINGS:  The right external iliac, common femoral, proximal superficial femoral, and proximal profunda femoral arteries are patent with normal velocities and  multiphase appropriate waveforms. The right external iliac, common femoral, proximal femoral, and proximal profunda femoral veins are patent normal phasic waveforms and antegrade flow. No evidence of pseudoaneurysm or arteriovenous fistula. There is a small subcutaneous complex circumscribed subcutaneous collection along the right groin measuring 1.9  x 2.0 x 2.0 cm, without associated Doppler flow.     IMPRESSION: 1.  Negative for right groin pseudoaneurysm. 2.  Small presumed residual hematoma along the right groin.       I personally reviewed the images and my interpretation is L 23 mm and R 38 mm aneurysms with no endoleak. Some thrombus on L internal branch which is nonocclusive.    LABS:      Sodium   Date Value Ref Range Status   06/04/2025 141 135 - 145 mmol/L Final   05/22/2025 140 135 - 145 mmol/L Final   05/21/2025 142 135 - 145 mmol/L Final     Urea Nitrogen   Date Value Ref Range Status   06/04/2025 24.4 (H) 8.0 - 23.0 mg/dL Final   05/22/2025 17.5 8.0 - 23.0 mg/dL Final   05/21/2025 29.3 (H) 8.0 - 23.0 mg/dL Final   04/27/2022 18 8 - 28 mg/dL Final   08/16/2021 19 8 - 28 mg/dL Final   05/20/2021 16 8 - 28 mg/dL Final     Hemoglobin   Date Value Ref Range Status   06/04/2025 14.4 13.3 - 17.7 g/dL Final   05/22/2025 12.5 (L) 13.3 - 17.7 g/dL Final   05/21/2025 14.7 13.3 - 17.7 g/dL Final     Platelet Count   Date Value Ref Range Status   06/04/2025 342 150 - 450 10e3/uL Final   05/22/2025 187 150 - 450 10e3/uL Final   05/21/2025 205 150 - 450 10e3/uL Final     INR   Date Value Ref Range Status   06/13/2025 2.8 (H) 0.9 - 1.1 Final   06/04/2025 2.52 (H) 0.85 - 1.15 Final   05/28/2025 2.1 (H) 0.9 - 1.1 Final   05/07/2025 2.6 (H) 0.9 - 1.1 Final   10/22/2024 1.07 0.85 - 1.15 Final   05/31/2024 2.55 (H) 0.85 - 1.15 Final     INR Point of Care   Date Value Ref Range Status   05/14/2025 2.0 (A) 0.9 - 1.1 Final   01/26/2024 2.1 (A) 0.9 - 1.1 Final   01/10/2024 4.3 (A) 0.9 - 1.1 Final         30 minutes spent on  the day of encounter doing chart review, history and exam, documentation, and further activities as noted.     Solomon Jennigns MD  [facility]

## 2025-07-08 ENCOUNTER — ONCOLOGY VISIT (OUTPATIENT)
Dept: ONCOLOGY | Facility: HOSPITAL | Age: 75
End: 2025-07-08
Payer: MEDICARE

## 2025-07-08 VITALS
HEART RATE: 66 BPM | WEIGHT: 158.9 LBS | RESPIRATION RATE: 16 BRPM | SYSTOLIC BLOOD PRESSURE: 150 MMHG | DIASTOLIC BLOOD PRESSURE: 79 MMHG | OXYGEN SATURATION: 95 % | BODY MASS INDEX: 26.44 KG/M2 | TEMPERATURE: 97.6 F

## 2025-07-08 DIAGNOSIS — C7A.090 ATYPICAL CARCINOID LUNG TUMOR (H): Primary | ICD-10-CM

## 2025-07-08 PROCEDURE — 99213 OFFICE O/P EST LOW 20 MIN: CPT | Performed by: INTERNAL MEDICINE

## 2025-07-08 PROCEDURE — G0463 HOSPITAL OUTPT CLINIC VISIT: HCPCS | Performed by: INTERNAL MEDICINE

## 2025-07-08 ASSESSMENT — PAIN SCALES - GENERAL: PAINLEVEL_OUTOF10: NO PAIN (0)

## 2025-07-08 NOTE — PROGRESS NOTES
[facility] Hematology and Oncology Progress Note    Patient: Jack Brown  MRN: [id number]  Date of Service: Jul 8, 2025        Assessment and Plan:    1. Lung carcinoid tumor: Overall he is doing well.  No clinical evidence of recurrence.  He had a CT scan on May 29.  I personally reviewed the images and shared them with Jack.  There is no evidence of recurrent carcinoid.  Will continue to check yearly until April 2026.     2.  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  I reviewed the aneurysms radiographically with the patient today.  He is now following regularly with vascular surgery.     3. Thyroid nodule: Thyroid ultrasound performed May 29, 2024.  This shows no suspicious thyroid nodules.  Previously biopsied rim calcified isthmic nodule is stable since 2017.  No additional follow-up needed.      ECOG Performance  0    Diagnosis:    1.  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     Treatment:    Right middle lobectomy in April 2016: Pathology shows atypical carcinoid tumor.  2.2 cm.  No lymphovascular invasion.  15 lymph nodes tested all negative.  Less than 2 mitoses per 10 high-power field.     Interim History:    Jack returns today for follow-up visit.  He has been doing well since his last visit.  No chest pain or shortness of breath.  No cough.  Energy and appetite are okay.    Review of Systems:    As above in the history.     Review of Systems otherwise Negative for:  General: chills, fever or night sweats  Psychological: anxiety or depression  Ophthalmic: blurry vision, double vision or loss of vision, vision change  ENT: epistaxis, oral lesions, hearing changes  Hematological and Lymphatic: bleeding, bruising, jaundice, swollen lymph nodes  Endocrine: hot flashes, unexpected weight changes  Respiratory: cough, hemoptysis, orthopnea or shortness of breath/CERVANTES  Cardiovascular: chest pain, edema, palpitations or PND  Gastrointestinal:  abdominal pain, blood in stools, change in bowel habits, constipation, diarrhea or nausea/vomiting  Genito-Urinary: change in urinary stream, incontinence, frequency/urgency  Musculoskeletal: joint pain, stiffness, swelling, muscle pain  Neurological: dizziness, headaches, numbness/tingling  Dermatological: lumps and rash    Past History:    Past Medical History:   Diagnosis Date    AAA (abdominal aortic aneurysm)     Basal cell carcinoma     BPH (benign prostatic hypertrophy)     Cancer (H)     Skin on R.chest wall.    Carcinoid tumor (H)     Chest discomfort     Coronary artery disease     Detached retina, left 10/01/2014    Hyperlipidemia     Hypertension     Melanoma of skin (H)     PAD (peripheral artery disease)     Pain of left lower leg 12/15/2023    Pain of left upper arm 10/10/2023    Palpitations     Paroxysmal atrial fibrillation (H)     Popliteal artery aneurysm     Pseudoaneurysm     Thrombosis     Thyroid nodule      Physical Exam:    BP (!) 150/79 (BP Location: Right arm, Patient Position: Sitting, Cuff Size: Adult Regular)   Pulse 66   Temp 97.6  F (36.4  C) (Oral)   Resp 16   Wt 72.1 kg (158 lb 14.4 oz)   SpO2 95%   BMI 26.44 kg/m      General: patient appears stated age of 73 year old. Nontoxic and in no distress.   HEENT: Head: atraumatic, normocephalic. Sclerae anicteric.  Chest:  Normal respiratory effort  Cardiac:  No edema.   Abdomen: abdomen is soft, non-distended  Extremities: normal tone and muscle bulk.  Skin: no lesions or rash on visible skin. Warm and dry.   CNS: alert and oriented. Grossly non-focal.   Psychiatric: normal mood and affect.     Lab Results:    No results found for this or any previous visit (from the past week).    Imaging:    CTA Abdomen Pelvis with Contrast  Result Date: 6/27/2025  EXAM: CTA ABDOMEN PELVIS WITH CONTRAST LOCATION: [facility] DATE: 6/27/2025 INDICATION:  PAD (peripheral artery disease), Common iliac aneurysm, Femoral  artery pseudo-aneurysm, right COMPARISON: 2/28/2025 TECHNIQUE: CT angiogram abdomen pelvis during arterial phase of injection of IV contrast. 2D and 3D MIP reconstructions were performed by the CT technologist. Dose reduction techniques were used. CONTRAST: 100 cc of Omni FINDINGS: ANGIOGRAM ABDOMEN/PELVIS: Abdominal aortic saccular aneurysm with a maximum diameter measuring 3.0 cm is unchanged. Status post repair of bilateral common iliac artery aneurysm with iliac branch grafts. The bilateral iliac branch grafts remain patent. There is a thin rind of thrombus within the left internal iliac limb of the graft. No evidence of endoleak. Bilateral iliac artery aneurysmal sacs are stable in size. LOWER CHEST: Normal. HEPATOBILIARY: Normal. PANCREAS: Normal. SPLEEN: Normal. ADRENAL GLANDS: Left adrenal nodule, unchanged. Nodularity of the right adrenal gland, unchanged. KIDNEYS/BLADDER: Bilateral renal cysts. No hydronephrosis. BOWEL: Normal. LYMPH NODES: Normal. PELVIC ORGANS: Enlarged prostate gland. MUSCULOSKELETAL: Normal.     IMPRESSION: 1.  Saccular abdominal aortic aneurysm is stable in size and appearance. 2.  Status post repair of bilateral common iliac artery aneurysm with iliac branch grafts. The bilateral iliac branch grafts remain patent. There is a thin rind of mural thrombus within the left internal iliac limb of the graft. No evidence of endoleak. Bilateral iliac artery aneurysmal sacs are stable in size.     ZIO PATCH MAIL OUT  Result Date: 6/12/2025  Zio monitoring from 5/30/2025 to 6/2/2025 (duration 3d 1h). Predominant underlying rhythm was sinus rhythm, 49 to 109bpm, average 79bpm. No atrial fibrillation. There were no pauses of greater than 3 seconds. Frequent supraventricular ectopic beats (19.1%). 1082 Supraventricular Tachycardia runs occurred, the run with the fastest interval lasting 9 beats with a max rate of 200 bpm, the longest lasting 19.5 secs with an avg rate of 117 bpm. Rare premature  ventricular contractions (<1%). Symptom triggers - none. Electronically signed by Breanne Díaz MD 6/12/2025  5:57 PM        Signed by: Orville Harmon MD

## 2025-07-08 NOTE — PROGRESS NOTES
"Oncology Rooming Note    July 8, 2025 10:20 AM   Jack Brown is a 74 year old male who presents for:    Chief Complaint   Patient presents with    Oncology Clinic Visit     Return visit 1 year. CT 06/27/25. Atypical carcinoid lung tumor.     Initial Vitals: BP (!) 150/79 (BP Location: Right arm, Patient Position: Sitting, Cuff Size: Adult Regular)   Pulse 66   Temp 97.6  F (36.4  C) (Oral)   Resp 16   Wt 72.1 kg (158 lb 14.4 oz)   SpO2 95%   BMI 26.44 kg/m   Estimated body mass index is 26.44 kg/m  as calculated from the following:    Height as of 6/4/25: 1.651 m (5' 5\").    Weight as of this encounter: 72.1 kg (158 lb 14.4 oz). Body surface area is 1.82 meters squared.  No Pain (0) Comment: Data Unavailable   No LMP for male patient.  Allergies reviewed: Yes  Medications reviewed: Yes    Medications: Medication refills not needed today.  Pharmacy name entered into [facility]:    [place], [place] - [address] [place] [address] PHARM[address]Y #[id number] [place] [address] - [facility] [address]    Frailty Screening:   Is the patient here for a new oncology consult visit in cancer care? 2. No    PHQ9:  Did this patient require a PHQ9?: No      Clinical concerns: none       Guerline Ferro CMA            "

## 2025-07-08 NOTE — Clinical Note
"7/8/2025      Jack Brown  [address]  [place] [address]      Dear Colleague,    Thank you for referring your patient, Jack Brown, to the [facility]. Please see a copy of my visit note below.    Oncology Rooming Note    July 8, 2025 10:20 AM   Jack Brown is a 74 year old male who presents for:    Chief Complaint   Patient presents with    Oncology Clinic Visit     Return visit 1 year. CT 06/27/25. Atypical carcinoid lung tumor.     Initial Vitals: BP (!) 150/79 (BP Location: Right arm, Patient Position: Sitting, Cuff Size: Adult Regular)   Pulse 66   Temp 97.6  F (36.4  C) (Oral)   Resp 16   Wt 72.1 kg (158 lb 14.4 oz)   SpO2 95%   BMI 26.44 kg/m   Estimated body mass index is 26.44 kg/m  as calculated from the following:    Height as of 6/4/25: 1.651 m (5' 5\").    Weight as of this encounter: 72.1 kg (158 lb 14.4 oz). Body surface area is 1.82 meters squared.  No Pain (0) Comment: Data Unavailable   No LMP for male patient.  Allergies reviewed: Yes  Medications reviewed: Yes    Medications: Medication refills not needed today.  Pharmacy name entered into [employer]:    [facility] [place], [place] - [address] [place] [address] PHARMACY [id number] [place], [place] - [address]    Frailty Screening:   Is the patient here for a new oncology consult visit in cancer care? 2. No    PHQ9:  Did this patient require a PHQ9?: No      Clinical concerns: none       Guerline Ferro [facility] Hematology and Oncology Progress Note    Patient: Jack Brown  MRN: [id number]  Date of Service: Jul 8, 2025        Assessment and Plan:    1. Lung carcinoid tumor: Overall he is doing well.  No clinical evidence of recurrence.  He had a CT scan on May 29.  I personally reviewed the images and shared them with Jack.  There is no evidence of recurrent carcinoid.  Will continue to check yearly until April 2026.     2.  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  I reviewed the aneurysms radiographically with the patient today.  He is now following regularly with vascular surgery.     3. Thyroid nodule: Thyroid ultrasound performed May 29, 2024.  This shows no suspicious thyroid nodules.  Previously biopsied rim calcified isthmic nodule is stable since 2017.  No additional follow-up needed.      ECOG Performance  0    Diagnosis:    1.  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     Treatment:    Right middle lobectomy in April 2016: Pathology shows atypical carcinoid tumor.  2.2 cm.  No lymphovascular invasion.  15 lymph nodes tested all negative.  Less than 2 mitoses per 10 high-power field.     Interim History:    Jack returns today for follow-up visit.  He has been doing well since his last visit.  No chest pain or shortness of breath.  No cough.  Energy and appetite are okay.    Review of Systems:    As above in the history.     Review of Systems otherwise Negative for:  General: chills, fever or night sweats  Psychological: anxiety or depression  Ophthalmic: blurry vision, double vision or loss of vision, vision change  ENT: epistaxis, oral lesions, hearing changes  Hematological and Lymphatic: bleeding, bruising, jaundice, swollen lymph nodes  Endocrine: hot flashes, unexpected weight changes  Respiratory: cough, hemoptysis, orthopnea or shortness of breath/CERVANTES  Cardiovascular: chest pain, edema, palpitations or PND  Gastrointestinal: abdominal pain, blood in stools, change in bowel habits, constipation, diarrhea or nausea/vomiting  Genito-Urinary: change in urinary stream, incontinence, frequency/urgency  Musculoskeletal: joint pain, stiffness, swelling, muscle pain  Neurological: dizziness, headaches, numbness/tingling  Dermatological: lumps and rash    Past History:    Past Medical History:   Diagnosis Date    AAA (abdominal aortic aneurysm)     Basal cell carcinoma     BPH (benign  prostatic hypertrophy)     Cancer (H)     Skin on R.chest wall.    Carcinoid tumor (H)     Chest discomfort     Coronary artery disease     Detached retina, left 10/01/2014    Hyperlipidemia     Hypertension     Melanoma of skin (H)     PAD (peripheral artery disease)     Pain of left lower leg 12/15/2023    Pain of left upper arm 10/10/2023    Palpitations     Paroxysmal atrial fibrillation (H)     Popliteal artery aneurysm     Pseudoaneurysm     Thrombosis     Thyroid nodule      Physical Exam:    BP (!) 150/79 (BP Location: Right arm, Patient Position: Sitting, Cuff Size: Adult Regular)   Pulse 66   Temp 97.6  F (36.4  C) (Oral)   Resp 16   Wt 72.1 kg (158 lb 14.4 oz)   SpO2 95%   BMI 26.44 kg/m      General: patient appears stated age of 73 year old. Nontoxic and in no distress.   HEENT: Head: atraumatic, normocephalic. Sclerae anicteric.  Chest:  Normal respiratory effort  Cardiac:  No edema.   Abdomen: abdomen is soft, non-distended  Extremities: normal tone and muscle bulk.  Skin: no lesions or rash on visible skin. Warm and dry.   CNS: alert and oriented. Grossly non-focal.   Psychiatric: normal mood and affect.     Lab Results:    No results found for this or any previous visit (from the past week).    Imaging:    CTA Abdomen Pelvis with Contrast  Result Date: 6/27/2025  EXAM: CTA ABDOMEN PELVIS WITH CONTRAST LOCATION: [facility] DATE: 6/27/2025 INDICATION:  PAD (peripheral artery disease), Common iliac aneurysm, Femoral artery pseudo-aneurysm, right COMPARISON: 2/28/2025 TECHNIQUE: CT angiogram abdomen pelvis during arterial phase of injection of IV contrast. 2D and 3D MIP reconstructions were performed by the CT technologist. Dose reduction techniques were used. CONTRAST: 100 cc of Omni FINDINGS: ANGIOGRAM ABDOMEN/PELVIS: Abdominal aortic saccular aneurysm with a maximum diameter measuring 3.0 cm is unchanged. Status post repair of bilateral common iliac artery aneurysm with  iliac branch grafts. The bilateral iliac branch grafts remain patent. There is a thin rind of thrombus within the left internal iliac limb of the graft. No evidence of endoleak. Bilateral iliac artery aneurysmal sacs are stable in size. LOWER CHEST: Normal. HEPATOBILIARY: Normal. PANCREAS: Normal. SPLEEN: Normal. ADRENAL GLANDS: Left adrenal nodule, unchanged. Nodularity of the right adrenal gland, unchanged. KIDNEYS/BLADDER: Bilateral renal cysts. No hydronephrosis. BOWEL: Normal. LYMPH NODES: Normal. PELVIC ORGANS: Enlarged prostate gland. MUSCULOSKELETAL: Normal.     IMPRESSION: 1.  Saccular abdominal aortic aneurysm is stable in size and appearance. 2.  Status post repair of bilateral common iliac artery aneurysm with iliac branch grafts. The bilateral iliac branch grafts remain patent. There is a thin rind of mural thrombus within the left internal iliac limb of the graft. No evidence of endoleak. Bilateral iliac artery aneurysmal sacs are stable in size.     ZIO PATCH MAIL OUT  Result Date: 6/12/2025  Zio monitoring from 5/30/2025 to 6/2/2025 (duration 3d 1h). Predominant underlying rhythm was sinus rhythm, 49 to 109bpm, average 79bpm. No atrial fibrillation. There were no pauses of greater than 3 seconds. Frequent supraventricular ectopic beats (19.1%). 1082 Supraventricular Tachycardia runs occurred, the run with the fastest interval lasting 9 beats with a max rate of 200 bpm, the longest lasting 19.5 secs with an avg rate of 117 bpm. Rare premature ventricular contractions (<1%). Symptom triggers - none. Electronically signed by Breanne Díaz MD 6/12/2025  5:57 PM        Signed by: Orville Harmon MD        Again, thank you for allowing me to participate in the care of your patient.        Sincerely,        Orville Harmno MD    Electronically signed

## 2025-07-16 NOTE — INTERVAL H&P NOTE
I have reviewed the surgical (or preoperative) H&P that is linked to this encounter, and examined the patient. There are no significant changes    RRR  CTAB  Palp DP/PP bilaterally (biphasic for all), L DP is weaker  R dorsiplantarflexion 3/5, otherwise, 5/5 for R plantar and left dorsi/plantarflexion  Compartments soft  Sensation intact to light touch   [FreeTextEntry3] :  Harsha Lewis M.D., FACS, [facility] Director [facility] at [facility] Certified in Thyroid/Parathyroid/Neck Ultrasound, TODD/ TAMRA , Department of Otolaryngology [facility] [employer] at [facility]

## 2025-07-23 ENCOUNTER — LAB (OUTPATIENT)
Dept: CARDIOLOGY | Facility: CLINIC | Age: 75
End: 2025-07-23
Payer: MEDICARE

## 2025-07-23 ENCOUNTER — OFFICE VISIT (OUTPATIENT)
Dept: CARDIOLOGY | Facility: CLINIC | Age: 75
End: 2025-07-23
Payer: MEDICARE

## 2025-07-23 ENCOUNTER — ANTICOAGULATION THERAPY VISIT (OUTPATIENT)
Dept: ANTICOAGULATION | Facility: CLINIC | Age: 75
End: 2025-07-23

## 2025-07-23 VITALS
HEART RATE: 53 BPM | SYSTOLIC BLOOD PRESSURE: 138 MMHG | WEIGHT: 164 LBS | DIASTOLIC BLOOD PRESSURE: 82 MMHG | BODY MASS INDEX: 27.32 KG/M2 | HEIGHT: 65 IN | RESPIRATION RATE: 16 BRPM

## 2025-07-23 DIAGNOSIS — I10 PRIMARY HYPERTENSION: ICD-10-CM

## 2025-07-23 DIAGNOSIS — I70.90 ARTERIAL OCCLUSION: Primary | ICD-10-CM

## 2025-07-23 DIAGNOSIS — I48.0 PAROXYSMAL ATRIAL FIBRILLATION (H): Primary | ICD-10-CM

## 2025-07-23 DIAGNOSIS — I25.10 CORONARY ARTERY DISEASE DUE TO LIPID RICH PLAQUE: ICD-10-CM

## 2025-07-23 DIAGNOSIS — I48.0 PAROXYSMAL ATRIAL FIBRILLATION (H): ICD-10-CM

## 2025-07-23 DIAGNOSIS — I25.83 CORONARY ARTERY DISEASE DUE TO LIPID RICH PLAQUE: ICD-10-CM

## 2025-07-23 DIAGNOSIS — Z79.01 LONG TERM (CURRENT) USE OF ANTICOAGULANTS: Primary | ICD-10-CM

## 2025-07-23 DIAGNOSIS — I72.3 ILIAC ANEURYSM: ICD-10-CM

## 2025-07-23 LAB — INR POINT OF CARE: 2.9 (ref 0.9–1.1)

## 2025-07-23 PROCEDURE — 99214 OFFICE O/P EST MOD 30 MIN: CPT | Performed by: NURSE PRACTITIONER

## 2025-07-23 PROCEDURE — 3079F DIAST BP 80-89 MM HG: CPT | Performed by: NURSE PRACTITIONER

## 2025-07-23 PROCEDURE — G2211 COMPLEX E/M VISIT ADD ON: HCPCS | Performed by: NURSE PRACTITIONER

## 2025-07-23 PROCEDURE — 3075F SYST BP GE 130 - 139MM HG: CPT | Performed by: NURSE PRACTITIONER

## 2025-07-23 NOTE — PROGRESS NOTES
HEART CARE ELECTROPHYSIOLOGY NOTE      [facility]  [phone]      Assessment/Recommendations   Assessment/Plan:  1.  Paroxysmal Atrial Fibrillation: No symptomatology or evidence of AF recurrence.  Only documented episode of PAF-RVR noted on telemetry 12/17/2023.  Appears to be asymptomatic.  ZIO monitor shows very frequent atrial ectopy which is a risk factor for recurrent A-fib.  He remains off membrane active antiarrhythmic medications.  We reviewed the natural progression of atrial fibrillation and treatment options.  Recommend sotalol versus ablation for recurrent AF.  -- Daily pulse checks; call with recurrent AF for further evaluation and management     He was reassured that atrial fibrillation is not life-threatening, but carries an increased risk for stroke.  He has a [id number] score of 6 for age >75, vascular disease, HTN, thrombotic event.     -- Continue warfarin for stroke prophylaxis, goal INR 2.0-3.0.      2.  Coronary artery disease: Three-vessel CABG in 2015.  Exercise nuclear stress testing 10/16/2023 negative for ischemia.  Denies anginal symptoms.  On aspirin.  Statin discontinued 5/2025 due to possible side effects.  Plan for discussing PCSK9 with Dr. Estrada at follow-up now that he has recovered from surgery.     3.  Hypertension: Controlled with amlodipine and metoprolol.    Follow up with Dr. Estrada 10/14/2025  Follow-up with me in 1 year       History of Present Illness/Subjective    HPI: Jack Brown is a 74 year old male who comes in today accompanied by his wife for EP follow-up of atrial fibrillation.  He has a history of paroxysmal atrial fibrillation, coronary artery disease (CABG x 3V in 2015: LIMA-LAD, SVG-OM, SVG-RCA), hypertension, hyperlipidemia, lung cancer  s/p right middle lobectomy 4/2016, peripheral arterial disease with acute lower extremity thrombosis, bilateral common iliac aneurysm, infrarenal abdominal aortic aneurysm.  Followed by  vascular surgery; surgical repair of right CFA pseudoaneurysm on 5/1/2024, s/p left leg angiogram/stent 8/21/2024, abdominal aortic aneurysm endovascular stent 5/21/2025.     Arrhythmia history  Dx/date: PAF-RVR diagnosed 12/17/2023 (seen on telemetry).  Converted to sinus rhythm on amiodarone.   Sx: Palpitations  [id number] score: 6 for age 73, vascular disease, HTN, thrombotic event  Oral anticoagulation: Warfarin  Antiarrhythmic medications, AV michel blocking agents: Amiodarone discontinued 1/26/2024  Procedures  DCCV: N/A  Ablation: N/A     Jack states that he is feells well.  He has not had any A-fib of which he is aware.  No AF noted in during recent hospitalization or MCT.  He denies chest discomfort, palpitations, abdominal fullness/bloating or peripheral edema, shortness of breath, paroxysmal nocturnal dyspnea, orthopnea, lightheadedness, dizziness, pre-syncope, or syncope.     Cardiographics (EKG personally reviewed):  EKG done 5/1/2025 shows sinus rhythm at 85 bpm, bigeminal PACs,  ms, QT/QTc 398/473 ms  EKG done 10/1/2024 shows sinus rhythm at 61 bpm, first-degree AV delay,  ms, QT/QTc 434/436 ms  EKG done 12/18/2023 shows sinus rhythm at 87 bpm, PACs, QRS 98 ms, QT/QTc 336/404 ms     Zio monitoring from 5/30/2025 to 6/2/2025 (duration 3d 1h).  Predominant underlying rhythm was sinus rhythm, 49 to 109bpm, average 79bpm.  No atrial fibrillation.  There were no pauses of greater than 3 seconds.  Frequent supraventricular ectopic beats (19.1%).  1082 Supraventricular Tachycardia runs occurred, the run with the fastest interval lasting 9 beats with a max rate of 200 bpm, the longest lasting 19.5 secs with an avg rate of 117 bpm.   Rare premature ventricular contractions (<1%).  Symptom triggers - none.    TTE done 12/19/2023:  Left ventricular size, wall motion and function are normal. The ejection  fraction is 55-60%.  The left atrium is mildly dilated.  There is mild (1+) aortic  "regurgitation.     NM stress test done 10/16/2023:    The exercise nuclear stress test is negative for inducible myocardial ischemia or infarction.    The exercise stress electrocardiogram is negative for inducible ischemic EKG changes.    The patient's exercise capacity is average for age and gender. The patient exercised for 6:01 minutes on the Florencio protocol, achieving 7.3 METs and 103% the age-predicted maximum heart rate. The patient did not experience any symptoms.    The left ventricular ejection fraction at stress is 52%.    The patient is at a low risk of future cardiac ischemic events.    A prior study was conducted on 5/17/2007.  Prior images were unavailable for comparison review.    I have reviewed and updated the patient's Past Medical History, Social History, Family History and Medication List.  Outside records personally reviewed.     Physical Examination  Review of Systems   Vitals: /82 (BP Location: Right arm, Patient Position: Sitting, Cuff Size: Adult Regular)   Pulse 53   Resp 16   Ht 1.651 m (5' 5\")   Wt 74.4 kg (164 lb)   BMI 27.29 kg/m    BMI= Body mass index is 27.29 kg/m .  Wt Readings from Last 3 Encounters:   07/23/25 74.4 kg (164 lb)   07/08/25 72.1 kg (158 lb 14.4 oz)   06/04/25 71.7 kg (158 lb 1.6 oz)       General Appearance:   Alert, well-appearing and in no acute distress.   HEENT: Atraumatic, normocephalic.  No scleral icterus, normal conjunctivae, EOMs intact, PERRL.  Mucous membranes pink and moist.     Chest/Lungs:   Chest symmetric, spine straight.  Respirations unlabored.  Lungs are clear to auscultation.   Cardiovascular:   Regular rate and rhythm.  Normal first and second heart sounds with no murmurs, rubs, or gallops; radial pulses are intact, No edema.   Abdomen:  Soft, nondistended.   Extremities: No cyanosis or clubbing.   Musculoskeletal: Moves all extremities.    Skin: Warm, dry, intact.    Neurologic: Mood and affect are appropriate.  Alert and oriented to " person, place, time, and situation.     ROS: 10 point ROS neg other than the symptoms noted above in the HPI.         Medical History  Surgical History Family History Social History   Past Medical History:   Diagnosis Date    AAA (abdominal aortic aneurysm)     Basal cell carcinoma     BPH (benign prostatic hypertrophy)     Cancer (H)     Skin on R.chest wall.    Carcinoid tumor (H)     Chest discomfort     Coronary artery disease     Detached retina, left 10/01/2014    Hyperlipidemia     Hypertension     Melanoma of skin (H)     PAD (peripheral artery disease)     Pain of left lower leg 12/15/2023    Pain of left upper arm 10/10/2023    Palpitations     Paroxysmal atrial fibrillation (H)     Popliteal artery aneurysm     Pseudoaneurysm     Thrombosis     Thyroid nodule      Past Surgical History:   Procedure Laterality Date    BYPASS GRAFT ARTERY CORONARY N/A 12/28/2015    Procedure: CORONARY ARTERY BYPASS x 3, RIGHT ENDOSCOPIC VEIN HARVEST, LEFT INTERNAL MAMMARY ARTERY, ANESTHESIA TRANSESOPHAGEAL ECHOCARDIOGRAM;  Surgeon: Jayson Alvarado MD;  Location: [facility];  Service:     CARDIAC SURGERY      CATARACT EXTRACTION  01/01/2015    CYST REMOVAL      Ganglion cyst removal of both wrist    ESTABLISHMENT, VASCULAR ACCESS, FEM APPROACH, FOR ENDOVASC STENT INSERTION INTO ABD AORTIC ANEURYSM Bilateral 5/21/2025    Procedure: BILATERAL ESTABLISHMENT, VASCULAR ACCESS, FEMORAL APPROACH, FOR ENDOVASCULAR STENT INSERTION INTO ABDOMINAL AORTIC ANEURYSM;  Surgeon: Laverne Crum MD;  Location: [facility]    EYE SURGERY      FEMORAL ARTERY - TIBIAL ARTERY BYPASS GRAFT Left 10/18/2024    Procedure: CREATION, BYPASS, ARTERIAL, FEMORAL TO TIBIAL, LEFT;  Surgeon: Laverne Crum MD;  Location: [facility]    HC REMOVAL PREPATELLA BURSA      Description: Excision Of Prepatellar Bursa;  Recorded: 09/30/2014;    HC REPAIR OF NASAL SEPTUM      Description: Septoplasty;  Recorded:  2014;    HERNIA REPAIR, UMBILICAL      IR LOWER EXTREMITY ANGIOGRAM LEFT  2023    IR LOWER EXTREMITY ANGIOGRAM LEFT  2024    IR OR ANGIOGRAM  2025    IR THROMBOLYSIS ARTERIAL INFUSION INITIAL DAY  2023    PARS PLANA VITRECTOMY W/ REPAIR OF MACULAR HOLE  2014    LA EXCIS TENDON SHEATH LESION, HAND/FINGER      Description: Hand Excision Of A Tendon Cyst;  Recorded: 2011;    LA LAP, VENTRAL HERNIA REPAIR,REDUCIBLE      Description: Laparoscopy Repair Of Umbilical Hernia;  Recorded: 2011;    PSEUDOANEURYSM REPAIR Right 2024    Procedure: REPAIR, PSEUDOANEURYSM, ARTERY, FEMORAL RIGHT;  Surgeon: Laverne Crum MD;  Location: [facility]    RETINAL DETACHMENT SURGERY  10/01/2014    SKIN CANCER EXCISION  2015    right chest    THORACOSCOPY Right 2016    Procedure: RIGHT VIDEO ASSISTED THORACOSCOPY, RIGHT LOBECTOMY;  Surgeon: Vinny Chase MD;  Location: [facility];  Service:     VASECTOMY       Family History   Problem Relation Age of Onset    Acute Myocardial Infarction Mother     Aneurysm Mother         brain    Acute Myocardial Infarction Father     Colitis Brother     Abdominal Aortic Aneurysm Brother         had surgery and was supposed to have a second - time ran out    Leukemia Brother     Other - See Comments Maternal Grandmother          of hemorrhage after childbirht    Pneumonia Maternal Grandfather     No Known Problems Paternal Grandmother     Colon Cancer Paternal Grandfather     Lung Cancer Cousin     Coronary Artery Disease Cousin         Social History     Socioeconomic History    Marital status:      Spouse name: Not on file    Number of children: Not on file    Years of education: Not on file    Highest education level: Not on file   Occupational History    Not on file   Tobacco Use    Smoking status: Never     Passive exposure: Never (per pt)    Smokeless tobacco: Never   Vaping Use    Vaping  status: Never Used   Substance and Sexual Activity    Alcohol use: Yes     Alcohol/week: 8.0 standard drinks of alcohol     Types: 8 Standard drinks or equivalent per week     Comment: very rarely    Drug use: No    Sexual activity: Yes     Partners: Female     Birth control/protection: Male Surgical   Other Topics Concern    Parent/sibling w/ CABG, MI or angioplasty before 65F 55M? Yes   Social History Narrative    Not on file     Social Drivers of Health     Financial Resource Strain: Low Risk  (5/21/2025)    Financial Resource Strain     Within the past 12 months, have you or your family members you live with been unable to get utilities (heat, electricity) when it was really needed?: No   Food Insecurity: Low Risk  (5/21/2025)    Food Insecurity     Within the past 12 months, did you worry that your food would run out before you got money to buy more?: No     Within the past 12 months, did the food you bought just not last and you didn t have money to get more?: No   Transportation Needs: Low Risk  (5/21/2025)    Transportation Needs     Within the past 12 months, has lack of transportation kept you from medical appointments, getting your medicines, non-medical meetings or appointments, work, or from getting things that you need?: No   Physical Activity: Not on file   Stress: Not on file   Social Connections: Not on file   Interpersonal Safety: Low Risk  (5/21/2025)    Interpersonal Safety     Do you feel physically and emotionally safe where you currently live?: Yes     Within the past 12 months, have you been hit, slapped, kicked or otherwise physically hurt by someone?: No     Within the past 12 months, have you been humiliated or emotionally abused in other ways by your partner or ex-partner?: No   Housing Stability: Low Risk  (5/21/2025)    Housing Stability     Do you have housing? : Yes     Are you worried about losing your housing?: No           Medications  Allergies   Current Outpatient Medications  "  Medication Sig Dispense Refill    acetaminophen (TYLENOL) 500 MG tablet [ACETAMINOPHEN (TYLENOL) 500 MG TABLET] Take 500 mg by mouth every 6 (six) hours as needed for pain.      amLODIPine (NORVASC) 10 MG tablet Take 1 tablet (10 mg) by mouth daily. 90 tablet 2    aspirin 81 MG EC tablet Take 81 mg by mouth daily.      metoprolol tartrate (LOPRESSOR) 50 MG tablet Take 1 tablet (50 mg) by mouth 2 times daily. 180 tablet 1    tamsulosin (FLOMAX) 0.4 MG capsule TAKE 1 CAPSULE BY MOUTH DAILY AFTER BREAKFAST 90 capsule 3    Vitamin D3 (VITAMIN D, CHOLECALCIFEROL,) 25 mcg (1000 units) tablet Take 1 tablet (25 mcg) by mouth daily. 90 tablet 1    warfarin ANTICOAGULANT (COUMADIN) 1 MG tablet Take by mouth See Admin Instructions. Take 3 mg on Wednesdays, Take 4 mg all other days of the week         Allergies   Allergen Reactions    Niacin Hives and Rash     Burning of the skin    Atorvastatin Muscle Pain (Myalgia)    Pravastatin Muscle Pain (Myalgia)          Lab Results    Chemistry/lipid CBC Cardiac Enzymes/BNP/TSH/INR   Recent Labs   Lab Test 05/22/25  0659   CHOL 161   HDL 36*   *   TRIG 102     Recent Labs   Lab Test 05/22/25  0659 11/04/24  0933 01/10/24  0750   * 93 77     Recent Labs   Lab Test 06/04/25  1808      POTASSIUM 3.9   CHLORIDE 107   CO2 22   GLC 90   BUN 24.4*   CR 1.07   GFRESTIMATED 72   BIGG 9.6     Recent Labs   Lab Test 06/04/25 1808 05/22/25  0659 05/21/25  0606   CR 1.07 1.10 1.35*     Recent Labs   Lab Test 12/15/23  2025 05/23/22  0942   A1C 5.5 5.6      Recent Labs   Lab Test 06/04/25  1808   WBC 7.5   HGB 14.4   HCT 43.3   MCV 88        Recent Labs   Lab Test 06/04/25 1808 05/22/25  0659 05/21/25  0606   HGB 14.4 12.5* 14.7    No results for input(s): \"TROPONINI\" in the last 75462 hours.  No results for input(s): \"BNP\", \"NTBNPI\", \"NTBNP\" in the last 82588 hours.  Recent Labs   Lab Test 10/25/18  1124   TSH 1.25     Recent Labs   Lab Test 06/27/25  1336 " 06/13/25  1343 06/04/25  1808   INR 2.9* 2.8* 2.52*      The longitudinal plan of care for the diagnosis(es)/condition(s) as documented were addressed during this visit. Due to the added complexity in care, I will continue to support Jack in the subsequent management and with ongoing continuity of care.

## 2025-07-23 NOTE — PROGRESS NOTES
ANTICOAGULATION MANAGEMENT     Jack Brown 74 year old male is on warfarin with therapeutic INR result. (Goal INR 2.0-3.0)    Recent labs: (last 7 days)     07/23/25  0741   INR 2.9*       ASSESSMENT     Source(s): Chart Review  Previous INR was Therapeutic last 2(+) visits  Medication, diet, health changes since last INR: chart reviewed; none identified         PLAN     Recommended plan for no diet, medication or health factor changes affecting INR     Dosing Instructions: Continue your current warfarin dose with next INR in 4 weeks       Summary  As of 7/23/2025      Full warfarin instructions:  3 mg every Wed; 4 mg all other days   Next INR check:  8/20/2025               Detailed voice message left for Jack with dosing instructions and follow up date.     Contact [phone] to schedule and with any changes, questions or concerns.     Education provided: Please call back if any changes to your diet, medications or how you've been taking warfarin    Plan made per [facility] anticoagulation protocol    Felisa Carnes RN  7/23/2025  Anticoagulation Clinic  [facility] for routing messages: jovani MORENO [facility] patient phone line: [phone]        _______________________________________________________________________     Anticoagulation Episode Summary       Current INR goal:  2.0-3.0   TTR:  79.0% (10.8 mo)   Target end date:  Indefinite   Send INR reminders to:  TIFFANIE Helton    Indications    Arterial occlusion [I70.90]  Paroxysmal atrial fibrillation (H) [I48.0]  Iliac aneurysm [I72.3]             Comments:  --             Anticoagulation Care Providers       Provider Role Specialty Phone number    Mercedes Adorno MD Referring Family Medicine [phone]    Solomon Jennings MD Referring Vascular Surgery [phone]

## 2025-07-23 NOTE — LETTER
7/23/2025    Mercedes Adorno MD  [address] [place] [address]    RE: Jack Brown       Dear Colleague,     I had the pleasure of seeing Jack Brown in the [facility].    HEART CARE ELECTROPHYSIOLOGY NOTE      [facility]  [phone]      Assessment/Recommendations   Assessment/Plan:  1.  Paroxysmal Atrial Fibrillation: No symptomatology or evidence of AF recurrence.  Only documented episode of PAF-RVR noted on telemetry 12/17/2023.  Appears to be asymptomatic.  ZIO monitor shows very frequent atrial ectopy which is a risk factor for recurrent A-fib.  He remains off membrane active antiarrhythmic medications.  We reviewed the natural progression of atrial fibrillation and treatment options.  Recommend sotalol versus ablation for recurrent AF.  -- Daily pulse checks; call with recurrent AF for further evaluation and management     He was reassured that atrial fibrillation is not life-threatening, but carries an increased risk for stroke.  He has a [id number] score of 6 for age >75, vascular disease, HTN, thrombotic event.     -- Continue warfarin for stroke prophylaxis, goal INR 2.0-3.0.      2.  Coronary artery disease: Three-vessel CABG in 2015.  Exercise nuclear stress testing 10/16/2023 negative for ischemia.  Denies anginal symptoms.  On aspirin.  Statin discontinued 5/2025 due to possible side effects.  Plan for discussing PCSK9 with Dr. Estrada at follow-up now that he has recovered from surgery.     3.  Hypertension: Controlled with amlodipine and metoprolol.    Follow up with Dr. Estrada 10/14/2025  Follow-up with me in 1 year       History of Present Illness/Subjective    HPI: Jack Brown is a 74 year old male who comes in today accompanied by his wife for EP follow-up of atrial fibrillation.  He has a history of paroxysmal atrial fibrillation, coronary artery disease (CABG x 3V in 2015: LIMA-LAD, SVG-OM, SVG-RCA), hypertension,  hyperlipidemia, lung cancer  s/p right middle lobectomy 4/2016, peripheral arterial disease with acute lower extremity thrombosis, bilateral common iliac aneurysm, infrarenal abdominal aortic aneurysm.  Followed by vascular surgery; surgical repair of right CFA pseudoaneurysm on 5/1/2024, s/p left leg angiogram/stent 8/21/2024, abdominal aortic aneurysm endovascular stent 5/21/2025.     Arrhythmia history  Dx/date: PAF-RVR diagnosed 12/17/2023 (seen on telemetry).  Converted to sinus rhythm on amiodarone.   Sx: Palpitations  [id number] score: 6 for age 73, vascular disease, HTN, thrombotic event  Oral anticoagulation: Warfarin  Antiarrhythmic medications, AV michel blocking agents: Amiodarone discontinued 1/26/2024  Procedures  DCCV: N/A  Ablation: N/A     Jack states that he is feells well.  He has not had any A-fib of which he is aware.  No AF noted in during recent hospitalization or MCT.  He denies chest discomfort, palpitations, abdominal fullness/bloating or peripheral edema, shortness of breath, paroxysmal nocturnal dyspnea, orthopnea, lightheadedness, dizziness, pre-syncope, or syncope.     Cardiographics (EKG personally reviewed):  EKG done 5/1/2025 shows sinus rhythm at 85 bpm, bigeminal PACs,  ms, QT/QTc 398/473 ms  EKG done 10/1/2024 shows sinus rhythm at 61 bpm, first-degree AV delay,  ms, QT/QTc 434/436 ms  EKG done 12/18/2023 shows sinus rhythm at 87 bpm, PACs, QRS 98 ms, QT/QTc 336/404 ms     Zio monitoring from 5/30/2025 to 6/2/2025 (duration 3d 1h).  Predominant underlying rhythm was sinus rhythm, 49 to 109bpm, average 79bpm.  No atrial fibrillation.  There were no pauses of greater than 3 seconds.  Frequent supraventricular ectopic beats (19.1%).  1082 Supraventricular Tachycardia runs occurred, the run with the fastest interval lasting 9 beats with a max rate of 200 bpm, the longest lasting 19.5 secs with an avg rate of 117 bpm.   Rare premature ventricular contractions  "(<1%).  Symptom triggers - none.    TTE done 12/19/2023:  Left ventricular size, wall motion and function are normal. The ejection  fraction is 55-60%.  The left atrium is mildly dilated.  There is mild (1+) aortic regurgitation.     NM stress test done 10/16/2023:     The exercise nuclear stress test is negative for inducible myocardial ischemia or infarction.     The exercise stress electrocardiogram is negative for inducible ischemic EKG changes.     The patient's exercise capacity is average for age and gender. The patient exercised for 6:01 minutes on the Florencio protocol, achieving 7.3 METs and 103% the age-predicted maximum heart rate. The patient did not experience any symptoms.     The left ventricular ejection fraction at stress is 52%.     The patient is at a low risk of future cardiac ischemic events.     A prior study was conducted on 5/17/2007.  Prior images were unavailable for comparison review.    I have reviewed and updated the patient's Past Medical History, Social History, Family History and Medication List.  Outside records personally reviewed.     Physical Examination  Review of Systems   Vitals: /82 (BP Location: Right arm, Patient Position: Sitting, Cuff Size: Adult Regular)   Pulse 53   Resp 16   Ht 1.651 m (5' 5\")   Wt 74.4 kg (164 lb)   BMI 27.29 kg/m    BMI= Body mass index is 27.29 kg/m .  Wt Readings from Last 3 Encounters:   07/23/25 74.4 kg (164 lb)   07/08/25 72.1 kg (158 lb 14.4 oz)   06/04/25 71.7 kg (158 lb 1.6 oz)       General Appearance:   Alert, well-appearing and in no acute distress.   HEENT: Atraumatic, normocephalic.  No scleral icterus, normal conjunctivae, EOMs intact, PERRL.  Mucous membranes pink and moist.     Chest/Lungs:   Chest symmetric, spine straight.  Respirations unlabored.  Lungs are clear to auscultation.   Cardiovascular:   Regular rate and rhythm.  Normal first and second heart sounds with no murmurs, rubs, or gallops; radial pulses are intact, No " edema.   Abdomen:  Soft, nondistended.   Extremities: No cyanosis or clubbing.   Musculoskeletal: Moves all extremities.    Skin: Warm, dry, intact.    Neurologic: Mood and affect are appropriate.  Alert and oriented to person, place, time, and situation.     ROS: 10 point ROS neg other than the symptoms noted above in the HPI.         Medical History  Surgical History Family History Social History   Past Medical History:   Diagnosis Date     AAA (abdominal aortic aneurysm)      Basal cell carcinoma      BPH (benign prostatic hypertrophy)      Cancer (H)     Skin on R.chest wall.     Carcinoid tumor (H)      Chest discomfort      Coronary artery disease      Detached retina, left 10/01/2014     Hyperlipidemia      Hypertension      Melanoma of skin (H)      PAD (peripheral artery disease)      Pain of left lower leg 12/15/2023     Pain of left upper arm 10/10/2023     Palpitations      Paroxysmal atrial fibrillation (H)      Popliteal artery aneurysm      Pseudoaneurysm      Thrombosis      Thyroid nodule      Past Surgical History:   Procedure Laterality Date     BYPASS GRAFT ARTERY CORONARY N/A 12/28/2015    Procedure: CORONARY ARTERY BYPASS x 3, RIGHT ENDOSCOPIC VEIN HARVEST, LEFT INTERNAL MAMMARY ARTERY, ANESTHESIA TRANSESOPHAGEAL ECHOCARDIOGRAM;  Surgeon: Jayson Alvarado MD;  Location: [facility];  Service:      CARDIAC SURGERY       CATARACT EXTRACTION  01/01/2015     CYST REMOVAL      Ganglion cyst removal of both wrist     ESTABLISHMENT, VASCULAR ACCESS, FEM APPROACH, FOR ENDOVASC STENT INSERTION INTO ABD AORTIC ANEURYSM Bilateral 5/21/2025    Procedure: BILATERAL ESTABLISHMENT, VASCULAR ACCESS, FEMORAL APPROACH, FOR ENDOVASCULAR STENT INSERTION INTO ABDOMINAL AORTIC ANEURYSM;  Surgeon: Laverne Crum MD;  Location: [facility] [place]     EYE SURGERY       FEMORAL ARTERY - TIBIAL ARTERY BYPASS GRAFT Left 10/18/2024    Procedure: CREATION, BYPASS, ARTERIAL, FEMORAL TO TIBIAL,  LEFT;  Surgeon: Laverne Crum MD;  Location: [facility] [place]     HC REMOVAL PREPATELLA BURSA      Description: Excision Of Prepatellar Bursa;  Recorded: 2014;     HC REPAIR OF NASAL SEPTUM      Description: Septoplasty;  Recorded: 2014;     HERNIA REPAIR, UMBILICAL       IR LOWER EXTREMITY ANGIOGRAM LEFT  2023     IR LOWER EXTREMITY ANGIOGRAM LEFT  2024     IR OR ANGIOGRAM  2025     IR THROMBOLYSIS ARTERIAL INFUSION INITIAL DAY  2023     PARS PLANA VITRECTOMY W/ REPAIR OF MACULAR HOLE  2014     NY EXCIS TENDON SHEATH LESION, HAND/FINGER      Description: Hand Excision Of A Tendon Cyst;  Recorded: 2011;     NY LAP, VENTRAL HERNIA REPAIR,REDUCIBLE      Description: Laparoscopy Repair Of Umbilical Hernia;  Recorded: 2011;     PSEUDOANEURYSM REPAIR Right 2024    Procedure: REPAIR, PSEUDOANEURYSM, ARTERY, FEMORAL RIGHT;  Surgeon: Laverne Crum MD;  Location: [facility] [place]     RETINAL DETACHMENT SURGERY  10/01/2014     SKIN CANCER EXCISION  2015    right chest     THORACOSCOPY Right 2016    Procedure: RIGHT VIDEO ASSISTED THORACOSCOPY, RIGHT LOBECTOMY;  Surgeon: Vinny Chase MD;  Location: [facility];  Service:      VASECTOMY       Family History   Problem Relation Age of Onset     Acute Myocardial Infarction Mother      Aneurysm Mother         brain     Acute Myocardial Infarction Father      Colitis Brother      Abdominal Aortic Aneurysm Brother         had surgery and was supposed to have a second - time ran out     Leukemia Brother      Other - See Comments Maternal Grandmother          of hemorrhage after childbirht     Pneumonia Maternal Grandfather      No Known Problems Paternal Grandmother      Colon Cancer Paternal Grandfather      Lung Cancer Cousin      Coronary Artery Disease Cousin         Social History     Socioeconomic History     Marital status:      Spouse name: Not on  file     Number of children: Not on file     Years of education: Not on file     Highest education level: Not on file   Occupational History     Not on file   Tobacco Use     Smoking status: Never     Passive exposure: Never (per pt)     Smokeless tobacco: Never   Vaping Use     Vaping status: Never Used   Substance and Sexual Activity     Alcohol use: Yes     Alcohol/week: 8.0 standard drinks of alcohol     Types: 8 Standard drinks or equivalent per week     Comment: very rarely     Drug use: No     Sexual activity: Yes     Partners: Female     Birth control/protection: Male Surgical   Other Topics Concern     Parent/sibling w/ CABG, MI or angioplasty before 65F 55M? Yes   Social History Narrative     Not on file     Social Drivers of Health     Financial Resource Strain: Low Risk  (5/21/2025)    Financial Resource Strain      Within the past 12 months, have you or your family members you live with been unable to get utilities (heat, electricity) when it was really needed?: No   Food Insecurity: Low Risk  (5/21/2025)    Food Insecurity      Within the past 12 months, did you worry that your food would run out before you got money to buy more?: No      Within the past 12 months, did the food you bought just not last and you didn t have money to get more?: No   Transportation Needs: Low Risk  (5/21/2025)    Transportation Needs      Within the past 12 months, has lack of transportation kept you from medical appointments, getting your medicines, non-medical meetings or appointments, work, or from getting things that you need?: No   Physical Activity: Not on file   Stress: Not on file   Social Connections: Not on file   Interpersonal Safety: Low Risk  (5/21/2025)    Interpersonal Safety      Do you feel physically and emotionally safe where you currently live?: Yes      Within the past 12 months, have you been hit, slapped, kicked or otherwise physically hurt by someone?: No      Within the past 12 months, have you  been humiliated or emotionally abused in other ways by your partner or ex-partner?: No   Housing Stability: Low Risk  (5/21/2025)    Housing Stability      Do you have housing? : Yes      Are you worried about losing your housing?: No           Medications  Allergies   Current Outpatient Medications   Medication Sig Dispense Refill     acetaminophen (TYLENOL) 500 MG tablet [ACETAMINOPHEN (TYLENOL) 500 MG TABLET] Take 500 mg by mouth every 6 (six) hours as needed for pain.       amLODIPine (NORVASC) 10 MG tablet Take 1 tablet (10 mg) by mouth daily. 90 tablet 2     aspirin 81 MG EC tablet Take 81 mg by mouth daily.       metoprolol tartrate (LOPRESSOR) 50 MG tablet Take 1 tablet (50 mg) by mouth 2 times daily. 180 tablet 1     tamsulosin (FLOMAX) 0.4 MG capsule TAKE 1 CAPSULE BY MOUTH DAILY AFTER BREAKFAST 90 capsule 3     Vitamin D3 (VITAMIN D, CHOLECALCIFEROL,) 25 mcg (1000 units) tablet Take 1 tablet (25 mcg) by mouth daily. 90 tablet 1     warfarin ANTICOAGULANT (COUMADIN) 1 MG tablet Take by mouth See Admin Instructions. Take 3 mg on Wednesdays, Take 4 mg all other days of the week         Allergies   Allergen Reactions     Niacin Hives and Rash     Burning of the skin     Atorvastatin Muscle Pain (Myalgia)     Pravastatin Muscle Pain (Myalgia)          Lab Results    Chemistry/lipid CBC Cardiac Enzymes/BNP/TSH/INR   Recent Labs   Lab Test 05/22/25  0659   CHOL 161   HDL 36*   *   TRIG 102     Recent Labs   Lab Test 05/22/25  0659 11/04/24  0933 01/10/24  0750   * 93 77     Recent Labs   Lab Test 06/04/25  1808      POTASSIUM 3.9   CHLORIDE 107   CO2 22   GLC 90   BUN 24.4*   CR 1.07   GFRESTIMATED 72   BIGG 9.6     Recent Labs   Lab Test 06/04/25  1808 05/22/25  0659 05/21/25  0606   CR 1.07 1.10 1.35*     Recent Labs   Lab Test 12/15/23  2025 05/23/22  0942   A1C 5.5 5.6      Recent Labs   Lab Test 06/04/25  1808   WBC 7.5   HGB 14.4   HCT 43.3   MCV 88        Recent Labs   Lab Test  "06/04/25  1808 05/22/25  0659 05/21/25  0606   HGB 14.4 12.5* 14.7    No results for input(s): \"TROPONINI\" in the last 70159 hours.  No results for input(s): \"BNP\", \"NTBNPI\", \"NTBNP\" in the last 43599 hours.  Recent Labs   Lab Test 10/25/18  1124   TSH 1.25     Recent Labs   Lab Test 06/27/25  1336 06/13/25  1343 06/04/25  1808   INR 2.9* 2.8* 2.52*      The longitudinal plan of care for the diagnosis(es)/condition(s) as documented were addressed during this visit. Due to the added complexity in care, I will continue to support Jack in the subsequent management and with ongoing continuity of care.                                          Thank you for allowing me to participate in the care of your patient.      Sincerely,     LILI Concepcion CNP     [facility]  cc:   LILI Concepcion CNP  [address], [address]  [place] [address]      "

## 2025-07-23 NOTE — PATIENT INSTRUCTIONS
Jcak Brown,    It was a pleasure to see you today at the [facility].     My recommendations after this visit include:    No changes recommended    Check pulse, call for frequent or prolonged A fib (irregularly irregular pulse)    Follow up with Dr. Estrada 10/14/2025  Follow-up with me in 1 year    Funmilayo Jacob, CNP  [facility], Electrophysiology  [phone]  EP nurses [phone]

## 2025-08-06 ENCOUNTER — PATIENT OUTREACH (OUTPATIENT)
Dept: ONCOLOGY | Facility: HOSPITAL | Age: 75
End: 2025-08-06
Payer: MEDICARE

## 2025-08-20 ENCOUNTER — LAB (OUTPATIENT)
Dept: LAB | Facility: CLINIC | Age: 75
End: 2025-08-20
Payer: MEDICARE

## 2025-08-20 ENCOUNTER — ANTICOAGULATION THERAPY VISIT (OUTPATIENT)
Dept: ANTICOAGULATION | Facility: CLINIC | Age: 75
End: 2025-08-20

## 2025-08-20 DIAGNOSIS — I72.3 ILIAC ANEURYSM: ICD-10-CM

## 2025-08-20 DIAGNOSIS — I48.0 PAROXYSMAL ATRIAL FIBRILLATION (H): ICD-10-CM

## 2025-08-20 DIAGNOSIS — I70.90 ARTERIAL OCCLUSION: Primary | ICD-10-CM

## 2025-08-20 DIAGNOSIS — I70.90 ARTERIAL OCCLUSION: ICD-10-CM

## 2025-08-20 LAB — INR BLD: 2.5 (ref 0.9–1.1)

## 2025-08-20 PROCEDURE — 36416 COLLJ CAPILLARY BLOOD SPEC: CPT

## 2025-08-20 PROCEDURE — 85610 PROTHROMBIN TIME: CPT

## (undated) DEVICE — ESU GROUND PAD ADULT REM W/15' CORD E7507DB

## (undated) DEVICE — WIRE GUIDE AMPLATZ SUPER STIFF 0.035"X260CM STR M001465090

## (undated) DEVICE — SOL NACL 0.9% IRRIG 1000ML BOTTLE 2F7124

## (undated) DEVICE — DRAPE U SPLIT 74X120" 29440

## (undated) DEVICE — GLOVE BIOGEL PI SZ 8.0 40880

## (undated) DEVICE — SOL NACL 0.9% INJ 1000ML BAG 2B1324X

## (undated) DEVICE — DRAPE IOBAN INCISE 36X23" 6651EZ

## (undated) DEVICE — DRSG TEGADERM 4X4 3/4" 1626W

## (undated) DEVICE — Device

## (undated) DEVICE — NDL PERC ENTRY 19GA 7CM G04876

## (undated) DEVICE — SOL WATER IRRIG 1000ML BOTTLE 2F7114

## (undated) DEVICE — DRAPE TIBURON CARDIOVASCULAR PERI-GROIN LF 9154

## (undated) DEVICE — DRAPE CV SPLIT 110X36" 89452

## (undated) DEVICE — RX SURGIFLO HEMOSTATIC MATRIX W/THROMBIN 8ML 2994

## (undated) DEVICE — DRAPE STERI TOWEL LG 1010

## (undated) DEVICE — SUTURE PROLENE 5-0 RB-2 8555H

## (undated) DEVICE — SU PROLENE 6-0 BV-1 DA 24" 8805H

## (undated) DEVICE — GOWN SURGICAL SMARTGOWN 2XL 89075

## (undated) DEVICE — VESSEL LOOP BLUE MINI 30-713

## (undated) DEVICE — CELL SAVER

## (undated) DEVICE — BASIN EMESIS STERILE  SSK9005A

## (undated) DEVICE — TOWEL SURG 16INW X 26INL WHITE STRL XRAY DETECTABLE X8314W

## (undated) DEVICE — SU MONOCRYL+ 4-0 18IN PS2 UND MCP496G

## (undated) DEVICE — SU PROLENE 7-0 BV-1DA 4X18" M8701

## (undated) DEVICE — BLADE CLIPPER DISP 4406

## (undated) DEVICE — SYR ANGIO CONTRAST  150-FT-Q

## (undated) DEVICE — DEVICE MULTI TORQUE TD01

## (undated) DEVICE — TAPE UMBILICAL COTTON 30X1/16IN 2 STRAND 8619-03A 8886861903

## (undated) DEVICE — SU PROLENE 6-0 24" C-1 DA TAPER POINT BLUE 8726H

## (undated) DEVICE — VIAL DECANTER STERILE WHITE DYNJDEC06

## (undated) DEVICE — SUTURE MONOCRYL+ 4-0 PS-2 27IN MCP426H

## (undated) DEVICE — SU DERMABOND ADVANCED .7ML DNX12

## (undated) DEVICE — CUSTOM PACK PERIPH VASCULAR SCV5BPVHEA

## (undated) DEVICE — SU VICRYL+ 3-0 27IN SH UND VCP416H

## (undated) DEVICE — LIGACLIP MEDIUM AESCULAP B2180-1

## (undated) DEVICE — SU SILK 3-0 SH 30" K832H

## (undated) DEVICE — CLIP HORIZON MULTI SM YELLOW 001204

## (undated) DEVICE — KIT TURNOVER FAIRVIEW SOUTHDALE FULL SP3889

## (undated) DEVICE — PREP CHLORAPREP 26ML TINTED HI-LITE ORANGE 930815

## (undated) DEVICE — CATH TRAY FOLEY 16FR W/METER A800365

## (undated) DEVICE — SYR 50ML LL W/O NDL 309653

## (undated) DEVICE — SU VICRYL+ 3-0 CT1 36IN UND VCP944H

## (undated) DEVICE — LIGACLIP LARGE AESCULAP O4120-1

## (undated) DEVICE — CLIP SPRING FOGARTY SOFTJAW CSOFT6

## (undated) DEVICE — MITT PRE-OP 7 L X 5 1/2 W 5177M1

## (undated) DEVICE — SYR KIT ANGIO YELLOW CONTRAST 10ML  K01-60102

## (undated) DEVICE — SUTURE SILK 0 TIES 30IN SA86G

## (undated) DEVICE — WIRE GUIDE LUNDERQUIST 0.035"X260CM DBL CVD

## (undated) DEVICE — CATH FOGARTY EMBOLECTOMY 4FR 40CM LATEX 120404F

## (undated) DEVICE — PITCHER STERILE 1000ML  SSK9004A

## (undated) DEVICE — SUTURE BOOTS 051003PBX

## (undated) DEVICE — DRAPE C-ARMOR 5 SIDED 5523

## (undated) DEVICE — CUSTOM PACK GEN MAJOR SBA5BGMHEA

## (undated) DEVICE — LOOP RETRACT-O-TAPE 16GA X 18 QUEST 1012

## (undated) DEVICE — GOWN IMPERVIOUS BREATHABLE SMART XLG 89045

## (undated) DEVICE — GEL ULTRASOUND AQUASONIC 20GM 01-01

## (undated) DEVICE — ESU PENCIL SMOKE EVAC W/ROCKER SWITCH 0703-047-000

## (undated) DEVICE — SYR 10ML LL W/O NDL 302995

## (undated) DEVICE — SPONGE RAY-TEC 4X8" 7318

## (undated) DEVICE — INTRO MICRO MINI STICK 4FR STIFF NITINOL 45-753

## (undated) DEVICE — CATH NAVICROSS SUPPORT 12MMX90CM STR NC35900

## (undated) DEVICE — DRAPE ISOLATION BAG 1003

## (undated) DEVICE — ESU ELEC BLADE 2.75" COATED/INSULATED E1455

## (undated) DEVICE — DRSG TEGADERM 2 3/8X2 3/4" 1624W

## (undated) DEVICE — NEEDLE HYPO MAGELLAN SAFETY 22GA 1 1/2IN 8881850215

## (undated) DEVICE — SU PROLENE 6-0 C-1DA 18" M8718

## (undated) DEVICE — CUSTOM PACK SPEC PROCEDURE SAN5BSPHEA

## (undated) DEVICE — CONNECTOR 5 TO 1 STRL 271502

## (undated) DEVICE — CATH BALLOON RELIANT STENT GRAFT 8FR REL46

## (undated) DEVICE — PACK MINOR SINGLE BASIN SSK3001

## (undated) DEVICE — RAD INFLATOR BASIC COMPAK  IN4130

## (undated) DEVICE — GLOVE SURG GAMMEX PI POLYISOPRENE WHITE SZ 8 LF 20685780

## (undated) DEVICE — SU PROLENE 5-0 RB-2 85550H

## (undated) DEVICE — CATH BALLOON ATLAS 6.5FR 14MMX4X75CM AT75144

## (undated) DEVICE — ADH LIQUID MASTISOL TOPICAL VIAL 2-3ML 0523-48

## (undated) DEVICE — DECANTER BAG 2002S

## (undated) DEVICE — SUCTION MANIFOLD NEPTUNE 2 SYS 1 PORT 702-025-000

## (undated) DEVICE — CLIP HORIZON MULTI MED BLUE 002204

## (undated) DEVICE — CLOSURE DEVICE 6FR VASC PROGLIDE MEDICATED SUTURE 12673-03

## (undated) DEVICE — SU ETHILON 3-0 FS-1 18" 669H

## (undated) DEVICE — WIPE MICRO-TIP GRAPHIC CONTROL 3300

## (undated) DEVICE — DRAIN BLAKE 19FR SIL 2231

## (undated) DEVICE — PATCH SURGICAL EVARREST FIBRIN SEALANT 4X2IN EVT5024

## (undated) DEVICE — CLIP HORIZON MED BLUE 002200

## (undated) DEVICE — CLIP HORIZON SM YELLOW 001200

## (undated) DEVICE — SU SILK 2-0 TIE 18' A185H

## (undated) DEVICE — SU ETHIBOND 2-0 SH 30" X833H

## (undated) DEVICE — BAG DECANTER STERILE WHITE DYNJDEC09

## (undated) DEVICE — GLIDEWIRE ADVANTAGE 25CM .035 180CM GA3501

## (undated) DEVICE — DRAPE IOBAN INCISE 23X17" 6650EZ

## (undated) DEVICE — SU SILK 4-0 TIE 18' A183H

## (undated) DEVICE — SUTURE VICRYL+ 2-0 27IN CT-1 UND VCP259H

## (undated) DEVICE — SU PROLENE 6-0 C-1DA 30" M8706

## (undated) DEVICE — CARDIO VASC SHEET 9162

## (undated) DEVICE — MARKER SURG SKIN 2 TIP STRL SPP99DT2AA

## (undated) DEVICE — INSERT JAW 33MM SOFTJAW SOFT33

## (undated) DEVICE — SUTURE SILK 2-0 TIES 30IN SA85H

## (undated) DEVICE — GLOVE LINER COTTON MED 32-2076

## (undated) DEVICE — SPONGE LAP 18X18" X8435

## (undated) DEVICE — NEEDLE HYPO 21GA X 1-1/2 SAFETY 305917

## (undated) DEVICE — DRAIN RESERVOIR 100ML JP 0070740

## (undated) DEVICE — ADAPTER CATH 403250

## (undated) DEVICE — DRAPE STERI FLUOROSCOPE 35X43"1012 LATEX FREE

## (undated) DEVICE — SYR KIT ANGIO LT BLUE SALINE 10ML K01-60083

## (undated) DEVICE — SU PROLENE 5-0 RB-2DA 30" 8710H

## (undated) DEVICE — LIGACLIP SMALL AESCULAP J1180-1

## (undated) DEVICE — SU PROLENE 7-0 BV-1DA 24" 8702H

## (undated) DEVICE — SU SILK 3-0 TIE 18" SA64H

## (undated) DEVICE — DRSG TEGADERM 4X10" 1627

## (undated) DEVICE — SUTURE SILK 3-0 TIES 30IN SA84H

## (undated) DEVICE — SYR 01ML LL W/O NDL LATEX FREE 309628

## (undated) DEVICE — DRSG GAUZE 2X2" 8042

## (undated) DEVICE — SU PROLENE 5-0 RB-1DA 36"  8556H

## (undated) DEVICE — VESSEL LOOP WHITE MAXI 30-721

## (undated) DEVICE — CATH OMNIFLUSH 5FRX100CM SIZING 13709703

## (undated) DEVICE — SURGICEL HEMOSTAT 4X8" 1952

## (undated) DEVICE — CATH ANGIO KUMPE SOFT-VU 5FRX65CM CVD H787107327015

## (undated) DEVICE — DRAPE C-ARM 60X42" 1013

## (undated) RX ORDER — HEPARIN SODIUM 1000 [USP'U]/ML
INJECTION, SOLUTION INTRAVENOUS; SUBCUTANEOUS
Status: DISPENSED
Start: 2024-05-01

## (undated) RX ORDER — PROPOFOL 10 MG/ML
INJECTION, EMULSION INTRAVENOUS
Status: DISPENSED
Start: 2024-10-18

## (undated) RX ORDER — FENTANYL CITRATE 50 UG/ML
INJECTION, SOLUTION INTRAMUSCULAR; INTRAVENOUS
Status: DISPENSED
Start: 2023-12-17

## (undated) RX ORDER — GINSENG 100 MG
CAPSULE ORAL
Status: DISPENSED
Start: 2025-05-21

## (undated) RX ORDER — LIDOCAINE HYDROCHLORIDE 10 MG/ML
INJECTION, SOLUTION INFILTRATION; PERINEURAL
Status: DISPENSED
Start: 2023-12-17

## (undated) RX ORDER — ONDANSETRON 2 MG/ML
INJECTION INTRAMUSCULAR; INTRAVENOUS
Status: DISPENSED
Start: 2024-10-18

## (undated) RX ORDER — PROPOFOL 10 MG/ML
INJECTION, EMULSION INTRAVENOUS
Status: DISPENSED
Start: 2024-05-01

## (undated) RX ORDER — FENTANYL CITRATE 50 UG/ML
INJECTION, SOLUTION INTRAMUSCULAR; INTRAVENOUS
Status: DISPENSED
Start: 2024-08-21

## (undated) RX ORDER — LIDOCAINE HYDROCHLORIDE 10 MG/ML
INJECTION, SOLUTION EPIDURAL; INFILTRATION; INTRACAUDAL; PERINEURAL
Status: DISPENSED
Start: 2024-10-18

## (undated) RX ORDER — LIDOCAINE HYDROCHLORIDE 10 MG/ML
INJECTION, SOLUTION INFILTRATION; PERINEURAL
Status: DISPENSED
Start: 2023-12-16

## (undated) RX ORDER — FENTANYL CITRATE 50 UG/ML
INJECTION, SOLUTION INTRAMUSCULAR; INTRAVENOUS
Status: DISPENSED
Start: 2024-05-01

## (undated) RX ORDER — PROPOFOL 10 MG/ML
INJECTION, EMULSION INTRAVENOUS
Status: DISPENSED
Start: 2025-05-21

## (undated) RX ORDER — PROTAMINE SULFATE 10 MG/ML
INJECTION, SOLUTION INTRAVENOUS
Status: DISPENSED
Start: 2025-05-21

## (undated) RX ORDER — HEPARIN SODIUM 1000 [USP'U]/ML
INJECTION, SOLUTION INTRAVENOUS; SUBCUTANEOUS
Status: DISPENSED
Start: 2024-08-21

## (undated) RX ORDER — PROTAMINE SULFATE 10 MG/ML
INJECTION, SOLUTION INTRAVENOUS
Status: DISPENSED
Start: 2024-10-18

## (undated) RX ORDER — DEXAMETHASONE SODIUM PHOSPHATE 10 MG/ML
INJECTION, SOLUTION INTRAMUSCULAR; INTRAVENOUS
Status: DISPENSED
Start: 2024-10-18

## (undated) RX ORDER — LIDOCAINE HYDROCHLORIDE 10 MG/ML
INJECTION, SOLUTION EPIDURAL; INFILTRATION; INTRACAUDAL; PERINEURAL
Status: DISPENSED
Start: 2023-12-19

## (undated) RX ORDER — BUPIVACAINE HYDROCHLORIDE 2.5 MG/ML
INJECTION, SOLUTION EPIDURAL; INFILTRATION; INTRACAUDAL
Status: DISPENSED
Start: 2024-05-01

## (undated) RX ORDER — HEPARIN SODIUM 1000 [USP'U]/ML
INJECTION, SOLUTION INTRAVENOUS; SUBCUTANEOUS
Status: DISPENSED
Start: 2024-10-18

## (undated) RX ORDER — ONDANSETRON 2 MG/ML
INJECTION INTRAMUSCULAR; INTRAVENOUS
Status: DISPENSED
Start: 2025-05-21

## (undated) RX ORDER — FENTANYL CITRATE 50 UG/ML
INJECTION, SOLUTION INTRAMUSCULAR; INTRAVENOUS
Status: DISPENSED
Start: 2025-05-21

## (undated) RX ORDER — ONDANSETRON 2 MG/ML
INJECTION INTRAMUSCULAR; INTRAVENOUS
Status: DISPENSED
Start: 2023-12-19

## (undated) RX ORDER — EPHEDRINE SULFATE 50 MG/ML
INJECTION, SOLUTION INTRAMUSCULAR; INTRAVENOUS; SUBCUTANEOUS
Status: DISPENSED
Start: 2024-05-01

## (undated) RX ORDER — CIPROFLOXACIN 500 MG/1
TABLET, FILM COATED ORAL
Status: DISPENSED
Start: 2021-09-22

## (undated) RX ORDER — DEXAMETHASONE SODIUM PHOSPHATE 10 MG/ML
INJECTION, SOLUTION INTRAMUSCULAR; INTRAVENOUS
Status: DISPENSED
Start: 2023-12-19

## (undated) RX ORDER — BUPIVACAINE HCL/EPINEPHRINE 0.25-.0005
VIAL (ML) INJECTION
Status: DISPENSED
Start: 2025-05-21

## (undated) RX ORDER — FENTANYL CITRATE 50 UG/ML
INJECTION, SOLUTION INTRAMUSCULAR; INTRAVENOUS
Status: DISPENSED
Start: 2023-12-16

## (undated) RX ORDER — LIDOCAINE HYDROCHLORIDE AND EPINEPHRINE 10; 10 MG/ML; UG/ML
INJECTION, SOLUTION INFILTRATION; PERINEURAL
Status: DISPENSED
Start: 2025-05-21

## (undated) RX ORDER — HEPARIN SODIUM 1000 [USP'U]/ML
INJECTION, SOLUTION INTRAVENOUS; SUBCUTANEOUS
Status: DISPENSED
Start: 2025-05-21

## (undated) RX ORDER — LIDOCAINE HYDROCHLORIDE 10 MG/ML
INJECTION, SOLUTION EPIDURAL; INFILTRATION; INTRACAUDAL; PERINEURAL
Status: DISPENSED
Start: 2025-05-21

## (undated) RX ORDER — PROPOFOL 10 MG/ML
INJECTION, EMULSION INTRAVENOUS
Status: DISPENSED
Start: 2023-12-19

## (undated) RX ORDER — DEXAMETHASONE SODIUM PHOSPHATE 10 MG/ML
INJECTION, SOLUTION INTRAMUSCULAR; INTRAVENOUS
Status: DISPENSED
Start: 2025-05-21

## (undated) RX ORDER — HEPARIN SODIUM 1000 [USP'U]/ML
INJECTION, SOLUTION INTRAVENOUS; SUBCUTANEOUS
Status: DISPENSED
Start: 2023-12-19

## (undated) RX ORDER — LIDOCAINE HYDROCHLORIDE 10 MG/ML
INJECTION, SOLUTION INFILTRATION; PERINEURAL
Status: DISPENSED
Start: 2024-08-21

## (undated) RX ORDER — FENTANYL CITRATE-0.9 % NACL/PF 10 MCG/ML
PLASTIC BAG, INJECTION (ML) INTRAVENOUS
Status: DISPENSED
Start: 2023-12-19

## (undated) RX ORDER — FENTANYL CITRATE 50 UG/ML
INJECTION, SOLUTION INTRAMUSCULAR; INTRAVENOUS
Status: DISPENSED
Start: 2024-10-18

## (undated) RX ORDER — PROTAMINE SULFATE 10 MG/ML
INJECTION, SOLUTION INTRAVENOUS
Status: DISPENSED
Start: 2024-08-21

## (undated) RX ORDER — EPHEDRINE SULFATE 50 MG/ML
INJECTION, SOLUTION INTRAMUSCULAR; INTRAVENOUS; SUBCUTANEOUS
Status: DISPENSED
Start: 2024-10-18